# Patient Record
Sex: MALE | Race: WHITE | HISPANIC OR LATINO | Employment: OTHER | URBAN - METROPOLITAN AREA
[De-identification: names, ages, dates, MRNs, and addresses within clinical notes are randomized per-mention and may not be internally consistent; named-entity substitution may affect disease eponyms.]

---

## 2017-01-12 ENCOUNTER — HOSPITAL ENCOUNTER (OUTPATIENT)
Dept: RADIOLOGY | Facility: HOSPITAL | Age: 82
Discharge: HOME | End: 2017-01-12
Attending: OTOLARYNGOLOGY
Payer: MEDICARE

## 2017-01-12 DIAGNOSIS — R05.9 COUGH: ICD-10-CM

## 2017-01-12 PROCEDURE — 71020 XR CHEST PA AND LATERAL: CPT | Mod: 26,,, | Performed by: RADIOLOGY

## 2017-01-12 PROCEDURE — 71020 XR CHEST PA AND LATERAL: CPT | Mod: TC

## 2017-02-21 ENCOUNTER — CLINICAL SUPPORT (OUTPATIENT)
Dept: SPEECH THERAPY | Facility: HOSPITAL | Age: 82
End: 2017-02-21
Attending: OTOLARYNGOLOGY
Payer: MEDICARE

## 2017-02-21 ENCOUNTER — HOSPITAL ENCOUNTER (OUTPATIENT)
Dept: RADIOLOGY | Facility: HOSPITAL | Age: 82
Discharge: HOME | End: 2017-02-21
Attending: OTOLARYNGOLOGY
Payer: MEDICARE

## 2017-02-21 DIAGNOSIS — R13.10 DYSPHAGIA: ICD-10-CM

## 2017-02-21 PROCEDURE — G8997 SWALLOW GOAL STATUS: HCPCS | Mod: CH

## 2017-02-21 PROCEDURE — 74230 X-RAY XM SWLNG FUNCJ C+: CPT | Mod: TC

## 2017-02-21 PROCEDURE — G8996 SWALLOW CURRENT STATUS: HCPCS | Mod: CH

## 2017-02-21 PROCEDURE — 92611 MOTION FLUOROSCOPY/SWALLOW: CPT

## 2017-02-21 PROCEDURE — 74230 X-RAY XM SWLNG FUNCJ C+: CPT | Mod: 26,,, | Performed by: RADIOLOGY

## 2017-02-21 PROCEDURE — G8998 SWALLOW D/C STATUS: HCPCS | Mod: CH

## 2017-02-21 NOTE — PROGRESS NOTES
TIME RECORD    Date: 02/21/2017    Start Time:  10:15  Stop Time:  10:40    PROCEDURES:    TIMED  Procedure Time Min.    Start:  Stop:     Start:  Stop:     Start:  Stop:     Start:  Stop:          UNTIMED  Procedure Time Min.   MBSS Start: 10:15  Stop: 10:40 25    Start:  Stop:      Total Timed Minutes:  25  Total Timed Units:  0  Total Untimed Units:  1  Charges Billed/# of units:  1      REHAB SERVICE VIDEO SWALLOW STUDY    HICN: MRN [id number], [id number]  Onset Date:  2/21/17  SOC Date:  2/21/17  Certification Period:  2/21/17 to 3/21/17   Primary Diagnosis:  Dysphagia, globus sensation when eating solids  Treatment Diagnosis:  n/a  Secondary Diagnosis:  n/a  Pertinent Medical History:    Past Medical History   Diagnosis Date    Arthritis     Diabetes mellitus     DJD (degenerative joint disease)     Hypertension     Prostate enlargement     Thyroid disease      Prior Therapy Dates/Results (same condition):  None prior to swallow study in radiology.  Prior Level of Function:  Independent  Functional Deficits Leading to Referral:  No reports of dysphagia prior to this MBS. Pt referred by ENT, Dr. Miguel, pt reports his primary MD is Dr. Spence  Social Cultural:  Pt lives at home, independent with ADLs, and is a  at his [facility] [employer]. Pt accompanied to MBS with son. Pt is Occitan speaking and can speak broken English. SLP was able to speak to pt in native language.   Nutrition:  Pt reported reg diet/thin liquids prior to MBS.  Signs of Abuse:  No  Medication:  n/a  Alertness/Attention:  Pt was awake and alert. Pt able to orient and state basic autobiographical information to SLP.  Oral Motor:  WFL, pt participated in oral [facility] exam prior to MBS. Pt demonstrated adequate strength and mobility for all oral structures. Pt's voice was observed to be hoarse prior to po trials, as well as after. Pt reported this was a new symptom and believes it is 2/2 to recent cold and coughing.     Patient Position:   Sitting, up right 90 degree posture in fluoro chair in lateral position.    View:  Lateral     Presentations:  Thin cup sips 5cc 1x, 10cc 3x, and 20 cc 1x, and by straw sips 10cc 2x. Pudding self fed 2x. Dental soft (diced peaches) self fed 2x. Solid (cracker) self fed 1x.  Oral Phase:  WFL. Pt demonstrated good bolus control, good mastication, and timely A/P transfer. SLP observed mild premature spillage during po trials, however it is deemed  functional for pt's recommended oral diet at this time.    Pharyngeal Phase:  Pt presents with functional pharyngeal phase of the swallow c/b slight delay of pharyngeal trigger with premature spillage on liquids. When pt consumed cyclical sips of thin from the straw, he had very trace residue in vallecula and was able to clear. However, no observable aspiration noted under fluoro across consistencies and/or pharyngeal residue post swallow. Pt demonstrated adequate epiglottic inversion, good laryngeal elevation/excursion, adequate vocal cord closure and adequate cricopharyngeal opening and relaxation to allow bolus to pass into UES.   On thin liquids, pt demonstrated one episode of mild penetration with straw sips. Pt was able to eject transient penetration from laryngeal vestibule. No material entered passed vocal cords.    Based on this MBSS, pt is deemed functional for eating and drinking and may continue with a regular diet/thin liquids at this time.    Strategies/Compensatory Techniques Utilized:  No straws to ensure safest swallow with liquids. SLP instructed pt to perform double swallows after thin liquids to eliminate vallecula residuals from consecutive straw drinking.    Impressions:  Mr. Martinez presents with a functional oral phase of the swallow. Pt also presents with functional pharyngeal phase of the swallow. Pt demonstrated adequate epiglottic inversion, good laryngeal elevation/excursion, adequate vocal cord closure and adequate cricopharyngeal opening and  relaxation to allow bolus to pass into UES.   On thin liquids, pt demonstrated one episode of mild penetration with straw sips. Pt was able to eject transient penetration from laryngeal vestibule. No material entered passed vocal cords.    Based on this MBSS, pt is deemed functional for eating and drinking and may continue with a regular diet/thin liquids at this time.  SLP recommends regular diet/thin liquids, with double swallows and no straw use to ensure safest swallow for eating and drinking. Although pt observed with mild penetration on thin liquids with use of a straw during MBS and a mild delayed swallow, pt's swallow is functional for oral diet recommended at this time. SLP to be reconsulted with any changes in status.     Recommendations/Precautions:   1. Pt may continue with regular diet/thin liquids, NO straws, double swallow, alternate bites and sips, 1 bite/sip at a time, sit upright at 90 degree angle for all meals.  2. Pt to f/u with Dr. Miguel regarding MBSS results   3. SLP educated pt on above results and son, who acknowledged all information presented to them.    Plan:   1. SLP will send report to Dr. Mcdonough as pt is scheduled for appointment for 2/22/17.  2. SLP provided pt with her contact information incase any questions should arise.    Therapist's Name: JOHN PAUL Horton SLP- Clinician    Therapist's Signature: __________JOHN PAUL HortonSLP- Clinician   Date: 02/21/2017    RADHA Montejo, [facility]-SLP  Speech Pathologist 2/22/2017     I certify that I was present in the room directing the student in service delivery and guiding them using my skilled judgment. As the co-signing therapist I have reviewed the students documentation and am responsible for the treatment, assessment, and plan.         I CERTIFY THE NEED FOR THESE SERVICES FURNISHED UNDER THIS PLAN OF TREATMENT AND WHILE UNDER MY CARE    Physician's comments:  ________________________________________________________________________________________________________________________________________________      Physician's Name: ___________________________________

## 2017-04-11 PROBLEM — I49.1 PREMATURE ATRIAL CONTRACTIONS: Status: ACTIVE | Noted: 2017-04-11

## 2017-04-11 PROBLEM — I49.3 PVCS (PREMATURE VENTRICULAR CONTRACTIONS): Status: ACTIVE | Noted: 2017-04-11

## 2017-10-22 RX ORDER — MONTELUKAST SODIUM 10 MG/1
TABLET ORAL
Qty: 90 TABLET | Refills: 0 | Status: SHIPPED | OUTPATIENT
Start: 2017-10-22 | End: 2017-10-23 | Stop reason: SDUPTHER

## 2017-10-23 RX ORDER — MONTELUKAST SODIUM 10 MG/1
10 TABLET ORAL DAILY
Qty: 90 TABLET | Refills: 0 | Status: SHIPPED | OUTPATIENT
Start: 2017-10-23 | End: 2018-06-28 | Stop reason: SDUPTHER

## 2018-01-02 ENCOUNTER — OFFICE VISIT (OUTPATIENT)
Dept: CARDIOLOGY | Facility: CLINIC | Age: 83
End: 2018-01-02
Payer: MEDICARE

## 2018-01-02 VITALS
HEART RATE: 65 BPM | BODY MASS INDEX: 25.5 KG/M2 | SYSTOLIC BLOOD PRESSURE: 152 MMHG | HEIGHT: 66 IN | DIASTOLIC BLOOD PRESSURE: 72 MMHG | WEIGHT: 158.69 LBS

## 2018-01-02 DIAGNOSIS — R60.0 LOCALIZED EDEMA: ICD-10-CM

## 2018-01-02 DIAGNOSIS — I49.1 PREMATURE ATRIAL CONTRACTIONS: ICD-10-CM

## 2018-01-02 DIAGNOSIS — I49.3 PVCS (PREMATURE VENTRICULAR CONTRACTIONS): ICD-10-CM

## 2018-01-02 DIAGNOSIS — R00.1 SINUS BRADYCARDIA: ICD-10-CM

## 2018-01-02 DIAGNOSIS — R00.2 PALPITATIONS: ICD-10-CM

## 2018-01-02 DIAGNOSIS — E03.9 ACQUIRED HYPOTHYROIDISM: ICD-10-CM

## 2018-01-02 DIAGNOSIS — I10 ESSENTIAL HYPERTENSION: Primary | ICD-10-CM

## 2018-01-02 DIAGNOSIS — E11.9 TYPE 2 DIABETES MELLITUS WITHOUT COMPLICATION, WITHOUT LONG-TERM CURRENT USE OF INSULIN: ICD-10-CM

## 2018-01-02 DIAGNOSIS — I06.1 RHEUMATIC AORTIC VALVE INSUFFICIENCY: ICD-10-CM

## 2018-01-02 DIAGNOSIS — I11.9 HYPERTENSIVE HEART DISEASE WITHOUT HEART FAILURE: ICD-10-CM

## 2018-01-02 PROCEDURE — 99999 PR PBB SHADOW E&M-EST. PATIENT-LVL III: CPT | Mod: PBBFAC,,, | Performed by: INTERNAL MEDICINE

## 2018-01-02 PROCEDURE — 99213 OFFICE O/P EST LOW 20 MIN: CPT | Mod: S$GLB,,, | Performed by: INTERNAL MEDICINE

## 2018-01-02 PROCEDURE — 93000 ELECTROCARDIOGRAM COMPLETE: CPT | Mod: S$GLB,,, | Performed by: INTERNAL MEDICINE

## 2018-01-02 RX ORDER — GLUCOSAMINE/CHONDRO SU A 500-400 MG
1 TABLET ORAL 3 TIMES DAILY
COMMUNITY
End: 2020-03-20

## 2018-01-02 RX ORDER — DICLOFENAC SODIUM 10 MG/G
2 GEL TOPICAL DAILY
COMMUNITY
End: 2020-03-20 | Stop reason: SDUPTHER

## 2018-01-02 RX ORDER — IRBESARTAN 300 MG/1
300 TABLET ORAL NIGHTLY
Qty: 90 TABLET | Refills: 3 | Status: SHIPPED | OUTPATIENT
Start: 2018-01-02 | End: 2019-03-22 | Stop reason: SDUPTHER

## 2018-01-02 RX ORDER — HYDROCHLOROTHIAZIDE 12.5 MG/1
12.5 CAPSULE ORAL DAILY
Qty: 30 CAPSULE | Refills: 11 | Status: SHIPPED | OUTPATIENT
Start: 2018-01-02 | End: 2018-02-05

## 2018-01-02 NOTE — PROGRESS NOTES
Subjective:   Patient ID:  Vince Martinez is a 87 y.o. male     Chief Complaint   Patient presents with    Follow-up     HTN       HPI: Blood pressure 140 to 176 mm Hg systolic at home since doubling the dose of irbesartan.   Pt averages taking one furosemide dose over a month.     Review of Systems   Cardiovascular: Positive for leg swelling (rare, intermittent.) and palpitations (Aware of fast and irregular heart beat when lying on his left side.). Negative for chest pain, claudication, dyspnea on exertion, irregular heartbeat, near-syncope, orthopnea and syncope.       Objective:   Physical Exam   Constitutional: He is oriented to person, place, and time. He appears well-developed and well-nourished. No distress.   HENT:   Head: Normocephalic.   Eyes: No scleral icterus.   Neck: No JVD present.   Cardiovascular: Normal rate and regular rhythm.  Exam reveals no gallop and no friction rub.    Murmur (II/ VI systolic murmur, III/VI diastolic decrescendo murmur) heard.  Pulmonary/Chest: Effort normal and breath sounds normal. No stridor.   Musculoskeletal: He exhibits no edema.   Neurological: He is alert and oriented to person, place, and time.   Skin: Skin is warm and dry. He is not diaphoretic.   Psychiatric: He has a normal mood and affect. His behavior is normal. Judgment and thought content normal.   Vitals reviewed.    ECG: NSR, WNL    Note echo 9/17 showed normal LVEF and mild to moderate AR  Assessment:     1. Essential hypertension    2. Sinus bradycardia    3. Rheumatic aortic valve insufficiency    4. Hypertensive heart disease without heart failure    5. PVCs (premature ventricular contractions)    6. Premature atrial contractions    7. Palpitations        Plan:   Same meds plus add HCTZ 12.5 mg daily  RTC one month with lab

## 2018-02-01 ENCOUNTER — LAB VISIT (OUTPATIENT)
Dept: LAB | Facility: HOSPITAL | Age: 83
End: 2018-02-01
Attending: INTERNAL MEDICINE
Payer: MEDICARE

## 2018-02-01 DIAGNOSIS — I10 ESSENTIAL HYPERTENSION: ICD-10-CM

## 2018-02-01 LAB
ANION GAP SERPL CALC-SCNC: 8 MMOL/L
BUN SERPL-MCNC: 28 MG/DL
CALCIUM SERPL-MCNC: 9.2 MG/DL
CHLORIDE SERPL-SCNC: 105 MMOL/L
CO2 SERPL-SCNC: 26 MMOL/L
CREAT SERPL-MCNC: 1.2 MG/DL
EST. GFR  (AFRICAN AMERICAN): >60 ML/MIN/1.73 M^2
EST. GFR  (NON AFRICAN AMERICAN): 54 ML/MIN/1.73 M^2
GLUCOSE SERPL-MCNC: 162 MG/DL
POTASSIUM SERPL-SCNC: 4.1 MMOL/L
SODIUM SERPL-SCNC: 139 MMOL/L

## 2018-02-01 PROCEDURE — 80048 BASIC METABOLIC PNL TOTAL CA: CPT

## 2018-02-01 PROCEDURE — 36415 COLL VENOUS BLD VENIPUNCTURE: CPT

## 2018-02-05 ENCOUNTER — OFFICE VISIT (OUTPATIENT)
Dept: CARDIOLOGY | Facility: CLINIC | Age: 83
End: 2018-02-05
Payer: MEDICARE

## 2018-02-05 VITALS
WEIGHT: 156.88 LBS | BODY MASS INDEX: 25.21 KG/M2 | DIASTOLIC BLOOD PRESSURE: 60 MMHG | HEIGHT: 66 IN | SYSTOLIC BLOOD PRESSURE: 130 MMHG | HEART RATE: 64 BPM

## 2018-02-05 DIAGNOSIS — R60.0 LOCALIZED EDEMA: ICD-10-CM

## 2018-02-05 DIAGNOSIS — E03.9 ACQUIRED HYPOTHYROIDISM: ICD-10-CM

## 2018-02-05 DIAGNOSIS — I10 ESSENTIAL HYPERTENSION: Primary | ICD-10-CM

## 2018-02-05 DIAGNOSIS — R00.2 PALPITATIONS: ICD-10-CM

## 2018-02-05 DIAGNOSIS — I49.1 PREMATURE ATRIAL CONTRACTIONS: ICD-10-CM

## 2018-02-05 DIAGNOSIS — I49.3 PVCS (PREMATURE VENTRICULAR CONTRACTIONS): ICD-10-CM

## 2018-02-05 DIAGNOSIS — E11.9 TYPE 2 DIABETES MELLITUS WITHOUT COMPLICATION, WITHOUT LONG-TERM CURRENT USE OF INSULIN: ICD-10-CM

## 2018-02-05 DIAGNOSIS — I11.9 HYPERTENSIVE HEART DISEASE WITHOUT HEART FAILURE: ICD-10-CM

## 2018-02-05 DIAGNOSIS — I06.1 RHEUMATIC AORTIC VALVE INSUFFICIENCY: ICD-10-CM

## 2018-02-05 PROCEDURE — 99213 OFFICE O/P EST LOW 20 MIN: CPT | Mod: S$GLB,,, | Performed by: INTERNAL MEDICINE

## 2018-02-05 PROCEDURE — 3008F BODY MASS INDEX DOCD: CPT | Mod: S$GLB,,, | Performed by: INTERNAL MEDICINE

## 2018-02-05 PROCEDURE — 1159F MED LIST DOCD IN RCRD: CPT | Mod: S$GLB,,, | Performed by: INTERNAL MEDICINE

## 2018-02-05 PROCEDURE — 99999 PR PBB SHADOW E&M-EST. PATIENT-LVL II: CPT | Mod: PBBFAC,,, | Performed by: INTERNAL MEDICINE

## 2018-02-05 RX ORDER — HYDROCHLOROTHIAZIDE 12.5 MG/1
12.5 CAPSULE ORAL DAILY
Qty: 90 CAPSULE | Refills: 3 | Status: SHIPPED | OUTPATIENT
Start: 2018-02-05 | End: 2018-08-07

## 2018-02-05 NOTE — PROGRESS NOTES
Subjective:      Patient ID: Vince Martinez is a 87 y.o. male.    Chief Complaint: Follow-up (blood pressure check)    HPI:Feels fine.  Pt still feels heart race when lying on left side.     Review of Systems   Cardiovascular: Positive for palpitations. Negative for chest pain, claudication, dyspnea on exertion, irregular heartbeat, leg swelling, near-syncope, orthopnea and syncope.      Blood pressure has been better at home since adding HCTZ    Sugar OK at home  Past Medical History:   Diagnosis Date    Arthritis     Diabetes mellitus     DJD (degenerative joint disease)     Hypertension     Prostate enlargement     Thyroid disease         Past Surgical History:   Procedure Laterality Date    PROSTATE SURGERY         No family history on file.    Social History     Social History    Marital status:      Spouse name: N/A    Number of children: N/A    Years of education: N/A     Social History Main Topics    Smoking status: Never Smoker    Smokeless tobacco: Never Used    Alcohol use No    Drug use: No    Sexual activity: Not on file     Other Topics Concern    Not on file     Social History Narrative    No narrative on file       Current Outpatient Prescriptions on File Prior to Visit   Medication Sig Dispense Refill    azelastine (ASTELIN) 137 mcg (0.1 %) nasal spray       celecoxib (CELEBREX) 200 MG capsule       cetirizine (ZYRTEC) 10 MG tablet Take 10 mg by mouth once daily.      diclofenac sodium 1 % Gel Apply 2 g topically once daily.      finasteride (PROSCAR) 5 mg tablet Take 5 mg by mouth once daily.       furosemide (LASIX) 20 MG tablet Only uses prn edema      glimepiride (AMARYL) 2 MG tablet Take 2 mg by mouth 3 (three) times daily.   1    glucosamine-chondroitin 500-400 mg tablet Take 1 tablet by mouth 3 (three) times daily.      irbesartan (AVAPRO) 300 MG tablet Take 1 tablet (300 mg total) by mouth every evening. 90 tablet 3    levothyroxine (SYNTHROID) 75 MCG tablet  "      loperamide (IMODIUM A-D) 2 mg Tab Take 2 mg by mouth 4 (four) times daily as needed.      mometasone 0.1% (ELOCON) 0.1 % cream       montelukast (SINGULAIR) 10 mg tablet Take 1 tablet (10 mg total) by mouth once daily. 90 tablet 0    naproxen (NAPROSYN) 375 MG tablet       omeprazole (PRILOSEC) 20 MG capsule Take 20 mg by mouth once daily.      simvastatin (ZOCOR) 20 MG tablet       tamsulosin (FLOMAX) 0.4 mg Cp24 0.4 mg once daily.       tizanidine 4 mg Cap       tramadol (ULTRAM) 50 mg tablet Take 50 mg by mouth every 4 (four) hours as needed.   2    [DISCONTINUED] hydroCHLOROthiazide (MICROZIDE) 12.5 mg capsule Take 1 capsule (12.5 mg total) by mouth once daily. 30 capsule 11     No current facility-administered medications on file prior to visit.        Review of patient's allergies indicates:   Allergen Reactions    Bactrim [sulfamethoxazole-trimethoprim] Rash    Ciprofloxacin Rash     Objective:     Vitals:    02/05/18 1506   BP: 130/60   BP Location: Left arm   Patient Position: Sitting   BP Method: Medium (Manual)   Pulse: 64   Weight: 71.2 kg (156 lb 14.4 oz)   Height: 5' 6" (1.676 m)        Physical Exam   Constitutional: He is oriented to person, place, and time. He appears well-developed and well-nourished. No distress.   Eyes: No scleral icterus.   Neck: No JVD present. Carotid bruit is not present.   Cardiovascular: Regular rhythm.  Exam reveals no gallop and no friction rub.    Murmur (Ii/VI systolic, I/VI diastolic decrescendo ) heard.  Pulmonary/Chest: Effort normal and breath sounds normal. No respiratory distress.   Musculoskeletal: He exhibits edema (trivial).   Neurological: He is alert and oriented to person, place, and time.   Skin: Skin is warm and dry. He is not diaphoretic.   Psychiatric: He has a normal mood and affect. His behavior is normal. Judgment and thought content normal.   Vitals reviewed.     BMP WNL except glu 162, BUN 28    Assessment:     1. Essential " hypertension    2. Hypertensive heart disease without heart failure    3. Rheumatic aortic valve insufficiency    4. Palpitations    5. PVCs (premature ventricular contractions)    6. Premature atrial contractions    7. Type 2 diabetes mellitus without complication, without long-term current use of insulin    8. Acquired hypothyroidism    9. Localized edema      Plan:   Vince was seen today for follow-up.    Diagnoses and all orders for this visit:    Essential hypertension    Hypertensive heart disease without heart failure    Rheumatic aortic valve insufficiency    Palpitations    PVCs (premature ventricular contractions)    Premature atrial contractions    Type 2 diabetes mellitus without complication, without long-term current use of insulin    Acquired hypothyroidism    Localized edema    Other orders  -     hydroCHLOROthiazide (MICROZIDE) 12.5 mg capsule; Take 1 capsule (12.5 mg total) by mouth once daily.     Same meds  RTC 6 months  F/u with Dr Spence    Follow-up in about 6 months (around 8/5/2018).

## 2018-06-28 RX ORDER — MONTELUKAST SODIUM 10 MG/1
TABLET ORAL
Qty: 30 TABLET | Refills: 0 | Status: SHIPPED | OUTPATIENT
Start: 2018-06-28 | End: 2019-01-14 | Stop reason: SDUPTHER

## 2018-07-10 ENCOUNTER — HOSPITAL ENCOUNTER (OUTPATIENT)
Dept: RADIOLOGY | Facility: HOSPITAL | Age: 83
Discharge: HOME | End: 2018-07-10
Attending: INTERNAL MEDICINE
Payer: MEDICARE

## 2018-07-10 DIAGNOSIS — R91.1 LUNG NODULE: ICD-10-CM

## 2018-07-10 DIAGNOSIS — R91.1 LUNG NODULE: Primary | ICD-10-CM

## 2018-07-10 PROCEDURE — 71046 X-RAY EXAM CHEST 2 VIEWS: CPT | Mod: TC,FY

## 2018-07-10 PROCEDURE — 71046 X-RAY EXAM CHEST 2 VIEWS: CPT | Mod: 26,,, | Performed by: RADIOLOGY

## 2018-07-31 RX ORDER — MONTELUKAST SODIUM 10 MG/1
TABLET ORAL
Qty: 30 TABLET | Refills: 0 | OUTPATIENT
Start: 2018-07-31

## 2018-08-06 ENCOUNTER — OFFICE VISIT (OUTPATIENT)
Dept: OTOLARYNGOLOGY | Facility: CLINIC | Age: 83
End: 2018-08-06
Payer: MEDICARE

## 2018-08-06 VITALS
BODY MASS INDEX: 24.85 KG/M2 | WEIGHT: 154.63 LBS | TEMPERATURE: 98 F | SYSTOLIC BLOOD PRESSURE: 87 MMHG | HEART RATE: 74 BPM | HEIGHT: 66 IN | DIASTOLIC BLOOD PRESSURE: 47 MMHG

## 2018-08-06 DIAGNOSIS — J34.89 RHINORRHEA: ICD-10-CM

## 2018-08-06 DIAGNOSIS — R05.9 COUGH: ICD-10-CM

## 2018-08-06 DIAGNOSIS — R49.0 DYSPHONIA: ICD-10-CM

## 2018-08-06 PROCEDURE — 96372 THER/PROPH/DIAG INJ SC/IM: CPT | Mod: 59,S$GLB,, | Performed by: OTOLARYNGOLOGY

## 2018-08-06 PROCEDURE — 99214 OFFICE O/P EST MOD 30 MIN: CPT | Mod: 25,S$GLB,, | Performed by: OTOLARYNGOLOGY

## 2018-08-06 PROCEDURE — 31575 DIAGNOSTIC LARYNGOSCOPY: CPT | Mod: S$GLB,,, | Performed by: OTOLARYNGOLOGY

## 2018-08-06 RX ORDER — IPRATROPIUM BROMIDE 42 UG/1
2 SPRAY, METERED NASAL 4 TIMES DAILY
Qty: 30 ML | Refills: 2 | Status: SHIPPED | OUTPATIENT
Start: 2018-08-06 | End: 2018-08-06 | Stop reason: SDUPTHER

## 2018-08-06 RX ORDER — BETAMETHASONE SODIUM PHOSPHATE AND BETAMETHASONE ACETATE 3; 3 MG/ML; MG/ML
12 INJECTION, SUSPENSION INTRA-ARTICULAR; INTRALESIONAL; INTRAMUSCULAR; SOFT TISSUE
Status: COMPLETED | OUTPATIENT
Start: 2018-08-06 | End: 2018-08-06

## 2018-08-06 RX ORDER — FLUTICASONE PROPIONATE 50 MCG
2 SPRAY, SUSPENSION (ML) NASAL DAILY
Qty: 16 G | Refills: 2 | Status: SHIPPED | OUTPATIENT
Start: 2018-08-06 | End: 2019-04-15 | Stop reason: SDUPTHER

## 2018-08-06 RX ORDER — NYSTATIN 100000 [USP'U]/ML
SUSPENSION ORAL
COMMUNITY
Start: 2018-05-01 | End: 2019-02-07

## 2018-08-06 RX ORDER — IPRATROPIUM BROMIDE 42 UG/1
2 SPRAY, METERED NASAL 4 TIMES DAILY
Qty: 30 ML | Refills: 2 | Status: SHIPPED | OUTPATIENT
Start: 2018-08-06 | End: 2019-01-14 | Stop reason: SDUPTHER

## 2018-08-06 RX ORDER — KETOCONAZOLE 20 MG/ML
SHAMPOO, SUSPENSION TOPICAL
COMMUNITY
Start: 2018-06-08 | End: 2021-04-15

## 2018-08-06 RX ORDER — MOMETASONE FUROATE 1 MG/ML
SOLUTION TOPICAL
COMMUNITY
Start: 2018-06-08 | End: 2018-08-06 | Stop reason: SDUPTHER

## 2018-08-06 RX ORDER — AMLODIPINE BESYLATE 5 MG/1
TABLET ORAL
COMMUNITY
Start: 2018-07-20 | End: 2018-08-07

## 2018-08-06 RX ORDER — METFORMIN HYDROCHLORIDE 500 MG/1
TABLET, EXTENDED RELEASE ORAL
COMMUNITY
Start: 2018-07-20 | End: 2020-07-01 | Stop reason: SDUPTHER

## 2018-08-06 RX ORDER — NEOMYCIN SULFATE, POLYMYXIN B SULFATE, AND DEXAMETHASONE 3.5; 10000; 1 MG/G; [USP'U]/G; MG/G
OINTMENT OPHTHALMIC
COMMUNITY
Start: 2018-06-19 | End: 2020-03-20

## 2018-08-06 RX ORDER — TRIAMCINOLONE ACETONIDE 1 MG/G
CREAM TOPICAL
COMMUNITY
Start: 2018-06-04 | End: 2018-12-20

## 2018-08-06 RX ADMIN — BETAMETHASONE SODIUM PHOSPHATE AND BETAMETHASONE ACETATE 12 MG: 3; 3 INJECTION, SUSPENSION INTRA-ARTICULAR; INTRALESIONAL; INTRAMUSCULAR; SOFT TISSUE at 01:08

## 2018-08-06 NOTE — PROCEDURES
Laryngoscopy  Date/Time: 8/6/2018 1:40 PM  Performed by: AURORA COLLINS  Authorized by: AURORA COLLINS     Consent Done?:  Yes (Verbal)  Anesthesia:     Local anesthetic:  Lidocaine 2% and Javi-Synephrine 1/2%    Patient tolerance:  Patient tolerated the procedure well with no immediate complications    Decongestion performed?: Yes    Laryngoscopy:     Areas examined:  Nasal cavities, nasopharynx, oropharynx, hypopharynx, larynx and vocal cords  Nose External:      No external nasal deformity  Nose Intranasal:      Mucosa no polyps     Mucosa ulcers not present     No mucosa lesions present     Septum gross deformity     Enlarged turbinates  Nasopharynx:      No mucosa lesions     Adenoids not present     Posterior choanae patent  Larynx/hypopharynx:      No epiglottis lesions     No epiglottis edema     No AE folds lesions     No vocal cord polyps     Equal and normal bilateral     No hypopharynx lesions     No piriform sinus pooling     No piriform sinus lesions     Office flexible endoscopy reveals mid septal spur on the left, boggy inferior turbinates, increased mucoid nasal secretions, some bowing of the vocal cords, slightly pink left vocal cord with no lesions seen including the nasopharynx, oropharynx, hypopharynx, larynx.

## 2018-08-06 NOTE — PATIENT INSTRUCTIONS
Start ipratropium nasal spray and use 2 sprays in each nostril 3 - 4 times a day as needed.  Okay to continue OTC Mucinex as needed.  Monitor and follow up blood pressure with PCP.   Keep follow up with Pulmonologist and other specialists.    Follow up here in 2 weeks.

## 2018-08-06 NOTE — PROGRESS NOTES
Subjective:       Patient ID: Vince Martinez is a 87 y.o. male.    Chief Complaint: Cold and Voice problem and Cough    He is an existing patient of mine last seen in March of 2017.  He is here today complaining of cough and runny nose and possible cold for about the past 2 weeks.  Secretions have been clear with no fever or chills or malaise.  There has been no wheezing or chest congestion.  He has been taking Mucinex over the counter with some relief.  He also has had a mild rash recently with itching for which he is taking Benadryl with limited response.  He also complains of gradually worsening voice over the past 6 months or more.  He denies vocal abuse or GERD breakthrough.  He denies throat soreness or swallowing problems at this time.  There is no tobacco use.  He sees pulmonology regularly for stable chest nodule.  His blood pressure is low today and states he saw his PCP within the past 2 weeks.              Review of Systems   Ears: Positive for hearing loss and family history of hearing loss.  Negative for ear pressure, ringing in ear, ear infections, dizziness, head trauma and taken gentramycin/streptomycin.    Nose:  Positive for postnasal drip. Negative for nosebleeds, nasal or sinus surgery and snoring.    Mouth/Throat: Positive for hoarse voice. Negative for pain swallowing, impaired swallowing, throat mass, neck mass, oral ulcers and neck lumps.   Constitutional: Negative for fever, chills and night sweats.    Eyes:  Negative for history of glaucoma.   Cardiovascular:  Positive for history of high blood pressure. Negative for palpitations.   Respiratory:  Positive for recent cough. Negative for asthma, emphysema, history of tuberculosis and shortness of breath.    Gastrointestinal:  Negative for history of stomach ulcers or pain, blood in stool and change in stool.   Other:  Positive for prostate disease, arthritis and rash. Negative for slurred, swollen glands and persistent infections.            Objective:        Vitals:    08/06/18 1148   BP: (!) 87/47   Pulse: 74   Temp: 98.1 °F (36.7 °C)     Body mass index is 24.95 kg/m².  Physical Exam   Constitutional: He is oriented to person, place, and time. He appears well-developed and well-nourished. No distress.   HENT:   Head: Normocephalic and atraumatic.   Right Ear: Tympanic membrane, external ear and ear canal normal.   Left Ear: Tympanic membrane, external ear and ear canal normal.   Mouth/Throat: Uvula is midline, oropharynx is clear and moist and mucous membranes are normal. No oral lesions. No trismus in the jaw. No uvula swelling. No oropharyngeal exudate, posterior oropharyngeal edema or posterior oropharyngeal erythema.   Boggy inferior turbinates with mucoid secretions bilaterally.   Neck: Phonation normal. Neck supple. No tracheal deviation present. No thyromegaly present.   His voice is within normal limits for age and unchanged as compared to prior visits.   Pulmonary/Chest: Effort normal. No stridor. No respiratory distress.   Musculoskeletal: He exhibits no edema.   Lymphadenopathy:     He has no cervical adenopathy.   Neurological: He is alert and oriented to person, place, and time. Coordination normal.   Skin: Skin is warm and dry. Rash noted. He is not diaphoretic.   Psychiatric: He has a normal mood and affect. His behavior is normal. His speech is not slurred.       Tests / Results:        Assessment:       1. Rhinorrhea    2. Cough    3. Dysphonia        Plan:        Reviewed above and office endoscopy and he would like to proceed.  See procedure note.    Start ipratropium nasal spray and use 2 sprays in each nostril 3 - 4 times a day as needed.  Okay to continue OTC Mucinex as needed.  Steroid injection reviewed and he would like to receive this and ordered generic Celestone 2 cc IM today.  Monitor and follow up blood pressure with PCP.   Keep follow up with Pulmonologist and other specialists.    Follow up here in 2 weeks.

## 2018-08-07 ENCOUNTER — OFFICE VISIT (OUTPATIENT)
Dept: CARDIOLOGY | Facility: CLINIC | Age: 83
End: 2018-08-07
Payer: MEDICARE

## 2018-08-07 VITALS
WEIGHT: 153.31 LBS | DIASTOLIC BLOOD PRESSURE: 50 MMHG | BODY MASS INDEX: 24.64 KG/M2 | HEART RATE: 68 BPM | HEIGHT: 66 IN | SYSTOLIC BLOOD PRESSURE: 110 MMHG

## 2018-08-07 DIAGNOSIS — I49.3 PVCS (PREMATURE VENTRICULAR CONTRACTIONS): ICD-10-CM

## 2018-08-07 DIAGNOSIS — I10 ESSENTIAL HYPERTENSION: Primary | ICD-10-CM

## 2018-08-07 DIAGNOSIS — E03.9 ACQUIRED HYPOTHYROIDISM: ICD-10-CM

## 2018-08-07 DIAGNOSIS — I11.9 HYPERTENSIVE HEART DISEASE WITHOUT HEART FAILURE: ICD-10-CM

## 2018-08-07 DIAGNOSIS — R60.0 LOCALIZED EDEMA: ICD-10-CM

## 2018-08-07 DIAGNOSIS — E11.9 TYPE 2 DIABETES MELLITUS WITHOUT COMPLICATION, WITHOUT LONG-TERM CURRENT USE OF INSULIN: ICD-10-CM

## 2018-08-07 DIAGNOSIS — I49.1 PREMATURE ATRIAL CONTRACTIONS: ICD-10-CM

## 2018-08-07 DIAGNOSIS — I06.1 RHEUMATIC AORTIC VALVE INSUFFICIENCY: ICD-10-CM

## 2018-08-07 DIAGNOSIS — R00.2 PALPITATIONS: ICD-10-CM

## 2018-08-07 PROCEDURE — 93000 ELECTROCARDIOGRAM COMPLETE: CPT | Mod: S$GLB,,, | Performed by: INTERNAL MEDICINE

## 2018-08-07 PROCEDURE — 99999 PR PBB SHADOW E&M-EST. PATIENT-LVL III: CPT | Mod: PBBFAC,,, | Performed by: INTERNAL MEDICINE

## 2018-08-07 PROCEDURE — 99214 OFFICE O/P EST MOD 30 MIN: CPT | Mod: S$GLB,,, | Performed by: INTERNAL MEDICINE

## 2018-08-07 NOTE — PROGRESS NOTES
"  Subjective:      Patient ID: Vince Martinez is a 87 y.o. male.    Chief Complaint: Hypotension (BP yesterday at Dr Machado office was 87/47)    HPI:  Pt c/o "shaking " of hands and weakness.  Blood pressure at Dr Miguel's office was 87/47.  No fall but "I almost fell."  Balance is off. Pt has trouble walking straight.     Review of Systems   Cardiovascular: Positive for chest pain (Minor left chest pain lasting a few hours last week. ), irregular heartbeat, leg swelling (chronic, mild), near-syncope and palpitations (qhs when lying on left side pt is aware of his heart beating). Negative for claudication, dyspnea on exertion, orthopnea and syncope.      Pt wears stockings to help edema     Past Medical History:   Diagnosis Date    Arthritis     Diabetes mellitus     DJD (degenerative joint disease)     Hypertension     Prostate enlargement     Thyroid disease         Past Surgical History:   Procedure Laterality Date    PROSTATE SURGERY         No family history on file.    Social History     Social History    Marital status:      Spouse name: N/A    Number of children: N/A    Years of education: N/A     Social History Main Topics    Smoking status: Never Smoker    Smokeless tobacco: Never Used    Alcohol use No    Drug use: No    Sexual activity: Not Asked     Other Topics Concern    None     Social History Narrative    None       Current Outpatient Prescriptions on File Prior to Visit   Medication Sig Dispense Refill    azelastine (ASTELIN) 137 mcg (0.1 %) nasal spray       celecoxib (CELEBREX) 200 MG capsule       cetirizine (ZYRTEC) 10 MG tablet Take 10 mg by mouth once daily.      diclofenac sodium 1 % Gel Apply 2 g topically once daily.      finasteride (PROSCAR) 5 mg tablet Take 5 mg by mouth once daily.       fluticasone (FLONASE) 50 mcg/actuation nasal spray 2 sprays (100 mcg total) by Each Nare route once daily. 16 g 2    furosemide (LASIX) 20 MG tablet Only uses prn " edema      glimepiride (AMARYL) 2 MG tablet Take 2 mg by mouth 3 (three) times daily.   1    glucosamine-chondroitin 500-400 mg tablet Take 1 tablet by mouth 3 (three) times daily.      ipratropium (ATROVENT) 42 mcg (0.06 %) nasal spray 2 sprays by Nasal route 4 (four) times daily. 30 mL 2    irbesartan (AVAPRO) 300 MG tablet Take 1 tablet (300 mg total) by mouth every evening. 90 tablet 3    ketoconazole (NIZORAL) 2 % shampoo       levothyroxine (SYNTHROID) 75 MCG tablet       loperamide (IMODIUM A-D) 2 mg Tab Take 2 mg by mouth 4 (four) times daily as needed.      metFORMIN (GLUCOPHAGE-XR) 500 MG 24 hr tablet       mometasone 0.1% (ELOCON) 0.1 % cream       montelukast (SINGULAIR) 10 mg tablet TAKE ONE TABLET BY MOUTH ONCE DAILY 30 tablet 0    naproxen (NAPROSYN) 375 MG tablet       neomycin-polymyxin-dexamethasone (DEXACINE) 3.5 mg/g-10,000 unit/g-0.1 % Oint       nystatin (MYCOSTATIN) 100,000 unit/mL suspension       omeprazole (PRILOSEC) 20 MG capsule Take 20 mg by mouth once daily.      simvastatin (ZOCOR) 20 MG tablet       tamsulosin (FLOMAX) 0.4 mg Cp24 0.4 mg once daily.       tizanidine 4 mg Cap       tramadol (ULTRAM) 50 mg tablet Take 50 mg by mouth every 4 (four) hours as needed.   2    triamcinolone acetonide 0.1% (KENALOG) 0.1 % cream       [DISCONTINUED] amLODIPine (NORVASC) 5 MG tablet       [DISCONTINUED] hydroCHLOROthiazide (MICROZIDE) 12.5 mg capsule Take 1 capsule (12.5 mg total) by mouth once daily. 90 capsule 3     No current facility-administered medications on file prior to visit.        Review of patient's allergies indicates:   Allergen Reactions    Bactrim [sulfamethoxazole-trimethoprim] Rash    Ciprofloxacin Rash     Objective:     Vitals:    08/07/18 1437 08/07/18 1439   BP: (!) 118/58 (!) 110/50   BP Location: Left arm Right arm   Patient Position: Sitting Sitting   BP Method: Large (Manual) Large (Manual)   Pulse: 68    Weight: 69.5 kg (153 lb 4.8 oz)   "  Height: 5' 6" (1.676 m)         Physical Exam   Constitutional: He is oriented to person, place, and time. He appears well-developed and well-nourished. No distress.   Eyes: No scleral icterus.   Neck: No JVD present. Carotid bruit is not present.   Cardiovascular: Regular rhythm.  Exam reveals no gallop and no friction rub.    Murmur (III/VI systolic) heard.  Pulmonary/Chest: Effort normal and breath sounds normal. No respiratory distress.   Musculoskeletal: He exhibits edema (trace to one plus edema bilaterally).   Neurological: He is alert and oriented to person, place, and time.   Skin: Skin is warm and dry. He is not diaphoretic.   Psychiatric: He has a normal mood and affect. His behavior is normal. Judgment and thought content normal.   Vitals reviewed.     ECG: NSR LAHB, unchanged compared with last year's ECG  Assessment:     1. Essential hypertension    2. Hypertensive heart disease without heart failure    3. Rheumatic aortic valve insufficiency    4. Palpitations    5. PVCs (premature ventricular contractions)    6. Premature atrial contractions    7. Type 2 diabetes mellitus without complication, without long-term current use of insulin    8. Acquired hypothyroidism    9. Localized edema      Plan:   Vince was seen today for hypotension.    Diagnoses and all orders for this visit:    Essential hypertension  -     IN OFFICE EKG 12-LEAD (to [facility])  -     CBC auto differential; Future  -     Comprehensive metabolic panel; Future  -     TSH; Future    Hypertensive heart disease without heart failure    Rheumatic aortic valve insufficiency    Palpitations    PVCs (premature ventricular contractions)    Premature atrial contractions    Type 2 diabetes mellitus without complication, without long-term current use of insulin    Acquired hypothyroidism    Localized edema       Due to low blood pressure and weakness consistent with symptomatic orthostatic hypotension will discontinue the amlodipine and HCTZ  RTC " one week.   Check lab--first we will check recent lab done for dr Spence  Follow-up in about 7 days (around 8/14/2018).

## 2018-08-14 ENCOUNTER — OFFICE VISIT (OUTPATIENT)
Dept: CARDIOLOGY | Facility: CLINIC | Age: 83
End: 2018-08-14
Payer: MEDICARE

## 2018-08-14 VITALS
SYSTOLIC BLOOD PRESSURE: 138 MMHG | HEIGHT: 66 IN | BODY MASS INDEX: 24.57 KG/M2 | HEART RATE: 76 BPM | DIASTOLIC BLOOD PRESSURE: 60 MMHG | WEIGHT: 152.88 LBS

## 2018-08-14 DIAGNOSIS — I06.1 RHEUMATIC AORTIC VALVE INSUFFICIENCY: ICD-10-CM

## 2018-08-14 DIAGNOSIS — I49.3 PVCS (PREMATURE VENTRICULAR CONTRACTIONS): ICD-10-CM

## 2018-08-14 DIAGNOSIS — R00.2 PALPITATIONS: ICD-10-CM

## 2018-08-14 DIAGNOSIS — I11.9 HYPERTENSIVE HEART DISEASE WITHOUT HEART FAILURE: ICD-10-CM

## 2018-08-14 DIAGNOSIS — E03.9 ACQUIRED HYPOTHYROIDISM: ICD-10-CM

## 2018-08-14 DIAGNOSIS — E11.9 TYPE 2 DIABETES MELLITUS WITHOUT COMPLICATION, WITHOUT LONG-TERM CURRENT USE OF INSULIN: ICD-10-CM

## 2018-08-14 DIAGNOSIS — I10 ESSENTIAL HYPERTENSION: Primary | ICD-10-CM

## 2018-08-14 DIAGNOSIS — R60.0 LOCALIZED EDEMA: ICD-10-CM

## 2018-08-14 DIAGNOSIS — I49.1 PREMATURE ATRIAL CONTRACTIONS: ICD-10-CM

## 2018-08-14 PROCEDURE — 99999 PR PBB SHADOW E&M-EST. PATIENT-LVL II: CPT | Mod: PBBFAC,,, | Performed by: INTERNAL MEDICINE

## 2018-08-14 PROCEDURE — 99214 OFFICE O/P EST MOD 30 MIN: CPT | Mod: S$GLB,,, | Performed by: INTERNAL MEDICINE

## 2018-08-14 NOTE — PROGRESS NOTES
Subjective:      Patient ID: Vince Martinez is a 87 y.o. male.    Chief Complaint: Follow-up    HPI:  Pt saw Dr Michelle Sunshine for stable pulmonary nodule  Pt still feels off balance.  Pt is taking cetirizine for rash which is pruritic.      Review of Systems   Cardiovascular: Positive for leg swelling. Negative for chest pain, claudication, dyspnea on exertion, irregular heartbeat, near-syncope, orthopnea, palpitations and syncope.        Past Medical History:   Diagnosis Date    Arthritis     Diabetes mellitus     DJD (degenerative joint disease)     Hypertension     Prostate enlargement     Thyroid disease         Past Surgical History:   Procedure Laterality Date    PROSTATE SURGERY         No family history on file.    Social History     Socioeconomic History    Marital status:      Spouse name: None    Number of children: None    Years of education: None    Highest education level: None   Social Needs    Financial resource strain: None    Food insecurity - worry: None    Food insecurity - inability: None    Transportation needs - medical: None    Transportation needs - non-medical: None   Occupational History    None   Tobacco Use    Smoking status: Never Smoker    Smokeless tobacco: Never Used   Substance and Sexual Activity    Alcohol use: No    Drug use: No    Sexual activity: None   Other Topics Concern    None   Social History Narrative    None       Current Outpatient Medications on File Prior to Visit   Medication Sig Dispense Refill    azelastine (ASTELIN) 137 mcg (0.1 %) nasal spray       celecoxib (CELEBREX) 200 MG capsule       cetirizine (ZYRTEC) 10 MG tablet Take 10 mg by mouth once daily.      diclofenac sodium 1 % Gel Apply 2 g topically once daily.      finasteride (PROSCAR) 5 mg tablet Take 5 mg by mouth once daily.       fluticasone (FLONASE) 50 mcg/actuation nasal spray 2 sprays (100 mcg total) by Each Nare route once daily. 16 g 2    furosemide (LASIX) 20  "MG tablet Only uses prn edema      glimepiride (AMARYL) 2 MG tablet Take 2 mg by mouth 3 (three) times daily.   1    glucosamine-chondroitin 500-400 mg tablet Take 1 tablet by mouth 3 (three) times daily.      ipratropium (ATROVENT) 42 mcg (0.06 %) nasal spray 2 sprays by Nasal route 4 (four) times daily. 30 mL 2    irbesartan (AVAPRO) 300 MG tablet Take 1 tablet (300 mg total) by mouth every evening. 90 tablet 3    ketoconazole (NIZORAL) 2 % shampoo       levothyroxine (SYNTHROID) 75 MCG tablet       loperamide (IMODIUM A-D) 2 mg Tab Take 2 mg by mouth 4 (four) times daily as needed.      metFORMIN (GLUCOPHAGE-XR) 500 MG 24 hr tablet       mometasone 0.1% (ELOCON) 0.1 % cream       montelukast (SINGULAIR) 10 mg tablet TAKE ONE TABLET BY MOUTH ONCE DAILY 30 tablet 0    naproxen (NAPROSYN) 375 MG tablet       neomycin-polymyxin-dexamethasone (DEXACINE) 3.5 mg/g-10,000 unit/g-0.1 % Oint       nystatin (MYCOSTATIN) 100,000 unit/mL suspension       omeprazole (PRILOSEC) 20 MG capsule Take 20 mg by mouth once daily.      simvastatin (ZOCOR) 20 MG tablet       tamsulosin (FLOMAX) 0.4 mg Cp24 0.4 mg once daily.       tizanidine 4 mg Cap       tramadol (ULTRAM) 50 mg tablet Take 50 mg by mouth every 4 (four) hours as needed.   2    triamcinolone acetonide 0.1% (KENALOG) 0.1 % cream        No current facility-administered medications on file prior to visit.        Review of patient's allergies indicates:   Allergen Reactions    Bactrim [sulfamethoxazole-trimethoprim] Rash    Ciprofloxacin Rash     Objective:     Vitals:    08/14/18 1540   BP: 138/60   BP Location: Left arm   Patient Position: Sitting   Pulse: 76   Weight: 69.4 kg (152 lb 14.4 oz)   Height: 5' 6" (1.676 m)        Physical Exam   Constitutional: He is oriented to person, place, and time. He appears well-developed and well-nourished. No distress.   Eyes: No scleral icterus.   Neck: No JVD present. Carotid bruit is not present. "   Cardiovascular: Regular rhythm. Exam reveals no gallop and no friction rub.   Murmur (III/VI systolic murmur, I/VI distolic decrescendo murmur) heard.  Pulmonary/Chest: Effort normal and breath sounds normal. No respiratory distress.   Musculoskeletal: He exhibits edema (trace pitting edema bilaterally).   Neurological: He is alert and oriented to person, place, and time.   Skin: Skin is warm and dry. He is not diaphoretic.   Psychiatric: He has a normal mood and affect. His behavior is normal. Judgment and thought content normal.   Vitals reviewed.   Pt has a vey fine rash localized under his breasts and in the skin folds of his axillae      Recent lab from Dr Spence reviewed CMP OK, Hgb 11.4  HbA1C 8.6  Assessment:     1. Essential hypertension    2. Hypertensive heart disease without heart failure    3. Rheumatic aortic valve insufficiency    4. Palpitations    5. PVCs (premature ventricular contractions)    6. Premature atrial contractions    7. Type 2 diabetes mellitus without complication, without long-term current use of insulin    8. Acquired hypothyroidism    9. Localized edema      Plan:   Vince was seen today for follow-up.    Diagnoses and all orders for this visit:    Essential hypertension    Hypertensive heart disease without heart failure    Rheumatic aortic valve insufficiency    Palpitations    PVCs (premature ventricular contractions)    Premature atrial contractions    Type 2 diabetes mellitus without complication, without long-term current use of insulin    Acquired hypothyroidism    Localized edema     Hypotension has resolved off amlodipine and HCTZ      Try Lotrimin for skin rash since it may be fungal or yeast in intertriginous areas  Try getting off the naproxen and Celebrex and substituting Tylenol to see if rash improves  See Dr Spence or dermatologist.  Same meds otherwise.  Follow-up in about 3 months (around 11/14/2018).

## 2018-10-10 ENCOUNTER — TELEPHONE (OUTPATIENT)
Dept: OTOLARYNGOLOGY | Facility: CLINIC | Age: 83
End: 2018-10-10

## 2018-10-10 NOTE — TELEPHONE ENCOUNTER
Call returned to Dr. Camargo and reviewed / discussed old record including numerous prior discussions re ERNST and pt's informed refusal of treatment.  He will encourage pt to comply as well and follow up.

## 2018-10-10 NOTE — TELEPHONE ENCOUNTER
Dr. Camargo called on behalf of a mutual patient Mr Vince Martinez. Pt has sleep apnea but not using CPAP machine. He has Fibromyalgia, is currently taking 21 medications. He recommending use of CPAP machine to help with his pain instead of prescribing pain meds. Please call Dr Camargo at your earliest convenience.       ----- Message from Cindi May sent at 10/10/2018  3:27 PM CDT -----  Contact: Dr. Camargo   Name of Who is Calling: Rommel Moses       What is the request in detail:Rommel Moses requesting a call back from staff in regards to the patient. Please contact to further discuss and advise      Can the clinic reply by [facility]: No       What Number to Call Back if not in [facility]: [phone]

## 2018-11-09 ENCOUNTER — TELEPHONE (OUTPATIENT)
Dept: CARDIOLOGY | Facility: CLINIC | Age: 83
End: 2018-11-09

## 2018-11-15 ENCOUNTER — OFFICE VISIT (OUTPATIENT)
Dept: CARDIOLOGY | Facility: CLINIC | Age: 83
End: 2018-11-15
Payer: MEDICARE

## 2018-11-15 VITALS
HEART RATE: 60 BPM | BODY MASS INDEX: 26.68 KG/M2 | SYSTOLIC BLOOD PRESSURE: 159 MMHG | DIASTOLIC BLOOD PRESSURE: 55 MMHG | HEIGHT: 66 IN | WEIGHT: 166 LBS

## 2018-11-15 DIAGNOSIS — I10 ESSENTIAL HYPERTENSION: ICD-10-CM

## 2018-11-15 DIAGNOSIS — I49.3 PVCS (PREMATURE VENTRICULAR CONTRACTIONS): ICD-10-CM

## 2018-11-15 DIAGNOSIS — I06.1 RHEUMATIC AORTIC VALVE INSUFFICIENCY: ICD-10-CM

## 2018-11-15 DIAGNOSIS — I50.41 ACUTE COMBINED SYSTOLIC AND DIASTOLIC CHF, NYHA CLASS 3: Primary | ICD-10-CM

## 2018-11-15 DIAGNOSIS — R60.0 LOCALIZED EDEMA: ICD-10-CM

## 2018-11-15 DIAGNOSIS — I49.1 PREMATURE ATRIAL CONTRACTIONS: ICD-10-CM

## 2018-11-15 PROCEDURE — 99999 PR PBB SHADOW E&M-EST. PATIENT-LVL III: CPT | Mod: PBBFAC,,, | Performed by: INTERNAL MEDICINE

## 2018-11-15 PROCEDURE — 93000 ELECTROCARDIOGRAM COMPLETE: CPT | Mod: S$GLB,,, | Performed by: INTERNAL MEDICINE

## 2018-11-15 PROCEDURE — 99214 OFFICE O/P EST MOD 30 MIN: CPT | Mod: 25,S$GLB,, | Performed by: INTERNAL MEDICINE

## 2018-11-15 PROCEDURE — 1101F PT FALLS ASSESS-DOCD LE1/YR: CPT | Mod: CPTII,S$GLB,, | Performed by: INTERNAL MEDICINE

## 2018-11-15 RX ORDER — LEVOTHYROXINE SODIUM 100 UG/1
TABLET ORAL
COMMUNITY
Start: 2018-11-05 | End: 2020-06-12 | Stop reason: SDUPTHER

## 2018-11-15 RX ORDER — FUROSEMIDE 40 MG/1
40 TABLET ORAL DAILY
Qty: 30 TABLET | Refills: 11 | Status: SHIPPED | OUTPATIENT
Start: 2018-11-15 | End: 2019-09-12 | Stop reason: SDUPTHER

## 2018-11-15 RX ORDER — FUROSEMIDE 40 MG/1
40 TABLET ORAL 2 TIMES DAILY
Qty: 60 TABLET | Refills: 11 | Status: SHIPPED | OUTPATIENT
Start: 2018-11-15 | End: 2018-11-15

## 2018-11-15 NOTE — PROGRESS NOTES
Subjective:      Patient ID: Vince Martinez is a 88 y.o. male.    Chief Complaint: Leg Swelling    HPI:  Pt c/o worse swelling of legs over past couple of months.  Pt ran out of furosemide.  In the past pt took HCTZ and metolazone but had to stop due to kidney issues.  Pt feels a little short of breath when he walks too much.  This is a new problem.  Pt feels short of breath when he lies on his back.  Pt takes Celebrex two capsules daily for arthritis all over.  FBS 64 this AM    Review of Systems   Cardiovascular: Positive for dyspnea on exertion, leg swelling and orthopnea. Negative for chest pain, claudication, irregular heartbeat, near-syncope, palpitations and syncope.      Pt recently saw Dr Joe  Past Medical History:   Diagnosis Date    Arthritis     Diabetes mellitus     DJD (degenerative joint disease)     Hypertension     Prostate enlargement     Thyroid disease         Past Surgical History:   Procedure Laterality Date    PROSTATE SURGERY         No family history on file.    Social History     Socioeconomic History    Marital status:      Spouse name: None    Number of children: None    Years of education: None    Highest education level: None   Social Needs    Financial resource strain: None    Food insecurity - worry: None    Food insecurity - inability: None    Transportation needs - medical: None    Transportation needs - non-medical: None   Occupational History    None   Tobacco Use    Smoking status: Never Smoker    Smokeless tobacco: Never Used   Substance and Sexual Activity    Alcohol use: No    Drug use: No    Sexual activity: None   Other Topics Concern    None   Social History Narrative    None       Current Outpatient Medications on File Prior to Visit   Medication Sig Dispense Refill    azelastine (ASTELIN) 137 mcg (0.1 %) nasal spray       celecoxib (CELEBREX) 200 MG capsule       cetirizine (ZYRTEC) 10 MG tablet Take 10 mg by mouth once daily.       diclofenac sodium 1 % Gel Apply 2 g topically once daily.      finasteride (PROSCAR) 5 mg tablet Take 5 mg by mouth once daily.       fluticasone (FLONASE) 50 mcg/actuation nasal spray 2 sprays (100 mcg total) by Each Nare route once daily. 16 g 2    glimepiride (AMARYL) 2 MG tablet Take 2 mg by mouth 3 (three) times daily.   1    glucosamine-chondroitin 500-400 mg tablet Take 1 tablet by mouth 3 (three) times daily.      ipratropium (ATROVENT) 42 mcg (0.06 %) nasal spray 2 sprays by Nasal route 4 (four) times daily. 30 mL 2    irbesartan (AVAPRO) 300 MG tablet Take 1 tablet (300 mg total) by mouth every evening. 90 tablet 3    ketoconazole (NIZORAL) 2 % shampoo       levothyroxine (SYNTHROID) 100 MCG tablet       loperamide (IMODIUM A-D) 2 mg Tab Take 2 mg by mouth 4 (four) times daily as needed.      metFORMIN (GLUCOPHAGE-XR) 500 MG 24 hr tablet       mometasone 0.1% (ELOCON) 0.1 % cream       montelukast (SINGULAIR) 10 mg tablet TAKE ONE TABLET BY MOUTH ONCE DAILY 30 tablet 0    neomycin-polymyxin-dexamethasone (DEXACINE) 3.5 mg/g-10,000 unit/g-0.1 % Oint       nystatin (MYCOSTATIN) 100,000 unit/mL suspension       omeprazole (PRILOSEC) 20 MG capsule Take 20 mg by mouth once daily.      simvastatin (ZOCOR) 20 MG tablet       tamsulosin (FLOMAX) 0.4 mg Cp24 0.4 mg once daily.       tizanidine 4 mg Cap       tramadol (ULTRAM) 50 mg tablet Take 50 mg by mouth every 4 (four) hours as needed.   2    triamcinolone acetonide 0.1% (KENALOG) 0.1 % cream       [DISCONTINUED] levothyroxine (SYNTHROID) 75 MCG tablet       naproxen (NAPROSYN) 375 MG tablet       [DISCONTINUED] furosemide (LASIX) 20 MG tablet Only uses prn edema       No current facility-administered medications on file prior to visit.        Review of patient's allergies indicates:   Allergen Reactions    Bactrim [sulfamethoxazole-trimethoprim] Rash    Ciprofloxacin Rash     Objective:     Vitals:    11/15/18 1528   BP: (!) 159/55  "  BP Location: Right arm   Patient Position: Sitting   BP Method: Large (Automatic)   Pulse: 60   Weight: 75.3 kg (166 lb)   Height: 5' 6" (1.676 m)        Physical Exam   Constitutional: He is oriented to person, place, and time. He appears well-developed and well-nourished. No distress.   Eyes: No scleral icterus.   Neck: No JVD present. Carotid bruit is not present.   Cardiovascular: Regular rhythm. Exam reveals no gallop and no friction rub.   Murmur (III/VI systolic murmur, II/VI diastolic decrescendo murmur) heard.  Pulmonary/Chest: Effort normal. No respiratory distress. He has rales.   Musculoskeletal: He exhibits edema (one to two plus edema bilaterally).   Neurological: He is alert and oriented to person, place, and time.   Skin: Skin is warm and dry. He is not diaphoretic.   Psychiatric: He has a normal mood and affect. His behavior is normal. Judgment and thought content normal.   Vitals reviewed.     Wt up 13 lbs since August    ECG--sinus bradycardia, left axis deviation    Note echocardiogram last year showed LVH, normal LVEF, and mild to moderate AR  Assessment:     1. Acute combined systolic and diastolic CHF, NYHA class 3    2. Essential hypertension    3. Rheumatic aortic valve insufficiency    4. PVCs (premature ventricular contractions)    5. Premature atrial contractions    6. Localized edema      Plan:   Vince was seen today for leg swelling.    Diagnoses and all orders for this visit:    Acute combined systolic and diastolic CHF, NYHA class 3  -     IN OFFICE EKG 12-LEAD (to [facility])  -     Transthoracic echo (TTE) complete ([facility] Only); Future    Essential hypertension  -     IN OFFICE EKG 12-LEAD (to [facility])    Rheumatic aortic valve insufficiency    PVCs (premature ventricular contractions)    Premature atrial contractions    Localized edema     Discontinue Celebrex and Aleve    Substitute Tylenol for pain    Begin furosemide 40 mg daily until the edema is gone and then prn    RTC one week "     CXR    Echocardiogram with doppler    CBC and CMP and BNP and TSH    Follow-up in about 7 days (around 11/22/2018).

## 2018-11-20 ENCOUNTER — TELEPHONE (OUTPATIENT)
Dept: CARDIOLOGY | Facility: CLINIC | Age: 83
End: 2018-11-20

## 2018-11-20 ENCOUNTER — HOSPITAL ENCOUNTER (OUTPATIENT)
Dept: RADIOLOGY | Facility: HOSPITAL | Age: 83
Discharge: HOME | End: 2018-11-20
Attending: INTERNAL MEDICINE
Payer: MEDICARE

## 2018-11-20 ENCOUNTER — HOSPITAL ENCOUNTER (OUTPATIENT)
Dept: CARDIOLOGY | Facility: HOSPITAL | Age: 83
Discharge: HOME | End: 2018-11-20
Attending: INTERNAL MEDICINE
Payer: MEDICARE

## 2018-11-20 DIAGNOSIS — I50.41 ACUTE COMBINED SYSTOLIC AND DIASTOLIC CHF, NYHA CLASS 3: ICD-10-CM

## 2018-11-20 LAB
AORTIC ROOT ANNULUS: 3.64 CM
AORTIC VALVE CUSP SEPERATION: 2.19 CM
AV MEAN GRADIENT: 5.69 MMHG
AV PEAK GRADIENT: 13.25 MMHG
AV REGURGITATION PRESSURE HALF TIME: 441 MS
AV VALVE AREA: 2.72 CM2
CV ECHO LV RWT: 0.55 CM
DOP CALC AO PEAK VEL: 1.82 M/S
DOP CALC AO VTI: 37.78 CM
DOP CALC LVOT AREA: 3.05 CM2
DOP CALC LVOT DIAMETER: 1.97 CM
DOP CALC LVOT STROKE VOLUME: 102.7 CM3
DOP CALCLVOT PEAK VEL VTI: 33.71 CM
E WAVE DECELERATION TIME: 378.06 MSEC
E/A RATIO: 0.58
ECHO LV POSTERIOR WALL: 1.3 CM (ref 0.6–1.1)
FRACTIONAL SHORTENING: 38 % (ref 28–44)
INTERVENTRICULAR SEPTUM: 1.51 CM (ref 0.6–1.1)
IVRT: 0.11 MSEC
LEFT ATRIUM SIZE: 2.57 CM
LEFT INTERNAL DIMENSION IN SYSTOLE: 2.91 CM (ref 2.1–4)
LEFT VENTRICLE DIASTOLIC VOLUME: 101.7 ML
LEFT VENTRICLE SYSTOLIC VOLUME: 32.41 ML
LEFT VENTRICULAR INTERNAL DIMENSION IN DIASTOLE: 4.69 CM (ref 3.5–6)
LEFT VENTRICULAR MASS: 265.77 G
LV LATERAL E/E' RATIO: 9.83
MV PEAK A VEL: 1.02 M/S
MV PEAK E VEL: 0.59 M/S
PISA TR MAX VEL: 1.73 M/S
PULM VEIN S/D RATIO: 2.29
PV PEAK D VEL: 0.28 M/S
PV PEAK S VEL: 0.64 M/S
PV PEAK VELOCITY: 1.15 CM/S
RA PRESSURE: 3 MMHG
RIGHT VENTRICULAR END-DIASTOLIC DIMENSION: 2.87 CM
TDI LATERAL: 0.06
TR MAX PG: 11.97 MMHG
TV REST PULMONARY ARTERY PRESSURE: 14.97 MMHG

## 2018-11-20 PROCEDURE — 93306 TTE W/DOPPLER COMPLETE: CPT | Mod: 26,,, | Performed by: INTERNAL MEDICINE

## 2018-11-20 PROCEDURE — 71046 X-RAY EXAM CHEST 2 VIEWS: CPT | Mod: TC,FY

## 2018-11-20 PROCEDURE — 93306 TTE W/DOPPLER COMPLETE: CPT

## 2018-11-20 PROCEDURE — 71046 X-RAY EXAM CHEST 2 VIEWS: CPT | Mod: 26,,, | Performed by: RADIOLOGY

## 2018-11-21 NOTE — TELEPHONE ENCOUNTER
CMP OK  BNP WNL   Echocardiogram shows normal LVEF  CXR shows clear lungs  Hgb 12.9  TSH WNL    Edema is more likely to be due to venous insufficiency than CHF    NSAID's have been discontinued    OK to take furosemide prn.    Discussed with pt

## 2018-11-26 ENCOUNTER — OFFICE VISIT (OUTPATIENT)
Dept: OTOLARYNGOLOGY | Facility: CLINIC | Age: 83
End: 2018-11-26
Payer: MEDICARE

## 2018-11-26 VITALS
HEART RATE: 64 BPM | DIASTOLIC BLOOD PRESSURE: 59 MMHG | TEMPERATURE: 97 F | SYSTOLIC BLOOD PRESSURE: 135 MMHG | WEIGHT: 158.88 LBS | BODY MASS INDEX: 25.54 KG/M2 | HEIGHT: 66 IN

## 2018-11-26 DIAGNOSIS — R43.8 REDUCED SENSE OF SMELL: ICD-10-CM

## 2018-11-26 DIAGNOSIS — R49.0 DYSPHONIA: ICD-10-CM

## 2018-11-26 DIAGNOSIS — E11.9 TYPE 2 DIABETES MELLITUS WITHOUT COMPLICATION, WITHOUT LONG-TERM CURRENT USE OF INSULIN: ICD-10-CM

## 2018-11-26 DIAGNOSIS — J34.89 RHINORRHEA: ICD-10-CM

## 2018-11-26 PROCEDURE — 1101F PT FALLS ASSESS-DOCD LE1/YR: CPT | Mod: CPTII,S$GLB,, | Performed by: OTOLARYNGOLOGY

## 2018-11-26 PROCEDURE — 99214 OFFICE O/P EST MOD 30 MIN: CPT | Mod: S$GLB,,, | Performed by: OTOLARYNGOLOGY

## 2018-11-26 RX ORDER — METHYLPREDNISOLONE 4 MG/1
TABLET ORAL
Qty: 1 PACKAGE | Refills: 0 | Status: SHIPPED | OUTPATIENT
Start: 2018-11-26 | End: 2019-02-07

## 2018-11-26 NOTE — PATIENT INSTRUCTIONS
Take steroid dose pack according to the instructions and take with food in the morning.    Continue fluticasone nasal spray and okay to use Atrovent spray also as needed for drippy nose.    Return in 2 weeks.

## 2018-11-27 ENCOUNTER — OFFICE VISIT (OUTPATIENT)
Dept: CARDIOLOGY | Facility: CLINIC | Age: 83
End: 2018-11-27
Payer: MEDICARE

## 2018-11-27 VITALS
DIASTOLIC BLOOD PRESSURE: 50 MMHG | BODY MASS INDEX: 26.3 KG/M2 | HEIGHT: 65 IN | SYSTOLIC BLOOD PRESSURE: 128 MMHG | WEIGHT: 157.88 LBS | HEART RATE: 80 BPM

## 2018-11-27 DIAGNOSIS — R00.2 PALPITATIONS: ICD-10-CM

## 2018-11-27 DIAGNOSIS — I10 ESSENTIAL HYPERTENSION: ICD-10-CM

## 2018-11-27 DIAGNOSIS — I49.1 PREMATURE ATRIAL CONTRACTIONS: ICD-10-CM

## 2018-11-27 DIAGNOSIS — I50.41 ACUTE COMBINED SYSTOLIC AND DIASTOLIC CHF, NYHA CLASS 3: ICD-10-CM

## 2018-11-27 DIAGNOSIS — I50.42 CHRONIC COMBINED SYSTOLIC AND DIASTOLIC CHF (CONGESTIVE HEART FAILURE): Primary | ICD-10-CM

## 2018-11-27 DIAGNOSIS — I49.3 PVCS (PREMATURE VENTRICULAR CONTRACTIONS): ICD-10-CM

## 2018-11-27 DIAGNOSIS — E03.9 ACQUIRED HYPOTHYROIDISM: ICD-10-CM

## 2018-11-27 DIAGNOSIS — R60.0 LOCALIZED EDEMA: ICD-10-CM

## 2018-11-27 DIAGNOSIS — R07.9 CHEST PAIN OF UNCERTAIN ETIOLOGY: ICD-10-CM

## 2018-11-27 DIAGNOSIS — I06.1 RHEUMATIC AORTIC VALVE INSUFFICIENCY: ICD-10-CM

## 2018-11-27 PROCEDURE — 1101F PT FALLS ASSESS-DOCD LE1/YR: CPT | Mod: CPTII,S$GLB,, | Performed by: INTERNAL MEDICINE

## 2018-11-27 PROCEDURE — 93000 ELECTROCARDIOGRAM COMPLETE: CPT | Mod: S$GLB,,, | Performed by: INTERNAL MEDICINE

## 2018-11-27 PROCEDURE — 99999 PR PBB SHADOW E&M-EST. PATIENT-LVL II: CPT | Mod: PBBFAC,,, | Performed by: INTERNAL MEDICINE

## 2018-11-27 PROCEDURE — 99214 OFFICE O/P EST MOD 30 MIN: CPT | Mod: S$GLB,,, | Performed by: INTERNAL MEDICINE

## 2018-11-27 NOTE — PROGRESS NOTES
Subjective:       Patient ID: Vince Martinez is a 88 y.o. male.    Chief Complaint: Other (voice more raspy, reduced sense of smell and taste)    He states he is here today due to reduction in his sense of smell and taste over the past 2-3 months.  He does not recall an antecedent URI.  No environmental changes.  No facial or head trauma.  Has been using fluticasone nasal spray apparently regularly.  Also uses either Atrovent or Astelin spray on and off as well.  Also believes his voice is a bit more raspy.  Last seen 4 months ago with runny nose and cough and allergy symptoms which cleared with steroid shot.  Had office endoscopy then as reviewed with boggy inferior turbinates, septal spur, bowing of vocal cords and no pus or polyps or vocal cord lesions.  Currently seeing Cardiology for workup of palpitations.  Believes his diabetes is under okay control.             Review of Systems   Ears: Positive for hearing loss and family history of hearing loss.  Negative for ear pressure, ringing in ear, ear infections, dizziness, head trauma and taken gentramycin/streptomycin.    Nose:  Positive for postnasal drip. Negative for nosebleeds, nasal or sinus surgery and snoring.    Mouth/Throat: Positive for hoarse voice. Negative for pain swallowing, impaired swallowing, throat mass, neck mass, oral ulcers and neck lumps.   Constitutional: Negative for fever, chills and night sweats.    Eyes:  Negative for history of glaucoma.   Cardiovascular:  Positive for history of high blood pressure.   Respiratory:  Negative for asthma, emphysema, history of tuberculosis, recent cough and shortness of breath.    Gastrointestinal:  Negative for history of stomach ulcers or pain, blood in stool and change in stool.   Other:  Positive for prostate disease and arthritis. Negative for slurred, swollen glands and persistent infections.           Objective:        Vitals:    11/26/18 1606   BP: (!) 135/59   Pulse: 64   Temp: 97.4 °F (36.3  °C)     Body mass index is 25.65 kg/m².  Physical Exam   Constitutional: He is oriented to person, place, and time. He appears well-developed and well-nourished. No distress.   HENT:   Head: Normocephalic and atraumatic.   Right Ear: Tympanic membrane, external ear and ear canal normal.   Left Ear: Tympanic membrane, external ear and ear canal normal.   Nose: No rhinorrhea or nasal deformity. No epistaxis.   Mouth/Throat: Uvula is midline, oropharynx is clear and moist and mucous membranes are normal. No oral lesions. No trismus in the jaw. No uvula swelling. No oropharyngeal exudate, posterior oropharyngeal edema or posterior oropharyngeal erythema.   Mild nasal mucosal congestion which is actually better than his usual baseline today.   Neck: Phonation normal. Neck supple. No tracheal deviation present. No thyromegaly present.   His voice is within normal limits for age and unchanged as compared to prior visits at this time.   Pulmonary/Chest: Effort normal. No stridor. No respiratory distress. He has no wheezes.   Lymphadenopathy:     He has no cervical adenopathy.   Neurological: He is alert and oriented to person, place, and time.   Skin: Skin is warm and dry. He is not diaphoretic.   Psychiatric: He has a normal mood and affect. His behavior is normal. His speech is not slurred.       Tests / Results:        Assessment:       1. Rhinorrhea    2. Dysphonia    3. Reduced sense of smell    4. Type 2 diabetes mellitus without complication, without long-term current use of insulin        Plan:       Reviewed options and pros and cons and would like to proceed with Medrol Dosepak and reviewed instructions to take in a.m. with food.    Continue fluticasone nasal spray and okay to use Atrovent spray also as needed for drippy nose.    Return in 2 weeks.

## 2018-11-27 NOTE — PROGRESS NOTES
Subjective:      Patient ID: Vince Martinez is a 88 y.o. male.    Chief Complaint: No chief complaint on file.    HPI:  Feels well.  Dr Ding prescribed Medrol dose pack for back pain.  Edema resolved with furosemide.  Review of Systems   Cardiovascular: Positive for chest pain (Minor left anterior chest discomfort located with one finger in AAL lasting 5 minutes while sitting today) and leg swelling (resolved completely with furosemide). Negative for claudication, dyspnea on exertion, irregular heartbeat, near-syncope, orthopnea, palpitations and syncope.      Pt is still taking the furosemide daily.    Pt stopped the Celebrex and Aleve.    Past Medical History:   Diagnosis Date    Acute combined systolic and diastolic CHF, NYHA class 3 11/15/2018    Arthritis     Diabetes mellitus     DJD (degenerative joint disease)     Hypertension     Prostate enlargement     Thyroid disease         Past Surgical History:   Procedure Laterality Date    PROSTATE SURGERY         No family history on file.    Social History     Socioeconomic History    Marital status:      Spouse name: Not on file    Number of children: Not on file    Years of education: Not on file    Highest education level: Not on file   Social Needs    Financial resource strain: Not on file    Food insecurity - worry: Not on file    Food insecurity - inability: Not on file    Transportation needs - medical: Not on file    Transportation needs - non-medical: Not on file   Occupational History    Not on file   Tobacco Use    Smoking status: Never Smoker    Smokeless tobacco: Never Used   Substance and Sexual Activity    Alcohol use: No    Drug use: No    Sexual activity: Not on file   Other Topics Concern    Not on file   Social History Narrative    Not on file       Current Outpatient Medications on File Prior to Visit   Medication Sig Dispense Refill    azelastine (ASTELIN) 137 mcg (0.1 %) nasal spray       cetirizine  (ZYRTEC) 10 MG tablet Take 10 mg by mouth once daily.      diclofenac sodium 1 % Gel Apply 2 g topically once daily.      finasteride (PROSCAR) 5 mg tablet Take 5 mg by mouth once daily.       fluticasone (FLONASE) 50 mcg/actuation nasal spray 2 sprays (100 mcg total) by Each Nare route once daily. 16 g 2    furosemide (LASIX) 40 MG tablet Take 1 tablet (40 mg total) by mouth once daily. 30 tablet 11    glimepiride (AMARYL) 2 MG tablet Take 2 mg by mouth 3 (three) times daily.   1    glucosamine-chondroitin 500-400 mg tablet Take 1 tablet by mouth 3 (three) times daily.      ipratropium (ATROVENT) 42 mcg (0.06 %) nasal spray 2 sprays by Nasal route 4 (four) times daily. 30 mL 2    irbesartan (AVAPRO) 300 MG tablet Take 1 tablet (300 mg total) by mouth every evening. 90 tablet 3    ketoconazole (NIZORAL) 2 % shampoo       levothyroxine (SYNTHROID) 100 MCG tablet       loperamide (IMODIUM A-D) 2 mg Tab Take 2 mg by mouth 4 (four) times daily as needed.      metFORMIN (GLUCOPHAGE-XR) 500 MG 24 hr tablet       methylPREDNISolone (MEDROL DOSEPACK) 4 mg tablet Take according to instructions with food. 1 Package 0    mometasone 0.1% (ELOCON) 0.1 % cream       montelukast (SINGULAIR) 10 mg tablet TAKE ONE TABLET BY MOUTH ONCE DAILY 30 tablet 0    neomycin-polymyxin-dexamethasone (DEXACINE) 3.5 mg/g-10,000 unit/g-0.1 % Oint       nystatin (MYCOSTATIN) 100,000 unit/mL suspension       omeprazole (PRILOSEC) 20 MG capsule Take 20 mg by mouth once daily.      simvastatin (ZOCOR) 20 MG tablet       tamsulosin (FLOMAX) 0.4 mg Cp24 0.4 mg once daily.       tizanidine 4 mg Cap       tramadol (ULTRAM) 50 mg tablet Take 50 mg by mouth every 4 (four) hours as needed.   2    triamcinolone acetonide 0.1% (KENALOG) 0.1 % cream        No current facility-administered medications on file prior to visit.        Review of patient's allergies indicates:   Allergen Reactions    Bactrim [sulfamethoxazole-trimethoprim]  "Rash    Ciprofloxacin Rash     Objective:     Vitals:    11/27/18 0843 11/27/18 0904   BP: (!) 154/63 (!) 128/50   BP Location: Right arm Left arm   Patient Position: Sitting Sitting   BP Method: Large (Manual)    Pulse: 80    Weight: 71.6 kg (157 lb 14.4 oz)    Height: 5' 5" (1.651 m)         Physical Exam   Constitutional: He is oriented to person, place, and time. He appears well-developed and well-nourished. No distress.   Eyes: No scleral icterus.   Neck: No JVD present. Carotid bruit is not present.   Cardiovascular: Regular rhythm. Exam reveals no gallop and no friction rub.   Murmur (III/VI systolic murmur and II?VI diastolic decrescendo murmur) heard.  Pulmonary/Chest: Effort normal and breath sounds normal. No respiratory distress.   Musculoskeletal: He exhibits no edema.   Neurological: He is alert and oriented to person, place, and time.   Skin: Skin is warm and dry. He is not diaphoretic.   Psychiatric: He has a normal mood and affect. His behavior is normal. Judgment and thought content normal.   Vitals reviewed.     Note recent echocardiogram showed normal LVEF and normal LVID's and moderate aortic regurgitation.    NOte CBC and CMP last week were OK with normal renal function.  BNP 21    CXR showed normal heart size and clear lungs.    Assessment:     1. Essential hypertension    2. Rheumatic aortic valve insufficiency    3. Localized edema    4. Acquired hypothyroidism    5. Acute combined systolic and diastolic CHF, NYHA class 3    6. Chronic combined systolic and diastolic CHF (congestive heart failure)    7. Premature atrial contractions    8. PVCs (premature ventricular contractions)    9. Palpitations    10. Chest pain of uncertain etiology      Plan:   Diagnoses and all orders for this visit:    Essential hypertension  -     EKG 12-lead    Rheumatic aortic valve insufficiency  -     EKG 12-lead    Localized edema    Acquired hypothyroidism    Acute combined systolic and diastolic CHF, NYHA " class 3    Chronic combined systolic and diastolic CHF (congestive heart failure)    Premature atrial contractions    PVCs (premature ventricular contractions)    Palpitations    Chest pain of uncertain etiology  -     EKG 12-lead     Same meds except change the furosemide to prn edema.  OK to take daily if necessary to control the edema.    Long discussion with pt regarding salt and water restriction and usefulness of daily weights    Due to minor chest discomfort today will repeat the ECG---NSR, LAHB, no change    Will repeat echocardiogram and CXR every 6 to 12 months    Discussed potential need for TAVR in the future with pt.    Follow-up in about 2 months (around 1/27/2019).  With lab

## 2018-12-20 ENCOUNTER — OFFICE VISIT (OUTPATIENT)
Dept: OTOLARYNGOLOGY | Facility: CLINIC | Age: 83
End: 2018-12-20
Payer: MEDICARE

## 2018-12-20 VITALS
TEMPERATURE: 97 F | HEART RATE: 77 BPM | WEIGHT: 158.88 LBS | BODY MASS INDEX: 26.47 KG/M2 | HEIGHT: 65 IN | SYSTOLIC BLOOD PRESSURE: 142 MMHG | DIASTOLIC BLOOD PRESSURE: 64 MMHG

## 2018-12-20 DIAGNOSIS — I10 ESSENTIAL HYPERTENSION: ICD-10-CM

## 2018-12-20 DIAGNOSIS — J34.3 NASAL TURBINATE HYPERTROPHY: ICD-10-CM

## 2018-12-20 DIAGNOSIS — R09.81 NASAL CONGESTION: ICD-10-CM

## 2018-12-20 DIAGNOSIS — E11.9 TYPE 2 DIABETES MELLITUS WITHOUT COMPLICATION, WITHOUT LONG-TERM CURRENT USE OF INSULIN: ICD-10-CM

## 2018-12-20 DIAGNOSIS — R43.8 REDUCED SENSE OF SMELL: ICD-10-CM

## 2018-12-20 PROCEDURE — 99214 PR OFFICE/OUTPT VISIT, EST, LEVL IV, 30-39 MIN: ICD-10-PCS | Mod: S$GLB,,, | Performed by: OTOLARYNGOLOGY

## 2018-12-20 PROCEDURE — 1101F PT FALLS ASSESS-DOCD LE1/YR: CPT | Mod: CPTII,S$GLB,, | Performed by: OTOLARYNGOLOGY

## 2018-12-20 PROCEDURE — 1101F PR PT FALLS ASSESS DOC 0-1 FALLS W/OUT INJ PAST YR: ICD-10-PCS | Mod: CPTII,S$GLB,, | Performed by: OTOLARYNGOLOGY

## 2018-12-20 PROCEDURE — 99214 OFFICE O/P EST MOD 30 MIN: CPT | Mod: S$GLB,,, | Performed by: OTOLARYNGOLOGY

## 2018-12-20 RX ORDER — PREDNISONE 10 MG/1
TABLET ORAL
Qty: 27 TABLET | Refills: 0 | Status: SHIPPED | OUTPATIENT
Start: 2018-12-20 | End: 2019-02-07

## 2018-12-20 RX ORDER — BENZOCAINE .13; .15; .5; 2 G/100G; G/100G; G/100G; G/100G
2 GEL ORAL DAILY
Qty: 8.6 G | Refills: 2 | Status: SHIPPED | OUTPATIENT
Start: 2018-12-20 | End: 2019-04-15 | Stop reason: ALTCHOICE

## 2018-12-20 NOTE — PATIENT INSTRUCTIONS
Generic Rhinocort nasal spray 2 sprays in each nostril daily.  Prednisone taper according to the instructions with food.  Follow up in 2- 3  weeks after above.

## 2019-01-04 NOTE — TELEPHONE ENCOUNTER
----- Message from Ade Martin sent at 1/4/2019 12:30 PM CST -----  Contact: pt  Can the clinic reply in [facility]: no    Please refill the medication(s) listed below. Please call the patient when the prescription(s) is ready for  at this phone number   [phone]       Medication #1 montelukast (SINGULAIR) 10 mg tablet  Medication #2 ipratropium (ATROVENT) 42 mcg (0.06 %) nasal spray  3 fluticasone (FLONASE) 50 mcg/actuation nasal spray     90 day supply    Preferred Pharmacy:[facility] [address] [place] [address]

## 2019-01-10 ENCOUNTER — OFFICE VISIT (OUTPATIENT)
Dept: OTOLARYNGOLOGY | Facility: CLINIC | Age: 84
End: 2019-01-10
Payer: MEDICARE

## 2019-01-10 VITALS
HEART RATE: 76 BPM | SYSTOLIC BLOOD PRESSURE: 119 MMHG | DIASTOLIC BLOOD PRESSURE: 58 MMHG | TEMPERATURE: 97 F | WEIGHT: 156.13 LBS | HEIGHT: 65 IN | BODY MASS INDEX: 26.01 KG/M2

## 2019-01-10 DIAGNOSIS — R49.0 DYSPHONIA: ICD-10-CM

## 2019-01-10 DIAGNOSIS — E11.9 TYPE 2 DIABETES MELLITUS WITHOUT COMPLICATION, WITHOUT LONG-TERM CURRENT USE OF INSULIN: ICD-10-CM

## 2019-01-10 DIAGNOSIS — J34.3 NASAL TURBINATE HYPERTROPHY: ICD-10-CM

## 2019-01-10 DIAGNOSIS — J34.89 RHINORRHEA: ICD-10-CM

## 2019-01-10 DIAGNOSIS — M26.629 TMJ SYNDROME: ICD-10-CM

## 2019-01-10 DIAGNOSIS — R09.81 NASAL CONGESTION: ICD-10-CM

## 2019-01-10 PROCEDURE — 99499 RISK ADDL DX/OHS AUDIT: ICD-10-PCS | Mod: S$GLB,,, | Performed by: OTOLARYNGOLOGY

## 2019-01-10 PROCEDURE — 1101F PT FALLS ASSESS-DOCD LE1/YR: CPT | Mod: CPTII,S$GLB,, | Performed by: OTOLARYNGOLOGY

## 2019-01-10 PROCEDURE — 99499 UNLISTED E&M SERVICE: CPT | Mod: S$GLB,,, | Performed by: OTOLARYNGOLOGY

## 2019-01-10 PROCEDURE — 99214 OFFICE O/P EST MOD 30 MIN: CPT | Mod: S$GLB,,, | Performed by: OTOLARYNGOLOGY

## 2019-01-10 PROCEDURE — 1101F PR PT FALLS ASSESS DOC 0-1 FALLS W/OUT INJ PAST YR: ICD-10-PCS | Mod: CPTII,S$GLB,, | Performed by: OTOLARYNGOLOGY

## 2019-01-10 PROCEDURE — 99214 PR OFFICE/OUTPT VISIT, EST, LEVL IV, 30-39 MIN: ICD-10-PCS | Mod: S$GLB,,, | Performed by: OTOLARYNGOLOGY

## 2019-01-10 RX ORDER — GABAPENTIN 100 MG/1
100 CAPSULE ORAL NIGHTLY
COMMUNITY
End: 2019-08-20

## 2019-01-10 RX ORDER — MELATONIN 10 MG
CAPSULE ORAL
COMMUNITY
End: 2021-04-15 | Stop reason: ALTCHOICE

## 2019-01-10 RX ORDER — ECONAZOLE NITRATE 10 MG/G
CREAM TOPICAL DAILY
COMMUNITY
End: 2019-08-20

## 2019-01-10 RX ORDER — TRIAMCINOLONE ACETONIDE 1 MG/G
CREAM TOPICAL 2 TIMES DAILY
COMMUNITY
End: 2020-07-14 | Stop reason: SDUPTHER

## 2019-01-10 RX ORDER — FLUOCINONIDE TOPICAL SOLUTION USP, 0.05% 0.5 MG/ML
SOLUTION TOPICAL 2 TIMES DAILY
COMMUNITY
End: 2019-08-20

## 2019-01-10 RX ORDER — HYDROCORTISONE 25 MG/G
CREAM TOPICAL 2 TIMES DAILY
COMMUNITY
End: 2019-08-20

## 2019-01-10 RX ORDER — DEXTROMETHORPHAN HYDROBROMIDE, GUAIFENESIN 5; 100 MG/5ML; MG/5ML
650 LIQUID ORAL EVERY 8 HOURS
COMMUNITY
End: 2019-08-20

## 2019-01-10 RX ORDER — CAPSAICIN 0.03 G/100G
CREAM TOPICAL 2 TIMES DAILY
COMMUNITY
End: 2019-08-20

## 2019-01-10 NOTE — PROGRESS NOTES
Subjective:       Patient ID: Vince Martinez is a 88 y.o. male.    Chief Complaint: Multiple complaints (Sneezing/runny nose/reduced taste and smell/ reduced hearing)    At his last visit on 11/26/2018 he presented with the following history:   He states he is here today due to reduction in his sense of smell and taste over the past 2-3 months.  He does not recall an antecedent URI.  No environmental changes.  No facial or head trauma.  Has been using fluticasone nasal spray apparently regularly.  Also uses either Atrovent or Astelin spray on and off as well.  Also believes his voice is a bit more raspy.  Last seen 4 months prior with runny nose and cough and allergy symptoms which cleared with steroid shot.  Had office endoscopy then as reviewed with boggy inferior turbinates, septal spur, bowing of vocal cords and no pus or polyps or vocal cord lesions.  Currently seeing Cardiology for workup of palpitations.  Believes his diabetes is under okay control.     Interval history:  At his last visit he was prescribed Medrol Dosepak and to continue fluticasone nasal spray and add generic Atrovent nasal spray as needed and follow up 2 weeks later.  Now states he was not taking fluticasone but was actually taking azelastine nasal spray and Atrovent nasal sprays.  He did not take the Medrol Dosepak as prescribed.  He now reports he has had symptoms for the past 6 months of reduced sense of taste and smell.  Possible antecedent URI not sure.  No closed head trauma or headaches or other neurologic symptoms.  Still with nasal congestion and rhinorrhea of mucoid secretions as well as somewhat raspy vocal quality and postnasal drip and sneezing.  Again denies any swallowing problems including solids or liquids and no GERD symptoms reported.            Review of Systems   Ears: Positive for hearing loss and family history of hearing loss.  Negative for ear pressure, ringing in ear, ear infections, dizziness, head trauma and  taken gentramycin/streptomycin.    Nose:  Positive for postnasal drip. Negative for nosebleeds, nasal or sinus surgery and snoring.    Mouth/Throat: Negative for pain swallowing, impaired swallowing, throat mass, neck mass, oral ulcers and neck lumps.   Constitutional: Negative for fever, chills and night sweats.    Eyes:  Negative for history of glaucoma.   Cardiovascular:  Positive for history of high blood pressure.   Respiratory:  Negative for asthma, emphysema, history of tuberculosis, recent cough and shortness of breath.    Gastrointestinal:  Negative for history of stomach ulcers or pain, blood in stool and change in stool.   Other:  Positive for prostate disease and arthritis. Negative for slurred, swollen glands and persistent infections.           Objective:        Vitals:    12/20/18 1313   BP: (!) 142/64   Pulse: 77   Temp: 96.7 °F (35.9 °C)     Body mass index is 26.44 kg/m².  Physical Exam   Constitutional: He is oriented to person, place, and time. He appears well-developed and well-nourished. No distress.   HENT:   Head: Normocephalic and atraumatic.   Right Ear: Tympanic membrane, external ear and ear canal normal.   Left Ear: Tympanic membrane, external ear and ear canal normal.   Nose: Mucosal edema present. No nasal deformity. No epistaxis.   Mouth/Throat: Uvula is midline, oropharynx is clear and moist and mucous membranes are normal. No oral lesions. No trismus in the jaw. No uvula swelling. No oropharyngeal exudate, posterior oropharyngeal edema or posterior oropharyngeal erythema.   Diffuse nasal mucosa congestion with boggy inferior turbinates and mucoid secretions anteriorly.   Neck: Phonation normal. Neck supple. No tracheal deviation present.   His voice is within normal limits for age and unchanged as compared to prior visits at this time though occasional throat clearing.   Pulmonary/Chest: Effort normal. No stridor. No respiratory distress. He has no wheezes.   Lymphadenopathy:     He  has no cervical adenopathy.   Neurological: He is alert and oriented to person, place, and time.   Skin: Skin is warm and dry.   Psychiatric: He has a normal mood and affect. His behavior is normal. His speech is not slurred.       Tests / Results:        Assessment:       1. Nasal congestion    2. Reduced sense of smell    3. Nasal turbinate hypertrophy    4. Type 2 diabetes mellitus without complication, without long-term current use of insulin    5. Essential hypertension        Plan:        Reviewed all above and options and pros and cons and risks and answered questions.  Reviewed course of steroids again and pros and cons and risks including increased blood sugar, mood change, GI upset, immune suppression, bone and joint problems.  He states he understands and accepts this and would like this course of therapy at this time.  Prednisone taper prescribed and reviewed instructions and precautions including strict ADA diet and monitoring blood sugars.  Change to Rhinocort nasal spray and use twice daily and add Atrovent nasal spray as needed.  Call if questions or problems and follow-up in 2 weeks.  Again advised patient to bring all medications including nasal sprays with him for clarification.  Discussed imaging with CT after tune-up depending on response.

## 2019-01-10 NOTE — PATIENT INSTRUCTIONS
TMJ measures including soft diet, moist heat, Advil as needed.    Proper care of voice as reviewed.    Increase ipratropium nasal spray to 3 - 4 times a day.    Add OTC Benadryl 12.5 mg to 25 mg every evening.    Follow up in 3 - 4 weeks and possible scope.    Continue daily generic Singulair (montelukast).      Stop fluticasone spray and cetirizine pill.

## 2019-01-14 RX ORDER — FLUTICASONE PROPIONATE 50 MCG
2 SPRAY, SUSPENSION (ML) NASAL DAILY
Qty: 16 G | Refills: 2 | OUTPATIENT
Start: 2019-01-14

## 2019-01-14 RX ORDER — IPRATROPIUM BROMIDE 42 UG/1
2 SPRAY, METERED NASAL 4 TIMES DAILY
Qty: 30 ML | Refills: 2 | OUTPATIENT
Start: 2019-01-14

## 2019-01-14 RX ORDER — IPRATROPIUM BROMIDE 42 UG/1
2 SPRAY, METERED NASAL 4 TIMES DAILY
Qty: 30 ML | Refills: 2 | Status: SHIPPED | OUTPATIENT
Start: 2019-01-14 | End: 2019-03-18

## 2019-01-14 RX ORDER — MONTELUKAST SODIUM 10 MG/1
10 TABLET ORAL DAILY
Qty: 30 TABLET | Refills: 0 | Status: SHIPPED | OUTPATIENT
Start: 2019-01-14 | End: 2020-07-20 | Stop reason: SDUPTHER

## 2019-01-14 RX ORDER — MONTELUKAST SODIUM 10 MG/1
10 TABLET ORAL DAILY
Qty: 30 TABLET | Refills: 0 | OUTPATIENT
Start: 2019-01-14

## 2019-01-21 ENCOUNTER — TELEPHONE (OUTPATIENT)
Dept: OTOLARYNGOLOGY | Facility: CLINIC | Age: 84
End: 2019-01-21

## 2019-01-21 NOTE — TELEPHONE ENCOUNTER
----- Message from Juan Villa sent at 1/21/2019 10:17 AM CST -----  Contact: TSERING OWEN [[id number]]  Name of Who is Calling: TSERING OWEN [[id number]]      What is the request in detail: Patient states [facility], did not receive his prescription for ipratropium (ATROVENT) 42 mcg (0.06 %) nasal spray. Can you please resend it in, patient goes out of town Friday.    Can the clinic reply by [facility]: no      What Number to Call Back [facility]: [phone]

## 2019-02-07 ENCOUNTER — OFFICE VISIT (OUTPATIENT)
Dept: CARDIOLOGY | Facility: CLINIC | Age: 84
End: 2019-02-07
Payer: MEDICARE

## 2019-02-07 VITALS
DIASTOLIC BLOOD PRESSURE: 48 MMHG | SYSTOLIC BLOOD PRESSURE: 108 MMHG | BODY MASS INDEX: 26.3 KG/M2 | HEART RATE: 56 BPM | HEIGHT: 65 IN | OXYGEN SATURATION: 97 % | WEIGHT: 157.88 LBS

## 2019-02-07 DIAGNOSIS — I06.1 RHEUMATIC AORTIC VALVE INSUFFICIENCY: Primary | ICD-10-CM

## 2019-02-07 DIAGNOSIS — I49.3 PVCS (PREMATURE VENTRICULAR CONTRACTIONS): ICD-10-CM

## 2019-02-07 DIAGNOSIS — I10 ESSENTIAL HYPERTENSION: ICD-10-CM

## 2019-02-07 DIAGNOSIS — I49.1 PREMATURE ATRIAL CONTRACTIONS: ICD-10-CM

## 2019-02-07 DIAGNOSIS — I50.42 CHRONIC COMBINED SYSTOLIC AND DIASTOLIC CHF (CONGESTIVE HEART FAILURE): ICD-10-CM

## 2019-02-07 DIAGNOSIS — R00.2 PALPITATIONS: ICD-10-CM

## 2019-02-07 DIAGNOSIS — I11.9 HYPERTENSIVE HEART DISEASE WITHOUT HEART FAILURE: ICD-10-CM

## 2019-02-07 DIAGNOSIS — E03.9 ACQUIRED HYPOTHYROIDISM: ICD-10-CM

## 2019-02-07 PROCEDURE — 1101F PR PT FALLS ASSESS DOC 0-1 FALLS W/OUT INJ PAST YR: ICD-10-PCS | Mod: CPTII,S$GLB,, | Performed by: INTERNAL MEDICINE

## 2019-02-07 PROCEDURE — 99999 PR PBB SHADOW E&M-EST. PATIENT-LVL III: CPT | Mod: PBBFAC,,, | Performed by: INTERNAL MEDICINE

## 2019-02-07 PROCEDURE — 99213 OFFICE O/P EST LOW 20 MIN: CPT | Mod: S$GLB,,, | Performed by: INTERNAL MEDICINE

## 2019-02-07 PROCEDURE — 99499 UNLISTED E&M SERVICE: CPT | Mod: S$GLB,,, | Performed by: INTERNAL MEDICINE

## 2019-02-07 PROCEDURE — 99499 RISK ADDL DX/OHS AUDIT: ICD-10-PCS | Mod: S$GLB,,, | Performed by: INTERNAL MEDICINE

## 2019-02-07 PROCEDURE — 99999 PR PBB SHADOW E&M-EST. PATIENT-LVL III: ICD-10-PCS | Mod: PBBFAC,,, | Performed by: INTERNAL MEDICINE

## 2019-02-07 PROCEDURE — 99213 PR OFFICE/OUTPT VISIT, EST, LEVL III, 20-29 MIN: ICD-10-PCS | Mod: S$GLB,,, | Performed by: INTERNAL MEDICINE

## 2019-02-07 PROCEDURE — 1101F PT FALLS ASSESS-DOCD LE1/YR: CPT | Mod: CPTII,S$GLB,, | Performed by: INTERNAL MEDICINE

## 2019-02-07 NOTE — PROGRESS NOTES
Subjective:      Patient ID: Vince Martinez is a 88 y.o. male.    Chief Complaint: Follow-up    HPI:  Travelled to [place].  Came back with vomiting and diarrhea.  PCP is Dr Joe.  Pt saw GI MD Dr Ryder yesterday who prescribed probiotics.      Review of Systems   Cardiovascular: Positive for chest pain (rare little pinch in left anterior chest that lasts a second) and palpitations (Heart was beating hard for 2 hours this AM). Negative for claudication, dyspnea on exertion, irregular heartbeat, leg swelling, near-syncope, orthopnea and syncope.        Past Medical History:   Diagnosis Date    Acute combined systolic and diastolic CHF, NYHA class 3 11/15/2018    Arthritis     Diabetes mellitus     DJD (degenerative joint disease)     Hypertension     Prostate enlargement     Thyroid disease         Past Surgical History:   Procedure Laterality Date    PROSTATE SURGERY         No family history on file.    Social History     Socioeconomic History    Marital status:      Spouse name: None    Number of children: None    Years of education: None    Highest education level: None   Social Needs    Financial resource strain: None    Food insecurity - worry: None    Food insecurity - inability: None    Transportation needs - medical: None    Transportation needs - non-medical: None   Occupational History    None   Tobacco Use    Smoking status: Never Smoker    Smokeless tobacco: Never Used   Substance and Sexual Activity    Alcohol use: No    Drug use: No    Sexual activity: None   Other Topics Concern    None   Social History Narrative    None       Current Outpatient Medications on File Prior to Visit   Medication Sig Dispense Refill    acetaminophen (TYLENOL ARTHRITIS PAIN) 650 MG TbSR Take 650 mg by mouth every 8 (eight) hours.      azelastine (ASTELIN) 137 mcg (0.1 %) nasal spray       budesonide (RINOCORT AQUA) 32 mcg/actuation nasal spray 2 sprays (64 mcg total) by Nasal route  once daily. 8.6 g 2    capsaicin (ZOSTRIX) 0.025 % cream Apply topically 2 (two) times daily.      diclofenac sodium 1 % Gel Apply 2 g topically once daily.      econazole nitrate 1 % cream Apply topically once daily.      finasteride (PROSCAR) 5 mg tablet Take 5 mg by mouth once daily.       fluocinonide (LIDEX) 0.05 % external solution Apply topically 2 (two) times daily.      fluticasone (FLONASE) 50 mcg/actuation nasal spray 2 sprays (100 mcg total) by Each Nare route once daily. 16 g 2    furosemide (LASIX) 40 MG tablet Take 1 tablet (40 mg total) by mouth once daily. 30 tablet 11    gabapentin (NEURONTIN) 100 MG capsule Take 100 mg by mouth every evening.      gel base no.41, bulk, (HYDROGEL) Gel by Misc.(Non-Drug; Combo Route) route.      glimepiride (AMARYL) 2 MG tablet Take 2 mg by mouth 3 (three) times daily.   1    glucosamine-chondroitin 500-400 mg tablet Take 1 tablet by mouth 3 (three) times daily.      hydrocortisone (PROCTOZONE-HC) 2.5 % rectal cream Place rectally 2 (two) times daily.      ipratropium (ATROVENT) 42 mcg (0.06 %) nasal spray 2 sprays by Nasal route 4 (four) times daily. 30 mL 2    irbesartan (AVAPRO) 300 MG tablet Take 1 tablet (300 mg total) by mouth every evening. 90 tablet 3    ketoconazole (NIZORAL) 2 % shampoo       levothyroxine (SYNTHROID) 100 MCG tablet       loperamide (IMODIUM A-D) 2 mg Tab Take 2 mg by mouth 4 (four) times daily as needed.      melatonin 10 mg Cap Take by mouth.      metFORMIN (GLUCOPHAGE-XR) 500 MG 24 hr tablet       mometasone 0.1% (ELOCON) 0.1 % cream       montelukast (SINGULAIR) 10 mg tablet Take 1 tablet (10 mg total) by mouth once daily. 30 tablet 0    multivitamin capsule Take 1 capsule by mouth once daily.      mv-mn/iron/folic acid/herb 190 (VITAMIN D3 COMPLETE ORAL) Take by mouth.      neomycin-polymyxin-dexamethasone (DEXACINE) 3.5 mg/g-10,000 unit/g-0.1 % Oint       omeprazole (PRILOSEC) 20 MG capsule Take 20 mg by  "mouth once daily.      simvastatin (ZOCOR) 20 MG tablet       tamsulosin (FLOMAX) 0.4 mg Cp24 0.4 mg once daily.       tizanidine 4 mg Cap       triamcinolone acetonide 0.1% (KENALOG) 0.1 % cream Apply topically 2 (two) times daily.      VITAMIN K2 ORAL Take 100 mcg by mouth.      [DISCONTINUED] cetirizine (ZYRTEC) 10 MG tablet Take 10 mg by mouth once daily.      [DISCONTINUED] methylPREDNISolone (MEDROL DOSEPACK) 4 mg tablet Take according to instructions with food. 1 Package 0    [DISCONTINUED] nystatin (MYCOSTATIN) 100,000 unit/mL suspension       [DISCONTINUED] predniSONE (DELTASONE) 10 MG tablet Take 4 pills daily day # 1 - 3.  Then take 3 pills daily day # 4 - 6.  Then take 2 pills daily day # 7 - 9.    Take with food.  Start in am. 27 tablet 0     No current facility-administered medications on file prior to visit.        Review of patient's allergies indicates:   Allergen Reactions    Bactrim [sulfamethoxazole-trimethoprim] Rash    Ciprofloxacin Rash     Objective:     Vitals:    02/07/19 1456   BP: (!) 108/48   BP Location: Left arm   Patient Position: Sitting   BP Method: Medium (Automatic)   Pulse: (!) 56   SpO2: 97%   Weight: 71.6 kg (157 lb 14.4 oz)   Height: 5' 5" (1.651 m)        Physical Exam   Constitutional: He is oriented to person, place, and time. He appears well-developed and well-nourished. No distress.   Eyes: No scleral icterus.   Neck: No JVD present. Carotid bruit is not present.   Cardiovascular: Regular rhythm. Exam reveals no gallop and no friction rub.   Murmur (I/VI systolic) heard.  Pulmonary/Chest: Effort normal and breath sounds normal. No respiratory distress.   Musculoskeletal: He exhibits no edema.   Neurological: He is alert and oriented to person, place, and time.   Skin: Skin is warm and dry. He is not diaphoretic.   Psychiatric: He has a normal mood and affect. His behavior is normal. Judgment and thought content normal.   Vitals reviewed.     Note BNP and CBC " and CMP 11/18 OK except for mild anemia    Note echo 11/18 OK except for moderate aortic regurgitation.  Assessment:     1. Rheumatic aortic valve insufficiency    2. Essential hypertension    3. 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 Palpitations    5. PVCs (premature ventricular contractions)    6. Premature atrial contractions    7. Chronic combined systolic and diastolic CHF (congestive heart failure)    8. Acquired hypothyroidism      Plan:   Vince was seen today for follow-up.    Diagnoses and all orders for this visit:    Rheumatic aortic valve insufficiency    Essential hypertension    Hypertensive heart disease without heart failure    Palpitations    PVCs (premature ventricular contractions)    Premature atrial contractions    Chronic combined systolic and diastolic CHF (congestive heart failure)    Acquired hypothyroidism     Same meds    Follow-up in about 6 months (around 8/7/2019).     F/u with Dr Joe    Will likely repeat the echocardiogram after the next visit

## 2019-02-13 ENCOUNTER — OFFICE VISIT (OUTPATIENT)
Dept: OTOLARYNGOLOGY | Facility: CLINIC | Age: 84
End: 2019-02-13
Payer: MEDICARE

## 2019-02-13 ENCOUNTER — TELEPHONE (OUTPATIENT)
Dept: CARDIOLOGY | Facility: CLINIC | Age: 84
End: 2019-02-13

## 2019-02-13 VITALS
HEIGHT: 65 IN | BODY MASS INDEX: 26.16 KG/M2 | HEART RATE: 76 BPM | WEIGHT: 157 LBS | TEMPERATURE: 98 F | SYSTOLIC BLOOD PRESSURE: 106 MMHG | DIASTOLIC BLOOD PRESSURE: 59 MMHG

## 2019-02-13 DIAGNOSIS — R09.89 CHOKING EPISODE: ICD-10-CM

## 2019-02-13 DIAGNOSIS — J34.89 RHINORRHEA: ICD-10-CM

## 2019-02-13 DIAGNOSIS — R09.81 NASAL CONGESTION: ICD-10-CM

## 2019-02-13 DIAGNOSIS — R49.9 CHANGE IN VOICE: ICD-10-CM

## 2019-02-13 DIAGNOSIS — R05.9 COUGH: ICD-10-CM

## 2019-02-13 DIAGNOSIS — R13.10 DYSPHAGIA, UNSPECIFIED TYPE: ICD-10-CM

## 2019-02-13 DIAGNOSIS — J34.3 NASAL TURBINATE HYPERTROPHY: ICD-10-CM

## 2019-02-13 PROCEDURE — 31575 LARYNGOSCOPY: ICD-10-PCS | Mod: S$GLB,,, | Performed by: OTOLARYNGOLOGY

## 2019-02-13 PROCEDURE — 1101F PR PT FALLS ASSESS DOC 0-1 FALLS W/OUT INJ PAST YR: ICD-10-PCS | Mod: CPTII,S$GLB,, | Performed by: OTOLARYNGOLOGY

## 2019-02-13 PROCEDURE — 99214 OFFICE O/P EST MOD 30 MIN: CPT | Mod: 25,S$GLB,, | Performed by: OTOLARYNGOLOGY

## 2019-02-13 PROCEDURE — 1101F PT FALLS ASSESS-DOCD LE1/YR: CPT | Mod: CPTII,S$GLB,, | Performed by: OTOLARYNGOLOGY

## 2019-02-13 PROCEDURE — 31575 DIAGNOSTIC LARYNGOSCOPY: CPT | Mod: S$GLB,,, | Performed by: OTOLARYNGOLOGY

## 2019-02-13 PROCEDURE — 99214 PR OFFICE/OUTPT VISIT, EST, LEVL IV, 30-39 MIN: ICD-10-PCS | Mod: 25,S$GLB,, | Performed by: OTOLARYNGOLOGY

## 2019-02-13 NOTE — PATIENT INSTRUCTIONS
Continue meds and follow up as per GI and PCP.  Set up swallowing study as ordered.  Follow up results and recheck in 2 weeks.

## 2019-02-13 NOTE — PROGRESS NOTES
Subjective:       Patient ID: Vince Martinez is a 88 y.o. male.    Chief Complaint: Follow-up    He is here for scheduled follow-up.  After his last visit he increased the ipratropium nasal spray to 3 times a day and has been taking the Benadryl 12.5 mg at bedtime.  He states he had been getting better until he became ill while traveling in [place] with his [employer] group.  He states he and several members in his group became ill with sore throat and cough and then diarrhea.  He has seen GI who prescribed something for him and reports getting better.  He states the sore throat cleared and the diarrhea is improving but continues with cough for the past few weeks.  He states the cough has been nonproductive and denies fever or purulent secretions or chest pain or shortness of breath.  Also reports a choking episode while traveling which concerned him.  No other neurologic symptoms.  Continue with some difficulty getting clear history.          Review of Systems   Ears: Positive for family history of hearing loss.  Negative for ear pressure, ringing in ear, ear infections, dizziness, head trauma and taken gentramycin/streptomycin.    Nose:  Positive for postnasal drip. Negative for nosebleeds, nasal or sinus surgery and snoring.    Mouth/Throat: Negative for pain swallowing, throat mass, neck mass, oral ulcers and neck lumps.   Constitutional: Negative for fever, chills and night sweats.    Eyes:  Negative for history of glaucoma.   Cardiovascular:  Positive for history of high blood pressure.   Respiratory:  Positive for recent cough. Negative for asthma, emphysema, history of tuberculosis and shortness of breath.    Gastrointestinal:  Negative for history of stomach ulcers or pain, blood in stool and change in stool.   Other:  Positive for prostate disease and arthritis. Negative for slurred, swollen glands and persistent infections.           Objective:        Vitals:    02/13/19 1417   BP: (!) 106/59   Pulse: 76    Temp: 98.4 °F (36.9 °C)     Body mass index is 26.13 kg/m².  Physical Exam   Constitutional: He is oriented to person, place, and time. He appears well-developed and well-nourished. No distress.   HENT:   Head: Normocephalic and atraumatic.   Right Ear: Tympanic membrane, external ear and ear canal normal.   Left Ear: Tympanic membrane, external ear and ear canal normal.   Nose: Mucosal edema present. No nasal deformity. No epistaxis.   Mouth/Throat: Uvula is midline, oropharynx is clear and moist and mucous membranes are normal. No oral lesions. No trismus in the jaw. No uvula swelling. No oropharyngeal exudate, posterior oropharyngeal edema or posterior oropharyngeal erythema.   Diffuse nasal mucosa congestion with boggy inferior turbinates and mucoid secretions anteriorly.   Neck: Phonation normal. Neck supple. No tracheal deviation present.   His voice is within normal limits for age and unchanged as compared to prior visits.   Pulmonary/Chest: Effort normal. No stridor. No respiratory distress. He has no wheezes.   Lymphadenopathy:     He has no cervical adenopathy.   Neurological: He is alert and oriented to person, place, and time.   Skin: Skin is warm and dry.   Psychiatric: He has a normal mood and affect. His behavior is normal. His speech is not slurred.       Tests / Results:          Assessment:       1. Cough    2. Choking episode    3. Dysphagia, unspecified type     4. Change in voice    5. Rhinorrhea    6. Nasal congestion    7. Nasal turbinate hypertrophy        Plan:        Reviewed above and wants vocal cords/throat checked with scope.  See procedure note.    Set up modified barium swallow.  Swallow precautions as reviewed.  Continue meds and follow up as per GI and PCP, etc.  Continue ipratropium nasal spray as often as 4 times daily and Benadryl 12.5 mg every evening.  Follow-up results and recheck in 2 weeks unless problems prior.

## 2019-02-13 NOTE — PROCEDURES
Laryngoscopy  Date/Time: 2/13/2019 2:54 PM  Performed by: Sarah Miguel MD  Authorized by: Sarah Miguel MD     Consent Done?:  Yes (Verbal)  Anesthesia:     Local anesthetic:  Lidocaine 2% and Javi-Synephrine 1/2%    Patient tolerance:  Patient tolerated the procedure well with no immediate complications    Decongestion performed?: Yes    Laryngoscopy:     Areas examined:  Nasal cavities, nasopharynx, oropharynx, hypopharynx, larynx and vocal cords  Nose External:      No external nasal deformity  Nose Intranasal:      Mucosa no polyps     Mucosa ulcers not present     No mucosa lesions present     Enlarged turbinates  Nasopharynx:      No mucosa lesions     Adenoids not present     Posterior choanae patent  Larynx/hypopharynx:      No epiglottis lesions     No epiglottis edema     No AE folds lesions     No vocal cord polyps     Equal and normal bilateral     No hypopharynx lesions     No piriform sinus pooling     No piriform sinus lesions       Office flexible endoscopy reveals mid septal spur on the left, boggy edematous inferior turbinates, increased mucoid nasal secretions, some bowing and thinning of the vocal cords, mild secretions right lateral BOT, no lesions seen including the nasopharynx, oropharynx, hypopharynx, larynx.

## 2019-02-13 NOTE — TELEPHONE ENCOUNTER
----- Message from Sharon Cuevas sent at 2/13/2019  9:26 AM CST -----  Contact: Self/ [phone]  Nepali Only  Patient asked if he should continue taking furosemide (LASIX) 40 MG tablet.    Please call.

## 2019-02-15 ENCOUNTER — HOSPITAL ENCOUNTER (OUTPATIENT)
Dept: RADIOLOGY | Facility: OTHER | Age: 84
Discharge: HOME | End: 2019-02-15
Attending: OTOLARYNGOLOGY
Payer: MEDICARE

## 2019-02-15 DIAGNOSIS — R13.10 DYSPHAGIA, UNSPECIFIED TYPE: ICD-10-CM

## 2019-02-15 DIAGNOSIS — R05.9 COUGH: ICD-10-CM

## 2019-02-15 DIAGNOSIS — R09.89 CHOKING EPISODE: ICD-10-CM

## 2019-02-15 PROCEDURE — 92611 MOTION FLUOROSCOPY/SWALLOW: CPT

## 2019-02-15 PROCEDURE — 74230 X-RAY XM SWLNG FUNCJ C+: CPT | Mod: TC

## 2019-02-15 PROCEDURE — 74230 FL MODIFIED BARIUM SWALLOW SPEECH STUDY: ICD-10-PCS | Mod: 26,,, | Performed by: RADIOLOGY

## 2019-02-15 PROCEDURE — 74230 X-RAY XM SWLNG FUNCJ C+: CPT | Mod: 26,,, | Performed by: RADIOLOGY

## 2019-02-15 NOTE — PROCEDURES
Modified Barium Swallow    Patient Name:  Vince Martinez   MRN:  [id number]      Recommendations:     Recommendations:                General Recommendations:     1. Outpatient speech pathology referral for voice evaluation.    Diet recommendations:   ,   Continue regular consistency diet with thin liquids    Aspiration Precautions:   1. Small bites  2. Small sips of liquids    General Precautions: Standard,      Referral     Reason for Referral  Patient was referred for a Modified Barium Swallow Study to assess the oral, pharyngeal and upper esophageal phases of his swallow. Pt placed standing at a 90 degree angle and a lateral view of the head and neck was taken.    Diagnosis: <principal problem not specified>     History:     Pt is an 88 year old male referred for swallow evaluation due to choking episode, dysphonia.    Pt reports changes in his voice, loss of taste and smell over the last few months. Vocal quality noted to be raspy. He reports no known issues with swallowing, denies coughing or choking with oral intake.    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 Premature spillage noted to the pyriform sinuses on larger volume sips from a straw. Penetration of the airway during the swallow, shallow to the level of the laryngeal vestibule, 1/6 swallows. No aspiration  and instance of aspiration was during rapid successive sips of thins from a straw and is judged to be WFL.  No residuals after the swallow on small single sips. Onset of mild residuals after the swallow on the tongue base, in the valleculae and pyriform sinuses on large volume sips. Residuals cleared with secondary swallows.    PUREES: Minimal delay in the trigger of the swallow reflex, 1 sec longer than normal with premature spillage to the valleculae. No airway penetration or aspiration on 6 swallows.  Mild residuals after the swallow on the tongue base, in the valleculae and pyriform sinuses that cleared with secondary swallows.    SOLIDS: Minimal delay in the trigger of the swallow reflex, 1 sec longer than normal with premature spillage to the valleculae while chewing. No airway penetration or aspiration on 3 swallows.  Minimal residuals after the swallow on the tongue base, in the valleculae and pyriform sinuses that cleared with secondary swallows    Cervical Esophageal Phase  · Adequate degree and duration of cricopharyngeal opening/relaxation during the swallow.      Assessment:     Impressions  ·  oral and pharyngeal swallow are WNL for age  · Instances of minimal to mild delay in the trigger of the swallow reflex that is judged to be WFL for age  · No significant instances of airway threat or aspiration before, during, or after the swallow during this evaluation    Prognosis: Good    Education: recorded results and recommendations discussed and reviewed with the patient at the end of the study. Oral and pharyngeal swallow that is WFL discussed.      Plan:   · Patient to be seen:      · Plan of Care expires:     · Plan of Care reviewed with:      patient       Time Tracking:   SLP Treatment Date:   02/15/19  Speech Start Time:  1302  Speech Stop Time:  1355     Speech Total Time (min):  53 min    Yuni Briseno CCC-SLP  02/15/2019

## 2019-02-24 NOTE — PROGRESS NOTES
Subjective:       Patient ID: Vince Martinez is a 88 y.o. male.    Chief Complaint: Follow-up (very little better)    He returns for follow-up.  Continues with some difficulty getting clear consistent history as well as what/how meds he is taking.  Continues to come to visit alone though previously advised to bring a friend or a family member, but apparently has no one to accompany him.  Finally brought all of his medications including nasal sprays with him today.  Reviewing all of this it appears he has been using the ipratropium nasal spray twice daily and fluticasone nasal spray twice daily, but later states he has been out of 1 of these apparently the fluticasone for the past few weeks.  He did take the steroids after his last visit and believes it helped for a little while and no adverse effects.  He also continues on montelukast and Zyrtec daily.  He did not get the QNASL nasal spray.  Continues to complain of nasal symptoms including drippy nose and some congestion.  Also complains of a gradual change in his voice and unable to sing in Oriental orthodox like he used to now at age 88.  Wonders if PND contributing to voice complaints.  Denies other throat complaints including no difficulty with solids or liquids or cough with meals.  Denies GERD or vocal abuse.  Also wants to discuss intermittent ear pain with jaw movement.  No recent dental work or dental complaints.          Review of Systems   Ears: Positive for family history of hearing loss.  Negative for ear pressure, ringing in ear, ear infections, dizziness, head trauma and taken gentramycin/streptomycin.    Nose:  Negative for nosebleeds, nasal or sinus surgery and snoring.    Mouth/Throat: Negative for pain swallowing, impaired swallowing, throat mass, neck mass, oral ulcers and neck lumps.   Constitutional: Negative for fever, chills and night sweats.    Eyes:  Negative for history of glaucoma.   Cardiovascular:  Positive for history of high blood pressure.    Respiratory:  Negative for asthma, emphysema, history of tuberculosis, recent cough and shortness of breath.    Gastrointestinal:  Negative for history of stomach ulcers or pain, acid reflux, blood in stool and change in stool.   Other:  Positive for prostate disease and arthritis. Negative for slurred, swollen glands and persistent infections.           Objective:        Vitals:    01/10/19 1458   BP: (!) 119/58   Pulse: 76   Temp: 97.2 °F (36.2 °C)     Body mass index is 25.98 kg/m².  Physical Exam   Constitutional: He is oriented to person, place, and time. He appears well-developed and well-nourished. No distress.   HENT:   Head: Normocephalic and atraumatic.   Right Ear: Tympanic membrane, external ear and ear canal normal.   Left Ear: Tympanic membrane, external ear and ear canal normal.   Nose: Mucosal edema present. No nasal deformity. No epistaxis.   Mouth/Throat: Uvula is midline, oropharynx is clear and moist and mucous membranes are normal. No oral lesions. No trismus in the jaw. No uvula swelling. No oropharyngeal exudate, posterior oropharyngeal edema or posterior oropharyngeal erythema.   Diffuse nasal mucosa congestion with boggy inferior turbinates and mucoid secretions anteriorly.   Neck: Phonation normal. Neck supple. No tracheal deviation present.   His voice is within normal limits for age and unchanged as compared to prior visits.   Pulmonary/Chest: Effort normal. No respiratory distress.   Lymphadenopathy:     He has no cervical adenopathy.   Neurological: He is alert and oriented to person, place, and time.   Skin: Skin is warm and dry.   Psychiatric: He has a normal mood and affect. His behavior is normal. His speech is not slurred.       Tests / Results:        Assessment:       1. Rhinorrhea    2. Nasal congestion    3. Nasal turbinate hypertrophy    4. Dysphonia    5. TMJ syndrome    6. Type 2 diabetes mellitus without complication, without long-term current use of insulin        Plan:        TMJ measures including soft diet, moist heat, Advil as needed.    Factors contributing to voice change reviewed and options and start with vocal hygiene as reviewed.    Increase ipratropium nasal spray to 3 - 4 times a day.    Add OTC Benadryl 12.5 mg to 25 mg every evening.    Follow up in 3 - 4 weeks and possible scope.     Continue daily generic Singulair (montelukast).      Stop fluticasone spray and cetirizine pill.

## 2019-03-18 ENCOUNTER — OFFICE VISIT (OUTPATIENT)
Dept: OTOLARYNGOLOGY | Facility: CLINIC | Age: 84
End: 2019-03-18
Payer: MEDICARE

## 2019-03-18 VITALS
HEIGHT: 65 IN | WEIGHT: 158.81 LBS | DIASTOLIC BLOOD PRESSURE: 65 MMHG | TEMPERATURE: 98 F | HEART RATE: 62 BPM | SYSTOLIC BLOOD PRESSURE: 121 MMHG | BODY MASS INDEX: 26.46 KG/M2

## 2019-03-18 DIAGNOSIS — R09.82 POSTNASAL DRIP: ICD-10-CM

## 2019-03-18 DIAGNOSIS — J34.3 NASAL TURBINATE HYPERTROPHY: ICD-10-CM

## 2019-03-18 DIAGNOSIS — R49.9 CHANGE IN VOICE: ICD-10-CM

## 2019-03-18 DIAGNOSIS — J34.89 RHINORRHEA: ICD-10-CM

## 2019-03-18 DIAGNOSIS — R09.89 CHOKING EPISODE: ICD-10-CM

## 2019-03-18 PROCEDURE — 1101F PR PT FALLS ASSESS DOC 0-1 FALLS W/OUT INJ PAST YR: ICD-10-PCS | Mod: CPTII,S$GLB,, | Performed by: OTOLARYNGOLOGY

## 2019-03-18 PROCEDURE — 99214 OFFICE O/P EST MOD 30 MIN: CPT | Mod: S$GLB,,, | Performed by: OTOLARYNGOLOGY

## 2019-03-18 PROCEDURE — 1101F PT FALLS ASSESS-DOCD LE1/YR: CPT | Mod: CPTII,S$GLB,, | Performed by: OTOLARYNGOLOGY

## 2019-03-18 PROCEDURE — 99214 PR OFFICE/OUTPT VISIT, EST, LEVL IV, 30-39 MIN: ICD-10-PCS | Mod: S$GLB,,, | Performed by: OTOLARYNGOLOGY

## 2019-03-18 RX ORDER — IPRATROPIUM BROMIDE 42 UG/1
SPRAY, METERED NASAL
Qty: 30 ML | Refills: 3 | Status: SHIPPED | OUTPATIENT
Start: 2019-03-18 | End: 2019-04-15

## 2019-03-18 NOTE — PROGRESS NOTES
Subjective:       Patient ID: Vince Martinez is a 88 y.o. male.    Chief Complaint: Follow-up    He returns for results and follow-up.  Since his last visit his traveler's diarrhea and cough and chest congestion have cleared.  There have been no further choking episodes or any swallow complaints.  He did follow through with the modified barium swallow and is here to discuss this.  Continues with his chronic nasal complaints of rhinorrhea and drip.  He has been on numerous medications to manage this in various combinations over several years with variable but generally incomplete response and has declined surgery.  Presently taking Atrovent nasal spray with some response but states effect lasts only 8 hr and taking twice daily.  Again advised he can take this as often as 3 to 4 times a day, every 6-8 hours.  He has additionally been taking a low dose of Benadryl every evening which he finds beneficial as well.          Review of Systems   Ears: Positive for family history of hearing loss.  Negative for ear pressure, ringing in ear, ear infections, dizziness, head trauma and taken gentramycin/streptomycin.    Nose:  Positive for postnasal drip. Negative for nosebleeds, nasal or sinus surgery and snoring.    Mouth/Throat: Negative for pain swallowing, throat mass, neck mass, oral ulcers and neck lumps.   Constitutional: Negative for fever, chills and night sweats.    Eyes:  Negative for history of glaucoma.   Cardiovascular:  Positive for history of high blood pressure.   Respiratory:  Negative for asthma, emphysema, history of tuberculosis and shortness of breath.    Gastrointestinal:  Negative for history of stomach ulcers or pain, blood in stool and change in stool.   Other:  Positive for prostate disease and arthritis. Negative for slurred, swollen glands and persistent infections.           Objective:        Vitals:    03/18/19 1527   BP: 121/65   Pulse: 62   Temp: 97.5 °F (36.4 °C)     Body mass index is 26.43  kg/m².  Physical Exam   Constitutional: He is oriented to person, place, and time. He appears well-developed and well-nourished. No distress.   HENT:   Head: Normocephalic and atraumatic.   Right Ear: Tympanic membrane, external ear and ear canal normal.   Left Ear: Tympanic membrane, external ear and ear canal normal.   Nose: Mucosal edema present. No nasal deformity. No epistaxis.   Mouth/Throat: Uvula is midline, oropharynx is clear and moist and mucous membranes are normal. No oral lesions. No trismus in the jaw. No uvula swelling. No oropharyngeal exudate, posterior oropharyngeal edema or posterior oropharyngeal erythema.   Diffuse nasal mucosa congestion with boggy inferior turbinates and mucoid secretions anteriorly.   Neck: Phonation normal. Neck supple.   His voice is within normal limits for age and unchanged as compared to prior visits.   Pulmonary/Chest: Effort normal and breath sounds normal.   Lymphadenopathy:     He has no cervical adenopathy.   Neurological: He is alert and oriented to person, place, and time.   Skin: Skin is warm and dry.   Psychiatric: He has a normal mood and affect. His behavior is normal. His speech is not slurred.       Tests / Results:    MBS results reviewed with patient which reports instances of minimal to mild delay in trigger of swallow reflex and deemed within functional limits for age and recommendation of continued regular diet with aspiration precautions of small bites and small sips and standard precautions.        Assessment:       1. Rhinorrhea    2. Postnasal drip    3. Nasal turbinate hypertrophy    4. Change in voice    5. Choking episode        Plan:       Reviewed above and considerations and recommendations and answered questions.  Increase ipratropium nasal spray to 2 sprays in each nostril every 6 hours as needed.  Continue OTC Benadryl 12.5 mg every evening.    See Dr. Kauffman for further recommendations re runny, drippy nose.  See SLP for recommendations  re change in voice, aging voice.  Standard swallow/aspiration precautions reviewed.    Follow up depending on above / as needed.

## 2019-03-18 NOTE — PATIENT INSTRUCTIONS
Increase ipratropium nasal spray to 2 sprays in each nostril every 6 hours as needed.  Continue OTC Benadryl 12.5 mg every evening.    See Dr. Kauffman for further recommendations re runny, drippy nose.  See SLP for recommendations re change in voice, aging voice.    Follow up depending on above / as needed.

## 2019-03-22 RX ORDER — IRBESARTAN 300 MG/1
TABLET ORAL
Qty: 90 TABLET | Refills: 3 | Status: SHIPPED | OUTPATIENT
Start: 2019-03-22 | End: 2019-03-25 | Stop reason: SDUPTHER

## 2019-03-25 RX ORDER — IRBESARTAN 300 MG/1
TABLET ORAL
Qty: 180 TABLET | Refills: 3 | Status: SHIPPED | OUTPATIENT
Start: 2019-03-25 | End: 2020-09-25 | Stop reason: SDUPTHER

## 2019-04-15 ENCOUNTER — TELEPHONE (OUTPATIENT)
Dept: OTOLARYNGOLOGY | Facility: CLINIC | Age: 84
End: 2019-04-15

## 2019-04-15 RX ORDER — IPRATROPIUM BROMIDE 42 UG/1
SPRAY, METERED NASAL
Qty: 90 ML | Refills: 3 | Status: SHIPPED | OUTPATIENT
Start: 2019-04-15 | End: 2019-05-08 | Stop reason: SDUPTHER

## 2019-04-15 RX ORDER — FLUTICASONE PROPIONATE 50 MCG
2 SPRAY, SUSPENSION (ML) NASAL DAILY
Qty: 16 G | Refills: 2 | Status: SHIPPED | OUTPATIENT
Start: 2019-04-15 | End: 2019-08-20 | Stop reason: SDUPTHER

## 2019-04-15 NOTE — TELEPHONE ENCOUNTER
Let him know I refilled his fluticasone.  However at his last visit he was taking ipratropium with good response but only lasted 8 hours so increased the dose.

## 2019-04-15 NOTE — TELEPHONE ENCOUNTER
----- Message from Michelle Becerril LPN sent at 4/12/2019  3:14 PM CDT -----  Contact: Pt   Refill request. Pt states wrong medication.  ----- Message -----  From: Kayla Conte  Sent: 4/12/2019   3:08 PM  To: [facility] Staff    Can the clinic reply in [facility]: No     Please refill the medication(s) listed below. Please call the patient when the prescription(s) is ready for  at the phone number [phone]    Medication #1: fluticasone (FLONASE) 50 mcg/actuation nasal spray // Pt states he is completely out and was given the wrong nasal spray. Please call to further discuss and advise.     Medication #2:    Preferred Pharmacy: [facility] on file

## 2019-04-17 NOTE — TELEPHONE ENCOUNTER
Spoke with patient gave message Dr. Miguel sent said he would call pharmacy and if not there will leave me a message

## 2019-05-07 ENCOUNTER — OFFICE VISIT (OUTPATIENT)
Dept: OTOLARYNGOLOGY | Facility: CLINIC | Age: 84
End: 2019-05-07
Payer: MEDICARE

## 2019-05-07 VITALS
SYSTOLIC BLOOD PRESSURE: 143 MMHG | HEART RATE: 63 BPM | DIASTOLIC BLOOD PRESSURE: 52 MMHG | BODY MASS INDEX: 26.04 KG/M2 | HEIGHT: 65 IN | WEIGHT: 156.31 LBS

## 2019-05-07 DIAGNOSIS — J30.0 VASOMOTOR RHINITIS: Primary | ICD-10-CM

## 2019-05-07 DIAGNOSIS — J34.3 NASAL TURBINATE HYPERTROPHY: ICD-10-CM

## 2019-05-07 DIAGNOSIS — R09.82 POSTNASAL DRIP: ICD-10-CM

## 2019-05-07 DIAGNOSIS — E11.9 TYPE 2 DIABETES MELLITUS WITHOUT COMPLICATION, WITHOUT LONG-TERM CURRENT USE OF INSULIN: ICD-10-CM

## 2019-05-07 DIAGNOSIS — J34.2 DEVIATED NASAL SEPTUM: ICD-10-CM

## 2019-05-07 DIAGNOSIS — I11.9 HYPERTENSIVE HEART DISEASE WITHOUT HEART FAILURE: ICD-10-CM

## 2019-05-07 PROCEDURE — 1101F PT FALLS ASSESS-DOCD LE1/YR: CPT | Mod: CPTII,S$GLB,, | Performed by: OTOLARYNGOLOGY

## 2019-05-07 PROCEDURE — 99214 PR OFFICE/OUTPT VISIT, EST, LEVL IV, 30-39 MIN: ICD-10-PCS | Mod: 25,S$GLB,, | Performed by: OTOLARYNGOLOGY

## 2019-05-07 PROCEDURE — 31231 NASAL ENDOSCOPY DX: CPT | Mod: S$GLB,,, | Performed by: OTOLARYNGOLOGY

## 2019-05-07 PROCEDURE — 99999 PR PBB SHADOW E&M-EST. PATIENT-LVL III: CPT | Mod: PBBFAC,,, | Performed by: OTOLARYNGOLOGY

## 2019-05-07 PROCEDURE — 1101F PR PT FALLS ASSESS DOC 0-1 FALLS W/OUT INJ PAST YR: ICD-10-PCS | Mod: CPTII,S$GLB,, | Performed by: OTOLARYNGOLOGY

## 2019-05-07 PROCEDURE — 99999 PR PBB SHADOW E&M-EST. PATIENT-LVL III: ICD-10-PCS | Mod: PBBFAC,,, | Performed by: OTOLARYNGOLOGY

## 2019-05-07 PROCEDURE — 99214 OFFICE O/P EST MOD 30 MIN: CPT | Mod: 25,S$GLB,, | Performed by: OTOLARYNGOLOGY

## 2019-05-07 PROCEDURE — 31231 NASAL/SINUS ENDOSCOPY: ICD-10-PCS | Mod: S$GLB,,, | Performed by: OTOLARYNGOLOGY

## 2019-05-07 NOTE — LETTER
May 7, 2019    Sarah Miguel MD  [address]  [address]  [place] [address]           OTOLARYNGOLOGY AND COMMUNICATION SCIENCES    Clyde Maher MD, FACS          SURGICAL AND MEDICAL DISEASES OF HEAD AND NECK  MD Clyde Ruth MD, FACS  Bryn Timmons III, MD    OTOLOGY, NEUROTOLOGY and SKULL-BASE SURGERY  Dixon Kee MD, FACS  Norbert Montoya MD, Director    PEDIATRIC OTOLARYNGOLOGY & OTOLOGY  ASHLEY Saucedo MD, FAAP  Radha Bonds MD, FACS    FACIAL PLASTIC and RECONSTRUCTIVE SURGERY  NIMCO Silver III, MD, FACS    RHINOLOGY and SINUS SURGERY  Carl Kauffman MD, MPH, FACS  Director   NIMCO Silver III, MD, FACS    LARYNGOLOGY  Nura Hernandez MD    SPEECH-LANGUAGE PATHOLOGY  Em Frederikc, PhD, [facility]-SLP  Teodora Benites, MS, CCC-SLP  Yancy Browning, MS, CCC-SLP  Charla Estrada MA, [facility]-SLP    AUDIOLOGY SECTION  Hortensia Villa, MS, CCC-A  RADHA Huerta, CCC-A  Michelle Garcia, Jermaine, CCC-A  Jermaine Quintero, CCC-A  Rommel Mata Jr., RADHA, CCA-A  RADHA Fernandez, CCC-A  Jermaine Burdick, CCC-A    [facility]  Head and Neck Surgical Oncology  LYNDON Godfrey, FNP-C  Pedatric Otolaryngology  LYNDON Fisher, PNP-C       Re:  Vince Martinez  :  10/19/1930    Dear Dr. Miguel,    Thank you for referring your patient, Vince Martinez, to us for evaluation and treatment.  I have enclosed a copy of my clinic note for your review and records.  If you have any questions please do not hesitate to contact our office.     Thank you for allowing me to participate in the care of your patient.    Sincerely,        Carl Kauffman MD, MPH, FACS    Director, Rhinology and Sinus Surgery  Department of Otorhinolaryngology  [facility]    Encl:  Progress note       [facility] [address] [place] [address]  phone [phone]  fax [phone]   [email or web address]

## 2019-05-07 NOTE — PROCEDURES
Nasal/sinus endoscopy  Date/Time: 5/7/2019 2:33 PM  Performed by: Carl Kauffman MD  Authorized by: Carl Kauffman MD     Consent Done?:  Yes (Verbal)  Anesthesia:     Local anesthetic:  Lidocaine 4% and Javi-Synephrine 1/2%    Patient tolerance:  Patient tolerated the procedure well with no immediate complications  Nose:     Procedure Performed:  Nasal Endoscopy  External:      No external nasal deformity  Intranasal:      Mucosa no polyps     Mucosa ulcers not present     No mucosa lesions present     Enlarged turbinates     Septum gross deformity  Nasopharynx:      No mucosa lesions     Adenoids not present     Posterior choanae patent     Eustachian tube patent     Moderate leftward septal deviation with obstruction.  Moderate posterior nonpurulent nasal mucus drainage.

## 2019-05-07 NOTE — PROGRESS NOTES
Subjective:      Vince Martinez is a 88 y.o. male who was referred to me by Dr. Sarah Conway* in consultation for rhinorrhea.    He relates a long history for several years of perennial, daily, moderately severe rhinorrhea, which is bilateral and sometimes copious.  This is stimulated by eating and also at nighttime and in the mornings.  Ipratropium spray has helped transiently, though he has had to use an increasing dose.  He notes also some nocturnal nasal congestion, as well as throat clearing and voice interruption due to the mucus that interferes with his work as a .  He denies facial pressure, hyposmia, pruritic symptoms or notable sinus infections.    Current sinonasal medications include ipratropium 2 sprays q6 hours.  The last course of antibiotics was a long time ago.  He does not regularly use nasal decongestant sprays.    He does not recall previously having allergy testing.    He denies a history of asthma.    He denies a history of reflux symptoms.  He has not previously had an EGD.    He denies have a diagnosis of obstructive sleep apnea.     He has previously had sinonasal surgery consisting of nasoantral windows as a young man.    He does not recall a prior history of nasal trauma other than the sinonasal surgery.    QOL assessment deferred.    Global QOL = 95%    Days missed = Less than 5      Past Medical History  He has a past medical history of Acute combined systolic and diastolic CHF, NYHA class 3, Arthritis, Diabetes mellitus, DJD (degenerative joint disease), Hypertension, Prostate enlargement, and Thyroid disease.    Past Surgical History  He has a past surgical history that includes Prostate surgery.    Family History  His family history is not on file.    Social History  He reports that he has never smoked. He has never used smokeless tobacco. He reports that he does not drink alcohol or use drugs.    Allergies  He is allergic to bactrim [sulfamethoxazole-trimethoprim] and  ciprofloxacin.    Medications   He has a current medication list which includes the following prescription(s): acetaminophen, capsaicin, diclofenac sodium, econazole nitrate, finasteride, fluocinonide, fluticasone propionate, furosemide, gabapentin, gel base no.41 (bulk), glimepiride, glucosamine-chondroitin, hydrocortisone, ipratropium, irbesartan, ketoconazole, levothyroxine, loperamide, melatonin, metformin, mometasone 0.1%, montelukast, multivitamin, mv-mn/iron/folic acid/herb 190, neomycin-polymyxin-dexamethasone, omeprazole, simvastatin, tamsulosin, tizanidine, triamcinolone acetonide 0.1%, and vitamin k2.    Review of Systems  Review of Systems   Constitutional: Negative for fatigue, fever and unexpected weight change.   HENT: Positive for hearing loss, postnasal drip, rhinorrhea and tinnitus. Negative for congestion, dental problem, ear discharge, ear pain, facial swelling, hoarse voice, nosebleeds, sinus pressure, sore throat, trouble swallowing and voice change.    Eyes: Positive for visual disturbance. Negative for photophobia, discharge and itching.   Respiratory: Negative for apnea, cough, shortness of breath and wheezing.    Cardiovascular: Positive for palpitations. Negative for chest pain.   Gastrointestinal: Negative for abdominal pain, nausea and vomiting.   Endocrine: Negative for cold intolerance and heat intolerance.   Genitourinary: Positive for difficulty urinating.   Musculoskeletal: Positive for neck pain. Negative for arthralgias, back pain and myalgias.   Skin: Positive for rash.   Allergic/Immunologic: Negative for environmental allergies and food allergies.   Neurological: Negative for dizziness, seizures, syncope, weakness and headaches.   Hematological: Negative for adenopathy. Does not bruise/bleed easily.   Psychiatric/Behavioral: Positive for decreased concentration and sleep disturbance. Negative for dysphoric mood. The patient is not nervous/anxious.           Objective:     BP  "(!) 143/52   Pulse 63   Ht 5' 5" (1.651 m)   Wt 70.9 kg (156 lb 4.9 oz)   BMI 26.01 kg/m²        Constitutional:   He appears well-developed. He is cooperative. Normal speech.  No hoarse voice.      Head:  Normocephalic. Salivary glands normal.  Facial strength is normal.      Ears:    Right Ear: No drainage or tenderness. Tympanic membrane is not perforated. Tympanic membrane mobility is normal. No middle ear effusion. No decreased hearing is noted.   Left Ear: No drainage or tenderness. Tympanic membrane is not perforated. Tympanic membrane mobility is normal.  No middle ear effusion. No decreased hearing is noted.     Nose:  Septal deviation present. No mucosal edema, rhinorrhea or polyps. No epistaxis. Turbinates normal, no turbinate masses and no turbinate hypertrophy.  Right sinus exhibits no maxillary sinus tenderness and no frontal sinus tenderness. Left sinus exhibits no maxillary sinus tenderness and no frontal sinus tenderness.     Mouth/Throat  Oropharynx clear and moist without lesions or asymmetry and normal uvula midline. He does not have dentures. Normal dentition. No oral lesions or mucous membrane lesions. No oropharyngeal exudate or posterior oropharyngeal erythema. Mirror exam not performed due to patient tolerance.  Mirror exam not performed due to patient tolerance.      Neck:  Neck normal without thyromegaly masses, asymmetry, normal tracheal structure, crepitus, and tenderness, thyroid normal, trachea normal and no adenopathy. Normal range of motion present.     He has no cervical adenopathy.     Cardiovascular:   Regular rhythm.      Pulmonary/Chest:   Effort normal.     Psychiatric:   He has a normal mood and affect. His speech is normal and behavior is normal.     Neurological:   No cranial nerve deficit.     Skin:   No rash noted.       Procedure    Nasal endoscopy performed.  See procedure note.     Left nasal valve     Left MT     Left posterior drainage     Right nasal valve     " Right MT      Right posterior drainage        Data Reviewed    WBC (K/uL)   Date Value   11/20/2018 5.15     Eosinophil% (%)   Date Value   11/20/2018 4.9     Eos # (K/uL)   Date Value   11/20/2018 0.3     Platelets (K/uL)   Date Value   11/20/2018 170     Glucose (mg/dL)   Date Value   11/20/2018 113 (H)     No results found for: IGE    No sinus imaging available.       Assessment:     1. Vasomotor rhinitis    2. Nasal turbinate hypertrophy    3. Deviated nasal septum    4. Postnasal drip    5. Hypertensive heart disease without heart failure    6. Type 2 diabetes mellitus without complication, without long-term current use of insulin         Plan:     I had a long discussion with the patient regarding his condition and the further workup and management options.  He would benefit from cryoablation of the posterior nasal nerves for treatment of his intractable rhinorrhea.  We will need to order the Clarifix device and notify him when available.  He has a deviated septum that may prohibit access on the left side, but he is willing to attempt this.  Otherwise, surgical correction of the septal deviation would be considered.    Follow up for the procedure.

## 2019-05-07 NOTE — Clinical Note
May 7, 2019      Sarah Miguel MD  [address]  [address]  [facility] [address]           Ern[facility] adilson - Otorhinolaryngology  [address]  [facility] [address]  Phone: [phone]  Fax: [phone]          Patient: Vince Martinez   MR Number: [id number]   YOB: 1930   Date of Visit: 5/7/2019       Dear Dr. Sarah Miguel:    Thank you for referring Vince Martinez to me for evaluation. Attached you will find relevant portions of my assessment and plan of care.    If you have questions, please do not hesitate to call me. I look forward to following Vince Martinez along with you.    Sincerely,    Carl Kauffman MD    Enclosure  CC:  No Recipients    If you would like to receive this communication electronically, please contact [email or web address] or [phone] to request more information on [employer] access.    For providers and/or their staff who would like to refer a patient to [facility], please contact us through our one-stop-shop provider referral line, [facility], at [phone].    If you feel you have received this communication in error or would no longer like to receive these types of communications, please e-mail [email or web address]

## 2019-05-08 ENCOUNTER — TELEPHONE (OUTPATIENT)
Dept: OTOLARYNGOLOGY | Facility: CLINIC | Age: 84
End: 2019-05-08

## 2019-05-08 NOTE — TELEPHONE ENCOUNTER
----- Message from Kayla Conte sent at 5/8/2019 12:45 PM CDT -----  Can the clinic reply in [facility]: No     Please refill the medication(s) listed below. Please call the patient when the prescription(s) is ready for  at the phone number [phone]    Medication #1: Nose Spray // Pt states he want to know if he can get two because he runs out. Please call to further discuss and advise.    Medication #2:    Preferred Pharmacy: [facility] on file

## 2019-05-09 RX ORDER — IPRATROPIUM BROMIDE 42 UG/1
SPRAY, METERED NASAL
Qty: 90 ML | Refills: 3 | Status: SHIPPED | OUTPATIENT
Start: 2019-05-09 | End: 2020-05-19 | Stop reason: SDUPTHER

## 2019-08-20 ENCOUNTER — OFFICE VISIT (OUTPATIENT)
Dept: CARDIOLOGY | Facility: CLINIC | Age: 84
End: 2019-08-20
Payer: MEDICARE

## 2019-08-20 VITALS
HEART RATE: 63 BPM | SYSTOLIC BLOOD PRESSURE: 114 MMHG | DIASTOLIC BLOOD PRESSURE: 56 MMHG | HEIGHT: 65 IN | WEIGHT: 156 LBS | BODY MASS INDEX: 25.99 KG/M2

## 2019-08-20 DIAGNOSIS — I35.1 AORTIC REGURGITATION: ICD-10-CM

## 2019-08-20 DIAGNOSIS — I11.9 HYPERTENSIVE HEART DISEASE WITHOUT HEART FAILURE: ICD-10-CM

## 2019-08-20 DIAGNOSIS — E03.9 ACQUIRED HYPOTHYROIDISM: ICD-10-CM

## 2019-08-20 DIAGNOSIS — I06.1 RHEUMATIC AORTIC VALVE INSUFFICIENCY: ICD-10-CM

## 2019-08-20 DIAGNOSIS — R60.0 LOCALIZED EDEMA: ICD-10-CM

## 2019-08-20 DIAGNOSIS — E11.9 TYPE 2 DIABETES MELLITUS WITHOUT COMPLICATION, WITHOUT LONG-TERM CURRENT USE OF INSULIN: ICD-10-CM

## 2019-08-20 DIAGNOSIS — I49.1 PREMATURE ATRIAL CONTRACTIONS: ICD-10-CM

## 2019-08-20 DIAGNOSIS — I49.3 PVCS (PREMATURE VENTRICULAR CONTRACTIONS): ICD-10-CM

## 2019-08-20 DIAGNOSIS — I50.42 CHRONIC COMBINED SYSTOLIC AND DIASTOLIC CHF (CONGESTIVE HEART FAILURE): Primary | ICD-10-CM

## 2019-08-20 DIAGNOSIS — I10 ESSENTIAL HYPERTENSION: ICD-10-CM

## 2019-08-20 DIAGNOSIS — R00.2 PALPITATIONS: ICD-10-CM

## 2019-08-20 PROCEDURE — 99999 PR PBB SHADOW E&M-EST. PATIENT-LVL II: CPT | Mod: PBBFAC,,, | Performed by: INTERNAL MEDICINE

## 2019-08-20 PROCEDURE — 1101F PT FALLS ASSESS-DOCD LE1/YR: CPT | Mod: CPTII,S$GLB,, | Performed by: INTERNAL MEDICINE

## 2019-08-20 PROCEDURE — 93000 EKG 12-LEAD: ICD-10-PCS | Mod: S$GLB,,, | Performed by: INTERNAL MEDICINE

## 2019-08-20 PROCEDURE — 99999 PR PBB SHADOW E&M-EST. PATIENT-LVL II: ICD-10-PCS | Mod: PBBFAC,,, | Performed by: INTERNAL MEDICINE

## 2019-08-20 PROCEDURE — 99214 OFFICE O/P EST MOD 30 MIN: CPT | Mod: S$GLB,,, | Performed by: INTERNAL MEDICINE

## 2019-08-20 PROCEDURE — 1101F PR PT FALLS ASSESS DOC 0-1 FALLS W/OUT INJ PAST YR: ICD-10-PCS | Mod: CPTII,S$GLB,, | Performed by: INTERNAL MEDICINE

## 2019-08-20 PROCEDURE — 93000 ELECTROCARDIOGRAM COMPLETE: CPT | Mod: S$GLB,,, | Performed by: INTERNAL MEDICINE

## 2019-08-20 PROCEDURE — 99499 RISK ADDL DX/OHS AUDIT: ICD-10-PCS | Mod: S$GLB,,, | Performed by: INTERNAL MEDICINE

## 2019-08-20 PROCEDURE — 99499 UNLISTED E&M SERVICE: CPT | Mod: S$GLB,,, | Performed by: INTERNAL MEDICINE

## 2019-08-20 PROCEDURE — 99214 PR OFFICE/OUTPT VISIT, EST, LEVL IV, 30-39 MIN: ICD-10-PCS | Mod: S$GLB,,, | Performed by: INTERNAL MEDICINE

## 2019-08-20 RX ORDER — NAPROXEN 375 MG/1
375 TABLET ORAL
COMMUNITY
Start: 2018-09-17 | End: 2019-08-20

## 2019-08-20 RX ORDER — AZELASTINE 1 MG/ML
SPRAY, METERED NASAL
Refills: 5 | COMMUNITY
Start: 2019-07-25 | End: 2020-03-20

## 2019-08-20 RX ORDER — CETIRIZINE HYDROCHLORIDE 10 MG/1
10 TABLET ORAL DAILY
COMMUNITY
End: 2019-08-20

## 2019-08-20 RX ORDER — CELECOXIB 200 MG/1
200 CAPSULE ORAL 2 TIMES DAILY
COMMUNITY
End: 2019-08-20

## 2019-08-20 RX ORDER — FLUTICASONE PROPIONATE 50 MCG
2 SPRAY, SUSPENSION (ML) NASAL DAILY
Qty: 16 G | Refills: 11 | Status: SHIPPED | OUTPATIENT
Start: 2019-08-20 | End: 2020-07-20

## 2019-08-20 NOTE — PROGRESS NOTES
Subjective:      Patient ID: Vince Martinez is a 88 y.o. male.    Chief Complaint: Follow-up    HPI:  Pt states he is only taking tylenol for arthritis so I have taken the naproxen and Celebrex off his list of meds.    Pt is aware of his heart beat only when he lies on his left side and places his head on the pillow.    Pt is mainly limited by arthritis in his hips.    New PCP is Dr Joe.  Pt had labs done last month.      Review of Systems   Cardiovascular: Negative for chest pain, claudication, dyspnea on exertion, irregular heartbeat, leg swelling, near-syncope, orthopnea, palpitations and syncope.      FBS typically 147 before breakfast    Past Medical History:   Diagnosis Date    Acute combined systolic and diastolic CHF, NYHA class 3 11/15/2018    Arthritis     Diabetes mellitus     DJD (degenerative joint disease)     Hypertension     Prostate enlargement     Thyroid disease         Past Surgical History:   Procedure Laterality Date    PROSTATE SURGERY         No family history on file.    Social History     Socioeconomic History    Marital status:      Spouse name: Not on file    Number of children: Not on file    Years of education: Not on file    Highest education level: Not on file   Occupational History    Not on file   Social Needs    Financial resource strain: Not on file    Food insecurity:     Worry: Not on file     Inability: Not on file    Transportation needs:     Medical: Not on file     Non-medical: Not on file   Tobacco Use    Smoking status: Never Smoker    Smokeless tobacco: Never Used   Substance and Sexual Activity    Alcohol use: No    Drug use: No    Sexual activity: Not on file   Lifestyle    Physical activity:     Days per week: Not on file     Minutes per session: Not on file    Stress: Not on file   Relationships    Social connections:     Talks on phone: Not on file     Gets together: Not on file     Attends Religion service: Not on file      Active member of club or organization: Not on file     Attends meetings of clubs or organizations: Not on file     Relationship status: Not on file   Other Topics Concern    Not on file   Social History Narrative    Not on file       Current Outpatient Medications on File Prior to Visit   Medication Sig Dispense Refill    azelastine (ASTELIN) 137 mcg (0.1 %) nasal spray USE 1 SPRAY(S) IN EACH NOSTRIL THREE TIMES DAILY AS NEEDED FOR 30 DAYS  5    diclofenac sodium 1 % Gel Apply 2 g topically once daily.      finasteride (PROSCAR) 5 mg tablet Take 5 mg by mouth once daily.       furosemide (LASIX) 40 MG tablet Take 1 tablet (40 mg total) by mouth once daily. 30 tablet 11    glimepiride (AMARYL) 2 MG tablet Take 2 mg by mouth 3 (three) times daily.   1    glucosamine-chondroitin 500-400 mg tablet Take 1 tablet by mouth 3 (three) times daily.      ipratropium (ATROVENT) 42 mcg (0.06 %) nasal spray 2 sprays in each nostril 4 times a day. 90 mL 3    irbesartan (AVAPRO) 300 MG tablet Take two tablet daily. 180 tablet 3    ketoconazole (NIZORAL) 2 % shampoo       levothyroxine (SYNTHROID) 100 MCG tablet       loperamide (IMODIUM A-D) 2 mg Tab Take 2 mg by mouth 4 (four) times daily as needed.      metFORMIN (GLUCOPHAGE-XR) 500 MG 24 hr tablet       mometasone 0.1% (ELOCON) 0.1 % cream       montelukast (SINGULAIR) 10 mg tablet Take 1 tablet (10 mg total) by mouth once daily. 30 tablet 0    neomycin-polymyxin-dexamethasone (DEXACINE) 3.5 mg/g-10,000 unit/g-0.1 % Oint       omeprazole (PRILOSEC) 20 MG capsule Take 20 mg by mouth once daily.      simvastatin (ZOCOR) 20 MG tablet       tamsulosin (FLOMAX) 0.4 mg Cp24 0.4 mg once daily.       tizanidine 4 mg Cap       triamcinolone acetonide 0.1% (KENALOG) 0.1 % cream Apply topically 2 (two) times daily.      [DISCONTINUED] fluticasone (FLONASE) 50 mcg/actuation nasal spray 2 sprays (100 mcg total) by Each Nare route once daily. 16 g 2    melatonin 10 mg  "Cap Take by mouth.      [DISCONTINUED] acetaminophen (TYLENOL ARTHRITIS PAIN) 650 MG TbSR Take 650 mg by mouth every 8 (eight) hours.      [DISCONTINUED] capsaicin (ZOSTRIX) 0.025 % cream Apply topically 2 (two) times daily.      [DISCONTINUED] celecoxib (CELEBREX) 200 MG capsule Take 200 mg by mouth 2 (two) times daily.      [DISCONTINUED] cetirizine (ZYRTEC) 10 MG tablet Take 10 mg by mouth once daily.      [DISCONTINUED] econazole nitrate 1 % cream Apply topically once daily.      [DISCONTINUED] fluocinonide (LIDEX) 0.05 % external solution Apply topically 2 (two) times daily.      [DISCONTINUED] gabapentin (NEURONTIN) 100 MG capsule Take 100 mg by mouth every evening.      [DISCONTINUED] gel base no.41, bulk, (HYDROGEL) Gel by Misc.(Non-Drug; Combo Route) route.      [DISCONTINUED] hydrocortisone (PROCTOZONE-HC) 2.5 % rectal cream Place rectally 2 (two) times daily.      [DISCONTINUED] multivitamin capsule Take 1 capsule by mouth once daily.      [DISCONTINUED] mv-mn/iron/folic acid/herb 190 (VITAMIN D3 COMPLETE ORAL) Take by mouth.      [DISCONTINUED] naproxen (EC-NAPROSYN) 375 MG TbEC EC tablet Take 375 mg by mouth.      [DISCONTINUED] VITAMIN K2 ORAL Take 100 mcg by mouth.       No current facility-administered medications on file prior to visit.        Review of patient's allergies indicates:   Allergen Reactions    Bactrim [sulfamethoxazole-trimethoprim] Rash    Ciprofloxacin Rash     Objective:     Vitals:    08/20/19 1622   BP: (!) 114/56   BP Location: Left arm   Patient Position: Sitting   BP Method: Large (Automatic)   Pulse: 63   Weight: 70.8 kg (155 lb 15.6 oz)   Height: 5' 5" (1.651 m)        Physical Exam   Constitutional: He is oriented to person, place, and time. He appears well-developed and well-nourished. No distress.   Eyes: No scleral icterus.   Neck: No JVD present. Carotid bruit is not present.   Cardiovascular: Regular rhythm and normal heart sounds. Exam reveals no gallop " and no friction rub.   No murmur heard.  Pulmonary/Chest: Effort normal and breath sounds normal. No respiratory distress.   Musculoskeletal: He exhibits no edema.   Neurological: He is alert and oriented to person, place, and time.   Skin: Skin is warm and dry. He is not diaphoretic.   Psychiatric: He has a normal mood and affect. His behavior is normal. Judgment and thought content normal.   Vitals reviewed.     ECG: sinus bradycardia, left axis deviation    Wt down 2 lbs    Assessment:     1. Chronic combined systolic and diastolic CHF (congestive heart failure)    2. Essential hypertension    3. 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 Palpitations    5. Premature atrial contractions    6. PVCs (premature ventricular contractions)    7. Rheumatic aortic valve insufficiency    8. Acquired hypothyroidism    9. Type 2 diabetes mellitus without complication, without long-term current use of insulin    10. Localized edema    11. Aortic regurgitation      Plan:   Vince was seen today for follow-up.    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     Need copies of labs done by Dr Tatyana Meng meds    Tylenol alone for arthritis pain.. Try to avoid tizanidine    Pt encouraged to use fluticasone nasal spray in  place of Zyrtec or Benadryl due to sedation  Follow up in about 6 months (around 2/20/2020).

## 2019-08-29 ENCOUNTER — HOSPITAL ENCOUNTER (OUTPATIENT)
Dept: RADIOLOGY | Facility: HOSPITAL | Age: 84
Discharge: HOME | End: 2019-08-29
Attending: ORTHOPAEDIC SURGERY
Payer: MEDICARE

## 2019-08-29 ENCOUNTER — OFFICE VISIT (OUTPATIENT)
Dept: ORTHOPEDICS | Facility: CLINIC | Age: 84
End: 2019-08-29
Payer: MEDICARE

## 2019-08-29 VITALS — HEIGHT: 65 IN | WEIGHT: 155 LBS | BODY MASS INDEX: 25.83 KG/M2

## 2019-08-29 DIAGNOSIS — M25.559 ARTHRALGIA OF HIP, UNSPECIFIED LATERALITY: ICD-10-CM

## 2019-08-29 DIAGNOSIS — M17.0 PRIMARY OSTEOARTHRITIS OF BOTH KNEES: ICD-10-CM

## 2019-08-29 DIAGNOSIS — M16.12 PRIMARY OSTEOARTHRITIS OF LEFT HIP: ICD-10-CM

## 2019-08-29 DIAGNOSIS — M25.559 ARTHRALGIA OF HIP, UNSPECIFIED LATERALITY: Primary | ICD-10-CM

## 2019-08-29 DIAGNOSIS — M16.11 PRIMARY OSTEOARTHRITIS OF RIGHT HIP: ICD-10-CM

## 2019-08-29 PROCEDURE — 73502 X-RAY EXAM HIP UNI 2-3 VIEWS: CPT | Mod: 26,RT,, | Performed by: RADIOLOGY

## 2019-08-29 PROCEDURE — 99202 PR OFFICE/OUTPT VISIT, NEW, LEVL II, 15-29 MIN: ICD-10-PCS | Mod: S$GLB,,, | Performed by: ORTHOPAEDIC SURGERY

## 2019-08-29 PROCEDURE — 73502 X-RAY EXAM HIP UNI 2-3 VIEWS: CPT | Mod: TC,PN,RT

## 2019-08-29 PROCEDURE — 99999 PR PBB SHADOW E&M-EST. PATIENT-LVL III: CPT | Mod: PBBFAC,,, | Performed by: ORTHOPAEDIC SURGERY

## 2019-08-29 PROCEDURE — 73560 XR KNEE 1 OR 2 VIEW BILATERAL: ICD-10-PCS | Mod: 26,50,, | Performed by: RADIOLOGY

## 2019-08-29 PROCEDURE — 73502 X-RAY EXAM HIP UNI 2-3 VIEWS: CPT | Mod: TC,PN,LT

## 2019-08-29 PROCEDURE — 1101F PR PT FALLS ASSESS DOC 0-1 FALLS W/OUT INJ PAST YR: ICD-10-PCS | Mod: CPTII,S$GLB,, | Performed by: ORTHOPAEDIC SURGERY

## 2019-08-29 PROCEDURE — 73502 XR HIP 2 VIEW LEFT: ICD-10-PCS | Mod: 26,59,LT, | Performed by: RADIOLOGY

## 2019-08-29 PROCEDURE — 99202 OFFICE O/P NEW SF 15 MIN: CPT | Mod: S$GLB,,, | Performed by: ORTHOPAEDIC SURGERY

## 2019-08-29 PROCEDURE — 99999 PR PBB SHADOW E&M-EST. PATIENT-LVL III: ICD-10-PCS | Mod: PBBFAC,,, | Performed by: ORTHOPAEDIC SURGERY

## 2019-08-29 PROCEDURE — 73560 X-RAY EXAM OF KNEE 1 OR 2: CPT | Mod: 50,TC,PN

## 2019-08-29 PROCEDURE — 73560 X-RAY EXAM OF KNEE 1 OR 2: CPT | Mod: 26,50,, | Performed by: RADIOLOGY

## 2019-08-29 PROCEDURE — 73502 X-RAY EXAM HIP UNI 2-3 VIEWS: CPT | Mod: 26,59,LT, | Performed by: RADIOLOGY

## 2019-08-29 PROCEDURE — 1101F PT FALLS ASSESS-DOCD LE1/YR: CPT | Mod: CPTII,S$GLB,, | Performed by: ORTHOPAEDIC SURGERY

## 2019-08-29 NOTE — PROGRESS NOTES
Subjective:      Patient ID: Vince Martinez is a 88 y.o. male.    Chief Complaint:  Pain in both hips  HPI      Vince Martinez is seen for evaluation and treatment of hip pain.  They have experienced problems with their bilateral hip over the past 6 months Pain is located in the groin, laterally and  referred to the knee. They have been treated with NA.   Symptoms have recently stayed the same. Ambulation reportedly has not been impaired. Self care ADLs are not painful.  He denies any recent treatment  Review of Systems   Constitution: Negative for fever and weight loss.   HENT: Negative for congestion.    Eyes: Negative for visual disturbance.   Cardiovascular: Negative for chest pain.   Respiratory: Negative for shortness of breath.    Hematologic/Lymphatic: Negative for bleeding problem. Does not bruise/bleed easily.   Skin: Negative for poor wound healing.   Musculoskeletal: Positive for joint pain.   Gastrointestinal: Negative for abdominal pain.   Genitourinary: Negative for dysuria.   Neurological: Negative for seizures.   Psychiatric/Behavioral: Negative for altered mental status.   Allergic/Immunologic: Negative for persistent infections.         Objective:            Ortho/SPM Exam    Right hip    The patient is not in acute distress.   Body habitus is:normal.   Sclerae normal  The patient walks without a limp.   Respiratory distress:  none  The skin over the hip is:intact.   There is:no local tenderness.   Range of motion- Flexion 95, External rotation 30, internal rotation 25.  Resisted SLR negative.  Pain with rotation negative  Sciatic tension findings negative.  Shortening/lengthening compared to the contralateral side exam deferred.  Pulses DP present, PT present.  Motor normal 5/5 strength in all tested muscle groups.   Sensory normal.    The left hip is identical     Radiographs were obtained of both hips and both knees.  There is mild joint space narrowing in both hips with pincer impingement,  slightly worse on the right.  Both knees have mild joint space narrowing        Assessment:       Encounter Diagnoses   Name Primary?    Arthralgia of hip, unspecified laterality Yes    Primary osteoarthritis of both knees     Primary osteoarthritis of right hip     Primary osteoarthritis of left hip     most of the patient's knee symptoms probably result from referred hip pain      Plan:       Vince was seen today for pain and pain.    Diagnoses and all orders for this visit:    Arthralgia of hip, unspecified laterality  -     X-Ray Hip 2 or 3 views Left; Future  -     Cancel: X-ray Knee Ortho Bilateral with Flexion; Future  -     X-Ray Hip 2 or 3 views Right; Future    Primary osteoarthritis of both knees    Primary osteoarthritis of right hip    Primary osteoarthritis of left hip      I explained my diagnostic impression and the reasoning behind it in detail, using layman's terms.  Models and/or pictures were used to help in the explanation.    Although I had initially considered refilling previous Flexeril at the patient's request, upon further review of the patient's medication list I do not recommended at this time because the risk of interactions.    Use of Tylenol products recommended and explained    Physical therapy    Depending on progress therapeutic injections could be considered in the future    I explained the potential role of surgery in the treatment of this condition to the patient.  They understand that if nonsurgical measures do not adequately control symptoms, surgery will be considered in the future.

## 2019-09-12 ENCOUNTER — TELEPHONE (OUTPATIENT)
Dept: ORTHOPEDICS | Facility: CLINIC | Age: 84
End: 2019-09-12

## 2019-09-12 RX ORDER — FUROSEMIDE 40 MG/1
40 TABLET ORAL DAILY
Qty: 90 TABLET | Refills: 3 | Status: SHIPPED | OUTPATIENT
Start: 2019-09-12 | End: 2020-09-11

## 2019-09-12 NOTE — TELEPHONE ENCOUNTER
Spoke to patient's wife. Refill sent to [facility]    ----- Message from Su Osman sent at 9/12/2019  2:06 PM CDT -----  Contact: self, [phone]  Patient states [facility] told him to call you for his Furosemide prescription refill. Please call.

## 2019-09-12 NOTE — TELEPHONE ENCOUNTER
----- Message from Su Osman sent at 9/12/2019  2:06 PM CDT -----  Contact: self, [phone]  Thai speaking only patient states he was seen 10 days ago and medication for arthritis was supposed to be sent to his pharmacy but they haven't received anything. Please call.

## 2019-09-23 ENCOUNTER — CLINICAL SUPPORT (OUTPATIENT)
Dept: REHABILITATION | Facility: HOSPITAL | Age: 84
End: 2019-09-23
Attending: ORTHOPAEDIC SURGERY
Payer: MEDICARE

## 2019-09-23 DIAGNOSIS — R26.89 IMPAIRED GAIT AND MOBILITY: ICD-10-CM

## 2019-09-23 DIAGNOSIS — R29.898 WEAKNESS OF BOTH HIPS: ICD-10-CM

## 2019-09-23 PROCEDURE — 97162 PT EVAL MOD COMPLEX 30 MIN: CPT | Mod: PN

## 2019-09-23 PROCEDURE — G8979 MOBILITY GOAL STATUS: HCPCS | Mod: CJ,PN

## 2019-09-23 PROCEDURE — G8978 MOBILITY CURRENT STATUS: HCPCS | Mod: CK,PN

## 2019-09-23 PROCEDURE — 97110 THERAPEUTIC EXERCISES: CPT | Mod: PN

## 2019-09-23 NOTE — PLAN OF CARE
[facility]  Physical Therapy Initial Evaluation    Name: Vince Martinez  Clinic Number: [id number]    Therapy Diagnosis:   Encounter Diagnoses   Name Primary?    Weakness of both hips     Impaired gait and mobility      Physician: Randy Riggs MD    Physician Orders: PT Eval and Treat  Medical Diagnosis from Referral:   M17.0 (ICD-10-CM) - Primary osteoarthritis of both knees   M16.11 (ICD-10-CM) - Primary osteoarthritis of right hip   M16.12 (ICD-10-CM) - Primary osteoarthritis of left hip     Evaluation Date: 9/23/2019  Authorization Period Expiration: 8/28/2019  Plan of Care Expiration: 9/23/2019 to 11/4/2019  Visit # / Visits authorized: 1/ 1    Time In: 1:15 PM  Time Out: 2:00 PM  Total Billable Time: 45 minutes (Moderate Complexity Evaluation, 1 TE)    Precautions: Standard    Subjective     Date of onset: Vince states the pain has been present for a a long time but has progressively gotten worse over the last 3 months    History of current condition - Vince reports: that his hips hurt more than his knees, right>left. The pain is worse at night and often wakes him up out of his sleep. He's unable to navigate stairs at all due to his hip pain. Minimal pain with standing up and sitting down. States he also has low back pain that accompanies his hip pain. His hip pain is exacerbated when he lays on his back, so he must lie on his sides and flip back and forth all night.      Medical History:   Past Medical History:   Diagnosis Date    Acute combined systolic and diastolic CHF, NYHA class 3 11/15/2018    Arthritis     Diabetes mellitus     DJD (degenerative joint disease)     Hypertension     Prostate enlargement     Thyroid disease        Surgical History:   Vince Martinez  has a past surgical history that includes Prostate surgery.    Medications:   Vince has a current medication list which includes the following prescription(s): azelastine, diclofenac sodium, finasteride,  "fluticasone propionate, furosemide, glimepiride, glucosamine-chondroitin, ipratropium, irbesartan, ketoconazole, levothyroxine, loperamide, melatonin, metformin, mometasone 0.1%, montelukast, neomycin-polymyxin-dexamethasone, omeprazole, simvastatin, tamsulosin, tizanidine, and triamcinolone acetonide 0.1%.    Allergies:   Review of patient's allergies indicates:   Allergen Reactions    Bactrim [sulfamethoxazole-trimethoprim] Rash    Ciprofloxacin Rash        Imaging, X-Ray Knees Bilaterally (8/29/2019): Mild tricompartmental degenerative joint space narrowing, slightly more pronounced at the medial femorotibial compartment. Patellar osteophyte formation.  No acute fracture or dislocation. No large suprapatellar effusion.  4 mm radiopaque density projecting over the medial soft tissues at the level of the left mid tibial shaft, possible foreign body of unknown chronicity.  Clinical correlation.    Prior Therapy: Yes for shoulder  Social History:  lives with their family; 1 story home with wood spencer and tile in bathroom and kitchen  Occupation: Retired  Prior Level of Function: Independent   Current Level of Function: Independent with pain    Pain:  Current 3/10, worst 9/10, best 3/10   Location: Bilateral hips and low back   Description: As if someone is punching him in the hips  Aggravating Factors: Bending, Walking, Extension and Getting out of bed/chair  Easing Factors: Tylenol for arthritis     Pts goals: "To get cured."    Objective     Posture: Forward head, slouched posture  Palpation: Tender to palpation at lateral and anterior portions of right hip with moderate palpation  Sensation: Intact    Range of Motion/Strength:     Hip Right Left Pain/Dysfunction with Movement   AROM/PROM      flexion WFL WFL    extension WFL WFL    abduction WFL WFL    adduction WFL WFL    Internal rotation  WFL* WFL Pain in lateral R hip   External rotation WFL* WFL Pain in lateral R hip     Knee Right Left Pain/Dysfunction " "with Movement   AROM/PROM      flexion WFL WFL    extension WFL WFL        L/E MMT Right Left Pain/Dysfunction with Movement   Hip Flexion 4-/5 4-/5    Hip Extension NT NT    Hip Abduction (Sitting) 4+/5 4+/5    Hip Adduction (Sitting) 4/5 4/5    Hip IR 4/5 4/5    Hip ER 4+/5 4+/5    Knee Flexion 5/5 5/5    Knee Extension 5/5 5/5    Ankle DF 4+/5 5/5    Ankle PF 5/5 5/5    Ankle Inversion 5/ 5/5    Ankle Eversion 5/5 5/5        Flexibility: Decreased hamstring length bilaterally    Special Tests:   SHANE: (+) Bilaterally  FADDIR: (+) on right, (-) on left  Scouring: (+) on right, (-) on left    Gait Analysis:Without AD   Assistance: Independent  Deviations: Decreased hip swing bilaterally, decreased magui    TU seconds     30 second sit-to-stand test (without U/E support): 7    Balance:   Right SLS: 5 seconds  Left SLS: 6 seconds        CMS Impairment/Limitation/Restriction for FOTO Knee Survey    Therapist reviewed FOTO scores for Vince Martinez on 2019.   FOTO documents entered into [employer] - see Media section.    Limitation Score: 44%  Category: Mobility    Current : CK = at least 40% but < 60% impaired, limited or restricted  Goal: CJ = at least 20% but < 40% impaired, limited or restricted         TREATMENT     Treatment Time In: 1:42 PM  Treatment Time Out: 1:53 PM  Total Treatment time separate from Evaluation: 11 minutes    Vince participated in THERAPEUTIC EXERCISES to develop strength, endurance and flexibility for 11 minutes including:    HS Stretch: 30"x1, B  LTR's: 1x5, 5" holds  Hip ADD ball squeezes: 1x10, 5" holds  Supine Hip ABD: 1x10, Yellow TB  SLR: 1x10, B    Home Exercises and Patient Education Provided    Education provided:   - Importance and role of physical therapy  - Proper body mechanics when performing HEP      Written Home Exercises Provided: yes.  Exercises were reviewed and Vince was able to demonstrate them prior to the end of the session.  Vince demonstrated good  " understanding of the education provided.     See EMR under Patient Instructions for exercises provided 9/23/2019.    Assessment     Vince is a 88 y.o. male referred to outpatient Physical Therapy with a medical diagnosis of Primary OA in both knees and pain in both hips. Pt presents to PT with pain in low back, both hips (right>left), and both knees, decreased ROM in bilateral hips, decreased strength and flexibility, poor posture, impaired balance and gait, and functional deficits with sleeping, bending down, picking up objects, walking and sitting..      Pt prognosis is Good.   Pt will benefit from skilled outpatient Physical Therapy to address the deficits stated above and in the chart below, provide pt/family education, and to maximize pt's level of independence.     Plan of care discussed with patient: Yes  Pt's spiritual, cultural and educational needs considered and pt agreeable to plan of care and goals as stated below:     Anticipated Barriers for therapy: Age, co-morbidities      Medical Necessity is demonstrated by the following  History  Co-morbidities and personal factors that may impact the plan of care Co-morbidities:   HTN, Heart disease, Rheumatic aortic valve insufficiency, palpitations, PVC's, Premature atrial contractions, CHF, DM Type II, Acquired hypothyroidism    Personal Factors:   age     high   Examination  Body Structures and Functions, activity limitations and participation restrictions that may impact the plan of care Body Regions:   Bilateral hips, right>left  Bilateral knees  Low back    Body Systems:    ROM  strength  balance  gait  transitions    Participation Restrictions:   See co-morbidities    Activity limitations:   Learning and applying knowledge  no deficits    General Tasks and Commands  undertaking multiple tasks    Communication  communicating with/receiving spoken language    Mobility  walking    Self care  looking after one's health    Domestic Life  doing house work  (cleaning house, washing dishes, laundry)  assisting others    Interactions/Relationships  no deficits    Life Areas  no deficits    Community and Social Life  community life  recreation and leisure         moderate   Clinical Presentation evolving clinical presentation with changing clinical characteristics moderate   Decision Making/ Complexity Score: moderate       Goals:    Short Term Goals (3 Weeks):  1. Pt will be compliant with HEP to supplement PT in restoring pain free function in bilateral hips and knees.  2. Pt will improve impaired LE MMTs to >/= 4/5 to improve dynamic hip support for functional tasks.    Long Term Goals (6 Weeks):  1. Pt will improve FOTO score to </= 39% limited to decrease perceived limitation with mobility.   2. Pt will improve impaired LE MMTs to >/= 4+/5 to improve dynamic hip support for functional tasks.  3. Pt will perform TUG in < 10 seconds without AD in order to demo improved gait speed  4. Pt will perform at least 12 sit to stands without UE support on 30 second sit to stand test in order to demo improved ability to perform transfers  5. Pt to perform stair navigation without assistance with reciprocal/step to pattern to promote safe functional mobility at home.  6. Pt will report pain less than or equal to 2/10 when lying on back, to improve sleeping.         Plan     Plan of care Certification: 9/23/2019 to 11/4/2019.    Outpatient Physical Therapy 2 times weekly for 6 weeks to include the following interventions: Gait Training, Manual Therapy, Moist Heat/ Ice, Neuromuscular Re-ed, Patient Education, Therapeutic Activites and Therapeutic Exercise.     Radha Martinez, PT, DPT

## 2019-09-23 NOTE — PATIENT INSTRUCTIONS
Lumbar Rotation    Feet on floor, slowly rock knees from side to side in small, pain-free range of motion. Allow lower back to rotate slightly, but keep shoulders flat on ground. Hold 5 seconds.  Repeat 10 times per set. Do 2 sets per session. Do 2 sessions per day.  Copyright © I. All rights reserved.      Supine Hamstring Stretch    Straight right leg with belt around heel of foot. Raise leg until a stretch is felt in the back of the thigh. Keep knee straight. Hold 30 seconds.   Repeat 3 times each side per set. Do 1 sets per session. Do 2 sessions per day.       Glute Squeeze, supine    Lie face up and squeeze your rear end. Do not tighten your abdominals or hold your breath. Hold 5 seconds. Relax.  Repeat 10 times per set. Do 2 sets per session. Do 2 sessions per day.      Strengthening: Hip Adduction - Isometric    Sit back or lie down with ball or folded pillow between knees, and squeeze knees together. Hold 5 seconds.  Repeat 10 times per set. Do 2 sets per session. Do 2 sessions per day.      Strengthening: Hip Abductor - Resisted    Lie flat with band looped around both legs above knees, and push thighs apart, ensuring right and left move together.  Repeat 10 times per set. Do 2 sets per session. Do 2 sessions per day.    Straight Leg Raise     With right leg straight, other leg bent, raise straight leg until knees are even. Slowly lower. Roll on your side and repeat lifting top leg up, on other side, lifting bottom leg up, and on stomach kicking back (squeezing your rear end before you kick back).  Repeat 10 times each leg per set. Do 2 sets per session. Do 2 sessions per day.       Copyright © [employer]. All rights reserved.

## 2019-09-26 ENCOUNTER — OFFICE VISIT (OUTPATIENT)
Dept: ORTHOPEDICS | Facility: CLINIC | Age: 84
End: 2019-09-26
Payer: MEDICARE

## 2019-09-26 VITALS — HEIGHT: 65 IN | WEIGHT: 155 LBS | BODY MASS INDEX: 25.83 KG/M2

## 2019-09-26 DIAGNOSIS — M70.62 TROCHANTERIC BURSITIS OF BOTH HIPS: Primary | ICD-10-CM

## 2019-09-26 DIAGNOSIS — M70.61 TROCHANTERIC BURSITIS OF BOTH HIPS: Primary | ICD-10-CM

## 2019-09-26 PROCEDURE — 20610 DRAIN/INJ JOINT/BURSA W/O US: CPT | Mod: 50,S$GLB,, | Performed by: ORTHOPAEDIC SURGERY

## 2019-09-26 PROCEDURE — 99213 PR OFFICE/OUTPT VISIT, EST, LEVL III, 20-29 MIN: ICD-10-PCS | Mod: 25,S$GLB,, | Performed by: ORTHOPAEDIC SURGERY

## 2019-09-26 PROCEDURE — 20610 PR DRAIN/INJECT LARGE JOINT/BURSA: ICD-10-PCS | Mod: 50,S$GLB,, | Performed by: ORTHOPAEDIC SURGERY

## 2019-09-26 PROCEDURE — 99999 PR PBB SHADOW E&M-EST. PATIENT-LVL III: CPT | Mod: PBBFAC,,, | Performed by: ORTHOPAEDIC SURGERY

## 2019-09-26 PROCEDURE — 99999 PR PBB SHADOW E&M-EST. PATIENT-LVL III: ICD-10-PCS | Mod: PBBFAC,,, | Performed by: ORTHOPAEDIC SURGERY

## 2019-09-26 PROCEDURE — 99213 OFFICE O/P EST LOW 20 MIN: CPT | Mod: 25,S$GLB,, | Performed by: ORTHOPAEDIC SURGERY

## 2019-09-26 PROCEDURE — 1101F PT FALLS ASSESS-DOCD LE1/YR: CPT | Mod: CPTII,S$GLB,, | Performed by: ORTHOPAEDIC SURGERY

## 2019-09-26 PROCEDURE — 1101F PR PT FALLS ASSESS DOC 0-1 FALLS W/OUT INJ PAST YR: ICD-10-PCS | Mod: CPTII,S$GLB,, | Performed by: ORTHOPAEDIC SURGERY

## 2019-09-26 RX ORDER — HYDROCORTISONE 25 MG/G
CREAM TOPICAL
Refills: 0 | COMMUNITY
Start: 2019-08-24 | End: 2022-02-02

## 2019-09-26 RX ORDER — TRIAMCINOLONE ACETONIDE 40 MG/ML
40 INJECTION, SUSPENSION INTRA-ARTICULAR; INTRAMUSCULAR
Status: COMPLETED | OUTPATIENT
Start: 2019-09-26 | End: 2019-09-26

## 2019-09-26 RX ORDER — BETAMETHASONE DIPROPIONATE 0.5 MG/G
LOTION TOPICAL
COMMUNITY
Start: 2019-09-24 | End: 2020-03-20

## 2019-09-26 RX ADMIN — TRIAMCINOLONE ACETONIDE 40 MG: 40 INJECTION, SUSPENSION INTRA-ARTICULAR; INTRAMUSCULAR at 03:09

## 2019-09-26 NOTE — PROGRESS NOTES
Subjective:      Patient ID: Vince Martinez is a 88 y.o. male.    Chief Complaint: Knee Pain; Hip Pain; and Follow-up      HPI: Vince Martinez  Is here in follow-up of bilateral hip pain R>>L.  Patient was last seen and treated approximately 1 month ago  For bilateral hip and knee pain.  He was treated with physical therapy.  Unfortunately, he has not been able to set up physical therapy.   Today, he complains of lateral hip pain that is worse with prolonged walking and going up stairs.  He reports tenderness to palpation  especially with sleeping on either side.   He denies any groin pain. He denies any radicular pain.      patient reports knees are not bothering him very much right now would like to focus on his hip pain.    Past Medical History:   Diagnosis Date    Acute combined systolic and diastolic CHF, NYHA class 3 11/15/2018    Arthritis     Diabetes mellitus     DJD (degenerative joint disease)     Hypertension     Prostate enlargement     Thyroid disease        Current Outpatient Medications:     finasteride (PROSCAR) 5 mg tablet, Take 5 mg by mouth once daily. , Disp: , Rfl:     furosemide (LASIX) 40 MG tablet, Take 1 tablet (40 mg total) by mouth once daily., Disp: 90 tablet, Rfl: 3    glimepiride (AMARYL) 2 MG tablet, Take 2 mg by mouth 3 (three) times daily. , Disp: , Rfl: 1    glucosamine-chondroitin 500-400 mg tablet, Take 1 tablet by mouth 3 (three) times daily., Disp: , Rfl:     irbesartan (AVAPRO) 300 MG tablet, Take two tablet daily., Disp: 180 tablet, Rfl: 3    ketoconazole (NIZORAL) 2 % shampoo, , Disp: , Rfl:     levothyroxine (SYNTHROID) 100 MCG tablet, , Disp: , Rfl:     metFORMIN (GLUCOPHAGE-XR) 500 MG 24 hr tablet, , Disp: , Rfl:     omeprazole (PRILOSEC) 20 MG capsule, Take 20 mg by mouth once daily., Disp: , Rfl:     simvastatin (ZOCOR) 20 MG tablet, , Disp: , Rfl:     tamsulosin (FLOMAX) 0.4 mg Cp24, 0.4 mg once daily. , Disp: , Rfl:     tizanidine 4 mg Cap, , Disp:  ", Rfl:     azelastine (ASTELIN) 137 mcg (0.1 %) nasal spray, USE 1 SPRAY(S) IN EACH NOSTRIL THREE TIMES DAILY AS NEEDED FOR 30 DAYS, Disp: , Rfl: 5    betamethasone dipropionate (DIPROLENE) 0.05 % lotion, , Disp: , Rfl:     diclofenac sodium 1 % Gel, Apply 2 g topically once daily., Disp: , Rfl:     fluticasone propionate (FLONASE) 50 mcg/actuation nasal spray, 2 sprays (100 mcg total) by Each Nostril route once daily. (Patient not taking: Reported on 9/26/2019), Disp: 16 g, Rfl: 11    hydrocortisone 2.5 % cream, APPLY CREAM TO AFFECTED AREA AND BODY ONCE DAILY AS NEEDED., Disp: , Rfl: 0    ipratropium (ATROVENT) 42 mcg (0.06 %) nasal spray, 2 sprays in each nostril 4 times a day. (Patient not taking: Reported on 9/26/2019), Disp: 90 mL, Rfl: 3    loperamide (IMODIUM A-D) 2 mg Tab, Take 2 mg by mouth 4 (four) times daily as needed., Disp: , Rfl:     melatonin 10 mg Cap, Take by mouth., Disp: , Rfl:     mometasone 0.1% (ELOCON) 0.1 % cream, , Disp: , Rfl:     montelukast (SINGULAIR) 10 mg tablet, Take 1 tablet (10 mg total) by mouth once daily. (Patient not taking: Reported on 9/26/2019), Disp: 30 tablet, Rfl: 0    neomycin-polymyxin-dexamethasone (DEXACINE) 3.5 mg/g-10,000 unit/g-0.1 % Oint, , Disp: , Rfl:     triamcinolone acetonide 0.1% (KENALOG) 0.1 % cream, Apply topically 2 (two) times daily., Disp: , Rfl:   No current facility-administered medications for this visit.   Review of patient's allergies indicates:   Allergen Reactions    Bactrim [sulfamethoxazole-trimethoprim] Rash    Ciprofloxacin Rash    Latex Rash       Ht 5' 5" (1.651 m)   Wt 70.3 kg (155 lb)   BMI 25.79 kg/m²     Review of Systems   Constitution: Negative for chills and fever.   Cardiovascular: Negative for chest pain and palpitations.   Respiratory: Negative for shortness of breath and wheezing.    Skin: Negative for poor wound healing and rash.   Musculoskeletal: Positive for joint pain, myalgias and stiffness. "   Gastrointestinal: Negative for nausea and vomiting.   Genitourinary: Negative for dysuria and hematuria.   Neurological: Negative for seizures and tremors.   Psychiatric/Behavioral: Negative for altered mental status.   Allergic/Immunologic: Negative for environmental allergies and persistent infections.         Objective:    Ortho Exam    bilateral HIP EXAM  The patient is not in acute distress.   Body habitus is::normal.   The patient walks without a limp.   The skin over the hip is::intact.   There is::tenderness laterally.  Range of motion- Flexion  100, External rotation  full, internal rotation  full.  Resisted SLR negative  Pain with rotation negative  Sciatic tension findings negative.  Shortening/lengthening compared to the contralateral side exam deferred.  Pulses DP present, PT present.  Motor normal resting muscle tone.   Sensory normal.  GEN: Well developed, well nourished  elderly male. AAOX3. No acute distress.   Normocephalic, atraumatic.   ALEX  Breathing unlabored.  Mood and affect  normal.           Assessment:     Imaging:  No new imaging.  Previous hip radiographs were reviewed which was significant for pincer  Impingement.  Joint spaces are fairly well maintained --  Mild narrowing        1. Trochanteric bursitis of both hips          Plan:       Orders Placed This Encounter    triamcinolone acetonide injection 40 mg    triamcinolone acetonide injection 40 mg       patient denies any groin pain and he can easily locate area of maximal tenderness at the trochanters of the bilateral hips.    I explained the nature the problem in regards to her trochanteric bursitis. I recommended and performed bilateral corticosteroid injection without complication.  Patient tolerated well.   patient cannot take NSAIDs -  I recommend Tylenol   start physical therapy on October 1st as planned.    Follow up in about 8 weeks (around 11/21/2019).

## 2019-09-26 NOTE — PROCEDURES
Procedures   PROCEDURE:  I have explained the risks, benefits, and alternatives of the procedure in detail.  The patient voices understanding and all questions have been answered.  The patient agrees to proceed as planned. So after I performed a sterile prep of the skin in the normal fashion the bilateral  Trochanteric bursa is injected using a 21 gauge needle with a combination of 1cc 1% plain xylocaine and 40mg triamcinolone.  The patient is cautioned that immediate relief of pain is secondary to the local anesthetic and will be temporary. After the anesthetic wears off there may be a increase in pain that may last for a few hours or a few days and they should use ice to help alleviate this this pain. Patient tolerated the procedure well.

## 2019-10-03 ENCOUNTER — CLINICAL SUPPORT (OUTPATIENT)
Dept: REHABILITATION | Facility: HOSPITAL | Age: 84
End: 2019-10-03
Attending: ORTHOPAEDIC SURGERY
Payer: MEDICARE

## 2019-10-03 DIAGNOSIS — R29.898 WEAKNESS OF BOTH HIPS: ICD-10-CM

## 2019-10-03 DIAGNOSIS — R26.89 IMPAIRED GAIT AND MOBILITY: ICD-10-CM

## 2019-10-03 PROCEDURE — 97110 THERAPEUTIC EXERCISES: CPT | Mod: PN

## 2019-10-03 NOTE — PROGRESS NOTES
"  Physical Therapy Daily Treatment Note     Name: Vince Martinez  Clinic Number: [id number]    Therapy Diagnosis:        Encounter Diagnoses   Name Primary?    Weakness of both hips      Impaired gait and mobility        Physician: Randy Riggs MD     Physician Orders: PT Eval and Treat  Medical Diagnosis from Referral:   M17.0 (ICD-10-CM) - Primary osteoarthritis of both knees   M16.11 (ICD-10-CM) - Primary osteoarthritis of right hip   M16.12 (ICD-10-CM) - Primary osteoarthritis of left hip      Evaluation Date: 9/23/2019  Authorization Period Expiration: 8/28/2019  Plan of Care Expiration: 9/23/2019 to 11/4/2019  Visit # / Visits authorized: 2/ 12         Time In: 1410  Time Out: 1500  Total Billable Time:50  Minutes (TE-3)    Precautions: Standard    Subjective     Pt reports: He received injections to his hips. Stated he has a little less pain.  He was compliant with home exercise program.  Response to previous treatment: tolerated well  Functional change: ongoing    Pain: 3/10  Location: bilateral hips      Objective   Arrived 10 minutes late for appointment  Vince received therapeutic exercises to develop strength, endurance, ROM, flexibility, posture and core stabilization for 50 minutes including:    Supine:   TA                                                       10x5" hold  HS Stretch:                                         30"x1, B  Piriformis stretch                              3x30" B  LTR's:                                                  2 minutes w/red Swiss ball  Bridges                                               2x10 B  Hip ADD ball squeezes:                      1x10, 5" holds  Supine Hip ABD:                                 1x10, Yellow TB  SLR:                                                    2x10, B   Reverse crunches                            2 minutes w/red Swiss ball                                         Vince received the following manual therapy techniques: Myofacial " "release and Soft tissue Mobilization were applied to the:  for ZERO minutes, including:  Not performed this visit        Home Exercises Provided and Patient Education Provided     Education provided:   - HEP    Written Home Exercises Provided: yes.  Exercises were reviewed and Vince was able to demonstrate them prior to the end of the session.  Vince demonstrated good  understanding of the education provided.     See EMR under Patient Instructions for exercises provided today.    Assessment   Patient received injections 9/26. Stated pain  In "marginally improved. Demonstrated poor HEP technique (unrecognizable) . Reviewed HEP and provided additional printed copy. Stated his pain is slightly reduced following today's interventions.Tolerated well.    Vince is progressing well towards his goals.   Pt prognosis is Good.     Pt will continue to benefit from skilled outpatient physical therapy to address the deficits listed in the problem list box on initial evaluation, provide pt/family education and to maximize pt's level of independence in the home and community environment.     Pt's spiritual, cultural and educational needs considered and pt agreeable to plan of care and goals.     Anticipated barriers to physical therapy:Age, co-morbidities    Goals:   Short Term Goals (3 Weeks):  1. Pt will be compliant with HEP to supplement PT in restoring pain free function in bilateral hips and knees.  2. Pt will improve impaired LE MMTs to >/= 4/5 to improve dynamic hip support for functional tasks.     Long Term Goals (6 Weeks):  1. Pt will improve FOTO score to </= 39% limited to decrease perceived limitation with mobility.   2. Pt will improve impaired LE MMTs to >/= 4+/5 to improve dynamic hip support for functional tasks.  3. Pt will perform TUG in < 10 seconds without AD in order to demo improved gait speed  4. Pt will perform at least 12 sit to stands without UE support on 30 second sit to stand test in order to demo " improved ability to perform transfers  5. Pt to perform stair navigation without assistance with reciprocal/step to pattern to promote safe functional mobility at home.  6. Pt will report pain less than or equal to 2/10 when lying on back, to improve sleeping.           Plan     Continue PT POC    Donavon Bello, PTA

## 2019-10-08 ENCOUNTER — CLINICAL SUPPORT (OUTPATIENT)
Dept: REHABILITATION | Facility: HOSPITAL | Age: 84
End: 2019-10-08
Attending: ORTHOPAEDIC SURGERY
Payer: MEDICARE

## 2019-10-08 DIAGNOSIS — R29.898 WEAKNESS OF BOTH HIPS: ICD-10-CM

## 2019-10-08 DIAGNOSIS — R26.89 IMPAIRED GAIT AND MOBILITY: ICD-10-CM

## 2019-10-08 PROCEDURE — 97110 THERAPEUTIC EXERCISES: CPT | Mod: PN

## 2019-10-08 NOTE — PROGRESS NOTES
"  Physical Therapy Daily Treatment Note     Name: Vince Martinez  Clinic Number: [id number]    Therapy Diagnosis:        Encounter Diagnoses   Name Primary?    Weakness of both hips      Impaired gait and mobility        Physician: Randy Riggs MD     Physician Orders: PT Eval and Treat  Medical Diagnosis from Referral:   M17.0 (ICD-10-CM) - Primary osteoarthritis of both knees   M16.11 (ICD-10-CM) - Primary osteoarthritis of right hip   M16.12 (ICD-10-CM) - Primary osteoarthritis of left hip      Evaluation Date: 9/23/2019  Authorization Period Expiration: 8/28/2019  Plan of Care Expiration: 9/23/2019 to 11/4/2019  Visit # / Visits authorized: 3/ 12         Time In: 1300  Time Out: 1355  Total Billable Time:55  Minutes (TE-3)    Precautions: Standard    Subjective     Pt reports: he is about the same, hips still hurt some. And he does his HEP "It hurts but I feel better after,"  He was compliant with home exercise program.  Response to previous treatment: tolerated well  Functional change: ongoing    Pain: 3/10  Location: bilateral hips      Objective     Vince received therapeutic exercises to develop strength, endurance, ROM, flexibility, posture and core stabilization for 55 minutes including:      Sci Fit: B U&LE reciprocation for CV endurance and tissue extensibility 10 minutes level 1     Supine:   TA                                                       10x5" hold  HS Stretch:                                         30"x1, B  Piriformis stretch                              3x30" B  LTR's:                                                  2 minutes w/red Swiss ball  Bridges                                               2x10 B  Hip ADD ball squeezes:                      1x10, 5" holds  Supine Hip ABD:                                 1x10, Red TB  SLR:                                                    2x10, B   Reverse crunches                            2 minutes w/red Swiss ball      Seated:   LAQ        "                                               2x10 B  Hip flex                                                2x10 B                                         Vince received the following manual therapy techniques: Myofacial release and Soft tissue Mobilization were applied to the:  for ZERO minutes, including:  Not performed this visit        Home Exercises Provided and Patient Education Provided     Education provided:   - HEP    Written Home Exercises Provided: yes.  Exercises were reviewed and Vince was able to demonstrate them prior to the end of the session.  Vince demonstrated good  understanding of the education provided.     See EMR under Patient Instructions for exercises provided today.    Assessment   Patient  Demonstrated poor HEP technique (unrecognizable) .  Stated his pain is slightly elevated following today's interventions.Tolerated well.    Vince is progressing well towards his goals.   Pt prognosis is Good.     Pt will continue to benefit from skilled outpatient physical therapy to address the deficits listed in the problem list box on initial evaluation, provide pt/family education and to maximize pt's level of independence in the home and community environment.     Pt's spiritual, cultural and educational needs considered and pt agreeable to plan of care and goals.     Anticipated barriers to physical therapy:Age, co-morbidities    Goals:   Short Term Goals (3 Weeks):  1. Pt will be compliant with HEP to supplement PT in restoring pain free function in bilateral hips and knees.  2. Pt will improve impaired LE MMTs to >/= 4/5 to improve dynamic hip support for functional tasks.     Long Term Goals (6 Weeks):  1. Pt will improve FOTO score to </= 39% limited to decrease perceived limitation with mobility.   2. Pt will improve impaired LE MMTs to >/= 4+/5 to improve dynamic hip support for functional tasks.  3. Pt will perform TUG in < 10 seconds without AD in order to demo improved gait speed  4. Pt  will perform at least 12 sit to stands without UE support on 30 second sit to stand test in order to demo improved ability to perform transfers  5. Pt to perform stair navigation without assistance with reciprocal/step to pattern to promote safe functional mobility at home.  6. Pt will report pain less than or equal to 2/10 when lying on back, to improve sleeping.           Plan     Continue PT POC    Donavon Bello, PTA

## 2019-10-10 ENCOUNTER — CLINICAL SUPPORT (OUTPATIENT)
Dept: REHABILITATION | Facility: HOSPITAL | Age: 84
End: 2019-10-10
Attending: ORTHOPAEDIC SURGERY
Payer: MEDICARE

## 2019-10-10 DIAGNOSIS — R26.89 IMPAIRED GAIT AND MOBILITY: ICD-10-CM

## 2019-10-10 DIAGNOSIS — R29.898 WEAKNESS OF BOTH HIPS: ICD-10-CM

## 2019-10-10 PROCEDURE — 97110 THERAPEUTIC EXERCISES: CPT | Mod: PN

## 2019-10-10 NOTE — PROGRESS NOTES
"  Physical Therapy Daily Treatment Note     Name: Vince Martinez  Clinic Number: [id number]    Therapy Diagnosis:        Encounter Diagnoses   Name Primary?    Weakness of both hips      Impaired gait and mobility        Physician: Randy Riggs MD     Physician Orders: PT Eval and Treat  Medical Diagnosis from Referral:   M17.0 (ICD-10-CM) - Primary osteoarthritis of both knees   M16.11 (ICD-10-CM) - Primary osteoarthritis of right hip   M16.12 (ICD-10-CM) - Primary osteoarthritis of left hip      Evaluation Date: 9/23/2019  Authorization Period Expiration: 11/23/2019  Plan of Care Expiration: 9/23/2019 to 11/4/2019  Visit # / Visits authorized: 4/ 12  FOTO:  4/5   NEXT  PTA visit: 3/6  GCode: 4/10    Visit amount:    121.28  Total amount:  606.40     Time In: 1300  Time Out: 1355  Total Billable Time:55  Minutes (TE-4)    Precautions: Standard    Subjective     Pt reports: he is about the same, hips still hurt some, but gets relief with HEP.  He was compliant with home exercise program.  Response to previous treatment: tolerated well  Functional change: ongoing    Pain: 3/10  Location: bilateral hips      Objective     Vince received therapeutic exercises to develop strength, endurance, ROM, flexibility, posture and core stabilization for 55 minutes including:    Sci Fit: B U&LE reciprocation for CV endurance and tissue extensibility 10 minutes level 1     Supine:   TA                                                       10x5" hold  HS Stretch:                                         30"x3, B  Piriformis stretch                                3x30" B   LTR's:                                                  2 minutes w/red Swiss ball  Bridges                                               2x10 B  Hip ADD ball squeezes:                      1x15, 5" holds  Supine Hip ABD:                                 1x15, 5" hold Red TB  SLR:                                                    3x10, B   Reverse crunches       " "                     2 minutes w/red Swiss ball      Seated:  Transverse abdominus:                   5"x15   LAQ                                                      2x10 B Seated in chair   Hip flex                                                  2x10 B Seated in chair                                      Home Exercises Provided and Patient Education Provided     Education provided:   - HEP    Written Home Exercises Provided: yes.  Exercises were reviewed and Vince was able to demonstrate them prior to the end of the session.  Vince demonstrated good  understanding of the education provided.     See EMR under Patient Instructions for exercises provided today.    Assessment   Vince required verbal instruction and tactile facilitation to perform therex with correct technique.  Completed all as noted with reports of "OK - about the same I'm tired"    Vince is progressing well towards his goals.   Pt prognosis is Good.     Pt will continue to benefit from skilled outpatient physical therapy to address the deficits listed in the problem list box on initial evaluation, provide pt/family education and to maximize pt's level of independence in the home and community environment.     Pt's spiritual, cultural and educational needs considered and pt agreeable to plan of care and goals.     Anticipated barriers to physical therapy:Age, co-morbidities    Goals:   Short Term Goals (3 Weeks):  1. Pt will be compliant with HEP to supplement PT in restoring pain free function in bilateral hips and knees.   (PROGRESSING, NOT MET)  2. Pt will improve impaired LE MMTs to >/= 4/5 to improve dynamic hip support for functional tasks.   (PROGRESSING, NOT MET)    Long Term Goals (6 Weeks):  1. Pt will improve FOTO score to </= 39% limited to decrease perceived limitation with mobility.  (PROGRESSING, NOT MET)  2. Pt will improve impaired LE MMTs to >/= 4+/5 to improve dynamic hip support for functional tasks.   (PROGRESSING, NOT MET)  3. Pt " will perform TUG in < 10 seconds without AD in order to demo improved gait speed   (PROGRESSING, NOT MET)  4. Pt will perform at least 12 sit to stands without UE support on 30 second sit to stand test in order to demo improved ability to perform transfers.   (PROGRESSING, NOT MET)  5. Pt to perform stair navigation without assistance with reciprocal/step to pattern to promote safe functional mobility at home.   (PROGRESSING, NOT MET)  6. Pt will report pain less than or equal to 2/10 when lying on back, to improve sleeping.   (PROGRESSING, NOT MET)            Plan     Continue PT POC,progress as able towards established goals.  Expand core stabilization.    Ro Butler, PTA

## 2019-10-10 NOTE — PROGRESS NOTES
"  Physical Therapy Daily Treatment Note     Name: Vince Martinez  Clinic Number: [id number]    Therapy Diagnosis: No diagnosis found.  Physician: Randy Riggs MD    Visit Date: 10/10/2019    Physician Orders: PT Eval and Treat  Medical Diagnosis from Referral:   M17.0 (ICD-10-CM) - Primary osteoarthritis of both knees   M16.11 (ICD-10-CM) - Primary osteoarthritis of right hip   M16.12 (ICD-10-CM) - Primary osteoarthritis of left hip      Evaluation Date: 9/23/2019  Authorization Period Expiration: 8/28/2019  Plan of Care Expiration: 9/23/2019 to 11/4/2019  Visit # / Visits authorized: 4/ 12  PTA visit: 3/6  FOTO: 4/5  GCode: 4/10       Time In: ***  Time Out: ***  Total Billable Time: ***  Minutes (TE-3)     Precautions: Standard    Subjective     Pt reports: ***.  He {Actions; was/was not:94456} compliant with home exercise program.  Response to previous treatment: ***  Functional change: ***    Pain: {0-10:14086::"0"}/10  Location: {RIGHT/LEFT/BILATERAL:42495} {LOCATION ON BODY:02542}     Objective     Vnice received therapeutic exercises to develop {AMB PT PROGRESS OBJECTIVE:74942} for *** minutes including:  ***  Sci Fit: B U&LE reciprocation for CV endurance and tissue extensibility 10 minutes level 1      Supine:   TA                                                       10x5" hold  HS Stretch:                                         30"x1, B  Piriformis stretch                              3x30" B  LTR's:                                                  2 minutes w/red Swiss ball  Bridges                                               2x10 B  Hip ADD ball squeezes:                      1x10, 5" holds  Supine Hip ABD:                                 1x10, Red TB  SLR:                                                    2x10, B   Reverse crunches                            2 minutes w/red Swiss ball       Seated:   LAQ                                                      2x10 B  Hip flex                         " "                       2x10 B     Home Exercises Provided and Patient Education Provided     Education provided:   - ***    Written Home Exercises Provided: {Blank single:77406::"yes","Patient instructed to cont prior HEP"}.  Exercises were reviewed and Vince was able to demonstrate them prior to the end of the session.  Vince demonstrated {Desc; good/fair/poor:91264} understanding of the education provided.     See EMR under {Blank single:64433::"Media","Patient Instructions"} for exercises provided {Blank single:66999::"10/10/2019","prior visit"}.      Assessment     ***  Vince {IS/IS NOT:53743} progressing well towards his goals.   Pt prognosis is {REHAB PROGNOSIS OHS:63481}.     Pt will continue to benefit from skilled outpatient physical therapy to address the deficits listed in the problem list box on initial evaluation, provide pt/family education and to maximize pt's level of independence in the home and community environment.     Pt's spiritual, cultural and educational needs considered and pt agreeable to plan of care and goals.    Anticipated barriers to physical therapy: Age, comorbidities    Goals:   Short Term Goals (3 Weeks):  1. Pt will be compliant with HEP to supplement PT in restoring pain free function in bilateral hips and knees.  2. Pt will improve impaired LE MMTs to >/= 4/5 to improve dynamic hip support for functional tasks.     Long Term Goals (6 Weeks):  1. Pt will improve FOTO score to </= 39% limited to decrease perceived limitation with mobility.   2. Pt will improve impaired LE MMTs to >/= 4+/5 to improve dynamic hip support for functional tasks.  3. Pt will perform TUG in < 10 seconds without AD in order to demo improved gait speed  4. Pt will perform at least 12 sit to stands without UE support on 30 second sit to stand test in order to demo improved ability to perform transfers  5. Pt to perform stair navigation without assistance with reciprocal/step to pattern to promote safe " functional mobility at home.  6. Pt will report pain less than or equal to 2/10 when lying on back, to improve sleeping.        Plan     ***    Katie Quintero, PTA

## 2019-10-15 ENCOUNTER — CLINICAL SUPPORT (OUTPATIENT)
Dept: REHABILITATION | Facility: HOSPITAL | Age: 84
End: 2019-10-15
Attending: ORTHOPAEDIC SURGERY
Payer: MEDICARE

## 2019-10-15 DIAGNOSIS — R29.898 WEAKNESS OF BOTH HIPS: ICD-10-CM

## 2019-10-15 DIAGNOSIS — R26.89 IMPAIRED GAIT AND MOBILITY: ICD-10-CM

## 2019-10-15 PROCEDURE — 97110 THERAPEUTIC EXERCISES: CPT | Mod: PN

## 2019-10-15 NOTE — PROGRESS NOTES
"  Physical Therapy Daily Treatment Note     Name: Vince Martinez  Clinic Number: [id number]    Therapy Diagnosis:        Encounter Diagnoses   Name Primary?    Weakness of both hips      Impaired gait and mobility        Physician: Randy Riggs MD     Physician Orders: PT Eval and Treat  Medical Diagnosis from Referral:   M17.0 (ICD-10-CM) - Primary osteoarthritis of both knees   M16.11 (ICD-10-CM) - Primary osteoarthritis of right hip   M16.12 (ICD-10-CM) - Primary osteoarthritis of left hip      Evaluation Date: 9/23/2019  Authorization Period Expiration: 11/23/2019  Plan of Care Expiration: 9/23/2019 to 11/4/2019  Visit # / Visits authorized: 4/ 12  FOTO:  5/5   NEXT  PTA visit: 4/6  GCode: 5/10    Visit amount:    60.64  Total amount:  667.04     Time In: 1400  Time Out: 1455  Total Billable Time: 30   Minutes (TE-2)    Precautions: Standard    Subjective     Pt reports: he is about the same, hips still hurt some, but gets relief with HEP.  He was compliant with home exercise program.  Response to previous treatment: tolerated well  Functional change: ongoing    Pain: 3/10  Location: bilateral hips      Objective     Vince received therapeutic exercises to develop strength, endurance, ROM, flexibility, posture and core stabilization for 45 minutes including:    Sci Fit: B U&LE reciprocation for CV endurance and tissue extensibility 10 minutes level 5.0    Supine:   TA                                                       10x5" hold   Brace march                                      1 minute x 2 trials  HS Stretch:                                          3x30", B  Piriformis stretch                                 3x30" B   LTR's:                                                  2 minutes w/red Swiss ball  Bridges                                               3x10 B  Hip ADD ball squeezes:                      1x15, 5" holds  Supine Hip ABD:                                 1x15, 5" hold Red TB  SLR:          " "                                          3x10, B   Reverse crunches                              2 minutes w/red Swiss ball      Seated:  Transverse abdominus:                   5"x15   LAQ                                                      2x10 B Seated in chair   Hip flex                                                  2x10 B Seated in chair    Standing:                                       Home Exercises Provided and Patient Education Provided     Education provided:   - HEP    Written Home Exercises Provided: yes.  Exercises were reviewed and Vince was able to demonstrate them prior to the end of the session.  Vince demonstrated good  understanding of the education provided.     See EMR under Patient Instructions for exercises provided today.    Assessment   Vince continues to require vc for effective stretch and exercise technique. Progressions in bold    Vince is progressing well towards his goals.   Pt prognosis is Good.     Pt will continue to benefit from skilled outpatient physical therapy to address the deficits listed in the problem list box on initial evaluation, provide pt/family education and to maximize pt's level of independence in the home and community environment.     Pt's spiritual, cultural and educational needs considered and pt agreeable to plan of care and goals.     Anticipated barriers to physical therapy:Age, co-morbidities    Goals:   Short Term Goals (3 Weeks):  1. Pt will be compliant with HEP to supplement PT in restoring pain free function in bilateral hips and knees.   (PROGRESSING, NOT MET)  2. Pt will improve impaired LE MMTs to >/= 4/5 to improve dynamic hip support for functional tasks.   (PROGRESSING, NOT MET)    Long Term Goals (6 Weeks):  1. Pt will improve FOTO score to </= 39% limited to decrease perceived limitation with mobility.  (PROGRESSING, NOT MET)  2. Pt will improve impaired LE MMTs to >/= 4+/5 to improve dynamic hip support for functional tasks.   (PROGRESSING, " NOT MET)  3. Pt will perform TUG in < 10 seconds without AD in order to demo improved gait speed   (PROGRESSING, NOT MET)  4. Pt will perform at least 12 sit to stands without UE support on 30 second sit to stand test in order to demo improved ability to perform transfers.   (PROGRESSING, NOT MET)  5. Pt to perform stair navigation without assistance with reciprocal/step to pattern to promote safe functional mobility at home.   (PROGRESSING, NOT MET)  6. Pt will report pain less than or equal to 2/10 when lying on back, to improve sleeping.   (PROGRESSING, NOT MET)            Plan     Continue PT POC,progress as able towards established goals.  Expand core stabilization.    Donavon Bello, PTA

## 2019-10-16 NOTE — PROGRESS NOTES
"  Physical Therapy Daily Treatment Note     Name: Vince Martinez  Clinic Number: [id number]    Therapy Diagnosis:        Encounter Diagnoses   Name Primary?    Weakness of both hips      Impaired gait and mobility        Physician: Randy Riggs MD     Physician Orders: PT Eval and Treat  Medical Diagnosis from Referral:   M17.0 (ICD-10-CM) - Primary osteoarthritis of both knees   M16.11 (ICD-10-CM) - Primary osteoarthritis of right hip   M16.12 (ICD-10-CM) - Primary osteoarthritis of left hip      Evaluation Date: 9/23/2019  Authorization Period Expiration: 11/23/2019  Plan of Care Expiration: 9/23/2019 to 11/4/2019  Visit # / Visits authorized: 6/12  FOTO:  6/12    PTA visit: 4/6  GCode: 6/10    Visit amount:    60.64  Total amount:  727.68     Time In: 2:05 PM  Time Out: 3:00 PM  Total Billable Time: 30 Minutes (TE-2)    Precautions: Standard    Subjective     Pt reports: he's feeling "okay" today.   He was compliant with home exercise program.  Response to previous treatment: No adverse reactions.   Functional change: None stated today.     Pain: 3/10  Location: bilateral hips      Objective     Vince received therapeutic exercises to develop strength, endurance, ROM, flexibility, posture and core stabilization for 30 minutes 1:1 with PT and 30 minutes supervised including:    Sci Fit: B U&LE reciprocation for CV endurance and tissue extensibility 10 minutes level 5.0 - not today, unavailable    Supine:   TA                                                       10x5" hold   Brace march                                      1 minute x 2 trials  HS Stretch:                                          3x30", B  Piriformis stretch                                 3x30" B   LTR's:                                                  2 minutes w/red Swiss ball  Bridges                                                3x10 B  Hip ADD ball squeezes:                      1x15, 5" holds  Supine Hip ABD:                            " "     1x15, 5" hold Red TB  SLR:                                                    3x10, B   Reverse crunches                              2 minutes w/red Swiss ball      Seated:  Transverse abdominus:                    5"x15   LAQ                                                      2x10 B Seated in chair   Hip flex                                                  2x10 B Seated in chair    Standing:   Mini Squats:     2x10  Steamboats:    2x10, B  Step ups:     2x10, 6" step                                      Home Exercises Provided and Patient Education Provided     Education provided:   - Importance of performing daily HEP to maximize therapy benefits.     Written Home Exercises Provided: yes.  Exercises were reviewed and Vince was able to demonstrate them prior to the end of the session.  Vince demonstrated good  understanding of the education provided.     See EMR under Patient Instructions for exercises provided today.    Assessment   Vince presented to therapy with minimal low back pain. Continues to require min to mod verbal cueing for proper body mechanics when performing supine exercises. Tolerated the addition of standing hip exercises in // bars with no increases in pain or discomfort noted. Will continue to progress resistance and repetitions.     Vince is progressing well towards his goals.   Pt prognosis is Good.     Pt will continue to benefit from skilled outpatient physical therapy to address the deficits listed in the problem list box on initial evaluation, provide pt/family education and to maximize pt's level of independence in the home and community environment.     Pt's spiritual, cultural and educational needs considered and pt agreeable to plan of care and goals.     Anticipated barriers to physical therapy:Age, co-morbidities    Goals:   Short Term Goals (3 Weeks):  1. Pt will be compliant with HEP to supplement PT in restoring pain free function in bilateral hips and knees.   (PROGRESSING, " NOT MET)  2. Pt will improve impaired LE MMTs to >/= 4/5 to improve dynamic hip support for functional tasks.   (PROGRESSING, NOT MET)    Long Term Goals (6 Weeks):  1. Pt will improve FOTO score to </= 39% limited to decrease perceived limitation with mobility.  (PROGRESSING, NOT MET)  2. Pt will improve impaired LE MMTs to >/= 4+/5 to improve dynamic hip support for functional tasks.   (PROGRESSING, NOT MET)  3. Pt will perform TUG in < 10 seconds without AD in order to demo improved gait speed   (PROGRESSING, NOT MET)  4. Pt will perform at least 12 sit to stands without UE support on 30 second sit to stand test in order to demo improved ability to perform transfers.   (PROGRESSING, NOT MET)  5. Pt to perform stair navigation without assistance with reciprocal/step to pattern to promote safe functional mobility at home.   (PROGRESSING, NOT MET)  6. Pt will report pain less than or equal to 2/10 when lying on back, to improve sleeping.   (PROGRESSING, NOT MET)      Plan     Continue PT POC, progress as able towards established goals.   Expand core stabilization.    Radha Martinez, PT

## 2019-10-17 ENCOUNTER — CLINICAL SUPPORT (OUTPATIENT)
Dept: REHABILITATION | Facility: HOSPITAL | Age: 84
End: 2019-10-17
Attending: ORTHOPAEDIC SURGERY
Payer: MEDICARE

## 2019-10-17 DIAGNOSIS — R29.898 WEAKNESS OF BOTH HIPS: ICD-10-CM

## 2019-10-17 DIAGNOSIS — R26.89 IMPAIRED GAIT AND MOBILITY: ICD-10-CM

## 2019-10-17 PROCEDURE — 97110 THERAPEUTIC EXERCISES: CPT | Mod: PN

## 2019-10-22 ENCOUNTER — CLINICAL SUPPORT (OUTPATIENT)
Dept: REHABILITATION | Facility: HOSPITAL | Age: 84
End: 2019-10-22
Attending: ORTHOPAEDIC SURGERY
Payer: MEDICARE

## 2019-10-22 DIAGNOSIS — R29.898 WEAKNESS OF BOTH HIPS: ICD-10-CM

## 2019-10-22 DIAGNOSIS — R26.89 IMPAIRED GAIT AND MOBILITY: ICD-10-CM

## 2019-10-22 PROCEDURE — 97110 THERAPEUTIC EXERCISES: CPT | Mod: PN

## 2019-10-22 NOTE — PROGRESS NOTES
"  Physical Therapy Daily Treatment Note     Name: Vince Martinez  Clinic Number: [id number]    Therapy Diagnosis:        Encounter Diagnoses   Name Primary?    Weakness of both hips      Impaired gait and mobility        Physician: Randy Riggs MD     Physician Orders: PT Eval and Treat  Medical Diagnosis from Referral:   M17.0 (ICD-10-CM) - Primary osteoarthritis of both knees   M16.11 (ICD-10-CM) - Primary osteoarthritis of right hip   M16.12 (ICD-10-CM) - Primary osteoarthritis of left hip      Evaluation Date: 9/23/2019  Authorization Period Expiration: 11/23/2019  Plan of Care Expiration: 9/23/2019 to 11/4/2019  Visit # / Visits authorized: 7/12  FOTO:  7/12    PTA visit: 5/6  GCode: 7/10    Visit amount:    60.64   Total amount:   788.32     Time In: 2:00PM  Time Out: 2:55 PM  Total Billable Time: 30 Minutes (TE-2)    Precautions: Standard    Subjective     Pt reports: he feels "about the same."  He was compliant with home exercise program.  Response to previous treatment: No adverse reactions.   Functional change: Ongoing     Pain: 3/10  Location: bilateral hips      Objective     Vince received therapeutic exercises to develop strength, endurance, ROM, flexibility, posture and core stabilization for 55 minutes  including:    Sci Fit: B U&LE reciprocation for CV endurance and tissue extensibility 10 minutes level 5.0 - not today, unavailable    Supine:   TA                                                       10x5" hold   Brace march                                      1 minute x 2 trials  HS Stretch:                                          3x30", B  Piriformis stretch                                 3x30" B   LTR's:                                                  2 minutes w/red Swiss ball  Bridges                                                3x10 B  Hip ADD ball squeezes:                      1x15, 5" holds  Supine Hip ABD:                                 1x15, 5" hold Red TB  SLR:                 " "                                   3x10, B   Reverse crunches                              2 minutes w/red Swiss ball      Seated:  Transverse abdominus:                    5"x15   LAQ                                                      2x10 B Seated in chair   Hip flex                                                  2x10 B Seated in chair    Standing:   Mini Squats:     2x10  Steamboats:    2x10, B YTB  Step ups:     2x10, 6" step                                      Home Exercises Provided and Patient Education Provided     Education provided:   - Importance of performing daily HEP to maximize therapy benefits.     Written Home Exercises Provided: yes.  Exercises were reviewed and Vince was able to demonstrate them prior to the end of the session.  Vince demonstrated good  understanding of the education provided.     See EMR under Patient Instructions for exercises provided today.    Assessment   Vince presented to therapy with minimal low back pain. Continues to require cueing for effective technique. Progressed with standing hip exercises in // bars adding YTB  with no increases in pain or discomfort noted. Will continue to progress resistance and repetitions.     iVnce is progressing well towards his goals.   Pt prognosis is Good.     Pt will continue to benefit from skilled outpatient physical therapy to address the deficits listed in the problem list box on initial evaluation, provide pt/family education and to maximize pt's level of independence in the home and community environment.     Pt's spiritual, cultural and educational needs considered and pt agreeable to plan of care and goals.     Anticipated barriers to physical therapy:Age, co-morbidities    Goals:   Short Term Goals (3 Weeks):  1. Pt will be compliant with HEP to supplement PT in restoring pain free function in bilateral hips and knees.   (PROGRESSING, NOT MET)  2. Pt will improve impaired LE MMTs to >/= 4/5 to improve dynamic hip support for " functional tasks.   (PROGRESSING, NOT MET)    Long Term Goals (6 Weeks):  1. Pt will improve FOTO score to </= 39% limited to decrease perceived limitation with mobility.  (PROGRESSING, NOT MET)  2. Pt will improve impaired LE MMTs to >/= 4+/5 to improve dynamic hip support for functional tasks.   (PROGRESSING, NOT MET)  3. Pt will perform TUG in < 10 seconds without AD in order to demo improved gait speed   (PROGRESSING, NOT MET)  4. Pt will perform at least 12 sit to stands without UE support on 30 second sit to stand test in order to demo improved ability to perform transfers.   (PROGRESSING, NOT MET)  5. Pt to perform stair navigation without assistance with reciprocal/step to pattern to promote safe functional mobility at home.   (PROGRESSING, NOT MET)  6. Pt will report pain less than or equal to 2/10 when lying on back, to improve sleeping.   (PROGRESSING, NOT MET)      Plan     Continue PT POC, progress as able towards established goals.   Expand core stabilization.    Donavon Bello, PTA

## 2019-10-24 ENCOUNTER — CLINICAL SUPPORT (OUTPATIENT)
Dept: REHABILITATION | Facility: HOSPITAL | Age: 84
End: 2019-10-24
Attending: ORTHOPAEDIC SURGERY
Payer: MEDICARE

## 2019-10-24 DIAGNOSIS — R29.898 WEAKNESS OF BOTH HIPS: ICD-10-CM

## 2019-10-24 DIAGNOSIS — R26.89 IMPAIRED GAIT AND MOBILITY: ICD-10-CM

## 2019-10-24 PROCEDURE — 97110 THERAPEUTIC EXERCISES: CPT | Mod: PN

## 2019-10-24 NOTE — PROGRESS NOTES
"  Physical Therapy Daily Treatment Note     Name: Vince Martinez  Clinic Number: [id number]    Therapy Diagnosis:        Encounter Diagnoses   Name Primary?    Weakness of both hips      Impaired gait and mobility        Physician: Randy Riggs MD     Physician Orders: PT Eval and Treat  Medical Diagnosis from Referral:   M17.0 (ICD-10-CM) - Primary osteoarthritis of both knees   M16.11 (ICD-10-CM) - Primary osteoarthritis of right hip   M16.12 (ICD-10-CM) - Primary osteoarthritis of left hip      Evaluation Date: 9/23/2019  Authorization Period Expiration: 11/23/2019  Plan of Care Expiration: 9/23/2019 to 11/4/2019  Visit # / Visits authorized: 8/12  FOTO:  8/12    PTA visit: 5/6  GCode: 8/10    Visit amount:    60.64   Total amount:   848.96     Time In: 2:10 PM  Time Out: 3:00 PM  Total Billable Time: 30 minutes (TE-2)    Precautions: Standard    Subjective     Pt reports: He still feel pain in his bilateral hips and low back  He was compliant with home exercise program.  Response to previous treatment: No adverse reactions.   Functional change: Ongoing     Pain: 3/10  Location: bilateral hips      Objective     Vince received therapeutic exercises to develop strength, endurance, ROM, flexibility, posture and core stabilization for 30 minutes 1:1 with PT and 30 minutes supervised including:    Supine:   TA                                                       10x5" hold   Brace march                                      1 minute x 2 trials  HS Stretch:                                          3x30", B  Piriformis stretch                                 3x30" B   LTR's:                                                  2 minutes w/red Swiss ball  Reverse crunches                              2 minutes w/red Swiss ball    Bridges                                                3x10 B, Red TB across hips  Hip ADD ball squeezes:                      2x15, 5" holds  SLR's     3x10  Sidelying Hip " "ABD   2x10  Sidelying Clams:                             2x10, Red TB    Seated:  Transverse abdominus:                    5"x15 - OOT  LAQ                                                      2x10 B Seated in chair - OOT  Hip flex                                                  2x10 B Seated in chair - OOT    Standing:   Mini Squats:     2x10  Steamboats:    2x10, B RTB  Step ups:     2x10, 6" step    Cybex Leg Press:    2x10, 5.5 plates                                      Home Exercises Provided and Patient Education Provided     Education provided:   - Importance of performing daily HEP to maximize therapy benefits.     Written Home Exercises Provided: yes.  Exercises were reviewed and Vince was able to demonstrate them prior to the end of the session.  Vince demonstrated good  understanding of the education provided.     See EMR under Patient Instructions for exercises provided today.    Assessment   Vince responded well to increases in resistance and repetitions. Patient will be placed on hold due to up and coming bypass surgery next Monday. Patient instructed to obtain clearance from cardiologist before returning to therapy.     Vince is progressing well towards his goals.   Pt prognosis is Good.     Pt will continue to benefit from skilled outpatient physical therapy to address the deficits listed in the problem list box on initial evaluation, provide pt/family education and to maximize pt's level of independence in the home and community environment.     Pt's spiritual, cultural and educational needs considered and pt agreeable to plan of care and goals.     Anticipated barriers to physical therapy:Age, co-morbidities    Goals:   Short Term Goals (3 Weeks):  1. Pt will be compliant with HEP to supplement PT in restoring pain free function in bilateral hips and knees.   (PROGRESSING, NOT MET)  2. Pt will improve impaired LE MMTs to >/= 4/5 to improve dynamic hip support for functional tasks.   (PROGRESSING, NOT " MET)    Long Term Goals (6 Weeks):  1. Pt will improve FOTO score to </= 39% limited to decrease perceived limitation with mobility.  (PROGRESSING, NOT MET)  2. Pt will improve impaired LE MMTs to >/= 4+/5 to improve dynamic hip support for functional tasks.   (PROGRESSING, NOT MET)  3. Pt will perform TUG in < 10 seconds without AD in order to demo improved gait speed   (PROGRESSING, NOT MET)  4. Pt will perform at least 12 sit to stands without UE support on 30 second sit to stand test in order to demo improved ability to perform transfers.   (PROGRESSING, NOT MET)  5. Pt to perform stair navigation without assistance with reciprocal/step to pattern to promote safe functional mobility at home.   (PROGRESSING, NOT MET)  6. Pt will report pain less than or equal to 2/10 when lying on back, to improve sleeping.   (PROGRESSING, NOT MET)      Plan     Hold PT until patient is cleared by cardiologist s/p bypass surgery.     Radha Martinez, PT, DPT

## 2019-11-01 ENCOUNTER — DOCUMENTATION ONLY (OUTPATIENT)
Dept: REHABILITATION | Facility: HOSPITAL | Age: 84
End: 2019-11-01

## 2019-11-01 DIAGNOSIS — R29.898 WEAKNESS OF BOTH HIPS: ICD-10-CM

## 2019-11-01 DIAGNOSIS — R26.89 IMPAIRED GAIT AND MOBILITY: ICD-10-CM

## 2019-11-01 NOTE — PROGRESS NOTES
Face to Face PTA Conference performed with the following regarding patient's current status, overall progress, and plan of care:  Ro Butler PTA, Katie Quintero PTA, Donavon Bello PTA and Reinier Lee PTA  Radha Martinez, PT     Donavon Bello, PTA     Katie Quintero, PTA    Reinier Lee, PTA     Ro Butler, PTA

## 2019-12-10 ENCOUNTER — OFFICE VISIT (OUTPATIENT)
Dept: ORTHOPEDICS | Facility: CLINIC | Age: 84
End: 2019-12-10
Payer: MEDICARE

## 2019-12-10 VITALS
BODY MASS INDEX: 25.83 KG/M2 | DIASTOLIC BLOOD PRESSURE: 59 MMHG | HEIGHT: 65 IN | HEART RATE: 71 BPM | SYSTOLIC BLOOD PRESSURE: 139 MMHG | WEIGHT: 155 LBS

## 2019-12-10 DIAGNOSIS — M76.30 IT BAND SYNDROME, UNSPECIFIED LATERALITY: ICD-10-CM

## 2019-12-10 DIAGNOSIS — M70.61 TROCHANTERIC BURSITIS OF BOTH HIPS: Primary | ICD-10-CM

## 2019-12-10 DIAGNOSIS — M70.62 TROCHANTERIC BURSITIS OF BOTH HIPS: Primary | ICD-10-CM

## 2019-12-10 PROCEDURE — 99999 PR PBB SHADOW E&M-EST. PATIENT-LVL III: ICD-10-PCS | Mod: PBBFAC,,, | Performed by: ORTHOPAEDIC SURGERY

## 2019-12-10 PROCEDURE — 99213 OFFICE O/P EST LOW 20 MIN: CPT | Mod: S$GLB,,, | Performed by: ORTHOPAEDIC SURGERY

## 2019-12-10 PROCEDURE — 1101F PR PT FALLS ASSESS DOC 0-1 FALLS W/OUT INJ PAST YR: ICD-10-PCS | Mod: CPTII,S$GLB,, | Performed by: ORTHOPAEDIC SURGERY

## 2019-12-10 PROCEDURE — 99213 PR OFFICE/OUTPT VISIT, EST, LEVL III, 20-29 MIN: ICD-10-PCS | Mod: S$GLB,,, | Performed by: ORTHOPAEDIC SURGERY

## 2019-12-10 PROCEDURE — 1159F PR MEDICATION LIST DOCUMENTED IN MEDICAL RECORD: ICD-10-PCS | Mod: S$GLB,,, | Performed by: ORTHOPAEDIC SURGERY

## 2019-12-10 PROCEDURE — 99999 PR PBB SHADOW E&M-EST. PATIENT-LVL III: CPT | Mod: PBBFAC,,, | Performed by: ORTHOPAEDIC SURGERY

## 2019-12-10 PROCEDURE — 1125F AMNT PAIN NOTED PAIN PRSNT: CPT | Mod: S$GLB,,, | Performed by: ORTHOPAEDIC SURGERY

## 2019-12-10 PROCEDURE — 1101F PT FALLS ASSESS-DOCD LE1/YR: CPT | Mod: CPTII,S$GLB,, | Performed by: ORTHOPAEDIC SURGERY

## 2019-12-10 PROCEDURE — 1125F PR PAIN SEVERITY QUANTIFIED, PAIN PRESENT: ICD-10-PCS | Mod: S$GLB,,, | Performed by: ORTHOPAEDIC SURGERY

## 2019-12-10 PROCEDURE — 1159F MED LIST DOCD IN RCRD: CPT | Mod: S$GLB,,, | Performed by: ORTHOPAEDIC SURGERY

## 2019-12-11 NOTE — PROGRESS NOTES
Subjective:      Patient ID: Vince Martinez is a 89 y.o. male.    Chief Complaint: Pain of the Right Hip; Pain of the Left Hip; Pain of the Left Knee; and Pain of the Right Knee      HPI: Vince Martinez  Is here in follow-up of bilateral hip pain R>>L.  Patient was last seen and treated approximately 3 months ago with physical therapy and bilateral corticosteroid injections of the trochanteric bursa. He reports injections were mildly helpful for a few weeks but symptoms have returned and he feels about the same at this point. He continues to have point tenderness to palpation at the lateral hip. Symptoms are worse with sleeping on either side. He does have mild groin pain with prolonged walking. Pts son reports he is very sedentary during the day which seems to make pain worse. Upon further questioning he does report lower back pain for which he wears a brace everyday which is very helpful.       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 , Disp: , Rfl:     fluticasone propionate (FLONASE) 50 mcg/actuation nasal spray, 2 sprays (100 mcg total) by Each Nostril route once daily., Disp: 16 g, Rfl: 11    furosemide (LASIX) 40 MG tablet, Take 1 tablet (40 mg total) by mouth once daily., Disp: 90 tablet, Rfl: 3    glimepiride (AMARYL) 2 MG tablet, Take 2 mg by mouth 3 (three) times daily. , Disp: , Rfl: 1     "glucosamine-chondroitin 500-400 mg tablet, Take 1 tablet by mouth 3 (three) times daily., Disp: , Rfl:     hydrocortisone 2.5 % cream, APPLY CREAM TO AFFECTED AREA AND BODY ONCE DAILY AS NEEDED., Disp: , Rfl: 0    ipratropium (ATROVENT) 42 mcg (0.06 %) nasal spray, 2 sprays in each nostril 4 times a day., Disp: 90 mL, Rfl: 3    irbesartan (AVAPRO) 300 MG tablet, Take two tablet daily., Disp: 180 tablet, Rfl: 3    ketoconazole (NIZORAL) 2 % shampoo, , Disp: , Rfl:     levothyroxine (SYNTHROID) 100 MCG tablet, , Disp: , Rfl:     loperamide (IMODIUM A-D) 2 mg Tab, Take 2 mg by mouth 4 (four) times daily as needed., Disp: , Rfl:     melatonin 10 mg Cap, Take by mouth., Disp: , Rfl:     metFORMIN (GLUCOPHAGE-XR) 500 MG 24 hr tablet, , Disp: , Rfl:     mometasone 0.1% (ELOCON) 0.1 % cream, , Disp: , Rfl:     montelukast (SINGULAIR) 10 mg tablet, Take 1 tablet (10 mg total) by mouth once daily., Disp: 30 tablet, Rfl: 0    neomycin-polymyxin-dexamethasone (DEXACINE) 3.5 mg/g-10,000 unit/g-0.1 % Oint, , Disp: , Rfl:     omeprazole (PRILOSEC) 20 MG capsule, Take 20 mg by mouth once daily., Disp: , Rfl:     simvastatin (ZOCOR) 20 MG tablet, , Disp: , Rfl:     tamsulosin (FLOMAX) 0.4 mg Cp24, 0.4 mg once daily. , Disp: , Rfl:     tizanidine 4 mg Cap, , Disp: , Rfl:     triamcinolone acetonide 0.1% (KENALOG) 0.1 % cream, Apply topically 2 (two) times daily., Disp: , Rfl:   Review of patient's allergies indicates:   Allergen Reactions    Bactrim [sulfamethoxazole-trimethoprim] Rash    Ciprofloxacin Rash    Latex Rash       BP (!) 139/59   Pulse 71   Ht 5' 5" (1.651 m)   Wt 70.3 kg (155 lb)   BMI 25.79 kg/m²     Review of Systems   Constitution: Negative for chills and fever.   Cardiovascular: Negative for chest pain and palpitations.   Respiratory: Negative for shortness of breath and wheezing.    Skin: Negative for poor wound healing and rash.   Musculoskeletal: Positive for joint pain, myalgias and " stiffness.   Gastrointestinal: Negative for nausea and vomiting.   Genitourinary: Negative for dysuria and hematuria.   Neurological: Negative for seizures and tremors.   Psychiatric/Behavioral: Negative for altered mental status.   Allergic/Immunologic: Negative for environmental allergies and persistent infections.         Objective:    Ortho Exam    bilateral HIP EXAM  The patient is not in acute distress.   Body habitus is::normal.   The patient walks without a limp.   The skin over the hip is::intact.   There is::tenderness laterally.  Range of motion- Flexion  100, External rotation  full, internal rotation  full.  Resisted SLR negative  Pain with rotation negative. No hip irritability.  Mild TTP lower back-- brace in place.  Sciatic tension findings negative.  Shortening/lengthening compared to the contralateral side exam deferred.  Pulses DP present, PT present.  Motor normal resting muscle tone.   Sensory normal.  GEN: Well developed, well nourished  elderly male. AAOX3. No acute distress.   Normocephalic, atraumatic.   ALEX  Breathing unlabored.  Mood and affect  normal.           Assessment:     Imaging:  No new imaging.  Previous hip radiographs were reviewed which was significant for pincer  Impingement.  Joint spaces are fairly well maintained --  Mild narrowing        1. Trochanteric bursitis of both hips    2. It band syndrome, unspecified laterality          Plan:       Orders Placed This Encounter    Ambulatory referral to Physical Therapy      Again, he can easily locate area of maximal tenderness at the trochanters of the bilateral hips.   I explained the nature the problem in regards to her trochanteric bursitis vs muscle tightness/ IT band. Injections were not as helpful as I would expect -- recommend continuing PT with focus on muscles not OA.   Start Topical cream with NSAIDs, muscle relaxer, and lidocaine.   Continue Tylenol  If sx do not improve - would recommend evaluation of the lumbar  spine.    Follow up in about 4 weeks (around 1/7/2020).

## 2020-01-09 ENCOUNTER — OFFICE VISIT (OUTPATIENT)
Dept: ORTHOPEDICS | Facility: CLINIC | Age: 85
End: 2020-01-09
Payer: MEDICARE

## 2020-01-09 VITALS — BODY MASS INDEX: 25.83 KG/M2 | WEIGHT: 155 LBS | HEIGHT: 65 IN

## 2020-01-09 DIAGNOSIS — M51.36 LUMBAR DEGENERATIVE DISC DISEASE: Primary | ICD-10-CM

## 2020-01-09 DIAGNOSIS — M48.061 SPINAL STENOSIS OF LUMBAR REGION WITHOUT NEUROGENIC CLAUDICATION: ICD-10-CM

## 2020-01-09 PROCEDURE — 1159F MED LIST DOCD IN RCRD: CPT | Mod: S$GLB,,, | Performed by: ORTHOPAEDIC SURGERY

## 2020-01-09 PROCEDURE — 1101F PR PT FALLS ASSESS DOC 0-1 FALLS W/OUT INJ PAST YR: ICD-10-PCS | Mod: CPTII,S$GLB,, | Performed by: ORTHOPAEDIC SURGERY

## 2020-01-09 PROCEDURE — 1125F AMNT PAIN NOTED PAIN PRSNT: CPT | Mod: S$GLB,,, | Performed by: ORTHOPAEDIC SURGERY

## 2020-01-09 PROCEDURE — 99213 OFFICE O/P EST LOW 20 MIN: CPT | Mod: S$GLB,,, | Performed by: ORTHOPAEDIC SURGERY

## 2020-01-09 PROCEDURE — 99999 PR PBB SHADOW E&M-EST. PATIENT-LVL III: CPT | Mod: PBBFAC,,, | Performed by: ORTHOPAEDIC SURGERY

## 2020-01-09 PROCEDURE — 99999 PR PBB SHADOW E&M-EST. PATIENT-LVL III: ICD-10-PCS | Mod: PBBFAC,,, | Performed by: ORTHOPAEDIC SURGERY

## 2020-01-09 PROCEDURE — 1101F PT FALLS ASSESS-DOCD LE1/YR: CPT | Mod: CPTII,S$GLB,, | Performed by: ORTHOPAEDIC SURGERY

## 2020-01-09 PROCEDURE — 1125F PR PAIN SEVERITY QUANTIFIED, PAIN PRESENT: ICD-10-PCS | Mod: S$GLB,,, | Performed by: ORTHOPAEDIC SURGERY

## 2020-01-09 PROCEDURE — 1159F PR MEDICATION LIST DOCUMENTED IN MEDICAL RECORD: ICD-10-PCS | Mod: S$GLB,,, | Performed by: ORTHOPAEDIC SURGERY

## 2020-01-09 PROCEDURE — 99213 PR OFFICE/OUTPT VISIT, EST, LEVL III, 20-29 MIN: ICD-10-PCS | Mod: S$GLB,,, | Performed by: ORTHOPAEDIC SURGERY

## 2020-01-09 NOTE — PROGRESS NOTES
Subjective:      Patient ID: Vince Martinez is a 89 y.o. male.    Chief Complaint: Follow-up of the Left Hip; Follow-up of the Right Hip; Follow-up of the Left Knee; and Follow-up of the Right Knee    HPI  The patient complains of ongoing bilateral hip pain.  The pain is lateral.  It bothers him mostly with sitting and prolonged standing.  The patient does not feel that walking aggravates his symptoms. He does not feel that past intervention has been particularly beneficial.  An over-the-counter lumbar brace has been helpful in the patient requests a prescription for a more supportive device            Review of Systems   Constitution: Negative for fever and weight loss.   HENT: Negative for congestion.    Eyes: Negative for visual disturbance.   Cardiovascular: Negative for chest pain.   Respiratory: Negative for shortness of breath.    Hematologic/Lymphatic: Negative for bleeding problem. Does not bruise/bleed easily.   Skin: Negative for poor wound healing.   Musculoskeletal: Positive for joint pain.   Gastrointestinal: Negative for abdominal pain.   Genitourinary: Negative for dysuria.   Neurological: Negative for seizures.   Psychiatric/Behavioral: Negative for altered mental status.   Allergic/Immunologic: Negative for persistent infections.         Objective:      Ortho/SPM Exam      Left hip    The patient is not in acute distress.   Body habitus is:normal.   Sclerae normal  The patient walks without a limp.   Respiratory distress:  none  The skin over the hip is:intact.   There is no local tenderness.   Range of motion-hip range of motion is limited by hamstring tightness.  Lumbar range of motion reduced in all planes   Resisted SLR negative.  Pain with rotation negative  Sciatic tension findings negative.  Shortening/lengthening compared to the contralateral side absent.  Pulses DP present, PT present.  Motor normal 5/5 strength in all tested muscle groups.   Sensory normal.      Right hip is  identical          Assessment:       Encounter Diagnoses   Name Primary?    Lumbar degenerative disc disease Yes    Spinal stenosis of lumbar region without neurogenic claudication         Considering the patient's overall presentation lumbar degenerative disc disease with stenosis is the most likely cause of his symptoms.    I cannot rule out that mild osteoarthritis of both hips and chronic bursitis may play a role the symptoms, but they do not seem to predominate.          Plan:       Vince was seen today for follow-up, follow-up, follow-up and follow-up.    Diagnoses and all orders for this visit:    Lumbar degenerative disc disease  -     Ambulatory Referral to Pain Clinic  -     Cancel: MRI Lumbar Spine Without Contrast; Future  -     Back/Cervical Brace For Home Use  -     CT Lumbar Spine Without Contrast; Future    Spinal stenosis of lumbar region without neurogenic claudication  -     Ambulatory Referral to Pain Clinic  -     Cancel: MRI Lumbar Spine Without Contrast; Future  -     Back/Cervical Brace For Home Use  -     CT Lumbar Spine Without Contrast; Future          I explained my diagnostic impression and the reasoning behind it in detail, using layman's terms.  Models and/or pictures were used to help in the explanation.    Brace prescribed the patient request    MRI    Pain clinic evaluation for potential injection-process explained and patient agrees

## 2020-01-16 ENCOUNTER — DOCUMENTATION ONLY (OUTPATIENT)
Dept: REHABILITATION | Facility: HOSPITAL | Age: 85
End: 2020-01-16

## 2020-01-16 ENCOUNTER — HOSPITAL ENCOUNTER (OUTPATIENT)
Dept: RADIOLOGY | Facility: HOSPITAL | Age: 85
Discharge: HOME | End: 2020-01-16
Attending: ORTHOPAEDIC SURGERY
Payer: MEDICARE

## 2020-01-16 ENCOUNTER — CLINICAL SUPPORT (OUTPATIENT)
Dept: REHABILITATION | Facility: HOSPITAL | Age: 85
End: 2020-01-16
Payer: MEDICARE

## 2020-01-16 DIAGNOSIS — M54.50 CHRONIC BILATERAL LOW BACK PAIN WITHOUT SCIATICA: ICD-10-CM

## 2020-01-16 DIAGNOSIS — M53.86 DECREASED ROM OF LUMBAR SPINE: ICD-10-CM

## 2020-01-16 DIAGNOSIS — M51.36 LUMBAR DEGENERATIVE DISC DISEASE: ICD-10-CM

## 2020-01-16 DIAGNOSIS — M48.061 SPINAL STENOSIS OF LUMBAR REGION WITHOUT NEUROGENIC CLAUDICATION: ICD-10-CM

## 2020-01-16 DIAGNOSIS — R29.898 WEAKNESS OF BOTH LEGS: ICD-10-CM

## 2020-01-16 DIAGNOSIS — G89.29 CHRONIC BILATERAL LOW BACK PAIN WITHOUT SCIATICA: ICD-10-CM

## 2020-01-16 PROBLEM — R26.89 IMPAIRED GAIT AND MOBILITY: Status: RESOLVED | Noted: 2019-09-23 | Resolved: 2020-01-16

## 2020-01-16 PROCEDURE — 72131 CT LUMBAR SPINE W/O DYE: CPT | Mod: TC

## 2020-01-16 PROCEDURE — 72131 CT LUMBAR SPINE WITHOUT CONTRAST: ICD-10-PCS | Mod: 26,,, | Performed by: RADIOLOGY

## 2020-01-16 PROCEDURE — 97162 PT EVAL MOD COMPLEX 30 MIN: CPT | Mod: PN

## 2020-01-16 PROCEDURE — 72131 CT LUMBAR SPINE W/O DYE: CPT | Mod: 26,,, | Performed by: RADIOLOGY

## 2020-01-16 NOTE — PROGRESS NOTES
Outpatient Therapy Discharge Summary     Name: Vince Martinez  Clinic Number: [id number]    Therapy Diagnosis: No diagnosis found.  Physician: No ref. provider found    Physician Orders: PT Eval and Treat  Medical Diagnosis from Referral:   M17.0 (ICD-10-CM) - Primary osteoarthritis of both knees   M16.11 (ICD-10-CM) - Primary osteoarthritis of right hip   M16.12 (ICD-10-CM) - Primary osteoarthritis of left hip      Evaluation Date: 9/23/2019    Date of Last visit: 10/24/2019  Total Visits Received: 8  Cancelled Visits: 0  No Show Visits: 1    Assessment    Goals:   Short Term Goals (3 Weeks):  1. Pt will be compliant with HEP to supplement PT in restoring pain free function in bilateral hips and knees. NOT MET  2. Pt will improve impaired LE MMTs to >/= 4/5 to improve dynamic hip support for functional tasks. NOT MET     Long Term Goals (6 Weeks):  1. Pt will improve FOTO score to </= 39% limited to decrease perceived limitation with mobility. NOT MET  2. Pt will improve impaired LE MMTs to >/= 4+/5 to improve dynamic hip support for functional tasks. NOT MET  3. Pt will perform TUG in < 10 seconds without AD in order to demo improved gait speed  NOT MET  4. Pt will perform at least 12 sit to stands without UE support on 30 second sit to stand test in order to demo improved ability to perform transfers. NOT MET  5. Pt to perform stair navigation without assistance with reciprocal/step to pattern to promote safe functional mobility at home.  NOT MET  6. Pt will report pain less than or equal to 2/10 when lying on back, to improve sleeping. NOT MET    Discharge reason: Other:  Patient had surgery    Plan   This patient is discharged from Physical Therapy

## 2020-01-17 NOTE — PLAN OF CARE
[facility]  Physical Therapy Initial Evaluation    Name: Vince Martinez  Clinic Number: [id number]    Therapy Diagnosis:   Encounter Diagnoses   Name Primary?    Weakness of both legs     Decreased ROM of lumbar spine     Chronic bilateral low back pain without sciatica      Physician: No ref. provider found    Physician Orders: PT Eval and Treat   Medical Diagnosis from Referral:   M70.61,M70.62 (ICD-10-CM) - Trochanteric bursitis of both hips   M76.30 (ICD-10-CM) - It band syndrome, unspecified laterality     Evaluation Date: 1/16/2020  Authorization Period Expiration: 3/1/2020  Plan of Care Expiration: 1/16/2020 to 3/6/2020  Visit # / Visits authorized: 1/12    Time In: 4:15 PM  Time Out: 5:00 PM  Total Billable Time: 45 minutes (Moderate Complexity Evaluation)    Precautions: Hx of recent Bypass surgery    Subjective     Date of onset: Multiple years    History of current condition - Vince reports: he was previously attending therapy at [facility] for bilateral hip and knee pain. Reports that he had bypass surgery 2 months ago. Since the surgery he's been going to the gym to lift weights and walk on the treadmill, however his hips have been causing him increased pain and limiting what he can perform at the gym. He states that his bilateral hips, bilateral knees, and low back all hurt, but his hips bother him the most. He states he had an MRI performed today, but hasn't received the results yet. He's in therapy now to relieve some of his hip pain. He states that stairs are difficult, as he is unable to go up more than 2 stairs at a time. He is unable to walk farther than a few blocks and it also limits his sleeping.      Medical History:   Past Medical History:   Diagnosis Date    Acute combined systolic and diastolic CHF, NYHA class 3 11/15/2018    Arthritis     Diabetes mellitus     DJD (degenerative joint disease)     Hypertension     Prostate enlargement     Thyroid  disease        Surgical History:   Vince Martinez  has a past surgical history that includes Prostate surgery.    Medications:   Vince has a current medication list which includes the following prescription(s): azelastine, betamethasone dipropionate, diclofenac sodium, finasteride, fluticasone propionate, furosemide, glimepiride, glucosamine-chondroitin, hydrocortisone, ipratropium, irbesartan, ketoconazole, levothyroxine, loperamide, melatonin, metformin, mometasone 0.1%, montelukast, neomycin-polymyxin-dexamethasone, omeprazole, simvastatin, tamsulosin, tizanidine, and triamcinolone acetonide 0.1%.    Allergies:   Review of patient's allergies indicates:   Allergen Reactions    Bactrim [sulfamethoxazole-trimethoprim] Rash    Ciprofloxacin Rash    Latex Rash        Imaging:  X-Ray Left Hip (8/29/2019): FINDINGS: The left hip joint space is preserved.  Normal left femoral head contour.  No fracture is seen.  No osseous lesion seen.  The remainder of the visualized osseous structures and soft tissues appear normal.    X-Ray Right Hip (8/29/2019): FINDINGS: The right hip joint space is relatively well preserved.  Minimal osteophyte at the lateral acetabular region.  Normal femoral head contour.  No fracture seen, no osseous lesions.  The remainder of the visualized osseous structures and soft tissues appear within normal limits.    X-Ray Knee Bilateral (8/29/2019): FINDINGS: Mild tricompartmental degenerative joint space narrowing, slightly more pronounced at the medial femorotibial compartment.  Patellar osteophyte formation.  No acute fracture or dislocation.  No large suprapatellar effusion.  4 mm radiopaque density projecting over the medial soft tissues at the level of the left mid tibial shaft, possible foreign body of unknown chronicity.  Clinical correlation.    Prior Therapy: Yes, previously at [facility] for hip and knee pain  Social History: lives with their family; 1 story home with wood spencer  "and tile in bathroom and kitchen  Occupation: Retired  Prior Level of Function: Independent  Current Level of Function: Independent with pain and increased time to perform activities    Pain:  Current 2/10, worst 9/10, best 2/10   Location: bilateral hips (lateral and posterior)   Description: Sharp  Aggravating Factors: Walking and Stair navigation  Easing Factors: Tylenol PM    Pts goals: "To get well soon."    Objective     Posture: Increased thoracic kyphosis, elevated right shoulder as compared to left (possibly due to hypertrophy of right UT), increase space between trunk and RUE as compared to left, right foot in more external rotation than left  Palpation: tender to moderate palpation along bilateral paraspinals from ~T12 down to gluteal region, bilateral greater trochanters and along bilateral IT bands  Sensation: Intact    Range of Motion/Strength:     Thoracolumbar AROM Pain/Dysfunction with Movement   Flexion 55    Extension 10    Right side bending 5* Pain in right hip   Left side bending 10* Pain in left hip   Right rotation 32    Left rotation 25    *denotes pain with movement    Hip Right Left Pain/Dysfunction with Movement   AROM/PROM      flexion WFL WFL    extension WFL WFL    abduction WFL WFL    adduction WFL WFL    Internal rotation  Severely limited Severely limited  Pain in bilateral hips with PROM testing   External rotation  Moderately limited Moderately limited Pain in bilateral hips and low back with PROM testing     Knee Right Left Pain/Dysfunction with Movement   AROM/PROM      flexion WFL WFL    extension WFL WFL      Ankle Right Left Pain/Dysfunction with Movement   AROM/PROM      dorsiflexion WFL WFL    plantarflexion WFL WFL    inversion WFL WFL    eversion WFL WFL        L/E MMT Right Left Pain/Dysfunction with Movement   Hip Flexion 4/5 4-/5    Hip Extension 3+/5 3+/5    Hip Abduction 5/5 4+/5    Hip Adduction NT NT    Hip IR 5/5 5/5    Hip ER 5/5 5/5    Knee Flexion 5/5 5/5  " "  Knee Extension 5/5 5/5    Ankle DF 5/5 5/5    Ankle PF 5/5 5/5    Ankle Inversion 5/5 5/5    Ankle Eversion 5/5 5/5      Joint Mobility:     - Hip:  Increased pain across low back and in bilateral hips with PROM testing of ER and IR    Flexibility:   Hamstring 90/90 Right: Moderately limited  Hamstring 90/90 Left: Moderately limited    Special Tests: Scouring: (+) on right, (-) on left    Gait Analysis:Without AD   Assistance Independent   Deviations: Decreased right arm swing, decreased heel strike bilaterally, slowed magui    TUG:  10.42 seconds     2 min walk test: ~420 feet    30 second sit-to-stand test (without U/E support): 9    Balance:   Right SLS: 6 seconds  Left SLS:  5 seconds      CMS Impairment/Limitation/Restriction for FOTO Hip Survey    Therapist reviewed FOTO scores for Vince Martinez on 1/16/2020.   FOTO documents entered into [employer] - see Media section.    Limitation Score: TBD%         TREATMENT     No treatment performed today, evaluation only. Begin with previous exercises from last round of therapy as able. Adjust repetitions and resistance as necessary, in a pain free range. Exercises listed below:    Supine:   TA                                                       10x5" hold   Brace march                                      1 minute x 2 trials  HS Stretch:                                         3x30", B  Piriformis stretch                                3x30" B   LTR's:                                                 2 minutes w/red Swiss ball  Reverse crunches                              2 minutes w/red Swiss ball    Bridges                                               3x10 B, Red TB across hips  Hip ADD ball squeezes:                     2x15, 5" holds  SLR's                                                  3x10  Sidelying Hip ABD                             2x10  Sidelying Clams:                               2x10, Red TB     Seated:  Transverse abdominus:                      " "5"x15  LAQ                                                     2x10 B Seated in chair   Hip flex                                                 2x10 B Seated in chair     Standing:   Mini Squats:                                          2x10  Steamboats:                                        2x10, B RTB  Step ups:                                              2x10, 6" step     Cybex Leg Press:                               2x10, 5.5 plates        Home Exercises and Patient Education Provided    Education provided:   - Importance and role of physical therapy  - Reissued previous HEP, instructed to begin performing in a pain free range.    Written Home Exercises Provided: yes.  Exercises were reviewed and Vince was able to demonstrate them prior to the end of the session.  Vince demonstrated good  understanding of the education provided.     See EMR under Patient Instructions for exercises provided 1/16/2020.    Assessment     Vince is a 89 y.o. male referred to outpatient Physical Therapy with a medical diagnosis of trochanteric bursitis of both hips and IT band syndrome. Pt presents to PT with pain, decreased thoracolumbar and bilateral hip ROM, decreased strength and flexibility in bilateral lower extremities, poor posture, impaired balance and gait, and functional deficits with performing household duties, performing workouts at the gym to improve overall health and wellness, prolonged walking, and stair navigation. Patient previously participated in physical therapy with an overall decrease in pain, however ceased therapy due to scheduled bypass surgery. Patient would benefit from re-starting therapy to address impairments listed above.      Pt prognosis is Fair.   Pt will benefit from skilled outpatient Physical Therapy to address the deficits stated above and in the chart below, provide pt/family education, and to maximize pt's level of independence.     Plan of care discussed with patient: Yes  Pt's spiritual, " cultural and educational needs considered and pt agreeable to plan of care and goals as stated below:     Anticipated Barriers for therapy: Co-morbidities      Medical Necessity is demonstrated by the following  History  Co-morbidities and personal factors that may impact the plan of care Co-morbidities:   HTN, Hx of hypertensive heart disease without heart failure, Hx of palpitations, Hx of PVC's, Hx of premature atrial contractions, CHF, DM II, Acquired hypothyroidism     Personal Factors:   age     high   Examination  Body Structures and Functions, activity limitations and participation restrictions that may impact the plan of care Body Regions:   back  lower extremities    Body Systems:    ROM  strength  balance  gait  transfers    Participation Restrictions:   See co-morbidities     Activity limitations:   Learning and applying knowledge  no deficits    General Tasks and Commands  no deficits    Communication  communicating with/receiving spoken language    Mobility  lifting and carrying objects  walking    Self care  no deficits    Domestic Life  shopping  cooking  doing house work (cleaning house, washing dishes, laundry)  assisting others    Interactions/Relationships  no deficits    Life Areas  no deficits    Community and Social Life  community life  recreation and leisure         moderate   Clinical Presentation evolving clinical presentation with changing clinical characteristics moderate   Decision Making/ Complexity Score: moderate       Goals:    Short Term Goals (3 Weeks):  1. Pt will be compliant with HEP to supplement PT in restoring pain free function.  2. Pt will improve impaired LE MMTs to >/= 4/5 to improve dynamic hip support for functional tasks.  3. Pt will report pain at worse in hips to less than or equal to 6/10 in order to improve ability to perform functional tasks.   4. Pt will participate in stair navigation with decreased pain, navigating greater than or equal to 4 stairs with UE  support, to improve functional mobility.     Long Term Goals (6 Weeks):  1. Pt will improve FOTO score to </= TBD% limited to decrease perceived limitation with mobility.   2. Pt will improve impaired LE MMTs to >/= 4+/5 to improve dynamic hip support for functional tasks.  3. Pt will perform at least 12 sit to stands without UE support on 30 second sit to stand test in order to demo improved ability to perform transfers.  4. Pt to perform stair navigation with UE assistance and reciprocal pattern to promote safe functional mobility at home.  5. Pt will report no pain during lumbar ROM to promote functional mobility      Plan     Plan of care Certification: 1/16/2020 to 3/6/2020.    Outpatient Physical Therapy 2 times weekly for 6 weeks to include the following interventions: Cervical/Lumbar Traction, Electrical Stimulation ,FDN, Fluidotherapy, Gait Training, Manual Therapy, Moist Heat/ Ice, Neuromuscular Re-ed, Patient Education, Therapeutic Activites, Therapeutic Exercise, Ultrasound and ASTYM, IASTM, and Kinesiotape.    Radha Martinez, PT, DPT

## 2020-01-21 ENCOUNTER — DOCUMENTATION ONLY (OUTPATIENT)
Dept: REHABILITATION | Facility: HOSPITAL | Age: 85
End: 2020-01-21

## 2020-01-21 DIAGNOSIS — G89.29 CHRONIC BILATERAL LOW BACK PAIN WITHOUT SCIATICA: ICD-10-CM

## 2020-01-21 DIAGNOSIS — R29.898 WEAKNESS OF BOTH LEGS: ICD-10-CM

## 2020-01-21 DIAGNOSIS — M53.86 DECREASED ROM OF LUMBAR SPINE: ICD-10-CM

## 2020-01-21 DIAGNOSIS — M54.50 CHRONIC BILATERAL LOW BACK PAIN WITHOUT SCIATICA: ICD-10-CM

## 2020-01-21 NOTE — PROGRESS NOTES
Face to Face PTA Conference performed with the following regarding patient's current status, overall progress, and plan of care:    Ro Butler PTA and Donavon Bello PTA    Radha Martinez, PT     Ro Butler, PTA

## 2020-01-22 ENCOUNTER — CLINICAL SUPPORT (OUTPATIENT)
Dept: REHABILITATION | Facility: HOSPITAL | Age: 85
End: 2020-01-22
Payer: MEDICARE

## 2020-01-22 DIAGNOSIS — M53.86 DECREASED ROM OF LUMBAR SPINE: ICD-10-CM

## 2020-01-22 DIAGNOSIS — M54.50 CHRONIC BILATERAL LOW BACK PAIN WITHOUT SCIATICA: ICD-10-CM

## 2020-01-22 DIAGNOSIS — G89.29 CHRONIC BILATERAL LOW BACK PAIN WITHOUT SCIATICA: ICD-10-CM

## 2020-01-22 DIAGNOSIS — R29.898 WEAKNESS OF BOTH LEGS: ICD-10-CM

## 2020-01-22 PROCEDURE — 97110 THERAPEUTIC EXERCISES: CPT | Mod: PN,CQ

## 2020-01-22 NOTE — PROGRESS NOTES
"  Physical Therapy Daily Treatment Note     Name: Vince Mratinez  Clinic Number: [id number]    Therapy Diagnosis:   Encounter Diagnoses   Name Primary?    Weakness of both legs     Decreased ROM of lumbar spine     Chronic bilateral low back pain without sciatica      Physician: Florecita Spencer PA-C  Visit Date: 1/22/2020    Physician Orders: PT Eval and Treat   Medical Diagnosis from Referral:   M70.61,M70.62 (ICD-10-CM) - Trochanteric bursitis of both hips   M76.30 (ICD-10-CM) - It band syndrome, unspecified laterality      Evaluation Date: 1/16/2020  Authorization Period Expiration: 3/1/2020  Plan of Care Expiration: 1/16/2020 to 3/6/2020  Visit # / Visits authorized: 2/12    FOTO: 2/5      Visit:  90.96  Total: 173.84    Time In: 1505  Time Out: 1550  Total Billable Time: 45 minutes    Precautions: History of recent Bypass surgery    Subjective     Pt reports: "just a little pain I'm okay".  He was partially compliant with home exercise program.  Response to previous treatment: some soreness  Functional change: None    Pain: 2/10  Location: bilateral hips      Objective     Vince received therapeutic exercises to develop strength, endurance, ROM, flexibility, posture and core stabilization for 45 minutes including:  Supine:   TA                                                       10x5" hold   Brace march                                      30 seconds x 2 trials  HS Stretch:                                         3x30", B  Piriformis stretch                                3x30" B   LTR's:                                                 1 minute w/red Swiss ball  Reverse crunches                              1 minute w/red Swiss ball    Bridges                                               3x10 B, Red TB across hips  Hip ADD ball squeezes:                     2x10, 5" holds  SLR's                                                  2x10 B  Sidelying Hip ABD                               2x10 Not Performed " "today  Sidelying Clams:                                 2x10, Red TB     Seated:  Transverse abdominus:                      5"x10  LAQ                                                     2x10 B Seated in chair   Hip flex                                                 2x10 B Seated in chair     Standing:   Mini Squats:                                            2x10  Steamboats:                                         NEXT  Step ups:                                              NEXT     Cybex Leg Press:                                 NEXT       Home Exercises Provided and Patient Education Provided     Education provided:   - Importance of daily HEP    Written Home Exercises Provided: Patient instructed to cont prior HEP.  Exercises were reviewed and Vince was able to demonstrate them prior to the end of the session.  Vince demonstrated fair  understanding of the education provided.     See EMR under Patient Instructions for exercises provided 1/16/2020.      Assessment     Patient required multiple verbal and tactile cues as well as demonstration for correct technique as well as increased time to perform.  Difficulty with isolation and tone of abdominals in supine and seated.  Improved slightly in sitting and with verbal and tactile cues. Limited therex due to patient reports of fatigue.  States increased pain after treatment rates at "5/10"  "I worked hard"  Vince is progressing well towards his goals.   Pt prognosis is Fair.     Pt will continue to benefit from skilled outpatient physical therapy to address the deficits listed in the problem list box on initial evaluation, provide pt/family education and to maximize pt's level of independence in the home and community environment.     Pt's spiritual, cultural and educational needs considered and pt agreeable to plan of care and goals.    Anticipated barriers to physical therapy: Co-morbidities       Goals:     Short Term Goals (3 Weeks):  1. Pt will be compliant " with HEP to supplement PT in restoring pain free function.  2. Pt will improve impaired LE MMTs to >/= 4/5 to improve dynamic hip support for functional tasks.  3. Pt will report pain at worse in hips to less than or equal to 6/10 in order to improve ability to perform functional tasks.   4. Pt will participate in stair navigation with decreased pain, navigating greater than or equal to 4 stairs with UE support, to improve functional mobility.      Long Term Goals (6 Weeks):  1. Pt will improve FOTO score to </= TBD% limited to decrease perceived limitation with mobility.   2. Pt will improve impaired LE MMTs to >/= 4+/5 to improve dynamic hip support for functional tasks.  3. Pt will perform at least 12 sit to stands without UE support on 30 second sit to stand test in order to demo improved ability to perform transfers.  4. Pt to perform stair navigation with UE assistance and reciprocal pattern to promote safe functional mobility at home.  5. Pt will report no pain during lumbar ROM to promote functional mobility         Plan     Continue PT per POC, progress as tolerated.  Progress with abdominal bracing and core stabilization.    Ro Butler, PTA

## 2020-01-24 ENCOUNTER — CLINICAL SUPPORT (OUTPATIENT)
Dept: REHABILITATION | Facility: HOSPITAL | Age: 85
End: 2020-01-24
Payer: MEDICARE

## 2020-01-24 DIAGNOSIS — G89.29 CHRONIC BILATERAL LOW BACK PAIN WITHOUT SCIATICA: ICD-10-CM

## 2020-01-24 DIAGNOSIS — M54.50 CHRONIC BILATERAL LOW BACK PAIN WITHOUT SCIATICA: ICD-10-CM

## 2020-01-24 DIAGNOSIS — R29.898 WEAKNESS OF BOTH LEGS: ICD-10-CM

## 2020-01-24 DIAGNOSIS — M53.86 DECREASED ROM OF LUMBAR SPINE: ICD-10-CM

## 2020-01-24 PROCEDURE — 97110 THERAPEUTIC EXERCISES: CPT | Mod: PN

## 2020-01-27 NOTE — PROGRESS NOTES
"  Physical Therapy Daily Treatment Note     Name: Vince Martinez  Clinic Number: [id number]    Therapy Diagnosis:   Encounter Diagnoses   Name Primary?    Weakness of both legs     Decreased ROM of lumbar spine     Chronic bilateral low back pain without sciatica      Physician: Florecita Spencer PA-C  Visit Date: 1/24/2020    Physician Orders: PT Eval and Treat   Medical Diagnosis from Referral:   M70.61,M70.62 (ICD-10-CM) - Trochanteric bursitis of both hips   M76.30 (ICD-10-CM) - It band syndrome, unspecified laterality      Evaluation Date: 1/16/2020  Authorization Period Expiration: 3/1/2020  Plan of Care Expiration: 1/16/2020 to 3/6/2020  Visit # / Visits authorized: 3/12  FOTO: 3/5  Visit:  30.32  Total: 204.16    Time In: 1500  Time Out: 1550  Total Billable Time: 15 minutes (1 TE)  Precautions: History of recent Bypass surgery    Subjective     Pt reports: no new complaints; mild B hip pain but 'doing good'.   He was partially compliant with home exercise program.  Response to previous treatment: some soreness  Functional change: None    Pain: 2/10  Location: bilateral hips      Objective     Vince received therapeutic exercises to develop strength, endurance, ROM, flexibility, posture and core stabilization for 15 minutes with PT including assessment and 30 minutes under supervision:  Supine:   TA                                                       10x5" hold   Brace march                                      30 seconds x 2 trials  HS Stretch:                                         3x30", B  Piriformis stretch                                3x30" B   LTR's:                                                 1 minute w/red Swiss ball  Reverse crunches                              1 minute w/red Swiss ball    Bridges                                               3x10 B, Red TB across hips  Hip ADD ball squeezes:                     2x10, 5" holds  SLR's                                                  2x10 " "B  Sidelying Hip ABD                               2x10   Sidelying Clams:                                 2x10, Red TB     Seated:  Transverse abdominus:                      5"x10 (not performed today)  LAQ                                                     2x10 B Seated in chair (not performed today)  Hip flex                                                 2x10 B Seated in chair (not performed today)     Standing:   Mini Squats:                                            2x10  Steamboats:                                         2x10 YTB  Step ups:                                              2x12 L1     Cybex Leg Press:                                 4 plates; 3x10 DL       Home Exercises Provided and Patient Education Provided     Education provided:   - Importance of daily HEP    Written Home Exercises Provided: Patient instructed to cont prior HEP.  Exercises were reviewed and Vince was able to demonstrate them prior to the end of the session.  Vince demonstrated fair  understanding of the education provided.     See EMR under Patient Instructions for exercises provided 1/16/2020.      Assessment   A:  No complaints voiced throughout session.      Vince is progressing well towards his goals.   Pt prognosis is Fair.     Pt will continue to benefit from skilled outpatient physical therapy to address the deficits listed in the problem list box on initial evaluation, provide pt/family education and to maximize pt's level of independence in the home and community environment.     Pt's spiritual, cultural and educational needs considered and pt agreeable to plan of care and goals.    Anticipated barriers to physical therapy: Co-morbidities       Goals:     Short Term Goals (3 Weeks):  1. Pt will be compliant with HEP to supplement PT in restoring pain free function.  2. Pt will improve impaired LE MMTs to >/= 4/5 to improve dynamic hip support for functional tasks.  3. Pt will report pain at worse in hips to less " than or equal to 6/10 in order to improve ability to perform functional tasks.   4. Pt will participate in stair navigation with decreased pain, navigating greater than or equal to 4 stairs with UE support, to improve functional mobility.      Long Term Goals (6 Weeks):  1. Pt will improve FOTO score to </= TBD% limited to decrease perceived limitation with mobility.   2. Pt will improve impaired LE MMTs to >/= 4+/5 to improve dynamic hip support for functional tasks.  3. Pt will perform at least 12 sit to stands without UE support on 30 second sit to stand test in order to demo improved ability to perform transfers.  4. Pt to perform stair navigation with UE assistance and reciprocal pattern to promote safe functional mobility at home.  5. Pt will report no pain during lumbar ROM to promote functional mobility         Plan     Continue PT per POC, progress as tolerated.  Progress with abdominal bracing and core stabilization.    Xiang Conroy, PT

## 2020-01-30 ENCOUNTER — CLINICAL SUPPORT (OUTPATIENT)
Dept: REHABILITATION | Facility: HOSPITAL | Age: 85
End: 2020-01-30
Payer: MEDICARE

## 2020-01-30 DIAGNOSIS — M53.86 DECREASED ROM OF LUMBAR SPINE: ICD-10-CM

## 2020-01-30 DIAGNOSIS — R29.898 WEAKNESS OF BOTH LEGS: ICD-10-CM

## 2020-01-30 DIAGNOSIS — G89.29 CHRONIC BILATERAL LOW BACK PAIN WITHOUT SCIATICA: ICD-10-CM

## 2020-01-30 DIAGNOSIS — M54.50 CHRONIC BILATERAL LOW BACK PAIN WITHOUT SCIATICA: ICD-10-CM

## 2020-01-30 PROCEDURE — 97110 THERAPEUTIC EXERCISES: CPT | Mod: PN,CQ

## 2020-01-30 NOTE — PROGRESS NOTES
"  Physical Therapy Daily Treatment Note     Name: Vince Matrinez  Clinic Number: [id number]    Therapy Diagnosis:   Encounter Diagnoses   Name Primary?    Weakness of both legs     Decreased ROM of lumbar spine     Chronic bilateral low back pain without sciatica      Physician: Florecita Spencer PA-C  Visit Date: 1/30/2020    Physician Orders: PT Eval and Treat   Medical Diagnosis from Referral:   M70.61,M70.62 (ICD-10-CM) - Trochanteric bursitis of both hips   M76.30 (ICD-10-CM) - It band syndrome, unspecified laterality      Evaluation Date: 1/16/2020  Authorization Period Expiration: 3/1/2020  Plan of Care Expiration: 1/16/2020 to 3/6/2020  Visit # / Visits authorized: 3/12  FOTO: 3/5  Visit:  121.28  Total: 325.44    Time In: 1500  Time Out: 1600  Total Billable Time: 60 minutes (4 TE)  Precautions: History of recent Bypass surgery    Subjective     Pt reports: no new complaints. Pain occurs mostly at night when trying to sleep   He was partially compliant with home exercise program.  Response to previous treatment: some soreness  Functional change: Ongoing    Pain: 2/10  Location: bilateral hips      Objective     Vince received therapeutic exercises to develop strength, endurance, ROM, flexibility, posture and core stabilization for 60  minutes   Supine:   TA                                                       10x5" hold   Brace march                                      30 seconds x 2 trials  HS Stretch:                                         3x30", B (poor technique needs manual  Piriformis stretch                                3x30" B   LTR's:                                                 2 minutes w/red Swiss ball  Reverse crunches                              2 minutes w/red Swiss ball    Bridges                                               3x10 B, Red TB across hips  Hip ADD ball squeezes:                     2x10, 5" holds w/small yellow " "ball  SLR's                                                  2x10 B  Sidelying Hip ABD                               2x10   Sidelying Clams:                                 2x10,  Green TB     Seated:  Transverse abdominus:                      5"x10 (not performed today)  LAQ                                                     2x10 B Seated in chair (not performed today)  Hip flex                                                 2x10 B Seated in chair (not performed today)     Standing:   Mini Squats:                                          2x10  Steamboats:                                         2x10 YTB  Step ups:                                              2x12 L2      Cybex Leg Press:                                 6.0 plates; 3x10 DL       Home Exercises Provided and Patient Education Provided     Education provided:   - Importance of daily HEP. Recommend patinet stretch before bed to alleviate LE spasms at night    Written Home Exercises Provided: Patient instructed to cont prior HEP.  Exercises were reviewed and Vince was able to demonstrate them prior to the end of the session.  Vince demonstrated fair  understanding of the education provided.     See EMR under Patient Instructions for exercises provided 1/16/2020.      Assessment   A:  No complaints voiced throughout session.  Needs a lot of cueing and occasional manual assistance for effective stretch and exercise technique. Increased reps and resistance per chart.    Vince is progressing well towards his goals.   Pt prognosis is Fair.     Pt will continue to benefit from skilled outpatient physical therapy to address the deficits listed in the problem list box on initial evaluation, provide pt/family education and to maximize pt's level of independence in the home and community environment.     Pt's spiritual, cultural and educational needs considered and pt agreeable to plan of care and goals.    Anticipated barriers to physical therapy: " Co-morbidities       Goals:     Short Term Goals (3 Weeks):  1. Pt will be compliant with HEP to supplement PT in restoring pain free function.  2. Pt will improve impaired LE MMTs to >/= 4/5 to improve dynamic hip support for functional tasks.  3. Pt will report pain at worse in hips to less than or equal to 6/10 in order to improve ability to perform functional tasks.   4. Pt will participate in stair navigation with decreased pain, navigating greater than or equal to 4 stairs with UE support, to improve functional mobility.      Long Term Goals (6 Weeks):  1. Pt will improve FOTO score to </= TBD% limited to decrease perceived limitation with mobility.   2. Pt will improve impaired LE MMTs to >/= 4+/5 to improve dynamic hip support for functional tasks.  3. Pt will perform at least 12 sit to stands without UE support on 30 second sit to stand test in order to demo improved ability to perform transfers.  4. Pt to perform stair navigation with UE assistance and reciprocal pattern to promote safe functional mobility at home.  5. Pt will report no pain during lumbar ROM to promote functional mobility         Plan     Continue PT per POC, progress as tolerated.  Progress with abdominal bracing and core stabilization.    Donavon Bello, PTA

## 2020-02-03 ENCOUNTER — TELEPHONE (OUTPATIENT)
Dept: PAIN MEDICINE | Facility: CLINIC | Age: 85
End: 2020-02-03

## 2020-02-03 ENCOUNTER — OFFICE VISIT (OUTPATIENT)
Dept: PAIN MEDICINE | Facility: CLINIC | Age: 85
End: 2020-02-03
Payer: MEDICARE

## 2020-02-03 VITALS
SYSTOLIC BLOOD PRESSURE: 166 MMHG | HEART RATE: 62 BPM | DIASTOLIC BLOOD PRESSURE: 60 MMHG | WEIGHT: 154.13 LBS | BODY MASS INDEX: 25.64 KG/M2

## 2020-02-03 DIAGNOSIS — M43.10 RETROLISTHESIS OF VERTEBRAE: ICD-10-CM

## 2020-02-03 DIAGNOSIS — M70.61 GREATER TROCHANTERIC BURSITIS OF BOTH HIPS: Primary | ICD-10-CM

## 2020-02-03 DIAGNOSIS — M70.62 GREATER TROCHANTERIC BURSITIS OF BOTH HIPS: Primary | ICD-10-CM

## 2020-02-03 DIAGNOSIS — M54.50 CHRONIC BILATERAL LOW BACK PAIN WITHOUT SCIATICA: ICD-10-CM

## 2020-02-03 DIAGNOSIS — G89.29 CHRONIC BILATERAL LOW BACK PAIN WITHOUT SCIATICA: ICD-10-CM

## 2020-02-03 DIAGNOSIS — M47.816 LUMBAR SPONDYLOSIS: ICD-10-CM

## 2020-02-03 PROCEDURE — 1125F AMNT PAIN NOTED PAIN PRSNT: CPT | Mod: S$GLB,,, | Performed by: PAIN MEDICINE

## 2020-02-03 PROCEDURE — 99999 PR PBB SHADOW E&M-EST. PATIENT-LVL III: CPT | Mod: PBBFAC,,, | Performed by: PAIN MEDICINE

## 2020-02-03 PROCEDURE — 99204 PR OFFICE/OUTPT VISIT, NEW, LEVL IV, 45-59 MIN: ICD-10-PCS | Mod: S$GLB,,, | Performed by: PAIN MEDICINE

## 2020-02-03 PROCEDURE — 1101F PR PT FALLS ASSESS DOC 0-1 FALLS W/OUT INJ PAST YR: ICD-10-PCS | Mod: CPTII,S$GLB,, | Performed by: PAIN MEDICINE

## 2020-02-03 PROCEDURE — 1159F MED LIST DOCD IN RCRD: CPT | Mod: S$GLB,,, | Performed by: PAIN MEDICINE

## 2020-02-03 PROCEDURE — 1125F PR PAIN SEVERITY QUANTIFIED, PAIN PRESENT: ICD-10-PCS | Mod: S$GLB,,, | Performed by: PAIN MEDICINE

## 2020-02-03 PROCEDURE — 99999 PR PBB SHADOW E&M-EST. PATIENT-LVL III: ICD-10-PCS | Mod: PBBFAC,,, | Performed by: PAIN MEDICINE

## 2020-02-03 PROCEDURE — 99204 OFFICE O/P NEW MOD 45 MIN: CPT | Mod: S$GLB,,, | Performed by: PAIN MEDICINE

## 2020-02-03 PROCEDURE — 1101F PT FALLS ASSESS-DOCD LE1/YR: CPT | Mod: CPTII,S$GLB,, | Performed by: PAIN MEDICINE

## 2020-02-03 PROCEDURE — 1159F PR MEDICATION LIST DOCUMENTED IN MEDICAL RECORD: ICD-10-PCS | Mod: S$GLB,,, | Performed by: PAIN MEDICINE

## 2020-02-03 NOTE — TELEPHONE ENCOUNTER
Pt scheduled for 2/13/20 at 0945 for Bilateral GTB. Pt aware to check in at registration desk on the first floor of the hospital for 0845 am. Pt denied taking blood thinners and is not diabetic.

## 2020-02-03 NOTE — LETTER
February 3, 2020      Randy Riggs MD  [address]  [address]  [facility] [address]           [facility]  [address]  [facility] [address]  Phone: [phone]          Patient: Vince Martinez   MR Number: [id number]   YOB: 1930   Date of Visit: 2/3/2020       Dear Dr. Randy Riggs:    Thank you for referring Vince Martinez to me for evaluation. Attached you will find relevant portions of my assessment and plan of care.    If you have questions, please do not hesitate to call me. I look forward to following Vince Martinez along with you.    Sincerely,    Angelica Norris Jr., MD    Enclosure  CC:  No Recipients    If you would like to receive this communication electronically, please contact [email or web address] or [phone] to request more information on [facility] access.    For providers and/or their staff who would like to refer a patient to [facility], please contact us through our one-stop-shop provider referral line, [facility], at [phone].    If you feel you have received this communication in error or would no longer like to receive these types of communications, please e-mail [email or web address]

## 2020-02-03 NOTE — PROGRESS NOTES
[facility] New Patient Evaluation    Referred by: Randy Riggs MD  Reason for referral:  Lumbar degenerative disc disease and      Spinal stenosis of lumbar region without neurogenic claudication    CC:   Chief Complaint   Patient presents with    Low-back Pain    Shoulder Pain     bilateral     Last 3 PDI Scores 2/3/2020   Pain Disability Index (PDI) 14       HPI:   Vince Martinez is a 89 y.o. male who presents today with multiple complaints.  His chief complaint is bilateral hip pain, which was diagnosed as greater trochanteric bursitis, bilaterally, by my colleagues in Orthopedic surgery.  He received a bilateral GTB injection which provided him relief for approximately 2 weeks.  He has been in physical therapy, but denies performing home exercises because of increasing pain.  Hip pain radiates down the lateral thigh stopping at the lateral aspect of the knee, bilaterally.  His secondary complaint is chronic low back pain, which is nonradiating.  Pain is felt more with certain positions of the low back, specifically, extension.  Pain is partially alleviated by lumbar flexion.  He has a similar complaint of chronic neck pain exacerbated by extension, and improved by flexion.    Location: Lumbar   Onset: six months ago  Current Pain Score: 2/10  Daily Pain of Range: 2-9/10  Quality: Burning and Deep  Radiation: Down both legs to knees  Worsened by: exercise, lying down and walking for more than 10 minutes  Improved by: physical therapy    Previous Therapies:  PT/OT: yes. Last visit on 1/31/20. Patient states he goes 2x a week.   HEP: Yes   Interventions:   Surgery:Yes 30 years ago. Patient not sure what surgery was done on his back for herniated discs.   Medications:   - NSAIDS:   - MSK Relaxants:   - TCAs:   - SNRIs:   - Topicals:   - Anticonvulsants:  - Opioids:     Current Pain Medications:  1. Tylenol PM      Review of Systems:  Review of Systems   Constitutional: Negative for chills and fever.    HENT: Negative for nosebleeds.    Eyes: Negative for blurred vision.   Respiratory: Positive for shortness of breath. Negative for hemoptysis.    Cardiovascular: Negative for chest pain, palpitations, orthopnea and claudication.   Gastrointestinal: Negative for abdominal pain, diarrhea, heartburn and vomiting.   Genitourinary: Negative for hematuria.   Musculoskeletal: Positive for back pain, joint pain, myalgias and neck pain.   Skin: Negative for rash.   Neurological: Positive for dizziness. Negative for tingling, seizures and loss of consciousness.   Endo/Heme/Allergies: Does not bruise/bleed easily.   Psychiatric/Behavioral: Negative for depression, hallucinations and memory loss. The patient has insomnia. The patient is not nervous/anxious.        History:    Current Outpatient Medications:     azelastine (ASTELIN) 137 mcg (0.1 %) nasal spray, USE 1 SPRAY(S) IN EACH NOSTRIL THREE TIMES DAILY AS NEEDED FOR 30 DAYS, Disp: , Rfl: 5    betamethasone dipropionate (DIPROLENE) 0.05 % lotion, , Disp: , Rfl:     finasteride (PROSCAR) 5 mg tablet, Take 5 mg by mouth once daily. , Disp: , Rfl:     furosemide (LASIX) 40 MG tablet, Take 1 tablet (40 mg total) by mouth once daily., Disp: 90 tablet, Rfl: 3    glimepiride (AMARYL) 2 MG tablet, Take 2 mg by mouth 3 (three) times daily. , Disp: , Rfl: 1    glucosamine-chondroitin 500-400 mg tablet, Take 1 tablet by mouth 3 (three) times daily., Disp: , Rfl:     ipratropium (ATROVENT) 42 mcg (0.06 %) nasal spray, 2 sprays in each nostril 4 times a day., Disp: 90 mL, Rfl: 3    irbesartan (AVAPRO) 300 MG tablet, Take two tablet daily., Disp: 180 tablet, Rfl: 3    ketoconazole (NIZORAL) 2 % shampoo, , Disp: , Rfl:     levothyroxine (SYNTHROID) 100 MCG tablet, , Disp: , Rfl:     loperamide (IMODIUM A-D) 2 mg Tab, Take 2 mg by mouth 4 (four) times daily as needed., Disp: , Rfl:     melatonin 10 mg Cap, Take by mouth., Disp: , Rfl:     metFORMIN (GLUCOPHAGE-XR) 500 MG 24  hr tablet, , Disp: , Rfl:     mometasone 0.1% (ELOCON) 0.1 % cream, , Disp: , Rfl:     montelukast (SINGULAIR) 10 mg tablet, Take 1 tablet (10 mg total) by mouth once daily., Disp: 30 tablet, Rfl: 0    neomycin-polymyxin-dexamethasone (DEXACINE) 3.5 mg/g-10,000 unit/g-0.1 % Oint, , Disp: , Rfl:     omeprazole (PRILOSEC) 20 MG capsule, Take 20 mg by mouth once daily., Disp: , Rfl:     simvastatin (ZOCOR) 20 MG tablet, , Disp: , Rfl:     triamcinolone acetonide 0.1% (KENALOG) 0.1 % cream, Apply topically 2 (two) times daily., Disp: , Rfl:     diclofenac sodium 1 % Gel, Apply 2 g topically once daily., Disp: , Rfl:     fluticasone propionate (FLONASE) 50 mcg/actuation nasal spray, 2 sprays (100 mcg total) by Each Nostril route once daily. (Patient not taking: Reported on 2/3/2020), Disp: 16 g, Rfl: 11    hydrocortisone 2.5 % cream, APPLY CREAM TO AFFECTED AREA AND BODY ONCE DAILY AS NEEDED., Disp: , Rfl: 0    tamsulosin (FLOMAX) 0.4 mg Cp24, 0.4 mg once daily. 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    Social History     Socioeconomic History    Marital status:      Spouse name: Not on file    Number of children: Not on file    Years of education: Not on file    Highest education level: Not on file   Occupational History    Not on file   Social Needs    Financial resource strain: Not on file    Food insecurity:     Worry: Not on file     Inability: Not on file    Transportation needs:     Medical: Not on file     Non-medical: Not on file   Tobacco Use    Smoking status: Never Smoker    Smokeless tobacco: Never Used    Substance and Sexual Activity    Alcohol use: No    Drug use: No    Sexual activity: Not on file   Lifestyle    Physical activity:     Days per week: Not on file     Minutes per session: Not on file    Stress: Not on file   Relationships    Social connections:     Talks on phone: Not on file     Gets together: Not on file     Attends Mosque service: Not on file     Active member of club or organization: Not on file     Attends meetings of clubs or organizations: Not on file     Relationship status: Not on file   Other Topics Concern    Not on file   Social History Narrative    Not on file       Review of patient's allergies indicates:   Allergen Reactions    Bactrim [sulfamethoxazole-trimethoprim] Rash    Ciprofloxacin Rash    Latex Rash       Physical Exam:  Vitals:    02/03/20 1325   BP: (!) 166/60   Pulse: 62   Weight: 69.9 kg (154 lb 1.6 oz)   PainSc:   2     General    Nursing note and vitals reviewed.  Constitutional: He is oriented to person, place, and time. He appears well-developed and well-nourished. No distress.   HENT:   Head: Normocephalic and atraumatic.   Nose: Nose normal.   Eyes: Conjunctivae and EOM are normal. Pupils are equal, round, and reactive to light. Right eye exhibits no discharge. Left eye exhibits no discharge. No scleral icterus.   Neck: No JVD present.   Cardiovascular: Intact distal pulses.    Pulmonary/Chest: Effort normal. No respiratory distress.   Abdominal: He exhibits no distension.   Neurological: He is alert and oriented to person, place, and time. Coordination normal.   Psychiatric: He has a normal mood and affect. His behavior is normal. Judgment and thought content normal.     General Musculoskeletal Exam   Gait: normal         Right Hip Exam     Tenderness   The patient tender to palpation of the trochanteric bursa.  Left Hip Exam     Tenderness   The patient tender to palpation of the trochanteric bursa.      Back (L-Spine & T-Spine) / Neck (C-Spine) Exam      Tenderness Posterior midline palpation reveals tenderness of the Upper C-Spine and Lower C-Spine. Right paramedian tenderness of the Lower L-Spine. Left paramedian tenderness of the Lower L-Spine.     Back (L-Spine & T-Spine) Range of Motion   Extension: abnormal Back extension: facet loading is positive and exacerabtes/reproduces the patient's typical low back pain    Flexion: abnormal Back flexion: limited ROM but partial relief of low back pain noted.     Neck (C-Spine) Range of Motion   Flexion:     Limited  Extension: Limited  Right Lateral Bend: abnormal  Left Lateral Bend: abnormal  Right Rotation: abnormal  Left Rotation: abnormal    Spinal Sensation   Right Side Sensation  C-Spine Level: normal   L-Spine Level: normal  Left Side Sensation  C-Spine Level: normal  L-Spine Level: normal    Neck (C-Spine) Tests   Spurling's Test   Left:  Negative  Right: negative    Other He has no scoliosis .      Muscle Strength   Right Upper Extremity   Biceps: 5/5/5   Deltoid:  5/5  Triceps:  5/5  Wrist extension: 5/5/5   Wrist flexion: 5/5/5   Finger Flexors:  5/5  Finger Extensors:  5/5  Left Upper Extremity  Biceps: 5/5/5   Deltoid:  5/5  Triceps:  5/5  Wrist extension: 5/5/5   Wrist flexion: 5/5/5   Finger Flexors:  5/5  Finger Extensors:  5/5  Right Lower Extremity   Hip Flexion: 5/5   Hip Extensors: 5/5  Quadriceps:  5/5   Hamstrin/5   Gastrocsoleus:  5/5/5  Left Lower Extremity   Hip Flexion: 5/5   Hip Extensors: 5/5  Quadriceps:  5/5   Hamstrin/5   Gastrocsoleus:  5/5/5    Reflexes     Left Side  Biceps:  2+  Quadriceps:  2+  Achilles:  2+    Right Side   Biceps:  2+  Quadriceps:  2+  Achilles:  2+      Imaging:  CT LUMBAR SPINE WITHOUT CONTRAST    CLINICAL HISTORY:  Spinal stenosis, lumbar;  Spinal stenosis, lumbar region without neurogenic claudication    TECHNIQUE:  Low-dose axial, sagittal and coronal reformations are obtained through the lumbar spine.  Contrast was not  administered.    COMPARISON:  None available    FINDINGS:  Lumbar vertebral body heights are maintained.  There is mild grade 1 retrolisthesis of L3 on L4.  Multilevel discogenic endplate change with disc height loss, most pronounced at L4-L5.  Scattered lumbar intervertebral disc gas phenomenon.  Limited evaluation of the central canal with suspected mild central canal stenosis at L2-L3 and L4-L5 and mild moderate stenosis at L3-L4.  Partially fused SI joints. There is multilevel facet arthropathy with variable degree of neural foraminal narrowing ranging from mild to severe, most pronounced on the right at L5-S1.    Limited evaluation of intra-abdominal structures demonstrates abdominal aortic atherosclerosis and left lower pole renal cyst.       Labs:  BMP  Lab Results   Component Value Date     11/20/2018    K 4.4 11/20/2018     11/20/2018    CO2 29 11/20/2018    BUN 23 11/20/2018    CREATININE 1.3 11/20/2018    CALCIUM 9.5 11/20/2018    ANIONGAP 8 11/20/2018    ESTGFRAFRICA 56 (A) 11/20/2018    EGFRNONAA 49 (A) 11/20/2018     Lab Results   Component Value Date    ALT 25 11/20/2018    AST 32 11/20/2018    ALKPHOS 75 11/20/2018    BILITOT 0.5 11/20/2018       Assessment:  Problem List Items Addressed This Visit     Chronic bilateral low back pain without sciatica    Greater trochanteric bursitis of both hips - Primary    Lumbar spondylosis    Retrolisthesis of vertebrae          2/3/2020 - Vince Martinez is a 89 y.o. male with chronic bilateral hip pain secondary to greater trochanteric bursitis and chronic axial spine pain related to facet arthropathy.  There is likely a contribution from retrolisthesis of L3 on L4 with regards to the low back pain. Patient is most concerned about the bilateral hip pain. I educated him on the appropriateness of repeating the bilateral GTB injection, which should not be the primary focus of his treatment.  The bilateral GTB injection can reduce the symptoms of  bursitis temporarily; however, long-term treatment of this condition requires physical therapy and home exercise.  Patient has been compliant with physical therapy but noncompliant with recommendations for home exercise and I tried to encourage him to perform his exercises daily.  He appears to understand.    : Reviewed and consistent with medication use as prescribed.    Treatment Plan:   Procedures:  Schedule for bilateral GTB injection  PT/OT/HEP:  Continue physical therapy as scheduled and improve compliance with home exercise program  Medications: No changes recommended at this time.  Labs: reviewed and medications are appropriately dosed for current hepatorenal function.  Imaging: No additional recommended at this time.    Follow Up: RTC 3-4 weeks    Angelica Norris Jr, MD  Interventional Pain Medicine / Anesthesiology    Disclaimer: This note was partly generated using dictation software which may occasionally result in transcription errors.

## 2020-02-03 NOTE — H&P (VIEW-ONLY)
[facility] New Patient Evaluation    Referred by: Randy Riggs MD  Reason for referral:  Lumbar degenerative disc disease and      Spinal stenosis of lumbar region without neurogenic claudication    CC:   Chief Complaint   Patient presents with    Low-back Pain    Shoulder Pain     bilateral     Last 3 PDI Scores 2/3/2020   Pain Disability Index (PDI) 14       HPI:   Vince Martinez is a 89 y.o. male who presents today with multiple complaints.  His chief complaint is bilateral hip pain, which was diagnosed as greater trochanteric bursitis, bilaterally, by my colleagues in Orthopedic surgery.  He received a bilateral GTB injection which provided him relief for approximately 2 weeks.  He has been in physical therapy, but denies performing home exercises because of increasing pain.  Hip pain radiates down the lateral thigh stopping at the lateral aspect of the knee, bilaterally.  His secondary complaint is chronic low back pain, which is nonradiating.  Pain is felt more with certain positions of the low back, specifically, extension.  Pain is partially alleviated by lumbar flexion.  He has a similar complaint of chronic neck pain exacerbated by extension, and improved by flexion.    Location: Lumbar   Onset: six months ago  Current Pain Score: 2/10  Daily Pain of Range: 2-9/10  Quality: Burning and Deep  Radiation: Down both legs to knees  Worsened by: exercise, lying down and walking for more than 10 minutes  Improved by: physical therapy    Previous Therapies:  PT/OT: yes. Last visit on 1/31/20. Patient states he goes 2x a week.   HEP: Yes   Interventions:   Surgery:Yes 30 years ago. Patient not sure what surgery was done on his back for herniated discs.   Medications:   - NSAIDS:   - MSK Relaxants:   - TCAs:   - SNRIs:   - Topicals:   - Anticonvulsants:  - Opioids:     Current Pain Medications:  1. Tylenol PM      Review of Systems:  Review of Systems   Constitutional: Negative for chills and fever.    HENT: Negative for nosebleeds.    Eyes: Negative for blurred vision.   Respiratory: Positive for shortness of breath. Negative for hemoptysis.    Cardiovascular: Negative for chest pain, palpitations, orthopnea and claudication.   Gastrointestinal: Negative for abdominal pain, diarrhea, heartburn and vomiting.   Genitourinary: Negative for hematuria.   Musculoskeletal: Positive for back pain, joint pain, myalgias and neck pain.   Skin: Negative for rash.   Neurological: Positive for dizziness. Negative for tingling, seizures and loss of consciousness.   Endo/Heme/Allergies: Does not bruise/bleed easily.   Psychiatric/Behavioral: Negative for depression, hallucinations and memory loss. The patient has insomnia. The patient is not nervous/anxious.        History:    Current Outpatient Medications:     azelastine (ASTELIN) 137 mcg (0.1 %) nasal spray, USE 1 SPRAY(S) IN EACH NOSTRIL THREE TIMES DAILY AS NEEDED FOR 30 DAYS, Disp: , Rfl: 5    betamethasone dipropionate (DIPROLENE) 0.05 % lotion, , Disp: , Rfl:     finasteride (PROSCAR) 5 mg tablet, Take 5 mg by mouth once daily. , Disp: , Rfl:     furosemide (LASIX) 40 MG tablet, Take 1 tablet (40 mg total) by mouth once daily., Disp: 90 tablet, Rfl: 3    glimepiride (AMARYL) 2 MG tablet, Take 2 mg by mouth 3 (three) times daily. , Disp: , Rfl: 1    glucosamine-chondroitin 500-400 mg tablet, Take 1 tablet by mouth 3 (three) times daily., Disp: , Rfl:     ipratropium (ATROVENT) 42 mcg (0.06 %) nasal spray, 2 sprays in each nostril 4 times a day., Disp: 90 mL, Rfl: 3    irbesartan (AVAPRO) 300 MG tablet, Take two tablet daily., Disp: 180 tablet, Rfl: 3    ketoconazole (NIZORAL) 2 % shampoo, , Disp: , Rfl:     levothyroxine (SYNTHROID) 100 MCG tablet, , Disp: , Rfl:     loperamide (IMODIUM A-D) 2 mg Tab, Take 2 mg by mouth 4 (four) times daily as needed., Disp: , Rfl:     melatonin 10 mg Cap, Take by mouth., Disp: , Rfl:     metFORMIN (GLUCOPHAGE-XR) 500 MG 24  hr tablet, , Disp: , Rfl:     mometasone 0.1% (ELOCON) 0.1 % cream, , Disp: , Rfl:     montelukast (SINGULAIR) 10 mg tablet, Take 1 tablet (10 mg total) by mouth once daily., Disp: 30 tablet, Rfl: 0    neomycin-polymyxin-dexamethasone (DEXACINE) 3.5 mg/g-10,000 unit/g-0.1 % Oint, , Disp: , Rfl:     omeprazole (PRILOSEC) 20 MG capsule, Take 20 mg by mouth once daily., Disp: , Rfl:     simvastatin (ZOCOR) 20 MG tablet, , Disp: , Rfl:     triamcinolone acetonide 0.1% (KENALOG) 0.1 % cream, Apply topically 2 (two) times daily., Disp: , Rfl:     diclofenac sodium 1 % Gel, Apply 2 g topically once daily., Disp: , Rfl:     fluticasone propionate (FLONASE) 50 mcg/actuation nasal spray, 2 sprays (100 mcg total) by Each Nostril route once daily. (Patient not taking: Reported on 2/3/2020), Disp: 16 g, Rfl: 11    hydrocortisone 2.5 % cream, APPLY CREAM TO AFFECTED AREA AND BODY ONCE DAILY AS NEEDED., Disp: , Rfl: 0    tamsulosin (FLOMAX) 0.4 mg Cp24, 0.4 mg once daily. 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    Social History     Socioeconomic History    Marital status:      Spouse name: Not on file    Number of children: Not on file    Years of education: Not on file    Highest education level: Not on file   Occupational History    Not on file   Social Needs    Financial resource strain: Not on file    Food insecurity:     Worry: Not on file     Inability: Not on file    Transportation needs:     Medical: Not on file     Non-medical: Not on file   Tobacco Use    Smoking status: Never Smoker    Smokeless tobacco: Never Used    Substance and Sexual Activity    Alcohol use: No    Drug use: No    Sexual activity: Not on file   Lifestyle    Physical activity:     Days per week: Not on file     Minutes per session: Not on file    Stress: Not on file   Relationships    Social connections:     Talks on phone: Not on file     Gets together: Not on file     Attends Hindu service: Not on file     Active member of club or organization: Not on file     Attends meetings of clubs or organizations: Not on file     Relationship status: Not on file   Other Topics Concern    Not on file   Social History Narrative    Not on file       Review of patient's allergies indicates:   Allergen Reactions    Bactrim [sulfamethoxazole-trimethoprim] Rash    Ciprofloxacin Rash    Latex Rash       Physical Exam:  Vitals:    02/03/20 1325   BP: (!) 166/60   Pulse: 62   Weight: 69.9 kg (154 lb 1.6 oz)   PainSc:   2     General    Nursing note and vitals reviewed.  Constitutional: He is oriented to person, place, and time. He appears well-developed and well-nourished. No distress.   HENT:   Head: Normocephalic and atraumatic.   Nose: Nose normal.   Eyes: Conjunctivae and EOM are normal. Pupils are equal, round, and reactive to light. Right eye exhibits no discharge. Left eye exhibits no discharge. No scleral icterus.   Neck: No JVD present.   Cardiovascular: Intact distal pulses.    Pulmonary/Chest: Effort normal. No respiratory distress.   Abdominal: He exhibits no distension.   Neurological: He is alert and oriented to person, place, and time. Coordination normal.   Psychiatric: He has a normal mood and affect. His behavior is normal. Judgment and thought content normal.     General Musculoskeletal Exam   Gait: normal         Right Hip Exam     Tenderness   The patient tender to palpation of the trochanteric bursa.  Left Hip Exam     Tenderness   The patient tender to palpation of the trochanteric bursa.      Back (L-Spine & T-Spine) / Neck (C-Spine) Exam      Tenderness Posterior midline palpation reveals tenderness of the Upper C-Spine and Lower C-Spine. Right paramedian tenderness of the Lower L-Spine. Left paramedian tenderness of the Lower L-Spine.     Back (L-Spine & T-Spine) Range of Motion   Extension: abnormal Back extension: facet loading is positive and exacerabtes/reproduces the patient's typical low back pain    Flexion: abnormal Back flexion: limited ROM but partial relief of low back pain noted.     Neck (C-Spine) Range of Motion   Flexion:     Limited  Extension: Limited  Right Lateral Bend: abnormal  Left Lateral Bend: abnormal  Right Rotation: abnormal  Left Rotation: abnormal    Spinal Sensation   Right Side Sensation  C-Spine Level: normal   L-Spine Level: normal  Left Side Sensation  C-Spine Level: normal  L-Spine Level: normal    Neck (C-Spine) Tests   Spurling's Test   Left:  Negative  Right: negative    Other He has no scoliosis .      Muscle Strength   Right Upper Extremity   Biceps: 5/5/5   Deltoid:  5/5  Triceps:  5/5  Wrist extension: 5/5/5   Wrist flexion: 5/5/5   Finger Flexors:  5/5  Finger Extensors:  5/5  Left Upper Extremity  Biceps: 5/5/5   Deltoid:  5/5  Triceps:  5/5  Wrist extension: 5/5/5   Wrist flexion: 5/5/5   Finger Flexors:  5/5  Finger Extensors:  5/5  Right Lower Extremity   Hip Flexion: 5/5   Hip Extensors: 5/5  Quadriceps:  5/5   Hamstrin/5   Gastrocsoleus:  5/5/5  Left Lower Extremity   Hip Flexion: 5/5   Hip Extensors: 5/5  Quadriceps:  5/5   Hamstrin/5   Gastrocsoleus:  5/5/5    Reflexes     Left Side  Biceps:  2+  Quadriceps:  2+  Achilles:  2+    Right Side   Biceps:  2+  Quadriceps:  2+  Achilles:  2+      Imaging:  CT LUMBAR SPINE WITHOUT CONTRAST    CLINICAL HISTORY:  Spinal stenosis, lumbar;  Spinal stenosis, lumbar region without neurogenic claudication    TECHNIQUE:  Low-dose axial, sagittal and coronal reformations are obtained through the lumbar spine.  Contrast was not  administered.    COMPARISON:  None available    FINDINGS:  Lumbar vertebral body heights are maintained.  There is mild grade 1 retrolisthesis of L3 on L4.  Multilevel discogenic endplate change with disc height loss, most pronounced at L4-L5.  Scattered lumbar intervertebral disc gas phenomenon.  Limited evaluation of the central canal with suspected mild central canal stenosis at L2-L3 and L4-L5 and mild moderate stenosis at L3-L4.  Partially fused SI joints. There is multilevel facet arthropathy with variable degree of neural foraminal narrowing ranging from mild to severe, most pronounced on the right at L5-S1.    Limited evaluation of intra-abdominal structures demonstrates abdominal aortic atherosclerosis and left lower pole renal cyst.       Labs:  BMP  Lab Results   Component Value Date     11/20/2018    K 4.4 11/20/2018     11/20/2018    CO2 29 11/20/2018    BUN 23 11/20/2018    CREATININE 1.3 11/20/2018    CALCIUM 9.5 11/20/2018    ANIONGAP 8 11/20/2018    ESTGFRAFRICA 56 (A) 11/20/2018    EGFRNONAA 49 (A) 11/20/2018     Lab Results   Component Value Date    ALT 25 11/20/2018    AST 32 11/20/2018    ALKPHOS 75 11/20/2018    BILITOT 0.5 11/20/2018       Assessment:  Problem List Items Addressed This Visit     Chronic bilateral low back pain without sciatica    Greater trochanteric bursitis of both hips - Primary    Lumbar spondylosis    Retrolisthesis of vertebrae          2/3/2020 - Vince Martinez is a 89 y.o. male with chronic bilateral hip pain secondary to greater trochanteric bursitis and chronic axial spine pain related to facet arthropathy.  There is likely a contribution from retrolisthesis of L3 on L4 with regards to the low back pain. Patient is most concerned about the bilateral hip pain. I educated him on the appropriateness of repeating the bilateral GTB injection, which should not be the primary focus of his treatment.  The bilateral GTB injection can reduce the symptoms of  bursitis temporarily; however, long-term treatment of this condition requires physical therapy and home exercise.  Patient has been compliant with physical therapy but noncompliant with recommendations for home exercise and I tried to encourage him to perform his exercises daily.  He appears to understand.    : Reviewed and consistent with medication use as prescribed.    Treatment Plan:   Procedures:  Schedule for bilateral GTB injection  PT/OT/HEP:  Continue physical therapy as scheduled and improve compliance with home exercise program  Medications: No changes recommended at this time.  Labs: reviewed and medications are appropriately dosed for current hepatorenal function.  Imaging: No additional recommended at this time.    Follow Up: RTC 3-4 weeks    Angelica Norris Jr, MD  Interventional Pain Medicine / Anesthesiology    Disclaimer: This note was partly generated using dictation software which may occasionally result in transcription errors.

## 2020-02-05 NOTE — PROGRESS NOTES
"  Physical Therapy Daily Treatment Note     Name: Vince Martinez  Clinic Number: [id number]    Therapy Diagnosis:   Encounter Diagnoses   Name Primary?    Weakness of both legs     Decreased ROM of lumbar spine     Chronic bilateral low back pain without sciatica      Physician: Florecita Spencer PA-C  Visit Date: 2/6/2020    Physician Orders: PT Eval and Treat   Medical Diagnosis from Referral:   M70.61,M70.62 (ICD-10-CM) - Trochanteric bursitis of both hips   M76.30 (ICD-10-CM) - It band syndrome, unspecified laterality      Evaluation Date: 1/16/2020  Authorization Period Expiration: 3/1/2020  Plan of Care Expiration: 1/16/2020 to 3/6/2020  Visit # / Visits authorized: 4/12  FOTO: 4/5  Visit:  60.64  Total: 386.08    Time In: 4:05 PM  Time Out: 5:05 PM  Total Billable Time: 35 minutes (2 TE)  Precautions: History of recent Bypass surgery    Subjective     Pt reports: that he's a little sore from navigating a lot of steps at the hospital.   He was partially compliant with home exercise program.  Response to previous treatment: no adverse reactions  Functional change: Ongoing    Pain: 4/10  Location: bilateral hips      Objective     Vince received the following manual therapy techniques: STM were applied to the: bilateral hips and IT Bands for 10 minutes, including:  FOAM roller to bilateral hip joints in IT Bands  Manual HS Stretch - not today    Vince received therapeutic exercises to develop strength, endurance, ROM, flexibility, posture and core stabilization for 50 minutes   Supine:   TA                                                       15x5" hold   Brace march                                      1 minute x 2 trials  Piriformis stretch                                3x30" B   LTR's:                                                 2 minutes w/red Swiss ball  Reverse crunches                              2 minutes w/red Swiss ball    Bridges                                               3x10 B, Red TB " "across hips  Hip ADD ball squeezes:                     3x10, 5" holds w/small yellow ball  SLR's                                                  2x10 B  Sidelying Hip ABD                               2x10   Sidelying Clams:                                 2x10,  Green TB     Seated:  Transverse abdominus:                      5"x10  LAQ                                                     2x10 B Seated in chair, 1.5#  Marching                                              2x10 B Seated in chair 1.5#     Standing:   Mini Squats:                                          2x10, 1.5#  Steamboats:                                         2x10 1.5#  Step ups:                                              2x12 L2, 1.5#     Cybex Leg Press:                                 6.0 plates; 3x10 DL - OOT       Home Exercises Provided and Patient Education Provided     Education provided:   - Importance of daily HEP. Recommend patinet stretch before bed to alleviate LE spasms at night    Written Home Exercises Provided: Patient instructed to cont prior HEP.  Exercises were reviewed and Vince was able to demonstrate them prior to the end of the session.  Vince demonstrated fair  understanding of the education provided.     See EMR under Patient Instructions for exercises provided 1/16/2020.      Assessment   Presented to therapy with increased complaints of pain and soreness from performance of stairs and increased walking at hospital. Tolerated increases in resistance by wearing 1.5# ankle weights. Some verbal cueing required for correct body mechanics of supine exercises.     Vince is progressing well towards his goals.   Pt prognosis is Fair.     Pt will continue to benefit from skilled outpatient physical therapy to address the deficits listed in the problem list box on initial evaluation, provide pt/family education and to maximize pt's level of independence in the home and community environment.     Pt's spiritual, cultural and " educational needs considered and pt agreeable to plan of care and goals.    Anticipated barriers to physical therapy: Co-morbidities       Goals:     Short Term Goals (3 Weeks):  1. Pt will be compliant with HEP to supplement PT in restoring pain free function.Progressing, Not Met  2. Pt will improve impaired LE MMTs to >/= 4/5 to improve dynamic hip support for functional tasks.Progressing, Not Met  3. Pt will report pain at worse in hips to less than or equal to 6/10 in order to improve ability to perform functional tasks. Progressing, Not Met  4. Pt will participate in stair navigation with decreased pain, navigating greater than or equal to 4 stairs with UE support, to improve functional mobility. Progressing, Not Met     Long Term Goals (6 Weeks):  1. Pt will improve FOTO score to </= TBD% limited to decrease perceived limitation with mobility. Progressing, Not Met  2. Pt will improve impaired LE MMTs to >/= 4+/5 to improve dynamic hip support for functional tasks.Progressing, Not Met  3. Pt will perform at least 12 sit to stands without UE support on 30 second sit to stand test in order to demo improved ability to perform transfers.Progressing, Not Met  4. Pt to perform stair navigation with UE assistance and reciprocal pattern to promote safe functional mobility at home.Progressing, Not Met  5. Pt will report no pain during lumbar ROM to promote functional mobility. Progressing, Not Met         Plan     Continue PT per POC, progress as tolerated.  Progress with abdominal bracing and core stabilization.    Radha Martinez, PT

## 2020-02-06 ENCOUNTER — CLINICAL SUPPORT (OUTPATIENT)
Dept: REHABILITATION | Facility: HOSPITAL | Age: 85
End: 2020-02-06
Payer: MEDICARE

## 2020-02-06 DIAGNOSIS — M54.50 CHRONIC BILATERAL LOW BACK PAIN WITHOUT SCIATICA: ICD-10-CM

## 2020-02-06 DIAGNOSIS — M53.86 DECREASED ROM OF LUMBAR SPINE: ICD-10-CM

## 2020-02-06 DIAGNOSIS — G89.29 CHRONIC BILATERAL LOW BACK PAIN WITHOUT SCIATICA: ICD-10-CM

## 2020-02-06 DIAGNOSIS — R29.898 WEAKNESS OF BOTH LEGS: ICD-10-CM

## 2020-02-06 PROCEDURE — 97110 THERAPEUTIC EXERCISES: CPT | Mod: PN

## 2020-02-10 ENCOUNTER — CLINICAL SUPPORT (OUTPATIENT)
Dept: REHABILITATION | Facility: HOSPITAL | Age: 85
End: 2020-02-10
Payer: MEDICARE

## 2020-02-10 DIAGNOSIS — G89.29 CHRONIC BILATERAL LOW BACK PAIN WITHOUT SCIATICA: ICD-10-CM

## 2020-02-10 DIAGNOSIS — M53.86 DECREASED ROM OF LUMBAR SPINE: ICD-10-CM

## 2020-02-10 DIAGNOSIS — R29.898 WEAKNESS OF BOTH LEGS: ICD-10-CM

## 2020-02-10 DIAGNOSIS — M54.50 CHRONIC BILATERAL LOW BACK PAIN WITHOUT SCIATICA: ICD-10-CM

## 2020-02-10 PROCEDURE — 97110 THERAPEUTIC EXERCISES: CPT | Mod: PN

## 2020-02-10 PROCEDURE — 97140 MANUAL THERAPY 1/> REGIONS: CPT | Mod: PN

## 2020-02-10 NOTE — PROGRESS NOTES
"  Physical Therapy Daily Treatment Note     Name: Vince Martinez  Clinic Number: [id number]    Therapy Diagnosis:   No diagnosis found.  Physician: Florecita Spencer PA-C  Visit Date: 2/10/2020    Physician Orders: PT Eval and Treat   Medical Diagnosis from Referral:   M70.61,M70.62 (ICD-10-CM) - Trochanteric bursitis of both hips   M76.30 (ICD-10-CM) - It band syndrome, unspecified laterality      Evaluation Date: 1/16/2020  Authorization Period Expiration: 3/1/2020  Plan of Care Expiration: 1/16/2020 to 3/6/2020  Visit # / Visits authorized: 5/12  FOTO: 5/5  Visit:  118.42  Total: 504.50  FOTO Taken at visit 5    Time In: 4:00 PM  Time Out: 4:55 PM  Total Billable Time: 55  minutes (3 TE, 1 MT)   Precautions: History of recent Bypass surgery    Subjective     Pt reports: that he's a little sore bilateral hips laterally. Reports that he has issues with trying to sleep on his back and has to sleep on his side. Reports sleeping on his side is uncomfortable.   He was partially compliant with home exercise program.  Response to previous treatment: no adverse reactions  Functional change: Ongoing    Pain: 3/10  Location: bilateral hips      Objective     Vince received the following manual therapy techniques: STM were applied to the: bilateral hips and IT Bands for 15 minutes, including:  FOAM roller to bilateral hip joints in IT Bands (Not today)   Manual HS Stretch 3x30" (B)  Manual hip flexor stretching in SL 3x30" (B)    Vince received therapeutic exercises to develop strength, endurance, ROM, flexibility, posture and core stabilization for 40 minutes   Supine:   TA                                                       15x5" hold (Not today)  Brace march                                      1 minute x 2 trials  Piriformis stretch                                3x30" B   LTR's:                                                 2 minutes w/red Swiss ball  Reverse crunches                              2 minutes w/red Australian " "ball    Bridges                                               3x10 B, Red TB across hips  Hip ADD ball squeezes:                     3x10, 5" holds w/small yellow ball  SLR's                                                  2x10 B  Sidelying Hip ABD                               2x10   Sidelying Clams:                                 2x10,  Green TB     Seated:  Transverse abdominus:                      5"x10  LAQ                                                     2x10 B Seated in chair, 2#  Marching                                              2x10 B Seated in chair 2#     Standing:   Mini Squats:                                          2x10, 2#  Steamboats:                                         2x10 2#  Step ups:                                              2x12 L2, 2#     Cybex Leg Press:                                 6.0 plates; 3x10 DL - OOT       Home Exercises Provided and Patient Education Provided     Education provided:   - Importance of daily HEP. Recommend patinet stretch before bed to alleviate LE spasms at night    Written Home Exercises Provided: Patient instructed to cont prior HEP.  Exercises were reviewed and Vince was able to demonstrate them prior to the end of the session.  Vince demonstrated fair  understanding of the education provided.     See EMR under Patient Instructions for exercises provided 1/16/2020.      Assessment   Presented to therapy with complaints ongoing pain and soreness from performance of stairs and increased walking at hospital. Tolerated increases in resistance by wearing 2# ankle weights. Some verbal cueing required for correct body mechanics of supine activities, otherwise no new c/o.      Vince is progressing well towards his goals.   Pt prognosis is Fair.     Pt will continue to benefit from skilled outpatient physical therapy to address the deficits listed in the problem list box on initial evaluation, provide pt/family education and to maximize pt's level of " independence in the home and community environment.     Pt's spiritual, cultural and educational needs considered and pt agreeable to plan of care and goals.    Anticipated barriers to physical therapy: Co-morbidities       Goals:     Short Term Goals (3 Weeks):  1. Pt will be compliant with HEP to supplement PT in restoring pain free function.Progressing, Not Met  2. Pt will improve impaired LE MMTs to >/= 4/5 to improve dynamic hip support for functional tasks.Progressing, Not Met  3. Pt will report pain at worse in hips to less than or equal to 6/10 in order to improve ability to perform functional tasks. Progressing, Not Met  4. Pt will participate in stair navigation with decreased pain, navigating greater than or equal to 4 stairs with UE support, to improve functional mobility. Progressing, Not Met     Long Term Goals (6 Weeks):  1. Pt will improve FOTO score to </= TBD% limited to decrease perceived limitation with mobility. Progressing, Not Met  2. Pt will improve impaired LE MMTs to >/= 4+/5 to improve dynamic hip support for functional tasks.Progressing, Not Met  3. Pt will perform at least 12 sit to stands without UE support on 30 second sit to stand test in order to demo improved ability to perform transfers.Progressing, Not Met  4. Pt to perform stair navigation with UE assistance and reciprocal pattern to promote safe functional mobility at home.Progressing, Not Met  5. Pt will report no pain during lumbar ROM to promote functional mobility. Progressing, Not Met         Plan     Continue PT per POC, progress as tolerated.  Progress with abdominal bracing and core stabilization.    Chung Delacruz, PT

## 2020-02-12 ENCOUNTER — CLINICAL SUPPORT (OUTPATIENT)
Dept: REHABILITATION | Facility: HOSPITAL | Age: 85
End: 2020-02-12
Payer: MEDICARE

## 2020-02-12 DIAGNOSIS — M53.86 DECREASED ROM OF LUMBAR SPINE: ICD-10-CM

## 2020-02-12 DIAGNOSIS — R29.898 WEAKNESS OF BOTH LEGS: ICD-10-CM

## 2020-02-12 DIAGNOSIS — G89.29 CHRONIC BILATERAL LOW BACK PAIN WITHOUT SCIATICA: ICD-10-CM

## 2020-02-12 DIAGNOSIS — M54.50 CHRONIC BILATERAL LOW BACK PAIN WITHOUT SCIATICA: ICD-10-CM

## 2020-02-12 PROCEDURE — 97140 MANUAL THERAPY 1/> REGIONS: CPT | Mod: PN,CQ

## 2020-02-12 NOTE — PROGRESS NOTES
"   Physical Therapy Daily Treatment Note     Name: Vince Martinez  Clinic Number: [id number]    Therapy Diagnosis:   Encounter Diagnoses   Name Primary?    Weakness of both legs     Decreased ROM of lumbar spine     Chronic bilateral low back pain without sciatica      Physician: Florecita Spencer PA-C  Visit Date: 2/12/2020    Physician Orders: PT Eval and Treat   Medical Diagnosis from Referral:   M70.61,M70.62 (ICD-10-CM) - Trochanteric bursitis of both hips   M76.30 (ICD-10-CM) - It band syndrome, unspecified laterality      Evaluation Date: 1/16/2020  Authorization Period Expiration: 3/1/2020  Plan of Care Expiration: 1/16/2020 to 3/6/2020  Visit # / Visits authorized: 6/12  FOTO: 6/10  Visit:  118.42  Total: 622.92  FOTO 6/10  PTA Visit:  1/6    Time In: 3:00 PM  Time Out: 4:00 PM  Total Billable Time: 60  minutes (3 TE, 1 MT)   Precautions: History of recent Bypass surgery    Subjective     Pt reports:  Reports that he is "tired"  He was partially compliant with home exercise program.  Response to previous treatment: no adverse reactions  Functional change: Ongoing    Pain: 3/10  Location: bilateral hips      Objective     Vince received the following manual therapy techniques: STM were applied to the: bilateral hips and IT Bands for 15 minutes, including:  FOAM roller to bilateral hip joints in IT Bands    Manual HS Stretch 3x30" (B)  Manual hip flexor stretching in SL 3x30" (B)    Vince received therapeutic exercises to develop strength, endurance, ROM, flexibility, posture and core stabilization for 40 minutes   Supine:   TA                                                       15x5" hold (Not today)  Brace march                                      1 minute x 2 trials  Piriformis stretch                                3x30" B   LTR's:                                                 2 minutes w/red Swiss ball  Reverse crunches                              2 minutes w/red Swiss ball    Bridges " "                                              3x10 B, Red TB across hips  Hip ADD ball squeezes:                     3x10, 5" holds w/small yellow ball  SLR's                                                  2x10 B  Sidelying Hip ABD                               3x10 B  Sidelying Clams:                                 3x10,  Green TB     Seated:  Transverse abdominus:                      5"x10  LAQ                                                     2x15 B Seated in chair, 2#  Marching                                              2x15 B Seated in chair 2#     Standing:   Mini Squats:                                          2x12, 2#  Steamboats:                                         2x10 2#  Step ups:                                              2x12 L2, 2#     Cybex Leg Press:                                 6.0 plates; 3x10 DL        Home Exercises Provided and Patient Education Provided     Education provided:   - Importance of daily HEP. Recommend patinet stretch before bed to alleviate LE spasms at night    Written Home Exercises Provided: Patient instructed to cont prior HEP.  Exercises were reviewed and Vince was able to demonstrate them prior to the end of the session.  Vince demonstrated fair  understanding of the education provided.     See EMR under Patient Instructions for exercises provided 1/16/2020.      Assessment   Continues to require some verbal cueing required for correct technique of supine activities, and for maintaining count. Able to increase activity with added reps as bolded and resume Leg press today.  Reports "just tired" after treatment.     Vince is progressing well towards his goals.   Pt prognosis is Fair.     Pt will continue to benefit from skilled outpatient physical therapy to address the deficits listed in the problem list box on initial evaluation, provide pt/family education and to maximize pt's level of independence in the home and community environment.     Pt's spiritual, " cultural and educational needs considered and pt agreeable to plan of care and goals.    Anticipated barriers to physical therapy: Co-morbidities       Goals:     Short Term Goals (3 Weeks):  1. Pt will be compliant with HEP to supplement PT in restoring pain free function.Progressing, Not Met  2. Pt will improve impaired LE MMTs to >/= 4/5 to improve dynamic hip support for functional tasks.Progressing, Not Met  3. Pt will report pain at worse in hips to less than or equal to 6/10 in order to improve ability to perform functional tasks. Progressing, Not Met  4. Pt will participate in stair navigation with decreased pain, navigating greater than or equal to 4 stairs with UE support, to improve functional mobility. Progressing, Not Met     Long Term Goals (6 Weeks):  1. Pt will improve FOTO score to </= TBD% limited to decrease perceived limitation with mobility. Progressing, Not Met  2. Pt will improve impaired LE MMTs to >/= 4+/5 to improve dynamic hip support for functional tasks.Progressing, Not Met  3. Pt will perform at least 12 sit to stands without UE support on 30 second sit to stand test in order to demo improved ability to perform transfers.Progressing, Not Met  4. Pt to perform stair navigation with UE assistance and reciprocal pattern to promote safe functional mobility at home.Progressing, Not Met  5. Pt will report no pain during lumbar ROM to promote functional mobility. Progressing, Not Met     Plan     Continue PT per POC, progress as tolerated.  Progress with abdominal bracing and core stabilization.    Ro Butler, PTA

## 2020-02-13 ENCOUNTER — HOSPITAL ENCOUNTER (OUTPATIENT)
Facility: HOSPITAL | Age: 85
Discharge: HOME | End: 2020-02-13
Attending: PAIN MEDICINE | Admitting: PAIN MEDICINE
Payer: MEDICARE

## 2020-02-13 VITALS
TEMPERATURE: 98 F | HEART RATE: 61 BPM | RESPIRATION RATE: 16 BRPM | SYSTOLIC BLOOD PRESSURE: 196 MMHG | DIASTOLIC BLOOD PRESSURE: 82 MMHG | HEIGHT: 65 IN | BODY MASS INDEX: 24.66 KG/M2 | WEIGHT: 148 LBS | OXYGEN SATURATION: 99 %

## 2020-02-13 DIAGNOSIS — M70.61 GREATER TROCHANTERIC BURSITIS OF BOTH HIPS: Primary | ICD-10-CM

## 2020-02-13 DIAGNOSIS — M70.62 GREATER TROCHANTERIC BURSITIS OF BOTH HIPS: Primary | ICD-10-CM

## 2020-02-13 DIAGNOSIS — G89.29 CHRONIC PAIN: ICD-10-CM

## 2020-02-13 LAB — POCT GLUCOSE: 77 MG/DL (ref 70–110)

## 2020-02-13 PROCEDURE — 63600175 PHARM REV CODE 636 W HCPCS: Performed by: PAIN MEDICINE

## 2020-02-13 PROCEDURE — 77002 PR FLUOROSCOPIC GUIDANCE NEEDLE PLACEMENT: ICD-10-PCS | Mod: 26,,, | Performed by: PAIN MEDICINE

## 2020-02-13 PROCEDURE — 77002 NEEDLE LOCALIZATION BY XRAY: CPT | Mod: 26,,, | Performed by: PAIN MEDICINE

## 2020-02-13 PROCEDURE — 20610 DRAIN/INJ JOINT/BURSA W/O US: CPT | Mod: 50,,, | Performed by: PAIN MEDICINE

## 2020-02-13 PROCEDURE — 25000003 PHARM REV CODE 250: Performed by: PAIN MEDICINE

## 2020-02-13 PROCEDURE — S0020 INJECTION, BUPIVICAINE HYDRO: HCPCS | Performed by: PAIN MEDICINE

## 2020-02-13 PROCEDURE — 20610 PR DRAIN/INJECT LARGE JOINT/BURSA: ICD-10-PCS | Mod: 50,,, | Performed by: PAIN MEDICINE

## 2020-02-13 PROCEDURE — 20610 DRAIN/INJ JOINT/BURSA W/O US: CPT | Mod: 50 | Performed by: PAIN MEDICINE

## 2020-02-13 PROCEDURE — 25500020 PHARM REV CODE 255: Performed by: PAIN MEDICINE

## 2020-02-13 RX ORDER — SODIUM BICARBONATE 1 MEQ/ML
SYRINGE (ML) INTRAVENOUS
Status: DISCONTINUED | OUTPATIENT
Start: 2020-02-13 | End: 2020-02-13 | Stop reason: HOSPADM

## 2020-02-13 RX ORDER — METHYLPREDNISOLONE ACETATE 40 MG/ML
INJECTION, SUSPENSION INTRA-ARTICULAR; INTRALESIONAL; INTRAMUSCULAR; SOFT TISSUE
Status: DISCONTINUED | OUTPATIENT
Start: 2020-02-13 | End: 2020-02-13 | Stop reason: HOSPADM

## 2020-02-13 RX ORDER — LIDOCAINE HYDROCHLORIDE 10 MG/ML
INJECTION, SOLUTION EPIDURAL; INFILTRATION; INTRACAUDAL; PERINEURAL
Status: DISCONTINUED | OUTPATIENT
Start: 2020-02-13 | End: 2020-02-13 | Stop reason: HOSPADM

## 2020-02-13 RX ORDER — BUPIVACAINE HYDROCHLORIDE 5 MG/ML
INJECTION, SOLUTION EPIDURAL; INTRACAUDAL
Status: DISCONTINUED | OUTPATIENT
Start: 2020-02-13 | End: 2020-02-13 | Stop reason: HOSPADM

## 2020-02-13 NOTE — DISCHARGE SUMMARY
[facility]  Discharge Note  Short Stay     Admit Date: 2/13/2020    Discharge Date: 2/13/2020     Attending Physician: Angelica Norris Jr, MD    Diagnoses:  Active Hospital Problems    Diagnosis  POA    Chronic pain [G89.29]  Yes      Resolved Hospital Problems   No resolved problems to display.     Discharged Condition: Good     Hospital Course: Patient was admitted for an outpatient interventional pain management procedure and tolerated the procedure well with no complications.     Final Diagnoses: Same as principal problem.     Disposition: Home or Self Care     Follow up/Patient Instructions:    Follow-up Information     Angelica Norris Jr, MD. Go in 2 weeks.    Specialty:  Pain Medicine  Why:  Post-procedural Follow Up As Scheduled, Call to make an appointment if you do not have one  Contact information:  [address]  [address]  [facility] [id number] [phone]                   Reconciled Medications:     Medication List      CONTINUE taking these medications    azelastine 137 mcg (0.1 %) nasal spray  Commonly known as:  ASTELIN  USE 1 SPRAY(S) IN EACH NOSTRIL THREE TIMES DAILY AS NEEDED FOR 30 DAYS     betamethasone dipropionate 0.05 % lotion  Commonly known as:  DIPROLENE     diclofenac sodium 1 % Gel  Commonly known as:  VOLTAREN  Apply 2 g topically once daily.     finasteride 5 mg tablet  Commonly known as:  PROSCAR  Take 5 mg by mouth once daily.     fluticasone propionate 50 mcg/actuation nasal spray  Commonly known as:  FLONASE  2 sprays (100 mcg total) by Each Nostril route once daily.     furosemide 40 MG tablet  Commonly known as:  LASIX  Take 1 tablet (40 mg total) by mouth once daily.     glimepiride 2 MG tablet  Commonly known as:  AMARYL  Take 2 mg by mouth 3 (three) times daily.     glucosamine-chondroitin 500-400 mg tablet  Take 1 tablet by mouth 3 (three) times daily.     hydrocortisone 2.5 % cream  APPLY CREAM TO AFFECTED AREA AND BODY ONCE DAILY AS NEEDED.      ipratropium 42 mcg (0.06 %) nasal spray  Commonly known as:  ATROVENT  2 sprays in each nostril 4 times a day.     irbesartan 300 MG tablet  Commonly known as:  AVAPRO  Take two tablet daily.     ketoconazole 2 % shampoo  Commonly known as:  NIZORAL     levothyroxine 100 MCG tablet  Commonly known as:  SYNTHROID     loperamide 2 mg Tab  Commonly known as:  IMODIUM A-D  Take 2 mg by mouth 4 (four) times daily as needed.     melatonin 10 mg Cap  Take by mouth.     metFORMIN 500 MG XR 24hr tablet  Commonly known as:  GLUCOPHAGE-XR     mometasone 0.1% 0.1 % cream  Commonly known as:  ELOCON     montelukast 10 mg tablet  Commonly known as:  SINGULAIR  Take 1 tablet (10 mg total) by mouth once daily.     neomycin-polymyxin-dexamethasone 3.5 mg/g-10,000 unit/g-0.1 % Oint  Commonly known as:  DEXACINE     omeprazole 20 MG capsule  Commonly known as:  PRILOSEC  Take 20 mg by mouth once daily.     simvastatin 20 MG tablet  Commonly known as:  ZOCOR     tamsulosin 0.4 mg Cap  Commonly known as:  FLOMAX  0.4 mg once daily.     tiZANidine 4 mg Cap     triamcinolone acetonide 0.1% 0.1 % cream  Commonly known as:  KENALOG  Apply topically 2 (two) times daily.           Discharge Procedure Orders (must include Diet, Follow-up, Activity)   Call MD for:  temperature >100.4     Call MD for:  severe uncontrolled pain     Call MD for:  redness, tenderness, or signs of infection (pain, swelling, redness, odor or green/yellow discharge around incision site)     Call MD for:  difficulty breathing or increased cough     Call MD for:  severe persistent headache     Call MD for:  worsening rash     Remove dressing in 24 hours       Angelica Norris Jr, MD  Interventional Pain Medicine / Anesthesiology

## 2020-02-13 NOTE — OP NOTE
Greater Trochanter Bursa Injection     Pre-operative diagnosis: Greater Trochanter Bursitis   Post-operative diagnosis: Greater Trochanter Bursitis  Procedure Date: 02/13/2020  Procedure:  (1) Bilateral Greater Trochanter Bursa Injection  (2) Intraoperative Fluoroscopy    Anesthesia: Local    Indication for procedure: Patient has a history of greater trochanter bursitis. The patient is here today for bilateral greater trochanter bursa injection for diagnostic and therapeutic purposes. The patient was deemed an appropriate candidate to undergo the planned procedure. Consent for procedure was obtained.     Findings: Final needle placement consistent with technically sucsessful procedure; demonstration of normal bursagram; without vascular uptake.     Complications: None     Description of procedure in detail: Informed consent obtained after explaining the procedure and potential complications including local discomfort, infection, headache, temporary or permanent weakness and/or numbness of one or both legs, temporary or permanent paraplegia, heart attack and stroke. Patient was brought back to procedure room and placed in a prone position and head resting comfortably in head ring. Prior to the initiation of the procedure, the patient's identity, the site, and the nature of the procedure were verified.     The skin was prepped with chloroprep and sterile drapes were applied. The Left greater trochanter bursa was identified by fluoroscopy in an AP view. Lidocaine 1% 2 mL was used to anesthetize the skin at the skin entry point and subcutaneous tissue with 25g 1 1/2 in needle. A 22G 3.5 inch spinal needle was advanced under intermittent fluoroscopy until os was encountered. There was no paresthesia with needle placement, but there was reproduction of pain. Aspiration was negative for blood. Omnipaque 0.5 mL was injected confirming appropriate position and spread into the bursa without intravascualr uptake. Next, 3 mL  containing 40 mg Depomedrol with 2 mL of 0.25% Bupivacaine was injected without difficulty or resistance. Needle was removed with flush and bandaged placed over site of needle insertion.     The same procedure was repeated on the opposite side.    Estimated blood loss: None     Disposition: The patient tolerated the procedure without complaint and was transported to the recovery room in stable condition.     Follow-up: RTC as scheduled    Angelica Norris Jr, MD  Interventional Pain Medicine / Anesthesiology

## 2020-02-13 NOTE — PLAN OF CARE
Discharge instructions reviewed with patient. Questions answered. Verbalized understanding, no further questions at this time. Procedure site is clean,dry and intact. Band-aid applied. Vital signs are stable. No acute distress noted. Staff at bedside to help pt change. Wheeled to [place] area by staff. Home with family in private vehicle.

## 2020-02-13 NOTE — DISCHARGE INSTRUCTIONS
Home Care Instructions Pain Management:    1.  DIET:    You may resume your normal diet today.    2.  BATHING:    You may shower with luke warm water.    3.  DRESSING:    You may remove your bandage today.    4.  ACTIVITY LEVEL:      You may resume your normal activities 24 hours after your procedure.    5.  MEDICATIONS:    You may resume your normal medications today.    6.  SPECIAL INSTRUCTIONS:    No heat to the injection site for 24 hours including bath or shower, heating pad, moist heat or hot tubs.    Use an ice pack to the injection site for any pain or discomfort.  Apply ice packs for 20 minute intervals as needed.    If you have received any sedatives by mouth today, you can not drive for 12 hours.    If you have received sedation through an IV, you can not drive for 24 hours.    PLEASE CALL YOUR DOCTOR FOR THE FOLLOWIN.  Redness or swelling around the injection site.  2.  Fever of 101 degrees.  3.  Drainage (pus) from the injection site.  4.  For any continuous bleeding (some dried blood over the incision is normal.)    FOR EMERGENCIES:    If any unusual problems or difficulties occur during clinic hours, call [phone] or dial [phone].    Follow up with with your physician in 2-3 weeks.

## 2020-02-17 ENCOUNTER — CLINICAL SUPPORT (OUTPATIENT)
Dept: REHABILITATION | Facility: HOSPITAL | Age: 85
End: 2020-02-17
Attending: ORTHOPAEDIC SURGERY
Payer: MEDICARE

## 2020-02-17 DIAGNOSIS — G89.29 CHRONIC BILATERAL LOW BACK PAIN WITHOUT SCIATICA: ICD-10-CM

## 2020-02-17 DIAGNOSIS — M53.86 DECREASED ROM OF LUMBAR SPINE: ICD-10-CM

## 2020-02-17 DIAGNOSIS — M54.50 CHRONIC BILATERAL LOW BACK PAIN WITHOUT SCIATICA: ICD-10-CM

## 2020-02-17 DIAGNOSIS — R29.898 WEAKNESS OF BOTH LEGS: ICD-10-CM

## 2020-02-17 PROCEDURE — 97140 MANUAL THERAPY 1/> REGIONS: CPT | Mod: PN | Performed by: PHYSICAL THERAPIST

## 2020-02-17 PROCEDURE — 97110 THERAPEUTIC EXERCISES: CPT | Mod: PN | Performed by: PHYSICAL THERAPIST

## 2020-02-17 NOTE — PROGRESS NOTES
"   Physical Therapy Daily Treatment Note     Name: Vince Martinez  Clinic Number: [id number]    Therapy Diagnosis:   Encounter Diagnoses   Name Primary?    Weakness of both legs     Decreased ROM of lumbar spine     Chronic bilateral low back pain without sciatica      Physician: Florecita Spencer PA-C  Visit Date: 2/17/2020    Physician Orders: PT Eval and Treat   Medical Diagnosis from Referral:   M70.61,M70.62 (ICD-10-CM) - Trochanteric bursitis of both hips   M76.30 (ICD-10-CM) - It band syndrome, unspecified laterality      Evaluation Date: 1/16/2020  Authorization Period Expiration: 3/1/2020  Plan of Care Expiration: 1/16/2020 to 3/6/2020  Visit # / Visits authorized: 7/12  FOTO: 7/10      Visit:  57.78    Total: 680.70  PTA Visit:  0/6    Time In: 3:00 PM  Time Out: 357 PM  Total Billable Time: 30 minutes (1 TE, 1 MT)   Precautions: History of recent Bypass surgery    Subjective     Pt reports: pain and stiffness in his hips today.   He was partially compliant with home exercise program.  Response to previous treatment: no adverse reactions  Functional change: Ongoing    Pain: 4/10  Location: bilateral hips      Objective     Vicne received the following manual therapy techniques: STM were applied to the: bilateral hips and IT Bands for 15 minutes, including:  FOAM roller to bilateral hip joints in IT Bands    Manual HS Stretch 3x30" (B)  Manual hip flexor stretching in SL 3x30" (B)    Vince received therapeutic exercises to develop strength, endurance, ROM, flexibility, posture and core stabilization for 42 minutes   Supine:   TA                                                       15x5" hold (Not today)  Brace march                                      1 minute x 2 trials  Piriformis stretch                                3x30" B   LTR's:                                                 2 minutes w/red Swiss ball  Reverse crunches                              2 minutes w/red Swiss ball    Bridges " "                                              3x10 B, Red TB across hips  Hip ADD ball squeezes:                     3x10, 5" holds w/small yellow ball  SLR's                                                  2x10 B  Sidelying Hip ABD                               3x10 B  Sidelying Clams:                                 3x10,  Green TB     Seated:  Transverse abdominus:                      5"x10  LAQ                                                     2x15 B Seated in chair, 2#  Marching                                              2x15 B Seated in chair 2#     Standing:   Mini Squats:                                          2x12, 2#  Steamboats:                                         2x10 2#  Step ups:                                              2x12 L2, 2#     Cybex Leg Press:                                 6.0 plates; 3x10 DL        Home Exercises Provided and Patient Education Provided     Education provided:   - Importance of daily HEP. Recommend patinet stretch before bed to alleviate LE spasms at night    Written Home Exercises Provided: Patient instructed to cont prior HEP.  Exercises were reviewed and Vince was able to demonstrate them prior to the end of the session.  Vince demonstrated fair  understanding of the education provided.     See EMR under Patient Instructions for exercises provided 1/16/2020.      Assessment   Pt presented to session with stiffness and pain in B hips. He was able to complete all therex without complaints of increased pain.     Vince is progressing well towards his goals.   Pt prognosis is Fair.     Pt will continue to benefit from skilled outpatient physical therapy to address the deficits listed in the problem list box on initial evaluation, provide pt/family education and to maximize pt's level of independence in the home and community environment.     Pt's spiritual, cultural and educational needs considered and pt agreeable to plan of care and goals.    Anticipated " barriers to physical therapy: Co-morbidities       Goals:     Short Term Goals (3 Weeks):  1. Pt will be compliant with HEP to supplement PT in restoring pain free function.Progressing, Not Met  2. Pt will improve impaired LE MMTs to >/= 4/5 to improve dynamic hip support for functional tasks.Progressing, Not Met  3. Pt will report pain at worse in hips to less than or equal to 6/10 in order to improve ability to perform functional tasks. Progressing, Not Met  4. Pt will participate in stair navigation with decreased pain, navigating greater than or equal to 4 stairs with UE support, to improve functional mobility. Progressing, Not Met     Long Term Goals (6 Weeks):  1. Pt will improve FOTO score to </= TBD% limited to decrease perceived limitation with mobility. Progressing, Not Met  2. Pt will improve impaired LE MMTs to >/= 4+/5 to improve dynamic hip support for functional tasks.Progressing, Not Met  3. Pt will perform at least 12 sit to stands without UE support on 30 second sit to stand test in order to demo improved ability to perform transfers.Progressing, Not Met  4. Pt to perform stair navigation with UE assistance and reciprocal pattern to promote safe functional mobility at home.Progressing, Not Met  5. Pt will report no pain during lumbar ROM to promote functional mobility. Progressing, Not Met     Plan     Continue PT per POC, progress as tolerated.  Progress with abdominal bracing and core stabilization.    Keith Condon, PT

## 2020-02-21 ENCOUNTER — OFFICE VISIT (OUTPATIENT)
Dept: PAIN MEDICINE | Facility: CLINIC | Age: 85
End: 2020-02-21
Payer: MEDICARE

## 2020-02-21 ENCOUNTER — TELEPHONE (OUTPATIENT)
Dept: PAIN MEDICINE | Facility: CLINIC | Age: 85
End: 2020-02-21

## 2020-02-21 VITALS — WEIGHT: 147.94 LBS | BODY MASS INDEX: 24.62 KG/M2

## 2020-02-21 DIAGNOSIS — M47.816 LUMBAR SPONDYLOSIS: ICD-10-CM

## 2020-02-21 DIAGNOSIS — G89.29 CHRONIC BILATERAL LOW BACK PAIN WITHOUT SCIATICA: ICD-10-CM

## 2020-02-21 DIAGNOSIS — M43.10 RETROLISTHESIS OF VERTEBRAE: ICD-10-CM

## 2020-02-21 DIAGNOSIS — M54.50 CHRONIC BILATERAL LOW BACK PAIN WITHOUT SCIATICA: ICD-10-CM

## 2020-02-21 DIAGNOSIS — M70.62 GREATER TROCHANTERIC BURSITIS OF BOTH HIPS: Primary | ICD-10-CM

## 2020-02-21 DIAGNOSIS — M70.61 GREATER TROCHANTERIC BURSITIS OF BOTH HIPS: Primary | ICD-10-CM

## 2020-02-21 PROCEDURE — 99214 OFFICE O/P EST MOD 30 MIN: CPT | Mod: S$GLB,,, | Performed by: NURSE PRACTITIONER

## 2020-02-21 PROCEDURE — 1125F AMNT PAIN NOTED PAIN PRSNT: CPT | Mod: S$GLB,,, | Performed by: NURSE PRACTITIONER

## 2020-02-21 PROCEDURE — 99214 PR OFFICE/OUTPT VISIT, EST, LEVL IV, 30-39 MIN: ICD-10-PCS | Mod: S$GLB,,, | Performed by: NURSE PRACTITIONER

## 2020-02-21 PROCEDURE — 1159F PR MEDICATION LIST DOCUMENTED IN MEDICAL RECORD: ICD-10-PCS | Mod: S$GLB,,, | Performed by: NURSE PRACTITIONER

## 2020-02-21 PROCEDURE — 1125F PR PAIN SEVERITY QUANTIFIED, PAIN PRESENT: ICD-10-PCS | Mod: S$GLB,,, | Performed by: NURSE PRACTITIONER

## 2020-02-21 PROCEDURE — 99999 PR PBB SHADOW E&M-EST. PATIENT-LVL IV: ICD-10-PCS | Mod: PBBFAC,,, | Performed by: NURSE PRACTITIONER

## 2020-02-21 PROCEDURE — 99999 PR PBB SHADOW E&M-EST. PATIENT-LVL IV: CPT | Mod: PBBFAC,,, | Performed by: NURSE PRACTITIONER

## 2020-02-21 PROCEDURE — 1101F PR PT FALLS ASSESS DOC 0-1 FALLS W/OUT INJ PAST YR: ICD-10-PCS | Mod: CPTII,S$GLB,, | Performed by: NURSE PRACTITIONER

## 2020-02-21 PROCEDURE — 1159F MED LIST DOCD IN RCRD: CPT | Mod: S$GLB,,, | Performed by: NURSE PRACTITIONER

## 2020-02-21 PROCEDURE — 1101F PT FALLS ASSESS-DOCD LE1/YR: CPT | Mod: CPTII,S$GLB,, | Performed by: NURSE PRACTITIONER

## 2020-02-21 NOTE — TELEPHONE ENCOUNTER
----- Message from Beth Livingston sent at 2/21/2020  9:04 AM CST -----  Contact: Self [phone] or call DR. Gomez Office and ask for Emelina at [phone]  Patient is calling to talk to nurse in regards he got injections done 2/13/20 and he is still having severe pain and he would like to be seen today.

## 2020-02-21 NOTE — PROGRESS NOTES
[facility] New Patient Evaluation    Referred by: Randy Riggs MD  Reason for referral:  Lumbar degenerative disc disease and      Spinal stenosis of lumbar region without neurogenic claudication    CC:   Chief Complaint   Patient presents with    Hip Pain     Last 3 PDI Scores 2/21/2020 2/3/2020   Pain Disability Index (PDI) 60 14     02/21/2020 - Mr. Martinez returns to clinic for follow up visit reporting worse hip pain.  Patient is s/p Bilateral Greater Trochanter Bursa Injection on 2/13/20 with 0% relief. Patient presents with son for CV, he is reporting right hip pain relief for 6 days about 50%, he reports his left hip pain continues he did not find that the injection helped his left hip pain he continues to have left hip pain with radiating lateral left leg pain to his knee he reports his pain as throbbing.  He continues in physical therapy, again denies performing home exercises because of increased pain. His son reports to me that he spends 2-3 hours sitting in a chair while at home because he is a .  I recommended consistent movement at least every half hour while at home as well as continuing physical therapy.   Pain intensity is currently 10/10.      HPI:   Vince Martinez is a 89 y.o. male who presents today with multiple complaints.  His chief complaint is bilateral hip pain, which was diagnosed as greater trochanteric bursitis, bilaterally, by my colleagues in Orthopedic surgery.  He received a bilateral GTB injection which provided him relief for approximately 2 weeks.  He has been in physical therapy, but denies performing home exercises because of increasing pain.  Hip pain radiates down the lateral thigh stopping at the lateral aspect of the knee, bilaterally.  His secondary complaint is chronic low back pain, which is nonradiating.  Pain is felt more with certain positions of the low back, specifically, extension.  Pain is partially alleviated by lumbar flexion.  He has a  similar complaint of chronic neck pain exacerbated by extension, and improved by flexion.    Location: Lumbar   Onset: six months ago  Current Pain Score: 2/10  Daily Pain of Range: 2-9/10  Quality: Burning and Deep  Radiation: Down both legs to knees  Worsened by: exercise, lying down and walking for more than 10 minutes  Improved by: physical therapy    Previous Therapies:  PT/OT: yes. Last visit on 1/31/20. Patient states he goes 2x a week.   HEP: Yes   Interventions:   Surgery:Yes 30 years ago. Patient not sure what surgery was done on his back for herniated discs.   Medications:   - NSAIDS:   - MSK Relaxants:   - TCAs:   - SNRIs:   - Topicals:   - Anticonvulsants:  - Opioids:     Current Pain Medications:  1. Tylenol PM      Review of Systems:  Review of Systems   Constitutional: Negative for chills and fever.   HENT: Negative for nosebleeds.    Eyes: Negative for blurred vision.   Respiratory: Positive for shortness of breath. Negative for hemoptysis.    Cardiovascular: Negative for chest pain, palpitations, orthopnea and claudication.   Gastrointestinal: Negative for abdominal pain, diarrhea, heartburn and vomiting.   Genitourinary: Negative for hematuria.   Musculoskeletal: Positive for back pain, joint pain, myalgias and neck pain.   Skin: Negative for rash.   Neurological: Positive for dizziness. Negative for tingling, seizures and loss of consciousness.   Endo/Heme/Allergies: Does not bruise/bleed easily.   Psychiatric/Behavioral: Negative for depression, hallucinations and memory loss. The patient has insomnia. The patient is not nervous/anxious.        History:    Current Outpatient Medications:     azelastine (ASTELIN) 137 mcg (0.1 %) nasal spray, USE 1 SPRAY(S) IN EACH NOSTRIL THREE TIMES DAILY AS NEEDED FOR 30 DAYS, Disp: , Rfl: 5    betamethasone dipropionate (DIPROLENE) 0.05 % lotion, , Disp: , Rfl:     diclofenac sodium 1 % Gel, Apply 2 g topically once daily., Disp: , Rfl:     finasteride  (PROSCAR) 5 mg tablet, Take 5 mg by mouth once daily. , Disp: , Rfl:     fluticasone propionate (FLONASE) 50 mcg/actuation nasal spray, 2 sprays (100 mcg total) by Each Nostril route once daily., Disp: 16 g, Rfl: 11    furosemide (LASIX) 40 MG tablet, Take 1 tablet (40 mg total) by mouth once daily., Disp: 90 tablet, Rfl: 3    glimepiride (AMARYL) 2 MG tablet, Take 2 mg by mouth 3 (three) times daily. , Disp: , Rfl: 1    glucosamine-chondroitin 500-400 mg tablet, Take 1 tablet by mouth 3 (three) times daily., Disp: , Rfl:     hydrocortisone 2.5 % cream, APPLY CREAM TO AFFECTED AREA AND BODY ONCE DAILY AS NEEDED., Disp: , Rfl: 0    ipratropium (ATROVENT) 42 mcg (0.06 %) nasal spray, 2 sprays in each nostril 4 times a day., Disp: 90 mL, Rfl: 3    irbesartan (AVAPRO) 300 MG tablet, Take two tablet daily., Disp: 180 tablet, Rfl: 3    ketoconazole (NIZORAL) 2 % shampoo, , Disp: , Rfl:     levothyroxine (SYNTHROID) 100 MCG tablet, , Disp: , Rfl:     loperamide (IMODIUM A-D) 2 mg Tab, Take 2 mg by mouth 4 (four) times daily as needed., Disp: , Rfl:     melatonin 10 mg Cap, Take by mouth., Disp: , Rfl:     metFORMIN (GLUCOPHAGE-XR) 500 MG 24 hr tablet, , Disp: , Rfl:     mometasone 0.1% (ELOCON) 0.1 % cream, , Disp: , Rfl:     montelukast (SINGULAIR) 10 mg tablet, Take 1 tablet (10 mg total) by mouth once daily., Disp: 30 tablet, Rfl: 0    neomycin-polymyxin-dexamethasone (DEXACINE) 3.5 mg/g-10,000 unit/g-0.1 % Oint, , Disp: , Rfl:     omeprazole (PRILOSEC) 20 MG capsule, Take 20 mg by mouth once daily., Disp: , Rfl:     simvastatin (ZOCOR) 20 MG tablet, , Disp: , Rfl:     tamsulosin (FLOMAX) 0.4 mg Cp24, 0.4 mg once daily. , Disp: , Rfl:     tizanidine 4 mg Cap, , Disp: , Rfl:     triamcinolone acetonide 0.1% (KENALOG) 0.1 % cream, Apply topically 2 (two) times daily., Disp: , Rfl:     Past Medical History:   Diagnosis Date    Acute combined systolic and diastolic CHF, NYHA class 3 11/15/2018     Arthritis     Diabetes mellitus     DJD (degenerative joint disease)     Hypertension     Prostate enlargement     Thyroid disease        Past Surgical History:   Procedure Laterality Date    INJECTION OF JOINT Bilateral 2/13/2020    Procedure: Injection, Joint, Bilateral GTB;  Surgeon: Angelica Norris Jr., MD;  [facility];  Service: Pain Management;  Laterality: Bilateral;    PROSTATE SURGERY         No family history on file.    Social History     Socioeconomic History    Marital status:      Spouse name: Not on file    Number of children: Not on file    Years of education: Not on file    Highest education level: Not on file   Occupational History    Not on file   Social Needs    Financial resource strain: Not on file    Food insecurity:     Worry: Not on file     Inability: Not on file    Transportation needs:     Medical: Not on file     Non-medical: Not on file   Tobacco Use    Smoking status: Never Smoker    Smokeless tobacco: Never Used   Substance and Sexual Activity    Alcohol use: No    Drug use: No    Sexual activity: Not on file   Lifestyle    Physical activity:     Days per week: Not on file     Minutes per session: Not on file    Stress: Not on file   Relationships    Social connections:     Talks on phone: Not on file     Gets together: Not on file     Attends Islam service: Not on file     Active member of club or organization: Not on file     Attends meetings of clubs or organizations: Not on file     Relationship status: Not on file   Other Topics Concern    Not on file   Social History Narrative    Not on file       Review of patient's allergies indicates:   Allergen Reactions    Bactrim [sulfamethoxazole-trimethoprim] Rash    Ciprofloxacin Rash    Latex Rash       Physical Exam:  Vitals:    02/21/20 1008   Weight: 67.1 kg (147 lb 14.9 oz)   PainSc: 10-Worst pain ever     General    Nursing note and vitals reviewed.  Constitutional: He is  oriented to person, place, and time. He appears well-developed and well-nourished. No distress.   HENT:   Head: Normocephalic and atraumatic.   Nose: Nose normal.   Eyes: Conjunctivae and EOM are normal. Pupils are equal, round, and reactive to light. Right eye exhibits no discharge. Left eye exhibits no discharge. No scleral icterus.   Neck: No JVD present.   Cardiovascular: Intact distal pulses.    Pulmonary/Chest: Effort normal. No respiratory distress.   Abdominal: He exhibits no distension.   Neurological: He is alert and oriented to person, place, and time. Coordination normal.   Psychiatric: He has a normal mood and affect. His behavior is normal. Judgment and thought content normal.     General Musculoskeletal Exam   Gait: normal         Right Hip Exam     Tenderness   The patient tender to palpation of the trochanteric bursa.  Left Hip Exam     Tenderness   The patient tender to palpation of the trochanteric bursa.      Back (L-Spine & T-Spine) / Neck (C-Spine) Exam     Tenderness Posterior midline palpation reveals tenderness of the Upper C-Spine and Lower C-Spine. Right paramedian tenderness of the Lower L-Spine. Left paramedian tenderness of the Lower L-Spine.     Back (L-Spine & T-Spine) Range of Motion   Extension: abnormal Back extension: facet loading is positive and exacerabtes/reproduces the patient's typical low back pain    Flexion: abnormal Back flexion: limited ROM but partial relief of low back pain noted.     Neck (C-Spine) Range of Motion   Flexion:     Limited  Extension: Limited  Right Lateral Bend: abnormal  Left Lateral Bend: abnormal  Right Rotation: abnormal  Left Rotation: abnormal    Spinal Sensation   Right Side Sensation  C-Spine Level: normal   L-Spine Level: normal  Left Side Sensation  C-Spine Level: normal  L-Spine Level: normal    Neck (C-Spine) Tests   Spurling's Test   Left:  Negative  Right: negative    Other He has no scoliosis .      Muscle Strength   Right Upper  Extremity   Biceps: 5   Deltoid:  5/5  Triceps:  5/5  Wrist extension:    Wrist flexion:    Finger Flexors:    Finger Extensors:  /5  Left Upper Extremity  Biceps:    Deltoid:  /  Triceps:  5/  Wrist extension:    Wrist flexion:    Finger Flexors:    Finger Extensors:  5  Right Lower Extremity   Hip Flexion:    Hip Extensors:   Quadriceps:     Hamstrin/5   Gastrocsoleus:    Left Lower Extremity   Hip Flexion:    Hip Extensors:   Quadriceps:     Hamstrin/5   Gastrocsoleus:      Reflexes     Left Side  Biceps:  2+  Quadriceps:  2+  Achilles:  2+    Right Side   Biceps:  2+  Quadriceps:  2+  Achilles:  2+      Imaging:  CT LUMBAR SPINE WITHOUT CONTRAST    CLINICAL HISTORY:  Spinal stenosis, lumbar;  Spinal stenosis, lumbar region without neurogenic claudication    TECHNIQUE:  Low-dose axial, sagittal and coronal reformations are obtained through the lumbar spine.  Contrast was not administered.    COMPARISON:  None available    FINDINGS:  Lumbar vertebral body heights are maintained.  There is mild grade 1 retrolisthesis of L3 on L4.  Multilevel discogenic endplate change with disc height loss, most pronounced at L4-L5.  Scattered lumbar intervertebral disc gas phenomenon.  Limited evaluation of the central canal with suspected mild central canal stenosis at L2-L3 and L4-L5 and mild moderate stenosis at L3-L4.  Partially fused SI joints. There is multilevel facet arthropathy with variable degree of neural foraminal narrowing ranging from mild to severe, most pronounced on the right at L5-S1.    Limited evaluation of intra-abdominal structures demonstrates abdominal aortic atherosclerosis and left lower pole renal cyst.       Labs:  BMP  Lab Results   Component Value Date     2018    K 4.4 2018     2018    CO2 29 2018    BUN 23 2018    CREATININE 1.3 2018    CALCIUM 9.5 2018    ANIONGAP 8  11/20/2018    ESTGFRAFRICA 56 (A) 11/20/2018    EGFRNONAA 49 (A) 11/20/2018     Lab Results   Component Value Date    ALT 25 11/20/2018    AST 32 11/20/2018    ALKPHOS 75 11/20/2018    BILITOT 0.5 11/20/2018       Assessment:  Problem List Items Addressed This Visit        Neuro    Lumbar spondylosis    Relevant Orders    CANE FOR HOME USE       Orthopedic    Chronic bilateral low back pain without sciatica    Relevant Orders    CANE FOR HOME USE    Greater trochanteric bursitis of both hips - Primary    Relevant Orders    CANE FOR HOME USE    Retrolisthesis of vertebrae    Relevant Orders    CANE FOR HOME USE          2/3/2020 - Vince Martinez is a 89 y.o. male with chronic bilateral hip pain secondary to greater trochanteric bursitis and chronic axial spine pain related to facet arthropathy.  There is likely a contribution from retrolisthesis of L3 on L4 with regards to the low back pain. Patient is most concerned about the bilateral hip pain. I educated him on the appropriateness of repeating the bilateral GTB injection, which should not be the primary focus of his treatment.  The bilateral GTB injection can reduce the symptoms of bursitis temporarily; however, long-term treatment of this condition requires physical therapy and home exercise.  Patient has been compliant with physical therapy but noncompliant with recommendations for home exercise and I tried to encourage him to perform his exercises daily.  He appears to understand.    02/21/[id number]-jces-old male presents with his son for clinic visit.  Patient status post bilateral GT be injections with reported 0% relief.  he is reporting right hip pain relief for 6 days at about 50%, he reports his left hip pain continues he did not find that the injection helped his left hip pain he continues to have left hip pain with radiating lateral left leg pain to his knee.  This chronic hip pain is secondary to GTB and chronic axial spine pain related to facet  arthropathy.  He reports no low back pain today just continued bilateral hip pain. Discussed that I do not recommend repeat injections today, suggested that he continue with physical therapy and that he establishes a home exercise plan for continued exercises daily.  Discussed that I will also recommend a quad cane to help with rising from a seated position as well as standing for long periods of time considering the patient is the past.  Also suggested that patient does not sit for greater than 30-40 minutes, his son reports to me during our visit that patient has a tendency to sit in his chair at home for 2-3 hours.  Recommended continue physical therapy and follow up in 6 weeks.    : Reviewed and consistent with medication use as prescribed.    Treatment Plan:   Procedures:  None at this time  PT/OT/HEP:  Continue physical therapy as scheduled and improve compliance with home exercise program  Medications:  Recommended continued use of Tylenol 500 to a 1000 Q 8 as needed for pain not to exceed 3000 mg in 24 hours.   Labs: reviewed and medications are appropriately dosed for current hepatorenal function.  Imaging: No additional recommended at this time.  Functional support:  Ordered a quad cane to assist with mobility and rising and sitting.  Follow Up: RTC 6 weeks    BASHIR Garcia  Interventional Pain Management      Disclaimer: This note was partly generated using dictation software which may occasionally result in transcription errors.

## 2020-02-21 NOTE — TELEPHONE ENCOUNTER
Spoke with pt regarding an appt today. Pt is scheduled for 10 am to see Pancho. Pt verbalized understanding and confirmed appt date and time.

## 2020-02-26 NOTE — PROGRESS NOTES
"   Physical Therapy Daily Treatment Note     Name: Vince Martinez  Clinic Number: [id number]    Therapy Diagnosis:   Encounter Diagnoses   Name Primary?    Weakness of both legs     Decreased ROM of lumbar spine     Chronic bilateral low back pain without sciatica      Physician: Florecita Spencer PA-C  Visit Date: 2/27/2020    Physician Orders: PT Eval and Treat   Medical Diagnosis from Referral:   M70.61,M70.62 (ICD-10-CM) - Trochanteric bursitis of both hips   M76.30 (ICD-10-CM) - It band syndrome, unspecified laterality      Evaluation Date: 1/16/2020  Authorization Period Expiration: 3/1/2020  Plan of Care Expiration: 1/16/2020 to 3/6/2020  Visit # / Visits authorized: 8/12  FOTO: 8/10      Visit:  60.64   Total: 741.34    Time In: 3:00 PM  Time Out: 4:00 PM  Total Billable Time: 30 minutes (2 TE)   Precautions: History of recent Bypass surgery    Subjective     Pt reports: that he's not doing too well. States he had injections in both hips about a week ago and is currently having increased pain in his left hip. States he thinks the therapy was helping prior to the injections, however since the injections he's been having pain with movement. Still has been unable to perform HEP regularly due to the pain.  He was partially compliant with home exercise program.  Response to previous treatment: no adverse reactions  Functional change: Ongoing    Pain: 3/10  Location: bilateral hips      Objective     Vince received therapeutic exercises to develop strength, endurance, ROM, flexibility, posture and core stabilization for 60 minutes including:     Supine:   TA                                                       15x5" hold (Not today)  Brace march                                      1 minute x 2 trials  Piriformis stretch                                3x30" B   LTR's:                                                 2 minutes w/red Swiss ball  Reverse crunches                              2 minutes w/red [place] " "ball    Bridges                                               3x10 B, Red TB across hips  Hip ADD ball squeezes:                     3x10, 5" holds w/small yellow ball  SLR's                                                  2x10 B  Sidelying Hip ABD                               3x10 B  Sidelying Clams:                                 3x10,  Green TB     Seated:  Transverse abdominus:                      5"x10  LAQ                                                     2x15 B Seated in chair, 2#  Marching                                              2x15 B Seated in chair 2#     Standing:   Mini Squats:                                          2x12, 2# - OOT  Steamboats:                                         2x10 2# - OOT  Step ups:                                              2x12 L2, 2# - OOT     Cybex Leg Press:                                 6.0 plates; 3x10 DL - OOT       Home Exercises Provided and Patient Education Provided     Education provided:   - Importance of daily HEP. Recommend patinet stretch before bed to alleviate LE spasms at night    Written Home Exercises Provided: Patient instructed to cont prior HEP.  Exercises were reviewed and Vince was able to demonstrate them prior to the end of the session.  Vince demonstrated fair  understanding of the education provided.     See EMR under Patient Instructions for exercises provided 1/16/2020.      Assessment   See updated POC.     Vince is progressing well towards his goals.   Pt prognosis is Fair.     Pt will continue to benefit from skilled outpatient physical therapy to address the deficits listed in the problem list box on initial evaluation, provide pt/family education and to maximize pt's level of independence in the home and community environment.     Pt's spiritual, cultural and educational needs considered and pt agreeable to plan of care and goals.    Anticipated barriers to physical therapy: Co-morbidities       Goals:   See updated POC.  "   Plan     See updated POC.     Radha Martinez, PT

## 2020-02-27 ENCOUNTER — DOCUMENTATION ONLY (OUTPATIENT)
Dept: REHABILITATION | Facility: HOSPITAL | Age: 85
End: 2020-02-27

## 2020-02-27 ENCOUNTER — CLINICAL SUPPORT (OUTPATIENT)
Dept: REHABILITATION | Facility: HOSPITAL | Age: 85
End: 2020-02-27
Payer: MEDICARE

## 2020-02-27 DIAGNOSIS — G89.29 CHRONIC BILATERAL LOW BACK PAIN WITHOUT SCIATICA: ICD-10-CM

## 2020-02-27 DIAGNOSIS — R29.898 WEAKNESS OF BOTH LEGS: ICD-10-CM

## 2020-02-27 DIAGNOSIS — M53.86 DECREASED ROM OF LUMBAR SPINE: ICD-10-CM

## 2020-02-27 DIAGNOSIS — M54.50 CHRONIC BILATERAL LOW BACK PAIN WITHOUT SCIATICA: ICD-10-CM

## 2020-02-27 PROCEDURE — 97110 THERAPEUTIC EXERCISES: CPT | Mod: PN

## 2020-02-27 NOTE — PROGRESS NOTES
PT/PTA met face to face to discuss pt's treatment plan and progress towards established goals. Pt will be seen by a physical therapist minimally every 6th visit or every 30 days.  Ro Butler PTA and BABITA Rothman PTA, PTA Denise Huner PTA

## 2020-03-03 ENCOUNTER — CLINICAL SUPPORT (OUTPATIENT)
Dept: REHABILITATION | Facility: HOSPITAL | Age: 85
End: 2020-03-03
Payer: MEDICARE

## 2020-03-03 DIAGNOSIS — M54.50 CHRONIC BILATERAL LOW BACK PAIN WITHOUT SCIATICA: ICD-10-CM

## 2020-03-03 DIAGNOSIS — R29.898 WEAKNESS OF BOTH LEGS: ICD-10-CM

## 2020-03-03 DIAGNOSIS — M53.86 DECREASED ROM OF LUMBAR SPINE: ICD-10-CM

## 2020-03-03 DIAGNOSIS — G89.29 CHRONIC BILATERAL LOW BACK PAIN WITHOUT SCIATICA: ICD-10-CM

## 2020-03-03 PROCEDURE — 97110 THERAPEUTIC EXERCISES: CPT | Mod: PN,CQ

## 2020-03-03 NOTE — PROGRESS NOTES
Physical Therapy Daily Treatment Note     Name: Vince Martinez  Clinic Number: [id number]    Therapy Diagnosis:   No diagnosis found.  Physician: Florecita Spencer PA-C  Visit Date: 3/3/2020    Physician Orders: PT Eval and Treat   Medical Diagnosis from Referral:   M70.61,M70.62 (ICD-10-CM) - Trochanteric bursitis of both hips   M76.30 (ICD-10-CM) - It band syndrome, unspecified laterality      Evaluation Date: 1/16/2020  Authorization Period Expiration: 3/1/2020  Plan of Care Expiration: 1/16/2020 to 3/6/2020  Visit # / Visits authorized: 9/12  FOTO: 9/10      Visit:  *** 60.64   Total: *** 741.34    Time In: *** PM  Time Out: *** PM  Total Billable Time: *** minutes (*** TE)   Precautions: History of recent Bypass surgery    Subjective     Pt reports: Ask about hip injections ***.  He was partially compliant with home exercise program.  Response to previous treatment: no adverse reactions  Functional change: Ongoing    Pain: 3/10  Location: bilateral hips      Objective     Vince received therapeutic exercises to develop strength, endurance, ROM, flexibility, posture and core stabilization for *** minutes including objective measurements:    Range of Motion/Strength:      Thoracolumbar AROM Pain/Dysfunction with Movement   Flexion 55     Extension 10     Right side bending 8 No pain in right hip   Left side bending 10 Pain in left hip   Right rotation 32     Left rotation 25     *denotes pain with movement     Hip Right Left Pain/Dysfunction with Movement   AROM/PROM         flexion WFL WFL     extension WFL WFL     abduction WFL WFL     adduction WFL WFL     Internal rotation  Severely limited Severely limited  No pain    External rotation  Moderately limited Moderately limited Pain in bilateral hips (L>R) and low back with PROM testing       L/E MMT Right Left Pain/Dysfunction with Movement   Hip Flexion 4/5 4/5     Hip Extension 3+/5 3+/5     Hip Abduction 5/5 4+/5     Hip Adduction NT NT     Hip IR 5/5 5/5    "  Hip ER 5/5 5/5     Knee Flexion 5/5 5/5     Knee Extension 5/5 5/5     Ankle DF 5/5 5/5     Ankle PF 5/5 5/5     Ankle Inversion 5/5 5/5     Ankle Eversion 5/5 5/5          Special Tests: Scouring: (-) on right, (-) on left     30 second sit-to-stand test (without U/E support): 8 with BUE support          CMS Impairment/Limitation/Restriction for FOTO Hip Survey     Therapist reviewed FOTO scores for Vince Martinez on 2/27/2020.   FOTO documents entered into [facility] - see Media section.     Current Limitation Score: 69%  Prediction Limitation Score: 59%            Supine:   TA                                                       15x5" hold (Not today)  Brace march                                      1 minute x 2 trials  Piriformis stretch                                3x30" B   LTR's:                                                 2 minutes w/red Swiss ball  Reverse crunches                              2 minutes w/red Swiss ball    Bridges                                               3x10 B, Red TB across hips  Hip ADD ball squeezes:                     3x10, 5" holds w/small yellow ball  SLR's                                                  2x10 B  Sidelying Hip ABD                               3x10 B  Sidelying Clams:                                 3x10,  Green TB     Seated:  Transverse abdominus:                      5"x10  LAQ                                                     2x15 B Seated in chair, 2#  Marching                                              2x15 B Seated in chair 2#     Standing:   Mini Squats:                                          2x12, 2# - OOT  Steamboats:                                         2x10 2# - OOT  Step ups:                                              2x12 L2, 2# - OOT     Cybex Leg Press:                                 6.0 plates; 3x10 DL - OOT       Home Exercises Provided and Patient Education Provided     Education provided:   - Importance of daily HEP. " Recommend patinet stretch before bed to alleviate LE spasms at night    Written Home Exercises Provided: Patient instructed to cont prior HEP.  Exercises were reviewed and Vince was able to demonstrate them prior to the end of the session.  Vince demonstrated fair  understanding of the education provided.     See EMR under Patient Instructions for exercises provided 1/16/2020.      Assessment   See updated POC.     Vince is progressing well towards his goals.   Pt prognosis is Fair.     Pt will continue to benefit from skilled outpatient physical therapy to address the deficits listed in the problem list box on initial evaluation, provide pt/family education and to maximize pt's level of independence in the home and community environment.     Pt's spiritual, cultural and educational needs considered and pt agreeable to plan of care and goals.    Anticipated barriers to physical therapy: Co-morbidities       Goals:     Short Term Goals (3 Weeks):  1. Pt will be compliant with HEP to supplement PT in restoring pain free function.Progressing, Not Met  2. Pt will improve impaired LE MMTs to >/= 4/5 to improve dynamic hip support for functional tasks.Progressing, Not Met  3. Pt will report pain at worse in hips to less than or equal to 6/10 in order to improve ability to perform functional tasks. Progressing, Not Met  4. Pt will participate in stair navigation with decreased pain, navigating greater than or equal to 4 stairs with UE support, to improve functional mobility. Progressing, Not Met     Long Term Goals (6 Weeks):  1. Pt will improve FOTO score to </= 59% limited to decrease perceived limitation with mobility. Progressing, Not Met; 69% limited 2/27/2020  2. Pt will improve impaired LE MMTs to >/= 4+/5 to improve dynamic hip support for functional tasks.Progressing, Not Met  3. Pt will perform at least 12 sit to stands without UE support on 30 second sit to stand test in order to demo improved ability to perform  transfers.Progressing, Not Met  4. Pt to perform stair navigation with UE assistance and reciprocal pattern to promote safe functional mobility at home.Progressing, Not Met  5. Pt will report no pain during lumbar ROM to promote functional mobility. Progressing, Not Met     Plan     See updated POC.      Radha Martinez, PT

## 2020-03-03 NOTE — PROGRESS NOTES
"   Physical Therapy Daily Treatment Note     Name: Vince Martinez  Clinic Number: [id number]    Therapy Diagnosis:   Encounter Diagnoses   Name Primary?    Weakness of both legs     Decreased ROM of lumbar spine     Chronic bilateral low back pain without sciatica      Physician: Florecita Spencer PA-C  Visit Date: 3/3/2020    Physician Orders: PT Eval and Treat   Medical Diagnosis from Referral:   M70.61,M70.62 (ICD-10-CM) - Trochanteric bursitis of both hips   M76.30 (ICD-10-CM) - It band syndrome, unspecified laterality      Evaluation Date: 1/16/2020  Authorization Period Expiration: 3/1/2020  Plan of Care Expiration: 2/27/2020 to 4/10/2020  Visit # / Visits authorized: 9/12  FOTO: 9/10  NEXT    Visit:   121.28  Total: 862.62    Time In: 5:00 PM  Time Out: 6:00 PM  Total Billable Time: 60 minutes (4 TE)   Precautions: History of recent Bypass surgery    Subjective     Pt reports: that he's doing better today.  Some fatigue but minimal pain. States left leg is definitely weaker than right.  He was partially compliant with home exercise program.  Response to previous treatment: no adverse reactions  Functional change: Ongoing    Pain: 2/10  Location: bilateral hips      Objective     Vince received therapeutic exercises to develop strength, endurance, ROM, flexibility, posture and core stabilization for 60 minutes including:    Supine:   TA                                                       15x5" hold   Brace march                                      1 minute x 2 trials  Piriformis stretch                                3x30" B   LTR's:                                                 2 minutes w/red Swiss ball  Reverse crunches                              2 minutes w/red Swiss ball    Bridges                                               3x10 B, Red TB across hips  Hip ADD ball squeezes:                     2x15, 5" holds w/small yellow ball  SLR's                                                  1# 2x10 " "B  Sidelying Hip ABD                               3x10 B  Sidelying Clams:                                 3x10,  Green TB     Seated:  Transverse abdominus:                      5"x15  LAQ                                                     2x15 B Seated in chair, 2#  Marching                                              2x15 B Seated in chair 2#     Standing:   Mini Squats:                                          2x12  Steamboats:                                         2x12 2#   Step ups:                                              2x12 L2, 2#      Cybex Leg Press:                                 6.0 plates; 3x10 DL        Home Exercises Provided and Patient Education Provided     Education provided:   - Importance of daily HEP. Recommend patinet stretch before bed to alleviate LE spasms at night    Written Home Exercises Provided: Patient instructed to cont prior HEP.  Exercises were reviewed and Vince was able to demonstrate them prior to the end of the session.  Vince demonstrated fair  understanding of the education provided.     See EMR under Patient Instructions for exercises provided 1/16/2020.      Assessment   Patient able to tolerate increased activity today with trial of added weight and reps as noted. Reports "tired" after treatment.  No complaints of pain.    Vince is progressing well towards his goals.   Pt prognosis is Fair.     Pt will continue to benefit from skilled outpatient physical therapy to address the deficits listed in the problem list box on initial evaluation, provide pt/family education and to maximize pt's level of independence in the home and community environment.     Pt's spiritual, cultural and educational needs considered and pt agreeable to plan of care and goals.    Anticipated barriers to physical therapy: Co-morbidities    Previous Short Term Goals (3 Weeks):  1. Pt will be compliant with HEP to supplement PT in restoring pain free function.Progressing, Not Met  2. Pt will " improve impaired LE MMTs to >/= 4/5 to improve dynamic hip support for functional tasks.Progressing, Not Met  3. Pt will report pain at worse in hips to less than or equal to 6/10 in order to improve ability to perform functional tasks. Progressing, Not Met  4. Pt will participate in stair navigation with decreased pain, navigating greater than or equal to 4 stairs with UE support, to improve functional mobility. Progressing, Not Met     New Short Term Goals: Continue per initial POC.     Long Term Goals (6 Weeks):  1. Pt will improve FOTO score to </= 59% limited to decrease perceived limitation with mobility. Progressing, Not Met; 69% limited 2/27/2020  2. Pt will improve impaired LE MMTs to >/= 4+/5 to improve dynamic hip support for functional tasks.Progressing, Not Met  3. Pt will perform at least 12 sit to stands without UE support on 30 second sit to stand test in order to demo improved ability to perform transfers.Progressing, Not Met  4. Pt to perform stair navigation with UE assistance and reciprocal pattern to promote safe functional mobility at home.Progressing, Not Met  5. Pt will report no pain during lumbar ROM to promote functional mobility. Progressing, Not Met     Long Term Goal Status:   continue per initial plan of care.  Reasons for Recertification of Therapy:   Expiration of current POC       Plan     Continue PT per updated POC to progress with LE strengthening.    Ro Butler, PTA

## 2020-03-04 NOTE — PLAN OF CARE
Outpatient Therapy Updated Plan of Care     Visit Date: 2/27/2020    Name: Vince Martinez  Clinic Number: [id number]    Therapy Diagnosis:   Encounter Diagnoses   Name Primary?    Weakness of both legs     Decreased ROM of lumbar spine     Chronic bilateral low back pain without sciatica      Physician: Florecita Spencer PA-C    Physician Orders: PT Eval and Treat   Medical Diagnosis from Referral:   M70.61,M70.62 (ICD-10-CM) - Trochanteric bursitis of both hips   M76.30 (ICD-10-CM) - It band syndrome, unspecified laterality      Evaluation Date: 1/16/2020  Current Certification Period:  1/16/2020 to 3/6/2020  Authorization Period Expiration: 3/1/2020  Plan of Care Expiration: 1/16/2020 to 3/6/2020  Visit # / Visits authorized: 8/12    Precautions: History of recent Bypass surgery  Functional Level Prior to Evaluation:  Independent    Subjective     Update:   Pt reports: that he's not doing too well. States he had injections in both hips about a week ago and is currently having increased pain in his left hip. States he thinks the therapy was helping prior to the injections, however since the injections he's been having pain with movement. Still has been unable to perform HEP regularly due to the pain.  He was partially compliant with home exercise program.  Response to previous treatment: no adverse reactions  Functional change: Ongoing     Pain: 3/10  Location: bilateral hips      Objective     Update:   Range of Motion/Strength:      Thoracolumbar AROM Pain/Dysfunction with Movement   Flexion 55     Extension 10     Right side bending 8 No pain in right hip   Left side bending 10 Pain in left hip   Right rotation 32     Left rotation 25     *denotes pain with movement     Hip Right Left Pain/Dysfunction with Movement   AROM/PROM         flexion WFL WFL     extension WFL WFL     abduction WFL WFL     adduction WFL WFL     Internal rotation  Severely limited Severely limited  No pain    External rotation   Moderately limited Moderately limited Pain in bilateral hips (L>R) and low back with PROM testing       L/E MMT Right Left Pain/Dysfunction with Movement   Hip Flexion 4/5 4/5     Hip Extension 3+/5 3+/5     Hip Abduction 5/5 4+/5     Hip Adduction NT NT     Hip IR 5/5 5/5     Hip ER 5/5 5/5     Knee Flexion 5/5 5/5     Knee Extension 5/5 5/5     Ankle DF 5/5 5/5     Ankle PF 5/5 5/5     Ankle Inversion 5/5 5/5     Ankle Eversion 5/5 5/5           Special Tests: Scouring: (-) on right, (-) on left     30 second sit-to-stand test (without U/E support): 8 with BUE support           CMS Impairment/Limitation/Restriction for FOTO Hip Survey     Therapist reviewed FOTO scores for Vince Martinez on 2/27/2020.   FOTO documents entered into [employer] - see Media section.     Current Limitation Score: 69%  Prediction Limitation Score: 59%           Assessment     Update: Vince presented to therapy with complaints of increased pain in left hip following injections. He states he believes that it's nerve pain. Upon objective measurements patient has only made minimal improvements, but scores could be skewed due to pain from injections. Will reassess in a few more visits. Patient to continue therapy, however if no improvements are made, patient will return to MD.      Previous Short Term Goals (3 Weeks):  1. Pt will be compliant with HEP to supplement PT in restoring pain free function.Progressing, Not Met  2. Pt will improve impaired LE MMTs to >/= 4/5 to improve dynamic hip support for functional tasks.Progressing, Not Met  3. Pt will report pain at worse in hips to less than or equal to 6/10 in order to improve ability to perform functional tasks. Progressing, Not Met  4. Pt will participate in stair navigation with decreased pain, navigating greater than or equal to 4 stairs with UE support, to improve functional mobility. Progressing, Not Met    New Short Term Goals: Continue per initial POC.     Long Term Goals (6 Weeks):  1.  Pt will improve FOTO score to </= 59% limited to decrease perceived limitation with mobility. Progressing, Not Met; 69% limited 2/27/2020  2. Pt will improve impaired LE MMTs to >/= 4+/5 to improve dynamic hip support for functional tasks.Progressing, Not Met  3. Pt will perform at least 12 sit to stands without UE support on 30 second sit to stand test in order to demo improved ability to perform transfers.Progressing, Not Met  4. Pt to perform stair navigation with UE assistance and reciprocal pattern to promote safe functional mobility at home.Progressing, Not Met  5. Pt will report no pain during lumbar ROM to promote functional mobility. Progressing, Not Met    Long Term Goal Status:   continue per initial plan of care.  Reasons for Recertification of Therapy:   Expiration of current POC.     Plan     Updated Certification Period: 2/27/2020 to 4/10/2020  Recommended Treatment Plan: 2 times per week for 4 weeks: Cervical/Lumbar Traction, Electrical Stimulation , FDN prn, Gait Training, Manual Therapy, Moist Heat/ Ice, Neuromuscular Re-ed, Patient Education, Therapeutic Activites, Therapeutic Exercise, Ultrasound and Kinesiotape prn  Other Recommendations: Patient to return to doctor if he shows no improvements over the next few sessions.     Radha Martinez, PT  2/27/2020      I CERTIFY THE NEED FOR THESE SERVICES FURNISHED UNDER THIS PLAN OF TREATMENT AND WHILE UNDER MY CARE    Physician's comments:        Physician's Signature: ___________________________________________________

## 2020-03-09 ENCOUNTER — CLINICAL SUPPORT (OUTPATIENT)
Dept: REHABILITATION | Facility: HOSPITAL | Age: 85
End: 2020-03-09
Payer: MEDICARE

## 2020-03-09 DIAGNOSIS — G89.29 CHRONIC BILATERAL LOW BACK PAIN WITHOUT SCIATICA: ICD-10-CM

## 2020-03-09 DIAGNOSIS — M53.86 DECREASED ROM OF LUMBAR SPINE: ICD-10-CM

## 2020-03-09 DIAGNOSIS — R29.898 WEAKNESS OF BOTH LEGS: ICD-10-CM

## 2020-03-09 DIAGNOSIS — M54.50 CHRONIC BILATERAL LOW BACK PAIN WITHOUT SCIATICA: ICD-10-CM

## 2020-03-09 PROCEDURE — 97110 THERAPEUTIC EXERCISES: CPT | Mod: PN,CQ

## 2020-03-09 NOTE — PROGRESS NOTES
"   Physical Therapy Daily Treatment Note     Name: Vince Martinez  Clinic Number: [id number]    Therapy Diagnosis:   Encounter Diagnoses   Name Primary?    Weakness of both legs     Decreased ROM of lumbar spine     Chronic bilateral low back pain without sciatica      Physician: Florecita Spencer PA-C  Visit Date: 3/9/2020    Physician Orders: PT Eval and Treat   Medical Diagnosis from Referral:   M70.61,M70.62 (ICD-10-CM) - Trochanteric bursitis of both hips   M76.30 (ICD-10-CM) - It band syndrome, unspecified laterality      Evaluation Date: 1/16/2020  Authorization Period Expiration: 3/1/2020  Plan of Care Expiration: 2/27/2020 to 4/10/2020  Visit # / Visits authorized: 10/12  FOTO: 10/10  NEXT    Visit:   121.28  Total: 862.62    Time In: 3:05 PM  Time Out: 4:05 PM  Total Billable Time: 30 minutes (4 TE)   Precautions: History of recent Bypass surgery    Subjective     Pt reports: his pain is minimal.  He was partially compliant with home exercise program.  Response to previous treatment: no adverse reactions  Functional change: Ongoing    Pain: 2/10  Location: bilateral hips      Objective     Vince received therapeutic exercises to develop strength, endurance, ROM, flexibility, posture and core stabilization for 30 minutes including:    Supine:   TA                                                       15x5" hold   Brace march                                      1 minute x 2 trials  Piriformis stretch                                3x30" B   LTR's:                                                 2 minutes w/red Swiss ball  Reverse crunches                              2 minutes w/red Swiss ball    Bridges                                               3x10 B, Red TB across hips  Hip ADD ball squeezes:                     2x15, 5" holds w/small yellow ball  SLR's                                                  1# 2x10 B  Sidelying Hip ABD                               2x10x1# B  Sidelying " "Clams:                                 3x10,  Green TB     Seated:  Transverse abdominus:                      5"x15  LAQ                                                     2x15 B Seated in chair, 2#  Marching                                              2x15 B Seated in chair 2#     Standing:   Mini Squats:                                          2x12  Steamboats:                                         2x15 2#   Step ups:                                              2x12 L2, 2#      Cybex Leg Press:                                 6.0 plates; 3x10 DL - NOT TODAY       Home Exercises Provided and Patient Education Provided     Education provided:   - Importance of daily HEP. Recommend patinet stretch before bed to alleviate LE spasms at night    Written Home Exercises Provided: Patient instructed to cont prior HEP.  Exercises were reviewed and Vince was able to demonstrate them prior to the end of the session.  Vince demonstrated fair  understanding of the education provided.     See EMR under Patient Instructions for exercises provided 1/16/2020.      Assessment   Patient performed above exercise program with verbal cueing for proper technique and had no difficulty with today's progressions.  Reports "tired" after treatment.  No complaints of pain.  Patient did not perform Cybex machine today due to extra time needed for today's progressions.     Vince is progressing well towards his goals.   Pt prognosis is Fair.     Pt will continue to benefit from skilled outpatient physical therapy to address the deficits listed in the problem list box on initial evaluation, provide pt/family education and to maximize pt's level of independence in the home and community environment.     Pt's spiritual, cultural and educational needs considered and pt agreeable to plan of care and goals.    Anticipated barriers to physical therapy: Co-morbidities    Previous Short Term Goals (3 Weeks):  1. Pt will be compliant with HEP to " supplement PT in restoring pain free function.Progressing, Not Met  2. Pt will improve impaired LE MMTs to >/= 4/5 to improve dynamic hip support for functional tasks.Progressing, Not Met  3. Pt will report pain at worse in hips to less than or equal to 6/10 in order to improve ability to perform functional tasks. Progressing, Not Met  4. Pt will participate in stair navigation with decreased pain, navigating greater than or equal to 4 stairs with UE support, to improve functional mobility. Progressing, Not Met     New Short Term Goals: Continue per initial POC.     Long Term Goals (6 Weeks):  1. Pt will improve FOTO score to </= 59% limited to decrease perceived limitation with mobility. Progressing, Not Met; 69% limited 2/27/2020  2. Pt will improve impaired LE MMTs to >/= 4+/5 to improve dynamic hip support for functional tasks.Progressing, Not Met  3. Pt will perform at least 12 sit to stands without UE support on 30 second sit to stand test in order to demo improved ability to perform transfers.Progressing, Not Met  4. Pt to perform stair navigation with UE assistance and reciprocal pattern to promote safe functional mobility at home.Progressing, Not Met  5. Pt will report no pain during lumbar ROM to promote functional mobility. Progressing, Not Met     Long Term Goal Status:   continue per initial plan of care.  Reasons for Recertification of Therapy:   Expiration of current POC       Plan     Continue PT per updated POC to progress with LE strengthening.    Richard Peterson, PTA

## 2020-03-12 ENCOUNTER — CLINICAL SUPPORT (OUTPATIENT)
Dept: REHABILITATION | Facility: HOSPITAL | Age: 85
End: 2020-03-12
Payer: MEDICARE

## 2020-03-12 DIAGNOSIS — M53.86 DECREASED ROM OF LUMBAR SPINE: ICD-10-CM

## 2020-03-12 DIAGNOSIS — M54.50 CHRONIC BILATERAL LOW BACK PAIN WITHOUT SCIATICA: ICD-10-CM

## 2020-03-12 DIAGNOSIS — R29.898 WEAKNESS OF BOTH LEGS: ICD-10-CM

## 2020-03-12 DIAGNOSIS — G89.29 CHRONIC BILATERAL LOW BACK PAIN WITHOUT SCIATICA: ICD-10-CM

## 2020-03-12 PROCEDURE — 97110 THERAPEUTIC EXERCISES: CPT | Mod: PN,CQ

## 2020-03-12 NOTE — PROGRESS NOTES
"   Physical Therapy Daily Treatment Note     Name: Vince Martinez  Clinic Number: [id number]    Therapy Diagnosis:   Encounter Diagnoses   Name Primary?    Weakness of both legs     Decreased ROM of lumbar spine     Chronic bilateral low back pain without sciatica      Physician: Florecita Spencer PA-C  Visit Date: 3/12/2020    Physician Orders: PT Eval and Treat   Medical Diagnosis from Referral:   M70.61,M70.62 (ICD-10-CM) - Trochanteric bursitis of both hips   M76.30 (ICD-10-CM) - It band syndrome, unspecified laterality      Evaluation Date: 1/16/2020  Authorization Period Expiration: 5/01/2020  Plan of Care Expiration: 2/27/2020 to 4/10/2020  Visit # / Visits authorized: 11/20  FOTO: 10/10  NEXT    Visit:   121.28  Total: 983.90    Time In: 3:05 PM  Time Out: 4:05 PM  Total Billable Time: 60 minutes (4 TE)   Precautions: History of recent Bypass surgery    Subjective     Pt reports: his pain is minimal.  He was partially compliant with home exercise program.  Response to previous treatment: no adverse reactions  Functional change: Ongoing    Pain: 2/10  Location: bilateral hips      Objective     Vince received therapeutic exercises to develop strength, endurance, ROM, flexibility, posture and core stabilization for  minutes including:    Supine:   TA                                                       15x5" hold   Brace march                                      1 minute x 2 trials  Piriformis stretch                                3x30" B   LTR's:                                                 2 minutes w/red Swiss ball  Reverse crunches                              2 minutes w/red Swiss ball    Bridges                                               3x10 B, Red TB across hips  Hip ADD ball squeezes:                     2x15, 5" holds w/small yellow ball  SLR's                                                  2# 3x10 B  Sidelying Hip ABD                               2# 2x10 B  Sidelying " "Clams:                                 Blue T Band 2x10 B     Seated:  Transverse abdominus:                      5"x20  LAQ                                                     2x15 B Seated in chair, 2#  Marching                                              2x15 B Seated in chair 2#     Standing:   Mini Squats:                                          2x12  Steamboats:                                         2x15 2#   Step ups:                                              2x15 L2, 2#      Cybex Leg Press:                                 6.0 plates; 2x15 DL       Home Exercises Provided and Patient Education Provided     Education provided:   - Importance of daily HEP. Recommend patinet stretch before bed to alleviate LE spasms at night    Written Home Exercises Provided: Patient instructed to cont prior HEP.  Exercises were reviewed and Vince was able to demonstrate them prior to the end of the session.  Vince demonstrated fair  understanding of the education provided.     See EMR under Patient Instructions for exercises provided 1/16/2020.      Assessment   Patient performed above exercise program with verbal cueing for proper technique and had no difficulty with today's progressions.  Reports continued complaints of "2/10" pain after treatment..     Vince is progressing well towards his goals.   Pt prognosis is Fair.     Pt will continue to benefit from skilled outpatient physical therapy to address the deficits listed in the problem list box on initial evaluation, provide pt/family education and to maximize pt's level of independence in the home and community environment.     Pt's spiritual, cultural and educational needs considered and pt agreeable to plan of care and goals.    Anticipated barriers to physical therapy: Co-morbidities    Previous Short Term Goals (3 Weeks):  1. Pt will be compliant with HEP to supplement PT in restoring pain free function.Progressing, Not Met  2. Pt will improve impaired LE MMTs to " >/= 4/5 to improve dynamic hip support for functional tasks.Progressing, Not Met  3. Pt will report pain at worse in hips to less than or equal to 6/10 in order to improve ability to perform functional tasks. Progressing, Not Met  4. Pt will participate in stair navigation with decreased pain, navigating greater than or equal to 4 stairs with UE support, to improve functional mobility. Progressing, Not Met     New Short Term Goals: Continue per initial POC.     Long Term Goals (6 Weeks):  1. Pt will improve FOTO score to </= 59% limited to decrease perceived limitation with mobility. Progressing, Not Met; 69% limited 2/27/2020  2. Pt will improve impaired LE MMTs to >/= 4+/5 to improve dynamic hip support for functional tasks.Progressing, Not Met  3. Pt will perform at least 12 sit to stands without UE support on 30 second sit to stand test in order to demo improved ability to perform transfers.Progressing, Not Met  4. Pt to perform stair navigation with UE assistance and reciprocal pattern to promote safe functional mobility at home.Progressing, Not Met  5. Pt will report no pain during lumbar ROM to promote functional mobility. Progressing, Not Met     Long Term Goal Status:   continue per initial plan of care.  Reasons for Recertification of Therapy:   Expiration of current POC    Plan     Continue PT per updated POC to progress with LE strengthening.    Ro Butler, PTA

## 2020-03-18 ENCOUNTER — TELEPHONE (OUTPATIENT)
Dept: RHEUMATOLOGY | Facility: CLINIC | Age: 85
End: 2020-03-18

## 2020-03-19 ENCOUNTER — TELEPHONE (OUTPATIENT)
Dept: REHABILITATION | Facility: HOSPITAL | Age: 85
End: 2020-03-19

## 2020-03-19 DIAGNOSIS — M54.50 CHRONIC BILATERAL LOW BACK PAIN WITHOUT SCIATICA: ICD-10-CM

## 2020-03-19 DIAGNOSIS — M53.86 DECREASED ROM OF LUMBAR SPINE: ICD-10-CM

## 2020-03-19 DIAGNOSIS — R29.898 WEAKNESS OF BOTH LEGS: ICD-10-CM

## 2020-03-19 DIAGNOSIS — G89.29 CHRONIC BILATERAL LOW BACK PAIN WITHOUT SCIATICA: ICD-10-CM

## 2020-03-19 NOTE — TELEPHONE ENCOUNTER
Postponed Appointments    Patient: Vince Martinez  Date: 3/19/2020  Diagnosis:   1. Weakness of both legs     2. Decreased ROM of lumbar spine     3. Chronic bilateral low back pain without sciatica       MRN: [id number]    Spoke with patient due to therapy following updates regarding COVID-19 closely and taking every precaution to ensure the safety of our patients, staff and community.  In an abundance of caution and in an effort to help reduce risk and limit community spread, we have decided to temporarily postpone appointments for patients who may be at increased risk to attend in-person therapy or where therapy is not critically needed at this time. Plan of care and home exercise program were reviewed and patient has what they need to continue therapy at home. All patient questions were answered. Patient verbalized understanding to all.      Radha Martinez, PT, DPT  3/19/2020

## 2020-03-20 ENCOUNTER — OFFICE VISIT (OUTPATIENT)
Dept: FAMILY MEDICINE | Facility: CLINIC | Age: 85
End: 2020-03-20
Payer: MEDICARE

## 2020-03-20 VITALS
HEART RATE: 60 BPM | DIASTOLIC BLOOD PRESSURE: 58 MMHG | WEIGHT: 155.19 LBS | TEMPERATURE: 97 F | BODY MASS INDEX: 25.85 KG/M2 | OXYGEN SATURATION: 98 % | SYSTOLIC BLOOD PRESSURE: 138 MMHG | HEIGHT: 65 IN

## 2020-03-20 DIAGNOSIS — I50.42 CHRONIC COMBINED SYSTOLIC AND DIASTOLIC CHF (CONGESTIVE HEART FAILURE): ICD-10-CM

## 2020-03-20 DIAGNOSIS — I10 ESSENTIAL HYPERTENSION: ICD-10-CM

## 2020-03-20 DIAGNOSIS — Z00.00 ANNUAL PHYSICAL EXAM: Primary | ICD-10-CM

## 2020-03-20 DIAGNOSIS — M15.9 PRIMARY OSTEOARTHRITIS INVOLVING MULTIPLE JOINTS: ICD-10-CM

## 2020-03-20 DIAGNOSIS — E11.65 TYPE 2 DIABETES MELLITUS WITH HYPERGLYCEMIA, WITHOUT LONG-TERM CURRENT USE OF INSULIN: ICD-10-CM

## 2020-03-20 PROCEDURE — 99204 PR OFFICE/OUTPT VISIT, NEW, LEVL IV, 45-59 MIN: ICD-10-PCS | Mod: S$GLB,,, | Performed by: FAMILY MEDICINE

## 2020-03-20 PROCEDURE — 99204 OFFICE O/P NEW MOD 45 MIN: CPT | Mod: S$GLB,,, | Performed by: FAMILY MEDICINE

## 2020-03-20 PROCEDURE — 99499 UNLISTED E&M SERVICE: CPT | Mod: S$GLB,,, | Performed by: FAMILY MEDICINE

## 2020-03-20 PROCEDURE — 99999 PR PBB SHADOW E&M-EST. PATIENT-LVL IV: ICD-10-PCS | Mod: PBBFAC,,, | Performed by: FAMILY MEDICINE

## 2020-03-20 PROCEDURE — 99499 RISK ADDL DX/OHS AUDIT: ICD-10-PCS | Mod: S$GLB,,, | Performed by: FAMILY MEDICINE

## 2020-03-20 PROCEDURE — 99999 PR PBB SHADOW E&M-EST. PATIENT-LVL IV: CPT | Mod: PBBFAC,,, | Performed by: FAMILY MEDICINE

## 2020-03-20 RX ORDER — TRAMADOL HYDROCHLORIDE 50 MG/1
50 TABLET ORAL EVERY 12 HOURS PRN
Qty: 40 TABLET | Refills: 0 | Status: SHIPPED | OUTPATIENT
Start: 2020-03-20 | End: 2020-07-14 | Stop reason: SDUPTHER

## 2020-03-20 RX ORDER — DICLOFENAC SODIUM 10 MG/G
2 GEL TOPICAL DAILY PRN
Qty: 100 G | Refills: 1 | Status: SHIPPED | OUTPATIENT
Start: 2020-03-20 | End: 2020-06-09

## 2020-03-20 NOTE — PROGRESS NOTES
Subjective:       Patient ID: Vince Martinez is a 89 y.o. male.    Chief Complaint: Establish Care and Annual Exam    89 years old male came to the clinic for his physical examination.  Blood pressure today stable.  No chest pain, palpitation, orthopnea or PND.  Last A1c was 7.6.  No polyuria, polydipsia polyphagia.  Patient was follow-up via private  endocrinologist .  Patient reports multiple joints pains especially over both hips.  The pain is 6/10 of intensity on and off aggravated with activity and better with rest.  Patient previously diagnosed with congestive heart failure no recent flare-ups.    Review of Systems   Constitutional: Negative.    HENT: Negative.    Eyes: Negative.    Respiratory: Negative.    Cardiovascular: Negative.  Negative for chest pain, palpitations and leg swelling.   Gastrointestinal: Negative.    Endocrine: Negative for polydipsia, polyphagia and polyuria.   Genitourinary: Negative.    Musculoskeletal: Positive for arthralgias.   Skin: Negative.    Neurological: Negative.    Psychiatric/Behavioral: Negative.        Objective:      Physical Exam   Constitutional: He is oriented to person, place, and time. He appears well-developed and well-nourished. No distress.   HENT:   Head: Normocephalic and atraumatic.   Right Ear: External ear normal.   Left Ear: External ear normal.   Nose: Nose normal.   Mouth/Throat: Oropharynx is clear and moist. No oropharyngeal exudate.   Eyes: Pupils are equal, round, and reactive to light. Conjunctivae and EOM are normal. Right eye exhibits no discharge. Left eye exhibits no discharge. No scleral icterus.   Neck: Normal range of motion. Neck supple. No JVD present. No tracheal deviation present. No thyromegaly present.   Cardiovascular: Normal rate, regular rhythm, normal heart sounds and intact distal pulses. Exam reveals no gallop and no friction rub.   No murmur heard.  Pulmonary/Chest: Effort normal and breath sounds normal. No stridor. No  respiratory distress. He has no wheezes. He has no rales. He exhibits no tenderness.   Abdominal: Soft. Bowel sounds are normal. He exhibits no distension and no mass. There is no tenderness. There is no rebound and no guarding.   Musculoskeletal: Normal range of motion. He exhibits no edema.        Right hip: He exhibits tenderness.        Left hip: He exhibits tenderness.   Lymphadenopathy:     He has no cervical adenopathy.   Neurological: He is alert and oriented to person, place, and time. He has normal reflexes. He displays normal reflexes. No cranial nerve deficit. He exhibits normal muscle tone. Coordination normal.   Skin: Skin is warm and dry. No rash noted. He is not diaphoretic. No erythema. No pallor.   Psychiatric: He has a normal mood and affect. His behavior is normal. Judgment and thought content normal.   Nursing note and vitals reviewed.      Assessment:       1. Annual physical exam    2. Essential hypertension    3. Type 2 diabetes mellitus with hyperglycemia, without long-term current use of insulin    4. Primary osteoarthritis involving multiple joints    5. Chronic combined systolic and diastolic CHF (congestive heart failure)        Plan:         Vince was seen today for establish care and annual exam.    Diagnoses and all orders for this visit:    Annual physical exam  -     Comprehensive metabolic panel; Future  -     Lipid panel; Future  -     Hemoglobin A1c; Future  -     TSH; Future    Essential hypertension  -     Comprehensive metabolic panel; Future  -     Lipid panel; Future  -     TSH; Future  -     Urinalysis; Future    Type 2 diabetes mellitus with hyperglycemia, without long-term current use of insulin  -     Comprehensive metabolic panel; Future  -     Lipid panel; Future  -     Hemoglobin A1c; Future    Primary osteoarthritis involving multiple joints  -     diclofenac sodium (VOLTAREN) 1 % Gel; Apply 2 g topically daily as needed.  -     traMADoL (ULTRAM) 50 mg tablet; Take 1  tablet (50 mg total) by mouth every 12 (twelve) hours as needed for Pain.    Chronic combined systolic and diastolic CHF (congestive heart failure)  -     Comprehensive metabolic panel; Future  -     TSH; Future    Continue monitoring blood pressure at home, low sodium diet.

## 2020-03-20 NOTE — PATIENT INSTRUCTIONS
Diabetes: Activity Tips    Being more active can help you manage your diabetes. The tips on this sheet can help you get the most from your exercise. They can also help you stay safe.  Staying Active  Its important for adults to spend less time sitting and being inactive. This is especially true if you have type 2 diabetes. When you are sitting for long periods of time, get up for short sessions of light activity every 30 minutes.  You should aim for at least 150 minutes a week of exercise or physical activity. Dont let more than 2 days go by without being active.  Benefit from briskness  Brisk activity gets your heart beating faster. This can help you increase your fitness, lose extra weight, and manage your blood sugar level. Try brisk walking. Or, if you have foot or leg problems, you can try swimming or bike riding. You can break up your exercise into chunks throughout the day. Work up to at least 30 minutes of steady, brisk exercise on most days.  Warm up and cool down  Warming up and cooling down reduce your risk of injury. They also help limit muscle soreness. Do a mild version of your activity for 5 minutes before and after your routine. You can also learn stretches that will help keep your muscles loose. Your healthcare provider may show you good ways to warm up and stretch.  Do the talk-sing test  The talk-sing test is a simple way to tell how hard youre exercising. If you can talk while exercising, youre in a safe range. If youre out of breath, slow down. If you can carry a tune, its time to  the pace. Walk up a hill. Increase the resistance on your stationary bike. Or swim faster.  What about eating?  You may be told to plan your exercise for 1 to 2 hours after a meal. In most cases, you dont need to eat while being active. If you take insulin or medicine that can cause low blood sugar, test your blood sugar before exercising. And carry a fast-acting sugar that will raise your blood sugar  level quickly. This includes glucose tablets or hard candy. Use it if you feel low blood sugar symptoms.  Safety tips  These tips can help you stay safe as you become fit:  · Exercise with a friend or carry a cell phone if you have one.  · Carry or wear identification, such as a necklace or bracelet, that says you have diabetes.  · Use the proper footwear and safety equipment for your activity.  · Drink water before, during, and after exercise.  · Dress properly for the weather.  · Dont exercise in very hot or very cold weather.  · Dont exercise if you are sick.  · If you are instructed to do so, test your blood sugar before and after you exercise. Have a small carbohydrate snack if your blood sugar is low before you start exercising.   When to stop exercising and call your healthcare provider  Stop exercising and call your healthcare provider right away if you notice any of the following:  · Pain, pressure, tightness, or heaviness in the chest  · Pain or heaviness in the neck, shoulders, back, arms, legs, or feet  · Unusual shortness of breath  · Dizziness or lightheadedness  · Unusually rapid or slow pulse  · Increased joint or muscle pain  · Nausea or vomiting  Date Last Reviewed: 5/1/2016  © 2760-7747 [employer]. [address] [place] [address], [place] [address]. All rights reserved. This information is not intended as a substitute for professional medical care. Always follow your healthcare professional's instructions.

## 2020-04-15 ENCOUNTER — OFFICE VISIT (OUTPATIENT)
Dept: URGENT CARE | Facility: CLINIC | Age: 85
End: 2020-04-15
Payer: MEDICARE

## 2020-04-15 ENCOUNTER — TELEPHONE (OUTPATIENT)
Dept: FAMILY MEDICINE | Facility: CLINIC | Age: 85
End: 2020-04-15

## 2020-04-15 VITALS
BODY MASS INDEX: 24.16 KG/M2 | HEART RATE: 96 BPM | OXYGEN SATURATION: 96 % | HEIGHT: 65 IN | WEIGHT: 145 LBS | TEMPERATURE: 98 F

## 2020-04-15 DIAGNOSIS — Z51.89 VISIT FOR WOUND CHECK: Primary | ICD-10-CM

## 2020-04-15 PROCEDURE — 99212 OFFICE O/P EST SF 10 MIN: CPT | Mod: S$GLB,,, | Performed by: INTERNAL MEDICINE

## 2020-04-15 PROCEDURE — 99212 PR OFFICE/OUTPT VISIT, EST, LEVL II, 10-19 MIN: ICD-10-PCS | Mod: S$GLB,,, | Performed by: INTERNAL MEDICINE

## 2020-04-15 NOTE — TELEPHONE ENCOUNTER
----- Message from Antonio Cuevas sent at 4/15/2020  5:05 PM CDT -----  Contact: son [phone]  Name of Caller   Reason for Visit/Symptoms fell Sunday  Best Contact Number or Confirm if Mychart Preferred [phone]  Preferred Date/Time of Appointment tomorrow   Interested in Virtual Visit (yes/no)  Additional Information

## 2020-04-15 NOTE — TELEPHONE ENCOUNTER
Spoke with son Vince ortiz. Reports father fell on Sunday but did not tell anyone in the family. This evening he noticed his father was limping and asked what was wrong and pt then reported he fell on Sunday. Son reports father has a red spot below knee with fluid. Informed to bring father to the urgent care on [address] for evaluation and poss xray's.  Son reports his parents do not have a phone or computer to access My [facility] or My chart portal.

## 2020-04-15 NOTE — PROGRESS NOTES
"Subjective:       Patient ID: Vince Martinez is a 89 y.o. male.    Vitals:  height is 5' 5" (1.651 m) and weight is 65.8 kg (145 lb). His tympanic temperature is 97.7 °F (36.5 °C). His pulse is 96. His oxygen saturation is 96%.     Chief Complaint: Wound Check   Patient has an approximately 3x6 abrasion on right shin. patient tripped on ramp and fell down and scrapped shin on aluminium, approximately 6 days ago. Patient is here concerned about infection.    Constitution: Negative for chills, fatigue and fever.   HENT: Negative for congestion and sore throat.    Neck: Negative for painful lymph nodes.   Cardiovascular: Negative for chest pain and leg swelling.   Eyes: Negative for double vision and blurred vision.   Respiratory: Negative for cough and shortness of breath.    Gastrointestinal: Negative for nausea, vomiting and diarrhea.   Genitourinary: Negative for dysuria, frequency and urgency.   Musculoskeletal: Negative for joint pain, joint swelling, muscle cramps and muscle ache.   Skin: Positive for abrasion. Negative for color change, pale and rash.   Allergic/Immunologic: Negative for seasonal allergies.   Neurological: Negative for dizziness, history of vertigo, light-headedness, passing out and headaches.   Hematologic/Lymphatic: Negative for swollen lymph nodes, easy bruising/bleeding and history of blood clots. Does not bruise/bleed easily.   Psychiatric/Behavioral: Negative for nervous/anxious, sleep disturbance and depression. The patient is not nervous/anxious.        Objective:      Physical Exam    Wound on right leg clean, dry without evidence of infection. Slightly erythematous borders with no fluctuance or tenderness.   Assessment:       1. Visit for wound check        Plan:         Visit for wound check    Healing well. No evidence of infection. Recommended continued daily dressing changes with neosporin use. Signs and symptoms of an infection reviewed. Patient and son expressed understanding. "

## 2020-04-23 ENCOUNTER — TELEPHONE (OUTPATIENT)
Dept: FAMILY MEDICINE | Facility: CLINIC | Age: 85
End: 2020-04-23

## 2020-04-23 NOTE — TELEPHONE ENCOUNTER
Patient complains of falling and hitting his right leg went to urgent care on 04/15/2020  Patient complains of pain on right leg and has problems walking with that leg he rates his pain at 10 when he walks. He says his legs bleeds on and off. Please advice on what to do.?

## 2020-04-23 NOTE — TELEPHONE ENCOUNTER
Spoke with wife and informed will need to do video visit in order for Dr. Ulloa to see pt leg, if not pt will have to go back to the urgent care clinic. Wife reports pt is doing much better now and not necessary to have appt. Instructed wife to call office if any changes in pt condition. Wife voices understanding.

## 2020-04-23 NOTE — TELEPHONE ENCOUNTER
Lets see if we can do  a virtual visit so I am able to see the wound and the area affected.    If not possible patient needs to go to the urgent care again for direct visualization to consider additional testing.

## 2020-04-23 NOTE — TELEPHONE ENCOUNTER
----- Message from Coreen SHORTJohn Paul Dugan sent at 4/23/2020 11:28 AM CDT -----  Contact: [phone] self - [place]   Name of Caller: Vince  Reason for Visit/Symptoms: Infected cut  Best Contact Number or Confirm if Mychart Preferred: [phone]  Preferred Date/Time of Appointment: soon as possible   Interested in Virtual Visit: No  Additional Information: Pt states that he fell yesterday and cut his leg.

## 2020-04-28 ENCOUNTER — OFFICE VISIT (OUTPATIENT)
Dept: PAIN MEDICINE | Facility: CLINIC | Age: 85
End: 2020-04-28
Payer: MEDICARE

## 2020-04-28 DIAGNOSIS — M54.16 LUMBAR RADICULOPATHY: ICD-10-CM

## 2020-04-28 DIAGNOSIS — M48.061 NEUROFORAMINAL STENOSIS OF LUMBAR SPINE: ICD-10-CM

## 2020-04-28 DIAGNOSIS — M51.36 DDD (DEGENERATIVE DISC DISEASE), LUMBAR: ICD-10-CM

## 2020-04-28 DIAGNOSIS — M48.062 SPINAL STENOSIS OF LUMBAR REGION WITH NEUROGENIC CLAUDICATION: Primary | ICD-10-CM

## 2020-04-28 DIAGNOSIS — M47.816 LUMBAR SPONDYLOSIS: ICD-10-CM

## 2020-04-28 PROBLEM — M51.369 DDD (DEGENERATIVE DISC DISEASE), LUMBAR: Status: ACTIVE | Noted: 2020-04-28

## 2020-04-28 PROCEDURE — 99999 PR PBB SHADOW E&M-EST. PATIENT-LVL II: ICD-10-PCS | Mod: PBBFAC,,, | Performed by: PAIN MEDICINE

## 2020-04-28 PROCEDURE — 99441 PR PHYSICIAN TELEPHONE EVALUATION 5-10 MIN: CPT | Mod: 95,,, | Performed by: PAIN MEDICINE

## 2020-04-28 PROCEDURE — 99441 PR PHYSICIAN TELEPHONE EVALUATION 5-10 MIN: ICD-10-PCS | Mod: 95,,, | Performed by: PAIN MEDICINE

## 2020-04-28 PROCEDURE — 99999 PR PBB SHADOW E&M-EST. PATIENT-LVL II: CPT | Mod: PBBFAC,,, | Performed by: PAIN MEDICINE

## 2020-04-28 NOTE — PROGRESS NOTES
[facility] Established Patient Evaluation  Telemedicine (Audio Only) Encounter    Telephone Bundle:  This visit was completed during the Coronavirus Crisis to enhance patient safety.  The patient location is: patient's home  The chief complaint leading to consultation is: Back Pain and Leg Pain  Visit type: Virtual visit with synchronous audio only  Total time spent with patient: 8 minutes  Each patient to whom he or she provides medical services by telemedicine is:    (1) informed of the relationship between the physician and patient and the respective role of any other health care provider with respect to management of the patient and  (2) notified that he or she may decline to receive medical services by telephone and may withdraw from such care at any time.    Referred by: Randy Riggs MD  Reason for referral:  Lumbar degenerative disc disease and      Spinal stenosis of lumbar region without neurogenic claudication    CC:   Chief Complaint   Patient presents with    Back Pain    Leg Pain     Last 3 PDI Scores 2/21/2020 2/3/2020   Pain Disability Index (PDI) 60 14     Interval Update:  4/28/20 - Mr. Martinez returns to clinic for follow up visit reporting stable back, thigh and leg pain.  Pain intensity is currently 3/10 ranging up to 9/10 with walking or standing.  Sleeping positions exacerbate the pain too.    2/21/2020 - Mr. Martinez returns to clinic for follow up visit reporting worse hip pain.  Patient is s/p Bilateral Greater Trochanter Bursa Injection on 2/13/20 with 0% relief. Patient presents with son for CV, he is reporting right hip pain relief for 6 days about 50%, he reports his left hip pain continues he did not find that the injection helped his left hip pain he continues to have left hip pain with radiating lateral left leg pain to his knee he reports his pain as throbbing.  He continues in physical therapy, again denies performing home exercises because of increased pain. His son  reports to me that he spends 2-3 hours sitting in a chair while at home because he is a .  I recommended consistent movement at least every half hour while at home as well as continuing physical therapy.   Pain intensity is currently 10/10.      Background / HPI:   Vince Martinez is a 89 y.o. male who presents today with multiple complaints.  His chief complaint is bilateral hip pain, which was diagnosed as greater trochanteric bursitis, bilaterally, by my colleagues in Orthopedic surgery.  He received a bilateral GTB injection which provided him relief for approximately 2 weeks.  He has been in physical therapy, but denies performing home exercises because of increasing pain.  Hip pain radiates down the lateral thigh stopping at the lateral aspect of the knee, bilaterally.  His secondary complaint is chronic low back pain, which is nonradiating.  Pain is felt more with certain positions of the low back, specifically, extension.  Pain is partially alleviated by lumbar flexion.  He has a similar complaint of chronic neck pain exacerbated by extension, and improved by flexion.    Location: Lumbar   Onset: six months ago  Current Pain Score: 2/10  Daily Pain of Range: 2-9/10  Quality: Burning and Deep  Radiation: Down both legs to knees  Worsened by: exercise, lying down and walking for more than 10 minutes  Improved by: physical therapy    Previous Therapies:  PT/OT: yes. Last visit on 1/31/20. Patient states he goes 2x a week.   HEP: Yes   Interventions:   Surgery:Yes 30 years ago. Patient not sure what surgery was done on his back for herniated discs.   Medications:   - NSAIDS:   - MSK Relaxants:   - TCAs:   - SNRIs:   - Topicals:   - Anticonvulsants:  - Opioids:     Current Pain Medications:  1. Tylenol PM      Review of Systems:  Review of Systems   Constitutional: Negative for chills and fever.   HENT: Negative for nosebleeds.    Eyes: Negative for blurred vision.   Respiratory: Positive for shortness of  breath. Negative for hemoptysis.    Cardiovascular: Negative for chest pain, palpitations, orthopnea and claudication.   Gastrointestinal: Negative for abdominal pain, diarrhea, heartburn and vomiting.   Genitourinary: Negative for hematuria.   Musculoskeletal: Positive for back pain, joint pain, myalgias and neck pain.   Skin: Negative for rash.   Neurological: Positive for dizziness. Negative for tingling, seizures and loss of consciousness.   Endo/Heme/Allergies: Does not bruise/bleed easily.   Psychiatric/Behavioral: Negative for depression, hallucinations and memory loss. The patient has insomnia. The patient is not nervous/anxious.        History:    Current Outpatient Medications:     diclofenac sodium (VOLTAREN) 1 % Gel, Apply 2 g topically daily as needed., Disp: 100 g, Rfl: 1    fluticasone propionate (FLONASE) 50 mcg/actuation nasal spray, 2 sprays (100 mcg total) by Each Nostril route once daily., Disp: 16 g, Rfl: 11    furosemide (LASIX) 40 MG tablet, Take 1 tablet (40 mg total) by mouth once daily., Disp: 90 tablet, Rfl: 3    glimepiride (AMARYL) 2 MG tablet, Take 2 mg by mouth 3 (three) times daily. , Disp: , Rfl: 1    hydrocortisone 2.5 % cream, APPLY CREAM TO AFFECTED AREA AND BODY ONCE DAILY AS NEEDED., Disp: , Rfl: 0    ipratropium (ATROVENT) 42 mcg (0.06 %) nasal spray, 2 sprays in each nostril 4 times a day., Disp: 90 mL, Rfl: 3    irbesartan (AVAPRO) 300 MG tablet, Take two tablet daily., Disp: 180 tablet, Rfl: 3    ketoconazole (NIZORAL) 2 % shampoo, , Disp: , Rfl:     levothyroxine (SYNTHROID) 100 MCG tablet, , Disp: , Rfl:     loperamide (IMODIUM A-D) 2 mg Tab, Take 2 mg by mouth 4 (four) times daily as needed., Disp: , Rfl:     melatonin 10 mg Cap, Take by mouth., Disp: , Rfl:     metFORMIN (GLUCOPHAGE-XR) 500 MG 24 hr tablet, , Disp: , Rfl:     montelukast (SINGULAIR) 10 mg tablet, Take 1 tablet (10 mg total) by mouth once daily., Disp: 30 tablet, Rfl: 0    omeprazole  (PRILOSEC) 20 MG capsule, Take 20 mg by mouth once daily., Disp: , Rfl:     simvastatin (ZOCOR) 20 MG tablet, , Disp: , Rfl:     tamsulosin (FLOMAX) 0.4 mg Cp24, 0.4 mg once daily. , Disp: , Rfl:     traMADoL (ULTRAM) 50 mg tablet, Take 1 tablet (50 mg total) by mouth every 12 (twelve) hours as needed for Pain., Disp: 40 tablet, Rfl: 0    triamcinolone acetonide 0.1% (KENALOG) 0.1 % cream, Apply topically 2 (two) times daily., Disp: , Rfl:     Past Medical History:   Diagnosis Date    Acute combined systolic and diastolic CHF, NYHA class 3 11/15/2018    Arthritis     Diabetes mellitus     DJD (degenerative joint disease)     Hypertension     Prostate enlargement     Thyroid disease        Past Surgical History:   Procedure Laterality Date    INJECTION OF JOINT Bilateral 2/13/2020    Procedure: Injection, Joint, Bilateral GTB;  Surgeon: Angelica Norris Jr., MD;  Location: [facility];  Service: Pain Management;  Laterality: Bilateral;    PROSTATE SURGERY         No family history on file.    Social History     Socioeconomic History    Marital status:      Spouse name: Not on file    Number of children: Not on file    Years of education: Not on file    Highest education level: Not on file   Occupational History    Not on file   Social Needs    Financial resource strain: Not on file    Food insecurity:     Worry: Not on file     Inability: Not on file    Transportation needs:     Medical: Not on file     Non-medical: Not on file   Tobacco Use    Smoking status: Never Smoker    Smokeless tobacco: Never Used   Substance and Sexual Activity    Alcohol use: No    Drug use: No    Sexual activity: Not on file   Lifestyle    Physical activity:     Days per week: Not on file     Minutes per session: Not on file    Stress: Not on file   Relationships    Social connections:     Talks on phone: Not on file     Gets together: Not on file     Attends Hinduism service: Not on file     Active  member of club or organization: Not on file     Attends meetings of clubs or organizations: Not on file     Relationship status: Not on file   Other Topics Concern    Not on file   Social History Narrative    Not on file       Review of patient's allergies indicates:   Allergen Reactions    Bactrim [sulfamethoxazole-trimethoprim] Rash    Ciprofloxacin Rash    Latex Rash       Physical Exam:  There were no vitals filed for this visit.  General    Nursing note and vitals reviewed.  Constitutional: He is oriented to person, place, and time. He appears well-developed and well-nourished. No distress.   HENT:   Head: Normocephalic and atraumatic.   Nose: Nose normal.   Eyes: Conjunctivae and EOM are normal. Pupils are equal, round, and reactive to light. Right eye exhibits no discharge. Left eye exhibits no discharge. No scleral icterus.   Neck: No JVD present.   Cardiovascular: Intact distal pulses.    Pulmonary/Chest: Effort normal. No respiratory distress.   Abdominal: He exhibits no distension.   Neurological: He is alert and oriented to person, place, and time. Coordination normal.   Psychiatric: He has a normal mood and affect. His behavior is normal. Judgment and thought content normal.     General Musculoskeletal Exam   Gait: normal         Right Hip Exam     Tenderness   The patient tender to palpation of the trochanteric bursa.  Left Hip Exam     Tenderness   The patient tender to palpation of the trochanteric bursa.      Back (L-Spine & T-Spine) / Neck (C-Spine) Exam     Tenderness Posterior midline palpation reveals tenderness of the Upper C-Spine and Lower C-Spine. Right paramedian tenderness of the Lower L-Spine. Left paramedian tenderness of the Lower L-Spine.     Back (L-Spine & T-Spine) Range of Motion   Extension: abnormal Back extension: facet loading is positive and exacerabtes/reproduces the patient's typical low back pain    Flexion: abnormal Back flexion: limited ROM but partial relief of low  back pain noted.     Neck (C-Spine) Range of Motion   Flexion:     Limited  Extension: Limited  Right Lateral Bend: abnormal  Left Lateral Bend: abnormal  Right Rotation: abnormal  Left Rotation: abnormal    Spinal Sensation   Right Side Sensation  C-Spine Level: normal   L-Spine Level: normal  Left Side Sensation  C-Spine Level: normal  L-Spine Level: normal    Neck (C-Spine) Tests   Spurling's Test   Left:  Negative  Right: negative    Other He has no scoliosis .      Muscle Strength   Right Upper Extremity   Biceps: 5/5/5   Deltoid:  5/5  Triceps:  5/5  Wrist extension: 5/5/5   Wrist flexion: 5/5/5   Finger Flexors:  5/5  Finger Extensors:  5/5  Left Upper Extremity  Biceps: 5/5/5   Deltoid:  5/5  Triceps:  5/5  Wrist extension: 5/5/5   Wrist flexion: 5/5/5   Finger Flexors:  5/5  Finger Extensors:  5/5  Right Lower Extremity   Hip Flexion: 5/5   Hip Extensors: 5/5  Quadriceps:  5/5   Hamstrin/5   Gastrocsoleus:  5/5/5  Left Lower Extremity   Hip Flexion: 5/5   Hip Extensors: 5/5  Quadriceps:  5/5   Hamstrin/5   Gastrocsoleus:  5/5/5    Reflexes     Left Side  Biceps:  2+  Quadriceps:  2+  Achilles:  2+    Right Side   Biceps:  2+  Quadriceps:  2+  Achilles:  2+      Imaging:  CT LUMBAR SPINE WITHOUT CONTRAST    CLINICAL HISTORY:  Spinal stenosis, lumbar;  Spinal stenosis, lumbar region without neurogenic claudication    TECHNIQUE:  Low-dose axial, sagittal and coronal reformations are obtained through the lumbar spine.  Contrast was not administered.    COMPARISON:  None available    FINDINGS:  Lumbar vertebral body heights are maintained.  There is mild grade 1 retrolisthesis of L3 on L4.  Multilevel discogenic endplate change with disc height loss, most pronounced at L4-L5.  Scattered lumbar intervertebral disc gas phenomenon.  Limited evaluation of the central canal with suspected mild central canal stenosis at L2-L3 and L4-L5 and mild moderate stenosis at L3-L4.  Partially fused SI joints. There is  multilevel facet arthropathy with variable degree of neural foraminal narrowing ranging from mild to severe, most pronounced on the right at L5-S1.    Limited evaluation of intra-abdominal structures demonstrates abdominal aortic atherosclerosis and left lower pole renal cyst.       Labs:  BMP  Lab Results   Component Value Date     11/20/2018    K 4.4 11/20/2018     11/20/2018    CO2 29 11/20/2018    BUN 23 11/20/2018    CREATININE 1.3 11/20/2018    CALCIUM 9.5 11/20/2018    ANIONGAP 8 11/20/2018    ESTGFRAFRICA 56 (A) 11/20/2018    EGFRNONAA 49 (A) 11/20/2018     Lab Results   Component Value Date    ALT 25 11/20/2018    AST 32 11/20/2018    ALKPHOS 75 11/20/2018    BILITOT 0.5 11/20/2018       Assessment:  Problem List Items Addressed This Visit     None          2/3/2020 - Vince Martinez is a 89 y.o. male with chronic bilateral hip pain secondary to greater trochanteric bursitis and chronic axial spine pain related to facet arthropathy.  There is likely a contribution from retrolisthesis of L3 on L4 with regards to the low back pain. Patient is most concerned about the bilateral hip pain. I educated him on the appropriateness of repeating the bilateral GTB injection, which should not be the primary focus of his treatment.  The bilateral GTB injection can reduce the symptoms of bursitis temporarily; however, long-term treatment of this condition requires physical therapy and home exercise.  Patient has been compliant with physical therapy but noncompliant with recommendations for home exercise and I tried to encourage him to perform his exercises daily.  He appears to understand.    02/21/[id number]-wgrr-old male presents with his son for clinic visit.  Patient status post bilateral GT be injections with reported 0% relief.  he is reporting right hip pain relief for 6 days at about 50%, he reports his left hip pain continues he did not find that the injection helped his left hip pain he continues to have  left hip pain with radiating lateral left leg pain to his knee.  This chronic hip pain is secondary to GTB and chronic axial spine pain related to facet arthropathy.  He reports no low back pain today just continued bilateral hip pain. Discussed that I do not recommend repeat injections today, suggested that he continue with physical therapy and that he establishes a home exercise plan for continued exercises daily.  Discussed that I will also recommend a quad cane to help with rising from a seated position as well as standing for long periods of time considering the patient is the past.  Also suggested that patient does not sit for greater than 30-40 minutes, his son reports to me during our visit that patient has a tendency to sit in his chair at home for 2-3 hours.  Recommended continue physical therapy and follow up in 6 weeks.    4/28/20 - 89-year-old male with symptoms of neurogenic claudication.  I recommended a lumbar epidural steroid injection at L4-5 which will hopefully address a combination of central canal or neural foraminal stenosis at L3-4 and L5-S1.    : Reviewed and consistent with medication use as prescribed.    Treatment Plan:   Procedures:  s/f LESI @ L4-5  PT/OT/HEP:  Continue physical therapy as scheduled and improve compliance with home exercise program  Medications:  Recommended continued use of Tylenol 500 to a 1000 Q 8 as needed for pain not to exceed 3000 mg in 24 hours.   Labs: reviewed and medications are appropriately dosed for current hepatorenal function.  Imaging: No additional recommended at this time.  Functional support:  Ordered a quad cane to assist with mobility and rising and sitting.  Follow Up: RTC within 10 days of DANIII    Angelica Norris Jr, MD  Interventional Pain Medicine / Anesthesiology        Disclaimer: This note was partly generated using dictation software which may occasionally result in transcription errors.

## 2020-04-30 ENCOUNTER — TELEPHONE (OUTPATIENT)
Dept: FAMILY MEDICINE | Facility: CLINIC | Age: 85
End: 2020-04-30

## 2020-04-30 ENCOUNTER — TELEPHONE (OUTPATIENT)
Dept: PAIN MEDICINE | Facility: CLINIC | Age: 85
End: 2020-04-30

## 2020-04-30 DIAGNOSIS — E11.65 TYPE 2 DIABETES MELLITUS WITH HYPERGLYCEMIA, WITHOUT LONG-TERM CURRENT USE OF INSULIN: Primary | ICD-10-CM

## 2020-04-30 DIAGNOSIS — M54.16 LUMBAR RADICULOPATHY: Primary | ICD-10-CM

## 2020-04-30 DIAGNOSIS — Z01.818 PREOP TESTING: Primary | ICD-10-CM

## 2020-04-30 NOTE — TELEPHONE ENCOUNTER
Spoke with pt and informed will have to refer to Podiatry for eval for diabetic shoes. Patient voices understanding.

## 2020-04-30 NOTE — TELEPHONE ENCOUNTER
Pt scheduled for 5/7/20 at 1130 am for LESI. Pt aware to check in at registration desk on the first floor of the hospital for 1030 am. Pt denied taking blood thinners and is diabetic. Reviewed medication list. Pt scheduled covid testing for 5/5/20.

## 2020-04-30 NOTE — TELEPHONE ENCOUNTER
Podiatric referral was ordered for evaluation for diabetes shoes.    Please contact the patient with appointment.

## 2020-04-30 NOTE — TELEPHONE ENCOUNTER
----- Message from Coreen Dugan sent at 4/30/2020  2:52 PM CDT -----  Contact: [phone] self -German   Pt is requesting a order diabetic shoes to be sent to [facility] Fax: [phone] Please advise

## 2020-05-04 ENCOUNTER — TELEPHONE (OUTPATIENT)
Dept: PAIN MEDICINE | Facility: CLINIC | Age: 85
End: 2020-05-04

## 2020-05-04 NOTE — TELEPHONE ENCOUNTER
----- Message from Atiya Jaramillo MA sent at 5/1/2020  1:59 PM CDT -----  Contact: Self [phone]      ----- Message -----  From: Beth Livingston  Sent: 5/1/2020   1:51 PM CDT  To: Myrna Dominguez [facility] Staff    Patient is calling to reschedule his procedure due to the virus.

## 2020-05-19 RX ORDER — IPRATROPIUM BROMIDE 42 UG/1
SPRAY, METERED NASAL
Qty: 90 ML | Refills: 3 | Status: SHIPPED | OUTPATIENT
Start: 2020-05-19 | End: 2020-12-03 | Stop reason: SDUPTHER

## 2020-05-19 RX ORDER — SIMVASTATIN 20 MG/1
20 TABLET, FILM COATED ORAL NIGHTLY
Qty: 90 TABLET | Refills: 0 | Status: SHIPPED | OUTPATIENT
Start: 2020-05-19 | End: 2021-01-15 | Stop reason: SDUPTHER

## 2020-05-19 RX ORDER — GLIMEPIRIDE 2 MG/1
2 TABLET ORAL 2 TIMES DAILY
Qty: 90 TABLET | Refills: 0 | Status: SHIPPED | OUTPATIENT
Start: 2020-05-19 | End: 2021-03-08 | Stop reason: SDUPTHER

## 2020-05-19 NOTE — TELEPHONE ENCOUNTER
----- Message from Nat Rodríguez sent at 5/19/2020 10:20 AM CDT -----  Contact: Vince  Type:  RX Refill Request    Who Called:  Vince  Refill or New Rx: refill  RX Name and Strength: simvastatin (ZOCOR) 20 MG tablet  How is the patient currently taking it? (ex. 1XDay): 1 x day  Is this a 30 day or 90 day RX: 90 day  Preferred Pharmacy with phone number: [address] [phone] (Phone) [phone] (Fax)  Local or Mail Order: local  Ordering Provider: Dr.Escipion Joe  Would the patient rather a call back or a response via [facility]?  Call back  Best Call Back Number: [phone]  Additional Information:  none

## 2020-05-27 ENCOUNTER — TELEPHONE (OUTPATIENT)
Dept: RHEUMATOLOGY | Facility: CLINIC | Age: 85
End: 2020-05-27

## 2020-05-27 ENCOUNTER — LAB VISIT (OUTPATIENT)
Dept: LAB | Facility: HOSPITAL | Age: 85
End: 2020-05-27
Attending: INTERNAL MEDICINE
Payer: MEDICARE

## 2020-05-27 ENCOUNTER — OFFICE VISIT (OUTPATIENT)
Dept: RHEUMATOLOGY | Facility: CLINIC | Age: 85
End: 2020-05-27
Payer: MEDICARE

## 2020-05-27 VITALS
SYSTOLIC BLOOD PRESSURE: 142 MMHG | HEART RATE: 78 BPM | WEIGHT: 157.44 LBS | BODY MASS INDEX: 26.23 KG/M2 | HEIGHT: 65 IN | TEMPERATURE: 98 F | DIASTOLIC BLOOD PRESSURE: 61 MMHG

## 2020-05-27 DIAGNOSIS — M25.551 BILATERAL HIP PAIN: ICD-10-CM

## 2020-05-27 DIAGNOSIS — M25.511 CHRONIC PAIN OF BOTH SHOULDERS: ICD-10-CM

## 2020-05-27 DIAGNOSIS — M25.512 CHRONIC PAIN OF BOTH SHOULDERS: ICD-10-CM

## 2020-05-27 DIAGNOSIS — G89.29 CHRONIC PAIN OF BOTH SHOULDERS: ICD-10-CM

## 2020-05-27 DIAGNOSIS — M25.551 BILATERAL HIP PAIN: Primary | ICD-10-CM

## 2020-05-27 DIAGNOSIS — M25.552 BILATERAL HIP PAIN: Primary | ICD-10-CM

## 2020-05-27 DIAGNOSIS — M25.552 BILATERAL HIP PAIN: ICD-10-CM

## 2020-05-27 LAB
ALBUMIN SERPL BCP-MCNC: 4 G/DL (ref 3.5–5.2)
ALP SERPL-CCNC: 86 U/L (ref 55–135)
ALT SERPL W/O P-5'-P-CCNC: 28 U/L (ref 10–44)
ANION GAP SERPL CALC-SCNC: 10 MMOL/L (ref 8–16)
AST SERPL-CCNC: 34 U/L (ref 10–40)
BASOPHILS # BLD AUTO: 0.06 K/UL (ref 0–0.2)
BASOPHILS NFR BLD: 1.2 % (ref 0–1.9)
BILIRUB SERPL-MCNC: 0.3 MG/DL (ref 0.1–1)
BUN SERPL-MCNC: 29 MG/DL (ref 8–23)
CALCIUM SERPL-MCNC: 9.4 MG/DL (ref 8.7–10.5)
CCP AB SER IA-ACNC: <0.5 U/ML
CHLORIDE SERPL-SCNC: 105 MMOL/L (ref 95–110)
CO2 SERPL-SCNC: 24 MMOL/L (ref 23–29)
CREAT SERPL-MCNC: 1.5 MG/DL (ref 0.5–1.4)
CRP SERPL-MCNC: 0.5 MG/L (ref 0–8.2)
DIFFERENTIAL METHOD: ABNORMAL
EOSINOPHIL # BLD AUTO: 0.2 K/UL (ref 0–0.5)
EOSINOPHIL NFR BLD: 4 % (ref 0–8)
ERYTHROCYTE [DISTWIDTH] IN BLOOD BY AUTOMATED COUNT: 13.1 % (ref 11.5–14.5)
ERYTHROCYTE [SEDIMENTATION RATE] IN BLOOD BY WESTERGREN METHOD: 6 MM/HR (ref 0–23)
EST. GFR  (AFRICAN AMERICAN): 47 ML/MIN/1.73 M^2
EST. GFR  (NON AFRICAN AMERICAN): 40.7 ML/MIN/1.73 M^2
GLUCOSE SERPL-MCNC: 234 MG/DL (ref 70–110)
HCT VFR BLD AUTO: 37.1 % (ref 40–54)
HGB BLD-MCNC: 11.6 G/DL (ref 14–18)
IMM GRANULOCYTES # BLD AUTO: 0.01 K/UL (ref 0–0.04)
IMM GRANULOCYTES NFR BLD AUTO: 0.2 % (ref 0–0.5)
LYMPHOCYTES # BLD AUTO: 1.9 K/UL (ref 1–4.8)
LYMPHOCYTES NFR BLD: 36.7 % (ref 18–48)
MCH RBC QN AUTO: 31.6 PG (ref 27–31)
MCHC RBC AUTO-ENTMCNC: 31.3 G/DL (ref 32–36)
MCV RBC AUTO: 101 FL (ref 82–98)
MONOCYTES # BLD AUTO: 0.4 K/UL (ref 0.3–1)
MONOCYTES NFR BLD: 8.3 % (ref 4–15)
NEUTROPHILS # BLD AUTO: 2.5 K/UL (ref 1.8–7.7)
NEUTROPHILS NFR BLD: 49.6 % (ref 38–73)
NRBC BLD-RTO: 0 /100 WBC
PLATELET # BLD AUTO: 147 K/UL (ref 150–350)
PMV BLD AUTO: 10.7 FL (ref 9.2–12.9)
POTASSIUM SERPL-SCNC: 4.3 MMOL/L (ref 3.5–5.1)
PROT SERPL-MCNC: 7.3 G/DL (ref 6–8.4)
RBC # BLD AUTO: 3.67 M/UL (ref 4.6–6.2)
SODIUM SERPL-SCNC: 139 MMOL/L (ref 136–145)
WBC # BLD AUTO: 5.04 K/UL (ref 3.9–12.7)

## 2020-05-27 PROCEDURE — 1159F PR MEDICATION LIST DOCUMENTED IN MEDICAL RECORD: ICD-10-PCS | Mod: S$GLB,,, | Performed by: INTERNAL MEDICINE

## 2020-05-27 PROCEDURE — 1125F PR PAIN SEVERITY QUANTIFIED, PAIN PRESENT: ICD-10-PCS | Mod: S$GLB,,, | Performed by: INTERNAL MEDICINE

## 2020-05-27 PROCEDURE — 85652 RBC SED RATE AUTOMATED: CPT

## 2020-05-27 PROCEDURE — 85025 COMPLETE CBC W/AUTO DIFF WBC: CPT

## 2020-05-27 PROCEDURE — 86431 RHEUMATOID FACTOR QUANT: CPT

## 2020-05-27 PROCEDURE — 1159F MED LIST DOCD IN RCRD: CPT | Mod: S$GLB,,, | Performed by: INTERNAL MEDICINE

## 2020-05-27 PROCEDURE — 86038 ANTINUCLEAR ANTIBODIES: CPT

## 2020-05-27 PROCEDURE — 86704 HEP B CORE ANTIBODY TOTAL: CPT

## 2020-05-27 PROCEDURE — 1125F AMNT PAIN NOTED PAIN PRSNT: CPT | Mod: S$GLB,,, | Performed by: INTERNAL MEDICINE

## 2020-05-27 PROCEDURE — 99205 OFFICE O/P NEW HI 60 MIN: CPT | Mod: S$GLB,,, | Performed by: INTERNAL MEDICINE

## 2020-05-27 PROCEDURE — 86706 HEP B SURFACE ANTIBODY: CPT

## 2020-05-27 PROCEDURE — 80053 COMPREHEN METABOLIC PANEL: CPT

## 2020-05-27 PROCEDURE — 86140 C-REACTIVE PROTEIN: CPT

## 2020-05-27 PROCEDURE — 99999 PR PBB SHADOW E&M-EST. PATIENT-LVL V: CPT | Mod: PBBFAC,,, | Performed by: INTERNAL MEDICINE

## 2020-05-27 PROCEDURE — 1101F PT FALLS ASSESS-DOCD LE1/YR: CPT | Mod: CPTII,S$GLB,, | Performed by: INTERNAL MEDICINE

## 2020-05-27 PROCEDURE — 1101F PR PT FALLS ASSESS DOC 0-1 FALLS W/OUT INJ PAST YR: ICD-10-PCS | Mod: CPTII,S$GLB,, | Performed by: INTERNAL MEDICINE

## 2020-05-27 PROCEDURE — 86705 HEP B CORE ANTIBODY IGM: CPT

## 2020-05-27 PROCEDURE — 86200 CCP ANTIBODY: CPT

## 2020-05-27 PROCEDURE — 99999 PR PBB SHADOW E&M-EST. PATIENT-LVL V: ICD-10-PCS | Mod: PBBFAC,,, | Performed by: INTERNAL MEDICINE

## 2020-05-27 PROCEDURE — 99205 PR OFFICE/OUTPT VISIT, NEW, LEVL V, 60-74 MIN: ICD-10-PCS | Mod: S$GLB,,, | Performed by: INTERNAL MEDICINE

## 2020-05-27 PROCEDURE — 86803 HEPATITIS C AB TEST: CPT

## 2020-05-27 NOTE — LETTER
May 27, 2020      Shlomo Luong MD  [address] [place] [address]           [facility]  [address] [place] [address]  Phone: [phone]  Fax: [phone]          Patient: Vince Martinez   MR Number: [id number]   YOB: 1930   Date of Visit: 5/27/2020       Dear Dr. Shlomo Luong:    Thank you for referring Vince Martinez to me for evaluation. Attached you will find relevant portions of my assessment and plan of care.    If you have questions, please do not hesitate to call me. I look forward to following Vince Martinez along with you.    Sincerely,    Mai Dumont MD    Enclosure  CC:  No Recipients    If you would like to receive this communication electronically, please contact [email or web address] or [phone] to request more information on [facility] access.    For providers and/or their staff who would like to refer a patient to [facility], please contact us through our one-stop-shop provider referral line, [facility] [place], at [phone].    If you feel you have received this communication in error or would no longer like to receive these types of communications, please e-mail [email or web address]

## 2020-05-27 NOTE — PROGRESS NOTES
Subjective:       Patient ID: Vince Martinez is a 89 y.o. male.    Chief Complaint: Disease Management    HPI  89 year old  F with PMH of DJD,  Type II DM, HTN, hypothyroidism, lumbar surgery. He is having pain in shoulders, knees, hips, and lower back. He has been having in shoulders and hips for a year.  Pain level can be as high as 10/10 aching. He uses voltaren gel without improvement.He has pain with raising his arms.  He has pain when he walks.  He denies any swelling. He reports he had mild headache in frontal area.  Denies jaw claudication or jaw claudication.   .   Past Medical History:   Diagnosis Date    Acute combined systolic and diastolic CHF, NYHA class 3 11/15/2018    Arthritis     Diabetes mellitus     DJD (degenerative joint disease)     Hypertension     Prostate enlargement     Thyroid disease            Review of Systems   Constitutional: Negative for activity change, appetite change, chills, diaphoresis, fatigue and fever.   HENT: Negative for ear discharge, ear pain, hearing loss, mouth sores, nosebleeds and sinus pressure.    Eyes: Negative.  Negative for photophobia, pain, discharge, redness and itching.   Respiratory: Negative for cough, chest tightness and shortness of breath.    Cardiovascular: Negative for chest pain, palpitations and leg swelling.   Gastrointestinal: Negative for abdominal distention, blood in stool and nausea.   Endocrine: Negative for cold intolerance, heat intolerance, polydipsia and polyphagia.   Genitourinary: Negative for flank pain, genital sores and hematuria.   Musculoskeletal: Negative for arthralgias, back pain, gait problem, joint swelling, myalgias, neck pain and neck stiffness.   Skin: Negative for color change, pallor and rash.   Neurological: Negative for dizziness, weakness, light-headedness and headaches.   Hematological: Negative for adenopathy. Does not bruise/bleed easily.   Psychiatric/Behavioral: Negative for decreased concentration and  "hallucinations. The patient is not nervous/anxious.          Objective:   BP (!) 142/61   Pulse 78   Temp 97.6 °F (36.4 °C)   Ht 5' 5" (1.651 m)   Wt 71.4 kg (157 lb 6.5 oz)   BMI 26.19 kg/m²      Physical Exam   Constitutional: He is well-developed, well-nourished, and in no distress. No distress.   HENT:   Head: Normocephalic and atraumatic.   Right Ear: External ear normal.   Eyes: Conjunctivae and EOM are normal. Pupils are equal, round, and reactive to light. Right eye exhibits no discharge. Left eye exhibits no discharge. No scleral icterus.   Neck: Normal range of motion. Neck supple. No JVD present. No tracheal deviation present. No thyromegaly present.   Cardiovascular: Normal rate, regular rhythm, normal heart sounds and intact distal pulses.    Pulmonary/Chest: Effort normal and breath sounds normal. No stridor. No respiratory distress. He has no wheezes. He has no rales. He exhibits no tenderness.   Abdominal: Soft. Bowel sounds are normal. He exhibits no distension and no mass. There is no tenderness. There is no rebound and no guarding.   Lymphadenopathy:     He has no cervical adenopathy.   Neurological: He is alert.   Skin: Skin is warm and dry. He is not diaphoretic.     Musculoskeletal:   Shoulders: decreased abduction to 170 degrees  No pain with external or internal rotation of the hips          Labs: reviewed    Lumbar spine (2019): I personally reviewed;Spondylosis with multilevel lower lumbar central canal stenosis which appears most pronounced at L3-L4.    Multilevel variable degree of neural foraminal narrowing ranging from mild to severe, most pronounced on the right at L5-S1.      No data to display     Assessment:     89 year old  F with PMH of DJD,  Type II DM, HTN, hypothyroidism, lumbar surgery here for evaluation of arthralgias.  He has known spinal stenosis and has upcoming steroid injection into lower back.  I told him I will work him up for PMR and inflammatory arthritis but I " suspect he likely just has djd.    No diagnosis found.        Plan:       Problem List Items Addressed This Visit     None        Labs  xrays  Start tylenol arthritis every 8 hours as needed  *   45 minutes of face to face contact spent with patient

## 2020-05-28 ENCOUNTER — TELEPHONE (OUTPATIENT)
Dept: RHEUMATOLOGY | Facility: CLINIC | Age: 85
End: 2020-05-28

## 2020-05-28 ENCOUNTER — TELEPHONE (OUTPATIENT)
Dept: FAMILY MEDICINE | Facility: CLINIC | Age: 85
End: 2020-05-28

## 2020-05-28 ENCOUNTER — HOSPITAL ENCOUNTER (OUTPATIENT)
Dept: RADIOLOGY | Facility: HOSPITAL | Age: 85
Discharge: HOME | End: 2020-05-28
Attending: INTERNAL MEDICINE
Payer: MEDICARE

## 2020-05-28 DIAGNOSIS — M25.551 BILATERAL HIP PAIN: ICD-10-CM

## 2020-05-28 DIAGNOSIS — M25.511 CHRONIC PAIN OF BOTH SHOULDERS: ICD-10-CM

## 2020-05-28 DIAGNOSIS — M25.512 CHRONIC PAIN OF BOTH SHOULDERS: ICD-10-CM

## 2020-05-28 DIAGNOSIS — G89.29 CHRONIC PAIN OF BOTH SHOULDERS: ICD-10-CM

## 2020-05-28 DIAGNOSIS — M25.552 BILATERAL HIP PAIN: ICD-10-CM

## 2020-05-28 LAB
ANA SER QL IF: NORMAL
HBV CORE AB SERPL QL IA: NEGATIVE
HBV CORE IGM SERPL QL IA: NEGATIVE
HBV SURFACE AB SER-ACNC: NEGATIVE M[IU]/ML
HCV AB SERPL QL IA: NEGATIVE
RHEUMATOID FACT SERPL-ACNC: <10 IU/ML (ref 0–15)

## 2020-05-28 PROCEDURE — 73521 XR HIPS BILATERAL 2 VIEW INCL AP PELVIS: ICD-10-PCS | Mod: 26,,, | Performed by: RADIOLOGY

## 2020-05-28 PROCEDURE — 73030 X-RAY EXAM OF SHOULDER: CPT | Mod: 26,50,, | Performed by: RADIOLOGY

## 2020-05-28 PROCEDURE — 73030 X-RAY EXAM OF SHOULDER: CPT | Mod: TC,50,FY,PO

## 2020-05-28 PROCEDURE — 73521 X-RAY EXAM HIPS BI 2 VIEWS: CPT | Mod: 26,,, | Performed by: RADIOLOGY

## 2020-05-28 PROCEDURE — 73030 XR SHOULDER COMPLETE 2 OR MORE VIEWS BILATERAL: ICD-10-PCS | Mod: 26,50,, | Performed by: RADIOLOGY

## 2020-05-28 PROCEDURE — 73521 X-RAY EXAM HIPS BI 2 VIEWS: CPT | Mod: TC,FY,PO

## 2020-05-28 NOTE — TELEPHONE ENCOUNTER
----- Message from Coreen DEJAJohn Paul Dugan sent at 5/28/2020  4:44 PM CDT -----  Contact: [phone] se;f - Ukrainian   Pt is requesting to speak with you re: labs results and medications. Please advise

## 2020-05-28 NOTE — PROGRESS NOTES
Called patient regarding results.  Told him he has elevated glucose and worsening renal function so he needs to contact pcp for follow up.  Secondly, I told him labs do not show PMR or RA.  His pain is largely due to DJD.  If no improvement with injections, would recommend he sees PMR.

## 2020-05-28 NOTE — TELEPHONE ENCOUNTER
Spoke with patient and he reports he seen Dr. Dumont yesterday and was informed that his kidney function has declined and he needs to call his PCP's office to f/u to see if any med changes need to be made. Informed patient will send message to the Dr and call back with recommendations. Patient voices understanding.

## 2020-05-29 NOTE — TELEPHONE ENCOUNTER
Please schedule follow-up visit with me or my nurse practitioner to adjust the medicines to renal dose.

## 2020-05-29 NOTE — DISCHARGE INSTRUCTIONS
Home Care Instructions Pain Management:    1.  DIET:    You may resume your normal diet today.    2.  BATHING:    You may shower with luke warm water.    3.  DRESSING:    You may remove your bandage today.    4.  ACTIVITY LEVEL:      You may resume your normal activities 24 hours after your procedure.    5.  MEDICATIONS:    You may resume your normal medications today.    6.  SPECIAL INSTRUCTIONS:    No heat to the injection site for 24 hours including bath or shower, heating pad, moist heat or hot tubs.    Use an ice pack to the injection site for any pain or discomfort.  Apply ice packs for 20 minute intervals as needed.    If you have received any sedatives by mouth today, you can not drive for 12 hours.    If you have received sedation through an IV, you can not drive for 24 hours.    PLEASE CALL YOUR DOCTOR FOR THE FOLLOWIN.  Redness or swelling around the injection site.  2.  Fever of 101 degrees.  3.  Drainage (pus) from the injection site.  4.  For any continuous bleeding (some dried blood over the incision is normal.)    FOR EMERGENCIES:    If any unusual problems or difficulties occur during clinic hours, call [phone] or dial [phone].    Follow up with with your physician in 2-3 weeks.

## 2020-06-01 ENCOUNTER — TELEPHONE (OUTPATIENT)
Dept: FAMILY MEDICINE | Facility: CLINIC | Age: 85
End: 2020-06-01

## 2020-06-01 NOTE — TELEPHONE ENCOUNTER
Spoke to pt, he reported that he had some blood work with Dr. Dumont, he was informed kidney function has decreased, and was advised to ask which medication may be causing this. Please advise.

## 2020-06-01 NOTE — TELEPHONE ENCOUNTER
----- Message from Nancy Merino sent at 6/1/2020 11:42 AM CDT -----  Contact: [phone]/Self   Patient is requesting to speak with nurse regarding medication and lab work results. Please advise.

## 2020-06-02 ENCOUNTER — TELEPHONE (OUTPATIENT)
Dept: RHEUMATOLOGY | Facility: CLINIC | Age: 85
End: 2020-06-02

## 2020-06-02 ENCOUNTER — LAB VISIT (OUTPATIENT)
Dept: FAMILY MEDICINE | Facility: CLINIC | Age: 85
End: 2020-06-02
Payer: MEDICARE

## 2020-06-02 DIAGNOSIS — Z01.818 PREOP TESTING: ICD-10-CM

## 2020-06-02 PROCEDURE — U0003 INFECTIOUS AGENT DETECTION BY NUCLEIC ACID (DNA OR RNA); SEVERE ACUTE RESPIRATORY SYNDROME CORONAVIRUS 2 (SARS-COV-2) (CORONAVIRUS DISEASE [COVID-19]), AMPLIFIED PROBE TECHNIQUE, MAKING USE OF HIGH THROUGHPUT TECHNOLOGIES AS DESCRIBED BY CMS-2020-01-R: HCPCS

## 2020-06-02 NOTE — TELEPHONE ENCOUNTER
Attempted to reach pt to schedule  Follow up appt. Pt is not available as he is has an appointment at [facility] for a procedure. Left message with Josie(wife) to call the office back to schedule a follow up appt.

## 2020-06-02 NOTE — TELEPHONE ENCOUNTER
Spoke with patient about + quant gold result.  He denies history of tuberculosis or exposure.  Denies any symptoms of TB.  I told him I can refer him to ID to see if they suggest treatment.  He verbalized understanding and said he would like to follow up with his pcp.

## 2020-06-03 LAB — SARS-COV-2 RNA RESP QL NAA+PROBE: NOT DETECTED

## 2020-06-03 NOTE — PLAN OF CARE
Spoke with patient regarding arrival time and NPO status for scheduled procedure with Dr. Norris, pt voiced understanding.

## 2020-06-04 ENCOUNTER — TELEPHONE (OUTPATIENT)
Dept: FAMILY MEDICINE | Facility: CLINIC | Age: 85
End: 2020-06-04

## 2020-06-04 ENCOUNTER — HOSPITAL ENCOUNTER (OUTPATIENT)
Facility: HOSPITAL | Age: 85
Discharge: HOME | End: 2020-06-04
Attending: PAIN MEDICINE | Admitting: PAIN MEDICINE
Payer: MEDICARE

## 2020-06-04 VITALS
WEIGHT: 160 LBS | DIASTOLIC BLOOD PRESSURE: 74 MMHG | HEART RATE: 60 BPM | SYSTOLIC BLOOD PRESSURE: 188 MMHG | TEMPERATURE: 98 F | OXYGEN SATURATION: 99 % | BODY MASS INDEX: 26.66 KG/M2 | RESPIRATION RATE: 16 BRPM | HEIGHT: 65 IN

## 2020-06-04 DIAGNOSIS — G89.29 CHRONIC PAIN: ICD-10-CM

## 2020-06-04 DIAGNOSIS — M54.16 LUMBAR RADICULOPATHY: Primary | ICD-10-CM

## 2020-06-04 PROCEDURE — 64483 PR EPIDURAL INJ, ANES/STEROID, TRANSFORAMINAL, LUMB/SACR, SNGL LEVL: ICD-10-PCS | Mod: ,,, | Performed by: PAIN MEDICINE

## 2020-06-04 PROCEDURE — 25500020 PHARM REV CODE 255: Performed by: PAIN MEDICINE

## 2020-06-04 PROCEDURE — 64483 NJX AA&/STRD TFRM EPI L/S 1: CPT | Mod: ,,, | Performed by: PAIN MEDICINE

## 2020-06-04 PROCEDURE — 63600175 PHARM REV CODE 636 W HCPCS: Performed by: PAIN MEDICINE

## 2020-06-04 PROCEDURE — 25000003 PHARM REV CODE 250: Performed by: PAIN MEDICINE

## 2020-06-04 PROCEDURE — 62323 NJX INTERLAMINAR LMBR/SAC: CPT | Performed by: PAIN MEDICINE

## 2020-06-04 RX ORDER — LIDOCAINE HYDROCHLORIDE 10 MG/ML
INJECTION INFILTRATION; PERINEURAL
Status: DISCONTINUED | OUTPATIENT
Start: 2020-06-04 | End: 2020-06-04 | Stop reason: HOSPADM

## 2020-06-04 RX ORDER — DEXAMETHASONE SODIUM PHOSPHATE 10 MG/ML
INJECTION INTRAMUSCULAR; INTRAVENOUS
Status: DISCONTINUED | OUTPATIENT
Start: 2020-06-04 | End: 2020-06-04 | Stop reason: HOSPADM

## 2020-06-04 RX ORDER — SODIUM BICARBONATE 1 MEQ/ML
SYRINGE (ML) INTRAVENOUS
Status: DISCONTINUED | OUTPATIENT
Start: 2020-06-04 | End: 2020-06-04 | Stop reason: HOSPADM

## 2020-06-04 RX ORDER — BUPIVACAINE HYDROCHLORIDE 2.5 MG/ML
INJECTION, SOLUTION EPIDURAL; INFILTRATION; INTRACAUDAL
Status: DISCONTINUED | OUTPATIENT
Start: 2020-06-04 | End: 2020-06-04 | Stop reason: HOSPADM

## 2020-06-04 NOTE — TELEPHONE ENCOUNTER
----- Message from Nat Rodríguez sent at 6/4/2020 11:13 AM CDT -----  Contact: Vince  Name of Caller: Vince   Reason for Visit/Symptoms: tuberculosis and medication affecting kidneys  Best Contact Number or Confirm if Mychart Preferred: [phone]  Preferred Date/Time of Appointment: asap please  Interested in Virtual Visit (yes/no): no  Additional Information: needs a lung exam per

## 2020-06-04 NOTE — TELEPHONE ENCOUNTER
Spoke with patient's wife and instructed appt scheduled for Mon with NP that works with Dr. Ulloa. Wife voices understanding.

## 2020-06-04 NOTE — H&P
[facility]  History & Physical - Short Stay  Pain Management           SUBJECTIVE:     Procedure: Procedure(s) (LRB):  Injection-steroid-epidural-lumbar--L4-5 (N/A)    Chief Complaint/Reason for Admission:  Lumbar radiculopathy [M54.16]    PTA Medications   Medication Sig    diclofenac sodium (VOLTAREN) 1 % Gel Apply 2 g topically daily as needed.    fluticasone propionate (FLONASE) 50 mcg/actuation nasal spray 2 sprays (100 mcg total) by Each Nostril route once daily.    furosemide (LASIX) 40 MG tablet Take 1 tablet (40 mg total) by mouth once daily.    glimepiride (AMARYL) 2 MG tablet Take 1 tablet (2 mg total) by mouth 2 (two) times daily.    hydrocortisone 2.5 % cream APPLY CREAM TO AFFECTED AREA AND BODY ONCE DAILY AS NEEDED.    ipratropium (ATROVENT) 42 mcg (0.06 %) nasal spray 2 sprays in each nostril 4 times a day.    irbesartan (AVAPRO) 300 MG tablet Take two tablet daily.    ketoconazole (NIZORAL) 2 % shampoo     levothyroxine (SYNTHROID) 100 MCG tablet     loperamide (IMODIUM A-D) 2 mg Tab Take 2 mg by mouth 4 (four) times daily as needed.    melatonin 10 mg Cap Take by mouth.    metFORMIN (GLUCOPHAGE-XR) 500 MG 24 hr tablet     montelukast (SINGULAIR) 10 mg tablet Take 1 tablet (10 mg total) by mouth once daily.    omeprazole (PRILOSEC) 20 MG capsule Take 20 mg by mouth once daily.    simvastatin (ZOCOR) 20 MG tablet Take 1 tablet (20 mg total) by mouth every evening.    tamsulosin (FLOMAX) 0.4 mg Cp24 0.4 mg once daily.     traMADoL (ULTRAM) 50 mg tablet Take 1 tablet (50 mg total) by mouth every 12 (twelve) hours as needed for Pain.    triamcinolone acetonide 0.1% (KENALOG) 0.1 % cream Apply topically 2 (two) times daily.       Review of patient's allergies indicates:   Allergen Reactions    Bactrim [sulfamethoxazole-trimethoprim] Rash    Ciprofloxacin Rash    Latex Rash       Past Medical History:   Diagnosis Date    Acute combined systolic and diastolic CHF,  NYHA class 3 11/15/2018    Arthritis     Diabetes mellitus     DJD (degenerative joint disease)     Hypertension     Prostate enlargement     Thyroid disease      Past Surgical History:   Procedure Laterality Date    INJECTION OF JOINT Bilateral 2/13/2020    Procedure: Injection, Joint, Bilateral GTB;  Surgeon: Angelica Norris Jr., MD;  Location: [facility];  Service: Pain Management;  Laterality: Bilateral;    PROSTATE SURGERY       History reviewed. No pertinent family history.  Social History     Tobacco Use    Smoking status: Never Smoker    Smokeless tobacco: Never Used   Substance Use Topics    Alcohol use: No    Drug use: No        Review of Systems:  Review of Systems   Constitutional: Negative for chills and fever.   HENT: Negative for nosebleeds.    Eyes: Negative for blurred vision.   Respiratory: Negative for hemoptysis.    Cardiovascular: Negative for chest pain and palpitations.   Gastrointestinal: Negative for heartburn and vomiting.   Genitourinary: Negative for hematuria.   Musculoskeletal: Positive for back pain. Negative for myalgias.   Skin: Negative for rash.   Neurological: Positive for tingling. Negative for seizures and loss of consciousness.   Endo/Heme/Allergies: Does not bruise/bleed easily.   Psychiatric/Behavioral: Negative for hallucinations.         OBJECTIVE:     Vital Signs (Most Recent):  Temp: 98.5 °F (36.9 °C) (06/04/20 1417)  Pulse: 60 (06/04/20 1417)  Resp: 17 (06/04/20 1417)  BP: (!) 187/77 (06/04/20 1417)  SpO2: 100 % (06/04/20 1417)    Physical Exam:  General appearance - alert, well appearing, and in no distress  Mental status - AOx3  Eyes - pupils equal and reactive, extraocular eye movements intact  Heart - normal rate, regular rhythm, normal S1, S2, no murmurs, rubs, clicks or gallops  Chest - clear to auscultation, no wheezes, rales or rhonchi, symmetric air entry  Abdomen - soft, nontender, nondistended, no masses or organomegaly  Neurological - alert,  oriented, normal speech, no focal findings or movement disorder noted  Extremities - peripheral pulses normal, no pedal edema, no clubbing or cyanosis      ASSESSMENT/PLAN:     Active Hospital Problems    Diagnosis  POA    Chronic pain [G89.29]  Yes      Resolved Hospital Problems   No resolved problems to display.        The risks and benefits of this intervention, and alternative therapies were discussed with the patient.  The discussion of risks included infection, bleeding, need for additional procedures or surgery, nerve damage, paralysis, adverse medication reaction(s), stroke, and/or death.  Questions regarding the procedure, risks, expected outcome, and possible side effects were solicited and answered to the patient's satisfaction.  Vince wishes to proceed with the injection.  Verbal and written consent were verified.      Proceed with intervention as scheduled.      Angelica Norris Jr, MD  Interventional Pain Medicine / Anesthesiology

## 2020-06-04 NOTE — DISCHARGE SUMMARY
[facility]  Discharge Note  Short Stay     Admit Date: 6/4/2020    Discharge Date: 6/4/2020     Attending Physician: Angelica Norris Jr, MD    Diagnoses:  Active Hospital Problems    Diagnosis  POA    Chronic pain [G89.29]  Yes      Resolved Hospital Problems   No resolved problems to display.     Discharged Condition: Good     Hospital Course: Patient was admitted for an outpatient interventional pain management procedure and tolerated the procedure well with no complications.     Final Diagnoses: Same as principal problem.     Disposition: Home or Self Care     Follow up/Patient Instructions:    Follow-up Information     Angelica Norris Jr, MD. Go in 2 weeks.    Specialty:  Pain Medicine  Why:  Post-procedural Follow Up As Scheduled  Contact information:  [address]  [address]  [place] [address] [phone]                   Reconciled Medications:     Medication List      CONTINUE taking these medications    diclofenac sodium 1 % Gel  Commonly known as:  VOLTAREN  Apply 2 g topically daily as needed.     fluticasone propionate 50 mcg/actuation nasal spray  Commonly known as:  FLONASE  2 sprays (100 mcg total) by Each Nostril route once daily.     furosemide 40 MG tablet  Commonly known as:  LASIX  Take 1 tablet (40 mg total) by mouth once daily.     glimepiride 2 MG tablet  Commonly known as:  AMARYL  Take 1 tablet (2 mg total) by mouth 2 (two) times daily.     hydrocortisone 2.5 % cream  APPLY CREAM TO AFFECTED AREA AND BODY ONCE DAILY AS NEEDED.     ipratropium 42 mcg (0.06 %) nasal spray  Commonly known as:  ATROVENT  2 sprays in each nostril 4 times a day.     irbesartan 300 MG tablet  Commonly known as:  AVAPRO  Take two tablet daily.     ketoconazole 2 % shampoo  Commonly known as:  NIZORAL     levothyroxine 100 MCG tablet  Commonly known as:  SYNTHROID     loperamide 2 mg Tab  Commonly known as:  IMODIUM A-D  Take 2 mg by mouth 4 (four) times daily as needed.     melatonin 10 mg  Cap  Take by mouth.     metFORMIN 500 MG XR 24hr tablet  Commonly known as:  GLUCOPHAGE-XR     montelukast 10 mg tablet  Commonly known as:  SINGULAIR  Take 1 tablet (10 mg total) by mouth once daily.     omeprazole 20 MG capsule  Commonly known as:  PRILOSEC  Take 20 mg by mouth once daily.     simvastatin 20 MG tablet  Commonly known as:  ZOCOR  Take 1 tablet (20 mg total) by mouth every evening.     tamsulosin 0.4 mg Cap  Commonly known as:  FLOMAX  0.4 mg once daily.     traMADoL 50 mg tablet  Commonly known as:  ULTRAM  Take 1 tablet (50 mg total) by mouth every 12 (twelve) hours as needed for Pain.     triamcinolone acetonide 0.1% 0.1 % cream  Commonly known as:  KENALOG  Apply topically 2 (two) times daily.           Discharge Procedure Orders (must include Diet, Follow-up, Activity)   Call MD for:  temperature >100.4     Call MD for:  severe uncontrolled pain     Call MD for:  redness, tenderness, or signs of infection (pain, swelling, redness, odor or green/yellow discharge around incision site)     Call MD for:  difficulty breathing or increased cough     Call MD for:  severe persistent headache     Call MD for:  worsening rash     Remove dressing in 24 hours       Angelica Norris Jr, MD  Interventional Pain Medicine / Anesthesiology

## 2020-06-04 NOTE — PLAN OF CARE
Discharge instructions with pain diary given and explained, pt voiced understating.  Denies pain or discomfort at this time.  Transported to car via w/c with son, safety maintained.

## 2020-06-04 NOTE — TELEPHONE ENCOUNTER
----- Message from Nat Rodríguez sent at 6/4/2020 11:13 AM CDT -----  Contact: Vince  Name of Caller: Vince   Reason for Visit/Symptoms: tuberculosis and medication affecting kidneys  Best Contact Number or Confirm if Mychart Preferred: [phone]  Preferred Date/Time of Appointment: asap please  Interested in Virtual Visit (yes/no): no  Additional Information: needs a lung exam per

## 2020-06-04 NOTE — OP NOTE
"Procedure Note    Pre-operative Diagnosis: Lumbar Radiculopathy  Post-operative Diagnosis: Lumbar Radiculopathy  Procedure Date: 06/04/2020  Procedure:  (1) Lumbar Epidural Steroid Injection at L4-5    (2) Intraoperative fluoroscopy          Anesthesia: Local    Indications: To alleviate pain and suffering, and reduce functional impairment.    Procedure in Detail:   The patients history and physical exam were reviewed. The risks, benefits and alternatives to the procedure were discussed, and all questions were answered to the patients satisfaction. The patient agreed to proceed, and written informed consent was verified.    The patient was brought into the procedure room and placed in the prone position on the fluoroscopy table. The area of the lumbar spine was prepped with Chloraprep and draped in a sterile manner. The L4-5 interspace was identified and marked under AP fluoroscopy. The skin and subcutaneous tissues overlying the targeted interspace were anesthetized with 3-5 mL of 1% lidocaine using a 25G, 1.5" needle. A 17G, 3.5" Tuohy epidural needle was directed toward the interspace under fluoroscopic guidance until the ligamentum flavum was engaged. From this point, a loss of resistance technique with a glass syringe and saline was used to identify entrance of the needle into the epidural space. Once loss of resistance was observed, up to 1 mL of contrast solution was injected. An appropriate epidurogram was noted.    A 5 mL mixture consisting of PF saline (2 mL), MPF Bupivacaine 0.25% (2 mL), and Dexamethasone 10 mg (1 mL) was injected slowly and without resistance.  The needle was removed and a bandage applied to puncture site.    Blood Loss: nil    Disposition: The patient tolerated the procedure well, and there were no apparent complications. Vital signs remained stable throughout the procedure. The patient was taken to the recovery area where written discharge instructions for the procedure were given. "     Follow-up: RTC as scheduled      Angelica Norris Jr, MD  Interventional Pain Medicine / Anesthesiology

## 2020-06-05 ENCOUNTER — OFFICE VISIT (OUTPATIENT)
Dept: ORTHOPEDICS | Facility: CLINIC | Age: 85
End: 2020-06-05
Payer: MEDICARE

## 2020-06-05 VITALS — BODY MASS INDEX: 26.66 KG/M2 | WEIGHT: 160 LBS | HEIGHT: 65 IN

## 2020-06-05 DIAGNOSIS — M51.36 LUMBAR DEGENERATIVE DISC DISEASE: Primary | ICD-10-CM

## 2020-06-05 PROCEDURE — 99213 OFFICE O/P EST LOW 20 MIN: CPT | Mod: S$GLB,,, | Performed by: ORTHOPAEDIC SURGERY

## 2020-06-05 PROCEDURE — 1125F AMNT PAIN NOTED PAIN PRSNT: CPT | Mod: S$GLB,,, | Performed by: ORTHOPAEDIC SURGERY

## 2020-06-05 PROCEDURE — 99999 PR PBB SHADOW E&M-EST. PATIENT-LVL III: ICD-10-PCS | Mod: PBBFAC,,, | Performed by: ORTHOPAEDIC SURGERY

## 2020-06-05 PROCEDURE — 1125F PR PAIN SEVERITY QUANTIFIED, PAIN PRESENT: ICD-10-PCS | Mod: S$GLB,,, | Performed by: ORTHOPAEDIC SURGERY

## 2020-06-05 PROCEDURE — 99213 PR OFFICE/OUTPT VISIT, EST, LEVL III, 20-29 MIN: ICD-10-PCS | Mod: S$GLB,,, | Performed by: ORTHOPAEDIC SURGERY

## 2020-06-05 PROCEDURE — 1101F PT FALLS ASSESS-DOCD LE1/YR: CPT | Mod: CPTII,S$GLB,, | Performed by: ORTHOPAEDIC SURGERY

## 2020-06-05 PROCEDURE — 1101F PR PT FALLS ASSESS DOC 0-1 FALLS W/OUT INJ PAST YR: ICD-10-PCS | Mod: CPTII,S$GLB,, | Performed by: ORTHOPAEDIC SURGERY

## 2020-06-05 PROCEDURE — 99999 PR PBB SHADOW E&M-EST. PATIENT-LVL III: CPT | Mod: PBBFAC,,, | Performed by: ORTHOPAEDIC SURGERY

## 2020-06-05 PROCEDURE — 1159F PR MEDICATION LIST DOCUMENTED IN MEDICAL RECORD: ICD-10-PCS | Mod: S$GLB,,, | Performed by: ORTHOPAEDIC SURGERY

## 2020-06-05 PROCEDURE — 1159F MED LIST DOCD IN RCRD: CPT | Mod: S$GLB,,, | Performed by: ORTHOPAEDIC SURGERY

## 2020-06-05 NOTE — PROGRESS NOTES
Subjective:      Patient ID: Vince Martinez is a 89 y.o. male.    Chief Complaint: Follow-up of the Right Hip and Follow-up of the Left Hip    HPI    The patient complains of pain in his neck, his lower back, both shoulders, both hips and both knees.  He received an epidural injection yesterday and notes no change.  He denies focal neurologic deficits          Review of Systems   Constitution: Negative for fever and weight loss.   HENT: Negative for congestion.    Eyes: Negative for visual disturbance.   Cardiovascular: Negative for chest pain.   Respiratory: Negative for shortness of breath.    Hematologic/Lymphatic: Negative for bleeding problem. Does not bruise/bleed easily.   Skin: Negative for poor wound healing.   Musculoskeletal: Positive for arthritis, back pain, joint pain and neck pain.   Gastrointestinal: Negative for abdominal pain.   Genitourinary: Negative for dysuria.   Neurological: Negative for seizures.   Psychiatric/Behavioral: Negative for altered mental status.   Allergic/Immunologic: Negative for persistent infections.         Objective:      Ortho/SPM Exam      Right hip    The patient is not in acute distress.   Body habitus is:normal.   Sclerae normal  The patient walks without a limp.   Respiratory distress:  none  The skin over the hip is:intact.   There is no local tenderness.   Range of motion- Flexion full, External rotation full, internal rotation full.  Resisted SLR negative.  Pain with rotation negative  Sciatic tension findings negative.  Shortening/lengthening compared to the contralateral side absent.  Pulses DP present, PT present.  Motor normal 5/5 strength in all tested muscle groups.   Sensory normal.      Left is identical    I reviewed the relevant imaging for the patient's condition:  Recent films of both hips show minimal degenerative change.  CT scan of the lumbar spine shows postsurgical changes and multilevel, severe degenerative change.              Assessment:        Encounter Diagnosis   Name Primary?    Lumbar degenerative disc disease Yes        The patient is objective symptoms are overall mainly due to degenerative disc disease.  Considering his age, his essentially normal gait and posture, overall spry appearance and lack of severe physical findings, I recommend nonsurgical care.            Plan:       Vince was seen today for follow-up and follow-up.    Diagnoses and all orders for this visit:    Lumbar degenerative disc disease        I explained my diagnostic impression and the reasoning behind it in detail, using layman's terms.  Models and/or pictures were used to help in the explanation.    Going forward I recommend occasional spinal injection, rheumatology follow-up in palliative treatment by primary care.

## 2020-06-08 ENCOUNTER — OFFICE VISIT (OUTPATIENT)
Dept: FAMILY MEDICINE | Facility: CLINIC | Age: 85
End: 2020-06-08
Payer: MEDICARE

## 2020-06-08 ENCOUNTER — HOSPITAL ENCOUNTER (OUTPATIENT)
Dept: RADIOLOGY | Facility: HOSPITAL | Age: 85
Discharge: HOME | End: 2020-06-08
Attending: NURSE PRACTITIONER
Payer: MEDICARE

## 2020-06-08 VITALS
TEMPERATURE: 98 F | HEIGHT: 65 IN | BODY MASS INDEX: 26.19 KG/M2 | OXYGEN SATURATION: 98 % | WEIGHT: 157.19 LBS | SYSTOLIC BLOOD PRESSURE: 140 MMHG | HEART RATE: 60 BPM | DIASTOLIC BLOOD PRESSURE: 54 MMHG

## 2020-06-08 DIAGNOSIS — I50.42 CHRONIC COMBINED SYSTOLIC AND DIASTOLIC CHF (CONGESTIVE HEART FAILURE): ICD-10-CM

## 2020-06-08 DIAGNOSIS — R76.12 POSITIVE QUANTIFERON-TB GOLD TEST: ICD-10-CM

## 2020-06-08 DIAGNOSIS — E11.65 TYPE 2 DIABETES MELLITUS WITH HYPERGLYCEMIA, WITHOUT LONG-TERM CURRENT USE OF INSULIN: Primary | ICD-10-CM

## 2020-06-08 DIAGNOSIS — I10 ESSENTIAL HYPERTENSION: ICD-10-CM

## 2020-06-08 DIAGNOSIS — N18.30 CKD (CHRONIC KIDNEY DISEASE) STAGE 3, GFR 30-59 ML/MIN: ICD-10-CM

## 2020-06-08 DIAGNOSIS — R05.8 COUGH PRESENT FOR GREATER THAN 3 WEEKS: ICD-10-CM

## 2020-06-08 PROCEDURE — 1101F PR PT FALLS ASSESS DOC 0-1 FALLS W/OUT INJ PAST YR: ICD-10-PCS | Mod: CPTII,S$GLB,, | Performed by: NURSE PRACTITIONER

## 2020-06-08 PROCEDURE — 1159F MED LIST DOCD IN RCRD: CPT | Mod: S$GLB,,, | Performed by: NURSE PRACTITIONER

## 2020-06-08 PROCEDURE — 71046 X-RAY EXAM CHEST 2 VIEWS: CPT | Mod: 26,,, | Performed by: RADIOLOGY

## 2020-06-08 PROCEDURE — 1125F AMNT PAIN NOTED PAIN PRSNT: CPT | Mod: S$GLB,,, | Performed by: NURSE PRACTITIONER

## 2020-06-08 PROCEDURE — 71046 X-RAY EXAM CHEST 2 VIEWS: CPT | Mod: TC,FY,PO

## 2020-06-08 PROCEDURE — 71046 XR CHEST PA AND LATERAL: ICD-10-PCS | Mod: 26,,, | Performed by: RADIOLOGY

## 2020-06-08 PROCEDURE — 3052F PR MOST RECENT HEMOGLOBIN A1C LEVEL 8.0 - < 9.0%: ICD-10-PCS | Mod: CPTII,S$GLB,, | Performed by: NURSE PRACTITIONER

## 2020-06-08 PROCEDURE — 99214 OFFICE O/P EST MOD 30 MIN: CPT | Mod: S$GLB,,, | Performed by: NURSE PRACTITIONER

## 2020-06-08 PROCEDURE — 99999 PR PBB SHADOW E&M-EST. PATIENT-LVL V: ICD-10-PCS | Mod: PBBFAC,,, | Performed by: NURSE PRACTITIONER

## 2020-06-08 PROCEDURE — 1125F PR PAIN SEVERITY QUANTIFIED, PAIN PRESENT: ICD-10-PCS | Mod: S$GLB,,, | Performed by: NURSE PRACTITIONER

## 2020-06-08 PROCEDURE — 1159F PR MEDICATION LIST DOCUMENTED IN MEDICAL RECORD: ICD-10-PCS | Mod: S$GLB,,, | Performed by: NURSE PRACTITIONER

## 2020-06-08 PROCEDURE — 99999 PR PBB SHADOW E&M-EST. PATIENT-LVL V: CPT | Mod: PBBFAC,,, | Performed by: NURSE PRACTITIONER

## 2020-06-08 PROCEDURE — 3052F HG A1C>EQUAL 8.0%<EQUAL 9.0%: CPT | Mod: CPTII,S$GLB,, | Performed by: NURSE PRACTITIONER

## 2020-06-08 PROCEDURE — 99214 PR OFFICE/OUTPT VISIT, EST, LEVL IV, 30-39 MIN: ICD-10-PCS | Mod: S$GLB,,, | Performed by: NURSE PRACTITIONER

## 2020-06-08 PROCEDURE — 99499 RISK ADDL DX/OHS AUDIT: ICD-10-PCS | Mod: S$GLB,,, | Performed by: NURSE PRACTITIONER

## 2020-06-08 PROCEDURE — 99499 UNLISTED E&M SERVICE: CPT | Mod: S$GLB,,, | Performed by: NURSE PRACTITIONER

## 2020-06-08 PROCEDURE — 1101F PT FALLS ASSESS-DOCD LE1/YR: CPT | Mod: CPTII,S$GLB,, | Performed by: NURSE PRACTITIONER

## 2020-06-08 NOTE — PROGRESS NOTES
Subjective:       Patient ID: Vince Martinez is a 89 y.o. male.    Chief Complaint: Follow-up (adjustments on meds)    This is an 88 yo  male patient of Dr. Ulloa who is new to me. He presents today with several concerns, but mainly to address recent positive Quanitferon Gold lab result. PMH includes HTN, CHF, DM2, hypothyroidism, DDD, and rheumatic aortic valve insufficiency, among others. VSS today. Denies fever, chest pain, SOB, and dyspnea. Reports mild, intermittent palpitations but has not occurred recently. Sees Dr. Jamal Gomez with Cardiology, last visit was 1 month ago. Patient reports chronic, non-productive cough. Denies blood- or pink-tinged sputum. Reports occasional night sweats. Patient reports receiving BCG injection in [facility] about 50 years ago.    Review of Systems   Constitutional: Negative for appetite change, chills, fatigue and fever.   HENT: Positive for postnasal drip and rhinorrhea. Negative for trouble swallowing.    Eyes: Negative for visual disturbance.   Respiratory: Positive for cough. Negative for chest tightness, shortness of breath and wheezing.    Cardiovascular: Negative for chest pain, palpitations and leg swelling.   Gastrointestinal: Negative for abdominal pain, blood in stool, constipation, diarrhea, nausea and vomiting.   Genitourinary: Negative for difficulty urinating and hematuria.   Musculoskeletal: Positive for arthralgias. Negative for neck stiffness.   Neurological: Positive for weakness (BLE). Negative for dizziness and light-headedness.   Psychiatric/Behavioral: Negative for sleep disturbance.       Objective:      Physical Exam   Constitutional: He is oriented to person, place, and time. Vital signs are normal. He appears well-developed. He is cooperative.   HENT:   Head: Normocephalic and atraumatic.   Right Ear: Hearing, tympanic membrane, external ear and ear canal normal.   Left Ear: Hearing, tympanic membrane, external ear and ear canal normal.   Nose:  Nose normal.   Mouth/Throat: Uvula is midline, oropharynx is clear and moist and mucous membranes are normal.   Eyes: Pupils are equal, round, and reactive to light. Conjunctivae, EOM and lids are normal.   Neck: Trachea normal and normal range of motion. Carotid bruit is not present. No tracheal deviation present.   Cardiovascular: Normal rate, regular rhythm, S1 normal, S2 normal, normal heart sounds and normal pulses.   Pacemaker in situ   Pulmonary/Chest: Effort normal and breath sounds normal. No respiratory distress.   Abdominal: Soft. Normal appearance and bowel sounds are normal. He exhibits no distension. There is no tenderness.   Musculoskeletal: Normal range of motion.   Neurological: He is alert and oriented to person, place, and time. He has normal strength and normal reflexes. Coordination normal.   Skin: Skin is warm, dry and intact. Capillary refill takes less than 2 seconds.   Psychiatric: He has a normal mood and affect. His speech is normal and behavior is normal. Thought content normal.   Vitals reviewed.      Assessment:       1. Type 2 diabetes mellitus with hyperglycemia, without long-term current use of insulin    2. Positive QuantiFERON-TB Gold test    3. Cough present for greater than 3 weeks    4. Essential hypertension    5. Chronic combined systolic and diastolic CHF (congestive heart failure)    6. CKD (chronic kidney disease) stage 3, GFR 30-59 ml/min        Plan:       Vince was seen today for follow-up.    Diagnoses and all orders for this visit:    Type 2 diabetes mellitus with hyperglycemia, without long-term current use of insulin  -     Hemoglobin A1C; Future  -     Renal function panel; Future    Positive QuantiFERON-TB Gold test  -     Ambulatory referral/consult to Infectious Disease; Future  -     X-Ray Chest PA And Lateral; Future    Cough present for greater than 3 weeks  -     X-Ray Chest PA And Lateral; Future    Essential hypertension  -     TSH; Future  -      Microalbumin/creatinine urine ratio; Future  -     furosemide (LASIX) 20 MG tablet; Take 1 tablet (20 mg total) by mouth once daily.  -     Renal function panel; Future    Chronic combined systolic and diastolic CHF (congestive heart failure)  -     furosemide (LASIX) 20 MG tablet; Take 1 tablet (20 mg total) by mouth once daily.    CKD (chronic kidney disease) stage 3, GFR 30-59 ml/min  -     Renal function panel; Future    - Patient states he takes current dose of Lasix for a few days of the week  when he notices peripheral edema. Advised patient to begin taking furosemide 20mg daily instead of the 40mg. Instructed to weigh himself daily and check a BP daily and maintain a log. If weight increases from one day to next, begin taking 40mg once again and call to notify us. Will do renal function panel next Tuesday  - CXR ordered today was normal; referred to ID for further evaluation of +Quantiferon Gold  - Labs ordered today  - Discussed proper diet at length, importance of adequate hydration and taking medications on time every day  - RTC for a follow-up with PCP next month, or sooner if needed

## 2020-06-09 ENCOUNTER — TELEPHONE (OUTPATIENT)
Dept: FAMILY MEDICINE | Facility: CLINIC | Age: 85
End: 2020-06-09

## 2020-06-09 PROBLEM — N18.30 CKD (CHRONIC KIDNEY DISEASE) STAGE 3, GFR 30-59 ML/MIN: Status: ACTIVE | Noted: 2020-06-09

## 2020-06-09 RX ORDER — FUROSEMIDE 20 MG/1
20 TABLET ORAL DAILY
Qty: 30 TABLET | Refills: 0 | Status: SHIPPED | OUTPATIENT
Start: 2020-06-09 | End: 2020-07-09

## 2020-06-09 NOTE — PROGRESS NOTES
Patient, Vince Martinez (MRN #[id number]), presented with a recent Platelet count less than 150 K/uL consistent with the definition of thrombocytopenia (ICD10 - D69.6).    Platelets   Date Value Ref Range Status   05/27/2020 147 (L) 150 - 350 K/uL Final     The patient's thrombocytopenia was monitored, evaluated, addressed and/or treated. This addendum to the medical record is made on 06/09/2020.

## 2020-06-09 NOTE — TELEPHONE ENCOUNTER
----- Message from Beth Livingston sent at 6/9/2020  9:26 AM CDT -----  Contact: Self [phone]  Patient Returning Your Phone Call

## 2020-06-10 ENCOUNTER — OFFICE VISIT (OUTPATIENT)
Dept: INFECTIOUS DISEASES | Facility: CLINIC | Age: 85
End: 2020-06-10
Payer: MEDICARE

## 2020-06-10 VITALS
HEART RATE: 59 BPM | WEIGHT: 157 LBS | SYSTOLIC BLOOD PRESSURE: 196 MMHG | BODY MASS INDEX: 26.16 KG/M2 | HEIGHT: 65 IN | DIASTOLIC BLOOD PRESSURE: 65 MMHG

## 2020-06-10 DIAGNOSIS — R76.12 POSITIVE QUANTIFERON-TB GOLD TEST: ICD-10-CM

## 2020-06-10 DIAGNOSIS — Z11.1 TUBERCULOSIS SCREENING: ICD-10-CM

## 2020-06-10 PROCEDURE — 1125F PR PAIN SEVERITY QUANTIFIED, PAIN PRESENT: ICD-10-PCS | Mod: S$GLB,,, | Performed by: INTERNAL MEDICINE

## 2020-06-10 PROCEDURE — 1101F PR PT FALLS ASSESS DOC 0-1 FALLS W/OUT INJ PAST YR: ICD-10-PCS | Mod: CPTII,S$GLB,, | Performed by: INTERNAL MEDICINE

## 2020-06-10 PROCEDURE — 99999 PR PBB SHADOW E&M-EST. PATIENT-LVL V: ICD-10-PCS | Mod: PBBFAC,,,

## 2020-06-10 PROCEDURE — 99202 PR OFFICE/OUTPT VISIT, NEW, LEVL II, 15-29 MIN: ICD-10-PCS | Mod: S$GLB,,, | Performed by: INTERNAL MEDICINE

## 2020-06-10 PROCEDURE — 1159F PR MEDICATION LIST DOCUMENTED IN MEDICAL RECORD: ICD-10-PCS | Mod: S$GLB,,, | Performed by: INTERNAL MEDICINE

## 2020-06-10 PROCEDURE — 1125F AMNT PAIN NOTED PAIN PRSNT: CPT | Mod: S$GLB,,, | Performed by: INTERNAL MEDICINE

## 2020-06-10 PROCEDURE — 1159F MED LIST DOCD IN RCRD: CPT | Mod: S$GLB,,, | Performed by: INTERNAL MEDICINE

## 2020-06-10 PROCEDURE — 99999 PR PBB SHADOW E&M-EST. PATIENT-LVL V: CPT | Mod: PBBFAC,,,

## 2020-06-10 PROCEDURE — 99202 OFFICE O/P NEW SF 15 MIN: CPT | Mod: S$GLB,,, | Performed by: INTERNAL MEDICINE

## 2020-06-10 PROCEDURE — 1101F PT FALLS ASSESS-DOCD LE1/YR: CPT | Mod: CPTII,S$GLB,, | Performed by: INTERNAL MEDICINE

## 2020-06-10 NOTE — PROGRESS NOTES
89-year-old man with hypertension, COPD, Left hip back pain with two injections and chronic cough greater than three weeks presents with positive qtb gold.    Patient was seen in pulmonary clinic five years ago for a pulmonary nodule. Patient did not follow up with [facility].    Patient has had some cough with sputum for the last several months. No fevers,chills,sweats, change in appetite, chest pain, nausea, vomiting, diarrhea, constipation, abdominal pain, burning on urination.    Patient is in need of a [facility] but has been in the [place] for the last 50 years. Has visited within the last five years. No TV exposure that he recalls. Patient has received BCG as a 15-year-old.    Vital signs as noted. Blood pressure 195/65. Pulse 59.    Chest x-ray 6/8/20 with pacemaker but no acute cardiopulmonary disease.    Impression: positive Qtb Consistent with exposure to tuberculosis. Chest x-ray does not show active tuberculosis. However, because of previous history of lung nodule, Will obtain a chest CT for a better look.    Plan: chest CT to look for nodule and or other signs for tuberculosis or other processes. If patient does not have evidence of active tuberculosis, will likely not treat for leading tuberculosis given unclear timing of TB exposure.  Return to clinic after CT scan    Mansoor David   [facility]

## 2020-06-12 DIAGNOSIS — M15.9 PRIMARY OSTEOARTHRITIS INVOLVING MULTIPLE JOINTS: ICD-10-CM

## 2020-06-12 RX ORDER — LEVOTHYROXINE SODIUM 100 UG/1
100 TABLET ORAL
Qty: 90 TABLET | Refills: 0 | Status: SHIPPED | OUTPATIENT
Start: 2020-06-12 | End: 2020-09-02

## 2020-06-12 RX ORDER — DICLOFENAC SODIUM 10 MG/G
GEL TOPICAL DAILY
Qty: 100 G | Refills: 0 | Status: SHIPPED | OUTPATIENT
Start: 2020-06-12 | End: 2020-07-14 | Stop reason: SDUPTHER

## 2020-06-12 NOTE — TELEPHONE ENCOUNTER
Pharmacy wanted to know how many grams patient uses on the diclofenac . I clarified it was 2 grams once a day. Pharmacist voices understanding.

## 2020-06-12 NOTE — TELEPHONE ENCOUNTER
----- Message from Carolee Mckinney MA sent at 6/12/2020  1:28 PM CDT -----  Contact: pharmacy      ----- Message -----  From: Ema Lee  Sent: 6/12/2020  11:47 AM CDT  To: Vignesh Carpio Staff    Type:  Pharmacy Calling to Clarify an RX    Name of Caller: Aliyah  Pharmacy Name: [facility]  Prescription Name: diclofenac sodium (VOLTAREN) 1 % Gel  What do they need to clarify?: how much to apply topically  Best Call Back Number: [phone]  Additional Information: n/a

## 2020-06-16 ENCOUNTER — LAB VISIT (OUTPATIENT)
Dept: LAB | Facility: HOSPITAL | Age: 85
End: 2020-06-16
Attending: NURSE PRACTITIONER
Payer: MEDICARE

## 2020-06-16 DIAGNOSIS — E11.65 TYPE 2 DIABETES MELLITUS WITH HYPERGLYCEMIA, WITHOUT LONG-TERM CURRENT USE OF INSULIN: ICD-10-CM

## 2020-06-16 DIAGNOSIS — N18.30 CKD (CHRONIC KIDNEY DISEASE) STAGE 3, GFR 30-59 ML/MIN: ICD-10-CM

## 2020-06-16 DIAGNOSIS — I10 ESSENTIAL HYPERTENSION: ICD-10-CM

## 2020-06-16 LAB
ALBUMIN SERPL BCP-MCNC: 4 G/DL (ref 3.5–5.2)
ANION GAP SERPL CALC-SCNC: 9 MMOL/L (ref 8–16)
BUN SERPL-MCNC: 18 MG/DL (ref 8–23)
CALCIUM SERPL-MCNC: 9.3 MG/DL (ref 8.7–10.5)
CHLORIDE SERPL-SCNC: 106 MMOL/L (ref 95–110)
CO2 SERPL-SCNC: 25 MMOL/L (ref 23–29)
CREAT SERPL-MCNC: 1.1 MG/DL (ref 0.5–1.4)
EST. GFR  (AFRICAN AMERICAN): >60 ML/MIN/1.73 M^2
EST. GFR  (NON AFRICAN AMERICAN): 59.2 ML/MIN/1.73 M^2
GLUCOSE SERPL-MCNC: 85 MG/DL (ref 70–110)
PHOSPHATE SERPL-MCNC: 3 MG/DL (ref 2.7–4.5)
POTASSIUM SERPL-SCNC: 4.4 MMOL/L (ref 3.5–5.1)
SODIUM SERPL-SCNC: 140 MMOL/L (ref 136–145)

## 2020-06-16 PROCEDURE — 36415 COLL VENOUS BLD VENIPUNCTURE: CPT | Mod: PO

## 2020-06-16 PROCEDURE — 80069 RENAL FUNCTION PANEL: CPT

## 2020-06-17 ENCOUNTER — NURSE TRIAGE (OUTPATIENT)
Dept: ADMINISTRATIVE | Facility: CLINIC | Age: 85
End: 2020-06-17

## 2020-06-17 NOTE — TELEPHONE ENCOUNTER
Pt contacted through Post Procedural Symptom Tracking. Denies any cough, fever, or difficulty breathing since procedure.     Reason for Disposition   Health Information question, no triage required and triager able to answer question    Additional Information   Negative: [1] Caller is not with the adult (patient) AND [2] reporting urgent symptoms   Negative: Lab result questions   Negative: Medication questions   Negative: Caller can't be reached by phone   Negative: Caller has already spoken to PCP or another triager   Negative: RN needs further essential information from caller in order to complete triage   Negative: Requesting regular office appointment   Negative: [1] Caller requesting NON-URGENT health information AND [2] PCP's office is the best resource    Protocols used: INFORMATION ONLY CALL-A-

## 2020-06-18 ENCOUNTER — PATIENT OUTREACH (OUTPATIENT)
Dept: ADMINISTRATIVE | Facility: OTHER | Age: 85
End: 2020-06-18

## 2020-06-18 DIAGNOSIS — E11.65 TYPE 2 DIABETES MELLITUS WITH HYPERGLYCEMIA, WITHOUT LONG-TERM CURRENT USE OF INSULIN: Primary | ICD-10-CM

## 2020-06-18 NOTE — PROGRESS NOTES
Patient's chart was reviewed.   Requested updates within Care Everywhere.  Immunizations reconciled.  Order placed for diabetic eye screening photo.    Health Maintenance was updated.

## 2020-06-19 ENCOUNTER — HOSPITAL ENCOUNTER (OUTPATIENT)
Dept: RADIOLOGY | Facility: HOSPITAL | Age: 85
Discharge: HOME | End: 2020-06-19
Attending: INTERNAL MEDICINE
Payer: MEDICARE

## 2020-06-19 DIAGNOSIS — Z11.1 TUBERCULOSIS SCREENING: ICD-10-CM

## 2020-06-19 PROCEDURE — 71250 CT THORAX DX C-: CPT | Mod: TC

## 2020-06-19 PROCEDURE — 71250 CT CHEST WITHOUT CONTRAST: ICD-10-PCS | Mod: 26,,, | Performed by: RADIOLOGY

## 2020-06-19 PROCEDURE — 71250 CT THORAX DX C-: CPT | Mod: 26,,, | Performed by: RADIOLOGY

## 2020-06-22 ENCOUNTER — OFFICE VISIT (OUTPATIENT)
Dept: PODIATRY | Facility: CLINIC | Age: 85
End: 2020-06-22
Payer: MEDICARE

## 2020-06-22 VITALS
HEART RATE: 67 BPM | SYSTOLIC BLOOD PRESSURE: 167 MMHG | BODY MASS INDEX: 26.13 KG/M2 | DIASTOLIC BLOOD PRESSURE: 61 MMHG | HEIGHT: 65 IN

## 2020-06-22 DIAGNOSIS — M20.41 HAMMER TOES OF BOTH FEET: ICD-10-CM

## 2020-06-22 DIAGNOSIS — M79.672 FOOT PAIN, BILATERAL: ICD-10-CM

## 2020-06-22 DIAGNOSIS — E11.65 TYPE 2 DIABETES MELLITUS WITH HYPERGLYCEMIA, WITHOUT LONG-TERM CURRENT USE OF INSULIN: ICD-10-CM

## 2020-06-22 DIAGNOSIS — E11.42 TYPE 2 DIABETES MELLITUS WITH POLYNEUROPATHY: Primary | ICD-10-CM

## 2020-06-22 DIAGNOSIS — M79.671 FOOT PAIN, BILATERAL: ICD-10-CM

## 2020-06-22 DIAGNOSIS — M20.42 HAMMER TOES OF BOTH FEET: ICD-10-CM

## 2020-06-22 PROCEDURE — 1125F PR PAIN SEVERITY QUANTIFIED, PAIN PRESENT: ICD-10-PCS | Mod: S$GLB,,, | Performed by: PODIATRIST

## 2020-06-22 PROCEDURE — 99999 PR PBB SHADOW E&M-EST. PATIENT-LVL V: CPT | Mod: PBBFAC,,, | Performed by: PODIATRIST

## 2020-06-22 PROCEDURE — 1101F PT FALLS ASSESS-DOCD LE1/YR: CPT | Mod: CPTII,S$GLB,, | Performed by: PODIATRIST

## 2020-06-22 PROCEDURE — 3052F PR MOST RECENT HEMOGLOBIN A1C LEVEL 8.0 - < 9.0%: ICD-10-PCS | Mod: CPTII,S$GLB,, | Performed by: PODIATRIST

## 2020-06-22 PROCEDURE — 99203 PR OFFICE/OUTPT VISIT, NEW, LEVL III, 30-44 MIN: ICD-10-PCS | Mod: S$GLB,,, | Performed by: PODIATRIST

## 2020-06-22 PROCEDURE — 1125F AMNT PAIN NOTED PAIN PRSNT: CPT | Mod: S$GLB,,, | Performed by: PODIATRIST

## 2020-06-22 PROCEDURE — 99203 OFFICE O/P NEW LOW 30 MIN: CPT | Mod: S$GLB,,, | Performed by: PODIATRIST

## 2020-06-22 PROCEDURE — 1159F MED LIST DOCD IN RCRD: CPT | Mod: S$GLB,,, | Performed by: PODIATRIST

## 2020-06-22 PROCEDURE — 1101F PR PT FALLS ASSESS DOC 0-1 FALLS W/OUT INJ PAST YR: ICD-10-PCS | Mod: CPTII,S$GLB,, | Performed by: PODIATRIST

## 2020-06-22 PROCEDURE — 3052F HG A1C>EQUAL 8.0%<EQUAL 9.0%: CPT | Mod: CPTII,S$GLB,, | Performed by: PODIATRIST

## 2020-06-22 PROCEDURE — 99999 PR PBB SHADOW E&M-EST. PATIENT-LVL V: ICD-10-PCS | Mod: PBBFAC,,, | Performed by: PODIATRIST

## 2020-06-22 PROCEDURE — 1159F PR MEDICATION LIST DOCUMENTED IN MEDICAL RECORD: ICD-10-PCS | Mod: S$GLB,,, | Performed by: PODIATRIST

## 2020-06-22 NOTE — PATIENT INSTRUCTIONS
Prescribed compound analgesic cream from [facility] to be applied as needed.    Recommend complex B vitamin to be taken daily. It is available over the counter.

## 2020-06-22 NOTE — LETTER
June 22, 2020      Shlomo Luong MD  [address] [facility]  [place] [address]           [place] [facility]  [address], [address]  [place] [address]  Phone: [phone]  Fax: [phone]          Patient: Vince Martinez   MR Number: [id number]   YOB: 1930   Date of Visit: 6/22/2020       Dear Dr. Shlomo Luong:    Thank you for referring Vince Martinez to me for evaluation. Attached you will find relevant portions of my assessment and plan of care.    If you have questions, please do not hesitate to call me. I look forward to following Vince Martinez along with you.    Sincerely,    Oneil Hazel, DPEDILBERTO    Enclosure  CC:  No Recipients    If you would like to receive this communication electronically, please contact [email or web address] or [phone] to request more information on [employer] access.    For providers and/or their staff who would like to refer a patient to [facility], please contact us through our one-stop-shop provider referral line, [facility], at [phone].    If you feel you have received this communication in error or would no longer like to receive these types of communications, please e-mail [email or web address]

## 2020-06-22 NOTE — PROGRESS NOTES
Subjective:      Patient ID: Vince Martinez is a 89 y.o. male.    Chief Complaint: Diabetes Mellitus (Dr. Luong 3/20/20) and Diabetic Foot Exam (brenda pain )    Vince is a 89 y.o. male who presents to the clinic upon referral from Dr. Luong  for evaluation and treatment of diabetic feet. 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  Patient relates history of seeing  routine diabetic foot care.  He says that he just received a prescription for diabetic shoes 2 months ago.  Relates that he gets a burning pain at the tips of both great toes and underneath the arch the left foot that is present even at rest.  Denies any trauma.    PCP: Shlomo Luong MD    Date Last Seen by PCP:     Current shoe gear: Rx diabetic extra depth shoes and custom accommodative insoles    Hemoglobin A1C   Date Value Ref Range Status   06/08/2020 8.2 (H) 4.0 - 5.6 % Final     Comment:     ADA Screening Guidelines:  5.7-6.4%  Consistent with prediabetes  >or=6.5%  Consistent with diabetes  High levels of fetal hemoglobin interfere with the HbA1C  assay. Heterozygous hemoglobin variants (HbS, HgC, etc)do  not significantly interfere with this assay.   However, presence of multiple variants may affect accuracy.             Review of Systems   Constitution: Negative for chills, fever and malaise/fatigue.   HENT: Negative for congestion and hearing loss.    Cardiovascular: Negative for chest pain, claudication and leg swelling.   Respiratory: Negative for cough and shortness of breath.    Skin: Positive for dry skin and nail changes. Negative for color change.   Musculoskeletal: Negative for back pain, joint pain, muscle cramps and muscle weakness.   Gastrointestinal: Negative for nausea and vomiting.   Neurological: Positive for numbness  and paresthesias. Negative for weakness.   Psychiatric/Behavioral: Negative for altered mental status.           Objective:      Physical Exam  Constitutional:       Appearance: Normal appearance.   Cardiovascular:      Pulses:           Dorsalis pedis pulses are 2+ on the right side and 2+ on the left side.        Posterior tibial pulses are 1+ on the right side and 1+ on the left side.      Comments: Mild nonpitting edema to lower extremity bilateral with multiple telangiectasias.  Musculoskeletal:      Comments: Manual muscle strength testing 4/5 bilateral lower extremity.    Mild semi rigid cavus foot structure bilateral foot.    Flexible reducible hammertoe contractures toes 2-4 bilateral foot.    No pain with ROM or MMT bilateral lower extremity.     Feet:      Right foot:      Protective Sensation: 10 sites tested. 4 sites sensed.      Skin integrity: Dry skin present. No skin breakdown.      Toenail Condition: Fungal disease present.     Left foot:      Protective Sensation: 10 sites tested. 3 sites sensed.      Skin integrity: No skin breakdown.      Toenail Condition: Fungal disease present.  Skin:     General: Skin is warm.      Capillary Refill: Capillary refill takes less than 2 seconds.      Findings: No ecchymosis or erythema.      Nails: There is no clubbing.        Comments: Mild skin atrophy to lower extremity bilateral.    No open lesions or macerations of foot bilateral.   Neurological:      Mental Status: He is alert.               Assessment:       Encounter Diagnoses   Name Primary?    Type 2 diabetes mellitus with hyperglycemia, without long-term current use of insulin     Type 2 diabetes mellitus with polyneuropathy Yes    Hammer toes of both feet     Foot pain, bilateral          Plan:       Vince was seen today for diabetes mellitus and diabetic foot exam.    Diagnoses and all orders for this visit:    Type 2 diabetes mellitus with polyneuropathy  -     DIABETIC SHOES FOR HOME  USE    Type 2 diabetes mellitus with hyperglycemia, without long-term current use of insulin  -     Ambulatory referral/consult to Podiatry  -     DIABETIC SHOES FOR HOME USE    Hammer toes of both feet  -     DIABETIC SHOES FOR HOME USE    Foot pain, bilateral  -     DIABETIC SHOES FOR HOME USE      I counseled the patient on his conditions, their implications and medical management.    Shoe inspection. Diabetic Foot Education. Patient reminded of the importance of good nutrition and blood sugar control to help prevent podiatric complications of diabetes. Patient instructed on proper foot hygeine. We discussed wearing proper shoe gear, daily foot inspections, never walking without protective shoe gear, never putting sharp instruments to feet, routine podiatric nail visits every 2-3 months.      Since I am unable to verify when the patient last received diabetic foot care I would recommend he follows up within 2 months.    Disregard the order for diabetic shoes above since he just received shoes within 2 months.    Prescribed compound pain cream to be applied to 4 times daily as needed.    Recommend patient take complex B vitamin daily to help with possible thiamine deficiency secondary to chronic metformin use.    Patient is considered intermediate risk for diabetic foot complication.

## 2020-06-24 ENCOUNTER — TELEPHONE (OUTPATIENT)
Dept: FAMILY MEDICINE | Facility: CLINIC | Age: 85
End: 2020-06-24

## 2020-06-24 NOTE — TELEPHONE ENCOUNTER
----- Message from Beth Livingston sent at 6/24/2020 10:18 AM CDT -----  Regarding: results  Contact: [phone]  TEST RESULTS:   Patient would like to get test results.  Name of test (lab, mammo, etc.): CT-Scan - X-ray   Date of test: 6-19-20 6-8-20

## 2020-06-26 ENCOUNTER — TELEPHONE (OUTPATIENT)
Dept: FAMILY MEDICINE | Facility: CLINIC | Age: 85
End: 2020-06-26

## 2020-06-26 NOTE — TELEPHONE ENCOUNTER
----- Message from Nat Rodríguez sent at 6/26/2020 12:58 PM CDT -----  Vince would like a call back in regards to labs for TB. He knows he did the CT scan for TB, but wonders if that will be enough. Please call [phone] to discuss.

## 2020-06-26 NOTE — TELEPHONE ENCOUNTER
Spoke with patient to discuss recent CT scan results. Patient would like a follow up appointment with Dr. David. Message sent to Dr. David's staff.    Mai Tilley NP

## 2020-07-01 RX ORDER — METFORMIN HYDROCHLORIDE 500 MG/1
500 TABLET, EXTENDED RELEASE ORAL DAILY
Qty: 90 TABLET | Refills: 3 | Status: SHIPPED | OUTPATIENT
Start: 2020-07-01 | End: 2021-07-09 | Stop reason: SDUPTHER

## 2020-07-09 ENCOUNTER — TELEPHONE (OUTPATIENT)
Dept: FAMILY MEDICINE | Facility: CLINIC | Age: 85
End: 2020-07-09

## 2020-07-09 NOTE — TELEPHONE ENCOUNTER
----- Message from Beth Livingston sent at 7/9/2020  9:54 AM CDT -----  Regarding: call back  Contact: [phone]  Patient is calling to see if a copy of his recent Lab work can be send to Dr. Gomez please fax to [phone].

## 2020-07-13 ENCOUNTER — OFFICE VISIT (OUTPATIENT)
Dept: OTOLARYNGOLOGY | Facility: CLINIC | Age: 85
End: 2020-07-13
Payer: MEDICARE

## 2020-07-13 VITALS
HEART RATE: 66 BPM | SYSTOLIC BLOOD PRESSURE: 181 MMHG | HEIGHT: 65 IN | WEIGHT: 155.69 LBS | DIASTOLIC BLOOD PRESSURE: 58 MMHG | BODY MASS INDEX: 25.94 KG/M2 | TEMPERATURE: 97 F

## 2020-07-13 DIAGNOSIS — Z01.812 PRE-PROCEDURE LAB EXAM: ICD-10-CM

## 2020-07-13 DIAGNOSIS — Z86.79 HISTORY OF CONGESTIVE HEART FAILURE: ICD-10-CM

## 2020-07-13 DIAGNOSIS — Z87.19 HISTORY OF GASTROESOPHAGEAL REFLUX (GERD): ICD-10-CM

## 2020-07-13 DIAGNOSIS — R49.0 DYSPHONIA: Primary | ICD-10-CM

## 2020-07-13 DIAGNOSIS — R05.9 COUGH: ICD-10-CM

## 2020-07-13 DIAGNOSIS — R09.89 THROAT CLEARING: ICD-10-CM

## 2020-07-13 DIAGNOSIS — J30.0 VASOMOTOR RHINITIS: ICD-10-CM

## 2020-07-13 PROCEDURE — 99214 PR OFFICE/OUTPT VISIT, EST, LEVL IV, 30-39 MIN: ICD-10-PCS | Mod: S$GLB,,, | Performed by: OTOLARYNGOLOGY

## 2020-07-13 PROCEDURE — 99214 OFFICE O/P EST MOD 30 MIN: CPT | Mod: S$GLB,,, | Performed by: OTOLARYNGOLOGY

## 2020-07-13 PROCEDURE — 1101F PT FALLS ASSESS-DOCD LE1/YR: CPT | Mod: CPTII,S$GLB,, | Performed by: OTOLARYNGOLOGY

## 2020-07-13 PROCEDURE — 1159F MED LIST DOCD IN RCRD: CPT | Mod: S$GLB,,, | Performed by: OTOLARYNGOLOGY

## 2020-07-13 PROCEDURE — 1159F PR MEDICATION LIST DOCUMENTED IN MEDICAL RECORD: ICD-10-PCS | Mod: S$GLB,,, | Performed by: OTOLARYNGOLOGY

## 2020-07-13 PROCEDURE — 1101F PR PT FALLS ASSESS DOC 0-1 FALLS W/OUT INJ PAST YR: ICD-10-PCS | Mod: CPTII,S$GLB,, | Performed by: OTOLARYNGOLOGY

## 2020-07-13 NOTE — PATIENT INSTRUCTIONS
Increase omeprazole to 20 mg twice a day.  Reflux precautions.  Vocal hygiene.  Use ipratropium nasal spray 2 sprays in each nostril every 6 hours.    Follow up in 3 weeks with possible scope.  Needs COVID-19 NP swab 72 hours prior to office scope.

## 2020-07-14 DIAGNOSIS — M15.9 PRIMARY OSTEOARTHRITIS INVOLVING MULTIPLE JOINTS: ICD-10-CM

## 2020-07-14 NOTE — TELEPHONE ENCOUNTER
----- Message from Lydia Noguera sent at 7/14/2020 12:32 PM CDT -----  Contact: [phone]/ SELF  Patient would like a refill for tamsulosin (FLOMAX) 0.4 mg Cp24, tizanidine 4 mg Cap , betamethasone dipropionate (DIPROLENE) 0.05 % lotion, traMADoL (ULTRAM) 50 mg tablet sent to [facility] - [place], [place] - [address] [facility] and would like to speak with you about his next MRI. Please advise

## 2020-07-15 RX ORDER — DICLOFENAC SODIUM 10 MG/G
GEL TOPICAL DAILY
Qty: 100 G | Refills: 0 | Status: SHIPPED | OUTPATIENT
Start: 2020-07-15 | End: 2020-07-15

## 2020-07-15 RX ORDER — DICLOFENAC SODIUM 10 MG/G
2 GEL TOPICAL DAILY PRN
Qty: 100 G | Refills: 1 | Status: SHIPPED | OUTPATIENT
Start: 2020-07-15 | End: 2020-11-30 | Stop reason: SDUPTHER

## 2020-07-15 RX ORDER — TRIAMCINOLONE ACETONIDE 1 MG/G
CREAM TOPICAL 2 TIMES DAILY
Qty: 15 G | Refills: 0 | Status: SHIPPED | OUTPATIENT
Start: 2020-07-15 | End: 2021-04-15 | Stop reason: SDUPTHER

## 2020-07-15 RX ORDER — DICLOFENAC SODIUM 10 MG/G
GEL TOPICAL DAILY
Qty: 100 G | Refills: 0 | Status: CANCELLED | OUTPATIENT
Start: 2020-07-15

## 2020-07-15 RX ORDER — TAMSULOSIN HYDROCHLORIDE 0.4 MG/1
0.4 CAPSULE ORAL DAILY
Qty: 90 CAPSULE | Refills: 3 | Status: SHIPPED | OUTPATIENT
Start: 2020-07-15 | End: 2021-04-22 | Stop reason: SDUPTHER

## 2020-07-15 RX ORDER — TRAMADOL HYDROCHLORIDE 50 MG/1
50 TABLET ORAL EVERY 12 HOURS PRN
Qty: 40 TABLET | Refills: 0 | Status: SHIPPED | OUTPATIENT
Start: 2020-07-15 | End: 2021-07-28 | Stop reason: SDUPTHER

## 2020-07-16 NOTE — PROGRESS NOTES
Subjective:       Patient ID: Vince Martinez is a 89 y.o. male.    Chief Complaint: Other (Voice problems getting worse)    Here today complaining of worsening voice problems.  Has complained of vocal quality for many years but getting worse and now difficult to communicate due to very frequent voice breaks as well as current mask requirements.  Coughs and clears his throat frequently which is only occasionally productive.  States feels as if phlegm in his chest that won't come out, but denies chest congestion, wheezing, SOB.  Denies any swallowing problems or increased symptoms with meals.  Had swallow study in 2/2017 and 2/2019 within normal limits for age and weight stable.  Recent chest x-ray and CT chest with lungs clear and no active disease.  Admits some GERD symptoms on once daily acid reducer.  Denies diet change, acidic foods, mint lozenges, vitamin C tablets, carbonated beverages or other exacerbating factors.  History of vasomotor rhinitis with some relief on ipratropium nasal spray and was to have procedure per Dr. Kauffman but follow-up problems.  Also previously discussed/referred SLP in private practice but he deferred, primarily focused on postnasal drip.                Review of Systems   Ears: Positive for family history of hearing loss.  Negative for ear pain, ear pressure, ear discharge and ear infections.    Nose:  Positive for postnasal drip. Negative for nosebleeds, nasal or sinus surgery and snoring.    Mouth/Throat: Negative for pain swallowing, impaired swallowing, throat mass, neck mass and oral ulcers.   Constitutional: Negative for recent unexplained weight loss, fever and chills.    Eyes:  Negative for history of glaucoma.   Cardiovascular:  Positive for history of high blood pressure.   Respiratory:  Negative for asthma, emphysema, history of tuberculosis and shortness of breath.    Gastrointestinal:  Positive for acid reflux. Negative for history of stomach ulcers or pain.   Other:   Positive for prostate disease and arthritis. Negative for slurred, swollen glands and persistent infections.           Objective:        Vitals:    07/13/20 1409   BP: (!) 181/58   Pulse: 66   Temp: 97.2 °F (36.2 °C)     Body mass index is 25.91 kg/m².  Physical Exam  Constitutional:       General: He is not in acute distress.     Appearance: He is well-developed.   HENT:      Head: Normocephalic and atraumatic.      Right Ear: Tympanic membrane, ear canal and external ear normal.      Left Ear: Tympanic membrane, ear canal and external ear normal.      Nose: Mucosal edema present. No nasal deformity.      Comments: Boggy inferior turbinates with mucoid secretions on anterior rhinoscopy.     Mouth/Throat:      Mouth: No oral lesions.      Pharynx: No oropharyngeal exudate, posterior oropharyngeal erythema or uvula swelling.   Neck:      Musculoskeletal: Neck supple.      Comments: Frequent voice breaks and occ throat clearing.     Pulmonary:      Effort: Pulmonary effort is normal. No respiratory distress.   Lymphadenopathy:      Cervical: No cervical adenopathy.   Skin:     General: Skin is warm and dry.   Neurological:      Mental Status: He is alert and oriented to person, place, and time.   Psychiatric:         Speech: Speech is not slurred.         Behavior: Behavior normal.         Tests / Results:    Recent chest x-ray and CT chest reports reviewed.        Assessment:       1. Dysphonia    2. Throat clearing    3. Cough    4. History of gastroesophageal reflux (GERD)    5. Vasomotor rhinitis    6. History of congestive heart failure    7. Pre-procedure lab exam        Plan:       Reviewed all above and considerations and recommendations and answered questions.  Long history of multifactorial voice problems gradually worsening.  Reviewed/understands multiple contributing factors including aging, GERD, postnasal drip, any underlying cardiopulmonary disease.  Need to recheck vocal cords etc with scope at next  visit after preprocedure COVID swab per protocol.  In the interim will increase acid reducer to twice daily, reflux precautions again reviewed, be sure to take ipratropium nasal spray every 6 hr/4 times a day, vocal hygiene.

## 2020-07-20 ENCOUNTER — OFFICE VISIT (OUTPATIENT)
Dept: FAMILY MEDICINE | Facility: CLINIC | Age: 85
End: 2020-07-20
Payer: MEDICARE

## 2020-07-20 VITALS
DIASTOLIC BLOOD PRESSURE: 60 MMHG | HEART RATE: 63 BPM | SYSTOLIC BLOOD PRESSURE: 120 MMHG | TEMPERATURE: 97 F | WEIGHT: 158.31 LBS | BODY MASS INDEX: 26.34 KG/M2 | OXYGEN SATURATION: 97 %

## 2020-07-20 DIAGNOSIS — A15.9 TB (TUBERCULOSIS): ICD-10-CM

## 2020-07-20 DIAGNOSIS — B35.6 TINEA CRURIS: ICD-10-CM

## 2020-07-20 DIAGNOSIS — Z00.00 ROUTINE GENERAL MEDICAL EXAMINATION AT A HEALTH CARE FACILITY: ICD-10-CM

## 2020-07-20 DIAGNOSIS — J30.1 SEASONAL ALLERGIC RHINITIS DUE TO POLLEN: ICD-10-CM

## 2020-07-20 DIAGNOSIS — N18.30 CKD (CHRONIC KIDNEY DISEASE) STAGE 3, GFR 30-59 ML/MIN: Primary | ICD-10-CM

## 2020-07-20 DIAGNOSIS — E03.9 HYPOTHYROIDISM, UNSPECIFIED TYPE: ICD-10-CM

## 2020-07-20 DIAGNOSIS — M62.838 MUSCLE SPASM: ICD-10-CM

## 2020-07-20 PROCEDURE — 1159F PR MEDICATION LIST DOCUMENTED IN MEDICAL RECORD: ICD-10-PCS | Mod: S$GLB,,, | Performed by: FAMILY MEDICINE

## 2020-07-20 PROCEDURE — 99214 PR OFFICE/OUTPT VISIT, EST, LEVL IV, 30-39 MIN: ICD-10-PCS | Mod: S$GLB,,, | Performed by: FAMILY MEDICINE

## 2020-07-20 PROCEDURE — 1159F MED LIST DOCD IN RCRD: CPT | Mod: S$GLB,,, | Performed by: FAMILY MEDICINE

## 2020-07-20 PROCEDURE — 1125F PR PAIN SEVERITY QUANTIFIED, PAIN PRESENT: ICD-10-PCS | Mod: S$GLB,,, | Performed by: FAMILY MEDICINE

## 2020-07-20 PROCEDURE — 99214 OFFICE O/P EST MOD 30 MIN: CPT | Mod: S$GLB,,, | Performed by: FAMILY MEDICINE

## 2020-07-20 PROCEDURE — 1101F PT FALLS ASSESS-DOCD LE1/YR: CPT | Mod: CPTII,S$GLB,, | Performed by: FAMILY MEDICINE

## 2020-07-20 PROCEDURE — 1125F AMNT PAIN NOTED PAIN PRSNT: CPT | Mod: S$GLB,,, | Performed by: FAMILY MEDICINE

## 2020-07-20 PROCEDURE — 99999 PR PBB SHADOW E&M-EST. PATIENT-LVL V: ICD-10-PCS | Mod: PBBFAC,,, | Performed by: FAMILY MEDICINE

## 2020-07-20 PROCEDURE — 1101F PR PT FALLS ASSESS DOC 0-1 FALLS W/OUT INJ PAST YR: ICD-10-PCS | Mod: CPTII,S$GLB,, | Performed by: FAMILY MEDICINE

## 2020-07-20 PROCEDURE — 99999 PR PBB SHADOW E&M-EST. PATIENT-LVL V: CPT | Mod: PBBFAC,,, | Performed by: FAMILY MEDICINE

## 2020-07-20 RX ORDER — FLUTICASONE PROPIONATE 50 UG/1
1 POWDER, METERED RESPIRATORY (INHALATION)
COMMUNITY
Start: 2019-10-28 | End: 2020-09-21 | Stop reason: SDUPTHER

## 2020-07-20 RX ORDER — CLOTRIMAZOLE AND BETAMETHASONE DIPROPIONATE 10; .64 MG/G; MG/G
CREAM TOPICAL 2 TIMES DAILY
Qty: 45 G | Refills: 0 | Status: SHIPPED | OUTPATIENT
Start: 2020-07-20 | End: 2021-02-05

## 2020-07-20 RX ORDER — FUROSEMIDE 20 MG/1
TABLET ORAL
COMMUNITY
Start: 2020-06-09 | End: 2020-11-30 | Stop reason: SDUPTHER

## 2020-07-20 RX ORDER — MONTELUKAST SODIUM 10 MG/1
10 TABLET ORAL DAILY
Qty: 90 TABLET | Refills: 0 | Status: SHIPPED | OUTPATIENT
Start: 2020-07-20 | End: 2020-10-19

## 2020-07-20 RX ORDER — TIZANIDINE 2 MG/1
2 TABLET ORAL EVERY 8 HOURS PRN
Qty: 90 TABLET | Refills: 0 | Status: SHIPPED | OUTPATIENT
Start: 2020-07-20 | End: 2020-10-26

## 2020-07-20 RX ORDER — HYDROCODONE POLISTIREX AND CHLORPHENIRAMINE POLISTIREX 10; 8 MG/5ML; MG/5ML
SUSPENSION, EXTENDED RELEASE ORAL
COMMUNITY
Start: 2020-07-15 | End: 2021-01-11 | Stop reason: CLARIF

## 2020-07-20 RX ORDER — FINASTERIDE 5 MG/1
TABLET, FILM COATED ORAL
COMMUNITY
Start: 2020-06-05 | End: 2021-01-06 | Stop reason: SDUPTHER

## 2020-07-20 NOTE — PROGRESS NOTES
Subjective:       Patient ID: Vince Martinez is a 89 y.o. male.    Chief Complaint: Follow-up (4 months)    89 years old male came to the clinic for follow-up.  Patient with decreased kidney function but stable in comparison previous reports.  Patient with possible the TB screen.  Patient with no recent follow-up infectious diseases.  Patient wants to know if he was previously exposed to COVID 19.  Patient requests refill of his medicine for seasonal allergies.  No recent thyroid check.  Patient with chronic back pain for the last year.  The pain is 6/10 of intensity on and off aggravated with activity and better with rest.  Patient was using Tylenol with no significant improvement.  Patient with rash over the inguinal area.    Review of Systems   Constitutional: Negative.  Negative for chills, fever and unexpected weight change.   HENT: Positive for voice change.    Eyes: Negative.    Respiratory: Positive for cough.    Cardiovascular: Negative.    Gastrointestinal: Negative.    Genitourinary: Negative.    Musculoskeletal: Positive for arthralgias and back pain.   Integumentary:  Positive for rash.   Neurological: Negative.    Psychiatric/Behavioral: Negative.          Objective:      Physical Exam  Vitals signs and nursing note reviewed.   Constitutional:       General: He is not in acute distress.     Appearance: He is well-developed. He is not diaphoretic.   HENT:      Head: Normocephalic and atraumatic.      Right Ear: External ear normal.      Left Ear: External ear normal.      Nose: Nose normal.      Mouth/Throat:      Pharynx: No oropharyngeal exudate.   Eyes:      General: No scleral icterus.        Right eye: No discharge.         Left eye: No discharge.      Conjunctiva/sclera: Conjunctivae normal.      Pupils: Pupils are equal, round, and reactive to light.   Neck:      Musculoskeletal: Normal range of motion and neck supple.      Thyroid: No thyromegaly.      Vascular: No JVD.      Trachea: No tracheal  deviation.   Cardiovascular:      Rate and Rhythm: Normal rate and regular rhythm.      Heart sounds: Normal heart sounds. No murmur. No friction rub. No gallop.    Pulmonary:      Effort: Pulmonary effort is normal. No respiratory distress.      Breath sounds: Normal breath sounds. No stridor. No wheezing or rales.   Chest:      Chest wall: No tenderness.   Abdominal:      General: Bowel sounds are normal. There is no distension.      Palpations: Abdomen is soft. There is no mass.      Tenderness: There is no abdominal tenderness. There is no guarding or rebound.   Musculoskeletal:      Lumbar back: He exhibits decreased range of motion, tenderness, pain and spasm.   Lymphadenopathy:      Cervical: No cervical adenopathy.   Skin:     General: Skin is warm and dry.      Coloration: Skin is not pale.      Findings: No erythema or rash.   Neurological:      Mental Status: He is alert and oriented to person, place, and time.      Cranial Nerves: No cranial nerve deficit.      Motor: No abnormal muscle tone.      Coordination: Coordination normal.      Gait: Gait abnormal.      Deep Tendon Reflexes: Reflexes are normal and symmetric. Reflexes normal.   Psychiatric:         Behavior: Behavior normal.         Thought Content: Thought content normal.         Judgment: Judgment normal.         Assessment:       1. CKD (chronic kidney disease) stage 3, GFR 30-59 ml/min    2. TB (tuberculosis)    3. Routine general medical examination at a health care facility    4. Tinea cruris    5. Hypothyroidism, unspecified type    6. Muscle spasm    7. Seasonal allergic rhinitis due to pollen        Plan:         Vince was seen today for follow-up.    Diagnoses and all orders for this visit:    CKD (chronic kidney disease) stage 3, GFR 30-59 ml/min  -     CBC auto differential; Future  -     Comprehensive metabolic panel; Future  -     Urinalysis; Future    TB (tuberculosis)  -     Ambulatory referral/consult to Infectious Disease;  Future    Routine general medical examination at a health care facility  -     COVID-19 (SARS CoV-2) IgG Antibody; Future    Tinea cruris  -     clotrimazole-betamethasone 1-0.05% (LOTRISONE) cream; Apply topically 2 (two) times daily.    Hypothyroidism, unspecified type  -     TSH; Future    Muscle spasm  -     tiZANidine (ZANAFLEX) 2 MG tablet; Take 1 tablet (2 mg total) by mouth every 8 (eight) hours as needed.    Seasonal allergic rhinitis due to pollen  -     montelukast (SINGULAIR) 10 mg tablet; Take 1 tablet (10 mg total) by mouth once daily.

## 2020-07-21 ENCOUNTER — PATIENT OUTREACH (OUTPATIENT)
Dept: ADMINISTRATIVE | Facility: OTHER | Age: 85
End: 2020-07-21

## 2020-07-21 ENCOUNTER — LAB VISIT (OUTPATIENT)
Dept: LAB | Facility: HOSPITAL | Age: 85
End: 2020-07-21
Attending: FAMILY MEDICINE
Payer: MEDICARE

## 2020-07-21 DIAGNOSIS — Z00.00 ROUTINE GENERAL MEDICAL EXAMINATION AT A HEALTH CARE FACILITY: ICD-10-CM

## 2020-07-21 DIAGNOSIS — N18.30 CKD (CHRONIC KIDNEY DISEASE) STAGE 3, GFR 30-59 ML/MIN: ICD-10-CM

## 2020-07-21 DIAGNOSIS — E03.9 HYPOTHYROIDISM, UNSPECIFIED TYPE: ICD-10-CM

## 2020-07-21 LAB
ALBUMIN SERPL BCP-MCNC: 3.9 G/DL (ref 3.5–5.2)
ALP SERPL-CCNC: 71 U/L (ref 55–135)
ALT SERPL W/O P-5'-P-CCNC: 34 U/L (ref 10–44)
ANION GAP SERPL CALC-SCNC: 9 MMOL/L (ref 8–16)
AST SERPL-CCNC: 39 U/L (ref 10–40)
BASOPHILS # BLD AUTO: 0.06 K/UL (ref 0–0.2)
BASOPHILS NFR BLD: 0.7 % (ref 0–1.9)
BILIRUB SERPL-MCNC: 0.4 MG/DL (ref 0.1–1)
BUN SERPL-MCNC: 19 MG/DL (ref 8–23)
CALCIUM SERPL-MCNC: 9.5 MG/DL (ref 8.7–10.5)
CHLORIDE SERPL-SCNC: 105 MMOL/L (ref 95–110)
CO2 SERPL-SCNC: 25 MMOL/L (ref 23–29)
CREAT SERPL-MCNC: 1.1 MG/DL (ref 0.5–1.4)
DIFFERENTIAL METHOD: ABNORMAL
EOSINOPHIL # BLD AUTO: 0.3 K/UL (ref 0–0.5)
EOSINOPHIL NFR BLD: 3.1 % (ref 0–8)
ERYTHROCYTE [DISTWIDTH] IN BLOOD BY AUTOMATED COUNT: 13.4 % (ref 11.5–14.5)
EST. GFR  (AFRICAN AMERICAN): >60 ML/MIN/1.73 M^2
EST. GFR  (NON AFRICAN AMERICAN): 59.2 ML/MIN/1.73 M^2
GLUCOSE SERPL-MCNC: 137 MG/DL (ref 70–110)
HCT VFR BLD AUTO: 38.3 % (ref 40–54)
HGB BLD-MCNC: 11.7 G/DL (ref 14–18)
IMM GRANULOCYTES # BLD AUTO: 0.02 K/UL (ref 0–0.04)
IMM GRANULOCYTES NFR BLD AUTO: 0.2 % (ref 0–0.5)
LYMPHOCYTES # BLD AUTO: 2.5 K/UL (ref 1–4.8)
LYMPHOCYTES NFR BLD: 31.6 % (ref 18–48)
MCH RBC QN AUTO: 31.9 PG (ref 27–31)
MCHC RBC AUTO-ENTMCNC: 30.5 G/DL (ref 32–36)
MCV RBC AUTO: 104 FL (ref 82–98)
MONOCYTES # BLD AUTO: 0.7 K/UL (ref 0.3–1)
MONOCYTES NFR BLD: 8.7 % (ref 4–15)
NEUTROPHILS # BLD AUTO: 4.5 K/UL (ref 1.8–7.7)
NEUTROPHILS NFR BLD: 55.7 % (ref 38–73)
NRBC BLD-RTO: 0 /100 WBC
PLATELET # BLD AUTO: 178 K/UL (ref 150–350)
PMV BLD AUTO: 11 FL (ref 9.2–12.9)
POTASSIUM SERPL-SCNC: 4.4 MMOL/L (ref 3.5–5.1)
PROT SERPL-MCNC: 7.5 G/DL (ref 6–8.4)
RBC # BLD AUTO: 3.67 M/UL (ref 4.6–6.2)
SODIUM SERPL-SCNC: 139 MMOL/L (ref 136–145)
TSH SERPL DL<=0.005 MIU/L-ACNC: 3.13 UIU/ML (ref 0.4–4)
WBC # BLD AUTO: 8.05 K/UL (ref 3.9–12.7)

## 2020-07-21 PROCEDURE — 86769 SARS-COV-2 COVID-19 ANTIBODY: CPT

## 2020-07-21 PROCEDURE — 36415 COLL VENOUS BLD VENIPUNCTURE: CPT | Mod: PO

## 2020-07-21 PROCEDURE — 84443 ASSAY THYROID STIM HORMONE: CPT

## 2020-07-21 PROCEDURE — 80053 COMPREHEN METABOLIC PANEL: CPT

## 2020-07-21 PROCEDURE — 85025 COMPLETE CBC W/AUTO DIFF WBC: CPT

## 2020-07-21 NOTE — PROGRESS NOTES
Requested updates within Care Everywhere.  Patient's chart was reviewed for overdue DILLON topics.  Immunizations reconciled.    Orders placed:  Tasked appts:  Labs Linked:

## 2020-07-22 ENCOUNTER — TELEPHONE (OUTPATIENT)
Dept: FAMILY MEDICINE | Facility: CLINIC | Age: 85
End: 2020-07-22

## 2020-07-22 DIAGNOSIS — E11.65 TYPE 2 DIABETES MELLITUS WITH HYPERGLYCEMIA, WITHOUT LONG-TERM CURRENT USE OF INSULIN: Primary | ICD-10-CM

## 2020-07-22 LAB — SARS-COV-2 IGG SERPLBLD QL IA.RAPID: NEGATIVE

## 2020-07-22 NOTE — TELEPHONE ENCOUNTER
Spoke to patient and inform that his blood sugar is elevated and that needed to recheck his blood sugar in 8 weeks. Patient voices understanding.

## 2020-07-22 NOTE — TELEPHONE ENCOUNTER
Patient with elevated blood sugar.    CMP and A1c was ordered in 8 weeks.    Please notify the patient with appointment.

## 2020-07-24 ENCOUNTER — OFFICE VISIT (OUTPATIENT)
Dept: ORTHOPEDICS | Facility: CLINIC | Age: 85
End: 2020-07-24
Payer: MEDICARE

## 2020-07-24 VITALS — WEIGHT: 158 LBS | BODY MASS INDEX: 26.33 KG/M2 | HEIGHT: 65 IN

## 2020-07-24 DIAGNOSIS — M48.061 SPINAL STENOSIS OF LUMBAR REGION WITHOUT NEUROGENIC CLAUDICATION: Primary | ICD-10-CM

## 2020-07-24 PROCEDURE — 99999 PR PBB SHADOW E&M-EST. PATIENT-LVL IV: CPT | Mod: PBBFAC,,, | Performed by: ORTHOPAEDIC SURGERY

## 2020-07-24 PROCEDURE — 99999 PR PBB SHADOW E&M-EST. PATIENT-LVL IV: ICD-10-PCS | Mod: PBBFAC,,, | Performed by: ORTHOPAEDIC SURGERY

## 2020-07-24 PROCEDURE — 1125F PR PAIN SEVERITY QUANTIFIED, PAIN PRESENT: ICD-10-PCS | Mod: S$GLB,,, | Performed by: ORTHOPAEDIC SURGERY

## 2020-07-24 PROCEDURE — 99213 OFFICE O/P EST LOW 20 MIN: CPT | Mod: S$GLB,,, | Performed by: ORTHOPAEDIC SURGERY

## 2020-07-24 PROCEDURE — 1101F PR PT FALLS ASSESS DOC 0-1 FALLS W/OUT INJ PAST YR: ICD-10-PCS | Mod: CPTII,S$GLB,, | Performed by: ORTHOPAEDIC SURGERY

## 2020-07-24 PROCEDURE — 1159F PR MEDICATION LIST DOCUMENTED IN MEDICAL RECORD: ICD-10-PCS | Mod: S$GLB,,, | Performed by: ORTHOPAEDIC SURGERY

## 2020-07-24 PROCEDURE — 1125F AMNT PAIN NOTED PAIN PRSNT: CPT | Mod: S$GLB,,, | Performed by: ORTHOPAEDIC SURGERY

## 2020-07-24 PROCEDURE — 1159F MED LIST DOCD IN RCRD: CPT | Mod: S$GLB,,, | Performed by: ORTHOPAEDIC SURGERY

## 2020-07-24 PROCEDURE — 99213 PR OFFICE/OUTPT VISIT, EST, LEVL III, 20-29 MIN: ICD-10-PCS | Mod: S$GLB,,, | Performed by: ORTHOPAEDIC SURGERY

## 2020-07-24 PROCEDURE — 1101F PT FALLS ASSESS-DOCD LE1/YR: CPT | Mod: CPTII,S$GLB,, | Performed by: ORTHOPAEDIC SURGERY

## 2020-07-24 NOTE — PROGRESS NOTES
Subjective:      Patient ID: Vince Martinez is a 89 y.o. male.    Chief Complaint: Pain of the Right Hip and Pain of the Left Hip    HPI    Patient reports he has lower back pain radiating to both buttocks and lateral hips.  Symptoms are worse with prolonged walking and at night when he rests.  He denies focal neurologic symptoms.  The patient reports that lumbar injection has not been helpful.          Review of Systems   Constitution: Negative for fever and weight loss.   HENT: Negative for congestion.    Eyes: Negative for visual disturbance.   Cardiovascular: Negative for chest pain.   Respiratory: Negative for shortness of breath.    Hematologic/Lymphatic: Negative for bleeding problem. Does not bruise/bleed easily.   Skin: Negative for poor wound healing.   Musculoskeletal: Positive for back pain and joint pain.   Gastrointestinal: Negative for abdominal pain.   Genitourinary: Negative for dysuria.   Neurological: Negative for seizures.   Psychiatric/Behavioral: Negative for altered mental status.   Allergic/Immunologic: Negative for persistent infections.         Objective:      Ortho/SPM Exam      The patient is not in acute distress.   Body habitus is:normal.   Sclerae normal  The patient walks without a limp.   Respiratory distress:  none  Lumbar incision well healed  There is no local tenderness.   Lumbar range of motion mildly reduced  No hip irritability  Negative tension findings bilaterally  Pulses DP present, PT present both lower extremities  Motor normal 5/5 strength in all tested muscle groups.  Both lower extremities  Sensory normal both lower extremities    Lumbar CT previously showed severe spinal stenosis          Assessment:       No diagnosis found.   The patient has spinal stenosis with claudication.  Injection was not helpful.  He reports that he cannot tolerate his symptoms, despite his benign examination at this time          Plan:       There are no diagnoses linked to this  encounter.       used (Genoveva)      I explained that the patient's lumbar spine is causing most of his symptoms.  I recommend the patient strongly consider seeking palliative care through his primary physician.  Patient indicated that his symptoms were intolerable and he required intervention.  Considering this, he has reached a therapeutic and point in so far as my credentials allow.  Explained this.    Neurosurgical consultation sent

## 2020-07-28 ENCOUNTER — OFFICE VISIT (OUTPATIENT)
Dept: INFECTIOUS DISEASES | Facility: CLINIC | Age: 85
End: 2020-07-28
Payer: MEDICARE

## 2020-07-28 VITALS
HEIGHT: 65 IN | HEART RATE: 63 BPM | BODY MASS INDEX: 25.71 KG/M2 | WEIGHT: 154.31 LBS | TEMPERATURE: 99 F | DIASTOLIC BLOOD PRESSURE: 51 MMHG | SYSTOLIC BLOOD PRESSURE: 166 MMHG

## 2020-07-28 DIAGNOSIS — Z22.7 LATENT TUBERCULOSIS: Primary | ICD-10-CM

## 2020-07-28 DIAGNOSIS — A15.9 TB (TUBERCULOSIS): ICD-10-CM

## 2020-07-28 PROCEDURE — 1159F MED LIST DOCD IN RCRD: CPT | Mod: S$GLB,,, | Performed by: INTERNAL MEDICINE

## 2020-07-28 PROCEDURE — 99203 PR OFFICE/OUTPT VISIT, NEW, LEVL III, 30-44 MIN: ICD-10-PCS | Mod: S$GLB,,, | Performed by: INTERNAL MEDICINE

## 2020-07-28 PROCEDURE — 99203 OFFICE O/P NEW LOW 30 MIN: CPT | Mod: S$GLB,,, | Performed by: INTERNAL MEDICINE

## 2020-07-28 PROCEDURE — 1125F AMNT PAIN NOTED PAIN PRSNT: CPT | Mod: S$GLB,,, | Performed by: INTERNAL MEDICINE

## 2020-07-28 PROCEDURE — 1125F PR PAIN SEVERITY QUANTIFIED, PAIN PRESENT: ICD-10-PCS | Mod: S$GLB,,, | Performed by: INTERNAL MEDICINE

## 2020-07-28 PROCEDURE — 1159F PR MEDICATION LIST DOCUMENTED IN MEDICAL RECORD: ICD-10-PCS | Mod: S$GLB,,, | Performed by: INTERNAL MEDICINE

## 2020-07-28 PROCEDURE — 99999 PR PBB SHADOW E&M-EST. PATIENT-LVL V: CPT | Mod: PBBFAC,,, | Performed by: INTERNAL MEDICINE

## 2020-07-28 PROCEDURE — 1101F PT FALLS ASSESS-DOCD LE1/YR: CPT | Mod: CPTII,S$GLB,, | Performed by: INTERNAL MEDICINE

## 2020-07-28 PROCEDURE — 1101F PR PT FALLS ASSESS DOC 0-1 FALLS W/OUT INJ PAST YR: ICD-10-PCS | Mod: CPTII,S$GLB,, | Performed by: INTERNAL MEDICINE

## 2020-07-28 PROCEDURE — 99999 PR PBB SHADOW E&M-EST. PATIENT-LVL V: ICD-10-PCS | Mod: PBBFAC,,, | Performed by: INTERNAL MEDICINE

## 2020-07-28 NOTE — PROGRESS NOTES
Subjective:      Patient ID: Vince Martinez is a 89 y.o. male.    Chief Complaint:TB    History of Present Illness    88 y/o male with a history of DM, HTN, hypertension and chronic kidney disease stage 3 who is referred to my clinic after testing positive for tuberculosis exposure.  [place] and has lived in the [place].  He works as a preacher.  He denies fevers.  He denies cough.  He denies night sweats.  He feels well.    Review of Systems   Constitution: Positive for decreased appetite. Negative for chills, fever, malaise/fatigue, night sweats, weight gain and weight loss.   HENT: Positive for hearing loss. Negative for congestion, ear pain, hoarse voice, sore throat and tinnitus.    Eyes: Positive for blurred vision. Negative for redness and visual disturbance.   Cardiovascular: Positive for leg swelling. Negative for chest pain and palpitations.   Respiratory: Negative for cough, hemoptysis, shortness of breath and sputum production.    Hematologic/Lymphatic: Bruises/bleeds easily.   Skin: Negative for dry skin, itching, rash and suspicious lesions.   Musculoskeletal: Positive for back pain and neck pain. Negative for joint pain and myalgias.   Gastrointestinal: Positive for constipation and heartburn. Negative for abdominal pain, diarrhea, nausea and vomiting.   Genitourinary: Positive for dysuria, flank pain and frequency. Negative for hematuria, hesitancy and urgency.   Neurological: Positive for weakness. Negative for dizziness, headaches, numbness and paresthesias.   Psychiatric/Behavioral: Positive for memory loss. Negative for depression. The patient has insomnia. The patient is not nervous/anxious.    All other systems reviewed and are negative.    Objective:   Physical Exam  Vitals signs and nursing note reviewed.   Constitutional:       General: He is not in acute distress.     Appearance: He is well-developed. He is not diaphoretic.   HENT:      Head:  Normocephalic and atraumatic.      Right Ear: External ear normal.      Left Ear: External ear normal.      Nose: Nose normal.      Mouth/Throat:      Pharynx: No oropharyngeal exudate.   Eyes:      General: No scleral icterus.        Right eye: No discharge.         Left eye: No discharge.      Conjunctiva/sclera: Conjunctivae normal.      Pupils: Pupils are equal, round, and reactive to light.   Neck:      Musculoskeletal: Normal range of motion and neck supple.      Thyroid: No thyromegaly.      Vascular: No JVD.      Trachea: No tracheal deviation.   Cardiovascular:      Rate and Rhythm: Normal rate and regular rhythm.      Heart sounds: No murmur. No friction rub. No gallop.    Pulmonary:      Effort: Pulmonary effort is normal. No respiratory distress.      Breath sounds: Normal breath sounds. No stridor. No wheezing or rales.   Chest:      Chest wall: No tenderness.   Abdominal:      General: Bowel sounds are normal. There is no distension.      Palpations: Abdomen is soft. There is no mass.      Tenderness: There is no abdominal tenderness. There is no guarding or rebound.   Musculoskeletal: Normal range of motion.         General: No tenderness.   Lymphadenopathy:      Cervical: No cervical adenopathy.   Skin:     General: Skin is warm.      Coloration: Skin is not pale.      Findings: No erythema or rash.   Neurological:      Mental Status: He is alert and oriented to person, place, and time.      Cranial Nerves: No cranial nerve deficit.      Motor: No abnormal muscle tone.      Coordination: Coordination normal.      Deep Tendon Reflexes: Reflexes are normal and symmetric. Reflexes normal.   Psychiatric:         Behavior: Behavior normal.         Thought Content: Thought content normal.         Judgment: Judgment normal.       Assessment:       1. Latent tuberculosis    2. TB (tuberculosis)        89 y/o male with latent tuberculosis.  CT chest was negative for pulmonary TB and he is asymptomatic.  At 89  years of age, treatment of latent TB may lead to side effects including liver toxicity.  I recommend holding off on treatment.  He should have a chest x-ray once a year as part of his annual physical exam.  Plan:       Latent tuberculosis   Follow up as needed   Chest x-ray once a year.

## 2020-07-28 NOTE — LETTER
August 2, 2020      Shlomo Luong MD  [address] [place] [address]           [facility] - Infectious Diseases  [address]  [place] [address]  Phone: [phone]  Fax: [phone]          Patient: Vince Martinez   MR Number: [id number]   YOB: 1930   Date of Visit: 7/28/2020       Dear Dr. Shlomo Luong:    Thank you for referring Vince Martinez to me for evaluation. Attached you will find relevant portions of my assessment and plan of care.    If you have questions, please do not hesitate to call me. I look forward to following Vince Martinez along with you.    Sincerely,    Benito Guerra MD    Enclosure  CC:  No Recipients    If you would like to receive this communication electronically, please contact [email or web address] or [phone] to request more information on [employer] Link access.    For providers and/or their staff who would like to refer a patient to [facility], please contact us through our one-stop-shop provider referral line, [facility], at [phone].    If you feel you have received this communication in error or would no longer like to receive these types of communications, please e-mail [email or web address]

## 2020-08-03 ENCOUNTER — TELEPHONE (OUTPATIENT)
Dept: FAMILY MEDICINE | Facility: CLINIC | Age: 85
End: 2020-08-03

## 2020-08-03 NOTE — TELEPHONE ENCOUNTER
----- Message from Gita Carvajal sent at 8/3/2020  9:33 AM CDT -----  Contact: Sandy @ [facility] -[phone]  Type:  Pharmacy Calling to Clarify an RX    Name of Caller: Sandy  Pharmacy Name: [facility]  Prescription Name:traMADoL (ULTRAM) 50 mg tablet  What do they need to clarify?: they pt history on the pt's P and P   Best Call Back Number:[phone]

## 2020-08-03 NOTE — TELEPHONE ENCOUNTER
----- Message from Ema Lee sent at 8/3/2020  1:05 PM CDT -----  Type:  Patient Returning Call    Who Called: Patient  Who Left Message for Patient: Unknown  Does the patient know what this is regarding?: Unknown  Would the patient rather a call back or a response via [facility]? Call back  Best Call Back Number: [phone]  Additional Information: n/a

## 2020-08-04 ENCOUNTER — TELEPHONE (OUTPATIENT)
Dept: INTERNAL MEDICINE | Facility: CLINIC | Age: 85
End: 2020-08-04

## 2020-08-04 ENCOUNTER — TELEPHONE (OUTPATIENT)
Dept: FAMILY MEDICINE | Facility: CLINIC | Age: 85
End: 2020-08-04

## 2020-08-04 NOTE — TELEPHONE ENCOUNTER
Spoke with  Gaby peterson from [facility] to inform us patient had received traMADOL 50 mg. . I informed her I will document ,

## 2020-08-04 NOTE — TELEPHONE ENCOUNTER
----- Message from Nancy Merino sent at 8/4/2020  8:04 AM CDT -----  Contact: Gaby bennett/ [facility] [phone]  Type:  Pharmacy Calling to Clarify an RX    Name of Caller:Gaby   Pharmacy Name:[facility]    Prescription Name:traMADoL (ULTRAM) 50 mg tablet  What do they need to clarify?:Pt currently taking hydrocodone-chlorpheniramine (TUSSIONEX) 10-8 mg/5 mL suspension  Best Call Back Number:[phone]  Additional Information:

## 2020-08-04 NOTE — TELEPHONE ENCOUNTER
----- Message from Ema Lee sent at 8/4/2020 11:52 AM CDT -----  Type:  RX Refill Request    Who Called: PT  Refill or New Rx: Refill  RX Name and Strength: Gabapentin 100 MG, 1  Is this a 30 day or 90 day RX: 90 Day  Preferred Pharmacy with phone number: [facility]  Would the patient rather a call back or a response via [facility]? Call back  Best Call Back Number: [phone]  Additional Information: Haitian preferred

## 2020-08-13 ENCOUNTER — TELEPHONE (OUTPATIENT)
Dept: ORTHOPEDICS | Facility: CLINIC | Age: 85
End: 2020-08-13

## 2020-08-13 NOTE — TELEPHONE ENCOUNTER
----- Message from Nat Rodríguez sent at 8/13/2020  1:36 PM CDT -----  Name of Caller: Vince   Reason for Visit/Symptoms: Spinal stenosis of lumbar region without neurogenic claudication   Best Contact Number or Confirm if Mychart Preferred: [phone]  Preferred Date/Time of Appointment: asap please   Interested in Virtual Visit (yes/no) no  Additional Information: none

## 2020-08-17 ENCOUNTER — TELEPHONE (OUTPATIENT)
Dept: NEUROSURGERY | Facility: CLINIC | Age: 85
End: 2020-08-17

## 2020-08-17 NOTE — TELEPHONE ENCOUNTER
----- Message from Lydia Noguera sent at 8/17/2020 11:01 AM CDT -----  Contact: SELF [phone]  Type:  Sooner Apoointment Request    Caller is requesting a sooner appointment.  Caller declined first available appointment listed below.  Caller will not accept being placed on the waitlist and is requesting a message be sent to doctor.  Name of Caller:PT  When is the first available appointment?11/4/20  Symptoms:pain    Additional Information: Speaks Italian

## 2020-08-17 NOTE — TELEPHONE ENCOUNTER
Spoke to the patient appointment scheduled instructed on needing MRI prior tot he appointment. Will get the MRI done

## 2020-08-21 ENCOUNTER — TELEPHONE (OUTPATIENT)
Dept: NEUROSURGERY | Facility: CLINIC | Age: 85
End: 2020-08-21

## 2020-08-21 NOTE — TELEPHONE ENCOUNTER
----- Message from Maciel Clayton sent at 8/19/2020 12:59 PM CDT -----  Regarding: MRI Inquiry  Contact: Self/ [phone]  Patient requesting to speak with you regarding the MRI he has to get done before his upcoming appointment. He wants to know when the appointment is going to be scheduled and where. Patient speaks Turkish. Please call and advise.

## 2020-08-21 NOTE — TELEPHONE ENCOUNTER
Spoke to the patient using the interpretor  Instructed he needs to call PCP to have MRI scheduled.

## 2020-08-25 ENCOUNTER — TELEPHONE (OUTPATIENT)
Dept: NEUROSURGERY | Facility: CLINIC | Age: 85
End: 2020-08-25

## 2020-08-25 ENCOUNTER — TELEPHONE (OUTPATIENT)
Dept: INTERNAL MEDICINE | Facility: CLINIC | Age: 85
End: 2020-08-25

## 2020-08-25 DIAGNOSIS — M54.9 DORSALGIA, UNSPECIFIED: ICD-10-CM

## 2020-08-25 NOTE — TELEPHONE ENCOUNTER
----- Message from Beth Livingston sent at 8/25/2020 11:54 AM CDT -----  Regarding: orders  Contact: [phone]  Patient is calling to get orders for an MRI of back and hip area, patient needs to have an MRI done before he see's dr. Sparks.

## 2020-08-26 NOTE — TELEPHONE ENCOUNTER
MRI was ordered.    Patient was previously referred to the neuro surgeon by the orthopedist because of spinal stenosis.

## 2020-08-28 DIAGNOSIS — M54.9 DORSALGIA, UNSPECIFIED: ICD-10-CM

## 2020-08-31 ENCOUNTER — TELEPHONE (OUTPATIENT)
Dept: FAMILY MEDICINE | Facility: CLINIC | Age: 85
End: 2020-08-31

## 2020-08-31 NOTE — TELEPHONE ENCOUNTER
----- Message from Gita Carvajal sent at 8/31/2020  3:11 PM CDT -----  Contact: [phone]  Type:  Needs Medical Advice    Who Called: PT  Reason for Call: regarding pt would like to speak to the nurse regarding having orders for a MRI of the Neck and Shoulder.pt has orders for the Spine and would like to schedule the appts all together  Would the patient rather a call back or a response via [facility]?  Call back  Best Call Back Number: [phone]

## 2020-08-31 NOTE — TELEPHONE ENCOUNTER
----- Message from Gita Carvajal sent at 8/31/2020  3:11 PM CDT -----  Contact: [phone]  Type:  Needs Medical Advice    Who Called: PT  Reason for Call: regarding pt would like to speak to the nurse regarding having orders for a MRI of the Neck and Shoulder.pt has orders for the Spine and would like to schedule the appts all together  Would the patient rather a call back or a response via [facility]?  Call back  Best Call Back Number: [phone]

## 2020-09-08 ENCOUNTER — TELEPHONE (OUTPATIENT)
Dept: OTOLARYNGOLOGY | Facility: CLINIC | Age: 85
End: 2020-09-08

## 2020-09-08 ENCOUNTER — LAB VISIT (OUTPATIENT)
Dept: FAMILY MEDICINE | Facility: CLINIC | Age: 85
End: 2020-09-08
Payer: MEDICARE

## 2020-09-08 ENCOUNTER — TELEPHONE (OUTPATIENT)
Dept: CARDIOLOGY | Facility: CLINIC | Age: 85
End: 2020-09-08

## 2020-09-08 DIAGNOSIS — Z01.812 PRE-PROCEDURE LAB EXAM: ICD-10-CM

## 2020-09-08 PROCEDURE — U0003 INFECTIOUS AGENT DETECTION BY NUCLEIC ACID (DNA OR RNA); SEVERE ACUTE RESPIRATORY SYNDROME CORONAVIRUS 2 (SARS-COV-2) (CORONAVIRUS DISEASE [COVID-19]), AMPLIFIED PROBE TECHNIQUE, MAKING USE OF HIGH THROUGHPUT TECHNOLOGIES AS DESCRIBED BY CMS-2020-01-R: HCPCS

## 2020-09-08 RX ORDER — OMEPRAZOLE 20 MG/1
20 CAPSULE, DELAYED RELEASE ORAL
Qty: 90 CAPSULE | Refills: 3 | Status: SHIPPED | OUTPATIENT
Start: 2020-09-08 | End: 2020-09-21 | Stop reason: SDUPTHER

## 2020-09-08 NOTE — TELEPHONE ENCOUNTER
----- Message from Nancy Merino sent at 9/8/2020 11:05 AM CDT -----  Contact: [phone]/Self  Patient is requesting a refill for fluticasone (FLONASE) 50 mcg/actuation nasal spray. [facility] [address]. Please advise.

## 2020-09-08 NOTE — TELEPHONE ENCOUNTER
Returned pt call. Scheduled pt an appointment for Thursday 9/10/20.        ----- Message from Toña Rothman sent at 9/8/2020 11:25 AM CDT -----  Regarding: Call Back  Name of Who is Calling : TSERING OWEN [[id number]]    Patient is requesting a call from staff in regards to being seen soon for voice hoarseness scheduled for covid test today    .....Please contact to further discuss and advise.    Can the clinic reply by [facility] : No    What Number to Call Back :  [phone]

## 2020-09-08 NOTE — TELEPHONE ENCOUNTER
----- Message from Beth Livingston sent at 9/8/2020  9:51 AM CDT -----  Regarding: call back  Contact: [phone]  Patient is calling to get refills on his medication sent to [facility] [place], [address] [phone] (Phone) [phone] (Fax)    1. omeprazole (PRILOSEC) 20 MG capsule 90 day supplies and takes it twice a day.

## 2020-09-09 ENCOUNTER — PATIENT OUTREACH (OUTPATIENT)
Dept: ADMINISTRATIVE | Facility: OTHER | Age: 85
End: 2020-09-09

## 2020-09-09 LAB — SARS-COV-2 RNA RESP QL NAA+PROBE: NOT DETECTED

## 2020-09-10 ENCOUNTER — OFFICE VISIT (OUTPATIENT)
Dept: OTOLARYNGOLOGY | Facility: CLINIC | Age: 85
End: 2020-09-10
Payer: MEDICARE

## 2020-09-10 VITALS
HEIGHT: 65 IN | HEART RATE: 73 BPM | SYSTOLIC BLOOD PRESSURE: 154 MMHG | DIASTOLIC BLOOD PRESSURE: 59 MMHG | TEMPERATURE: 98 F | BODY MASS INDEX: 25.18 KG/M2 | WEIGHT: 151.13 LBS

## 2020-09-10 DIAGNOSIS — R49.0 DYSPHONIA: Primary | ICD-10-CM

## 2020-09-10 DIAGNOSIS — J30.0 VASOMOTOR RHINITIS: ICD-10-CM

## 2020-09-10 DIAGNOSIS — J34.3 NASAL TURBINATE HYPERTROPHY: ICD-10-CM

## 2020-09-10 DIAGNOSIS — R43.8 REDUCED SENSE OF SMELL: ICD-10-CM

## 2020-09-10 DIAGNOSIS — Z87.19 HISTORY OF GASTROESOPHAGEAL REFLUX (GERD): ICD-10-CM

## 2020-09-10 DIAGNOSIS — R09.89 THROAT CLEARING: ICD-10-CM

## 2020-09-10 PROCEDURE — 99214 PR OFFICE/OUTPT VISIT, EST, LEVL IV, 30-39 MIN: ICD-10-PCS | Mod: 25,S$GLB,, | Performed by: OTOLARYNGOLOGY

## 2020-09-10 PROCEDURE — 31575 LARYNGOSCOPY: ICD-10-PCS | Mod: S$GLB,,, | Performed by: OTOLARYNGOLOGY

## 2020-09-10 PROCEDURE — 1101F PT FALLS ASSESS-DOCD LE1/YR: CPT | Mod: CPTII,S$GLB,, | Performed by: OTOLARYNGOLOGY

## 2020-09-10 PROCEDURE — 31575 DIAGNOSTIC LARYNGOSCOPY: CPT | Mod: S$GLB,,, | Performed by: OTOLARYNGOLOGY

## 2020-09-10 PROCEDURE — 1159F PR MEDICATION LIST DOCUMENTED IN MEDICAL RECORD: ICD-10-PCS | Mod: S$GLB,,, | Performed by: OTOLARYNGOLOGY

## 2020-09-10 PROCEDURE — 99214 OFFICE O/P EST MOD 30 MIN: CPT | Mod: 25,S$GLB,, | Performed by: OTOLARYNGOLOGY

## 2020-09-10 PROCEDURE — 1159F MED LIST DOCD IN RCRD: CPT | Mod: S$GLB,,, | Performed by: OTOLARYNGOLOGY

## 2020-09-10 PROCEDURE — 1101F PR PT FALLS ASSESS DOC 0-1 FALLS W/OUT INJ PAST YR: ICD-10-PCS | Mod: CPTII,S$GLB,, | Performed by: OTOLARYNGOLOGY

## 2020-09-10 NOTE — PATIENT INSTRUCTIONS
See laryngology, Dr. Hernandez for voice change / vocal chord nodule.  Increase omeprazole to 20 mg twice a day.  Reflux precautions.  Vocal hygiene.  Use ipratropium nasal spray 2 sprays in each nostril every 6 hours.

## 2020-09-10 NOTE — PROGRESS NOTES
Subjective:       Patient ID: Vince Martinez is a 89 y.o. male.    Chief Complaint: Follow-up (Voice problems)    Last seen 07/13/2020 with history as follows:   Here today complaining of worsening voice problems.  Has complained of vocal quality for many years but getting worse and now difficult to communicate due to very frequent voice breaks as well as current mask requirements.  Coughs and clears his throat frequently which is only occasionally productive.  States feels as if phlegm in his chest that won't come out, but denies chest congestion, wheezing, SOB.  Denies any swallowing problems or increased symptoms with meals.  Had swallow study in 2/2017 and 2/2019 within normal limits for age and weight stable.  Recent chest x-ray and CT chest with lungs clear and no active disease.  Seeing Infectious Disease for possible latent TB.  History of stable CHF.  Admits some GERD symptoms on once daily acid reducer.  Denies diet change, acidic foods, mint lozenges, vitamin C tablets, carbonated beverages or other exacerbating factors.  History of vasomotor rhinitis with some relief on ipratropium nasal spray and was to have procedure per Dr. Kauffman but follow-up problems.  Also previously discussed/referred SLP/laryngology in private practice but he deferred, primarily focused on postnasal drip.    Interval history:   Returns for follow-up.  After the last visit was to increase omeprazole to twice daily and increase the ipratropium nasal spray to every 6 hours and return for recheck and possible scope.  Did not make any of these medication changes however.  Voice complaints remain the same.  Has had long history of poor vocal quality but now states worse/change over the past 2 months.  No antecedent URI or LRI symptoms.  No new complaints.  Had COVID swab on 09/08/2020 and negative.  No tobacco ever.                   Review of Systems     Constitutional: Negative for chills and fever.      HENT: Positive for postnasal  drip, runny nose, stuffy nose and voice change.  Negative for ear discharge and ear pain.      Respiratory:  Negative for shortness of breath.      Gastrointestinal:  Positive for acid reflux.     Hematologic: Negative for swollen glands.                Objective:        Vitals:    09/10/20 1132   BP: (!) 154/59   Pulse: 73   Temp: 98.1 °F (36.7 °C)     Body mass index is 25.14 kg/m².  Physical Exam  Constitutional:       General: He is not in acute distress.     Appearance: He is well-developed.   HENT:      Head: Normocephalic and atraumatic.      Right Ear: Tympanic membrane, ear canal and external ear normal.      Left Ear: Tympanic membrane, ear canal and external ear normal.      Nose: Mucosal edema present. No nasal deformity.      Comments: Boggy inferior turbinates with mucoid secretions on anterior rhinoscopy.     Mouth/Throat:      Mouth: No oral lesions.      Pharynx: No oropharyngeal exudate, posterior oropharyngeal erythema or uvula swelling.   Neck:      Comments: Frequent voice breaks and occ throat clearing.     Pulmonary:      Effort: Pulmonary effort is normal. No respiratory distress.   Lymphadenopathy:      Cervical: No cervical adenopathy.   Skin:     General: Skin is warm and dry.   Neurological:      Mental Status: He is alert and oriented to person, place, and time.   Psychiatric:         Speech: Speech is not slurred.         Behavior: Behavior normal.         Tests / Results:    COVID swab negative on 09/08/2020.        Assessment:       1. Dysphonia    2. Throat clearing    3. Vasomotor rhinitis    4. History of gastroesophageal reflux (GERD)    5. Reduced sense of smell    6. Nasal turbinate hypertrophy        Plan:        Reviewed above and office endoscopy and he would like to proceed.  See procedure note.    See laryngology, Dr. Hernandez.  Increase omeprazole to 20 mg twice a day.  Reflux precautions.  Vocal hygiene.  Use ipratropium nasal spray 2 sprays in each nostril every 6 hours.

## 2020-09-10 NOTE — PROCEDURES
Laryngoscopy    Date/Time: 9/10/2020 11:20 AM  Performed by: Sarah Miguel MD  Authorized by: Sarah Miguel MD     Consent Done?:  Yes (Verbal)  Anesthesia:     Local anesthetic:  Lidocaine 2% and Javi-Synephrine 1/2%    Patient tolerance:  Patient tolerated the procedure well with no immediate complications    Decongestion performed?: Yes    Laryngoscopy:     Areas examined:  Nasal cavities, nasopharynx, oropharynx, hypopharynx, vocal cords and larynx  Nose External:      No external nasal deformity  Nose Intranasal:      Mucosa no polyps     Mucosa ulcers not present     No mucosa lesions present  Nasopharynx:      No mucosa lesions     Adenoids not present     Posterior choanae patent  Larynx/hypopharynx:      No epiglottis lesions     No epiglottis edema     Not equal and normal bilateral     No hypopharynx lesions     Office endoscopy reveals diffuse nasal mucosal edema, reduced after topical Javi-Synephrine.  Septal spur left mid septum.  Mucoid nasal secretions bilaterally.  Nasopharynx, oropharynx, hypopharynx within normal limits with somewhat increased secretions hypopharynx.  Vocal cords move well bilaterally but thin atrophic, right more than left with small nodule right vocal cord at anterior to mid 1/3 junction.  Mild interarytenoid erythema.

## 2020-09-16 ENCOUNTER — LAB VISIT (OUTPATIENT)
Dept: LAB | Facility: HOSPITAL | Age: 85
End: 2020-09-16
Attending: FAMILY MEDICINE
Payer: MEDICARE

## 2020-09-16 ENCOUNTER — DOCUMENTATION ONLY (OUTPATIENT)
Dept: REHABILITATION | Facility: HOSPITAL | Age: 85
End: 2020-09-16

## 2020-09-16 DIAGNOSIS — G89.29 CHRONIC BILATERAL LOW BACK PAIN WITHOUT SCIATICA: ICD-10-CM

## 2020-09-16 DIAGNOSIS — R29.898 WEAKNESS OF BOTH LEGS: Primary | ICD-10-CM

## 2020-09-16 DIAGNOSIS — M54.50 CHRONIC BILATERAL LOW BACK PAIN WITHOUT SCIATICA: ICD-10-CM

## 2020-09-16 DIAGNOSIS — E11.65 TYPE 2 DIABETES MELLITUS WITH HYPERGLYCEMIA, WITHOUT LONG-TERM CURRENT USE OF INSULIN: ICD-10-CM

## 2020-09-16 DIAGNOSIS — M53.86 DECREASED ROM OF LUMBAR SPINE: ICD-10-CM

## 2020-09-16 LAB
ALBUMIN SERPL BCP-MCNC: 4.1 G/DL (ref 3.5–5.2)
ALP SERPL-CCNC: 79 U/L (ref 55–135)
ALT SERPL W/O P-5'-P-CCNC: 33 U/L (ref 10–44)
ANION GAP SERPL CALC-SCNC: 9 MMOL/L (ref 8–16)
AST SERPL-CCNC: 36 U/L (ref 10–40)
BILIRUB SERPL-MCNC: 0.4 MG/DL (ref 0.1–1)
BUN SERPL-MCNC: 35 MG/DL (ref 8–23)
CALCIUM SERPL-MCNC: 9 MG/DL (ref 8.7–10.5)
CHLORIDE SERPL-SCNC: 100 MMOL/L (ref 95–110)
CO2 SERPL-SCNC: 29 MMOL/L (ref 23–29)
CREAT SERPL-MCNC: 1.6 MG/DL (ref 0.5–1.4)
EST. GFR  (AFRICAN AMERICAN): 43.5 ML/MIN/1.73 M^2
EST. GFR  (NON AFRICAN AMERICAN): 37.6 ML/MIN/1.73 M^2
ESTIMATED AVG GLUCOSE: 186 MG/DL (ref 68–131)
GLUCOSE SERPL-MCNC: 92 MG/DL (ref 70–110)
HBA1C MFR BLD HPLC: 8.1 % (ref 4–5.6)
POTASSIUM SERPL-SCNC: 4.4 MMOL/L (ref 3.5–5.1)
PROT SERPL-MCNC: 7.4 G/DL (ref 6–8.4)
SODIUM SERPL-SCNC: 138 MMOL/L (ref 136–145)

## 2020-09-16 PROCEDURE — 36415 COLL VENOUS BLD VENIPUNCTURE: CPT | Mod: PO

## 2020-09-16 PROCEDURE — 83036 HEMOGLOBIN GLYCOSYLATED A1C: CPT

## 2020-09-16 PROCEDURE — 80053 COMPREHEN METABOLIC PANEL: CPT

## 2020-09-16 NOTE — PROGRESS NOTES
Pt was evaluated on 1/16/2020 and was seen 11 times for PT. Pt has not attended PT since 3/12/2020. Pt was given HEP. Current status is unknown. Pt to be d/c'd at this time.

## 2020-09-17 ENCOUNTER — TELEPHONE (OUTPATIENT)
Dept: FAMILY MEDICINE | Facility: CLINIC | Age: 85
End: 2020-09-17

## 2020-09-17 NOTE — TELEPHONE ENCOUNTER
Elevated blood sugar for the last 3 months.    Follow-up visit with me or my nurse practitioner for diabetes follow-up.

## 2020-09-21 RX ORDER — OMEPRAZOLE 20 MG/1
20 CAPSULE, DELAYED RELEASE ORAL
Qty: 90 CAPSULE | Refills: 3 | Status: SHIPPED | OUTPATIENT
Start: 2020-09-21 | End: 2020-11-09 | Stop reason: SDUPTHER

## 2020-09-21 RX ORDER — FLUTICASONE PROPIONATE 50 UG/1
1 POWDER, METERED RESPIRATORY (INHALATION) 2 TIMES DAILY
Qty: 60 EACH | Refills: 1 | Status: SHIPPED | OUTPATIENT
Start: 2020-09-21 | End: 2020-12-03 | Stop reason: SDUPTHER

## 2020-09-25 ENCOUNTER — OFFICE VISIT (OUTPATIENT)
Dept: FAMILY MEDICINE | Facility: CLINIC | Age: 85
End: 2020-09-25
Payer: MEDICARE

## 2020-09-25 ENCOUNTER — HOSPITAL ENCOUNTER (OUTPATIENT)
Dept: RADIOLOGY | Facility: HOSPITAL | Age: 85
Discharge: HOME | End: 2020-09-25
Attending: PODIATRIST
Payer: MEDICARE

## 2020-09-25 ENCOUNTER — OFFICE VISIT (OUTPATIENT)
Dept: PODIATRY | Facility: CLINIC | Age: 85
End: 2020-09-25
Payer: MEDICARE

## 2020-09-25 VITALS
TEMPERATURE: 98 F | WEIGHT: 154.31 LBS | DIASTOLIC BLOOD PRESSURE: 58 MMHG | HEIGHT: 65 IN | HEART RATE: 62 BPM | BODY MASS INDEX: 25.71 KG/M2 | OXYGEN SATURATION: 97 % | SYSTOLIC BLOOD PRESSURE: 124 MMHG

## 2020-09-25 VITALS
HEIGHT: 65 IN | DIASTOLIC BLOOD PRESSURE: 61 MMHG | HEART RATE: 59 BPM | WEIGHT: 154.31 LBS | SYSTOLIC BLOOD PRESSURE: 146 MMHG | BODY MASS INDEX: 25.71 KG/M2

## 2020-09-25 DIAGNOSIS — M79.672 FOOT PAIN, BILATERAL: ICD-10-CM

## 2020-09-25 DIAGNOSIS — I10 ESSENTIAL HYPERTENSION: ICD-10-CM

## 2020-09-25 DIAGNOSIS — I73.9 PVD (PERIPHERAL VASCULAR DISEASE): ICD-10-CM

## 2020-09-25 DIAGNOSIS — E66.3 OVERWEIGHT (BMI 25.0-29.9): ICD-10-CM

## 2020-09-25 DIAGNOSIS — E11.65 TYPE 2 DIABETES MELLITUS WITH HYPERGLYCEMIA, WITHOUT LONG-TERM CURRENT USE OF INSULIN: Primary | ICD-10-CM

## 2020-09-25 DIAGNOSIS — L60.0 ONYCHOCRYPTOSIS: ICD-10-CM

## 2020-09-25 DIAGNOSIS — M51.36 DDD (DEGENERATIVE DISC DISEASE), LUMBAR: ICD-10-CM

## 2020-09-25 DIAGNOSIS — M79.671 FOOT PAIN, BILATERAL: ICD-10-CM

## 2020-09-25 DIAGNOSIS — N18.30 CKD (CHRONIC KIDNEY DISEASE) STAGE 3, GFR 30-59 ML/MIN: ICD-10-CM

## 2020-09-25 DIAGNOSIS — L60.9 DISEASE OF NAIL: ICD-10-CM

## 2020-09-25 DIAGNOSIS — E11.42 TYPE 2 DIABETES MELLITUS WITH POLYNEUROPATHY: ICD-10-CM

## 2020-09-25 PROBLEM — E11.49 TYPE 2 OR UNSPECIFIED TYPE DIABETES MELLITUS WITH NEUROLOGICAL MANIFESTATIONS: Status: ACTIVE | Noted: 2020-09-25

## 2020-09-25 PROCEDURE — 99999 PR PBB SHADOW E&M-EST. PATIENT-LVL V: CPT | Mod: PBBFAC,,, | Performed by: NURSE PRACTITIONER

## 2020-09-25 PROCEDURE — 1159F PR MEDICATION LIST DOCUMENTED IN MEDICAL RECORD: ICD-10-PCS | Mod: S$GLB,,, | Performed by: PODIATRIST

## 2020-09-25 PROCEDURE — 99499 RISK ADDL DX/OHS AUDIT: ICD-10-PCS | Mod: S$GLB,,, | Performed by: PODIATRIST

## 2020-09-25 PROCEDURE — 1159F PR MEDICATION LIST DOCUMENTED IN MEDICAL RECORD: ICD-10-PCS | Mod: S$GLB,,, | Performed by: NURSE PRACTITIONER

## 2020-09-25 PROCEDURE — 1159F MED LIST DOCD IN RCRD: CPT | Mod: S$GLB,,, | Performed by: PODIATRIST

## 2020-09-25 PROCEDURE — 1125F AMNT PAIN NOTED PAIN PRSNT: CPT | Mod: S$GLB,,, | Performed by: PODIATRIST

## 2020-09-25 PROCEDURE — 73630 XR FOOT COMPLETE 3 VIEW BILATERAL: ICD-10-PCS | Mod: 26,50,, | Performed by: RADIOLOGY

## 2020-09-25 PROCEDURE — 3052F HG A1C>EQUAL 8.0%<EQUAL 9.0%: CPT | Mod: CPTII,S$GLB,, | Performed by: PODIATRIST

## 2020-09-25 PROCEDURE — 1101F PT FALLS ASSESS-DOCD LE1/YR: CPT | Mod: CPTII,S$GLB,, | Performed by: NURSE PRACTITIONER

## 2020-09-25 PROCEDURE — 1125F AMNT PAIN NOTED PAIN PRSNT: CPT | Mod: S$GLB,,, | Performed by: NURSE PRACTITIONER

## 2020-09-25 PROCEDURE — 3052F PR MOST RECENT HEMOGLOBIN A1C LEVEL 8.0 - < 9.0%: ICD-10-PCS | Mod: CPTII,S$GLB,, | Performed by: NURSE PRACTITIONER

## 2020-09-25 PROCEDURE — 99214 PR OFFICE/OUTPT VISIT, EST, LEVL IV, 30-39 MIN: ICD-10-PCS | Mod: S$GLB,,, | Performed by: NURSE PRACTITIONER

## 2020-09-25 PROCEDURE — 99999 PR PBB SHADOW E&M-EST. PATIENT-LVL V: ICD-10-PCS | Mod: PBBFAC,,, | Performed by: NURSE PRACTITIONER

## 2020-09-25 PROCEDURE — 3052F HG A1C>EQUAL 8.0%<EQUAL 9.0%: CPT | Mod: CPTII,S$GLB,, | Performed by: NURSE PRACTITIONER

## 2020-09-25 PROCEDURE — 1101F PR PT FALLS ASSESS DOC 0-1 FALLS W/OUT INJ PAST YR: ICD-10-PCS | Mod: CPTII,S$GLB,, | Performed by: NURSE PRACTITIONER

## 2020-09-25 PROCEDURE — 11721 ROUTINE FOOT CARE: ICD-10-PCS | Mod: Q9,S$GLB,, | Performed by: PODIATRIST

## 2020-09-25 PROCEDURE — 3052F PR MOST RECENT HEMOGLOBIN A1C LEVEL 8.0 - < 9.0%: ICD-10-PCS | Mod: CPTII,S$GLB,, | Performed by: PODIATRIST

## 2020-09-25 PROCEDURE — 99999 PR PBB SHADOW E&M-EST. PATIENT-LVL V: CPT | Mod: PBBFAC,,, | Performed by: PODIATRIST

## 2020-09-25 PROCEDURE — 99213 PR OFFICE/OUTPT VISIT, EST, LEVL III, 20-29 MIN: ICD-10-PCS | Mod: 25,S$GLB,, | Performed by: PODIATRIST

## 2020-09-25 PROCEDURE — 99214 OFFICE O/P EST MOD 30 MIN: CPT | Mod: S$GLB,,, | Performed by: NURSE PRACTITIONER

## 2020-09-25 PROCEDURE — 1101F PR PT FALLS ASSESS DOC 0-1 FALLS W/OUT INJ PAST YR: ICD-10-PCS | Mod: CPTII,S$GLB,, | Performed by: PODIATRIST

## 2020-09-25 PROCEDURE — 1101F PT FALLS ASSESS-DOCD LE1/YR: CPT | Mod: CPTII,S$GLB,, | Performed by: PODIATRIST

## 2020-09-25 PROCEDURE — 1125F PR PAIN SEVERITY QUANTIFIED, PAIN PRESENT: ICD-10-PCS | Mod: S$GLB,,, | Performed by: PODIATRIST

## 2020-09-25 PROCEDURE — 1159F MED LIST DOCD IN RCRD: CPT | Mod: S$GLB,,, | Performed by: NURSE PRACTITIONER

## 2020-09-25 PROCEDURE — 99499 UNLISTED E&M SERVICE: CPT | Mod: S$GLB,,, | Performed by: PODIATRIST

## 2020-09-25 PROCEDURE — 73630 X-RAY EXAM OF FOOT: CPT | Mod: TC,50,PN

## 2020-09-25 PROCEDURE — 1125F PR PAIN SEVERITY QUANTIFIED, PAIN PRESENT: ICD-10-PCS | Mod: S$GLB,,, | Performed by: NURSE PRACTITIONER

## 2020-09-25 PROCEDURE — 11721 DEBRIDE NAIL 6 OR MORE: CPT | Mod: Q9,S$GLB,, | Performed by: PODIATRIST

## 2020-09-25 PROCEDURE — 99999 PR PBB SHADOW E&M-EST. PATIENT-LVL V: ICD-10-PCS | Mod: PBBFAC,,, | Performed by: PODIATRIST

## 2020-09-25 PROCEDURE — 73630 X-RAY EXAM OF FOOT: CPT | Mod: 26,50,, | Performed by: RADIOLOGY

## 2020-09-25 PROCEDURE — 99213 OFFICE O/P EST LOW 20 MIN: CPT | Mod: 25,S$GLB,, | Performed by: PODIATRIST

## 2020-09-25 RX ORDER — IRBESARTAN 300 MG/1
TABLET ORAL
Qty: 180 TABLET | Refills: 3 | Status: SHIPPED | OUTPATIENT
Start: 2020-09-25 | End: 2020-11-30 | Stop reason: SDUPTHER

## 2020-09-25 RX ORDER — INFLUENZA A VIRUS A/MICHIGAN/45/2015 X-275 (H1N1) ANTIGEN (FORMALDEHYDE INACTIVATED), INFLUENZA A VIRUS A/SINGAPORE/INFIMH-16-0019/2016 IVR-186 (H3N2) ANTIGEN (FORMALDEHYDE INACTIVATED), INFLUENZA B VIRUS B/PHUKET/3073/2013 ANTIGEN (FORMALDEHYDE INACTIVATED), AND INFLUENZA B VIRUS B/MARYLAND/15/2016 BX-69A ANTIGEN (FORMALDEHYDE INACTIVATED) 60; 60; 60; 60 UG/.7ML; UG/.7ML; UG/.7ML; UG/.7ML
INJECTION, SUSPENSION INTRAMUSCULAR
COMMUNITY
Start: 2020-09-23 | End: 2020-09-25 | Stop reason: ALTCHOICE

## 2020-09-25 NOTE — PROCEDURES
Routine Foot Care    Date/Time: 9/25/2020 2:45 PM  Performed by: Oneil Hazel DPM  Authorized by: Oneil Hazel DPM     Consent Done?:  Yes (Verbal)  Hyperkeratotic Skin Lesions?: No      Nail Care Type:  Debride  Location(s): All  (Left 1st Toe, Left 3rd Toe, Left 2nd Toe, Left 4th Toe, Left 5th Toe, Right 1st Toe, Right 2nd Toe, Right 3rd Toe, Right 4th Toe and Right 5th Toe)  Patient tolerance:  Patient tolerated the procedure well with no immediate complications     Used sterile nail nipper.

## 2020-09-25 NOTE — PATIENT INSTRUCTIONS
You may use Voltaren gel on your feet up to 4 times a day as needed.    Recommend complex B vitamin to be taken daily. It is available over the counter.    We discussed removing ingrown nail borders left great toe if arterial ultrasound is normal.

## 2020-09-25 NOTE — PROGRESS NOTES
Subjective:       Patient ID: Vince Martinez is a 89 y.o. male.    Chief Complaint: Follow-up and Diabetes    This is an 88 yo  male patient of Dr. Ulloa who is known to me. He presents today for a DM follow-up. PMH includes    Patient Active Problem List:     Cough     Essential hypertension     Hypertensive heart disease without heart failure     Type 2 diabetes mellitus with hyperglycemia, without long-term current use of insulin     Acquired hypothyroidism     Localized edema     Rheumatic aortic valve insufficiency     Palpitations     PVCs (premature ventricular contractions)     Premature atrial contractions     Chronic combined systolic and diastolic CHF (congestive heart failure)     Chest pain of uncertain etiology     Greater trochanteric bursitis of both hips     Lumbar spondylosis     Retrolisthesis of vertebrae     Chronic pain     Neuroforaminal stenosis of lumbar spine     Lumbar radiculopathy     DDD (degenerative disc disease), lumbar     Spinal stenosis of lumbar region with neurogenic claudication     CKD (chronic kidney disease) stage 3, GFR 30-59 ml/min    VSS today. Denies fever, chills, chest pain, SOB, dyspnea, palpitations, polydipsia, polyuria, polyphagia or N/V/D. Reports some chronic generalized weakness, visual changes, and neuropathy. Due to see Podiatry today. Also due for eye exam. Patient admits that he does not follow a proper diabetic diet. Does not limit starches and has ice cream occasionally. Eats a variety of fruits. Drinks some water. Has no current exercise routine. No exercise routine as of now. Last A1C at 8.1 which is a slight improvement from previous result. Currently taking metformin 500mg once a day and glimepiride 2mg BID. See more below.    Diabetes  He presents for his follow-up diabetic visit. He has type 2 diabetes mellitus. Pertinent negatives for hypoglycemia include no dizziness, headaches, mood changes, nervousness/anxiousness or speech difficulty.  Associated symptoms include visual change and weakness. Pertinent negatives for diabetes include no blurred vision, no chest pain, no fatigue, no polydipsia, no polyphagia and no polyuria. Pertinent negatives for hypoglycemia complications include no blackouts. Risk factors for coronary artery disease include diabetes mellitus, male sex and hypertension. Current diabetic treatment includes oral agent (dual therapy). He is compliant with treatment most of the time. He is following a diabetic diet. When asked about meal planning, he reported none. He never participates in exercise. An ACE inhibitor/angiotensin II receptor blocker is being taken. He sees a podiatrist.Eye exam is not current.     Review of Systems   Constitutional: Negative for appetite change, chills, fatigue and fever.   HENT: Negative for sore throat and trouble swallowing.    Eyes: Positive for visual disturbance. Negative for blurred vision.   Respiratory: Negative for cough, chest tightness and shortness of breath.    Cardiovascular: Negative for chest pain, palpitations and leg swelling.   Gastrointestinal: Negative for abdominal pain, constipation, diarrhea, nausea and vomiting.   Endocrine: Negative for polydipsia, polyphagia and polyuria.   Genitourinary: Negative for difficulty urinating.   Musculoskeletal: Positive for arthralgias, back pain and gait problem.   Neurological: Positive for weakness. Negative for dizziness, speech difficulty, headaches, disturbances in coordination and coordination difficulties.   Psychiatric/Behavioral: The patient is not nervous/anxious.          Objective:      Physical Exam  Constitutional:       General: He is not in acute distress.     Appearance: Normal appearance. He is well-developed, well-groomed and overweight.   HENT:      Head: Normocephalic.      Right Ear: External ear normal.      Left Ear: External ear normal.   Eyes:      General:         Right eye: No discharge.         Left eye: No  discharge.   Neck:      Vascular: No carotid bruit.   Cardiovascular:      Rate and Rhythm: Normal rate.      Pulses:           Carotid pulses are 2+ on the right side and 2+ on the left side.       Radial pulses are 2+ on the right side and 2+ on the left side.      Heart sounds: Normal heart sounds. No murmur.      Comments: Pacemaker in situ  Pulmonary:      Effort: Pulmonary effort is normal. No respiratory distress.      Breath sounds: No wheezing or rhonchi.   Abdominal:      General: Bowel sounds are normal. There is no distension.      Palpations: Abdomen is soft.      Tenderness: There is no abdominal tenderness.   Musculoskeletal:      Lumbar back: He exhibits pain.      Right lower leg: No edema.      Left lower leg: No edema.   Skin:     General: Skin is warm and dry.      Capillary Refill: Capillary refill takes less than 2 seconds.   Neurological:      Mental Status: He is alert and oriented to person, place, and time.      Coordination: Coordination abnormal.      Gait: Gait abnormal.   Psychiatric:         Attention and Perception: Attention normal.         Mood and Affect: Mood and affect normal.         Speech: Speech normal.         Behavior: Behavior normal. Behavior is cooperative.         Thought Content: Thought content normal.         Assessment:       1. Type 2 diabetes mellitus with hyperglycemia, without long-term current use of insulin    2. CKD (chronic kidney disease) stage 3, GFR 30-59 ml/min    3. Essential hypertension    4. DDD (degenerative disc disease), lumbar    5. Overweight (BMI 25.0-29.9)        Plan:       Vince was seen today for follow-up and diabetes.    Diagnoses and all orders for this visit:    Type 2 diabetes mellitus with hyperglycemia, without long-term current use of insulin  -     CBC auto differential; Future  -     Comprehensive metabolic panel; Future  -     Lipid Panel; Future  -     TSH; Future  -     Urinalysis; Future  -     Hemoglobin A1C; Future  -      Ambulatory referral/consult to Optometry; Future    CKD (chronic kidney disease) stage 3, GFR 30-59 ml/min  -     CBC auto differential; Future  -     Comprehensive metabolic panel; Future  -     Lipid Panel; Future  -     TSH; Future  -     Urinalysis; Future    Essential hypertension  -     CBC auto differential; Future  -     Comprehensive metabolic panel; Future  -     Lipid Panel; Future  -     TSH; Future  -     Urinalysis; Future  -     irbesartan (AVAPRO) 300 MG tablet; Take two tablet daily.    DDD (degenerative disc disease), lumbar    Overweight (BMI 25.0-29.9)  -     Lipid Panel; Future        - Encouraged patient to continue to eat a low salt/low fat ADA diet and discussed importance of engaging in physical activity at least 5x/week for a minimum of 30 min/day  - Refills sent to preferred pharmacy  - Labs ordered to be done prior to next appointment  - Follow up with Podiatry & Optometry  - RTC in 3 months for a DM follow-up, or sooner if needed

## 2020-09-25 NOTE — PROGRESS NOTES
Subjective:      Patient ID: Vince Martinez is a 89 y.o. male.    Chief Complaint: Diabetes Mellitus (7/20/2020 office visit with PCP-FAZAL Ulloa ) and Nail Care    Vince is a 89 y.o. male who presents to the clinic upon referral from Dr. Marina geiger. provider found  for evaluation and treatment of diabetic feet. 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  Patient relates history of seeing  routine diabetic foot care.  He says that he just received a prescription for diabetic shoes 2 months ago.  Relates that he gets a burning pain at the tips of both great toes and underneath the arch the left foot that is present even at rest.  Denies any trauma.    09/25/2020:  He returns for routine foot care however does complain of persistent pain to both feet for more than 6 months duration.  He is wearing extra-depth diabetic shoes.  Relates allow the pain is at the tips of his toes specially the left great toe.  Does get some pain up underneath the balls of both feet and bottom of the heels.  He did not receive previous compound pain cream from professional [facility] secondary to lack of insurance coverage.  Does have prior prescription for Voltaren gel however uses it mostly around his hips and other joints that her him.  Chart check also reveals that he has a previous diagnosis of latent tuberculosis followed by infectious disease.  Also has a history spinal stenosis of the lumbar region without neurogenic claudication.  All these are contributing factors possibly to chronic pain.    PCP: Shlomo Luong MD    Date Last Seen by PCP:  07/20/2020    Current shoe gear: Rx diabetic extra depth shoes and custom accommodative insoles    Hemoglobin A1C   Date Value Ref Range Status   09/16/2020 8.1 (H) 4.0 - 5.6 % Final     Comment:      ADA Screening Guidelines:  5.7-6.4%  Consistent with prediabetes  >or=6.5%  Consistent with diabetes  High levels of fetal hemoglobin interfere with the HbA1C  assay. Heterozygous hemoglobin variants (HbS, HgC, etc)do  not significantly interfere with this assay.   However, presence of multiple variants may affect accuracy.     06/08/2020 8.2 (H) 4.0 - 5.6 % Final     Comment:     ADA Screening Guidelines:  5.7-6.4%  Consistent with prediabetes  >or=6.5%  Consistent with diabetes  High levels of fetal hemoglobin interfere with the HbA1C  assay. Heterozygous hemoglobin variants (HbS, HgC, etc)do  not significantly interfere with this assay.   However, presence of multiple variants may affect accuracy.             Review of Systems   Constitution: Negative for chills, fever and malaise/fatigue.   HENT: Negative for congestion and hearing loss.    Cardiovascular: Negative for chest pain, claudication and leg swelling.   Respiratory: Negative for cough and shortness of breath.    Skin: Positive for dry skin and nail changes. Negative for color change.   Musculoskeletal: Positive for back pain and joint pain. Negative for muscle cramps and muscle weakness.   Gastrointestinal: Negative for nausea and vomiting.   Neurological: Positive for numbness and paresthesias. Negative for weakness.   Psychiatric/Behavioral: Negative for altered mental status.           Objective:      Physical Exam  Constitutional:       Appearance: Normal appearance.   Cardiovascular:      Pulses:           Dorsalis pedis pulses are 1+ on the right side and 1+ on the left side.        Posterior tibial pulses are 1+ on the right side and 1+ on the left side.      Comments: Mild nonpitting edema to lower extremity bilateral with multiple telangiectasias.  Musculoskeletal:      Comments: Manual muscle strength testing 4/5 bilateral lower extremity.    Mild semi rigid cavus foot structure bilateral foot.    Flexible reducible hammertoe contractures  toes 2-4 bilateral foot.    No pain with ROM or MMT bilateral lower extremity.    Localized pain on palpation to distal medial lateral nail borders of the left hallux without localized signs infection.  There is pain on palpation to the plantar 1 MTP overlying the sesamoids left foot.  Slight pain with 1 MTP range of motion left foot.  Mild diffuse pain on palpation along the dorsal aspect of the left forefoot overlying metatarsal shafts 2-5.  Mild pain on palpation to the plantar heel bilateral.  Mild pain on palpation to dorsal left midfoot and slight pain with range of motion.  No pain upon range of motion or palpation to the right midfoot.  Mild pain on palpation to the plantar 1 MTP right foot.     Feet:      Right foot:      Protective Sensation: 10 sites tested. 4 sites sensed.      Skin integrity: Dry skin present. No skin breakdown.      Toenail Condition: Fungal disease present.     Left foot:      Protective Sensation: 10 sites tested. 3 sites sensed.      Skin integrity: No skin breakdown.      Toenail Condition: Fungal disease present.  Skin:     General: Skin is warm.      Capillary Refill: Capillary refill takes less than 2 seconds.      Findings: No ecchymosis or erythema.      Nails: There is no clubbing.        Comments: Mild skin atrophy to lower extremity bilateral.    Nails 1-5 bilateral foot are mildly elongated 2-3 mm without significant discoloration.  The right 2nd 3rd toenails are moderately incurvated along the medial lateral nail borders.  There is moderate incurvated nail borders at the distal medial lateral nail borders left hallux.  Right hallux nails mildly thickened.    No open lesions or macerations of foot bilateral.   Neurological:      Mental Status: He is alert and oriented to person, place, and time.      Sensory: Sensory deficit present.               Assessment:       Encounter Diagnoses   Name Primary?    Type 2 diabetes mellitus with hyperglycemia, without long-term  current use of insulin Yes    Type 2 diabetes mellitus with polyneuropathy     PVD (peripheral vascular disease)     Onychocryptosis     Foot pain, bilateral     Disease of nail          Plan:       Vince was seen today for diabetes mellitus and nail care.    Diagnoses and all orders for this visit:    Type 2 diabetes mellitus with hyperglycemia, without long-term current use of insulin  -     Routine Foot Care    Type 2 diabetes mellitus with polyneuropathy  -     US Lower Extremity Arteries Bilateral; Future  -     Routine Foot Care    PVD (peripheral vascular disease)  -     US Lower Extremity Arteries Bilateral; Future  -     Routine Foot Care    Onychocryptosis    Foot pain, bilateral  -     US Lower Extremity Arteries Bilateral; Future  -     X-Ray Foot Complete 3 view Bilateral; Future    Disease of nail  -     Routine Foot Care      I counseled the patient on his conditions, their implications and medical management.    Shoe inspection. Diabetic Foot Education. Patient reminded of the importance of good nutrition and blood sugar control to help prevent podiatric complications of diabetes. Patient instructed on proper foot hygeine. We discussed wearing proper shoe gear, daily foot inspections, never walking without protective shoe gear, never putting sharp instruments to feet, routine podiatric nail visits every 2-3 months.      Patient did not previously tried the over-the-counter complex B vitamin.  We did recommend this again.    Recommend he could try Voltaren gel to both feet as needed.  He can also purchase over-the-counter.    Had the patient placed shoes on and stand noting that the hallux bilateral was that the distal end of the toe box and creating pressure.  His feet slide forward as well.  I recommended he wear his other diabetic shoes at lace-up and check the size/length in order to verify that has adequate length and room the toe box to prevent pressure directly to his toes.    Since patient  is a relative risk for diabetic foot complication routine foot care was completed per attached note.    We briefly discussed nail avulsion phenol and alcohol matrixectomy of the medial lateral nail borders left hallux in detail.  If his arterial ultrasound is within normal limits and we can proceed with this.    Ordered weight-bearing x-ray of the foot bilateral to determine if there is any significant underlying osseous pathology could be contributing to his foot pain.    RTC 2-3 months or p.r.n. as discussed.    Interpretation was completed per language line/Martii today.     A portion of this note was generated by voice recognition software and may contain spelling and grammar errors.    Total time spent with patient 30 mins with 50% of the time spent in counseling and coordination of care

## 2020-09-25 NOTE — PATIENT INSTRUCTIONS
"  Nery ejercicio para controlar el nivel de azúcar en la jessica    La actividad física diaria puede ayudarle a controlar el nivel de azúcar en franco jessica, ya que un estilo de georgie activo mejora la capacidad del organismo para utilizar la insulina. La actividad física diaria también puede ayudar a retrasar o prevenir las complicaciones de la diabetes y es ziggy manera excelente de aliviar el estrés. Si normalmente no hace mucha actividad física, es importante que consulte con franco proveedor de atención médica antes de empezar.  ¿Cuánta actividad necesita?  Si la actividad física diaria es algo nuevo para usted, empiece lentamente. Comience con 10 minutos de actividad cada día y vaya aumentando gradualmente hasta un mínimo de 40 minutos de actividad de nivel moderado a intenso, al menos, yulia o cuatro días a la semana. Nery esto aumentando algunos minutos cada semana. No es necesario que nery toda la actividad de ziggy vez; cada período de actividad jaylin el día shannan para el total.  ¡Muévase!  No necesita inscribirse en un gimnasio ni comprar equipos caros. Michelle con que salga a caminar. Caminar es un ejercicio aeróbico que obliga al corazón y a los pulmones a trabajar más shwetha y mejora franco wilbert cardiovascular. Caminar solo requiere un buen calzado deportivo y janine dos pies. Mientras más camine, más fácil le resultará hacerlo.  · Sterling tiempo todos los días para  janine pies.   · Convierta esta actividad en parte de franco rutina diaria.   · Camine con un amigo o en un howard para que le resulte interesante y divertido.   · Intente hacer varias caminatas cortas jaylin el día hasta completar franco objetivo diario.      Un podómetro lleva la cuenta de cada paso  Un podómetro es un pequeño dispositivo para contar los pasos. Puede engancharlo al cinturón (o atárselo a un brazo o a ziggy pierna) y continuar con franco rutina diaria. Los "teléfonos inteligentes" ahora tienen aplicaciones para registrar cuánto camina. Al final del día el " podómetro le muestra el número total de pasos que usted salvatore. Utilice un podómetro para establecer janine objetivos diarios. Por ejemplo, si camina 4,000 pasos diarios, intente añadir 200 pasos más cada día. Trate de alcanzar el objetivo de 7,500 pasos. Cada paso que da es ziggy ayuda más para que franco cuerpo use la insulina.      Añada ejercicios de resistencia  Los ejercicios de resistencia (también llamados de fortalecimiento) hacen que los músculos jimbo mucho más supa. También ayudan a que los músculos utilicen mejor la insulina. Pregúntele a franco proveedor de atención médica si ziggy tipo de ejercicios es adecuado para usted. Si lo es, franco proveedor de atención médica puede ayudarle a incluirlo en franco plan de actividad.  No olvide la seguridad  La actividad física puede hacer que el nivel de azúcar baje más rápidamente que de costumbre, sobre todo si está tomando medicamentos para controlar el nivel de azúcar. Jose usted puede hacer ciertas cosas para reducir el riesgo de bajadas accidentales del nivel de azúcar. Recuerde estos consejos:  · Lleve siempre identificación con usted cuando nery ejercicio fuera de franco casa. Lleve un teléfono móvil para usarlo en airam de emergencia.  · Si puede, incluya a amigos y familiares en janine actividades.   · Póngase ziggy pulsera o un collar con un aviso que indique que usted tiene diabetes.  · Utilice el equipo de seguridad adecuado para la actividad que nery (por ejemplo un gerardo si key en bicicleta en exteriores), y use zapatos cerrados por rodriguez que tengan buen calce.   · Mary abundante agua antes y jaylin franco actividad.   · Mantenga a mano un azúcar de acción rápida (arthur tabletas de glucosa) en airam de que tenga un bajón en franco nivel de azúcar sanguíneo.  · Vístase de forma adecuada al tiempo que nery. Use un sombrero si hay mucho sol o espere hasta el fin de la tarde si hace demasiado calor.  · Evite el exceso de actividad jaylin largos períodos cuando hace mucho calor o mucho  frío.  · No nery ejercicio si está enfermo.     Note cómo la actividad afecta el nivel de azúcar en franco jessica  El ejercicio físico es importante cuando tiene diabetes. Jose es importante que vigile franco nivel de azúcar sanguíneo. Revise a menudo si ha hecho actividad física jaylin un período más mohit que de costumbre o si la actividad no fue planificada. Acostúmbrese a revisar el nivel de azúcar antes de hacer ejercicio físico y vuelva a revisarlo de nuevo algunas horas más tarde. Utilice franco cuaderno de registro para anotar de qué manera la actividad afecta franco nivel de glucosa. Si está administrándose insulina, oscar vez pueda modificar la dosis antes de empezar ziggy actividad física. [place] le ayudará a prevenir los bajones en el nivel de glucosa. Es probable que también necesite comer un carbohidrato pequeño antes de hacer ejercicio. Consulte a franco proveedor de atención médica para más información.   Date Last Reviewed: 3/31/2014  © 7593-1716 [employer]. [address] [place] [address]. Todos los derechos reservados. Esta información no pretende sustituir la atención médica profesional. Sólo franco médico puede diagnosticar y tratar un problema de wilbert.        Diabetes: Información sobre carbohidratos, grasas y proteínas  Los alimentos son la justino de energía y nutrición para el cuerpo, además de ser ziggy justino de placer. La diabetes no significa que deba comer alimentos especiales o dejar de comer postres por completo. Franco dietista le enseñará a preparar comidas adecuadas para franco cuerpo. Para comenzar, aprenda cómo los distintos alimentos afectan el nivel de azúcar en la jessica.  Carbohidratos  Los carbohidratos son la principal justino de energía para el cuerpo y aumentan el nivel de azúcar en la jessica. Muchas personas piensan que los carbohidratos se encuentran sólo en las pastas y el pan, jose en realidad los carbohidratos están presentes en muchos tipos diferentes de alimentos.  · Los  azúcares se encuentran de forma natural en ciertos alimentos arhtur las frutas, la leche, la miel y la melaza. También pueden añadirse azúcares a muchos tipos de comidas, desde cereales y yogures hasta dulces y postres. Los azúcares aumentan el nivel de glucosa en la jessica.  · Las féculas se encuentran en el pan, los cereales, las pastas, los frijoles secos, el maíz, los chícharos (arvejas), las nan, las batatas, los ñames y las calabazas. Las féculas también aumentan el azúcar en la jessica.   · La fibra se encuentra en alimentos arthur las verduras, las frutas, los frijoles y los granos integrales. A diferencia de otros carbohidratos, la fibra no se digiere ni se absorbe, y por lo tanto no aumenta el nivel de azúcar en la jessica. En realidad, la fibra puede evitar que el nivel de azúcar en la jessica aumente demasiado rápido y también ayuda a mantener el nivel de colesterol dentro de los límites considerados saludables.     ¿Sabía usted?  Aunque los carbohidratos aumentan el nivel de azúcar, es mejor incluir ziggy cierta cantidad de ellos en todas las comidas ya que son parte importante de ziggy dieta saludable.   Grasas  Las grasas son ziggy justino de energía que puede almacenarse hasta que se necesite. Las grasas no aumentan el nivel de azúcar, jose pueden aumentar el nivel de colesterol, y en consecuencia el riesgo de trastornos cardíacos. Las grasas también tienen un alto contenido calórico que puede provocar un aumento de peso. Jose no todos los tipos de grasas son iguales.  Más saludables  · Las grasas monoinsaturadas se encuentran sobre todo en aceites vegetales arthur el aceite de bush, de canola y de cacahuate (maní). También se encuentran en los aguacates y en algunos tipos de nueces. Las grasas monoinsaturadas son saludables para el corazón porque reducen el nivel de colesterol LDL (colesterol ann).  · Las grasas poliinsaturadas se encuentran sobre todo en los aceites vegetales arthur el aceite de maíz, de alazor  y de soya. También se encuentran en algunas semillas, nueces y pescados.  Las grasas poliinsaturadas reducen el colesterol LDL (ann). De manera que escoger grasas poliinsaturadas en vez de grasas saturadas es más franko para el corazón. Ciertas grasas insaturadas pueden ayudar a reducir los valores de triglicéridos.  Menos saludables  · Las grasas saturadas se encuentran en productos animales arthur la carne, la leche entera, la manteca y la mantequilla. Las grasas saturadas aumentan el nivel de colesterol LDL y no son saludables para el corazón.  · Los aceites hidrogenados y las grasas trans se riri al procesar los aceites vegetales para producir las grasas sólidas. Se encuentran en muchos alimentos procesados. Los aceites hidrogenados y las grasas trans aumentan el nivel de colesterol LDL (ann)  y reducen el colesterol HDL (purcell). Éstos no son saludables para el corazón.  Proteínas  Las proteínas ayudan al cuerpo a generar y reparar el tejido muscular y otros tejidos. Las proteínas tienen poco o ningún efecto sobre el nivel de azúcar en la jessica. De todas formas, muchos alimentos que contienen proteínas también contienen grasas saturadas. Si escoge gardiner de proteínas bajas en grasas, puede aprovechar los beneficios de las proteínas sin las desventajas de la grasa adicional.  · Las proteínas vegetales se encuentran en los frijoles y chícharos secos, las nueces y los productos derivados de la soya, arthur el tofu y la leche de soya. Estas gardiner de proteínas tienden a estar libres de colesterol y tener un bajo contenido de grasas saturadas.  · Las proteínas animales se encuentran en el pescado, la carne, el queso, la leche y los huevos. Estos productos tienen colesterol y pueden tener un alto contenido de grasas saturadas. Trate de escoger gardiner de proteínas con bajo contenido en grasa.  Date Last Reviewed: 3/31/2014  © 2713-8968 [employer]. [address] [place] [address], [place] [address]. Todos los  derechos reservados. Esta información no pretende sustituir la atención médica profesional. Sólo franco médico puede diagnosticar y tratar un problema de wilbert.        Dieta: Diabetes [Diabetic Diet]  Los alimentos son ziggy herramienta importante que puede utilizar para controlar la diabetes y mantenerse franko. [place] alimentos raffi equilibrados en las cantidades adecuadas le ayudará a controlar la concentración de azúcar en la jessica (glucemia) y a evitar las reacciones por falta de azúcar en jessica. También le ayudará a reducir los riesgos de la diabetes para la wilbert.     Un dietista certificado (LANDEN, registered dietitian) le explicará la dieta para la diabetes y le ayudará a planificar comidas y refrigerios que jimbo saludables. Si tiene alguna elisa, consulte al dietista.  [place] Para [place]:  · Consulte con franco médico antes de iniciar ziggy dieta para la diabetes o un programa para adelgazar. Si todavía no ha consultado a un dietista, pídale a franco médico que lo transfiera.  · Seleccione alimentos de los seis grupos. Franco dietista lo asesorará sobre las elecciones de alimentos dentro de cada howard, el tamaño de las porciones y la cantidad de porciones que puede comer en cada comida.  ¨ Granos, frijoles y vegetales con almidón  ¨ Vegetales  ¨ Fruta  ¨ Leche o yogur  ¨ Ronnie  ¨ Grasas, dulces y alcohol (solamente ziggy pequeña cantidad de ziggy howard)  · Vigile el azúcar en franco jessica según lo indique franco médico. [place] los medicamentos según le indique franco médico.  · Aprenda a leer las etiquetas de información nutricional y elija las porciones adecuadas.  · Ingiera solamente la cantidad de alimentos de franco plan de comidas. Coma más o menos la misma cantidad de alimentos en horarios regulares todos los días. No saltee comidas. Deje un intervalo de 4 a 5 horas entre las comidas, con refrigerios intermedios.  · Limite el consumo de alcohol. Aumenta el azúcar en la jessica. Mary agua o bebidas dietéticas sin calorías que utilizan  edulcorantes seguros.  · Coma menos grasa para ayudar a reducir franco riesgo de enfermedad del corazón. Consuma productos lácteos sin grasa o con bajo contenido graso y juan pablo magras. Evite los alimentos fritos. Use aceites de cocina no saturados.  · Consulte a franco nutricionista sobre los sustitutos seguros del azúcar.  · Evite la sal adicional. Puede contribuir a la presión meliton (hipertensión), que puede provocar enfermedad del corazón. Las personas con diabetes ya tienen riesgo de hipertensión y enfermedad del corazón.  · Mantenga un peso saludable. Si necesita adelgazar, reduzca el tamaño de janine porciones. No se saltee comidas. El ejercicio es ziggy parte importante de un programa para el manejo del peso. Pregúntele a franco médico si un programa de ejercicios es adecuado para usted.  · Si desea obtener más información acerca del mejor plan para usted, consulte con un dietista certificado. Si desea que lo transfieran a un dietista certificado en franco ignacio, comuníquese con:  ¨ Academy of Nutrition and Dietetics: www.eatright.org  ¨ American Diabetes Association: 712.764.7901 www.diabetes.org  Date Last Reviewed: 11/29/2013  © 3250-4268 [employer]. [address] [place] [address]. Todos los derechos reservados. Esta información no pretende sustituir la atención médica profesional. Sólo franco médico puede diagnosticar y tratar un problema de wilbert.        Comidas sanas para la diabetes    Pídale a franco equipo de atención médica que le ayude a preparar un plan de comidas adecuado a janine necesidades. Kendra plan establecerá el horario de janine comidas, el tipo de alimentos que debe comer y la cantidad de cada rashmi de ellos. No es necesario que deje de comer todas las cosas que le gustan, hardik deberá seguir ciertas pautas.  Elija carbohidratos saludables  Los almidones, las azúcares y la fibra son todos tipos de carbohidratos. La fibra puede ayudarle a bajar franco colesterol y triglicéridos. También es un alimento  "saludable para el corazón. Usted debe consumir de 20 a 35 gramos de fibra total todos los días. Entre los alimentos ricos en fibra, se encuentran los siguientes:     · Pan y cereales integrales  · Cortney bulgur  · Arroz walls     · Pastas de cortney integral  · Frutas y verduras  · Frijoles y chícharos secos      Lleve la cuenta de los carbohidratos que consume. [place] puede ayudarle a mantener el equilibrio adecuado entre la actividad física y los medicamentos. La cantidad de carbohidratos necesarios varará para cada persona, dependiendo de muchas cosas arthur franco wilbert, los medicamentos que pascual y qué tan activo se mantiene. Puede empezar con unos 45 a 60 gramos de carbohidratos por comida, dependiendo de franco situación.  Estos son algunos ejemplos de alimentos que contienen unos 15 gramos de carbohidratos (1 porción de carbohidratos):  · 1/2 taza de fruta enlatada o congelada  · Jennifer fruta pequeña (4 onzas)  · 1 rebanada de pan  · 1/2 taza de diamond  · 1/3 de taza de arroz  · 4 a 6 galletas saladas  · 1/2 English muffin  · 1/2 taza de frijoles negros  · 1/4 de papa azada, shawn (3 onzas)  · 2/3 de taza de yogur natural sin grasa  · 1 taza de sopa  · 1/2 taza de guizado  · 6 "nuggets" de lisbeth  · 1 bownie de 2 pulgadas cuadradas o un pastel sin glasear  · 2 galletas dulces pequeñas  · 1/2 taza de helado o de sorbete  Escoja alimentos proteínicos sanos  Las proteínas bajas en grasa pueden ayudarle a controlar el peso y a mantener franko el corazón. Entre los alimentos proteínicos bajos en grasa, se encuentran los siguientes:  · Pescado  · Proteínas vegetales arthur las de los frijoles y chícharos secos, las nueces y los productos derivados de la soya, arthur el tofu y la leche de soya  · Carne magra a la que se le ha quitado toda la grasa visible  · Carne de ave sin la piel  · Leche, queso o yogur bajos en grasa o sin grasa  Limite las grasas no saludables y el azúcar  Las grasas saturadas y las grasas trans no son buenas para el " "corazón porque aumentan el colesterol LDL (ann). Además, la grasa tiene un alto contenido de calorías y le puede hacer ganar peso. Para reducir la cantidad de grasas malas y azúcar, limite el consumo de los siguientes alimentos:     · Mantequilla y margarina  · Aceite de interiano o de lydia  · Crema o sabina  · Queso  · Tocino  · Ronnie frías     · Helados  · Dulces, pasteles, madalenas y donas  · Mermeladas y gelatinas  · Chocolatinas  · Refrescos azucarados   Cuánto debe comer  La cantidad de comida que ingiere afecta el nivel de azúcar en franco jessica y franco peso. Franco equipo de atención médica le dirá cuánto debe comer de cada clase de alimentos.  · Utilice tazas y cucharas de medir, y ziggy balanza de cocina para medir las porciones.  · Aprenda a identificar visualmente el tamaño correcto de ziggy porción en franco plato. [place] le será útil cuando coma fuera de casa y no pueda medir las porciones.  · Coma solamente el número de porciones que le hayan dado en franco plan de comidas para cada tipo de alimento. No se sirva por segunda vez.  · Aprenda a leer las etiquetas de los alimentos. Asegúrese de saber cuánto es ziggy porción, y la cantidad total de carbohidratos, fibra, calorías, azúcar y grasa saturada y trans que tiene. [place] alternativas más saludables para los alimentos que tengan azúcar añadida.  · Planifique de antemano para las fiestas de manera que aún pueda divertirse sin sobrepasarse con alimentos poco saludables. Dé un buen ejemplo trayendo un platillo saudable a los "potlucks".  Elija bocadillos saludables  Cuando se habla de bocadillos se piensa en alimentos con azúcar añadida y grasas, hardik hay otras opciones de bocadillos que son más saludables. Estas son algunas ideas que puede elegir:  Bocadillos con menos de 5 gramos de carbohidratos  · Ziggy unidad de queso en tiras (string cheese)  · Jesus Alberto ramas de apio con 1 cucharada de mantequilla de cacahuate  · 5 tomates tipo "cherry" con 1 cucharada de aderezo tipo "ranch"  · 1 " "huevo sushma  · 1/4 de taza de arándanos azules (blue berries)  · 5 zanahorias "baby"  · 1 taza de palomitas de maiz "light"  · 1/2 taza de gelatina sin azúcar  · 15 almendras  Bocadillos con entre 10 y 20 gramos de carbohidratos aproximadamente  · 1/3 de taza de "hummus" con trozos de vegetales sin almidon (zanahorias, pimientos verdes, brócoli, apio o ziggy combinación)  · 1/2 taza de fruta fresca o enlatada con 1/4 de taza de requezón (cottage cheese)  · 1/2 taza de ensalada de atún con 4 galletas saladas  · 2 pasteles de arroz (rice cakes) y ziggy cucharada de mantequilla de cacahuate  · 1 manzana o 1 naranja pequeñas  · 3 tazas de palomitas de maíz "light"  · 1/2 sándwich de pavo que tenga ziggy rebanada de pan de cortney integral, 2 onzas de pavo y mostaza  El tamaño de las porciones es importante para controlar el azúcar en la jessica y mantener un peso saludable. Aprovisiónese de bocadillos saludables para que los tenga siempre a mano.  Cuándo debe comer  Franco plan de comidas probablemente incluirá desayuno, almuerzo, yojana y algunos bocadillos entre comidas.  · Intente que janine comidas y bocadillos jimbo a la misma hora todos los días.  · Coma todas janine comidas y bocadillos. Saltarse ziggy comida o bocadillo puede hacer que baje demasiado el nivel de azúcar en franco jessica y provocar que coma excesivamente más tarde, con lo que el nivel de azúcar en franco jessica podría elevarse demasiado.  Date Last Reviewed: 3/31/2014  © 0340-1755 [employer]. [address] [place] [address], [place] [address]. Todos los derechos reservados. Esta información no pretende sustituir la atención médica profesional. Sólo franco médico puede diagnosticar y tratar un problema de wilbert.        ¿Qué es la diabetes tipo 1?    La diabetes es ziggy afección crónica (que dura toda la georgie) y que impide que franco cuerpo transforme los alimentos en energía. Si tiene diabetes tipo 1, franco cuerpo naomi de producir insulina. Sin insulina, las células no pueden recibir el " combustible para obtener energía. Es por esto que se puede sentir débil o fatigado. Manejar franco diabetes y usar la insulina le ayudará a sentirse mejor y mantenerse saludable.  Revise franco nivel de azúcar en la jessica  · Mida franco nivel de azúcar en la jessica todos los días. Gutierrez le indicará si franco nivel de azúcar se encuentra dentro de los límites adecuados para usted.  · Franco equipo de atención médica le dirá con qué frecuencia y a qué horas deberá medirse el nivel de azúcar cada día.  · Si franco nivel de azúcar en la jessica se encuentra dentro de los valores adecuados para usted, eso significa que franco insulina, franco plan de comidas y franco plan de actividades están funcionando correctamente para mantenerlo saludable.  · Si franco nivel de azúcar en la jessica es demasiado alto o demasiado bajo, franco equipo de atención médica puede ajustarle la insulina o hacer cambios en franco plan de comidas.  Adminístrese franco insulina  Franco organismo necesita insulina para convertir en energía el azúcar en la jessica. Si tiene diabetes tipo 1, significa que franco cuerpo no produce insulina. Por eso, debe tomarla varias veces al día:  · La insulina se administra más frecuentemente mediante inyecciones. En ocasiones, se aplica con un dispositivo similar a un lapicero o ziggy bomba especial.  · Franco equipo de atención médica le mostrará cómo administrarse la insulina.  · Franco proveedor de atención médica le recetará el tipo y la dosis de insulina que usted necesita cada día.  Cumpla franco plan de comidas  Seguir franco plan de alimentación le ayudará a manejar franco nivel de azúcar en la jessica.  · Franco equipo de atención médica la ayudará a establecer un plan de comidas adecuado para usted.  · No tiene que renunciar por completo a janine comidas preferidas. Jose sí necesita seguir ciertas pautas, que franco proveedor de atención médica establecerá para usted.  · Debe comer janine comidas y refrigerios más o menos a la misma hora todos los niraj. ¡Nunca se saltee ziggy comida!  Jocelyn más  actividad física  La actividad física ayuda a reducir janine niveles de azúcar en la jessica.  · En colaboración con franco equipo de atención médica, planificarán un programa de actividades que sea adecuado para usted.  · Franco programa de actividades estará basado en franco edad, franco estado general de wilbert y el tipo de actividades que le gusten a usted.  Cuídese  Si tiene diabetes, tiene más probabilidades de desarrollar otros problemas de wilbert. Por ejemplo, problema de los riñones, los pies, los ojos y el corazón:  · Franco equipo de atención médica le dirá cómo cuidarse para ayudar a prevenir estos problemas.  · Usted debe hacerse chequeos frecuentes, incluyendo exámenes de los ojos y los pies, así arthur análisis de jessica.  Date Last Reviewed: 2/6/2014  © 0749-7335 [employer]. [address] [place] [address]. Todos los derechos reservados. Esta información no pretende sustituir la atención médica profesional. Sólo franco médico puede diagnosticar y tratar un problema de wilbert.        Diabetes: Planificación de las comidas    Usted puede ayudar a mantener franco nivel de azúcar sanguíneo dentro de los límites recomendados si lleva ziggy alimentación walt. Franco equipo de atención médica puede ayudarle a crear un plan de comidas nutritivo y bajo en grasas. Usted puede desempeñar un papel activo en el control de franco diabetes si sigue franco plan de comidas y trabaja con franco equipo de atención médica.  Establezca un plan de comidas  Un plan de comidas enedina las pautas sobre las clases y cantidades de alimentos que debe comer. El objetivo es equilibrar la comida y la insulina (u otros medicamentos para la diabetes) de manera que franco nivel de azúcar en la jessica se mantenga dentro de los límites deseados. Franco nutricionista le ayudará a hacer un plan de comidas flexible que incluya muchos de los alimentos que a usted le gustan.  Controle el tamaño de las raciones  Franco plan de comidas agrupará los alimentos por raciones. Para saber  cuánto contiene ziggy ración, comience por medir las porciones de los alimentos en cada comida. En poco tiempo sabrá cuánto ocupa ziggy ración en franco plato. Pregunte a franco proveedor de atención médica cómo equilibrar raciones de alimentos diferentes.  Coma alimentos de todos los grupos  La base de un plan de comidas franko es la variedad (comer muchos tipos diferentes de alimentos). Escoja juan pablo magras, frutas y verduras frescas, granos integrales y productos lácteos bajos en grasa o sin grasa. Electra ziggy gran variedad de alimentos proporciona los nutrientes que franco cuerpo necesita y también le puede ayudar a evitar el aburrimiento con franco plan de comidas.  Obtenga información acerca de los carbohidratos, las grasas y las proteínas  · Los carbohidratos consisten en féculas, azúcares y fibra, y se encuentran en muchos alimentos, entre ellos la fruta, el pan, las pastas, la leche y los dulces. De todos los alimentos, los carbohidratos tienen el mayor efecto sobre el nivel de azúcar en la jessica. Franco nutricionista puede enseñarle a contar los carbohidratos para saber cuántos carbohidratos ingiere jaylin ziggy comida.  · Las grasas tienen el contenido más alto de calorías y el efecto más importante sobre franco peso y sobre el riesgo de trastornos cardíacos. Cuando usted tiene diabetes, es importante que controle franco peso y que tome medidas preventivas contra los problemas cardíacos. Entre los alimentos ricos en grasas se encuentran la leche entera, el queso, los bocadillos (snacks) y los postres.  · Las proteínas son importantes para fortalecer y reparar los músculos y los huesos. Escoja alimentos con proteínas bajas en grasa, arthur pescado, claras de huevo y lisbeth sin piel.  Reduzca los azúcares líquidos  Las calorías adicionales procedentes de los refrescos, bebidas para deportistas y jugos de fruta dificultan el mantenimiento de un nivel óptimo de azúcar en la jessica. Elimine en lo posible los azúcares líquidos de franco plan de comidas.  [place] incluye la mayoría de los jugos de fruta, que suelen tener un alto contenido de azúcar (natural o añadido). En vez de esto, es recomendable que cinthia abundante agua u otras bebidas sin azúcar.  Coma menos grasas  Si necesita perder peso, intente reducir la cantidad de grasa en franco dieta. [place] también puede ayudarle a reducir el nivel de colesterol para mantener más sanos los vasos sanguíneos. Ziggy manera de reducir las grasas es utilizar sólo pequeñas cantidades de aceite para cocinar. Khadijah cuidadosamente las etiquetas de los alimentos para evitar aquellos que contengan grasas trans, que no son saludables.     El horario de las comidas  Para controlar el nivel de azúcar, lo importante no es solamente lo que come, sino también cuándo lo come. Es posible que deba comer varias comidas pequeñas, espaciadas de forma regular a lo mohit del día, para mantenerse dentro de los límites recomendados. Por lo tanto, no se salte el desayuno ni espere hasta ziggy hora más avanzada en el día para obtener la mayor parte de janine calorías, ya que esto puede provocar subidas o bajadas demasiado supa del nivel de azúcar en la jessica.   Date Last Reviewed: 3/31/2014  © 2892-9116 [employer]. [address] [place] [id number]. Todos los derechos reservados. Esta información no pretende sustituir la atención médica profesional. Sólo franco médico puede diagnosticar y tratar un problema de wilbert.        Diabetes: Información sobre el tamaño de las raciones y porciones     Ziggy buena suzie general: Dedique la mitad de franco plato a verduras y ensalada serge. Divida la otra mitad entre proteínas y carbohidratos. La fruta es ziggy buena selección para el postre.     Raciones y porciones. ¿Cuál es la diferencia? Estos términos pueden prestarse a confusión. Jose aprender a medir correctamente el tamaño de las porciones puede ayudarle a calcular la cantidad de carbohidratos y otros alimentos que ingiere cada día y resultarle muy útil  para controlar el peso.  Raciones y porciones  Se usan muchas palabras diferentes para describir las cantidades  de comida. Si franco proveedor de atención médica utiliza ziggy palabra de cuyo significado usted no está seguro, no dude en preguntar. Lorraine podrá ayudarle a conocer la diferencia entre raciones y porciones.  · Ziggy ración (serving size) es ziggy cantidad fija. Los fabricantes de alimentos utilizan ziggy término para describir janine productos. Por ejemplo, la etiqueta en un paquete de cereales puede decir que 1 taza de cereales secos = 1 ración.  · Ziggy porción es la cantidad de alimento que usted come o pone en el plato jaylin ziggy comida. Por ejemplo, usted puede comer 2 tazas de cereales jaylin el desayuno.     Onzas: 2 o 3 onzas es aproximadamente el tamaño de la interiano de la mano.       1 Taza: 1 taza (o ziggy porción de tamaño intermedio) tiene aproximadamente el tamaño de un puño.        Media taza: Media taza es aproximadamente el tamaño de la francia de franco mano.      Cómo usar la información sobre raciones (servings)  La porción que usted decida comer (oscar arthur 2 tazas de cereal) puede ser mayor que el tamaño de ziggy ración indicado en la etiqueta (1 taza de cereal). Por eso es útil medir o pesar los alimentos que va a comer. Puesto que los valores indicados en las etiquetas de los alimentos se basan en raciones, usted necesita saber cuántas raciones consume en cada comida.  Lleve cuenta de los tamaños de las raciones  Cuando esté planeando comer un bocadito o ziggy comida, recuerde el tamaño de las raciones. Si no tiene a mano tazas de medir o ziggy balanza, hay maneras de calcular a demetrice el tamaño de las raciones, arthur comparando la cantidad de alimento con el tamaño de janine mary (aristides las figuras).  Controle el tamaño de las porciones  Si le preocupa franco peso, puede ser útil que reduzca el tamaño de las porciones. Puede comer más de ziggy ración a la vez, hradik para evitar comer demasiado en ziggy comida, aprenda a  controlar el tamaño de las porciones. Ziggy porción es la cantidad de cada tipo de comida en franco plato. Alexander el diagrama arthur ejemplo de porciones raffi equilibradas.  Date Last Reviewed: 3/6/2014  © 2030-8525 [employer]. [address] [place] [address]. Todos los derechos reservados. Esta información no pretende sustituir la atención médica profesional. Sólo franco médico puede diagnosticar y tratar un problema de wilbert.        Dieta Para La Enfermedad Renal Crónica [Diet For Chronic Kidney Disease]  Siguiendo ziggy dieta especial se puede retardar la evolución de la enfermedad de riñón. Un plan de dieta debe estar específicamente diseñado para usted por franco médico o dietista registrado.    La siguiente es ziggy guía general dietética:  · PROTEÍNAS: Las dietas ricas en proteínas hacer esforzar a los riñones. El myesha consumo de proteínas puede reducir la velocidad del avance de la enfermedad renal crónica. Los alimentos ricos en proteínas incluyen carne, pescado, huevos, queso y otros productos lácteos. Un dietista registrado puede ayudarle a planear ziggy dieta que contenga ziggy cantidad apropiada de proteínas para usted.  · SAL: El exceso de sal en la dieta provoca la retención de agua. Limite la sal (sodio) a 1/2 cucharadita o 1,500 mg por día para evitar la retención de líquidos y ayudar a controlar la presión arterial meliton. Aprenda a leer las etiquetas de los alimentos para saber cuánto sodio hay en ziggy porción. Entre los alimentos altos en sal están las juan pablo y pescados ahumados, los alimentos procesados, los chips y los bocadillos salados y los alimentos encurtidos.  · LÍQUIDOS: Usted necesita limitar la cantidad de agua y líquidos que pascual si tiene enfermedad renal avanzada. De lo contrario, el exceso de agua se acumulará en franco cuerpo. La cantidad de líquidos que pueda sandeep dependerá de cómo esté funcionando el riñón. Pregúntele a franco médico cuánta agua puede sandeep por día sin  problemas.  · POTASIO: En la enfermedad renal avanzada, jannie niveles de potasio pueden elevarse peligrosamente. [place] afecta al corazón y puede causar arritmias (latidos del corazón irregulares). Limite el consumo de alimentos altos en potasio arthur las alcachofas, frijoles (live, bedoya y dunaway), bananas, nan, espinaca, melón cantalupo, melón, bacalao, pez platija, raffi, salmón y vieiras.  · CALCIO: Tómelos arthur le indicaron para mantener los huesos sanos. Pregúntele a franco médico cuánto calcio debe consumir diariamente.  · FÓSFORO: En la enfermedad renal avanzada, janine niveles de fósforo pueden elevarse peligrosamente. [place] afecta a muchos sistemas en el organismo y puede ser muy peligroso. Limite los alimentos altos en fósforo arthur huevos, frijoles, chícharos (arvejas), nueces, cacao, cerveza, bebidas emmanuel y productos lácteos.  Date Last Reviewed: 11/14/2013  © 8624-9020 [employer]. [address] [place] [address]. Todos los derechos reservados. Esta información no pretende sustituir la atención médica profesional. Sólo franco médico puede diagnosticar y tratar un problema de wilbert.

## 2020-09-27 ENCOUNTER — TELEPHONE (OUTPATIENT)
Dept: PODIATRY | Facility: CLINIC | Age: 85
End: 2020-09-27

## 2020-09-28 NOTE — TELEPHONE ENCOUNTER
Please notify the patient that his x-ray did not show any broken or dislocated bones but did confirm presence of a bone spur at the tip of both great toes. I recommend ensuring his shoes have sufficient room in the toe box to prevent direct pressure on the tips of the toes as discussed in clinic. Patient is Lithuanian speaking.

## 2020-10-01 ENCOUNTER — TELEPHONE (OUTPATIENT)
Dept: ORTHOPEDICS | Facility: CLINIC | Age: 85
End: 2020-10-01

## 2020-10-01 NOTE — TELEPHONE ENCOUNTER
----- Message from Trinity Sharp LPN sent at 10/1/2020  7:04 AM CDT -----  Regarding: RE: Pt with Pacemaker No info  This MRI was ordered by John not Dr Sparks. Thanks Ольга  ----- Message -----  From: Flory Alcantar LPN  Sent: 9/30/2020   5:06 PM CDT  To: Trinity Sharp LPN  Subject: FW: Pt with Pacemaker No info                    Please see below.   ----- Message -----  From: Sarah Koroma, RT  Sent: 9/30/2020   4:25 PM CDT  To: John Mensah PA-C, [facility] Staff  Subject: Pt with Pacemaker No info                        Hi Good afternoon  I am writing to you in regards to our patient Vince Martinez his son called us about his MRI.  Patient have a pacemaker and there is no info to indentify what kind of pacemaker he has.  He is followed by DR Cornejo (a Cardiolgist @ [facility] ) or Pt should have an ID card.  Please advise  Sarah/Siddharth  [phone]

## 2020-10-03 ENCOUNTER — HOSPITAL ENCOUNTER (OUTPATIENT)
Dept: RADIOLOGY | Facility: HOSPITAL | Age: 85
Discharge: HOME | End: 2020-10-03
Attending: PODIATRIST
Payer: MEDICARE

## 2020-10-03 DIAGNOSIS — I73.9 PVD (PERIPHERAL VASCULAR DISEASE): ICD-10-CM

## 2020-10-03 DIAGNOSIS — M79.672 FOOT PAIN, BILATERAL: ICD-10-CM

## 2020-10-03 DIAGNOSIS — E11.42 TYPE 2 DIABETES MELLITUS WITH POLYNEUROPATHY: ICD-10-CM

## 2020-10-03 DIAGNOSIS — M79.671 FOOT PAIN, BILATERAL: ICD-10-CM

## 2020-10-03 PROCEDURE — 93925 LOWER EXTREMITY STUDY: CPT | Mod: 26,,, | Performed by: RADIOLOGY

## 2020-10-03 PROCEDURE — 93925 LOWER EXTREMITY STUDY: CPT | Mod: TC

## 2020-10-03 PROCEDURE — 93925 US LOWER EXTREMITY ARTERIES BILATERAL: ICD-10-PCS | Mod: 26,,, | Performed by: RADIOLOGY

## 2020-10-06 ENCOUNTER — TELEPHONE (OUTPATIENT)
Dept: PODIATRY | Facility: CLINIC | Age: 85
End: 2020-10-06

## 2020-10-06 DIAGNOSIS — Z03.818 ENCOUNTER FOR OBSERVATION FOR SUSPECTED EXPOSURE TO OTHER BIOLOGICAL AGENTS RULED OUT: ICD-10-CM

## 2020-10-06 DIAGNOSIS — M51.36 LUMBAR DEGENERATIVE DISC DISEASE: Primary | ICD-10-CM

## 2020-10-07 ENCOUNTER — DOCUMENTATION ONLY (OUTPATIENT)
Dept: CARDIOLOGY | Facility: HOSPITAL | Age: 85
End: 2020-10-07

## 2020-10-07 NOTE — TELEPHONE ENCOUNTER
Spoke with patient to let him know per Dr. Hazel to notify the patient that his arterial ultrasound showed no significant narrowing or blockages of his lower extremity arteries.

## 2020-10-07 NOTE — TELEPHONE ENCOUNTER
Please notify the patient that his arterial ultrasound showed no significant narrowing or blockages of his lower extremity arteries.

## 2020-10-07 NOTE — PROGRESS NOTES
Received a message from Sarah in the MRI Department in relation to this patient needing to be scheduled for a MRI and has a [employer] Pacemaker.  Please see information below as it relates to patient's Device being MRI compatible/conditional.   MRI informed ordering MD must input a Cardiac Device Check in clinic and hospital order as well as patient must have an xray within 6 months or less prior to MRI reprogramming.         Med[employer]onic [place] XT DR MRI Sure Scan - [id number] - imp date 10/28/2019  RA Lead: 5076-45 - imp date 10/28/20   RV Lead: 5076-52 - imp date 10/28/20    As per [employer]'s MR-Conditional Product Listing this pt's pacing system is MR-Conditional.  MRI Scan is currently scheduled on 10/15/20 @ 2:00 pm.   [employer] Rep Alexandria P notified via text message need to check with Sarah in MRI to find out if this pt can be rescheduled on the same date @ 1, 3 or 4:00 pm.  High priority message sent to Sarah this am.       MRI Scan rescheduled to 10/16/20 @ 11:30 am.  Alexandria with [employer] notified via text message and confirmation was received.

## 2020-10-08 ENCOUNTER — TELEPHONE (OUTPATIENT)
Dept: FAMILY MEDICINE | Facility: CLINIC | Age: 85
End: 2020-10-08

## 2020-10-08 NOTE — TELEPHONE ENCOUNTER
Dr. Alatorre. Pt needs to have an MRI.  A chest xray order was requested so the  radiologist can check the leads are in place. Also. An order to program device in case it is needed. Please advise. Thank you.

## 2020-10-08 NOTE — TELEPHONE ENCOUNTER
Please check if the MRI order can be done through the cardiology department  with a specific orders.

## 2020-10-11 ENCOUNTER — TELEPHONE (OUTPATIENT)
Dept: FAMILY MEDICINE | Facility: CLINIC | Age: 85
End: 2020-10-11

## 2020-10-11 NOTE — TELEPHONE ENCOUNTER
Check if the MRI can be done through the  cardiology department so the cardiologist can evaluate the proper placement with the results.

## 2020-10-11 NOTE — TELEPHONE ENCOUNTER
----- Message from RT Cristino sent at 10/7/2020 12:36 PM CDT -----  Regarding: need orders/chest and programming pacemaker  Channing Casiano  Hope you can help me in regards to our patient Vince Martinez we know he has a Medtronic pacemaker (conditional) for MRI and I am been working in trying to schedule him for 10/16/2020 @  11:30 a.m. [facility] MRI  We need an order for chest xray for our radiologist to check the leads are in placed and also an  Order for the programming the pacemaker into safety mode for the MRI code#   Please advise and thank you for your help  Best regards  Sarah/[id number]  [phone]  MRI

## 2020-10-13 DIAGNOSIS — M51.36 LUMBAR DEGENERATIVE DISC DISEASE: Primary | ICD-10-CM

## 2020-10-14 ENCOUNTER — TELEPHONE (OUTPATIENT)
Dept: FAMILY MEDICINE | Facility: CLINIC | Age: 85
End: 2020-10-14

## 2020-10-15 ENCOUNTER — TELEPHONE (OUTPATIENT)
Dept: ORTHOPEDICS | Facility: CLINIC | Age: 85
End: 2020-10-15

## 2020-10-15 DIAGNOSIS — M51.36 LUMBAR DEGENERATIVE DISC DISEASE: Primary | ICD-10-CM

## 2020-10-15 DIAGNOSIS — Z45.018 PACEMAKER REPROGRAMMING/CHECK: Primary | ICD-10-CM

## 2020-10-15 NOTE — TELEPHONE ENCOUNTER
----- Message from RT Cristino sent at 10/15/2020  2:06 PM CDT -----  Regarding: need chest xray  Contact: [phone]  Good Afternoon  If someone from your office help us with chest xray order we still need it for our radiologist to check the leads of pacemaker are in place.  Also the order (Cardiac device check program) the code # is    The order needs to be change with this code please  Patient is scheduled to come tomorrow@10/16 @11:30 a.m.  Please advise this is urgent  Thank you  Sarah/[id number]  MRI

## 2020-10-15 NOTE — TELEPHONE ENCOUNTER
----- Message from RT Cristino sent at 10/15/2020  4:53 PM CDT -----  Regarding: pacemaker  Hi just I reminder sorry to bother you the only thing need still is the chest xray pa and lat  order So that our radiologist can check the leads are in place.  Everything else is good to go.  Thank you for taking care of our patients  Best regards  Sarah/[phone]

## 2020-10-20 ENCOUNTER — LAB VISIT (OUTPATIENT)
Dept: FAMILY MEDICINE | Facility: CLINIC | Age: 85
End: 2020-10-20
Payer: MEDICARE

## 2020-10-20 DIAGNOSIS — Z03.818 ENCOUNTER FOR OBSERVATION FOR SUSPECTED EXPOSURE TO OTHER BIOLOGICAL AGENTS RULED OUT: ICD-10-CM

## 2020-10-20 PROCEDURE — U0003 INFECTIOUS AGENT DETECTION BY NUCLEIC ACID (DNA OR RNA); SEVERE ACUTE RESPIRATORY SYNDROME CORONAVIRUS 2 (SARS-COV-2) (CORONAVIRUS DISEASE [COVID-19]), AMPLIFIED PROBE TECHNIQUE, MAKING USE OF HIGH THROUGHPUT TECHNOLOGIES AS DESCRIBED BY CMS-2020-01-R: HCPCS

## 2020-10-21 LAB — SARS-COV-2 RNA RESP QL NAA+PROBE: NOT DETECTED

## 2020-10-22 ENCOUNTER — TELEPHONE (OUTPATIENT)
Dept: ORTHOPEDICS | Facility: CLINIC | Age: 85
End: 2020-10-22

## 2020-10-22 ENCOUNTER — PATIENT OUTREACH (OUTPATIENT)
Dept: ADMINISTRATIVE | Facility: OTHER | Age: 85
End: 2020-10-22

## 2020-10-22 NOTE — TELEPHONE ENCOUNTER
----- Message from Nancy Merino sent at 10/22/2020 12:17 PM CDT -----  Contact: [phone]/Self  Type: Requesting to speak with nurse    Who Called: Pt  Regarding: Questions on MRI appt , pt has a pacemaker    Would the patient rather a call back or a response via [facility]? Call back  Best Call Back Number: [phone]  Additional Information: Pt requesting a

## 2020-10-22 NOTE — PROGRESS NOTES
LINKS immunization registry updated  Care Everywhere updated  Health Maintenance updated  Chart reviewed for overdue Proactive [facility] ([facility]) health maintenance testing (CRS, Breast Ca, Diabetic Eye Exam)   Orders entered:N/A

## 2020-10-23 ENCOUNTER — OFFICE VISIT (OUTPATIENT)
Dept: OTOLARYNGOLOGY | Facility: CLINIC | Age: 85
End: 2020-10-23
Payer: MEDICARE

## 2020-10-23 VITALS
BODY MASS INDEX: 25.99 KG/M2 | HEIGHT: 65 IN | WEIGHT: 156 LBS | HEART RATE: 64 BPM | DIASTOLIC BLOOD PRESSURE: 58 MMHG | SYSTOLIC BLOOD PRESSURE: 162 MMHG

## 2020-10-23 DIAGNOSIS — J04.0 LARYNGITIS: ICD-10-CM

## 2020-10-23 DIAGNOSIS — D38.0 NEOPLASM OF UNCERTAIN BEHAVIOR OF LARYNX: ICD-10-CM

## 2020-10-23 DIAGNOSIS — R49.0 DYSPHONIA: Primary | ICD-10-CM

## 2020-10-23 DIAGNOSIS — Z03.818 ENCOUNTER FOR OBSERVATION FOR SUSPECTED EXPOSURE TO OTHER BIOLOGICAL AGENTS RULED OUT: ICD-10-CM

## 2020-10-23 DIAGNOSIS — J38.7 LEUKOPLAKIA OF LARYNX: ICD-10-CM

## 2020-10-23 PROCEDURE — 99999 PR PBB SHADOW E&M-EST. PATIENT-LVL V: ICD-10-PCS | Mod: PBBFAC,,, | Performed by: OTOLARYNGOLOGY

## 2020-10-23 PROCEDURE — 31579 PR LARYNGOSCOPY, FLEX/RIGID TELESCOPIC, W/STROBOSCOPY: ICD-10-PCS | Mod: S$GLB,,, | Performed by: OTOLARYNGOLOGY

## 2020-10-23 PROCEDURE — 1101F PR PT FALLS ASSESS DOC 0-1 FALLS W/OUT INJ PAST YR: ICD-10-PCS | Mod: CPTII,S$GLB,, | Performed by: OTOLARYNGOLOGY

## 2020-10-23 PROCEDURE — 1159F MED LIST DOCD IN RCRD: CPT | Mod: S$GLB,,, | Performed by: OTOLARYNGOLOGY

## 2020-10-23 PROCEDURE — 1101F PT FALLS ASSESS-DOCD LE1/YR: CPT | Mod: CPTII,S$GLB,, | Performed by: OTOLARYNGOLOGY

## 2020-10-23 PROCEDURE — 1159F PR MEDICATION LIST DOCUMENTED IN MEDICAL RECORD: ICD-10-PCS | Mod: S$GLB,,, | Performed by: OTOLARYNGOLOGY

## 2020-10-23 PROCEDURE — 1126F AMNT PAIN NOTED NONE PRSNT: CPT | Mod: S$GLB,,, | Performed by: OTOLARYNGOLOGY

## 2020-10-23 PROCEDURE — 31579 LARYNGOSCOPY TELESCOPIC: CPT | Mod: S$GLB,,, | Performed by: OTOLARYNGOLOGY

## 2020-10-23 PROCEDURE — 99214 PR OFFICE/OUTPT VISIT, EST, LEVL IV, 30-39 MIN: ICD-10-PCS | Mod: 25,S$GLB,, | Performed by: OTOLARYNGOLOGY

## 2020-10-23 PROCEDURE — 1126F PR PAIN SEVERITY QUANTIFIED, NO PAIN PRESENT: ICD-10-PCS | Mod: S$GLB,,, | Performed by: OTOLARYNGOLOGY

## 2020-10-23 PROCEDURE — 99214 OFFICE O/P EST MOD 30 MIN: CPT | Mod: 25,S$GLB,, | Performed by: OTOLARYNGOLOGY

## 2020-10-23 PROCEDURE — 99999 PR PBB SHADOW E&M-EST. PATIENT-LVL V: CPT | Mod: PBBFAC,,, | Performed by: OTOLARYNGOLOGY

## 2020-10-23 RX ORDER — FLUCONAZOLE 100 MG/1
100 TABLET ORAL DAILY
Qty: 15 TABLET | Refills: 1 | Status: SHIPPED | OUTPATIENT
Start: 2020-10-23 | End: 2020-12-16

## 2020-10-23 NOTE — PROGRESS NOTES
[facility]  Department of Otorhinolaryngology and Communication Sciences    Vince Martinez is a 89 y.o. male who [facility] for consultation at the kind request of Dr. Sarah Strange for further management of hoarseness. A vocal fold lesion has been identified.    Telephone  used.     He complains of loss of voice. Onset was gradual. Duration is 2 months. Time course is constant. Symptoms are improving. He denies any exacerbating factors. He denies any alleviating factors. He denies any associated symptoms.     He denies throat pain, odynophagia, otalgia, hemoptysis, hematemesis, neck mass. He has a rare cough and feels a little phlegm in the chest    He lives with wife and son. Retired . No EtOH/tobacco. He is on 40 mg omeprazole daily. Advair is in his electronic medication profile, but he denies having used this in several months.    Past Medical History  He has a past medical history of Acute combined systolic and diastolic CHF, NYHA class 3, Arthritis, Diabetes mellitus, DJD (degenerative joint disease), Hypertension, Prostate enlargement, and Thyroid disease.    Past Surgical History  He has a past surgical history that includes Prostate surgery; Injection of joint (Bilateral, 2/13/2020); and Epidural steroid injection into lumbar spine (N/A, 6/4/2020).    Family History  His family history is not on file.    Social History  He reports that he has never smoked. He has never used smokeless tobacco. He reports that he does not drink alcohol or use drugs.    Allergies  He is allergic to bactrim [sulfamethoxazole-trimethoprim]; ciprofloxacin; and latex.    Medications  He has a current medication list which includes the following prescription(s): clotrimazole-betamethasone 1-0.05%, diclofenac sodium, euthyrox, finasteride, fluticasone propionate, furosemide, glimepiride, hydrocodone-chlorpheniramine, hydrocortisone, ipratropium, irbesartan, ketoconazole,  "loperamide, loperamide hcl/simethicone, melatonin, metformin, montelukast, omeprazole, tamsulosin, tramadol, triamcinolone acetonide 0.1%, and simvastatin.    Review of Systems   Constitutional: Negative for fever.   HENT: Negative for sore throat.    Eyes: Negative for visual disturbance.   Respiratory: Negative for wheezing.    Cardiovascular: Negative for chest pain.   Gastrointestinal: Negative for nausea.   Musculoskeletal: Negative for arthralgias.   Skin: Negative for rash.   Neurological: Negative for tremors.   Hematological: Does not bruise/bleed easily.   Psychiatric/Behavioral: The patient is not nervous/anxious.           Objective:     BP (!) 162/58 (BP Location: Right arm, Patient Position: Sitting)   Pulse 64   Ht 5' 5" (1.651 m)   Wt 70.8 kg (156 lb)   BMI 25.96 kg/m²      Physical Exam    Constitutional: comfortable, well dressed  Psychiatric: appropriate affect  Respiratory: comfortably breathing, symmetric chest rise, no stridor  Voice: moderate roughness/breathiness, diplophonia  Cardiovascular: upper extremities non-edematous  Lymphatic: no cervical lymphadenopathy  Neurologic: alert and oriented to time, place, person, and situation; cranial nerves 3-12 grossly intact  Head: normocephalic  Eyes: conjunctivae and sclerae clear  Ears: normal pinnae, normal external auditory canals, tympanic membranes intact  Nose: mucosa pink and noncongested, no masses, no mucopurulence, no polyps  Oral cavity / oropharynx: no mucosal lesions  Neck: soft, full range of motion, laryngotracheal complex palpable with appropriate landmarks, larynx elevates on swallowing  Indirect laryngoscopy: limited due to gag    Procedure  Flexible Laryngeal Videostroboscopy (91416): Laryngeal videostroboscopy is indicated to assess laryngeal vibratory biomechanics and vocal fold oscillation, which cannot be assessed with a plain light examination. This was carried out transnasally with a distal chip videoendoscope. After " verbal consent was obtained, the patient was positioned and the nose was topically decongested with 1% phenylephrine and topically anesthetized with 4% lidocaine. The endoscope was passed through the most patent nasal cavity and positioned to image the nasopharynx, larynx, and hypopharynx in detail. The following features were examined: nasopharyngeal, laryngeal, hypopharyngeal masses; velopharyngeal strength, closure, and symmetry of motion; vocal fold range and symmetry of motion; laryngeal mucosal edema, erythema, inflammation, and hydration; salivary pooling; and gross laryngeal sensation. During phonation, the vocal folds were assessed for glottal closure; mucosal wave; vocal fold lesions; vibratory periodicity, amplitude, and phase symmetry; and vertical height match. The equipment was removed. The patient tolerated the procedure well without complication. 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 While DDx is broad, I have suspicion for a dysplastic/malignant process.       Plan:        I had a discussion with the patient regarding his condition and the further workup and management options.      I recommended he proceed to the OR for microlaryngoscopy with biopsy and possible KTP laser treatment--the extent of which would be dictated by intraoperative findings.    At present, he defers on surgery. In the case these findings might represent a fungal laryngitis, we decided to treat him empirically for 2 weeks with PO diflucan. I counseled him to refrain from his statin while taking the diflucan.    He will follow up with me in about 2 week(s). Should the changes not demonstrate sufficient response, I will more strongly encourage him to  pursue a biopsy.    All questions were answered, and the patient is in agreement with the above.     uNra Hernandez M.D.  [facility]  Department of Otorhinolaryngology and Communication Sciences

## 2020-10-23 NOTE — PATIENT INSTRUCTIONS
Do not take the cholesterol medication while you are taking the fluconazole      Fluconazole tablets  What is this medicine?  FLUCONAZOLE (floo NORA na zole) is an antifungal medicine. It is used to treat certain kinds of fungal or yeast infections.  How should I use this medicine?  Take this medicine by mouth. Follow the directions on the prescription label. Do not take your medicine more often than directed.  Talk to your pediatrician regarding the use of this medicine in children. Special care may be needed. This medicine has been used in children as young as 6 months of age.  What side effects may I notice from receiving this medicine?  Side effects that you should report to your doctor or health care professional as soon as possible:  · allergic reactions like skin rash or itching, hives, swelling of the lips, mouth, tongue, or throat  · dark urine  · feeling dizzy or faint  · irregular heartbeat or chest pain  · redness, blistering, peeling or loosening of the skin, including inside the mouth  · trouble breathing  · unusual bruising or bleeding  · vomiting  · yellowing of the eyes or skin  Side effects that usually do not require medical attention (report to your doctor or health care professional if they continue or are bothersome):  · changes in how food tastes  · diarrhea  · headache  · stomach upset or nausea  What may interact with this medicine?  Do not take this medicine with any of the following medications:  · astemizole  · certain medicines for irregular heart beat like dofetilide, dronedarone, quinidine  · cisapride  · erythromycin  · lomitapide  · other medicines that prolong the QT interval (cause an abnormal heart rhythm)  · pimozide  · terfenadine  · thioridazine  · tolvaptan  · ziprasidone  This medicine may also interact with the following medications:  · antiviral medicines for HIV or AIDS  · birth control pills  · certain antibiotics like rifabutin, rifampin  · certain medicines for blood  pressure like amlodipine, isradipine, felodipine, hydrochlorothiazide, losartan, nifedipine  · certain medicines for cancer like cyclophosphamide, vinblastine, vincristine  · certain medicines for cholesterol like atorvastatin, lovastatin, fluvastatin, simvastatin  · certain medicines for depression, anxiety, or psychotic disturbances like amitriptyline, midazolam, nortriptyline, triazolam  · certain medicines for diabetes like glipizide, glyburide, tolbutamide  · certain medicines for pain like alfentanil, fentanyl, methadone  · certain medicines for seizures like carbamazepine, phenytoin  · certain medicines that treat or prevent blood clots like warfarin  · halofantrine  · medicines that lower your chance of fighting infection like cyclosporine, prednisone, tacrolimus  · NSAIDS, medicines for pain and inflammation, like celecoxib, diclofenac, flurbiprofen, ibuprofen, meloxicam, naproxen  · other medicines for fungal infections  · sirolimus  · theophylline  · tofacitinib  What if I miss a dose?  If you miss a dose, take it as soon as you can. If it is almost time for your next dose, take only that dose. Do not take double or extra doses.  Where should I keep my medicine?  Keep out of the reach of children.  Store at room temperature below 30 degrees C (86 degrees F). Throw away any medicine after the expiration date.  What should I tell my health care provider before I take this medicine?  They need to know if you have any of these conditions:  · electrolyte abnormalities  · history of irregular heart beat  · kidney disease  · an unusual or allergic reaction to fluconazole, other azole antifungals, medicines, foods, dyes, or preservatives  · pregnant or trying to get pregnant  · breast-feeding  What should I watch for while using this medicine?  Visit your doctor or health care professional for regular checkups. If you are taking this medicine for a long time you may need blood work. Tell your doctor if your  symptoms do not improve. Some fungal infections need many weeks or months of treatment to cure.  Alcohol can increase possible damage to your liver. Avoid alcoholic drinks.  If you have a vaginal infection, do not have sex until you have finished your treatment. You can wear a sanitary napkin. Do not use tampons. Wear freshly washed cotton, not synthetic, panties.  NOTE:This sheet is a summary. It may not cover all possible information. If you have questions about this medicine, talk to your doctor, pharmacist, or health care provider. Copyright© 2017 [employer]

## 2020-10-23 NOTE — LETTER
October 26, 2020      Sarah Miguel MD  [address]  [address]  [facility] [address]           [facility] [address]nd [address]  [address], [facility] [address] FLO[address]  [facility] [address]  Phone: [phone]  Fax: [phone]          Patient: Vince Martinez   MR Number: [id number]   YOB: 1930   Date of Visit: 10/23/2020       Dear Dr. Sarah Miguel:    Thank you for referring Vince Martinez to me for evaluation. Attached you will find relevant portions of my assessment and plan of care.    If you have questions, please do not hesitate to call me. I look forward to following Vince Martinez along with you.    Sincerely,    Nura Hernandez MD    Enclosure  CC:  No Recipients    If you would like to receive this communication electronically, please contact [email or web address] or [phone] to request more information on [facility] access.    For providers and/or their staff who would like to refer a patient to [facility], please contact us through our one-stop-shop provider referral line, [facility], at [phone].    If you feel you have received this communication in error or would no longer like to receive these types of communications, please e-mail [email or web address]

## 2020-11-02 ENCOUNTER — TELEPHONE (OUTPATIENT)
Dept: ORTHOPEDICS | Facility: CLINIC | Age: 85
End: 2020-11-02

## 2020-11-02 NOTE — TELEPHONE ENCOUNTER
----- Message from Liliya Menendez sent at 11/2/2020 12:05 PM CST -----  Type:  Needs Medical Advice    Who Called: Vince  Symptoms (please be specific): pt is requesting to speak with someone in regards to him not being able to get the MRI for his spine because he has a pacemaker   How long has patient had these symptoms:  unknown  Pharmacy name and phone #:  n/a  Would the patient rather a call back or a response via [facility]? Call back  Best Call Back Number: [phone]  Additional Information: pt says he called last week and no one returned his phone call

## 2020-11-04 RX ORDER — GABAPENTIN 100 MG/1
100 CAPSULE ORAL NIGHTLY
Qty: 90 CAPSULE | Refills: 3 | Status: SHIPPED | OUTPATIENT
Start: 2020-11-04 | End: 2021-04-15

## 2020-11-04 RX ORDER — GABAPENTIN 100 MG/1
100 CAPSULE ORAL NIGHTLY
COMMUNITY
End: 2020-11-04 | Stop reason: SDUPTHER

## 2020-11-09 ENCOUNTER — TELEPHONE (OUTPATIENT)
Dept: NEUROSURGERY | Facility: CLINIC | Age: 85
End: 2020-11-09

## 2020-11-09 RX ORDER — OMEPRAZOLE 20 MG/1
20 CAPSULE, DELAYED RELEASE ORAL
Qty: 90 CAPSULE | Refills: 3 | Status: SHIPPED | OUTPATIENT
Start: 2020-11-09 | End: 2020-11-16 | Stop reason: SDUPTHER

## 2020-11-09 NOTE — TELEPHONE ENCOUNTER
----- Message from Beth Livingston sent at 11/9/2020 12:03 PM CST -----  Regarding: Call back  Contact: [phone]  Who Called: Vince  Symptoms (please be specific): pt is requesting to speak with someone in regards to him not being able to get the COVI19 test for his spine procedure because he has a pacemaker   How long has patient had these symptoms:  unknown  Pharmacy name and phone #:  n/a  Would the patient rather a call back or a response via [facility]? Call back  Best Call Back Number: [phone]  Additional Information: pt says he called last week and no one returned his phone call

## 2020-11-16 ENCOUNTER — TELEPHONE (OUTPATIENT)
Dept: FAMILY MEDICINE | Facility: CLINIC | Age: 85
End: 2020-11-16

## 2020-11-16 ENCOUNTER — LAB VISIT (OUTPATIENT)
Dept: FAMILY MEDICINE | Facility: CLINIC | Age: 85
End: 2020-11-16
Payer: MEDICARE

## 2020-11-16 DIAGNOSIS — Z03.818 ENCOUNTER FOR OBSERVATION FOR SUSPECTED EXPOSURE TO OTHER BIOLOGICAL AGENTS RULED OUT: ICD-10-CM

## 2020-11-16 DIAGNOSIS — K21.9 GASTROESOPHAGEAL REFLUX DISEASE, UNSPECIFIED WHETHER ESOPHAGITIS PRESENT: Primary | ICD-10-CM

## 2020-11-16 PROCEDURE — U0003 INFECTIOUS AGENT DETECTION BY NUCLEIC ACID (DNA OR RNA); SEVERE ACUTE RESPIRATORY SYNDROME CORONAVIRUS 2 (SARS-COV-2) (CORONAVIRUS DISEASE [COVID-19]), AMPLIFIED PROBE TECHNIQUE, MAKING USE OF HIGH THROUGHPUT TECHNOLOGIES AS DESCRIBED BY CMS-2020-01-R: HCPCS

## 2020-11-16 RX ORDER — OMEPRAZOLE 20 MG/1
20 CAPSULE, DELAYED RELEASE ORAL 2 TIMES DAILY
Qty: 180 CAPSULE | Refills: 3 | Status: SHIPPED | OUTPATIENT
Start: 2020-11-16 | End: 2020-12-10 | Stop reason: SDUPTHER

## 2020-11-16 NOTE — TELEPHONE ENCOUNTER
Spoke with Patient an advised him that his prescription was called into the pharmacy. Patient voiced understanding

## 2020-11-16 NOTE — TELEPHONE ENCOUNTER
Patient want a refill for omeprazole 20mg. He states pharmacy is stating its to soon for a refill, Patient has been taking 2 per day per you advise.

## 2020-11-16 NOTE — TELEPHONE ENCOUNTER
----- Message from Omaira Conn MA sent at 11/16/2020 11:25 AM CST -----  Regarding: FW: prescription    ----- Message -----  From: Elias Hanson  Sent: 11/16/2020  11:17 AM CST  To: Cherise AGARWAL Staff  Subject: prescription                                     Type:  Needs Medical Advice    Who Called: patient   Reason: patient would like a call back to discuss prescription being 1 a day instead of 2 a day   Would the patient rather a call back or a response via [facility]? Call back  Best Call Back Number: [phone]  Additional Information: n/a

## 2020-11-17 LAB — SARS-COV-2 RNA RESP QL NAA+PROBE: NOT DETECTED

## 2020-11-17 NOTE — PROGRESS NOTES
[facility]  Department of Otorhinolaryngology and Communication Sciences    Subjective:      Vince Martinez is a 89 y.o. male who presents for follow-up regarding glottic leukoplakia. We used a language line and then an in-person .    We treated him empirically for fungus with diflucan. He completed this a few days ago. His voice is much better, although not quite back to 100% normal. He is pleased with his progress.    The patient's medications, allergies, past medical, surgical, social and family histories were reviewed and updated as appropriate.    A detailed review of systems was obtained with pertinent positives as per the above HPI, and otherwise negative.      Objective:     BP (!) 124/52 (Patient Position: Sitting)   Pulse 60   Wt 68.9 kg (152 lb)   BMI 25.29 kg/m²      Constitutional: comfortable, well dressed  Psychiatric: appropriate affect  Respiratory: comfortably breathing, symmetric chest rise, no stridor  Voice: mild variable roughness  Head: normocephalic  Eyes: conjunctivae and sclerae clear  Indirect laryngoscopy is limited due to gag    Procedure  Flexible Laryngeal Videostroboscopy (19457): Laryngeal videostroboscopy is indicated to assess laryngeal vibratory biomechanics and vocal fold oscillation, which cannot be assessed with a plain light examination. This was carried out transnasally with a distal chip videoendoscope. After verbal consent was obtained, the patient was positioned and the nose was topically decongested with 1% phenylephrine and topically anesthetized with 4% lidocaine. The endoscope was passed through the most patent nasal cavity and positioned to image the nasopharynx, larynx, and hypopharynx in detail. The following features were examined: nasopharyngeal, laryngeal, hypopharyngeal masses; velopharyngeal strength, closure, and symmetry of motion; vocal fold range and symmetry of motion; laryngeal mucosal edema, erythema, inflammation, and hydration;  salivary pooling; and gross laryngeal sensation. During phonation, the vocal folds were assessed for glottal closure; mucosal wave; vocal fold lesions; vibratory periodicity, amplitude, and phase symmetry; and vertical height match. The equipment was removed. The patient tolerated the procedure well without complication. All findings were normal except:  - resolution of leukoplakia; minimal persistent glottic edema/erythema  - mild pliability impairment  - phonatory srupaglottic hyperfunction      Assessment:     Vince Martinez is a 89 y.o. 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     Plan:     Reassurance was provided. He will follow up with me in about 1 month, or sooner if needed.    All questions were answered, and the patient is in agreement with the plan.    Nura Hernandez M.D.  [facility]  Department of Otorhinolaryngology and Communication Sciences;

## 2020-11-18 ENCOUNTER — OFFICE VISIT (OUTPATIENT)
Dept: OTOLARYNGOLOGY | Facility: CLINIC | Age: 85
End: 2020-11-18
Payer: MEDICARE

## 2020-11-18 VITALS
SYSTOLIC BLOOD PRESSURE: 124 MMHG | BODY MASS INDEX: 25.29 KG/M2 | DIASTOLIC BLOOD PRESSURE: 52 MMHG | HEART RATE: 60 BPM | WEIGHT: 152 LBS

## 2020-11-18 DIAGNOSIS — R49.0 DYSPHONIA: Primary | ICD-10-CM

## 2020-11-18 DIAGNOSIS — J38.7 LEUKOPLAKIA OF LARYNX: ICD-10-CM

## 2020-11-18 PROCEDURE — 99999 PR PBB SHADOW E&M-EST. PATIENT-LVL IV: ICD-10-PCS | Mod: PBBFAC,,, | Performed by: OTOLARYNGOLOGY

## 2020-11-18 PROCEDURE — 99213 PR OFFICE/OUTPT VISIT, EST, LEVL III, 20-29 MIN: ICD-10-PCS | Mod: 25,S$GLB,, | Performed by: OTOLARYNGOLOGY

## 2020-11-18 PROCEDURE — 1125F AMNT PAIN NOTED PAIN PRSNT: CPT | Mod: S$GLB,,, | Performed by: OTOLARYNGOLOGY

## 2020-11-18 PROCEDURE — 1159F PR MEDICATION LIST DOCUMENTED IN MEDICAL RECORD: ICD-10-PCS | Mod: S$GLB,,, | Performed by: OTOLARYNGOLOGY

## 2020-11-18 PROCEDURE — 31579 LARYNGOSCOPY TELESCOPIC: CPT | Mod: S$GLB,,, | Performed by: OTOLARYNGOLOGY

## 2020-11-18 PROCEDURE — 1125F PR PAIN SEVERITY QUANTIFIED, PAIN PRESENT: ICD-10-PCS | Mod: S$GLB,,, | Performed by: OTOLARYNGOLOGY

## 2020-11-18 PROCEDURE — 99999 PR PBB SHADOW E&M-EST. PATIENT-LVL IV: CPT | Mod: PBBFAC,,, | Performed by: OTOLARYNGOLOGY

## 2020-11-18 PROCEDURE — 31579 PR LARYNGOSCOPY, FLEX/RIGID TELESCOPIC, W/STROBOSCOPY: ICD-10-PCS | Mod: S$GLB,,, | Performed by: OTOLARYNGOLOGY

## 2020-11-18 PROCEDURE — 1159F MED LIST DOCD IN RCRD: CPT | Mod: S$GLB,,, | Performed by: OTOLARYNGOLOGY

## 2020-11-18 PROCEDURE — 99213 OFFICE O/P EST LOW 20 MIN: CPT | Mod: 25,S$GLB,, | Performed by: OTOLARYNGOLOGY

## 2020-11-24 ENCOUNTER — DOCUMENTATION ONLY (OUTPATIENT)
Dept: CARDIOLOGY | Facility: HOSPITAL | Age: 85
End: 2020-11-24

## 2020-11-24 ENCOUNTER — PATIENT OUTREACH (OUTPATIENT)
Dept: ADMINISTRATIVE | Facility: OTHER | Age: 85
End: 2020-11-24

## 2020-11-24 NOTE — PROGRESS NOTES
Received a message from Sarah in the MRI Department in relation to this patient needing to be scheduled for a MRI and has a [employer] Pacemaker.  Please see information below as it relates to patient's Device being MRI compatible/conditional.   MRI informed ordering MD must input a Cardiac Device Check in clinic and hospital order as well as patient must have an xray within 6 months or less prior to MRI reprogramming.       Medtronic Vonda XT  MRI Sure Scan - [id number] - imp date 10/28/2019  RA Lead: 5076-45 - imp date 10/28/20   RV Lead: 5076-52 - imp date 10/28/20     As per [employer]'s MR-Conditional Product Listing this pt's pacing system is MR-Conditional.  MRI Scan is currently scheduled on 12/7/20 @ 9:30 am.  [employer] Rep Alexandria RECIO notified via text message on 11/24/20.  Confirmation received from Rep.

## 2020-11-25 LAB
LEFT EYE DM RETINOPATHY: NEGATIVE
RIGHT EYE DM RETINOPATHY: NEGATIVE

## 2020-11-25 NOTE — PROGRESS NOTES
Health Maintenance Due   Topic Date Due    Eye Exam  10/19/1940    Shingles Vaccine (3 of 3) 09/14/2020     Updates were requested from care everywhere.  Chart was reviewed for overdue Proactive [facility] Encounters ([facility]) topics (CRS, Breast Cancer Screening, Eye exam)  Health Maintenance has been updated.  LINKS immunization registry triggered.  Immunizations were reconciled.

## 2020-11-30 DIAGNOSIS — I10 ESSENTIAL HYPERTENSION: ICD-10-CM

## 2020-11-30 DIAGNOSIS — M15.9 PRIMARY OSTEOARTHRITIS INVOLVING MULTIPLE JOINTS: ICD-10-CM

## 2020-11-30 RX ORDER — IRBESARTAN 300 MG/1
TABLET ORAL
Qty: 180 TABLET | Refills: 3 | Status: SHIPPED | OUTPATIENT
Start: 2020-11-30 | End: 2020-12-10 | Stop reason: SDUPTHER

## 2020-11-30 RX ORDER — FUROSEMIDE 20 MG/1
20 TABLET ORAL DAILY
Qty: 90 TABLET | Refills: 3 | Status: SHIPPED | OUTPATIENT
Start: 2020-11-30 | End: 2021-05-07 | Stop reason: SDUPTHER

## 2020-11-30 RX ORDER — DICLOFENAC SODIUM 10 MG/G
2 GEL TOPICAL DAILY PRN
Qty: 100 G | Refills: 1 | Status: SHIPPED | OUTPATIENT
Start: 2020-11-30 | End: 2021-03-30

## 2020-11-30 NOTE — TELEPHONE ENCOUNTER
----- Message from Beth Livingston sent at 11/30/2020 10:27 AM CST -----  Regarding: Call back  Contact: [phone]  Patient is calling to get refills on his medication sent to [id number] - [place], [place] - [address] [phone] (Phone) [phone] (Fax)    1. diclofenac sodium (VOLTAREN) 1 % Gel 100 g     2. furosemide (LASIX) 20 MG tablet    3. irbesartan (AVAPRO) 300 MG tablet 180 tablet

## 2020-12-03 RX ORDER — FLUTICASONE PROPIONATE 50 UG/1
1 POWDER, METERED RESPIRATORY (INHALATION) 2 TIMES DAILY
Qty: 60 EACH | Refills: 1 | Status: SHIPPED | OUTPATIENT
Start: 2020-12-03 | End: 2020-12-14 | Stop reason: SDUPTHER

## 2020-12-03 RX ORDER — IPRATROPIUM BROMIDE 42 UG/1
SPRAY, METERED NASAL
Qty: 90 ML | Refills: 3 | Status: SHIPPED | OUTPATIENT
Start: 2020-12-03 | End: 2021-05-06 | Stop reason: SDUPTHER

## 2020-12-03 NOTE — TELEPHONE ENCOUNTER
----- Message from Kimi Strong sent at 12/3/2020 10:17 AM CST -----  Regarding: refill  Contact: pt [phone]       Type: RX Refill Request  Who Called:   Refill or New Rx:  RX Name and Strength:  #fluticasone propionate (FLOVENT DISKUS) 50 mcg/actuation DsDv 60 each   #ipratropium (ATROVENT) 42 mcg (0.06 %) nasal spray 90 mL   #triamcinolone acetonide 0.1% (KENALOG) 0.1 % cream 15 g   Preferred Pharmacy with phone number:[id number] - [place], [place]  [address] [place] [address]  Would the patient rather a call back or a response via [facility]?   Best Call Back Number:[phone]  Additional Information:

## 2020-12-07 ENCOUNTER — HOSPITAL ENCOUNTER (OUTPATIENT)
Dept: RADIOLOGY | Facility: HOSPITAL | Age: 85
Discharge: HOME | End: 2020-12-07
Attending: PHYSICIAN ASSISTANT
Payer: MEDICARE

## 2020-12-07 ENCOUNTER — OFFICE VISIT (OUTPATIENT)
Dept: NEUROSURGERY | Facility: CLINIC | Age: 85
End: 2020-12-07
Payer: MEDICARE

## 2020-12-07 ENCOUNTER — DOCUMENTATION ONLY (OUTPATIENT)
Dept: CARDIOLOGY | Facility: HOSPITAL | Age: 85
End: 2020-12-07

## 2020-12-07 DIAGNOSIS — M45.0 ANKYLOSING SPONDYLITIS OF MULTIPLE SITES IN SPINE: ICD-10-CM

## 2020-12-07 DIAGNOSIS — M54.9 DORSALGIA, UNSPECIFIED: ICD-10-CM

## 2020-12-07 DIAGNOSIS — M46.1 BILATERAL SACROILIITIS: Primary | ICD-10-CM

## 2020-12-07 DIAGNOSIS — M47.22 CERVICAL SPONDYLOSIS WITH RADICULOPATHY: ICD-10-CM

## 2020-12-07 DIAGNOSIS — Z45.018 PACEMAKER REPROGRAMMING/CHECK: ICD-10-CM

## 2020-12-07 DIAGNOSIS — M48.061 SPINAL STENOSIS OF LUMBAR REGION WITHOUT NEUROGENIC CLAUDICATION: ICD-10-CM

## 2020-12-07 PROCEDURE — 99204 OFFICE O/P NEW MOD 45 MIN: CPT | Mod: S$GLB,,, | Performed by: NEUROLOGICAL SURGERY

## 2020-12-07 PROCEDURE — 1159F MED LIST DOCD IN RCRD: CPT | Mod: S$GLB,,, | Performed by: NEUROLOGICAL SURGERY

## 2020-12-07 PROCEDURE — 71046 X-RAY EXAM CHEST 2 VIEWS: CPT | Mod: 26,,, | Performed by: RADIOLOGY

## 2020-12-07 PROCEDURE — 3288F PR FALLS RISK ASSESSMENT DOCUMENTED: ICD-10-PCS | Mod: CPTII,S$GLB,, | Performed by: NEUROLOGICAL SURGERY

## 2020-12-07 PROCEDURE — 1159F PR MEDICATION LIST DOCUMENTED IN MEDICAL RECORD: ICD-10-PCS | Mod: S$GLB,,, | Performed by: NEUROLOGICAL SURGERY

## 2020-12-07 PROCEDURE — 71046 X-RAY EXAM CHEST 2 VIEWS: CPT | Mod: TC,FY

## 2020-12-07 PROCEDURE — 72148 MRI LUMBAR SPINE W/O DYE: CPT | Mod: TC

## 2020-12-07 PROCEDURE — 72148 MRI LUMBAR SPINE WITHOUT CONTRAST: ICD-10-PCS | Mod: 26,,, | Performed by: RADIOLOGY

## 2020-12-07 PROCEDURE — 99999 PR PBB SHADOW E&M-EST. PATIENT-LVL IV: CPT | Mod: PBBFAC,,, | Performed by: NEUROLOGICAL SURGERY

## 2020-12-07 PROCEDURE — 1100F PR PT FALLS ASSESS DOC 2+ FALLS/FALL W/INJURY/YR: ICD-10-PCS | Mod: CPTII,S$GLB,, | Performed by: NEUROLOGICAL SURGERY

## 2020-12-07 PROCEDURE — 71046 XR CHEST PA AND LATERAL: ICD-10-PCS | Mod: 26,,, | Performed by: RADIOLOGY

## 2020-12-07 PROCEDURE — 3288F FALL RISK ASSESSMENT DOCD: CPT | Mod: CPTII,S$GLB,, | Performed by: NEUROLOGICAL SURGERY

## 2020-12-07 PROCEDURE — 99999 PR PBB SHADOW E&M-EST. PATIENT-LVL IV: ICD-10-PCS | Mod: PBBFAC,,, | Performed by: NEUROLOGICAL SURGERY

## 2020-12-07 PROCEDURE — 99499 UNLISTED E&M SERVICE: CPT | Mod: S$GLB,,, | Performed by: NEUROLOGICAL SURGERY

## 2020-12-07 PROCEDURE — 99499 RISK ADDL DX/OHS AUDIT: ICD-10-PCS | Mod: S$GLB,,, | Performed by: NEUROLOGICAL SURGERY

## 2020-12-07 PROCEDURE — 1100F PTFALLS ASSESS-DOCD GE2>/YR: CPT | Mod: CPTII,S$GLB,, | Performed by: NEUROLOGICAL SURGERY

## 2020-12-07 PROCEDURE — 72148 MRI LUMBAR SPINE W/O DYE: CPT | Mod: 26,,, | Performed by: RADIOLOGY

## 2020-12-07 PROCEDURE — 99204 PR OFFICE/OUTPT VISIT, NEW, LEVL IV, 45-59 MIN: ICD-10-PCS | Mod: S$GLB,,, | Performed by: NEUROLOGICAL SURGERY

## 2020-12-07 NOTE — PROGRESS NOTES
Pre and Post reprogramming of pt's Medtronic PPM for MRI was done by Yordan CASANOVA with [employer].

## 2020-12-07 NOTE — PROGRESS NOTES
NEUROSURGICAL OUTPATIENT CONSULTATION NOTE    DATE OF SERVICE:  12/07/2020    ATTENDING PHYSICIAN:  Dayton Sparks MD    CONSULT REQUESTED BY:  Dr Riggs    REASON FOR CONSULT:  Low back pain, bilateral hip pain, neck pain, left shoulder pain    SUBJECTIVE:    HISTORY OF PRESENT ILLNESS:  This is a very pleasant 89 y.o. male who reports worsening gait imbalance, he fell a month and a half ago and sustained a neck injury.  Since then he has been complaining of worsening neck pain and left shoulder pain.  He reports dropping things occasionally..  No finger numbness.    He also complains of worsening chronic low back pain.  The pain is felt bilaterally more on the right side in the lumbosacral area.  Pain travels in the groin and bilateral hip area.  Pain has been refractory to conservative management including physical therapy, epidural steroid injection.  No new onset of motor weakness, numbness or sphincter dysfunction symptoms.  Patient reports significant range of motion lost in the cervical thoracic spine.              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    Palpitations 11/18/2016    PVCs (premature ventricular contractions) 04/11/2017    Premature atrial contractions 04/11/2017    Chronic combined systolic and diastolic CHF (congestive heart failure) 11/27/2018    Chest pain of uncertain etiology 11/27/2018    Greater trochanteric bursitis of both hips 02/03/2020    Lumbar spondylosis 02/03/2020    Retrolisthesis of vertebrae 02/03/2020    Chronic pain 02/13/2020    Neuroforaminal stenosis of lumbar spine 04/28/2020    Lumbar  radiculopathy 04/28/2020    DDD (degenerative disc disease), lumbar 04/28/2020    Spinal stenosis of lumbar region with neurogenic claudication 04/28/2020    CKD (chronic kidney disease) stage 3, GFR 30-59 ml/min 06/09/2020    Type 2 or unspecified type diabetes mellitus with neurological manifestations 09/25/2020    Generalized osteoarthritis 12/04/2013    Benign prostatic hyperplasia 12/04/2013    Hypertensive heart disease without congestive heart failure 04/12/2016    Esophageal reflux 12/04/2013     Resolved Ambulatory Problems     Diagnosis Date Noted    Cervicalgia 08/10/2015    Pain in limb 08/10/2015    Muscle weakness 08/10/2015    Poor posture 08/10/2015    Sinus bradycardia 04/12/2016    Acute combined systolic and diastolic CHF, NYHA class 3 11/15/2018    Weakness of both hips 09/23/2019    Impaired gait and mobility 09/23/2019    Weakness of both legs 01/16/2020    Decreased ROM of lumbar spine 01/16/2020    Chronic bilateral low back pain without sciatica 01/16/2020     Past Medical History:   Diagnosis Date    Arthritis     Diabetes mellitus     DJD (degenerative joint disease)     Hypertension     Prostate enlargement     Thyroid disease        PAST SURGICAL HISTORY:  Past Surgical History:   Procedure Laterality Date    EPIDURAL STEROID INJECTION INTO LUMBAR SPINE N/A 6/4/2020    Procedure: Injection-steroid-epidural-lumbar--L4-5;  Surgeon: Angelica Norris Jr., MD;  Location: [facility] [place];  Service: Pain Management;  Laterality: N/A;  sign consent at DOS.    INJECTION OF JOINT Bilateral 2/13/2020    Procedure: Injection, Joint, Bilateral GTB;  Surgeon: Angelica Norris Jr., MD;  Location: [facility] [place];  Service: Pain Management;  Laterality: Bilateral;    PROSTATE SURGERY         SOCIAL HISTORY:   Social History     Socioeconomic History    Marital status:      Spouse name: Not on file    Number of children: Not on file    Years of education: Not on file     Highest education level: Not on file   Occupational History    Not on file   Social Needs    Financial resource strain: Not on file    Food insecurity     Worry: Not on file     Inability: Not on file    Transportation needs     Medical: Not on file     Non-medical: Not on file   Tobacco Use    Smoking status: Never Smoker    Smokeless tobacco: Never Used   Substance and Sexual Activity    Alcohol use: No    Drug use: No    Sexual activity: Not on file   Lifestyle    Physical activity     Days per week: Not on file     Minutes per session: Not on file    Stress: Not on file   Relationships    Social connections     Talks on phone: Not on file     Gets together: Not on file     Attends Yarsanism service: Not on file     Active member of club or organization: Not on file     Attends meetings of clubs or organizations: Not on file     Relationship status: Not on file   Other Topics Concern    Not on file   Social History Narrative    Not on file       FAMILY HISTORY:  No family history on file.    CURRENTS MEDICATIONS:  Current Outpatient Medications on File Prior to Visit   Medication Sig Dispense Refill    clotrimazole-betamethasone 1-0.05% (LOTRISONE) cream Apply topically 2 (two) times daily. 45 g 0    diclofenac sodium (VOLTAREN) 1 % Gel Apply 2 g topically daily as needed. 100 g 1    EUTHYROX 100 mcg tablet TAKE 1 TABLET BY MOUTH BEFORE BREAKFAST 90 tablet 0    finasteride (PROSCAR) 5 mg tablet       fluconazole (DIFLUCAN) 100 MG tablet Take 1 tablet (100 mg total) by mouth once daily. 2 tablets by mouth on day 1, then 1 tablet by mouth on days 2-14. 15 tablet 1    fluticasone propionate (FLOVENT DISKUS) 50 mcg/actuation DsDv Inhale 1 puff into the lungs 2 (two) times daily. 60 each 1    furosemide (LASIX) 20 MG tablet Take 1 tablet (20 mg total) by mouth once daily. 90 tablet 3    gabapentin (NEURONTIN) 100 MG capsule Take 1 capsule (100 mg total) by mouth every evening. 90 capsule 3     glimepiride (AMARYL) 2 MG tablet Take 1 tablet (2 mg total) by mouth 2 (two) times daily. 90 tablet 0    hydrocodone-chlorpheniramine (TUSSIONEX) 10-8 mg/5 mL suspension TK 5ML PO QHS FOR 7 DAYS      hydrocortisone 2.5 % cream APPLY CREAM TO AFFECTED AREA AND BODY ONCE DAILY AS NEEDED.  0    ipratropium (ATROVENT) 42 mcg (0.06 %) nasal spray 2 sprays in each nostril 4 times a day. 90 mL 3    irbesartan (AVAPRO) 300 MG tablet Take two tablet daily. 180 tablet 3    ketoconazole (NIZORAL) 2 % shampoo       loperamide (IMODIUM A-D) 2 mg Tab Take 2 mg by mouth 4 (four) times daily as needed.      loperamide HCl/simethicone (LOPERAMIDE-SIMETHICONE ORAL) Take by mouth.      melatonin 10 mg Cap Take by mouth.      metFORMIN (GLUCOPHAGE-XR) 500 MG XR 24hr tablet Take 1 tablet (500 mg total) by mouth once daily. 90 tablet 3    montelukast (SINGULAIR) 10 mg tablet Take 1 tablet by mouth once daily 90 tablet 0    omeprazole (PRILOSEC) 20 MG capsule Take 1 capsule (20 mg total) by mouth 2 (two) times daily. 180 capsule 3    tamsulosin (FLOMAX) 0.4 mg Cap Take 1 capsule (0.4 mg total) by mouth once daily. 90 capsule 3    tiZANidine (ZANAFLEX) 2 MG tablet TAKE 1 TABLET BY MOUTH EVERY 8 HOURS AS NEEDED 90 tablet 0    traMADoL (ULTRAM) 50 mg tablet Take 1 tablet (50 mg total) by mouth every 12 (twelve) hours as needed for Pain. 40 tablet 0    triamcinolone acetonide 0.1% (KENALOG) 0.1 % cream Apply topically 2 (two) times daily. 15 g 0    simvastatin (ZOCOR) 20 MG tablet Take 1 tablet (20 mg total) by mouth every evening. 90 tablet 0     No current facility-administered medications on file prior to visit.        ALLERGIES:  Review of patient's allergies indicates:   Allergen Reactions    Bactrim [sulfamethoxazole-trimethoprim] Rash    Ciprofloxacin Rash    Latex Rash       REVIEW OF SYSTEMS:  Review of Systems   Constitutional: Negative for diaphoresis, fever and weight loss.   Respiratory: Negative for  shortness of breath.    Cardiovascular: Negative for chest pain.   Gastrointestinal: Negative for blood in stool.   Genitourinary: Negative for hematuria.   Endo/Heme/Allergies: Does not bruise/bleed easily.   All other systems reviewed and are negative.      OBJECTIVE:    PHYSICAL EXAMINATION:   There were no vitals filed for this visit.    Physical Exam:  Vitals reviewed.    Constitutional: He appears well-developed and well-nourished.     Eyes: Pupils are equal, round, and reactive to light. Conjunctivae and EOM are normal.     Cardiovascular: Normal distal pulses (thoracic kyphosis) and no edema.     Abdominal: Soft.     Skin: Skin displays no rash on trunk and no rash on extremities. Skin displays no lesions on trunk and no lesions on extremities.     Psych/Behavior: He is alert. He is oriented to person, place, and time. He has a normal mood and affect.     Musculoskeletal:        Neck: Range of motion is limited.     Neurological:        DTRs: Tricep reflexes are 2+ on the right side and 2+ on the left side. Bicep reflexes are 2+ on the right side and 2+ on the left side. Brachioradialis reflexes are 2+ on the right side and 2+ on the left side. Patellar reflexes are 2+ on the right side and 2+ on the left side. Achilles reflexes are 2+ on the right side and 2+ on the left side.       Back Exam     Tenderness   The patient is experiencing tenderness in the cervical and sacroiliac.    Range of Motion   Extension: abnormal   Flexion: abnormal   Lateral bend right: abnormal   Lateral bend left: abnormal   Rotation right: abnormal   Rotation left: abnormal     Muscle Strength   Right Quadriceps:  5/5   Left Quadriceps:  5/5   Right Hamstrings:  5/5   Left Hamstrings:  5/5     Tests   Straight leg raise right: negative  Straight leg raise left: negative    Other   Toe walk: normal  Heel walk: normal            SI joint:   Palpation at the right and left SI joints is painful bilaterally  SHANE test is positive  bilaterally  Gaenslen test is positive bilaterally  Thigh thrust test is positive bilaterally    Neurologic Exam     Mental Status   Oriented to person, place, and time.   Speech: speech is normal   Level of consciousness: alert    Cranial Nerves   Cranial nerves II through XII intact.     CN III, IV, VI   Pupils are equal, round, and reactive to light.  Extraocular motions are normal.     Motor Exam   Muscle bulk: normal  Overall muscle tone: normal    Strength   Right deltoid: 5/5  Left deltoid: 5/5  Right biceps: 5/5  Left biceps: 5/5  Right triceps: 5/5  Left triceps: 5/5  Right wrist flexion: 5/5  Left wrist flexion: 5/5  Right wrist extension: 5/5  Left wrist extension: 5/5  Right interossei: 5/5  Left interossei: 5/5  Right iliopsoas: 5/5  Left iliopsoas: 5/5  Right quadriceps: 5/5  Left quadriceps: 5/5  Right hamstrin/5  Left hamstrin/5  Right anterior tibial: 5/5  Left anterior tibial: 5/5  Right posterior tibial: 5/5  Left posterior tibial: 5/5  Right peroneal: 5/5  Left peroneal: 5/5  Right gastroc: 5/5  Left gastroc: 5/5    Sensory Exam   Light touch normal.   Pinprick normal.     Gait, Coordination, and Reflexes     Gait  Gait: normal    Coordination   Finger to nose coordination: normal  Tandem walking coordination: normal    Reflexes   Right brachioradialis: 2+  Left brachioradialis: 2+  Right biceps: 2+  Left biceps: 2+  Right triceps: 2+  Left triceps: 2+  Right patellar: 2+  Left patellar: 2+  Right achilles: 2+  Left achilles: 2+  Right plantar: normal  Left plantar: normal  Right Rodriguez: absent  Left Rodriguez: absent  Right ankle clonus: absent  Left ankle clonus: absent        DIAGNOSTIC DATA:  I personally interpreted the following imaging:   CT lumbar spine 2020 shows ankylosing spondylitis fusion of the thoracic and upper lumbar spine.  Anterior fusion of the SI joint bilaterally  Lumbar spine MRI shows diffuse spondylosis with facet arthropathy at L5-S1, L4-5 and  L3-4    ASSESMENT:  This is a 89 y.o. male with     Problem List Items Addressed This Visit     None      Visit Diagnoses     Bilateral sacroiliitis    -  Primary    Relevant Orders    CT Pelvis Without Contrast    Ambulatory referral/consult to Rheumatology    Spinal stenosis of lumbar region without neurogenic claudication        Ankylosing spondylitis of multiple sites in spine        Relevant Orders    CT Pelvis Without Contrast    CT Cervical Spine Without Contrast    Ambulatory referral/consult to Rheumatology    Cervical spondylosis with radiculopathy        Relevant Orders    CT Cervical Spine Without Contrast          PLAN:  Bilateral SI joint block and steroid injection to treat the sacroiliitis  CT of the cervical spine to rule out fracture or significant stenosis in the context of recent fall with ankylosing spondylitis  Medical management per rheumatology  All questions answered        Dayton Sparks MD  Cell:[phone]

## 2020-12-07 NOTE — LETTER
December 7, 2020      Radny Riggs MD  [address]  [address]  [facility] [address]           [place] [facility]  [address]  [facility] [address]  Phone: [phone]          Patient: Vince Martinez   MR Number: [id number]   YOB: 1930   Date of Visit: 12/7/2020       Dear Dr. Randy Riggs:    Thank you for referring Vince Martinez to me for evaluation. Attached you will find relevant portions of my assessment and plan of care.    If you have questions, please do not hesitate to call me. I look forward to following Vince Martinez along with you.    Sincerely,    Dayton Sparks MD    Enclosure  CC:  No Recipients    If you would like to receive this communication electronically, please contact [email or web address] or [phone] to request more information on [employer] [facility] access.    For providers and/or their staff who would like to refer a patient to [facility], please contact us through our one-stop-shop provider referral line, [employer] , at [phone].    If you feel you have received this communication in error or would no longer like to receive these types of communications, please e-mail [email or web address]

## 2020-12-07 NOTE — PROGRESS NOTES
An MRI has been ordered on this patient, an IMPLANTED CARDIAC DEVICE is present.    The patient's medical record has been reviewed for the following:    -the MRI conditional system has been implanted for a minimum of 6 weeks and is considered post the lead maturation period    -there are no known lead extenders, lead adaptors, or abandoned leads in place    -there are no known broken leads or leads with intermittent electrical contact as confirmed by the lead impedance history    -pacing capture thresholds are < 2.0 volts (V) at a pulse width of 0.40 milliseconds (ms)    -PACEMAKERS ONLY:  measured pacing lead impedances are >/= 200 Ohms and </= 1500 Ohms    -there is no noted diaphragmatic stimulation at a pacing output of 5.0V and at a pulse width of 1.0ms in patients whose device will be programmed to an asynchronous pacing mode    Parameters were reprogrammed to MRI safe mode as per the manufacturers scanning guidelines; Pacing mode should be programmed to ODO.

## 2020-12-08 ENCOUNTER — TELEPHONE (OUTPATIENT)
Dept: NEUROSURGERY | Facility: CLINIC | Age: 85
End: 2020-12-08

## 2020-12-08 DIAGNOSIS — M46.1 BILATERAL SACROILIITIS: Primary | ICD-10-CM

## 2020-12-08 DIAGNOSIS — Z03.818 ENCOUNTER FOR OBSERVATION FOR SUSPECTED EXPOSURE TO OTHER BIOLOGICAL AGENTS RULED OUT: ICD-10-CM

## 2020-12-08 DIAGNOSIS — Z41.9 SURGERY, ELECTIVE: ICD-10-CM

## 2020-12-10 ENCOUNTER — OFFICE VISIT (OUTPATIENT)
Dept: FAMILY MEDICINE | Facility: CLINIC | Age: 85
End: 2020-12-10
Payer: MEDICARE

## 2020-12-10 ENCOUNTER — TELEPHONE (OUTPATIENT)
Dept: FAMILY MEDICINE | Facility: CLINIC | Age: 85
End: 2020-12-10

## 2020-12-10 ENCOUNTER — TELEPHONE (OUTPATIENT)
Dept: NEUROSURGERY | Facility: CLINIC | Age: 85
End: 2020-12-10

## 2020-12-10 ENCOUNTER — HOSPITAL ENCOUNTER (OUTPATIENT)
Dept: RADIOLOGY | Facility: HOSPITAL | Age: 85
Discharge: HOME | End: 2020-12-10
Attending: FAMILY MEDICINE
Payer: MEDICARE

## 2020-12-10 VITALS
BODY MASS INDEX: 25.5 KG/M2 | WEIGHT: 153.25 LBS | OXYGEN SATURATION: 99 % | HEART RATE: 63 BPM | SYSTOLIC BLOOD PRESSURE: 116 MMHG | TEMPERATURE: 97 F | DIASTOLIC BLOOD PRESSURE: 54 MMHG

## 2020-12-10 DIAGNOSIS — M54.2 CHRONIC NECK PAIN: ICD-10-CM

## 2020-12-10 DIAGNOSIS — G89.29 CHRONIC NECK PAIN: ICD-10-CM

## 2020-12-10 DIAGNOSIS — K21.9 GASTROESOPHAGEAL REFLUX DISEASE, UNSPECIFIED WHETHER ESOPHAGITIS PRESENT: ICD-10-CM

## 2020-12-10 DIAGNOSIS — E78.5 DYSLIPIDEMIA: ICD-10-CM

## 2020-12-10 DIAGNOSIS — Z13.820 SCREENING FOR OSTEOPOROSIS: ICD-10-CM

## 2020-12-10 DIAGNOSIS — K21.9 GASTROESOPHAGEAL REFLUX DISEASE WITHOUT ESOPHAGITIS: ICD-10-CM

## 2020-12-10 DIAGNOSIS — M67.40 GANGLION CYST: ICD-10-CM

## 2020-12-10 DIAGNOSIS — I10 ESSENTIAL HYPERTENSION: ICD-10-CM

## 2020-12-10 DIAGNOSIS — M81.0 AGE-RELATED OSTEOPOROSIS WITHOUT CURRENT PATHOLOGICAL FRACTURE: ICD-10-CM

## 2020-12-10 DIAGNOSIS — E11.65 TYPE 2 DIABETES MELLITUS WITH HYPERGLYCEMIA, WITHOUT LONG-TERM CURRENT USE OF INSULIN: Primary | ICD-10-CM

## 2020-12-10 PROCEDURE — 1126F AMNT PAIN NOTED NONE PRSNT: CPT | Mod: S$GLB,,, | Performed by: FAMILY MEDICINE

## 2020-12-10 PROCEDURE — 72040 XR CERVICAL SPINE AP LATERAL: ICD-10-PCS | Mod: 26,,, | Performed by: RADIOLOGY

## 2020-12-10 PROCEDURE — 72040 X-RAY EXAM NECK SPINE 2-3 VW: CPT | Mod: TC,FY,PO

## 2020-12-10 PROCEDURE — 72040 X-RAY EXAM NECK SPINE 2-3 VW: CPT | Mod: 26,,, | Performed by: RADIOLOGY

## 2020-12-10 PROCEDURE — 99999 PR PBB SHADOW E&M-EST. PATIENT-LVL V: CPT | Mod: PBBFAC,,, | Performed by: FAMILY MEDICINE

## 2020-12-10 PROCEDURE — 1100F PR PT FALLS ASSESS DOC 2+ FALLS/FALL W/INJURY/YR: ICD-10-PCS | Mod: CPTII,S$GLB,, | Performed by: FAMILY MEDICINE

## 2020-12-10 PROCEDURE — 3052F PR MOST RECENT HEMOGLOBIN A1C LEVEL 8.0 - < 9.0%: ICD-10-PCS | Mod: CPTII,S$GLB,, | Performed by: FAMILY MEDICINE

## 2020-12-10 PROCEDURE — 99214 OFFICE O/P EST MOD 30 MIN: CPT | Mod: S$GLB,,, | Performed by: FAMILY MEDICINE

## 2020-12-10 PROCEDURE — 1159F MED LIST DOCD IN RCRD: CPT | Mod: S$GLB,,, | Performed by: FAMILY MEDICINE

## 2020-12-10 PROCEDURE — 3052F HG A1C>EQUAL 8.0%<EQUAL 9.0%: CPT | Mod: CPTII,S$GLB,, | Performed by: FAMILY MEDICINE

## 2020-12-10 PROCEDURE — 99999 PR PBB SHADOW E&M-EST. PATIENT-LVL V: ICD-10-PCS | Mod: PBBFAC,,, | Performed by: FAMILY MEDICINE

## 2020-12-10 PROCEDURE — 3288F PR FALLS RISK ASSESSMENT DOCUMENTED: ICD-10-PCS | Mod: CPTII,S$GLB,, | Performed by: FAMILY MEDICINE

## 2020-12-10 PROCEDURE — 3288F FALL RISK ASSESSMENT DOCD: CPT | Mod: CPTII,S$GLB,, | Performed by: FAMILY MEDICINE

## 2020-12-10 PROCEDURE — 99214 PR OFFICE/OUTPT VISIT, EST, LEVL IV, 30-39 MIN: ICD-10-PCS | Mod: S$GLB,,, | Performed by: FAMILY MEDICINE

## 2020-12-10 PROCEDURE — 1100F PTFALLS ASSESS-DOCD GE2>/YR: CPT | Mod: CPTII,S$GLB,, | Performed by: FAMILY MEDICINE

## 2020-12-10 PROCEDURE — 1159F PR MEDICATION LIST DOCUMENTED IN MEDICAL RECORD: ICD-10-PCS | Mod: S$GLB,,, | Performed by: FAMILY MEDICINE

## 2020-12-10 PROCEDURE — 1126F PR PAIN SEVERITY QUANTIFIED, NO PAIN PRESENT: ICD-10-PCS | Mod: S$GLB,,, | Performed by: FAMILY MEDICINE

## 2020-12-10 RX ORDER — OMEPRAZOLE 40 MG/1
40 CAPSULE, DELAYED RELEASE ORAL
Qty: 90 CAPSULE | Refills: 3 | Status: SHIPPED | OUTPATIENT
Start: 2020-12-10 | End: 2021-04-15 | Stop reason: SINTOL

## 2020-12-10 RX ORDER — TOBRAMYCIN / DEXAMETHASONE 3; .5 MG/ML; MG/ML
SUSPENSION/ DROPS OPHTHALMIC
COMMUNITY
Start: 2020-11-25 | End: 2021-04-15

## 2020-12-10 RX ORDER — TRIAMCINOLONE ACETONIDE 1 MG/ML
LOTION TOPICAL
COMMUNITY
Start: 2020-12-07 | End: 2021-04-15

## 2020-12-10 RX ORDER — IRBESARTAN 300 MG/1
300 TABLET ORAL DAILY
Qty: 90 TABLET | Refills: 3 | Status: SHIPPED | OUTPATIENT
Start: 2020-12-10 | End: 2021-07-28

## 2020-12-10 NOTE — TELEPHONE ENCOUNTER
----- Message from Liliya Menendez sent at 12/10/2020  9:56 AM CST -----  Type:  Patient Returning Call    Who Called: Vince  Who Left Message for Patient: unknown  Does the patient know what this is regarding?: rheumatologist  Would the patient rather a call back or a response via [facility]? Call back  Best Call Back Number: [phone]  Additional Information: none

## 2020-12-10 NOTE — PROGRESS NOTES
Subjective:       Patient ID: Vince Martinez is a 89 y.o. male.    Chief Complaint: No chief complaint on file.    89 years old male who came to the clinic for diabetes follow-up.  Last A1c was elevated.  No polyuria, polydipsia .or polyphagia.  Patient with left hand ganglion cyst for the last couple of months.  Patient is concerned about possible osteoporosis.  Patient with neck pain for the last 6 months.  The pain is 6/10 of intensity on and off aggravated with activity better with rest.  He is requesting refill of omeprazole for reflux.    Review of Systems   Constitutional: Negative.    HENT: Negative.    Eyes: Negative.    Respiratory: Negative.    Cardiovascular: Negative.  Negative for chest pain, palpitations, leg swelling and claudication.   Gastrointestinal: Negative.    Genitourinary: Negative.    Musculoskeletal: Negative.    Integumentary:  Negative.   Neurological: Negative.    Psychiatric/Behavioral: Negative.          Objective:      Physical Exam  Vitals signs and nursing note reviewed.   Constitutional:       General: He is not in acute distress.     Appearance: He is well-developed. He is not diaphoretic.   HENT:      Head: Normocephalic and atraumatic.      Right Ear: External ear normal.      Left Ear: External ear normal.      Nose: Nose normal.      Mouth/Throat:      Pharynx: No oropharyngeal exudate.   Eyes:      General: No scleral icterus.        Right eye: No discharge.         Left eye: No discharge.      Conjunctiva/sclera: Conjunctivae normal.      Pupils: Pupils are equal, round, and reactive to light.   Neck:      Musculoskeletal: Normal range of motion and neck supple.      Thyroid: No thyromegaly.      Vascular: No JVD.      Trachea: No tracheal deviation.   Cardiovascular:      Rate and Rhythm: Normal rate and regular rhythm.      Heart sounds: Normal heart sounds. No murmur. No friction rub. No gallop.    Pulmonary:      Effort: Pulmonary effort is normal. No respiratory  distress.      Breath sounds: Normal breath sounds. No stridor. No wheezing or rales.   Chest:      Chest wall: No tenderness.   Abdominal:      General: Bowel sounds are normal. There is no distension.      Palpations: Abdomen is soft. There is no mass.      Tenderness: There is no abdominal tenderness. There is no guarding or rebound.   Musculoskeletal: Normal range of motion.         General: No tenderness.   Lymphadenopathy:      Cervical: No cervical adenopathy.   Skin:     General: Skin is warm and dry.      Coloration: Skin is not pale.      Findings: No erythema or rash.   Neurological:      Mental Status: He is alert and oriented to person, place, and time.      Cranial Nerves: No cranial nerve deficit.      Motor: No abnormal muscle tone.      Coordination: Coordination abnormal.      Gait: Gait abnormal.      Deep Tendon Reflexes: Reflexes are normal and symmetric. Reflexes normal.   Psychiatric:         Behavior: Behavior normal.         Thought Content: Thought content normal.         Judgment: Judgment normal.         Assessment:       1. Type 2 diabetes mellitus with hyperglycemia, without long-term current use of insulin    2. Gastroesophageal reflux disease without esophagitis    3. Dyslipidemia    4. Ganglion cyst    5. Gastroesophageal reflux disease, unspecified whether esophagitis present    6. Chronic neck pain    7. Screening for osteoporosis    8. Age-related osteoporosis without current pathological fracture         Plan:         Diagnoses and all orders for this visit:    Type 2 diabetes mellitus with hyperglycemia, without long-term current use of insulin  -     Hemoglobin A1C; Future  -     Comprehensive Metabolic Panel; Future  -     Lipid Panel; Future    Gastroesophageal reflux disease without esophagitis    Dyslipidemia  -     Comprehensive Metabolic Panel; Future  -     Lipid Panel; Future    Ganglion cyst    Gastroesophageal reflux disease, unspecified whether esophagitis present  -      omeprazole (PRILOSEC) 40 MG capsule; Take 1 capsule (40 mg total) by mouth before breakfast.    Chronic neck pain  -     X-Ray Cervical Spine AP And Lateral; Future    Screening for osteoporosis  -     DXA Bone Density Spine And Hip; Future    Age-related osteoporosis without current pathological fracture   -     DXA Bone Density Spine And Hip; Future

## 2020-12-10 NOTE — PATIENT INSTRUCTIONS
Quiste ganglionar: Mano    Un quiste ganglionar es ziggy protuberancia firme y llena de líquido que puede aparecer repentinamente en la parte delantera o trasera de la haleigh o en la base de un dedo. Estos quistes se desarrollan a partir de tejidos normales de la haleigh y los dedos, y pueden ser tan pequeños arthur ziggy arveja y robles grandes arthur ziggy semilla de durazno. Aunque los quistes ganglionares son comunes, no se propagan ni llegan a ser cancerosos. Pueden formarse después de ziggy lesión, hardik muchas veces se desconoce la razón de franco aparición. Estos quistes pueden cambiar de tamaño y desaparecer por sí solos.  Síntomas  Algunas veces, los quistes ganglionares son dolorosos, sobre todo cuando aparecen por primera vez. El uso oxana de la mano o la haleigh puede hacer que el quiste crezca y sea más doloroso. Algunos movimientos de la mano y la haleigh, arthur agarrar objetos, pueden también resultar difíciles.  Cómo se desarrolla un quiste ganglionar  Dipti muñecas y mary están compuestas de muchos huesos pequeños que se unen en las articulaciones. Los tendones fijan los músculos a los huesos en las articulaciones y, a franco vez, permiten que las articulaciones se flexionen y estiren. Tanto los tendones arthur las articulaciones están revestidos de un tejido llamado membrana sinovial. Ziggy tejido produce un fluido espeso que hace que las articulaciones y tendones se muevan constantemente con facilidad. Algunas veces, ziggy tejido brota de la articulación o tendones y forma un quiste. Conforme el quiste se llena de fluido y crece, forma ziggy protuberancia que se puede palpar.  Dónde aparecen los quistes ganglionares  Un quiste ganglionar puede aparecer en cualquier lugar de la mano, cerca de ziggy articulación. Con mayor frecuencia, aparecen en la parte de atrás o adelante de la haleigh, o en la base de un dedo del lado de la interiano de la mano. Franco médico por lo general puede diagnosticar un quiste con tan sólo examinar la  protuberancia. Es posible que extraiga un poco de fluido o pida ziggy radiografía a fin de descartar otros posibles problemas.  Cómo tratar un quiste ganglionar  Es posible que franco médico sólo le vigile el quiste ganglionar. Muchos se encogen y concha de doler sin necesidad de tratamiento; otros simplemente desaparecen. Si el quiste es antiestético o doloroso, o si le dificulta utilizar la mano, el médico puede tratarlo o, de ser necesario, quitarlo mediante ziggy operación.  Tratamiento no quirúrgico  Es posible que franco médico extraiga (aspire) el líquido con ziggy aguja con el fin de reducir el quiste. Si el quiste le produce dolor, es posible que el médico le inyecte también un antiinflamatorio (por ejemplo, cortisona) para aliviar la irritación. Quizá se le envuelva la mano luego para evitar que vuelva a surgir el quiste.  Cirugía  Si el quiste vuelve a aparecer después del tratamiento, el médico puede quitarlo con ziggy cirugía. Ziggy parte del tejido que recubre la articulación o tendón se extrae junto con el quiste. Eso ayuda a prevenir la formación de otro quiste, aunque de todos modos es posible que reaparezca luego de la cirugía. Por lo general, se le aplica anestesia sólo en la mano o brazo y usted puede regresar a casa después de varias horas de cindy concluido la operación. Es posible que se le entablille la mano jaylin varios días.  Date Last Reviewed: 9/10/2015  © 2430-0325 [employer]. [address] [place] [address], [place] [address]. Todos los derechos reservados. Esta información no pretende sustituir la atención médica profesional. Sólo franco médico puede diagnosticar y tratar un problema de wilbert.

## 2020-12-11 ENCOUNTER — HOSPITAL ENCOUNTER (OUTPATIENT)
Dept: RADIOLOGY | Facility: HOSPITAL | Age: 85
Discharge: HOME | End: 2020-12-11
Attending: FAMILY MEDICINE
Payer: MEDICARE

## 2020-12-11 ENCOUNTER — PATIENT OUTREACH (OUTPATIENT)
Dept: ADMINISTRATIVE | Facility: HOSPITAL | Age: 85
End: 2020-12-11

## 2020-12-11 DIAGNOSIS — Z13.820 SCREENING FOR OSTEOPOROSIS: ICD-10-CM

## 2020-12-11 DIAGNOSIS — M81.0 AGE-RELATED OSTEOPOROSIS WITHOUT CURRENT PATHOLOGICAL FRACTURE: ICD-10-CM

## 2020-12-11 PROCEDURE — 77080 DXA BONE DENSITY AXIAL: CPT | Mod: 26,,, | Performed by: RADIOLOGY

## 2020-12-11 PROCEDURE — 77080 DXA BONE DENSITY AXIAL: CPT | Mod: TC

## 2020-12-11 PROCEDURE — 77080 DEXA BONE DENSITY SPINE HIP: ICD-10-PCS | Mod: 26,,, | Performed by: RADIOLOGY

## 2020-12-11 NOTE — TELEPHONE ENCOUNTER
Spoke to Gabi from the pharmacy to give clarification about the avapro 300 mg once a day. Gabi voices understanding.

## 2020-12-11 NOTE — TELEPHONE ENCOUNTER
----- Message from Omaira Conn MA sent at 12/10/2020 10:44 AM CST -----  Regarding: Clarify RX  Contact: Gaby @ [facility] [phone]    ----- Message -----  From: Gita Carvajal  Sent: 12/10/2020  10:25 AM CST  To: Cherise AGARWAL Staff    Type:  Pharmacy Calling to Clarify an RX    Name of Caller: Gaby  Pharmacy Name: [facility]  Prescription Name:irbesartan (AVAPRO) 300 MG tablet  What do they need to clarify?: Regarding Clarification on the Directions  Best Call Back Number: [phone]

## 2020-12-11 NOTE — TELEPHONE ENCOUNTER
Avapro dose is 300 mg daily.    Prescription was sent to the pharmacy.    Please clarify with the pharmacy.

## 2020-12-14 ENCOUNTER — LAB VISIT (OUTPATIENT)
Dept: FAMILY MEDICINE | Facility: CLINIC | Age: 85
End: 2020-12-14
Payer: MEDICARE

## 2020-12-14 DIAGNOSIS — Z03.818 ENCOUNTER FOR OBSERVATION FOR SUSPECTED EXPOSURE TO OTHER BIOLOGICAL AGENTS RULED OUT: ICD-10-CM

## 2020-12-14 PROCEDURE — U0003 INFECTIOUS AGENT DETECTION BY NUCLEIC ACID (DNA OR RNA); SEVERE ACUTE RESPIRATORY SYNDROME CORONAVIRUS 2 (SARS-COV-2) (CORONAVIRUS DISEASE [COVID-19]), AMPLIFIED PROBE TECHNIQUE, MAKING USE OF HIGH THROUGHPUT TECHNOLOGIES AS DESCRIBED BY CMS-2020-01-R: HCPCS

## 2020-12-14 RX ORDER — FLUTICASONE PROPIONATE 50 UG/1
1 POWDER, METERED RESPIRATORY (INHALATION) 2 TIMES DAILY
Qty: 60 EACH | Refills: 1 | Status: SHIPPED | OUTPATIENT
Start: 2020-12-14 | End: 2021-01-06 | Stop reason: SDUPTHER

## 2020-12-15 LAB — SARS-COV-2 RNA RESP QL NAA+PROBE: NOT DETECTED

## 2020-12-15 NOTE — PROGRESS NOTES
[facility]  Department of Otorhinolaryngology and Communication Sciences    Subjective:      Vince Martinez is a 89 y.o. male who presents for follow-up regarding fungal laryngitis that resolved with PO diflucan. We used the language line .    Since his last visit, his voice has worsened a bit. He uses a steroid inhaler.    The patient's medications, allergies, past medical, surgical, social and family histories were reviewed and updated as appropriate.    A detailed review of systems was obtained with pertinent positives as per the above HPI, and otherwise negative.      Objective:     /87 (BP Location: Left arm)   Pulse 93   Wt 65.1 kg (143 lb 8.3 oz)   BMI 23.88 kg/m²      Constitutional: comfortable, well dressed  Psychiatric: appropriate affect  Respiratory: comfortably breathing, symmetric chest rise, no stridor  Voice: mild variable roughness/strain  Head: normocephalic  Eyes: conjunctivae and sclerae clear  Indirect laryngoscopy is limited due to gag    Procedure  Flexible Laryngeal Videostroboscopy (80655): Laryngeal videostroboscopy is indicated to assess laryngeal vibratory biomechanics and vocal fold oscillation, which cannot be assessed with a plain light examination. This was carried out transnasally with a distal chip videoendoscope. After verbal consent was obtained, the patient was positioned and the nose was topically decongested with 1% phenylephrine and topically anesthetized with 4% lidocaine. The endoscope was passed through the most patent nasal cavity and positioned to image the nasopharynx, larynx, and hypopharynx in detail. The following features were examined: nasopharyngeal, laryngeal, hypopharyngeal masses; velopharyngeal strength, closure, and symmetry of motion; vocal fold range and symmetry of motion; laryngeal mucosal edema, erythema, inflammation, and hydration; salivary pooling; and gross laryngeal sensation. During phonation, the vocal folds were  assessed for glottal closure; mucosal wave; vocal fold lesions; vibratory periodicity, amplitude, and phase symmetry; and vertical height match. The equipment was removed. The patient tolerated the procedure well without complication. 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 male with recurrent candida laryngitis, suspected to e related to inhaled steroid usage.     Plan:     I recommended we treat him again with dilfucan, for a longer course of treatment. I recommended he hold his statin while taking the diflucan. I also reached out to his PCP to ascertain whether he can hold his inhaled steroid until his treatment is complete.    He will follow up with me in about 1 month, or sooner if needed.    All questions were answered, and the patient is in agreement with the plan.    Nura Hernandez M.D.  [facility]  Department of Otorhinolaryngology and Communication Sciences;

## 2020-12-16 ENCOUNTER — OFFICE VISIT (OUTPATIENT)
Dept: OTOLARYNGOLOGY | Facility: CLINIC | Age: 85
End: 2020-12-16
Payer: MEDICARE

## 2020-12-16 VITALS
WEIGHT: 143.5 LBS | HEART RATE: 93 BPM | DIASTOLIC BLOOD PRESSURE: 87 MMHG | SYSTOLIC BLOOD PRESSURE: 129 MMHG | BODY MASS INDEX: 23.88 KG/M2

## 2020-12-16 DIAGNOSIS — J04.0 LARYNGITIS: Primary | ICD-10-CM

## 2020-12-16 DIAGNOSIS — R49.0 DYSPHONIA: ICD-10-CM

## 2020-12-16 PROCEDURE — 99213 OFFICE O/P EST LOW 20 MIN: CPT | Mod: 25,S$GLB,, | Performed by: OTOLARYNGOLOGY

## 2020-12-16 PROCEDURE — 1126F PR PAIN SEVERITY QUANTIFIED, NO PAIN PRESENT: ICD-10-PCS | Mod: S$GLB,,, | Performed by: OTOLARYNGOLOGY

## 2020-12-16 PROCEDURE — 1159F PR MEDICATION LIST DOCUMENTED IN MEDICAL RECORD: ICD-10-PCS | Mod: S$GLB,,, | Performed by: OTOLARYNGOLOGY

## 2020-12-16 PROCEDURE — 3288F FALL RISK ASSESSMENT DOCD: CPT | Mod: CPTII,S$GLB,, | Performed by: OTOLARYNGOLOGY

## 2020-12-16 PROCEDURE — 1159F MED LIST DOCD IN RCRD: CPT | Mod: S$GLB,,, | Performed by: OTOLARYNGOLOGY

## 2020-12-16 PROCEDURE — 99999 PR PBB SHADOW E&M-EST. PATIENT-LVL V: ICD-10-PCS | Mod: PBBFAC,,, | Performed by: OTOLARYNGOLOGY

## 2020-12-16 PROCEDURE — 1126F AMNT PAIN NOTED NONE PRSNT: CPT | Mod: S$GLB,,, | Performed by: OTOLARYNGOLOGY

## 2020-12-16 PROCEDURE — 31579 LARYNGOSCOPY TELESCOPIC: CPT | Mod: S$GLB,,, | Performed by: OTOLARYNGOLOGY

## 2020-12-16 PROCEDURE — 99213 PR OFFICE/OUTPT VISIT, EST, LEVL III, 20-29 MIN: ICD-10-PCS | Mod: 25,S$GLB,, | Performed by: OTOLARYNGOLOGY

## 2020-12-16 PROCEDURE — 3288F PR FALLS RISK ASSESSMENT DOCUMENTED: ICD-10-PCS | Mod: CPTII,S$GLB,, | Performed by: OTOLARYNGOLOGY

## 2020-12-16 PROCEDURE — 31579 PR LARYNGOSCOPY, FLEX/RIGID TELESCOPIC, W/STROBOSCOPY: ICD-10-PCS | Mod: S$GLB,,, | Performed by: OTOLARYNGOLOGY

## 2020-12-16 PROCEDURE — 1101F PR PT FALLS ASSESS DOC 0-1 FALLS W/OUT INJ PAST YR: ICD-10-PCS | Mod: CPTII,S$GLB,, | Performed by: OTOLARYNGOLOGY

## 2020-12-16 PROCEDURE — 1101F PT FALLS ASSESS-DOCD LE1/YR: CPT | Mod: CPTII,S$GLB,, | Performed by: OTOLARYNGOLOGY

## 2020-12-16 PROCEDURE — 99999 PR PBB SHADOW E&M-EST. PATIENT-LVL V: CPT | Mod: PBBFAC,,, | Performed by: OTOLARYNGOLOGY

## 2020-12-16 RX ORDER — FLUCONAZOLE 100 MG/1
100 TABLET ORAL DAILY
Qty: 29 TABLET | Refills: 0 | Status: SHIPPED | OUTPATIENT
Start: 2020-12-16 | End: 2021-01-13

## 2020-12-16 RX ORDER — FLUTICASONE PROPIONATE 50 UG/1
POWDER, METERED RESPIRATORY (INHALATION)
COMMUNITY
End: 2021-01-06

## 2020-12-16 NOTE — Clinical Note
This man has what appears to be a recurrent fungal laryngitis. I think this is related to his inhaled steroids. I am prescribing him Diflucan for 2 weeks. Can he hold his inhaled steroids during this time period? Thanks.

## 2020-12-16 NOTE — PATIENT INSTRUCTIONS
Talk to your doctor about holding the FLOVENT INHALER while taking the fungus medicine  Hold the ZOCOR medication while taking the fungus medicine      Fluconazole tablets  What is this medicine?  FLUCONAZOLE (floo NORA na zole) is an antifungal medicine. It is used to treat certain kinds of fungal or yeast infections.  How should I use this medicine?  Take this medicine by mouth. Follow the directions on the prescription label. Do not take your medicine more often than directed.  Talk to your pediatrician regarding the use of this medicine in children. Special care may be needed. This medicine has been used in children as young as 6 months of age.  What side effects may I notice from receiving this medicine?  Side effects that you should report to your doctor or health care professional as soon as possible:  · allergic reactions like skin rash or itching, hives, swelling of the lips, mouth, tongue, or throat  · dark urine  · feeling dizzy or faint  · irregular heartbeat or chest pain  · redness, blistering, peeling or loosening of the skin, including inside the mouth  · trouble breathing  · unusual bruising or bleeding  · vomiting  · yellowing of the eyes or skin  Side effects that usually do not require medical attention (report to your doctor or health care professional if they continue or are bothersome):  · changes in how food tastes  · diarrhea  · headache  · stomach upset or nausea  What may interact with this medicine?  Do not take this medicine with any of the following medications:  · astemizole  · certain medicines for irregular heart beat like dofetilide, dronedarone, quinidine  · cisapride  · erythromycin  · lomitapide  · other medicines that prolong the QT interval (cause an abnormal heart rhythm)  · pimozide  · terfenadine  · thioridazine  · tolvaptan  · ziprasidone  This medicine may also interact with the following medications:  · antiviral medicines for HIV or AIDS  · birth control pills  · certain  antibiotics like rifabutin, rifampin  · certain medicines for blood pressure like amlodipine, isradipine, felodipine, hydrochlorothiazide, losartan, nifedipine  · certain medicines for cancer like cyclophosphamide, vinblastine, vincristine  · certain medicines for cholesterol like atorvastatin, lovastatin, fluvastatin, simvastatin  · certain medicines for depression, anxiety, or psychotic disturbances like amitriptyline, midazolam, nortriptyline, triazolam  · certain medicines for diabetes like glipizide, glyburide, tolbutamide  · certain medicines for pain like alfentanil, fentanyl, methadone  · certain medicines for seizures like carbamazepine, phenytoin  · certain medicines that treat or prevent blood clots like warfarin  · halofantrine  · medicines that lower your chance of fighting infection like cyclosporine, prednisone, tacrolimus  · NSAIDS, medicines for pain and inflammation, like celecoxib, diclofenac, flurbiprofen, ibuprofen, meloxicam, naproxen  · other medicines for fungal infections  · sirolimus  · theophylline  · tofacitinib  What if I miss a dose?  If you miss a dose, take it as soon as you can. If it is almost time for your next dose, take only that dose. Do not take double or extra doses.  Where should I keep my medicine?  Keep out of the reach of children.  Store at room temperature below 30 degrees C (86 degrees F). Throw away any medicine after the expiration date.  What should I tell my health care provider before I take this medicine?  They need to know if you have any of these conditions:  · electrolyte abnormalities  · history of irregular heart beat  · kidney disease  · an unusual or allergic reaction to fluconazole, other azole antifungals, medicines, foods, dyes, or preservatives  · pregnant or trying to get pregnant  · breast-feeding  What should I watch for while using this medicine?  Visit your doctor or health care professional for regular checkups. If you are taking this medicine for  a long time you may need blood work. Tell your doctor if your symptoms do not improve. Some fungal infections need many weeks or months of treatment to cure.  Alcohol can increase possible damage to your liver. Avoid alcoholic drinks.  If you have a vaginal infection, do not have sex until you have finished your treatment. You can wear a sanitary napkin. Do not use tampons. Wear freshly washed cotton, not synthetic, panties.  NOTE:This sheet is a summary. It may not cover all possible information. If you have questions about this medicine, talk to your doctor, pharmacist, or health care provider. Copyright© 2017 [employer]

## 2020-12-22 ENCOUNTER — HOSPITAL ENCOUNTER (OUTPATIENT)
Dept: RADIOLOGY | Facility: HOSPITAL | Age: 85
Discharge: HOME | End: 2020-12-22
Attending: NEUROLOGICAL SURGERY
Payer: MEDICARE

## 2020-12-22 DIAGNOSIS — M45.0 ANKYLOSING SPONDYLITIS OF MULTIPLE SITES IN SPINE: ICD-10-CM

## 2020-12-22 DIAGNOSIS — M46.1 BILATERAL SACROILIITIS: ICD-10-CM

## 2020-12-22 DIAGNOSIS — M47.22 CERVICAL SPONDYLOSIS WITH RADICULOPATHY: ICD-10-CM

## 2020-12-22 PROCEDURE — 72192 CT PELVIS WITHOUT CONTRAST: ICD-10-PCS | Mod: 26,,, | Performed by: RADIOLOGY

## 2020-12-22 PROCEDURE — 72192 CT PELVIS W/O DYE: CPT | Mod: TC

## 2020-12-22 PROCEDURE — 72125 CT NECK SPINE W/O DYE: CPT | Mod: 26,,, | Performed by: SPECIALIST

## 2020-12-22 PROCEDURE — 72192 CT PELVIS W/O DYE: CPT | Mod: 26,,, | Performed by: RADIOLOGY

## 2020-12-22 PROCEDURE — 72125 CT NECK SPINE W/O DYE: CPT | Mod: TC

## 2020-12-22 PROCEDURE — 72125 CT CERVICAL SPINE WITHOUT CONTRAST: ICD-10-PCS | Mod: 26,,, | Performed by: SPECIALIST

## 2020-12-23 ENCOUNTER — TELEPHONE (OUTPATIENT)
Dept: NEUROSURGERY | Facility: CLINIC | Age: 85
End: 2020-12-23

## 2020-12-23 NOTE — TELEPHONE ENCOUNTER
----- Message from Dayton Sparks MD sent at 12/23/2020  7:56 AM CST -----  Please let him know that I have not seen a fracture on his cervical spine CT scan.  I have reviewed this CT of the pelvis that shows degeneration in the SI joints.  We can proceed with the injections.

## 2020-12-29 ENCOUNTER — TELEPHONE (OUTPATIENT)
Dept: FAMILY MEDICINE | Facility: CLINIC | Age: 85
End: 2020-12-29

## 2020-12-29 NOTE — TELEPHONE ENCOUNTER
Spoke to patients wife and she stated he was not in and she will have him return my call. Patient wife voiced understanding

## 2021-01-06 RX ORDER — FINASTERIDE 5 MG/1
5 TABLET, FILM COATED ORAL DAILY
Qty: 90 TABLET | Refills: 3 | Status: SHIPPED | OUTPATIENT
Start: 2021-01-06 | End: 2022-02-02 | Stop reason: SDUPTHER

## 2021-01-06 RX ORDER — FLUTICASONE PROPIONATE 50 UG/1
1 POWDER, METERED RESPIRATORY (INHALATION) 2 TIMES DAILY
Qty: 60 EACH | Refills: 1 | Status: SHIPPED | OUTPATIENT
Start: 2021-01-06 | End: 2021-01-12 | Stop reason: SDUPTHER

## 2021-01-07 DIAGNOSIS — Z03.818 ENCOUNTER FOR OBSERVATION FOR SUSPECTED EXPOSURE TO OTHER BIOLOGICAL AGENTS RULED OUT: ICD-10-CM

## 2021-01-09 ENCOUNTER — LAB VISIT (OUTPATIENT)
Dept: INTERNAL MEDICINE | Facility: CLINIC | Age: 86
End: 2021-01-09
Payer: MEDICARE

## 2021-01-09 DIAGNOSIS — Z41.9 SURGERY, ELECTIVE: ICD-10-CM

## 2021-01-09 PROCEDURE — U0003 INFECTIOUS AGENT DETECTION BY NUCLEIC ACID (DNA OR RNA); SEVERE ACUTE RESPIRATORY SYNDROME CORONAVIRUS 2 (SARS-COV-2) (CORONAVIRUS DISEASE [COVID-19]), AMPLIFIED PROBE TECHNIQUE, MAKING USE OF HIGH THROUGHPUT TECHNOLOGIES AS DESCRIBED BY CMS-2020-01-R: HCPCS

## 2021-01-11 ENCOUNTER — ANESTHESIA EVENT (OUTPATIENT)
Dept: SURGERY | Facility: HOSPITAL | Age: 86
End: 2021-01-11
Payer: MEDICARE

## 2021-01-11 ENCOUNTER — HOSPITAL ENCOUNTER (OUTPATIENT)
Dept: PREADMISSION TESTING | Facility: HOSPITAL | Age: 86
Discharge: HOME | End: 2021-01-11
Attending: NEUROLOGICAL SURGERY
Payer: MEDICARE

## 2021-01-11 ENCOUNTER — CLINICAL SUPPORT (OUTPATIENT)
Dept: LAB | Facility: HOSPITAL | Age: 86
End: 2021-01-11
Attending: NEUROLOGICAL SURGERY
Payer: MEDICARE

## 2021-01-11 ENCOUNTER — TELEPHONE (OUTPATIENT)
Dept: NEUROSURGERY | Facility: CLINIC | Age: 86
End: 2021-01-11

## 2021-01-11 VITALS
HEIGHT: 65 IN | RESPIRATION RATE: 16 BRPM | TEMPERATURE: 98 F | BODY MASS INDEX: 23.82 KG/M2 | OXYGEN SATURATION: 100 % | WEIGHT: 143 LBS | HEART RATE: 60 BPM | DIASTOLIC BLOOD PRESSURE: 72 MMHG | SYSTOLIC BLOOD PRESSURE: 180 MMHG

## 2021-01-11 DIAGNOSIS — Z01.818 PREOP EXAMINATION: Primary | ICD-10-CM

## 2021-01-11 DIAGNOSIS — Z01.818 PREOP EXAMINATION: ICD-10-CM

## 2021-01-11 LAB — SARS-COV-2 RNA RESP QL NAA+PROBE: NOT DETECTED

## 2021-01-11 PROCEDURE — 93010 EKG 12-LEAD: ICD-10-PCS | Mod: ,,, | Performed by: INTERNAL MEDICINE

## 2021-01-11 PROCEDURE — 93005 ELECTROCARDIOGRAM TRACING: CPT

## 2021-01-11 PROCEDURE — 93010 ELECTROCARDIOGRAM REPORT: CPT | Mod: ,,, | Performed by: INTERNAL MEDICINE

## 2021-01-11 RX ORDER — SODIUM CHLORIDE, SODIUM LACTATE, POTASSIUM CHLORIDE, CALCIUM CHLORIDE 600; 310; 30; 20 MG/100ML; MG/100ML; MG/100ML; MG/100ML
INJECTION, SOLUTION INTRAVENOUS CONTINUOUS
Status: CANCELLED | OUTPATIENT
Start: 2021-01-11

## 2021-01-11 RX ORDER — LIDOCAINE HYDROCHLORIDE 10 MG/ML
1 INJECTION, SOLUTION EPIDURAL; INFILTRATION; INTRACAUDAL; PERINEURAL ONCE
Status: CANCELLED | OUTPATIENT
Start: 2021-01-11 | End: 2021-01-11

## 2021-01-12 ENCOUNTER — HOSPITAL ENCOUNTER (OUTPATIENT)
Facility: HOSPITAL | Age: 86
Discharge: HOME | End: 2021-01-12
Attending: NEUROLOGICAL SURGERY | Admitting: NEUROLOGICAL SURGERY
Payer: MEDICARE

## 2021-01-12 ENCOUNTER — ANESTHESIA (OUTPATIENT)
Dept: SURGERY | Facility: HOSPITAL | Age: 86
End: 2021-01-12
Payer: MEDICARE

## 2021-01-12 VITALS
BODY MASS INDEX: 24.99 KG/M2 | RESPIRATION RATE: 18 BRPM | DIASTOLIC BLOOD PRESSURE: 64 MMHG | HEART RATE: 66 BPM | HEIGHT: 65 IN | TEMPERATURE: 98 F | WEIGHT: 150 LBS | SYSTOLIC BLOOD PRESSURE: 159 MMHG | OXYGEN SATURATION: 97 %

## 2021-01-12 DIAGNOSIS — M53.3 SI (SACROILIAC) JOINT DYSFUNCTION: Primary | ICD-10-CM

## 2021-01-12 LAB — POCT GLUCOSE: 113 MG/DL (ref 70–110)

## 2021-01-12 PROCEDURE — 36000704 HC OR TIME LEV I 1ST 15 MIN: Performed by: NEUROLOGICAL SURGERY

## 2021-01-12 PROCEDURE — 25000003 PHARM REV CODE 250: Performed by: NEUROLOGICAL SURGERY

## 2021-01-12 PROCEDURE — 27096 PR INJECTION,SACROILIAC JOINT: ICD-10-PCS | Mod: 50,,, | Performed by: NEUROLOGICAL SURGERY

## 2021-01-12 PROCEDURE — 71000015 HC POSTOP RECOV 1ST HR: Performed by: NEUROLOGICAL SURGERY

## 2021-01-12 PROCEDURE — 25500020 PHARM REV CODE 255: Performed by: NEUROLOGICAL SURGERY

## 2021-01-12 PROCEDURE — 37000009 HC ANESTHESIA EA ADD 15 MINS: Performed by: NEUROLOGICAL SURGERY

## 2021-01-12 PROCEDURE — 36000705 HC OR TIME LEV I EA ADD 15 MIN: Performed by: NEUROLOGICAL SURGERY

## 2021-01-12 PROCEDURE — 71000016 HC POSTOP RECOV ADDL HR: Performed by: NEUROLOGICAL SURGERY

## 2021-01-12 PROCEDURE — 27096 INJECT SACROILIAC JOINT: CPT | Mod: 50,,, | Performed by: NEUROLOGICAL SURGERY

## 2021-01-12 PROCEDURE — 63600175 PHARM REV CODE 636 W HCPCS: Performed by: NURSE ANESTHETIST, CERTIFIED REGISTERED

## 2021-01-12 PROCEDURE — 63600175 PHARM REV CODE 636 W HCPCS: Performed by: NEUROLOGICAL SURGERY

## 2021-01-12 PROCEDURE — 63600175 PHARM REV CODE 636 W HCPCS: Performed by: NURSE PRACTITIONER

## 2021-01-12 PROCEDURE — 25000003 PHARM REV CODE 250: Performed by: NURSE ANESTHETIST, CERTIFIED REGISTERED

## 2021-01-12 PROCEDURE — 37000008 HC ANESTHESIA 1ST 15 MINUTES: Performed by: NEUROLOGICAL SURGERY

## 2021-01-12 RX ORDER — BUPIVACAINE HYDROCHLORIDE 2.5 MG/ML
INJECTION, SOLUTION INFILTRATION; PERINEURAL
Status: DISCONTINUED | OUTPATIENT
Start: 2021-01-12 | End: 2021-01-12 | Stop reason: HOSPADM

## 2021-01-12 RX ORDER — LIDOCAINE HYDROCHLORIDE 10 MG/ML
1 INJECTION, SOLUTION EPIDURAL; INFILTRATION; INTRACAUDAL; PERINEURAL ONCE
Status: DISCONTINUED | OUTPATIENT
Start: 2021-01-12 | End: 2021-01-12 | Stop reason: HOSPADM

## 2021-01-12 RX ORDER — LIDOCAINE HCL/PF 100 MG/5ML
SYRINGE (ML) INTRAVENOUS
Status: DISCONTINUED | OUTPATIENT
Start: 2021-01-12 | End: 2021-01-12

## 2021-01-12 RX ORDER — PROPOFOL 10 MG/ML
INJECTION, EMULSION INTRAVENOUS CONTINUOUS PRN
Status: DISCONTINUED | OUTPATIENT
Start: 2021-01-12 | End: 2021-01-12

## 2021-01-12 RX ORDER — ONDANSETRON 2 MG/ML
4 INJECTION INTRAMUSCULAR; INTRAVENOUS ONCE AS NEEDED
Status: CANCELLED | OUTPATIENT
Start: 2021-01-12 | End: 2032-06-09

## 2021-01-12 RX ORDER — MUPIROCIN 20 MG/G
OINTMENT TOPICAL 2 TIMES DAILY
Status: DISCONTINUED | OUTPATIENT
Start: 2021-01-12 | End: 2021-01-12 | Stop reason: HOSPADM

## 2021-01-12 RX ORDER — FLUTICASONE PROPIONATE 50 UG/1
1 POWDER, METERED RESPIRATORY (INHALATION) 2 TIMES DAILY
Qty: 60 EACH | Refills: 1 | Status: SHIPPED | OUTPATIENT
Start: 2021-01-12 | End: 2021-01-19 | Stop reason: SDUPTHER

## 2021-01-12 RX ORDER — SODIUM CHLORIDE, SODIUM LACTATE, POTASSIUM CHLORIDE, CALCIUM CHLORIDE 600; 310; 30; 20 MG/100ML; MG/100ML; MG/100ML; MG/100ML
INJECTION, SOLUTION INTRAVENOUS CONTINUOUS
Status: DISCONTINUED | OUTPATIENT
Start: 2021-01-12 | End: 2021-01-12 | Stop reason: HOSPADM

## 2021-01-12 RX ORDER — PROPOFOL 10 MG/ML
INJECTION, EMULSION INTRAVENOUS
Status: DISCONTINUED | OUTPATIENT
Start: 2021-01-12 | End: 2021-01-12

## 2021-01-12 RX ORDER — METHYLPREDNISOLONE ACETATE 40 MG/ML
INJECTION, SUSPENSION INTRA-ARTICULAR; INTRALESIONAL; INTRAMUSCULAR; SOFT TISSUE
Status: DISCONTINUED | OUTPATIENT
Start: 2021-01-12 | End: 2021-01-12 | Stop reason: HOSPADM

## 2021-01-12 RX ORDER — HYDROMORPHONE HYDROCHLORIDE 2 MG/ML
0.5 INJECTION, SOLUTION INTRAMUSCULAR; INTRAVENOUS; SUBCUTANEOUS EVERY 5 MIN PRN
Status: CANCELLED | OUTPATIENT
Start: 2021-01-12

## 2021-01-12 RX ADMIN — PROPOFOL 50 MCG/KG/MIN: 10 INJECTION, EMULSION INTRAVENOUS at 07:01

## 2021-01-12 RX ADMIN — PROPOFOL 15 MG: 10 INJECTION, EMULSION INTRAVENOUS at 07:01

## 2021-01-12 RX ADMIN — LIDOCAINE HYDROCHLORIDE 100 MG: 20 INJECTION, SOLUTION INTRAVENOUS at 07:01

## 2021-01-12 RX ADMIN — SODIUM CHLORIDE, SODIUM LACTATE, POTASSIUM CHLORIDE, AND CALCIUM CHLORIDE: .6; .31; .03; .02 INJECTION, SOLUTION INTRAVENOUS at 06:01

## 2021-01-15 RX ORDER — SIMVASTATIN 20 MG/1
20 TABLET, FILM COATED ORAL NIGHTLY
Qty: 90 TABLET | Refills: 0 | Status: SHIPPED | OUTPATIENT
Start: 2021-01-15 | End: 2021-04-15

## 2021-01-18 ENCOUNTER — IMMUNIZATION (OUTPATIENT)
Dept: INTERNAL MEDICINE | Facility: CLINIC | Age: 86
End: 2021-01-18
Payer: MEDICARE

## 2021-01-18 DIAGNOSIS — Z23 NEED FOR VACCINATION: Primary | ICD-10-CM

## 2021-01-18 PROCEDURE — 91300 COVID-19, MRNA, LNP-S, PF, 30 MCG/0.3 ML DOSE VACCINE: CPT | Mod: PBBFAC | Performed by: FAMILY MEDICINE

## 2021-01-20 ENCOUNTER — OFFICE VISIT (OUTPATIENT)
Dept: RHEUMATOLOGY | Facility: CLINIC | Age: 86
End: 2021-01-20
Payer: MEDICARE

## 2021-01-20 ENCOUNTER — LAB VISIT (OUTPATIENT)
Dept: LAB | Facility: HOSPITAL | Age: 86
End: 2021-01-20
Attending: INTERNAL MEDICINE
Payer: MEDICARE

## 2021-01-20 VITALS
TEMPERATURE: 97 F | HEART RATE: 67 BPM | WEIGHT: 152.38 LBS | BODY MASS INDEX: 25.36 KG/M2 | DIASTOLIC BLOOD PRESSURE: 49 MMHG | SYSTOLIC BLOOD PRESSURE: 121 MMHG

## 2021-01-20 DIAGNOSIS — M25.50 POLYARTHRALGIA: ICD-10-CM

## 2021-01-20 DIAGNOSIS — M15.0 PRIMARY GENERALIZED (OSTEO)ARTHRITIS: ICD-10-CM

## 2021-01-20 LAB
CRP SERPL-MCNC: 1.2 MG/L (ref 0–8.2)
ERYTHROCYTE [SEDIMENTATION RATE] IN BLOOD BY WESTERGREN METHOD: 5 MM/HR (ref 0–23)

## 2021-01-20 PROCEDURE — 1125F AMNT PAIN NOTED PAIN PRSNT: CPT | Mod: S$GLB,,, | Performed by: INTERNAL MEDICINE

## 2021-01-20 PROCEDURE — 99214 PR OFFICE/OUTPT VISIT, EST, LEVL IV, 30-39 MIN: ICD-10-PCS | Mod: S$GLB,,, | Performed by: INTERNAL MEDICINE

## 2021-01-20 PROCEDURE — 3288F PR FALLS RISK ASSESSMENT DOCUMENTED: ICD-10-PCS | Mod: CPTII,S$GLB,, | Performed by: INTERNAL MEDICINE

## 2021-01-20 PROCEDURE — 3288F FALL RISK ASSESSMENT DOCD: CPT | Mod: CPTII,S$GLB,, | Performed by: INTERNAL MEDICINE

## 2021-01-20 PROCEDURE — 99214 OFFICE O/P EST MOD 30 MIN: CPT | Mod: S$GLB,,, | Performed by: INTERNAL MEDICINE

## 2021-01-20 PROCEDURE — 85652 RBC SED RATE AUTOMATED: CPT

## 2021-01-20 PROCEDURE — 99999 PR PBB SHADOW E&M-EST. PATIENT-LVL III: CPT | Mod: PBBFAC,,, | Performed by: INTERNAL MEDICINE

## 2021-01-20 PROCEDURE — 36415 COLL VENOUS BLD VENIPUNCTURE: CPT | Mod: PO

## 2021-01-20 PROCEDURE — 1159F MED LIST DOCD IN RCRD: CPT | Mod: S$GLB,,, | Performed by: INTERNAL MEDICINE

## 2021-01-20 PROCEDURE — 1100F PR PT FALLS ASSESS DOC 2+ FALLS/FALL W/INJURY/YR: ICD-10-PCS | Mod: CPTII,S$GLB,, | Performed by: INTERNAL MEDICINE

## 2021-01-20 PROCEDURE — 1159F PR MEDICATION LIST DOCUMENTED IN MEDICAL RECORD: ICD-10-PCS | Mod: S$GLB,,, | Performed by: INTERNAL MEDICINE

## 2021-01-20 PROCEDURE — 1125F PR PAIN SEVERITY QUANTIFIED, PAIN PRESENT: ICD-10-PCS | Mod: S$GLB,,, | Performed by: INTERNAL MEDICINE

## 2021-01-20 PROCEDURE — 99999 PR PBB SHADOW E&M-EST. PATIENT-LVL III: ICD-10-PCS | Mod: PBBFAC,,, | Performed by: INTERNAL MEDICINE

## 2021-01-20 PROCEDURE — 1100F PTFALLS ASSESS-DOCD GE2>/YR: CPT | Mod: CPTII,S$GLB,, | Performed by: INTERNAL MEDICINE

## 2021-01-20 PROCEDURE — 86140 C-REACTIVE PROTEIN: CPT

## 2021-01-20 RX ORDER — AMOXICILLIN 500 MG
1 CAPSULE ORAL DAILY
COMMUNITY
End: 2022-02-02

## 2021-01-20 RX ORDER — CYANOCOBALAMIN (VITAMIN B-12) 250 MCG
1000 TABLET ORAL DAILY
COMMUNITY
End: 2021-04-15

## 2021-01-22 RX ORDER — FLUTICASONE PROPIONATE 50 MCG
SPRAY, SUSPENSION (ML) NASAL
COMMUNITY
Start: 2020-10-26 | End: 2021-01-22 | Stop reason: SDUPTHER

## 2021-01-22 RX ORDER — FLUTICASONE PROPIONATE 50 MCG
SPRAY, SUSPENSION (ML) NASAL
Qty: 16 G | Refills: 0 | Status: SHIPPED | OUTPATIENT
Start: 2021-01-22 | End: 2021-02-03

## 2021-01-22 RX ORDER — FLUTICASONE PROPIONATE 50 MCG
1 SPRAY, SUSPENSION (ML) NASAL DAILY
COMMUNITY
End: 2021-02-03

## 2021-02-03 ENCOUNTER — OFFICE VISIT (OUTPATIENT)
Dept: NEUROSURGERY | Facility: CLINIC | Age: 86
End: 2021-02-03
Payer: MEDICARE

## 2021-02-03 VITALS — HEIGHT: 65 IN | BODY MASS INDEX: 25.36 KG/M2

## 2021-02-03 DIAGNOSIS — M48.061 BILATERAL STENOSIS OF LATERAL RECESS OF LUMBAR SPINE: Primary | ICD-10-CM

## 2021-02-03 DIAGNOSIS — M54.16 LUMBAR RADICULOPATHY, CHRONIC: ICD-10-CM

## 2021-02-03 PROCEDURE — 99213 OFFICE O/P EST LOW 20 MIN: CPT | Mod: S$GLB,,, | Performed by: NEUROLOGICAL SURGERY

## 2021-02-03 PROCEDURE — 3288F FALL RISK ASSESSMENT DOCD: CPT | Mod: CPTII,S$GLB,, | Performed by: NEUROLOGICAL SURGERY

## 2021-02-03 PROCEDURE — 3288F PR FALLS RISK ASSESSMENT DOCUMENTED: ICD-10-PCS | Mod: CPTII,S$GLB,, | Performed by: NEUROLOGICAL SURGERY

## 2021-02-03 PROCEDURE — 1101F PT FALLS ASSESS-DOCD LE1/YR: CPT | Mod: CPTII,S$GLB,, | Performed by: NEUROLOGICAL SURGERY

## 2021-02-03 PROCEDURE — 1159F MED LIST DOCD IN RCRD: CPT | Mod: S$GLB,,, | Performed by: NEUROLOGICAL SURGERY

## 2021-02-03 PROCEDURE — 99999 PR PBB SHADOW E&M-EST. PATIENT-LVL IV: ICD-10-PCS | Mod: PBBFAC,,, | Performed by: NEUROLOGICAL SURGERY

## 2021-02-03 PROCEDURE — 1101F PR PT FALLS ASSESS DOC 0-1 FALLS W/OUT INJ PAST YR: ICD-10-PCS | Mod: CPTII,S$GLB,, | Performed by: NEUROLOGICAL SURGERY

## 2021-02-03 PROCEDURE — 1159F PR MEDICATION LIST DOCUMENTED IN MEDICAL RECORD: ICD-10-PCS | Mod: S$GLB,,, | Performed by: NEUROLOGICAL SURGERY

## 2021-02-03 PROCEDURE — 1125F PR PAIN SEVERITY QUANTIFIED, PAIN PRESENT: ICD-10-PCS | Mod: S$GLB,,, | Performed by: NEUROLOGICAL SURGERY

## 2021-02-03 PROCEDURE — 99213 PR OFFICE/OUTPT VISIT, EST, LEVL III, 20-29 MIN: ICD-10-PCS | Mod: S$GLB,,, | Performed by: NEUROLOGICAL SURGERY

## 2021-02-03 PROCEDURE — 99999 PR PBB SHADOW E&M-EST. PATIENT-LVL IV: CPT | Mod: PBBFAC,,, | Performed by: NEUROLOGICAL SURGERY

## 2021-02-03 PROCEDURE — 1125F AMNT PAIN NOTED PAIN PRSNT: CPT | Mod: S$GLB,,, | Performed by: NEUROLOGICAL SURGERY

## 2021-02-05 ENCOUNTER — TELEPHONE (OUTPATIENT)
Dept: PAIN MEDICINE | Facility: CLINIC | Age: 86
End: 2021-02-05

## 2021-02-05 DIAGNOSIS — B35.6 TINEA CRURIS: ICD-10-CM

## 2021-02-05 DIAGNOSIS — M54.16 LUMBAR RADICULOPATHY: Primary | ICD-10-CM

## 2021-02-05 RX ORDER — CLOTRIMAZOLE AND BETAMETHASONE DIPROPIONATE 10; .64 MG/G; MG/G
CREAM TOPICAL
Qty: 45 G | Refills: 0 | Status: SHIPPED | OUTPATIENT
Start: 2021-02-05 | End: 2022-07-12

## 2021-02-08 ENCOUNTER — IMMUNIZATION (OUTPATIENT)
Dept: INTERNAL MEDICINE | Facility: CLINIC | Age: 86
End: 2021-02-08
Payer: MEDICARE

## 2021-02-08 DIAGNOSIS — Z23 NEED FOR VACCINATION: Primary | ICD-10-CM

## 2021-02-08 PROCEDURE — 91300 COVID-19, MRNA, LNP-S, PF, 30 MCG/0.3 ML DOSE VACCINE: CPT | Mod: PBBFAC | Performed by: FAMILY MEDICINE

## 2021-02-08 PROCEDURE — 0002A COVID-19, MRNA, LNP-S, PF, 30 MCG/0.3 ML DOSE VACCINE: CPT | Mod: PBBFAC | Performed by: FAMILY MEDICINE

## 2021-02-17 ENCOUNTER — TELEPHONE (OUTPATIENT)
Dept: INTERNAL MEDICINE | Facility: CLINIC | Age: 86
End: 2021-02-17

## 2021-02-23 ENCOUNTER — HOSPITAL ENCOUNTER (OUTPATIENT)
Facility: HOSPITAL | Age: 86
Discharge: HOME | End: 2021-02-23
Attending: PAIN MEDICINE | Admitting: PAIN MEDICINE
Payer: MEDICARE

## 2021-02-23 VITALS
DIASTOLIC BLOOD PRESSURE: 72 MMHG | HEART RATE: 60 BPM | RESPIRATION RATE: 18 BRPM | WEIGHT: 145 LBS | OXYGEN SATURATION: 97 % | SYSTOLIC BLOOD PRESSURE: 170 MMHG | TEMPERATURE: 97 F | HEIGHT: 65 IN | BODY MASS INDEX: 24.16 KG/M2

## 2021-02-23 DIAGNOSIS — M54.16 LUMBAR RADICULOPATHY: Primary | ICD-10-CM

## 2021-02-23 DIAGNOSIS — G89.29 CHRONIC PAIN: ICD-10-CM

## 2021-02-23 LAB — POCT GLUCOSE: 122 MG/DL (ref 70–110)

## 2021-02-23 PROCEDURE — 64483 NJX AA&/STRD TFRM EPI L/S 1: CPT | Performed by: PAIN MEDICINE

## 2021-02-23 PROCEDURE — 99152 MOD SED SAME PHYS/QHP 5/>YRS: CPT | Performed by: PAIN MEDICINE

## 2021-02-23 PROCEDURE — 63600175 PHARM REV CODE 636 W HCPCS: Performed by: PAIN MEDICINE

## 2021-02-23 PROCEDURE — 25000003 PHARM REV CODE 250: Performed by: PAIN MEDICINE

## 2021-02-23 PROCEDURE — 25500020 PHARM REV CODE 255: Performed by: PAIN MEDICINE

## 2021-02-23 PROCEDURE — 64483 NJX AA&/STRD TFRM EPI L/S 1: CPT | Mod: LT,,, | Performed by: PAIN MEDICINE

## 2021-02-23 PROCEDURE — 64483 PR EPIDURAL INJ, ANES/STEROID, TRANSFORAMINAL, LUMB/SACR, SNGL LEVL: ICD-10-PCS | Mod: LT,,, | Performed by: PAIN MEDICINE

## 2021-02-23 RX ORDER — INDOMETHACIN 25 MG/1
CAPSULE ORAL
Status: DISCONTINUED | OUTPATIENT
Start: 2021-02-23 | End: 2021-02-23 | Stop reason: HOSPADM

## 2021-02-23 RX ORDER — LIDOCAINE HYDROCHLORIDE 10 MG/ML
1 INJECTION, SOLUTION EPIDURAL; INFILTRATION; INTRACAUDAL; PERINEURAL ONCE
Status: COMPLETED | OUTPATIENT
Start: 2021-02-23 | End: 2021-02-23

## 2021-02-23 RX ORDER — BUPIVACAINE HYDROCHLORIDE 2.5 MG/ML
INJECTION, SOLUTION EPIDURAL; INFILTRATION; INTRACAUDAL
Status: DISCONTINUED | OUTPATIENT
Start: 2021-02-23 | End: 2021-02-23 | Stop reason: HOSPADM

## 2021-02-23 RX ORDER — DEXAMETHASONE SODIUM PHOSPHATE 10 MG/ML
INJECTION INTRAMUSCULAR; INTRAVENOUS
Status: DISCONTINUED | OUTPATIENT
Start: 2021-02-23 | End: 2021-02-23 | Stop reason: HOSPADM

## 2021-02-23 RX ORDER — SODIUM CHLORIDE 9 MG/ML
INJECTION, SOLUTION INTRAVENOUS CONTINUOUS
Status: DISCONTINUED | OUTPATIENT
Start: 2021-02-23 | End: 2022-02-07

## 2021-02-23 RX ORDER — FENTANYL CITRATE 50 UG/ML
INJECTION, SOLUTION INTRAMUSCULAR; INTRAVENOUS
Status: DISCONTINUED | OUTPATIENT
Start: 2021-02-23 | End: 2021-02-23 | Stop reason: HOSPADM

## 2021-03-09 ENCOUNTER — OFFICE VISIT (OUTPATIENT)
Dept: PAIN MEDICINE | Facility: CLINIC | Age: 86
End: 2021-03-09
Payer: MEDICARE

## 2021-03-09 VITALS
WEIGHT: 145.06 LBS | HEART RATE: 60 BPM | SYSTOLIC BLOOD PRESSURE: 129 MMHG | DIASTOLIC BLOOD PRESSURE: 56 MMHG | BODY MASS INDEX: 24.14 KG/M2

## 2021-03-09 DIAGNOSIS — M47.816 LUMBAR SPONDYLOSIS: ICD-10-CM

## 2021-03-09 DIAGNOSIS — M48.062 SPINAL STENOSIS OF LUMBAR REGION WITH NEUROGENIC CLAUDICATION: ICD-10-CM

## 2021-03-09 DIAGNOSIS — M70.61 GREATER TROCHANTERIC BURSITIS OF BOTH HIPS: ICD-10-CM

## 2021-03-09 DIAGNOSIS — M48.061 NEUROFORAMINAL STENOSIS OF LUMBAR SPINE: ICD-10-CM

## 2021-03-09 DIAGNOSIS — M51.36 DDD (DEGENERATIVE DISC DISEASE), LUMBAR: ICD-10-CM

## 2021-03-09 DIAGNOSIS — G89.29 CHRONIC BILATERAL LOW BACK PAIN WITHOUT SCIATICA: ICD-10-CM

## 2021-03-09 DIAGNOSIS — M54.16 LUMBAR RADICULOPATHY: Primary | ICD-10-CM

## 2021-03-09 DIAGNOSIS — M54.50 CHRONIC BILATERAL LOW BACK PAIN WITHOUT SCIATICA: ICD-10-CM

## 2021-03-09 DIAGNOSIS — M70.62 GREATER TROCHANTERIC BURSITIS OF BOTH HIPS: ICD-10-CM

## 2021-03-09 PROCEDURE — 99213 PR OFFICE/OUTPT VISIT, EST, LEVL III, 20-29 MIN: ICD-10-PCS | Mod: S$GLB,,, | Performed by: NURSE PRACTITIONER

## 2021-03-09 PROCEDURE — 99999 PR PBB SHADOW E&M-EST. PATIENT-LVL IV: ICD-10-PCS | Mod: PBBFAC,,, | Performed by: NURSE PRACTITIONER

## 2021-03-09 PROCEDURE — 99213 OFFICE O/P EST LOW 20 MIN: CPT | Mod: S$GLB,,, | Performed by: NURSE PRACTITIONER

## 2021-03-09 PROCEDURE — 1125F PR PAIN SEVERITY QUANTIFIED, PAIN PRESENT: ICD-10-PCS | Mod: S$GLB,,, | Performed by: NURSE PRACTITIONER

## 2021-03-09 PROCEDURE — 1159F PR MEDICATION LIST DOCUMENTED IN MEDICAL RECORD: ICD-10-PCS | Mod: S$GLB,,, | Performed by: NURSE PRACTITIONER

## 2021-03-09 PROCEDURE — 1125F AMNT PAIN NOTED PAIN PRSNT: CPT | Mod: S$GLB,,, | Performed by: NURSE PRACTITIONER

## 2021-03-09 PROCEDURE — 1159F MED LIST DOCD IN RCRD: CPT | Mod: S$GLB,,, | Performed by: NURSE PRACTITIONER

## 2021-03-09 PROCEDURE — 99999 PR PBB SHADOW E&M-EST. PATIENT-LVL IV: CPT | Mod: PBBFAC,,, | Performed by: NURSE PRACTITIONER

## 2021-04-09 ENCOUNTER — TELEPHONE (OUTPATIENT)
Dept: NEUROSURGERY | Facility: CLINIC | Age: 86
End: 2021-04-09

## 2021-04-13 RX ORDER — FLUTICASONE PROPIONATE 50 MCG
2 SPRAY, SUSPENSION (ML) NASAL 2 TIMES DAILY
Qty: 16 G | Refills: 1 | Status: SHIPPED | OUTPATIENT
Start: 2021-04-13 | End: 2021-06-04

## 2021-04-15 ENCOUNTER — OFFICE VISIT (OUTPATIENT)
Dept: FAMILY MEDICINE | Facility: CLINIC | Age: 86
End: 2021-04-15
Payer: MEDICARE

## 2021-04-15 VITALS
OXYGEN SATURATION: 93 % | RESPIRATION RATE: 18 BRPM | BODY MASS INDEX: 25.42 KG/M2 | WEIGHT: 152.56 LBS | HEIGHT: 65 IN | DIASTOLIC BLOOD PRESSURE: 65 MMHG | SYSTOLIC BLOOD PRESSURE: 120 MMHG | HEART RATE: 69 BPM

## 2021-04-15 DIAGNOSIS — R49.0 HOARSENESS: ICD-10-CM

## 2021-04-15 DIAGNOSIS — I10 ESSENTIAL HYPERTENSION: ICD-10-CM

## 2021-04-15 DIAGNOSIS — E66.3 OVERWEIGHT (BMI 25.0-29.9): ICD-10-CM

## 2021-04-15 DIAGNOSIS — G47.00 INSOMNIA, UNSPECIFIED TYPE: Primary | ICD-10-CM

## 2021-04-15 DIAGNOSIS — M81.0 AGE-RELATED OSTEOPOROSIS WITHOUT CURRENT PATHOLOGICAL FRACTURE: ICD-10-CM

## 2021-04-15 DIAGNOSIS — E11.65 TYPE 2 DIABETES MELLITUS WITH HYPERGLYCEMIA, WITHOUT LONG-TERM CURRENT USE OF INSULIN: ICD-10-CM

## 2021-04-15 DIAGNOSIS — J30.1 SEASONAL ALLERGIC RHINITIS DUE TO POLLEN: ICD-10-CM

## 2021-04-15 PROCEDURE — 99999 PR PBB SHADOW E&M-EST. PATIENT-LVL V: CPT | Mod: PBBFAC,,, | Performed by: NURSE PRACTITIONER

## 2021-04-15 PROCEDURE — 99214 OFFICE O/P EST MOD 30 MIN: CPT | Mod: S$GLB,,, | Performed by: NURSE PRACTITIONER

## 2021-04-15 PROCEDURE — 3288F FALL RISK ASSESSMENT DOCD: CPT | Mod: CPTII,S$GLB,, | Performed by: NURSE PRACTITIONER

## 2021-04-15 PROCEDURE — 3052F HG A1C>EQUAL 8.0%<EQUAL 9.0%: CPT | Mod: CPTII,S$GLB,, | Performed by: NURSE PRACTITIONER

## 2021-04-15 PROCEDURE — 3052F PR MOST RECENT HEMOGLOBIN A1C LEVEL 8.0 - < 9.0%: ICD-10-PCS | Mod: CPTII,S$GLB,, | Performed by: NURSE PRACTITIONER

## 2021-04-15 PROCEDURE — 1159F PR MEDICATION LIST DOCUMENTED IN MEDICAL RECORD: ICD-10-PCS | Mod: S$GLB,,, | Performed by: NURSE PRACTITIONER

## 2021-04-15 PROCEDURE — 99999 PR PBB SHADOW E&M-EST. PATIENT-LVL V: ICD-10-PCS | Mod: PBBFAC,,, | Performed by: NURSE PRACTITIONER

## 2021-04-15 PROCEDURE — 3288F PR FALLS RISK ASSESSMENT DOCUMENTED: ICD-10-PCS | Mod: CPTII,S$GLB,, | Performed by: NURSE PRACTITIONER

## 2021-04-15 PROCEDURE — 1100F PR PT FALLS ASSESS DOC 2+ FALLS/FALL W/INJURY/YR: ICD-10-PCS | Mod: CPTII,S$GLB,, | Performed by: NURSE PRACTITIONER

## 2021-04-15 PROCEDURE — 1159F MED LIST DOCD IN RCRD: CPT | Mod: S$GLB,,, | Performed by: NURSE PRACTITIONER

## 2021-04-15 PROCEDURE — 1125F AMNT PAIN NOTED PAIN PRSNT: CPT | Mod: S$GLB,,, | Performed by: NURSE PRACTITIONER

## 2021-04-15 PROCEDURE — 99214 PR OFFICE/OUTPT VISIT, EST, LEVL IV, 30-39 MIN: ICD-10-PCS | Mod: S$GLB,,, | Performed by: NURSE PRACTITIONER

## 2021-04-15 PROCEDURE — 1100F PTFALLS ASSESS-DOCD GE2>/YR: CPT | Mod: CPTII,S$GLB,, | Performed by: NURSE PRACTITIONER

## 2021-04-15 PROCEDURE — 1125F PR PAIN SEVERITY QUANTIFIED, PAIN PRESENT: ICD-10-PCS | Mod: S$GLB,,, | Performed by: NURSE PRACTITIONER

## 2021-04-15 RX ORDER — TRAZODONE HYDROCHLORIDE 50 MG/1
50 TABLET ORAL NIGHTLY
Qty: 30 TABLET | Refills: 11 | Status: SHIPPED | OUTPATIENT
Start: 2021-04-15 | End: 2021-07-28

## 2021-04-16 ENCOUNTER — LAB VISIT (OUTPATIENT)
Dept: LAB | Facility: HOSPITAL | Age: 86
End: 2021-04-16
Attending: NURSE PRACTITIONER
Payer: MEDICARE

## 2021-04-16 DIAGNOSIS — I10 ESSENTIAL HYPERTENSION: ICD-10-CM

## 2021-04-16 DIAGNOSIS — E11.65 TYPE 2 DIABETES MELLITUS WITH HYPERGLYCEMIA, WITHOUT LONG-TERM CURRENT USE OF INSULIN: ICD-10-CM

## 2021-04-16 LAB
ALBUMIN SERPL BCP-MCNC: 3.9 G/DL (ref 3.5–5.2)
ALP SERPL-CCNC: 57 U/L (ref 55–135)
ALT SERPL W/O P-5'-P-CCNC: 27 U/L (ref 10–44)
ANION GAP SERPL CALC-SCNC: 9 MMOL/L (ref 8–16)
AST SERPL-CCNC: 40 U/L (ref 10–40)
BASOPHILS # BLD AUTO: 0.05 K/UL (ref 0–0.2)
BASOPHILS NFR BLD: 1 % (ref 0–1.9)
BILIRUB SERPL-MCNC: 0.4 MG/DL (ref 0.1–1)
BUN SERPL-MCNC: 26 MG/DL (ref 8–23)
CALCIUM SERPL-MCNC: 8.9 MG/DL (ref 8.7–10.5)
CHLORIDE SERPL-SCNC: 106 MMOL/L (ref 95–110)
CHOLEST SERPL-MCNC: 151 MG/DL (ref 120–199)
CHOLEST/HDLC SERPL: 3.4 {RATIO} (ref 2–5)
CO2 SERPL-SCNC: 26 MMOL/L (ref 23–29)
CREAT SERPL-MCNC: 1.5 MG/DL (ref 0.5–1.4)
DIFFERENTIAL METHOD: ABNORMAL
EOSINOPHIL # BLD AUTO: 0.3 K/UL (ref 0–0.5)
EOSINOPHIL NFR BLD: 5.7 % (ref 0–8)
ERYTHROCYTE [DISTWIDTH] IN BLOOD BY AUTOMATED COUNT: 12.9 % (ref 11.5–14.5)
EST. GFR  (AFRICAN AMERICAN): 46.7 ML/MIN/1.73 M^2
EST. GFR  (NON AFRICAN AMERICAN): 40.4 ML/MIN/1.73 M^2
ESTIMATED AVG GLUCOSE: 192 MG/DL (ref 68–131)
GLUCOSE SERPL-MCNC: 91 MG/DL (ref 70–110)
HBA1C MFR BLD: 8.3 % (ref 4–5.6)
HCT VFR BLD AUTO: 33.5 % (ref 40–54)
HDLC SERPL-MCNC: 44 MG/DL (ref 40–75)
HDLC SERPL: 29.1 % (ref 20–50)
HGB BLD-MCNC: 10.7 G/DL (ref 14–18)
IMM GRANULOCYTES # BLD AUTO: 0.01 K/UL (ref 0–0.04)
IMM GRANULOCYTES NFR BLD AUTO: 0.2 % (ref 0–0.5)
LDLC SERPL CALC-MCNC: 75 MG/DL (ref 63–159)
LYMPHOCYTES # BLD AUTO: 2.7 K/UL (ref 1–4.8)
LYMPHOCYTES NFR BLD: 52.1 % (ref 18–48)
MCH RBC QN AUTO: 32.3 PG (ref 27–31)
MCHC RBC AUTO-ENTMCNC: 31.9 G/DL (ref 32–36)
MCV RBC AUTO: 101 FL (ref 82–98)
MONOCYTES # BLD AUTO: 0.5 K/UL (ref 0.3–1)
MONOCYTES NFR BLD: 9.8 % (ref 4–15)
NEUTROPHILS # BLD AUTO: 1.6 K/UL (ref 1.8–7.7)
NEUTROPHILS NFR BLD: 31.2 % (ref 38–73)
NONHDLC SERPL-MCNC: 107 MG/DL
NRBC BLD-RTO: 0 /100 WBC
PLATELET # BLD AUTO: 158 K/UL (ref 150–450)
PMV BLD AUTO: 11 FL (ref 9.2–12.9)
POTASSIUM SERPL-SCNC: 4.2 MMOL/L (ref 3.5–5.1)
PROT SERPL-MCNC: 6.9 G/DL (ref 6–8.4)
RBC # BLD AUTO: 3.31 M/UL (ref 4.6–6.2)
SODIUM SERPL-SCNC: 141 MMOL/L (ref 136–145)
TRIGL SERPL-MCNC: 160 MG/DL (ref 30–150)
TSH SERPL DL<=0.005 MIU/L-ACNC: 1.87 UIU/ML (ref 0.4–4)
WBC # BLD AUTO: 5.12 K/UL (ref 3.9–12.7)

## 2021-04-16 PROCEDURE — 80061 LIPID PANEL: CPT | Performed by: NURSE PRACTITIONER

## 2021-04-16 PROCEDURE — 36415 COLL VENOUS BLD VENIPUNCTURE: CPT | Mod: PO | Performed by: NURSE PRACTITIONER

## 2021-04-16 PROCEDURE — 85025 COMPLETE CBC W/AUTO DIFF WBC: CPT | Performed by: NURSE PRACTITIONER

## 2021-04-16 PROCEDURE — 80053 COMPREHEN METABOLIC PANEL: CPT | Performed by: NURSE PRACTITIONER

## 2021-04-16 PROCEDURE — 83036 HEMOGLOBIN GLYCOSYLATED A1C: CPT | Performed by: NURSE PRACTITIONER

## 2021-04-16 PROCEDURE — 84443 ASSAY THYROID STIM HORMONE: CPT | Performed by: NURSE PRACTITIONER

## 2021-04-19 ENCOUNTER — TELEPHONE (OUTPATIENT)
Dept: FAMILY MEDICINE | Facility: CLINIC | Age: 86
End: 2021-04-19

## 2021-04-21 ENCOUNTER — TELEPHONE (OUTPATIENT)
Dept: NEUROSURGERY | Facility: CLINIC | Age: 86
End: 2021-04-21

## 2021-04-28 ENCOUNTER — OFFICE VISIT (OUTPATIENT)
Dept: NEUROSURGERY | Facility: CLINIC | Age: 86
End: 2021-04-28
Payer: MEDICARE

## 2021-04-28 VITALS — HEART RATE: 72 BPM | DIASTOLIC BLOOD PRESSURE: 74 MMHG | SYSTOLIC BLOOD PRESSURE: 124 MMHG

## 2021-04-28 DIAGNOSIS — M48.061 STENOSIS OF LATERAL RECESS OF LUMBAR SPINE: ICD-10-CM

## 2021-04-28 DIAGNOSIS — Z98.890 STATUS POST LUMBAR LAMINECTOMY: ICD-10-CM

## 2021-04-28 DIAGNOSIS — M54.17 LUMBOSACRAL RADICULOPATHY AT L5: ICD-10-CM

## 2021-04-28 DIAGNOSIS — M48.061 SPINAL STENOSIS OF LUMBAR REGION WITH RADICULOPATHY: Primary | ICD-10-CM

## 2021-04-28 DIAGNOSIS — M54.16 SPINAL STENOSIS OF LUMBAR REGION WITH RADICULOPATHY: Primary | ICD-10-CM

## 2021-04-28 PROCEDURE — 99214 OFFICE O/P EST MOD 30 MIN: CPT | Mod: S$GLB,,, | Performed by: NEUROLOGICAL SURGERY

## 2021-04-28 PROCEDURE — 99214 PR OFFICE/OUTPT VISIT, EST, LEVL IV, 30-39 MIN: ICD-10-PCS | Mod: S$GLB,,, | Performed by: NEUROLOGICAL SURGERY

## 2021-04-28 PROCEDURE — 1159F PR MEDICATION LIST DOCUMENTED IN MEDICAL RECORD: ICD-10-PCS | Mod: S$GLB,,, | Performed by: NEUROLOGICAL SURGERY

## 2021-04-28 PROCEDURE — 99999 PR PBB SHADOW E&M-EST. PATIENT-LVL III: ICD-10-PCS | Mod: PBBFAC,,, | Performed by: NEUROLOGICAL SURGERY

## 2021-04-28 PROCEDURE — 3288F PR FALLS RISK ASSESSMENT DOCUMENTED: ICD-10-PCS | Mod: CPTII,S$GLB,, | Performed by: NEUROLOGICAL SURGERY

## 2021-04-28 PROCEDURE — 1101F PT FALLS ASSESS-DOCD LE1/YR: CPT | Mod: CPTII,S$GLB,, | Performed by: NEUROLOGICAL SURGERY

## 2021-04-28 PROCEDURE — 99499 UNLISTED E&M SERVICE: CPT | Mod: S$GLB,,, | Performed by: NEUROLOGICAL SURGERY

## 2021-04-28 PROCEDURE — 99999 PR PBB SHADOW E&M-EST. PATIENT-LVL III: CPT | Mod: PBBFAC,,, | Performed by: NEUROLOGICAL SURGERY

## 2021-04-28 PROCEDURE — 99499 RISK ADDL DX/OHS AUDIT: ICD-10-PCS | Mod: S$GLB,,, | Performed by: NEUROLOGICAL SURGERY

## 2021-04-28 PROCEDURE — 3288F FALL RISK ASSESSMENT DOCD: CPT | Mod: CPTII,S$GLB,, | Performed by: NEUROLOGICAL SURGERY

## 2021-04-28 PROCEDURE — 1159F MED LIST DOCD IN RCRD: CPT | Mod: S$GLB,,, | Performed by: NEUROLOGICAL SURGERY

## 2021-04-28 PROCEDURE — 1125F PR PAIN SEVERITY QUANTIFIED, PAIN PRESENT: ICD-10-PCS | Mod: S$GLB,,, | Performed by: NEUROLOGICAL SURGERY

## 2021-04-28 PROCEDURE — 1125F AMNT PAIN NOTED PAIN PRSNT: CPT | Mod: S$GLB,,, | Performed by: NEUROLOGICAL SURGERY

## 2021-04-28 PROCEDURE — 1101F PR PT FALLS ASSESS DOC 0-1 FALLS W/OUT INJ PAST YR: ICD-10-PCS | Mod: CPTII,S$GLB,, | Performed by: NEUROLOGICAL SURGERY

## 2021-04-29 ENCOUNTER — TELEPHONE (OUTPATIENT)
Dept: NEUROSURGERY | Facility: CLINIC | Age: 86
End: 2021-04-29

## 2021-04-29 DIAGNOSIS — M54.16 LUMBAR RADICULOPATHY: ICD-10-CM

## 2021-04-29 DIAGNOSIS — M48.061 STENOSIS OF LATERAL RECESS OF LUMBAR SPINE: Primary | ICD-10-CM

## 2021-05-06 ENCOUNTER — TELEPHONE (OUTPATIENT)
Dept: FAMILY MEDICINE | Facility: CLINIC | Age: 86
End: 2021-05-06

## 2021-05-06 ENCOUNTER — OFFICE VISIT (OUTPATIENT)
Dept: OTOLARYNGOLOGY | Facility: CLINIC | Age: 86
End: 2021-05-06
Payer: MEDICARE

## 2021-05-06 VITALS
WEIGHT: 153.56 LBS | BODY MASS INDEX: 25.58 KG/M2 | HEART RATE: 59 BPM | DIASTOLIC BLOOD PRESSURE: 56 MMHG | SYSTOLIC BLOOD PRESSURE: 130 MMHG | HEIGHT: 65 IN

## 2021-05-06 DIAGNOSIS — J37.0 CHRONIC LARYNGITIS: Chronic | ICD-10-CM

## 2021-05-06 DIAGNOSIS — J30.0 VASOMOTOR RHINITIS: ICD-10-CM

## 2021-05-06 DIAGNOSIS — R49.0 HOARSENESS: Primary | ICD-10-CM

## 2021-05-06 DIAGNOSIS — I10 ESSENTIAL HYPERTENSION: Primary | ICD-10-CM

## 2021-05-06 DIAGNOSIS — J38.2 VOCAL CORD NODULE: ICD-10-CM

## 2021-05-06 DIAGNOSIS — K21.9 LARYNGOPHARYNGEAL REFLUX (LPR): Chronic | ICD-10-CM

## 2021-05-06 DIAGNOSIS — E11.65 TYPE 2 DIABETES MELLITUS WITH HYPERGLYCEMIA, WITHOUT LONG-TERM CURRENT USE OF INSULIN: ICD-10-CM

## 2021-05-06 PROCEDURE — 31575 DIAGNOSTIC LARYNGOSCOPY: CPT | Mod: S$GLB,,, | Performed by: OTOLARYNGOLOGY

## 2021-05-06 PROCEDURE — 3288F FALL RISK ASSESSMENT DOCD: CPT | Mod: CPTII,S$GLB,, | Performed by: OTOLARYNGOLOGY

## 2021-05-06 PROCEDURE — 31575 LARYNGOSCOPY: ICD-10-PCS | Mod: S$GLB,,, | Performed by: OTOLARYNGOLOGY

## 2021-05-06 PROCEDURE — 1101F PT FALLS ASSESS-DOCD LE1/YR: CPT | Mod: CPTII,S$GLB,, | Performed by: OTOLARYNGOLOGY

## 2021-05-06 PROCEDURE — 1101F PR PT FALLS ASSESS DOC 0-1 FALLS W/OUT INJ PAST YR: ICD-10-PCS | Mod: CPTII,S$GLB,, | Performed by: OTOLARYNGOLOGY

## 2021-05-06 PROCEDURE — 1159F MED LIST DOCD IN RCRD: CPT | Mod: S$GLB,,, | Performed by: OTOLARYNGOLOGY

## 2021-05-06 PROCEDURE — 99214 OFFICE O/P EST MOD 30 MIN: CPT | Mod: 25,S$GLB,, | Performed by: OTOLARYNGOLOGY

## 2021-05-06 PROCEDURE — 99999 PR PBB SHADOW E&M-EST. PATIENT-LVL V: ICD-10-PCS | Mod: PBBFAC,,, | Performed by: OTOLARYNGOLOGY

## 2021-05-06 PROCEDURE — 1159F PR MEDICATION LIST DOCUMENTED IN MEDICAL RECORD: ICD-10-PCS | Mod: S$GLB,,, | Performed by: OTOLARYNGOLOGY

## 2021-05-06 PROCEDURE — 99214 PR OFFICE/OUTPT VISIT, EST, LEVL IV, 30-39 MIN: ICD-10-PCS | Mod: 25,S$GLB,, | Performed by: OTOLARYNGOLOGY

## 2021-05-06 PROCEDURE — 1126F AMNT PAIN NOTED NONE PRSNT: CPT | Mod: S$GLB,,, | Performed by: OTOLARYNGOLOGY

## 2021-05-06 PROCEDURE — 99999 PR PBB SHADOW E&M-EST. PATIENT-LVL V: CPT | Mod: PBBFAC,,, | Performed by: OTOLARYNGOLOGY

## 2021-05-06 PROCEDURE — 1126F PR PAIN SEVERITY QUANTIFIED, NO PAIN PRESENT: ICD-10-PCS | Mod: S$GLB,,, | Performed by: OTOLARYNGOLOGY

## 2021-05-06 PROCEDURE — 3288F PR FALLS RISK ASSESSMENT DOCUMENTED: ICD-10-PCS | Mod: CPTII,S$GLB,, | Performed by: OTOLARYNGOLOGY

## 2021-05-06 RX ORDER — AZELASTINE 1 MG/ML
1 SPRAY, METERED NASAL 2 TIMES DAILY
Qty: 30 ML | Refills: 11 | Status: SHIPPED | OUTPATIENT
Start: 2021-05-06 | End: 2021-05-07 | Stop reason: CLARIF

## 2021-05-06 RX ORDER — FAMOTIDINE 40 MG/1
40 TABLET, FILM COATED ORAL 2 TIMES DAILY
Qty: 30 TABLET | Refills: 3 | Status: SHIPPED | OUTPATIENT
Start: 2021-05-06 | End: 2021-05-07

## 2021-05-06 RX ORDER — IPRATROPIUM BROMIDE 42 UG/1
SPRAY, METERED NASAL
Qty: 90 ML | Refills: 3 | Status: SHIPPED | OUTPATIENT
Start: 2021-05-06 | End: 2021-07-28

## 2021-05-07 ENCOUNTER — HOSPITAL ENCOUNTER (OUTPATIENT)
Dept: PREADMISSION TESTING | Facility: HOSPITAL | Age: 86
Discharge: HOME | End: 2021-05-07
Attending: NEUROLOGICAL SURGERY
Payer: MEDICARE

## 2021-05-07 ENCOUNTER — HOSPITAL ENCOUNTER (OUTPATIENT)
Dept: RADIOLOGY | Facility: HOSPITAL | Age: 86
Discharge: HOME | End: 2021-05-07
Attending: FAMILY MEDICINE
Payer: MEDICARE

## 2021-05-07 ENCOUNTER — ANESTHESIA EVENT (OUTPATIENT)
Dept: SURGERY | Facility: HOSPITAL | Age: 86
End: 2021-05-07
Payer: MEDICARE

## 2021-05-07 ENCOUNTER — OFFICE VISIT (OUTPATIENT)
Dept: FAMILY MEDICINE | Facility: CLINIC | Age: 86
End: 2021-05-07
Payer: MEDICARE

## 2021-05-07 VITALS
SYSTOLIC BLOOD PRESSURE: 142 MMHG | WEIGHT: 155 LBS | BODY MASS INDEX: 25.83 KG/M2 | OXYGEN SATURATION: 97 % | DIASTOLIC BLOOD PRESSURE: 64 MMHG | HEART RATE: 60 BPM | RESPIRATION RATE: 16 BRPM | HEIGHT: 65 IN

## 2021-05-07 VITALS
SYSTOLIC BLOOD PRESSURE: 118 MMHG | HEART RATE: 64 BPM | HEIGHT: 65 IN | OXYGEN SATURATION: 99 % | DIASTOLIC BLOOD PRESSURE: 64 MMHG | WEIGHT: 155.19 LBS | BODY MASS INDEX: 25.85 KG/M2

## 2021-05-07 DIAGNOSIS — K21.9 GASTROESOPHAGEAL REFLUX DISEASE WITHOUT ESOPHAGITIS: ICD-10-CM

## 2021-05-07 DIAGNOSIS — E11.65 TYPE 2 DIABETES MELLITUS WITH HYPERGLYCEMIA, WITHOUT LONG-TERM CURRENT USE OF INSULIN: ICD-10-CM

## 2021-05-07 DIAGNOSIS — I50.42 CHRONIC COMBINED SYSTOLIC AND DIASTOLIC CHF (CONGESTIVE HEART FAILURE): ICD-10-CM

## 2021-05-07 DIAGNOSIS — Z01.818 PRE-OP EXAM: Primary | ICD-10-CM

## 2021-05-07 DIAGNOSIS — Z01.818 PRE-OP EXAM: ICD-10-CM

## 2021-05-07 DIAGNOSIS — I10 ESSENTIAL HYPERTENSION: ICD-10-CM

## 2021-05-07 PROCEDURE — 1159F PR MEDICATION LIST DOCUMENTED IN MEDICAL RECORD: ICD-10-PCS | Mod: S$GLB,,, | Performed by: FAMILY MEDICINE

## 2021-05-07 PROCEDURE — 93010 EKG 12-LEAD: ICD-10-PCS | Mod: S$GLB,,, | Performed by: INTERNAL MEDICINE

## 2021-05-07 PROCEDURE — 99999 PR PBB SHADOW E&M-EST. PATIENT-LVL IV: CPT | Mod: PBBFAC,,, | Performed by: FAMILY MEDICINE

## 2021-05-07 PROCEDURE — 1101F PT FALLS ASSESS-DOCD LE1/YR: CPT | Mod: CPTII,S$GLB,, | Performed by: FAMILY MEDICINE

## 2021-05-07 PROCEDURE — 93005 ELECTROCARDIOGRAM TRACING: CPT | Mod: S$GLB,,, | Performed by: FAMILY MEDICINE

## 2021-05-07 PROCEDURE — 1125F AMNT PAIN NOTED PAIN PRSNT: CPT | Mod: S$GLB,,, | Performed by: FAMILY MEDICINE

## 2021-05-07 PROCEDURE — 99499 UNLISTED E&M SERVICE: CPT | Mod: S$GLB,,, | Performed by: FAMILY MEDICINE

## 2021-05-07 PROCEDURE — 99215 OFFICE O/P EST HI 40 MIN: CPT | Mod: S$GLB,,, | Performed by: FAMILY MEDICINE

## 2021-05-07 PROCEDURE — 3288F FALL RISK ASSESSMENT DOCD: CPT | Mod: CPTII,S$GLB,, | Performed by: FAMILY MEDICINE

## 2021-05-07 PROCEDURE — 71046 X-RAY EXAM CHEST 2 VIEWS: CPT | Mod: 26,,, | Performed by: RADIOLOGY

## 2021-05-07 PROCEDURE — 1159F MED LIST DOCD IN RCRD: CPT | Mod: S$GLB,,, | Performed by: FAMILY MEDICINE

## 2021-05-07 PROCEDURE — 71046 XR CHEST PA AND LATERAL: ICD-10-PCS | Mod: 26,,, | Performed by: RADIOLOGY

## 2021-05-07 PROCEDURE — 99999 PR PBB SHADOW E&M-EST. PATIENT-LVL IV: ICD-10-PCS | Mod: PBBFAC,,, | Performed by: FAMILY MEDICINE

## 2021-05-07 PROCEDURE — 1125F PR PAIN SEVERITY QUANTIFIED, PAIN PRESENT: ICD-10-PCS | Mod: S$GLB,,, | Performed by: FAMILY MEDICINE

## 2021-05-07 PROCEDURE — 1101F PR PT FALLS ASSESS DOC 0-1 FALLS W/OUT INJ PAST YR: ICD-10-PCS | Mod: CPTII,S$GLB,, | Performed by: FAMILY MEDICINE

## 2021-05-07 PROCEDURE — 3288F PR FALLS RISK ASSESSMENT DOCUMENTED: ICD-10-PCS | Mod: CPTII,S$GLB,, | Performed by: FAMILY MEDICINE

## 2021-05-07 PROCEDURE — 99215 PR OFFICE/OUTPT VISIT, EST, LEVL V, 40-54 MIN: ICD-10-PCS | Mod: S$GLB,,, | Performed by: FAMILY MEDICINE

## 2021-05-07 PROCEDURE — 93010 ELECTROCARDIOGRAM REPORT: CPT | Mod: S$GLB,,, | Performed by: INTERNAL MEDICINE

## 2021-05-07 PROCEDURE — 3052F PR MOST RECENT HEMOGLOBIN A1C LEVEL 8.0 - < 9.0%: ICD-10-PCS | Mod: CPTII,S$GLB,, | Performed by: FAMILY MEDICINE

## 2021-05-07 PROCEDURE — 93005 EKG 12-LEAD: ICD-10-PCS | Mod: S$GLB,,, | Performed by: FAMILY MEDICINE

## 2021-05-07 PROCEDURE — 3052F HG A1C>EQUAL 8.0%<EQUAL 9.0%: CPT | Mod: CPTII,S$GLB,, | Performed by: FAMILY MEDICINE

## 2021-05-07 PROCEDURE — 71046 X-RAY EXAM CHEST 2 VIEWS: CPT | Mod: TC,FY,PO

## 2021-05-07 PROCEDURE — 99499 RISK ADDL DX/OHS AUDIT: ICD-10-PCS | Mod: S$GLB,,, | Performed by: FAMILY MEDICINE

## 2021-05-07 RX ORDER — FUROSEMIDE 20 MG/1
20 TABLET ORAL 2 TIMES DAILY
Qty: 180 TABLET | Refills: 3
Start: 2021-05-07 | End: 2021-05-07 | Stop reason: SDUPTHER

## 2021-05-07 RX ORDER — FUROSEMIDE 20 MG/1
20 TABLET ORAL 2 TIMES DAILY
Qty: 180 TABLET | Refills: 3 | Status: SHIPPED | OUTPATIENT
Start: 2021-05-07 | End: 2021-06-28 | Stop reason: SDUPTHER

## 2021-05-07 RX ORDER — LIDOCAINE HYDROCHLORIDE 10 MG/ML
1 INJECTION, SOLUTION EPIDURAL; INFILTRATION; INTRACAUDAL; PERINEURAL ONCE
Status: CANCELLED | OUTPATIENT
Start: 2021-05-07 | End: 2021-05-07

## 2021-05-07 RX ORDER — OMEPRAZOLE 20 MG/1
20 CAPSULE, DELAYED RELEASE ORAL
Qty: 90 CAPSULE | Refills: 3 | Status: SHIPPED | OUTPATIENT
Start: 2021-05-07 | End: 2021-07-28

## 2021-05-07 RX ORDER — SODIUM CHLORIDE, SODIUM LACTATE, POTASSIUM CHLORIDE, CALCIUM CHLORIDE 600; 310; 30; 20 MG/100ML; MG/100ML; MG/100ML; MG/100ML
INJECTION, SOLUTION INTRAVENOUS CONTINUOUS
Status: CANCELLED | OUTPATIENT
Start: 2021-05-07

## 2021-05-10 ENCOUNTER — TELEPHONE (OUTPATIENT)
Dept: FAMILY MEDICINE | Facility: CLINIC | Age: 86
End: 2021-05-10

## 2021-05-13 ENCOUNTER — TELEPHONE (OUTPATIENT)
Dept: SPEECH THERAPY | Facility: HOSPITAL | Age: 86
End: 2021-05-13

## 2021-05-17 ENCOUNTER — TELEPHONE (OUTPATIENT)
Dept: OTOLARYNGOLOGY | Facility: CLINIC | Age: 86
End: 2021-05-17

## 2021-05-24 ENCOUNTER — LAB VISIT (OUTPATIENT)
Dept: FAMILY MEDICINE | Facility: CLINIC | Age: 86
End: 2021-05-24
Payer: MEDICARE

## 2021-05-24 DIAGNOSIS — Z01.818 PRE-OP TESTING: ICD-10-CM

## 2021-05-24 PROCEDURE — U0005 INFEC AGEN DETEC AMPLI PROBE: HCPCS | Performed by: NEUROLOGICAL SURGERY

## 2021-05-24 PROCEDURE — U0003 INFECTIOUS AGENT DETECTION BY NUCLEIC ACID (DNA OR RNA); SEVERE ACUTE RESPIRATORY SYNDROME CORONAVIRUS 2 (SARS-COV-2) (CORONAVIRUS DISEASE [COVID-19]), AMPLIFIED PROBE TECHNIQUE, MAKING USE OF HIGH THROUGHPUT TECHNOLOGIES AS DESCRIBED BY CMS-2020-01-R: HCPCS | Performed by: NEUROLOGICAL SURGERY

## 2021-05-25 LAB — SARS-COV-2 RNA RESP QL NAA+PROBE: NOT DETECTED

## 2021-05-26 ENCOUNTER — HOSPITAL ENCOUNTER (OUTPATIENT)
Facility: HOSPITAL | Age: 86
Discharge: HOME | End: 2021-05-27
Attending: NEUROLOGICAL SURGERY | Admitting: NEUROLOGICAL SURGERY
Payer: MEDICARE

## 2021-05-26 ENCOUNTER — ANESTHESIA (OUTPATIENT)
Dept: SURGERY | Facility: HOSPITAL | Age: 86
End: 2021-05-26
Payer: MEDICARE

## 2021-05-26 DIAGNOSIS — M54.16 SPINAL STENOSIS OF LUMBAR REGION WITH RADICULOPATHY: Primary | ICD-10-CM

## 2021-05-26 DIAGNOSIS — M48.061 SPINAL STENOSIS OF LUMBAR REGION WITH RADICULOPATHY: Primary | ICD-10-CM

## 2021-05-26 LAB
ABO + RH BLD: NORMAL
BLD GP AB SCN CELLS X3 SERPL QL: NORMAL
POCT GLUCOSE: 123 MG/DL (ref 70–110)
POCT GLUCOSE: 238 MG/DL (ref 70–110)
POCT GLUCOSE: 241 MG/DL (ref 70–110)
POCT GLUCOSE: 255 MG/DL (ref 70–110)
POCT GLUCOSE: 84 MG/DL (ref 70–110)

## 2021-05-26 PROCEDURE — 25000003 PHARM REV CODE 250: Performed by: NEUROLOGICAL SURGERY

## 2021-05-26 PROCEDURE — 86900 BLOOD TYPING SEROLOGIC ABO: CPT | Performed by: NEUROLOGICAL SURGERY

## 2021-05-26 PROCEDURE — 99900035 HC TECH TIME PER 15 MIN (STAT)

## 2021-05-26 PROCEDURE — 71000015 HC POSTOP RECOV 1ST HR: Performed by: NEUROLOGICAL SURGERY

## 2021-05-26 PROCEDURE — 63600175 PHARM REV CODE 636 W HCPCS: Performed by: NURSE PRACTITIONER

## 2021-05-26 PROCEDURE — 71000016 HC POSTOP RECOV ADDL HR: Performed by: NEUROLOGICAL SURGERY

## 2021-05-26 PROCEDURE — 37000008 HC ANESTHESIA 1ST 15 MINUTES: Performed by: NEUROLOGICAL SURGERY

## 2021-05-26 PROCEDURE — 63600175 PHARM REV CODE 636 W HCPCS: Performed by: NURSE ANESTHETIST, CERTIFIED REGISTERED

## 2021-05-26 PROCEDURE — 36415 COLL VENOUS BLD VENIPUNCTURE: CPT | Performed by: NEUROLOGICAL SURGERY

## 2021-05-26 PROCEDURE — 71000039 HC RECOVERY, EACH ADD'L HOUR: Performed by: NEUROLOGICAL SURGERY

## 2021-05-26 PROCEDURE — 27201423 OPTIME MED/SURG SUP & DEVICES STERILE SUPPLY: Performed by: NEUROLOGICAL SURGERY

## 2021-05-26 PROCEDURE — 63047 PR LAMINEC/FACETECT/FORAMIN,LUMBAR 1 SEG: ICD-10-PCS | Mod: ,,, | Performed by: NEUROLOGICAL SURGERY

## 2021-05-26 PROCEDURE — 63600175 PHARM REV CODE 636 W HCPCS: Performed by: NEUROLOGICAL SURGERY

## 2021-05-26 PROCEDURE — 94799 UNLISTED PULMONARY SVC/PX: CPT

## 2021-05-26 PROCEDURE — 63047 LAM FACETEC & FORAMOT LUMBAR: CPT | Mod: ,,, | Performed by: NEUROLOGICAL SURGERY

## 2021-05-26 PROCEDURE — 71000033 HC RECOVERY, INTIAL HOUR: Performed by: NEUROLOGICAL SURGERY

## 2021-05-26 PROCEDURE — 37000009 HC ANESTHESIA EA ADD 15 MINS: Performed by: NEUROLOGICAL SURGERY

## 2021-05-26 PROCEDURE — 94760 N-INVAS EAR/PLS OXIMETRY 1: CPT

## 2021-05-26 PROCEDURE — 36000710: Performed by: NEUROLOGICAL SURGERY

## 2021-05-26 PROCEDURE — 36000711: Performed by: NEUROLOGICAL SURGERY

## 2021-05-26 PROCEDURE — 25000003 PHARM REV CODE 250: Performed by: NURSE ANESTHETIST, CERTIFIED REGISTERED

## 2021-05-26 RX ORDER — SUCCINYLCHOLINE CHLORIDE 20 MG/ML
INJECTION INTRAMUSCULAR; INTRAVENOUS
Status: DISCONTINUED | OUTPATIENT
Start: 2021-05-26 | End: 2021-05-26

## 2021-05-26 RX ORDER — MAG HYDROX/ALUMINUM HYD/SIMETH 200-200-20
30 SUSPENSION, ORAL (FINAL DOSE FORM) ORAL EVERY 4 HOURS PRN
Status: DISCONTINUED | OUTPATIENT
Start: 2021-05-26 | End: 2021-05-27 | Stop reason: HOSPADM

## 2021-05-26 RX ORDER — CEFAZOLIN SODIUM 2 G/50ML
2 SOLUTION INTRAVENOUS ONCE
Status: DISCONTINUED | OUTPATIENT
Start: 2021-05-26 | End: 2021-05-26

## 2021-05-26 RX ORDER — HEPARIN SODIUM 5000 [USP'U]/ML
5000 INJECTION, SOLUTION INTRAVENOUS; SUBCUTANEOUS EVERY 12 HOURS
Status: DISCONTINUED | OUTPATIENT
Start: 2021-05-26 | End: 2021-05-27 | Stop reason: HOSPADM

## 2021-05-26 RX ORDER — ONDANSETRON 8 MG/1
8 TABLET, ORALLY DISINTEGRATING ORAL EVERY 6 HOURS PRN
Status: DISCONTINUED | OUTPATIENT
Start: 2021-05-26 | End: 2021-05-27 | Stop reason: HOSPADM

## 2021-05-26 RX ORDER — LIDOCAINE HYDROCHLORIDE 10 MG/ML
1 INJECTION, SOLUTION EPIDURAL; INFILTRATION; INTRACAUDAL; PERINEURAL ONCE
Status: DISCONTINUED | OUTPATIENT
Start: 2021-05-26 | End: 2021-05-26

## 2021-05-26 RX ORDER — IBUPROFEN 200 MG
24 TABLET ORAL
Status: DISCONTINUED | OUTPATIENT
Start: 2021-05-26 | End: 2021-05-27 | Stop reason: HOSPADM

## 2021-05-26 RX ORDER — ONDANSETRON 2 MG/ML
4 INJECTION INTRAMUSCULAR; INTRAVENOUS DAILY PRN
Status: DISCONTINUED | OUTPATIENT
Start: 2021-05-26 | End: 2021-05-26 | Stop reason: HOSPADM

## 2021-05-26 RX ORDER — TRAZODONE HYDROCHLORIDE 50 MG/1
50 TABLET ORAL NIGHTLY
Status: DISCONTINUED | OUTPATIENT
Start: 2021-05-26 | End: 2021-05-27 | Stop reason: HOSPADM

## 2021-05-26 RX ORDER — PHENYLEPHRINE HYDROCHLORIDE 10 MG/ML
INJECTION INTRAVENOUS
Status: DISCONTINUED | OUTPATIENT
Start: 2021-05-26 | End: 2021-05-26

## 2021-05-26 RX ORDER — METHOCARBAMOL 500 MG/1
500 TABLET, FILM COATED ORAL 3 TIMES DAILY
Status: DISCONTINUED | OUTPATIENT
Start: 2021-05-26 | End: 2021-05-27 | Stop reason: HOSPADM

## 2021-05-26 RX ORDER — AMOXICILLIN 250 MG
2 CAPSULE ORAL NIGHTLY PRN
Status: DISCONTINUED | OUTPATIENT
Start: 2021-05-26 | End: 2021-05-27 | Stop reason: HOSPADM

## 2021-05-26 RX ORDER — OXYCODONE HCL 10 MG/1
10 TABLET, FILM COATED, EXTENDED RELEASE ORAL
Status: COMPLETED | OUTPATIENT
Start: 2021-05-26 | End: 2021-05-26

## 2021-05-26 RX ORDER — TAMSULOSIN HYDROCHLORIDE 0.4 MG/1
0.4 CAPSULE ORAL DAILY
Status: DISCONTINUED | OUTPATIENT
Start: 2021-05-26 | End: 2021-05-27 | Stop reason: HOSPADM

## 2021-05-26 RX ORDER — FUROSEMIDE 20 MG/1
20 TABLET ORAL 2 TIMES DAILY
Status: DISCONTINUED | OUTPATIENT
Start: 2021-05-26 | End: 2021-05-27 | Stop reason: HOSPADM

## 2021-05-26 RX ORDER — LEVOTHYROXINE SODIUM 100 UG/1
100 TABLET ORAL
Status: DISCONTINUED | OUTPATIENT
Start: 2021-05-27 | End: 2021-05-27 | Stop reason: HOSPADM

## 2021-05-26 RX ORDER — IBUPROFEN 200 MG
16 TABLET ORAL
Status: DISCONTINUED | OUTPATIENT
Start: 2021-05-26 | End: 2021-05-27 | Stop reason: HOSPADM

## 2021-05-26 RX ORDER — HYDROMORPHONE HYDROCHLORIDE 2 MG/ML
0.5 INJECTION, SOLUTION INTRAMUSCULAR; INTRAVENOUS; SUBCUTANEOUS EVERY 5 MIN PRN
Status: DISCONTINUED | OUTPATIENT
Start: 2021-05-26 | End: 2021-05-26 | Stop reason: HOSPADM

## 2021-05-26 RX ORDER — LIDOCAINE HYDROCHLORIDE AND EPINEPHRINE 10; 10 MG/ML; UG/ML
INJECTION, SOLUTION INFILTRATION; PERINEURAL
Status: DISCONTINUED | OUTPATIENT
Start: 2021-05-26 | End: 2021-05-26 | Stop reason: HOSPADM

## 2021-05-26 RX ORDER — PROCHLORPERAZINE EDISYLATE 5 MG/ML
5 INJECTION INTRAMUSCULAR; INTRAVENOUS EVERY 6 HOURS PRN
Status: DISCONTINUED | OUTPATIENT
Start: 2021-05-26 | End: 2021-05-27 | Stop reason: HOSPADM

## 2021-05-26 RX ORDER — SODIUM CHLORIDE, SODIUM LACTATE, POTASSIUM CHLORIDE, CALCIUM CHLORIDE 600; 310; 30; 20 MG/100ML; MG/100ML; MG/100ML; MG/100ML
INJECTION, SOLUTION INTRAVENOUS CONTINUOUS
Status: DISCONTINUED | OUTPATIENT
Start: 2021-05-26 | End: 2021-05-26

## 2021-05-26 RX ORDER — ACETAMINOPHEN 325 MG/1
650 TABLET ORAL EVERY 6 HOURS
Status: DISCONTINUED | OUTPATIENT
Start: 2021-05-26 | End: 2021-05-27 | Stop reason: HOSPADM

## 2021-05-26 RX ORDER — SODIUM CHLORIDE, SODIUM LACTATE, POTASSIUM CHLORIDE, CALCIUM CHLORIDE 600; 310; 30; 20 MG/100ML; MG/100ML; MG/100ML; MG/100ML
INJECTION, SOLUTION INTRAVENOUS CONTINUOUS
Status: DISCONTINUED | OUTPATIENT
Start: 2021-05-26 | End: 2021-05-27

## 2021-05-26 RX ORDER — LIDOCAINE HYDROCHLORIDE 20 MG/ML
INJECTION INTRAVENOUS
Status: DISCONTINUED | OUTPATIENT
Start: 2021-05-26 | End: 2021-05-26

## 2021-05-26 RX ORDER — GLUCAGON 1 MG
1 KIT INJECTION
Status: DISCONTINUED | OUTPATIENT
Start: 2021-05-26 | End: 2021-05-27 | Stop reason: HOSPADM

## 2021-05-26 RX ORDER — OXYCODONE HYDROCHLORIDE 5 MG/1
5 TABLET ORAL
Status: DISCONTINUED | OUTPATIENT
Start: 2021-05-26 | End: 2021-05-26 | Stop reason: HOSPADM

## 2021-05-26 RX ORDER — TRAMADOL HYDROCHLORIDE 50 MG/1
50 TABLET ORAL EVERY 6 HOURS
Status: DISCONTINUED | OUTPATIENT
Start: 2021-05-26 | End: 2021-05-27 | Stop reason: HOSPADM

## 2021-05-26 RX ORDER — ACETAMINOPHEN 325 MG/1
650 TABLET ORAL
Status: COMPLETED | OUTPATIENT
Start: 2021-05-26 | End: 2021-05-26

## 2021-05-26 RX ORDER — BISACODYL 10 MG
10 SUPPOSITORY, RECTAL RECTAL DAILY
Status: DISCONTINUED | OUTPATIENT
Start: 2021-05-26 | End: 2021-05-27 | Stop reason: HOSPADM

## 2021-05-26 RX ORDER — PANTOPRAZOLE SODIUM 40 MG/1
40 TABLET, DELAYED RELEASE ORAL DAILY
Status: DISCONTINUED | OUTPATIENT
Start: 2021-05-26 | End: 2021-05-27 | Stop reason: HOSPADM

## 2021-05-26 RX ORDER — FINASTERIDE 5 MG/1
5 TABLET, FILM COATED ORAL DAILY
Status: DISCONTINUED | OUTPATIENT
Start: 2021-05-26 | End: 2021-05-27 | Stop reason: HOSPADM

## 2021-05-26 RX ORDER — MUPIROCIN 20 MG/G
OINTMENT TOPICAL 2 TIMES DAILY
Status: DISCONTINUED | OUTPATIENT
Start: 2021-05-26 | End: 2021-05-27 | Stop reason: HOSPADM

## 2021-05-26 RX ORDER — CEFAZOLIN SODIUM 1 G/3ML
INJECTION, POWDER, FOR SOLUTION INTRAMUSCULAR; INTRAVENOUS
Status: DISCONTINUED | OUTPATIENT
Start: 2021-05-26 | End: 2021-05-26

## 2021-05-26 RX ORDER — FENTANYL CITRATE 50 UG/ML
INJECTION, SOLUTION INTRAMUSCULAR; INTRAVENOUS
Status: DISCONTINUED | OUTPATIENT
Start: 2021-05-26 | End: 2021-05-26

## 2021-05-26 RX ORDER — ROCURONIUM BROMIDE 10 MG/ML
INJECTION, SOLUTION INTRAVENOUS
Status: DISCONTINUED | OUTPATIENT
Start: 2021-05-26 | End: 2021-05-26

## 2021-05-26 RX ORDER — HYDROMORPHONE HYDROCHLORIDE 2 MG/ML
1 INJECTION, SOLUTION INTRAMUSCULAR; INTRAVENOUS; SUBCUTANEOUS
Status: DISCONTINUED | OUTPATIENT
Start: 2021-05-26 | End: 2021-05-27 | Stop reason: HOSPADM

## 2021-05-26 RX ORDER — INSULIN ASPART 100 [IU]/ML
0-5 INJECTION, SOLUTION INTRAVENOUS; SUBCUTANEOUS
Status: DISCONTINUED | OUTPATIENT
Start: 2021-05-26 | End: 2021-05-27 | Stop reason: HOSPADM

## 2021-05-26 RX ORDER — DEXAMETHASONE SODIUM PHOSPHATE 4 MG/ML
INJECTION, SOLUTION INTRA-ARTICULAR; INTRALESIONAL; INTRAMUSCULAR; INTRAVENOUS; SOFT TISSUE
Status: DISCONTINUED | OUTPATIENT
Start: 2021-05-26 | End: 2021-05-26

## 2021-05-26 RX ORDER — ONDANSETRON 2 MG/ML
INJECTION INTRAMUSCULAR; INTRAVENOUS
Status: DISCONTINUED | OUTPATIENT
Start: 2021-05-26 | End: 2021-05-26

## 2021-05-26 RX ORDER — VANCOMYCIN HYDROCHLORIDE 1 G/20ML
INJECTION, POWDER, LYOPHILIZED, FOR SOLUTION INTRAVENOUS
Status: DISCONTINUED | OUTPATIENT
Start: 2021-05-26 | End: 2021-05-26 | Stop reason: HOSPADM

## 2021-05-26 RX ORDER — PREGABALIN 75 MG/1
75 CAPSULE ORAL
Status: COMPLETED | OUTPATIENT
Start: 2021-05-26 | End: 2021-05-26

## 2021-05-26 RX ORDER — PROPOFOL 10 MG/ML
VIAL (ML) INTRAVENOUS
Status: DISCONTINUED | OUTPATIENT
Start: 2021-05-26 | End: 2021-05-26

## 2021-05-26 RX ORDER — CELECOXIB 100 MG/1
200 CAPSULE ORAL
Status: COMPLETED | OUTPATIENT
Start: 2021-05-26 | End: 2021-05-26

## 2021-05-26 RX ADMIN — PROPOFOL 100 MG: 10 INJECTION, EMULSION INTRAVENOUS at 08:05

## 2021-05-26 RX ADMIN — TRAMADOL HYDROCHLORIDE 50 MG: 50 TABLET ORAL at 05:05

## 2021-05-26 RX ADMIN — INSULIN ASPART 3 UNITS: 100 INJECTION, SOLUTION INTRAVENOUS; SUBCUTANEOUS at 05:05

## 2021-05-26 RX ADMIN — TAMSULOSIN HYDROCHLORIDE 0.4 MG: 0.4 CAPSULE ORAL at 04:05

## 2021-05-26 RX ADMIN — FENTANYL CITRATE 50 MCG: 50 INJECTION, SOLUTION INTRAMUSCULAR; INTRAVENOUS at 08:05

## 2021-05-26 RX ADMIN — PHENYLEPHRINE HYDROCHLORIDE 100 MCG: 10 INJECTION INTRAVENOUS at 08:05

## 2021-05-26 RX ADMIN — TRAZODONE HYDROCHLORIDE 50 MG: 50 TABLET ORAL at 09:05

## 2021-05-26 RX ADMIN — PHENYLEPHRINE HYDROCHLORIDE 200 MCG: 10 INJECTION INTRAVENOUS at 09:05

## 2021-05-26 RX ADMIN — LIDOCAINE HYDROCHLORIDE 100 MG: 20 INJECTION, SOLUTION INTRAVENOUS at 08:05

## 2021-05-26 RX ADMIN — PREGABALIN 75 MG: 75 CAPSULE ORAL at 06:05

## 2021-05-26 RX ADMIN — ACETAMINOPHEN 650 MG: 325 TABLET ORAL at 05:05

## 2021-05-26 RX ADMIN — SODIUM CHLORIDE, SODIUM LACTATE, POTASSIUM CHLORIDE, AND CALCIUM CHLORIDE: .6; .31; .03; .02 INJECTION, SOLUTION INTRAVENOUS at 09:05

## 2021-05-26 RX ADMIN — CELECOXIB 200 MG: 100 CAPSULE ORAL at 06:05

## 2021-05-26 RX ADMIN — SODIUM CHLORIDE, SODIUM LACTATE, POTASSIUM CHLORIDE, AND CALCIUM CHLORIDE: .6; .31; .03; .02 INJECTION, SOLUTION INTRAVENOUS at 06:05

## 2021-05-26 RX ADMIN — DEXAMETHASONE SODIUM PHOSPHATE 4 MG: 4 INJECTION, SOLUTION INTRA-ARTICULAR; INTRALESIONAL; INTRAMUSCULAR; INTRAVENOUS; SOFT TISSUE at 08:05

## 2021-05-26 RX ADMIN — SUCCINYLCHOLINE CHLORIDE 120 MG: 20 INJECTION, SOLUTION INTRAMUSCULAR; INTRAVENOUS at 08:05

## 2021-05-26 RX ADMIN — HEPARIN SODIUM 5000 UNITS: 5000 INJECTION INTRAVENOUS; SUBCUTANEOUS at 12:05

## 2021-05-26 RX ADMIN — CEFAZOLIN 2 G: 330 INJECTION, POWDER, FOR SOLUTION INTRAMUSCULAR; INTRAVENOUS at 08:05

## 2021-05-26 RX ADMIN — ROCURONIUM BROMIDE 10 MG: 10 INJECTION, SOLUTION INTRAVENOUS at 08:05

## 2021-05-26 RX ADMIN — METHOCARBAMOL TABLETS 500 MG: 500 TABLET, COATED ORAL at 04:05

## 2021-05-26 RX ADMIN — HEPARIN SODIUM 5000 UNITS: 5000 INJECTION INTRAVENOUS; SUBCUTANEOUS at 09:05

## 2021-05-26 RX ADMIN — PANTOPRAZOLE SODIUM 40 MG: 40 TABLET, DELAYED RELEASE ORAL at 04:05

## 2021-05-26 RX ADMIN — ACETAMINOPHEN 650 MG: 325 TABLET ORAL at 06:05

## 2021-05-26 RX ADMIN — METHOCARBAMOL TABLETS 500 MG: 500 TABLET, COATED ORAL at 09:05

## 2021-05-26 RX ADMIN — FINASTERIDE 5 MG: 5 TABLET, FILM COATED ORAL at 04:05

## 2021-05-26 RX ADMIN — FUROSEMIDE 20 MG: 20 TABLET ORAL at 09:05

## 2021-05-26 RX ADMIN — ACETAMINOPHEN 650 MG: 325 TABLET ORAL at 12:05

## 2021-05-26 RX ADMIN — FENTANYL CITRATE 50 MCG: 50 INJECTION, SOLUTION INTRAMUSCULAR; INTRAVENOUS at 09:05

## 2021-05-26 RX ADMIN — ONDANSETRON 8 MG: 2 INJECTION, SOLUTION INTRAMUSCULAR; INTRAVENOUS at 09:05

## 2021-05-26 RX ADMIN — OXYCODONE HYDROCHLORIDE 10 MG: 10 TABLET, FILM COATED, EXTENDED RELEASE ORAL at 06:05

## 2021-05-26 RX ADMIN — PHENYLEPHRINE HYDROCHLORIDE 100 MCG: 10 INJECTION INTRAVENOUS at 09:05

## 2021-05-26 RX ADMIN — MUPIROCIN: 20 OINTMENT TOPICAL at 09:05

## 2021-05-26 RX ADMIN — ACETAMINOPHEN 650 MG: 325 TABLET ORAL at 11:05

## 2021-05-27 ENCOUNTER — TELEPHONE (OUTPATIENT)
Dept: FAMILY MEDICINE | Facility: CLINIC | Age: 86
End: 2021-05-27

## 2021-05-27 VITALS
HEIGHT: 65 IN | HEART RATE: 59 BPM | SYSTOLIC BLOOD PRESSURE: 128 MMHG | BODY MASS INDEX: 25.75 KG/M2 | RESPIRATION RATE: 17 BRPM | TEMPERATURE: 98 F | DIASTOLIC BLOOD PRESSURE: 67 MMHG | WEIGHT: 154.56 LBS | OXYGEN SATURATION: 96 %

## 2021-05-27 LAB — POCT GLUCOSE: 157 MG/DL (ref 70–110)

## 2021-05-27 PROCEDURE — 99900035 HC TECH TIME PER 15 MIN (STAT)

## 2021-05-27 PROCEDURE — 25000003 PHARM REV CODE 250: Performed by: NEUROLOGICAL SURGERY

## 2021-05-27 PROCEDURE — 97116 GAIT TRAINING THERAPY: CPT | Performed by: PHYSICAL THERAPIST

## 2021-05-27 PROCEDURE — 97162 PT EVAL MOD COMPLEX 30 MIN: CPT | Performed by: PHYSICAL THERAPIST

## 2021-05-27 PROCEDURE — 94761 N-INVAS EAR/PLS OXIMETRY MLT: CPT

## 2021-05-27 PROCEDURE — 97165 OT EVAL LOW COMPLEX 30 MIN: CPT

## 2021-05-27 PROCEDURE — 94799 UNLISTED PULMONARY SVC/PX: CPT

## 2021-05-27 PROCEDURE — 63600175 PHARM REV CODE 636 W HCPCS: Performed by: NEUROLOGICAL SURGERY

## 2021-05-27 RX ORDER — TRAMADOL HYDROCHLORIDE 50 MG/1
50 TABLET ORAL EVERY 8 HOURS PRN
Qty: 42 TABLET | Refills: 0 | Status: SHIPPED | OUTPATIENT
Start: 2021-05-27 | End: 2022-02-02

## 2021-05-27 RX ADMIN — FINASTERIDE 5 MG: 5 TABLET, FILM COATED ORAL at 08:05

## 2021-05-27 RX ADMIN — HEPARIN SODIUM 5000 UNITS: 5000 INJECTION INTRAVENOUS; SUBCUTANEOUS at 08:05

## 2021-05-27 RX ADMIN — FUROSEMIDE 20 MG: 20 TABLET ORAL at 08:05

## 2021-05-27 RX ADMIN — PANTOPRAZOLE SODIUM 40 MG: 40 TABLET, DELAYED RELEASE ORAL at 08:05

## 2021-05-27 RX ADMIN — LEVOTHYROXINE SODIUM 100 MCG: 100 TABLET ORAL at 08:05

## 2021-05-27 RX ADMIN — METHOCARBAMOL TABLETS 500 MG: 500 TABLET, COATED ORAL at 08:05

## 2021-05-27 RX ADMIN — TAMSULOSIN HYDROCHLORIDE 0.4 MG: 0.4 CAPSULE ORAL at 08:05

## 2021-06-04 ENCOUNTER — OFFICE VISIT (OUTPATIENT)
Dept: NEUROSURGERY | Facility: CLINIC | Age: 86
End: 2021-06-04
Payer: MEDICARE

## 2021-06-04 VITALS — SYSTOLIC BLOOD PRESSURE: 153 MMHG | DIASTOLIC BLOOD PRESSURE: 56 MMHG | TEMPERATURE: 98 F | HEART RATE: 60 BPM

## 2021-06-04 DIAGNOSIS — Z98.890 S/P LUMBAR MICRODISCECTOMY: ICD-10-CM

## 2021-06-04 DIAGNOSIS — Z98.890 S/P LUMBAR LAMINECTOMY: Primary | ICD-10-CM

## 2021-06-04 PROCEDURE — 1125F AMNT PAIN NOTED PAIN PRSNT: CPT | Mod: S$GLB,,, | Performed by: PHYSICIAN ASSISTANT

## 2021-06-04 PROCEDURE — 99999 PR PBB SHADOW E&M-EST. PATIENT-LVL V: CPT | Mod: PBBFAC,,, | Performed by: PHYSICIAN ASSISTANT

## 2021-06-04 PROCEDURE — 1101F PT FALLS ASSESS-DOCD LE1/YR: CPT | Mod: CPTII,S$GLB,, | Performed by: PHYSICIAN ASSISTANT

## 2021-06-04 PROCEDURE — 1101F PR PT FALLS ASSESS DOC 0-1 FALLS W/OUT INJ PAST YR: ICD-10-PCS | Mod: CPTII,S$GLB,, | Performed by: PHYSICIAN ASSISTANT

## 2021-06-04 PROCEDURE — 99024 PR POST-OP FOLLOW-UP VISIT: ICD-10-PCS | Mod: S$GLB,,, | Performed by: PHYSICIAN ASSISTANT

## 2021-06-04 PROCEDURE — 1125F PR PAIN SEVERITY QUANTIFIED, PAIN PRESENT: ICD-10-PCS | Mod: S$GLB,,, | Performed by: PHYSICIAN ASSISTANT

## 2021-06-04 PROCEDURE — 3288F FALL RISK ASSESSMENT DOCD: CPT | Mod: CPTII,S$GLB,, | Performed by: PHYSICIAN ASSISTANT

## 2021-06-04 PROCEDURE — 99999 PR PBB SHADOW E&M-EST. PATIENT-LVL V: ICD-10-PCS | Mod: PBBFAC,,, | Performed by: PHYSICIAN ASSISTANT

## 2021-06-04 PROCEDURE — 99024 POSTOP FOLLOW-UP VISIT: CPT | Mod: S$GLB,,, | Performed by: PHYSICIAN ASSISTANT

## 2021-06-04 PROCEDURE — 3288F PR FALLS RISK ASSESSMENT DOCUMENTED: ICD-10-PCS | Mod: CPTII,S$GLB,, | Performed by: PHYSICIAN ASSISTANT

## 2021-06-28 DIAGNOSIS — I50.42 CHRONIC COMBINED SYSTOLIC AND DIASTOLIC CHF (CONGESTIVE HEART FAILURE): ICD-10-CM

## 2021-06-28 RX ORDER — FUROSEMIDE 20 MG/1
20 TABLET ORAL 2 TIMES DAILY
Qty: 180 TABLET | Refills: 3 | Status: SHIPPED | OUTPATIENT
Start: 2021-06-28 | End: 2022-06-28

## 2021-06-30 DIAGNOSIS — R49.0 HOARSENESS: Primary | ICD-10-CM

## 2021-06-30 DIAGNOSIS — R05.9 COUGH: ICD-10-CM

## 2021-07-09 RX ORDER — METFORMIN HYDROCHLORIDE 500 MG/1
500 TABLET, EXTENDED RELEASE ORAL DAILY
Qty: 90 TABLET | Refills: 3 | Status: SHIPPED | OUTPATIENT
Start: 2021-07-09 | End: 2022-07-09

## 2021-07-19 ENCOUNTER — OFFICE VISIT (OUTPATIENT)
Dept: NEUROSURGERY | Facility: CLINIC | Age: 86
End: 2021-07-19
Payer: MEDICARE

## 2021-07-19 VITALS
BODY MASS INDEX: 25.66 KG/M2 | WEIGHT: 154 LBS | DIASTOLIC BLOOD PRESSURE: 90 MMHG | SYSTOLIC BLOOD PRESSURE: 150 MMHG | HEIGHT: 65 IN | HEART RATE: 59 BPM | TEMPERATURE: 98 F

## 2021-07-19 DIAGNOSIS — Z98.890 S/P LUMBAR LAMINECTOMY: Primary | ICD-10-CM

## 2021-07-19 PROCEDURE — 3288F FALL RISK ASSESSMENT DOCD: CPT | Mod: CPTII,S$GLB,, | Performed by: PHYSICIAN ASSISTANT

## 2021-07-19 PROCEDURE — 99024 POSTOP FOLLOW-UP VISIT: CPT | Mod: S$GLB,,, | Performed by: PHYSICIAN ASSISTANT

## 2021-07-19 PROCEDURE — 1101F PR PT FALLS ASSESS DOC 0-1 FALLS W/OUT INJ PAST YR: ICD-10-PCS | Mod: CPTII,S$GLB,, | Performed by: PHYSICIAN ASSISTANT

## 2021-07-19 PROCEDURE — 99999 PR PBB SHADOW E&M-EST. PATIENT-LVL V: ICD-10-PCS | Mod: PBBFAC,,, | Performed by: PHYSICIAN ASSISTANT

## 2021-07-19 PROCEDURE — 99024 PR POST-OP FOLLOW-UP VISIT: ICD-10-PCS | Mod: S$GLB,,, | Performed by: PHYSICIAN ASSISTANT

## 2021-07-19 PROCEDURE — 1101F PT FALLS ASSESS-DOCD LE1/YR: CPT | Mod: CPTII,S$GLB,, | Performed by: PHYSICIAN ASSISTANT

## 2021-07-19 PROCEDURE — 99999 PR PBB SHADOW E&M-EST. PATIENT-LVL V: CPT | Mod: PBBFAC,,, | Performed by: PHYSICIAN ASSISTANT

## 2021-07-19 PROCEDURE — 3288F PR FALLS RISK ASSESSMENT DOCUMENTED: ICD-10-PCS | Mod: CPTII,S$GLB,, | Performed by: PHYSICIAN ASSISTANT

## 2021-07-19 RX ORDER — MELOXICAM 15 MG/1
15 TABLET ORAL DAILY PRN
Qty: 90 TABLET | Refills: 0 | Status: ON HOLD | OUTPATIENT
Start: 2021-07-19 | End: 2022-05-15 | Stop reason: HOSPADM

## 2021-07-28 ENCOUNTER — OFFICE VISIT (OUTPATIENT)
Dept: FAMILY MEDICINE | Facility: CLINIC | Age: 86
End: 2021-07-28
Payer: MEDICARE

## 2021-07-28 VITALS
OXYGEN SATURATION: 96 % | HEART RATE: 68 BPM | DIASTOLIC BLOOD PRESSURE: 50 MMHG | WEIGHT: 154.13 LBS | BODY MASS INDEX: 25.68 KG/M2 | SYSTOLIC BLOOD PRESSURE: 100 MMHG | HEIGHT: 65 IN

## 2021-07-28 DIAGNOSIS — D64.9 ANEMIA, UNSPECIFIED TYPE: ICD-10-CM

## 2021-07-28 DIAGNOSIS — G89.29 CHRONIC NECK PAIN: ICD-10-CM

## 2021-07-28 DIAGNOSIS — I10 ESSENTIAL HYPERTENSION: ICD-10-CM

## 2021-07-28 DIAGNOSIS — N18.30 STAGE 3 CHRONIC KIDNEY DISEASE, UNSPECIFIED WHETHER STAGE 3A OR 3B CKD: ICD-10-CM

## 2021-07-28 DIAGNOSIS — I50.42 CHRONIC COMBINED SYSTOLIC AND DIASTOLIC CHF (CONGESTIVE HEART FAILURE): ICD-10-CM

## 2021-07-28 DIAGNOSIS — E66.3 OVERWEIGHT (BMI 25.0-29.9): ICD-10-CM

## 2021-07-28 DIAGNOSIS — M54.2 CHRONIC NECK PAIN: ICD-10-CM

## 2021-07-28 DIAGNOSIS — E11.65 TYPE 2 DIABETES MELLITUS WITH HYPERGLYCEMIA, WITHOUT LONG-TERM CURRENT USE OF INSULIN: Primary | ICD-10-CM

## 2021-07-28 LAB — GLUCOSE SERPL-MCNC: 95 MG/DL (ref 70–110)

## 2021-07-28 PROCEDURE — 1126F PR PAIN SEVERITY QUANTIFIED, NO PAIN PRESENT: ICD-10-PCS | Mod: CPTII,S$GLB,, | Performed by: NURSE PRACTITIONER

## 2021-07-28 PROCEDURE — 3051F PR MOST RECENT HEMOGLOBIN A1C LEVEL 7.0 - < 8.0%: ICD-10-PCS | Mod: CPTII,S$GLB,, | Performed by: NURSE PRACTITIONER

## 2021-07-28 PROCEDURE — 3051F HG A1C>EQUAL 7.0%<8.0%: CPT | Mod: CPTII,S$GLB,, | Performed by: NURSE PRACTITIONER

## 2021-07-28 PROCEDURE — 99215 OFFICE O/P EST HI 40 MIN: CPT | Mod: S$GLB,,, | Performed by: NURSE PRACTITIONER

## 2021-07-28 PROCEDURE — 82962 POCT GLUCOSE, HAND-HELD DEVICE: ICD-10-PCS | Mod: S$GLB,,, | Performed by: NURSE PRACTITIONER

## 2021-07-28 PROCEDURE — 1160F PR REVIEW ALL MEDS BY PRESCRIBER/CLIN PHARMACIST DOCUMENTED: ICD-10-PCS | Mod: CPTII,S$GLB,, | Performed by: NURSE PRACTITIONER

## 2021-07-28 PROCEDURE — 82962 GLUCOSE BLOOD TEST: CPT | Mod: S$GLB,,, | Performed by: NURSE PRACTITIONER

## 2021-07-28 PROCEDURE — 1159F MED LIST DOCD IN RCRD: CPT | Mod: CPTII,S$GLB,, | Performed by: NURSE PRACTITIONER

## 2021-07-28 PROCEDURE — 99499 RISK ADDL DX/OHS AUDIT: ICD-10-PCS | Mod: S$GLB,,, | Performed by: NURSE PRACTITIONER

## 2021-07-28 PROCEDURE — 1160F RVW MEDS BY RX/DR IN RCRD: CPT | Mod: CPTII,S$GLB,, | Performed by: NURSE PRACTITIONER

## 2021-07-28 PROCEDURE — 99215 PR OFFICE/OUTPT VISIT, EST, LEVL V, 40-54 MIN: ICD-10-PCS | Mod: S$GLB,,, | Performed by: NURSE PRACTITIONER

## 2021-07-28 PROCEDURE — 1101F PT FALLS ASSESS-DOCD LE1/YR: CPT | Mod: CPTII,S$GLB,, | Performed by: NURSE PRACTITIONER

## 2021-07-28 PROCEDURE — 1101F PR PT FALLS ASSESS DOC 0-1 FALLS W/OUT INJ PAST YR: ICD-10-PCS | Mod: CPTII,S$GLB,, | Performed by: NURSE PRACTITIONER

## 2021-07-28 PROCEDURE — 99499 UNLISTED E&M SERVICE: CPT | Mod: S$GLB,,, | Performed by: NURSE PRACTITIONER

## 2021-07-28 PROCEDURE — 3288F PR FALLS RISK ASSESSMENT DOCUMENTED: ICD-10-PCS | Mod: CPTII,S$GLB,, | Performed by: NURSE PRACTITIONER

## 2021-07-28 PROCEDURE — 99999 PR PBB SHADOW E&M-EST. PATIENT-LVL IV: ICD-10-PCS | Mod: PBBFAC,,, | Performed by: NURSE PRACTITIONER

## 2021-07-28 PROCEDURE — 3288F FALL RISK ASSESSMENT DOCD: CPT | Mod: CPTII,S$GLB,, | Performed by: NURSE PRACTITIONER

## 2021-07-28 PROCEDURE — 1126F AMNT PAIN NOTED NONE PRSNT: CPT | Mod: CPTII,S$GLB,, | Performed by: NURSE PRACTITIONER

## 2021-07-28 PROCEDURE — 1159F PR MEDICATION LIST DOCUMENTED IN MEDICAL RECORD: ICD-10-PCS | Mod: CPTII,S$GLB,, | Performed by: NURSE PRACTITIONER

## 2021-07-28 PROCEDURE — 99999 PR PBB SHADOW E&M-EST. PATIENT-LVL IV: CPT | Mod: PBBFAC,,, | Performed by: NURSE PRACTITIONER

## 2021-07-28 RX ORDER — IRBESARTAN 150 MG/1
150 TABLET ORAL DAILY
Qty: 90 TABLET | Refills: 2 | Status: SHIPPED | OUTPATIENT
Start: 2021-07-28 | End: 2022-04-29

## 2021-07-28 RX ORDER — MONTELUKAST SODIUM 10 MG/1
10 TABLET ORAL DAILY
COMMUNITY
Start: 2021-07-19 | End: 2021-07-28

## 2021-07-28 RX ORDER — ACETAMINOPHEN, DIPHENHYDRAMINE HCL, PHENYLEPHRINE HCL 325; 25; 5 MG/1; MG/1; MG/1
1 TABLET ORAL NIGHTLY PRN
COMMUNITY

## 2021-07-28 RX ORDER — GLIMEPIRIDE 2 MG/1
2 TABLET ORAL
Qty: 90 TABLET | Refills: 3
Start: 2021-07-28 | End: 2022-07-28

## 2021-08-02 ENCOUNTER — CLINICAL SUPPORT (OUTPATIENT)
Dept: REHABILITATION | Facility: HOSPITAL | Age: 86
End: 2021-08-02
Payer: MEDICARE

## 2021-08-02 DIAGNOSIS — Z74.09 IMPAIRED MOBILITY: ICD-10-CM

## 2021-08-02 DIAGNOSIS — R53.1 DECREASED STRENGTH: ICD-10-CM

## 2021-08-02 DIAGNOSIS — G89.29 CHRONIC BILATERAL LOW BACK PAIN WITH BILATERAL SCIATICA: Primary | ICD-10-CM

## 2021-08-02 DIAGNOSIS — M54.42 CHRONIC BILATERAL LOW BACK PAIN WITH BILATERAL SCIATICA: Primary | ICD-10-CM

## 2021-08-02 DIAGNOSIS — M54.41 CHRONIC BILATERAL LOW BACK PAIN WITH BILATERAL SCIATICA: Primary | ICD-10-CM

## 2021-08-02 PROCEDURE — 97110 THERAPEUTIC EXERCISES: CPT | Mod: PN

## 2021-08-02 PROCEDURE — 97162 PT EVAL MOD COMPLEX 30 MIN: CPT | Mod: PN

## 2021-08-03 PROBLEM — G89.29 CHRONIC BILATERAL LOW BACK PAIN WITH BILATERAL SCIATICA: Status: ACTIVE | Noted: 2021-08-03

## 2021-08-03 PROBLEM — Z74.09 IMPAIRED MOBILITY: Status: ACTIVE | Noted: 2021-08-03

## 2021-08-03 PROBLEM — R53.1 DECREASED STRENGTH: Status: ACTIVE | Noted: 2021-08-03

## 2021-08-03 PROBLEM — M54.41 CHRONIC BILATERAL LOW BACK PAIN WITH BILATERAL SCIATICA: Status: ACTIVE | Noted: 2021-08-03

## 2021-08-03 PROBLEM — M54.42 CHRONIC BILATERAL LOW BACK PAIN WITH BILATERAL SCIATICA: Status: ACTIVE | Noted: 2021-08-03

## 2021-08-09 ENCOUNTER — TELEPHONE (OUTPATIENT)
Dept: REHABILITATION | Facility: HOSPITAL | Age: 86
End: 2021-08-09

## 2021-08-10 ENCOUNTER — CLINICAL SUPPORT (OUTPATIENT)
Dept: REHABILITATION | Facility: HOSPITAL | Age: 86
End: 2021-08-10
Payer: MEDICARE

## 2021-08-10 DIAGNOSIS — M54.41 CHRONIC BILATERAL LOW BACK PAIN WITH BILATERAL SCIATICA: Primary | ICD-10-CM

## 2021-08-10 DIAGNOSIS — G89.29 CHRONIC BILATERAL LOW BACK PAIN WITH BILATERAL SCIATICA: Primary | ICD-10-CM

## 2021-08-10 DIAGNOSIS — Z74.09 IMPAIRED MOBILITY: ICD-10-CM

## 2021-08-10 DIAGNOSIS — M54.42 CHRONIC BILATERAL LOW BACK PAIN WITH BILATERAL SCIATICA: Primary | ICD-10-CM

## 2021-08-10 DIAGNOSIS — R53.1 DECREASED STRENGTH: ICD-10-CM

## 2021-08-10 PROCEDURE — 97110 THERAPEUTIC EXERCISES: CPT | Mod: PN

## 2021-08-10 PROCEDURE — 97112 NEUROMUSCULAR REEDUCATION: CPT | Mod: PN

## 2021-08-13 ENCOUNTER — CLINICAL SUPPORT (OUTPATIENT)
Dept: REHABILITATION | Facility: HOSPITAL | Age: 86
End: 2021-08-13
Payer: MEDICARE

## 2021-08-13 DIAGNOSIS — M54.41 CHRONIC BILATERAL LOW BACK PAIN WITH BILATERAL SCIATICA: ICD-10-CM

## 2021-08-13 DIAGNOSIS — G89.29 CHRONIC BILATERAL LOW BACK PAIN WITH BILATERAL SCIATICA: ICD-10-CM

## 2021-08-13 DIAGNOSIS — R53.1 DECREASED STRENGTH: ICD-10-CM

## 2021-08-13 DIAGNOSIS — Z74.09 IMPAIRED MOBILITY: ICD-10-CM

## 2021-08-13 DIAGNOSIS — M54.42 CHRONIC BILATERAL LOW BACK PAIN WITH BILATERAL SCIATICA: ICD-10-CM

## 2021-08-13 PROCEDURE — 97112 NEUROMUSCULAR REEDUCATION: CPT | Mod: PN

## 2021-08-13 PROCEDURE — 97110 THERAPEUTIC EXERCISES: CPT | Mod: PN

## 2021-08-16 ENCOUNTER — CLINICAL SUPPORT (OUTPATIENT)
Dept: REHABILITATION | Facility: HOSPITAL | Age: 86
End: 2021-08-16
Payer: MEDICARE

## 2021-08-16 DIAGNOSIS — G89.29 CHRONIC BILATERAL LOW BACK PAIN WITH BILATERAL SCIATICA: ICD-10-CM

## 2021-08-16 DIAGNOSIS — M54.42 CHRONIC BILATERAL LOW BACK PAIN WITH BILATERAL SCIATICA: ICD-10-CM

## 2021-08-16 DIAGNOSIS — R53.1 DECREASED STRENGTH: ICD-10-CM

## 2021-08-16 DIAGNOSIS — Z74.09 IMPAIRED MOBILITY: ICD-10-CM

## 2021-08-16 DIAGNOSIS — M54.41 CHRONIC BILATERAL LOW BACK PAIN WITH BILATERAL SCIATICA: ICD-10-CM

## 2021-08-16 PROCEDURE — 97110 THERAPEUTIC EXERCISES: CPT | Mod: PN

## 2021-08-16 PROCEDURE — 97112 NEUROMUSCULAR REEDUCATION: CPT | Mod: PN

## 2021-08-17 ENCOUNTER — DOCUMENT SCAN (OUTPATIENT)
Dept: HOME HEALTH SERVICES | Facility: HOSPITAL | Age: 86
End: 2021-08-17
Payer: MEDICARE

## 2021-08-18 ENCOUNTER — CLINICAL SUPPORT (OUTPATIENT)
Dept: REHABILITATION | Facility: HOSPITAL | Age: 86
End: 2021-08-18
Payer: MEDICARE

## 2021-08-18 DIAGNOSIS — G89.29 CHRONIC BILATERAL LOW BACK PAIN WITH BILATERAL SCIATICA: ICD-10-CM

## 2021-08-18 DIAGNOSIS — M54.42 CHRONIC BILATERAL LOW BACK PAIN WITH BILATERAL SCIATICA: ICD-10-CM

## 2021-08-18 DIAGNOSIS — R53.1 DECREASED STRENGTH: ICD-10-CM

## 2021-08-18 DIAGNOSIS — M54.41 CHRONIC BILATERAL LOW BACK PAIN WITH BILATERAL SCIATICA: ICD-10-CM

## 2021-08-18 DIAGNOSIS — Z74.09 IMPAIRED MOBILITY: ICD-10-CM

## 2021-08-18 PROCEDURE — 97110 THERAPEUTIC EXERCISES: CPT | Mod: PN

## 2021-08-18 PROCEDURE — 97112 NEUROMUSCULAR REEDUCATION: CPT | Mod: PN

## 2021-08-24 ENCOUNTER — CLINICAL SUPPORT (OUTPATIENT)
Dept: REHABILITATION | Facility: HOSPITAL | Age: 86
End: 2021-08-24
Payer: MEDICARE

## 2021-08-24 DIAGNOSIS — M54.41 CHRONIC BILATERAL LOW BACK PAIN WITH BILATERAL SCIATICA: ICD-10-CM

## 2021-08-24 DIAGNOSIS — Z74.09 IMPAIRED MOBILITY: ICD-10-CM

## 2021-08-24 DIAGNOSIS — M54.42 CHRONIC BILATERAL LOW BACK PAIN WITH BILATERAL SCIATICA: ICD-10-CM

## 2021-08-24 DIAGNOSIS — G89.29 CHRONIC BILATERAL LOW BACK PAIN WITH BILATERAL SCIATICA: ICD-10-CM

## 2021-08-24 DIAGNOSIS — R53.1 DECREASED STRENGTH: ICD-10-CM

## 2021-08-24 PROCEDURE — 97110 THERAPEUTIC EXERCISES: CPT | Mod: PN

## 2021-08-24 PROCEDURE — 97112 NEUROMUSCULAR REEDUCATION: CPT | Mod: PN

## 2021-09-16 ENCOUNTER — TELEPHONE (OUTPATIENT)
Dept: REHABILITATION | Facility: HOSPITAL | Age: 86
End: 2021-09-16

## 2021-09-23 ENCOUNTER — TELEPHONE (OUTPATIENT)
Dept: REHABILITATION | Facility: HOSPITAL | Age: 86
End: 2021-09-23

## 2021-10-01 ENCOUNTER — DOCUMENTATION ONLY (OUTPATIENT)
Dept: REHABILITATION | Facility: HOSPITAL | Age: 86
End: 2021-10-01

## 2021-10-01 PROBLEM — M54.42 CHRONIC BILATERAL LOW BACK PAIN WITH BILATERAL SCIATICA: Status: RESOLVED | Noted: 2021-08-03 | Resolved: 2021-10-01

## 2021-10-01 PROBLEM — G89.29 CHRONIC BILATERAL LOW BACK PAIN WITH BILATERAL SCIATICA: Status: RESOLVED | Noted: 2021-08-03 | Resolved: 2021-10-01

## 2021-10-01 PROBLEM — Z74.09 IMPAIRED MOBILITY: Status: RESOLVED | Noted: 2021-08-03 | Resolved: 2021-10-01

## 2021-10-01 PROBLEM — M54.41 CHRONIC BILATERAL LOW BACK PAIN WITH BILATERAL SCIATICA: Status: RESOLVED | Noted: 2021-08-03 | Resolved: 2021-10-01

## 2021-10-01 PROBLEM — R53.1 DECREASED STRENGTH: Status: RESOLVED | Noted: 2021-08-03 | Resolved: 2021-10-01

## 2021-12-29 ENCOUNTER — LAB VISIT (OUTPATIENT)
Dept: PRIMARY CARE CLINIC | Facility: OTHER | Age: 86
End: 2021-12-29
Payer: MEDICARE

## 2021-12-29 DIAGNOSIS — R05.9 COUGH: ICD-10-CM

## 2021-12-29 DIAGNOSIS — Z11.52 ENCOUNTER FOR SCREENING FOR COVID-19: Primary | ICD-10-CM

## 2021-12-29 PROCEDURE — U0003 INFECTIOUS AGENT DETECTION BY NUCLEIC ACID (DNA OR RNA); SEVERE ACUTE RESPIRATORY SYNDROME CORONAVIRUS 2 (SARS-COV-2) (CORONAVIRUS DISEASE [COVID-19]), AMPLIFIED PROBE TECHNIQUE, MAKING USE OF HIGH THROUGHPUT TECHNOLOGIES AS DESCRIBED BY CMS-2020-01-R: HCPCS | Performed by: FAMILY MEDICINE

## 2021-12-31 ENCOUNTER — TELEPHONE (OUTPATIENT)
Dept: FAMILY MEDICINE | Facility: CLINIC | Age: 86
End: 2021-12-31
Payer: MEDICARE

## 2021-12-31 LAB
SARS-COV-2 RNA RESP QL NAA+PROBE: NOT DETECTED
SARS-COV-2- CYCLE NUMBER: NORMAL

## 2022-01-06 ENCOUNTER — IMMUNIZATION (OUTPATIENT)
Dept: INTERNAL MEDICINE | Facility: CLINIC | Age: 87
End: 2022-01-06
Payer: MEDICARE

## 2022-01-06 DIAGNOSIS — Z23 NEED FOR VACCINATION: Primary | ICD-10-CM

## 2022-01-06 PROCEDURE — 0003A COVID-19, MRNA, LNP-S, PF, 30 MCG/0.3 ML DOSE VACCINE: CPT | Mod: PBBFAC | Performed by: FAMILY MEDICINE

## 2022-01-19 ENCOUNTER — LAB VISIT (OUTPATIENT)
Dept: PRIMARY CARE CLINIC | Facility: CLINIC | Age: 87
End: 2022-01-19
Payer: MEDICARE

## 2022-01-19 DIAGNOSIS — Z20.822 CONTACT WITH AND (SUSPECTED) EXPOSURE TO COVID-19: ICD-10-CM

## 2022-01-19 LAB
CTP QC/QA: YES
SARS-COV-2 AG RESP QL IA.RAPID: POSITIVE

## 2022-01-19 PROCEDURE — 87811 SARS-COV-2 COVID19 W/OPTIC: CPT

## 2022-01-24 ENCOUNTER — LAB VISIT (OUTPATIENT)
Dept: PRIMARY CARE CLINIC | Facility: CLINIC | Age: 87
End: 2022-01-24
Payer: MEDICARE

## 2022-01-24 DIAGNOSIS — Z20.822 CONTACT WITH AND (SUSPECTED) EXPOSURE TO COVID-19: ICD-10-CM

## 2022-01-24 LAB
CTP QC/QA: YES
SARS-COV-2 AG RESP QL IA.RAPID: POSITIVE

## 2022-01-24 PROCEDURE — 87811 SARS-COV-2 COVID19 W/OPTIC: CPT

## 2022-02-02 ENCOUNTER — OFFICE VISIT (OUTPATIENT)
Dept: FAMILY MEDICINE | Facility: CLINIC | Age: 87
End: 2022-02-02
Payer: MEDICARE

## 2022-02-02 VITALS
DIASTOLIC BLOOD PRESSURE: 50 MMHG | HEIGHT: 65 IN | SYSTOLIC BLOOD PRESSURE: 140 MMHG | HEART RATE: 61 BPM | OXYGEN SATURATION: 99 % | WEIGHT: 150.81 LBS | BODY MASS INDEX: 25.13 KG/M2

## 2022-02-02 DIAGNOSIS — N18.30 STAGE 3 CHRONIC KIDNEY DISEASE, UNSPECIFIED WHETHER STAGE 3A OR 3B CKD: ICD-10-CM

## 2022-02-02 DIAGNOSIS — M53.3 SI (SACROILIAC) JOINT DYSFUNCTION: ICD-10-CM

## 2022-02-02 DIAGNOSIS — I11.9 HYPERTENSIVE HEART DISEASE WITHOUT HEART FAILURE: ICD-10-CM

## 2022-02-02 DIAGNOSIS — E03.9 ACQUIRED HYPOTHYROIDISM: ICD-10-CM

## 2022-02-02 DIAGNOSIS — E66.3 OVERWEIGHT (BMI 25.0-29.9): ICD-10-CM

## 2022-02-02 DIAGNOSIS — R26.9 ABNORMALITY OF GAIT AND MOBILITY: ICD-10-CM

## 2022-02-02 DIAGNOSIS — N40.0 BENIGN PROSTATIC HYPERPLASIA, UNSPECIFIED WHETHER LOWER URINARY TRACT SYMPTOMS PRESENT: ICD-10-CM

## 2022-02-02 DIAGNOSIS — I10 ESSENTIAL HYPERTENSION: ICD-10-CM

## 2022-02-02 DIAGNOSIS — M48.062 SPINAL STENOSIS OF LUMBAR REGION WITH NEUROGENIC CLAUDICATION: ICD-10-CM

## 2022-02-02 DIAGNOSIS — I50.42 CHRONIC COMBINED SYSTOLIC AND DIASTOLIC CHF (CONGESTIVE HEART FAILURE): ICD-10-CM

## 2022-02-02 DIAGNOSIS — M15.9 GENERALIZED OSTEOARTHRITIS: ICD-10-CM

## 2022-02-02 DIAGNOSIS — Z00.00 ENCOUNTER FOR PREVENTIVE HEALTH EXAMINATION: Primary | ICD-10-CM

## 2022-02-02 DIAGNOSIS — E11.65 TYPE 2 DIABETES MELLITUS WITH HYPERGLYCEMIA, WITHOUT LONG-TERM CURRENT USE OF INSULIN: ICD-10-CM

## 2022-02-02 DIAGNOSIS — I06.1 RHEUMATIC AORTIC VALVE INSUFFICIENCY: ICD-10-CM

## 2022-02-02 DIAGNOSIS — G89.4 CHRONIC PAIN SYNDROME: ICD-10-CM

## 2022-02-02 DIAGNOSIS — Z74.09 OTHER REDUCED MOBILITY: ICD-10-CM

## 2022-02-02 DIAGNOSIS — I70.0 ABDOMINAL AORTIC ATHEROSCLEROSIS: ICD-10-CM

## 2022-02-02 DIAGNOSIS — J84.10 CALCIFIED GRANULOMA OF LUNG: ICD-10-CM

## 2022-02-02 PROBLEM — J98.4 CALCIFIED GRANULOMA OF LUNG: Status: ACTIVE | Noted: 2022-02-02

## 2022-02-02 PROBLEM — R07.9 CHEST PAIN OF UNCERTAIN ETIOLOGY: Status: RESOLVED | Noted: 2018-11-27 | Resolved: 2022-02-02

## 2022-02-02 PROBLEM — E11.49 TYPE 2 OR UNSPECIFIED TYPE DIABETES MELLITUS WITH NEUROLOGICAL MANIFESTATIONS: Status: RESOLVED | Noted: 2020-09-25 | Resolved: 2022-02-02

## 2022-02-02 PROCEDURE — G0439 PR MEDICARE ANNUAL WELLNESS SUBSEQUENT VISIT: ICD-10-PCS | Mod: S$GLB,,, | Performed by: NURSE PRACTITIONER

## 2022-02-02 PROCEDURE — G0439 PPPS, SUBSEQ VISIT: HCPCS | Mod: S$GLB,,, | Performed by: NURSE PRACTITIONER

## 2022-02-02 PROCEDURE — 3051F HG A1C>EQUAL 7.0%<8.0%: CPT | Mod: CPTII,S$GLB,, | Performed by: NURSE PRACTITIONER

## 2022-02-02 PROCEDURE — 1126F AMNT PAIN NOTED NONE PRSNT: CPT | Mod: CPTII,S$GLB,, | Performed by: NURSE PRACTITIONER

## 2022-02-02 PROCEDURE — 1126F PR PAIN SEVERITY QUANTIFIED, NO PAIN PRESENT: ICD-10-PCS | Mod: CPTII,S$GLB,, | Performed by: NURSE PRACTITIONER

## 2022-02-02 PROCEDURE — 99499 UNLISTED E&M SERVICE: CPT | Mod: S$GLB,,, | Performed by: NURSE PRACTITIONER

## 2022-02-02 PROCEDURE — 1100F PR PT FALLS ASSESS DOC 2+ FALLS/FALL W/INJURY/YR: ICD-10-PCS | Mod: CPTII,S$GLB,, | Performed by: NURSE PRACTITIONER

## 2022-02-02 PROCEDURE — 3288F PR FALLS RISK ASSESSMENT DOCUMENTED: ICD-10-PCS | Mod: CPTII,S$GLB,, | Performed by: NURSE PRACTITIONER

## 2022-02-02 PROCEDURE — 99999 PR PBB SHADOW E&M-EST. PATIENT-LVL III: ICD-10-PCS | Mod: PBBFAC,,, | Performed by: NURSE PRACTITIONER

## 2022-02-02 PROCEDURE — 1170F FXNL STATUS ASSESSED: CPT | Mod: CPTII,S$GLB,, | Performed by: NURSE PRACTITIONER

## 2022-02-02 PROCEDURE — 3288F FALL RISK ASSESSMENT DOCD: CPT | Mod: CPTII,S$GLB,, | Performed by: NURSE PRACTITIONER

## 2022-02-02 PROCEDURE — 99999 PR PBB SHADOW E&M-EST. PATIENT-LVL III: CPT | Mod: PBBFAC,,, | Performed by: NURSE PRACTITIONER

## 2022-02-02 PROCEDURE — 1170F PR FUNCTIONAL STATUS ASSESSED: ICD-10-PCS | Mod: CPTII,S$GLB,, | Performed by: NURSE PRACTITIONER

## 2022-02-02 PROCEDURE — 1100F PTFALLS ASSESS-DOCD GE2>/YR: CPT | Mod: CPTII,S$GLB,, | Performed by: NURSE PRACTITIONER

## 2022-02-02 PROCEDURE — 3051F PR MOST RECENT HEMOGLOBIN A1C LEVEL 7.0 - < 8.0%: ICD-10-PCS | Mod: CPTII,S$GLB,, | Performed by: NURSE PRACTITIONER

## 2022-02-02 PROCEDURE — 99499 RISK ADDL DX/OHS AUDIT: ICD-10-PCS | Mod: S$GLB,,, | Performed by: NURSE PRACTITIONER

## 2022-02-02 RX ORDER — BETAMETHASONE DIPROPIONATE 0.5 MG/G
1 LOTION TOPICAL NIGHTLY PRN
COMMUNITY
Start: 2021-12-29

## 2022-02-02 RX ORDER — TAMSULOSIN HYDROCHLORIDE 0.4 MG/1
0.4 CAPSULE ORAL DAILY
Qty: 90 CAPSULE | Refills: 3 | Status: SHIPPED | OUTPATIENT
Start: 2022-02-02 | End: 2023-02-11

## 2022-02-02 RX ORDER — FINASTERIDE 5 MG/1
5 TABLET, FILM COATED ORAL DAILY
Qty: 90 TABLET | Refills: 3 | Status: SHIPPED | OUTPATIENT
Start: 2022-02-02 | End: 2023-01-30
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
## 2022-02-02 NOTE — PATIENT INSTRUCTIONS
Counseling and Referral of Other Preventative  (Italic type indicates deductible and co-insurance are waived)    Patient Name: Vince Martinez  Today's Date: 2/2/2022    Health Maintenance       Date Due Completion Date    Diabetes Urine Screening 06/08/2021 6/8/2020    Hemoglobin A1c 11/07/2021 5/7/2021    Foot Exam 03/10/2022 3/10/2021 (Done)    Override on 3/10/2021: Done    Eye Exam 02/09/2022 (Originally 11/25/2021) 11/25/2020    Shingles Vaccine (3 of 3) 03/04/2022 (Originally 9/14/2020) 7/20/2020    Lipid Panel 05/07/2022 5/7/2021    TETANUS VACCINE 07/20/2030 7/20/2020        Orders Placed This Encounter   Procedures    Comprehensive Metabolic Panel    CBC Auto Differential    Lipid Panel    TSH    Urinalysis    Hemoglobin A1C    Microalbumin/Creatinine Ratio, Urine     The following information is provided to all patients.  This information is to help you find resources for any of the problems found today that may be affecting your health:                Living healthy guide: www.[facility].[place].gov      Understanding Diabetes: www.diabetes.org      Eating healthy: www.cdc.gov/healthyweight      CDC home safety checklist: www.cdc.gov/steadi/patient.html      Agency on Aging: www.goea.[place].gov      Alcoholics anonymous (AA): www.aa.org      Physical Activity: www.ergino.nih.gov/xi5wvll      Tobacco use: www.quitwithusla.org

## 2022-02-05 ENCOUNTER — LAB VISIT (OUTPATIENT)
Dept: LAB | Facility: HOSPITAL | Age: 87
End: 2022-02-05
Attending: NURSE PRACTITIONER
Payer: MEDICARE

## 2022-02-05 DIAGNOSIS — I10 ESSENTIAL HYPERTENSION: ICD-10-CM

## 2022-02-05 DIAGNOSIS — E11.65 TYPE 2 DIABETES MELLITUS WITH HYPERGLYCEMIA, WITHOUT LONG-TERM CURRENT USE OF INSULIN: ICD-10-CM

## 2022-02-05 LAB
ALBUMIN SERPL BCP-MCNC: 3.7 G/DL (ref 3.5–5.2)
ALP SERPL-CCNC: 56 U/L (ref 55–135)
ALT SERPL W/O P-5'-P-CCNC: 27 U/L (ref 10–44)
ANION GAP SERPL CALC-SCNC: 10 MMOL/L (ref 8–16)
AST SERPL-CCNC: 30 U/L (ref 10–40)
BASOPHILS # BLD AUTO: 0.04 K/UL (ref 0–0.2)
BASOPHILS NFR BLD: 1 % (ref 0–1.9)
BILIRUB SERPL-MCNC: 0.4 MG/DL (ref 0.1–1)
BUN SERPL-MCNC: 35 MG/DL (ref 10–30)
CALCIUM SERPL-MCNC: 9.3 MG/DL (ref 8.7–10.5)
CHLORIDE SERPL-SCNC: 106 MMOL/L (ref 95–110)
CHOLEST SERPL-MCNC: 196 MG/DL (ref 120–199)
CHOLEST/HDLC SERPL: 4.3 {RATIO} (ref 2–5)
CO2 SERPL-SCNC: 25 MMOL/L (ref 23–29)
CREAT SERPL-MCNC: 1.6 MG/DL (ref 0.5–1.4)
DIFFERENTIAL METHOD: ABNORMAL
EOSINOPHIL # BLD AUTO: 0.3 K/UL (ref 0–0.5)
EOSINOPHIL NFR BLD: 8 % (ref 0–8)
ERYTHROCYTE [DISTWIDTH] IN BLOOD BY AUTOMATED COUNT: 13 % (ref 11.5–14.5)
EST. GFR  (AFRICAN AMERICAN): 42.9 ML/MIN/1.73 M^2
EST. GFR  (NON AFRICAN AMERICAN): 37.1 ML/MIN/1.73 M^2
ESTIMATED AVG GLUCOSE: 177 MG/DL (ref 68–131)
GLUCOSE SERPL-MCNC: 149 MG/DL (ref 70–110)
HBA1C MFR BLD: 7.8 % (ref 4–5.6)
HCT VFR BLD AUTO: 33.3 % (ref 40–54)
HDLC SERPL-MCNC: 46 MG/DL (ref 40–75)
HDLC SERPL: 23.5 % (ref 20–50)
HGB BLD-MCNC: 10.9 G/DL (ref 14–18)
IMM GRANULOCYTES # BLD AUTO: 0.01 K/UL (ref 0–0.04)
IMM GRANULOCYTES NFR BLD AUTO: 0.2 % (ref 0–0.5)
LDLC SERPL CALC-MCNC: 123 MG/DL (ref 63–159)
LYMPHOCYTES # BLD AUTO: 1.8 K/UL (ref 1–4.8)
LYMPHOCYTES NFR BLD: 45 % (ref 18–48)
MCH RBC QN AUTO: 32.5 PG (ref 27–31)
MCHC RBC AUTO-ENTMCNC: 32.7 G/DL (ref 32–36)
MCV RBC AUTO: 99 FL (ref 82–98)
MONOCYTES # BLD AUTO: 0.4 K/UL (ref 0.3–1)
MONOCYTES NFR BLD: 10.4 % (ref 4–15)
NEUTROPHILS # BLD AUTO: 1.4 K/UL (ref 1.8–7.7)
NEUTROPHILS NFR BLD: 35.4 % (ref 38–73)
NONHDLC SERPL-MCNC: 150 MG/DL
NRBC BLD-RTO: 0 /100 WBC
PLATELET # BLD AUTO: 176 K/UL (ref 150–450)
PMV BLD AUTO: 10.9 FL (ref 9.2–12.9)
POTASSIUM SERPL-SCNC: 4.9 MMOL/L (ref 3.5–5.1)
PROT SERPL-MCNC: 7 G/DL (ref 6–8.4)
RBC # BLD AUTO: 3.35 M/UL (ref 4.6–6.2)
SODIUM SERPL-SCNC: 141 MMOL/L (ref 136–145)
TRIGL SERPL-MCNC: 135 MG/DL (ref 30–150)
TSH SERPL DL<=0.005 MIU/L-ACNC: 1.09 UIU/ML (ref 0.4–4)
WBC # BLD AUTO: 4.02 K/UL (ref 3.9–12.7)

## 2022-02-05 PROCEDURE — 85025 COMPLETE CBC W/AUTO DIFF WBC: CPT | Performed by: NURSE PRACTITIONER

## 2022-02-05 PROCEDURE — 36415 COLL VENOUS BLD VENIPUNCTURE: CPT | Mod: PO | Performed by: NURSE PRACTITIONER

## 2022-02-05 PROCEDURE — 80061 LIPID PANEL: CPT | Performed by: NURSE PRACTITIONER

## 2022-02-05 PROCEDURE — 80053 COMPREHEN METABOLIC PANEL: CPT | Performed by: NURSE PRACTITIONER

## 2022-02-05 PROCEDURE — 83036 HEMOGLOBIN GLYCOSYLATED A1C: CPT | Performed by: NURSE PRACTITIONER

## 2022-02-05 PROCEDURE — 84443 ASSAY THYROID STIM HORMONE: CPT | Performed by: NURSE PRACTITIONER

## 2022-02-07 ENCOUNTER — OFFICE VISIT (OUTPATIENT)
Dept: FAMILY MEDICINE | Facility: CLINIC | Age: 87
End: 2022-02-07
Payer: MEDICARE

## 2022-02-07 VITALS
SYSTOLIC BLOOD PRESSURE: 130 MMHG | WEIGHT: 152.56 LBS | OXYGEN SATURATION: 98 % | DIASTOLIC BLOOD PRESSURE: 60 MMHG | BODY MASS INDEX: 25.42 KG/M2 | HEART RATE: 71 BPM | HEIGHT: 65 IN

## 2022-02-07 DIAGNOSIS — E11.65 TYPE 2 DIABETES MELLITUS WITH HYPERGLYCEMIA, WITHOUT LONG-TERM CURRENT USE OF INSULIN: ICD-10-CM

## 2022-02-07 DIAGNOSIS — D69.6 THROMBOCYTOPENIA, UNSPECIFIED: Primary | ICD-10-CM

## 2022-02-07 DIAGNOSIS — I10 ESSENTIAL HYPERTENSION: ICD-10-CM

## 2022-02-07 DIAGNOSIS — I49.5 SICK SINUS SYNDROME: ICD-10-CM

## 2022-02-07 DIAGNOSIS — M46.1 SACROILIITIS, NOT ELSEWHERE CLASSIFIED: ICD-10-CM

## 2022-02-07 PROCEDURE — 3051F PR MOST RECENT HEMOGLOBIN A1C LEVEL 7.0 - < 8.0%: ICD-10-PCS | Mod: CPTII,S$GLB,, | Performed by: FAMILY MEDICINE

## 2022-02-07 PROCEDURE — 99499 UNLISTED E&M SERVICE: CPT | Mod: S$GLB,,, | Performed by: FAMILY MEDICINE

## 2022-02-07 PROCEDURE — 99214 OFFICE O/P EST MOD 30 MIN: CPT | Mod: S$GLB,,, | Performed by: FAMILY MEDICINE

## 2022-02-07 PROCEDURE — 3288F PR FALLS RISK ASSESSMENT DOCUMENTED: ICD-10-PCS | Mod: CPTII,S$GLB,, | Performed by: FAMILY MEDICINE

## 2022-02-07 PROCEDURE — 99499 RISK ADDL DX/OHS AUDIT: ICD-10-PCS | Mod: S$GLB,,, | Performed by: FAMILY MEDICINE

## 2022-02-07 PROCEDURE — 3288F FALL RISK ASSESSMENT DOCD: CPT | Mod: CPTII,S$GLB,, | Performed by: FAMILY MEDICINE

## 2022-02-07 PROCEDURE — 1125F AMNT PAIN NOTED PAIN PRSNT: CPT | Mod: CPTII,S$GLB,, | Performed by: FAMILY MEDICINE

## 2022-02-07 PROCEDURE — 1159F PR MEDICATION LIST DOCUMENTED IN MEDICAL RECORD: ICD-10-PCS | Mod: CPTII,S$GLB,, | Performed by: FAMILY MEDICINE

## 2022-02-07 PROCEDURE — 99999 PR PBB SHADOW E&M-EST. PATIENT-LVL IV: CPT | Mod: PBBFAC,,, | Performed by: FAMILY MEDICINE

## 2022-02-07 PROCEDURE — 1159F MED LIST DOCD IN RCRD: CPT | Mod: CPTII,S$GLB,, | Performed by: FAMILY MEDICINE

## 2022-02-07 PROCEDURE — 99999 PR PBB SHADOW E&M-EST. PATIENT-LVL IV: ICD-10-PCS | Mod: PBBFAC,,, | Performed by: FAMILY MEDICINE

## 2022-02-07 PROCEDURE — 1101F PR PT FALLS ASSESS DOC 0-1 FALLS W/OUT INJ PAST YR: ICD-10-PCS | Mod: CPTII,S$GLB,, | Performed by: FAMILY MEDICINE

## 2022-02-07 PROCEDURE — 1101F PT FALLS ASSESS-DOCD LE1/YR: CPT | Mod: CPTII,S$GLB,, | Performed by: FAMILY MEDICINE

## 2022-02-07 PROCEDURE — 99214 PR OFFICE/OUTPT VISIT, EST, LEVL IV, 30-39 MIN: ICD-10-PCS | Mod: S$GLB,,, | Performed by: FAMILY MEDICINE

## 2022-02-07 PROCEDURE — 3051F HG A1C>EQUAL 7.0%<8.0%: CPT | Mod: CPTII,S$GLB,, | Performed by: FAMILY MEDICINE

## 2022-02-07 PROCEDURE — 1125F PR PAIN SEVERITY QUANTIFIED, PAIN PRESENT: ICD-10-PCS | Mod: CPTII,S$GLB,, | Performed by: FAMILY MEDICINE

## 2022-02-07 NOTE — PROGRESS NOTES
Subjective:       Patient ID: Vince Martinez is a 89 y.o. male.    Chief Complaint: Follow-up and Diabetes    89 years old male came clinic for blood pressure check.  Blood pressure today stable.  No chest pain, palpitation, orthopnea or PND.  Last A1c was stable.  No Polyuria, polydipsia polyphagia.  Patient currently with pacemaker.  No flare ups of back pain.  Thrombocytopenia was resolved.    Review of Systems   Constitutional: Negative.    HENT: Negative.    Eyes: Negative.    Respiratory: Negative.    Cardiovascular: Negative.  Negative for chest pain, palpitations, leg swelling and claudication.   Gastrointestinal: Negative.    Endocrine: Negative for polydipsia, polyphagia and polyuria.   Genitourinary: Negative.    Musculoskeletal: Negative.    Integumentary:  Negative.   Neurological: Negative.    Psychiatric/Behavioral: Negative.          Objective:      Physical Exam  Vitals and nursing note reviewed.   Constitutional:       General: He is not in acute distress.     Appearance: He is well-developed and well-nourished. He is not diaphoretic.   HENT:      Head: Normocephalic and atraumatic.      Right Ear: External ear normal.      Left Ear: External ear normal.      Nose: Nose normal.      Mouth/Throat:      Mouth: Oropharynx is clear and moist.      Pharynx: No oropharyngeal exudate.   Eyes:      General: No scleral icterus.        Right eye: No discharge.         Left eye: No discharge.      Extraocular Movements: EOM normal.      Conjunctiva/sclera: Conjunctivae normal.      Pupils: Pupils are equal, round, and reactive to light.   Neck:      Thyroid: No thyromegaly.      Vascular: No JVD.      Trachea: No tracheal deviation.   Cardiovascular:      Rate and Rhythm: Normal rate and regular rhythm.      Pulses: Intact distal pulses.      Heart sounds: Normal heart sounds. No murmur heard.  No friction rub. No gallop.    Pulmonary:      Effort: Pulmonary effort is normal. No respiratory distress.       Breath sounds: Normal breath sounds. No stridor. No wheezing or rales.   Chest:      Chest wall: No tenderness.   Abdominal:      General: Bowel sounds are normal. There is no distension.      Palpations: Abdomen is soft. There is no mass.      Tenderness: There is no abdominal tenderness. There is no guarding or rebound.   Musculoskeletal:         General: No tenderness or edema. Normal range of motion.      Cervical back: Normal range of motion and neck supple.   Lymphadenopathy:      Cervical: No cervical adenopathy.   Skin:     General: Skin is warm and dry.      Coloration: Skin is not pale.      Findings: No erythema or rash.   Neurological:      Mental Status: He is alert and oriented to person, place, and time.      Cranial Nerves: No cranial nerve deficit.      Motor: No abnormal muscle tone.      Coordination: Coordination abnormal.      Gait: Gait abnormal.      Deep Tendon Reflexes: Reflexes are normal and symmetric. Reflexes normal.   Psychiatric:         Mood and Affect: Mood and affect normal.         Behavior: Behavior normal.         Thought Content: Thought content normal.         Judgment: Judgment normal.         Assessment:       Problem List Items Addressed This Visit     Essential hypertension    Type 2 diabetes mellitus with hyperglycemia, without long-term current use of insulin    Relevant Orders    Microalbumin/Creatinine Ratio, Urine    Sacroiliitis, not elsewhere classified    Thrombocytopenia, unspecified - Primary    Sick sinus syndrome          Plan:         Vince was seen today for follow-up and diabetes.    Diagnoses and all orders for this visit:    Thrombocytopenia, unspecified    Sick sinus syndrome    Sacroiliitis, not elsewhere classified    Type 2 diabetes mellitus with hyperglycemia, without long-term current use of insulin  -     Microalbumin/Creatinine Ratio, Urine; Future    Essential hypertension        Continue monitoring blood pressure at home, low sodium diet.  Continue  monitoring blood sugar at home,ADA diet.

## 2022-02-23 DIAGNOSIS — K21.9 GASTROESOPHAGEAL REFLUX DISEASE, UNSPECIFIED WHETHER ESOPHAGITIS PRESENT: ICD-10-CM

## 2022-02-23 NOTE — TELEPHONE ENCOUNTER
No new care gaps identified.  Powered by [employer] by [employer]. Reference number: [id number].   2/23/2022 9:19:52 AM CST

## 2022-02-24 ENCOUNTER — PATIENT OUTREACH (OUTPATIENT)
Dept: ADMINISTRATIVE | Facility: OTHER | Age: 87
End: 2022-02-24
Payer: MEDICARE

## 2022-02-24 DIAGNOSIS — E11.65 TYPE 2 DIABETES MELLITUS WITH HYPERGLYCEMIA, WITHOUT LONG-TERM CURRENT USE OF INSULIN: Primary | ICD-10-CM

## 2022-02-24 NOTE — PROGRESS NOTES
Health Maintenance Due   Topic Date Due    Eye Exam  11/25/2021    Foot Exam  03/10/2022     Updates were requested from care everywhere.  Chart was reviewed for overdue Proactive [facility]ters ([facility]) topics (CRS, Breast Cancer Screening, Eye exam)  Health Maintenance has been updated.  LINKS immunization registry triggered.  Immunizations were reconciled.  Order placed for diabetic eye screening photo.

## 2022-02-25 RX ORDER — OMEPRAZOLE 40 MG/1
CAPSULE, DELAYED RELEASE ORAL
Qty: 90 CAPSULE | Refills: 0 | OUTPATIENT
Start: 2022-02-25

## 2022-02-25 NOTE — TELEPHONE ENCOUNTER
Quick DC. Inappropriate Request    Refill Authorization Note   Vince Martinez  is requesting a refill authorization.  Brief Assessment and Rationale for Refill:  Quick Discontinue  Medication Therapy Plan:       Medication Reconciliation Completed:  No      Comments:   Pended Medication(s)       Requested Prescriptions     Pending Prescriptions Disp Refills    omeprazole (PRILOSEC) 40 MG capsule [Pharmacy Med Name: Omeprazole 40 MG Oral Capsule Delayed Release] 90 capsule 0     Sig: TAKE 1 CAPSULE BY MOUTH BEFORE BREAKFAST        Duplicate Pended Encounter(s)/ Last Prescribed Details: (includes pharmacy & prescriber details)   The original prescription was discontinued on 4/15/2021 by Mai Tilley NP for the following reason: Side effects           Note composed:8:21 AM 02/25/2022

## 2022-02-28 ENCOUNTER — OFFICE VISIT (OUTPATIENT)
Dept: NEUROSURGERY | Facility: CLINIC | Age: 87
End: 2022-02-28
Payer: MEDICARE

## 2022-02-28 VITALS — WEIGHT: 152.56 LBS | HEIGHT: 65 IN | BODY MASS INDEX: 25.42 KG/M2

## 2022-02-28 DIAGNOSIS — M70.61 TROCHANTERIC BURSITIS OF BOTH HIPS: Primary | ICD-10-CM

## 2022-02-28 DIAGNOSIS — M70.62 TROCHANTERIC BURSITIS OF BOTH HIPS: Primary | ICD-10-CM

## 2022-02-28 DIAGNOSIS — Z98.890 STATUS POST LAMINECTOMY: ICD-10-CM

## 2022-02-28 DIAGNOSIS — M47.817 FACET ARTHROPATHY, LUMBOSACRAL: ICD-10-CM

## 2022-02-28 PROCEDURE — 99213 OFFICE O/P EST LOW 20 MIN: CPT | Mod: S$GLB,,, | Performed by: NEUROLOGICAL SURGERY

## 2022-02-28 PROCEDURE — 99999 PR PBB SHADOW E&M-EST. PATIENT-LVL IV: ICD-10-PCS | Mod: PBBFAC,,, | Performed by: NEUROLOGICAL SURGERY

## 2022-02-28 PROCEDURE — 1157F ADVNC CARE PLAN IN RCRD: CPT | Mod: CPTII,S$GLB,, | Performed by: NEUROLOGICAL SURGERY

## 2022-02-28 PROCEDURE — 1159F PR MEDICATION LIST DOCUMENTED IN MEDICAL RECORD: ICD-10-PCS | Mod: CPTII,S$GLB,, | Performed by: NEUROLOGICAL SURGERY

## 2022-02-28 PROCEDURE — 1157F PR ADVANCE CARE PLAN OR EQUIV PRESENT IN MEDICAL RECORD: ICD-10-PCS | Mod: CPTII,S$GLB,, | Performed by: NEUROLOGICAL SURGERY

## 2022-02-28 PROCEDURE — 1125F PR PAIN SEVERITY QUANTIFIED, PAIN PRESENT: ICD-10-PCS | Mod: CPTII,S$GLB,, | Performed by: NEUROLOGICAL SURGERY

## 2022-02-28 PROCEDURE — 3288F PR FALLS RISK ASSESSMENT DOCUMENTED: ICD-10-PCS | Mod: CPTII,S$GLB,, | Performed by: NEUROLOGICAL SURGERY

## 2022-02-28 PROCEDURE — 1159F MED LIST DOCD IN RCRD: CPT | Mod: CPTII,S$GLB,, | Performed by: NEUROLOGICAL SURGERY

## 2022-02-28 PROCEDURE — 99213 PR OFFICE/OUTPT VISIT, EST, LEVL III, 20-29 MIN: ICD-10-PCS | Mod: S$GLB,,, | Performed by: NEUROLOGICAL SURGERY

## 2022-02-28 PROCEDURE — 99999 PR PBB SHADOW E&M-EST. PATIENT-LVL IV: CPT | Mod: PBBFAC,,, | Performed by: NEUROLOGICAL SURGERY

## 2022-02-28 PROCEDURE — 1101F PR PT FALLS ASSESS DOC 0-1 FALLS W/OUT INJ PAST YR: ICD-10-PCS | Mod: CPTII,S$GLB,, | Performed by: NEUROLOGICAL SURGERY

## 2022-02-28 PROCEDURE — 1101F PT FALLS ASSESS-DOCD LE1/YR: CPT | Mod: CPTII,S$GLB,, | Performed by: NEUROLOGICAL SURGERY

## 2022-02-28 PROCEDURE — 3288F FALL RISK ASSESSMENT DOCD: CPT | Mod: CPTII,S$GLB,, | Performed by: NEUROLOGICAL SURGERY

## 2022-02-28 PROCEDURE — 1125F AMNT PAIN NOTED PAIN PRSNT: CPT | Mod: CPTII,S$GLB,, | Performed by: NEUROLOGICAL SURGERY

## 2022-02-28 NOTE — PROGRESS NOTES
NEUROSURGICAL PROGRESS NOTE    DATE OF SERVICE:  02/28/2022    ATTENDING PHYSICIAN:  Dayton Sparks MD    SUBJECTIVE:    INTERIM HISTORY:    This is a very pleasant 89 y.o. male, who is status post left L4-5 laminectomy, medial facetectomy, microdiskectomy on 05/26/2021.  He reports that his left buttock pain did not improve following the laminectomy.  He is now complaining of bilateral hip pain, mainly when he lays on his side at night.  He tried a new mattress but he has been having the same pain issue.  He also has back pain when he bent forward to lift about 20 lb.  He has some pain on the left side and radiates below the knee.  On the right side the pain radiates behind the thigh.  Wearing a back brace helps with the back pain.  No new onset of motor weakness or numbness.  No sphincter dysfunction symptoms.              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    Palpitations 11/18/2016    PVCs (premature ventricular contractions) 04/11/2017    Premature atrial contractions 04/11/2017    Chronic combined systolic and diastolic CHF (congestive heart failure) 11/27/2018    Greater trochanteric bursitis of both hips 02/03/2020    Lumbar spondylosis 02/03/2020    Retrolisthesis of vertebrae 02/03/2020    Chronic pain 02/13/2020    Neuroforaminal stenosis of lumbar spine 04/28/2020    Lumbar radiculopathy 04/28/2020    DDD (degenerative disc disease), lumbar 04/28/2020    Spinal stenosis of lumbar region with neurogenic claudication 04/28/2020    CKD (chronic kidney disease) stage 3, GFR 30-59 ml/min 06/09/2020    Generalized osteoarthritis  12/04/2013    Benign prostatic hyperplasia 12/04/2013    Hypertensive heart disease without congestive heart failure 04/12/2016    Esophageal reflux 12/04/2013    Sacroiliitis, not elsewhere classified 01/12/2021    Spinal stenosis of lumbar region with radiculopathy 05/26/2021    Overweight (BMI 25.0-29.9) 02/02/2022    Calcified granuloma of lung 02/02/2022    Abdominal aortic atherosclerosis 02/02/2022    Thrombocytopenia, unspecified 02/07/2022    Sick sinus syndrome 02/07/2022     Resolved Ambulatory Problems     Diagnosis Date Noted    Cervicalgia 08/10/2015    Pain in limb 08/10/2015    Muscle weakness 08/10/2015    Poor posture 08/10/2015    Cough     Sinus bradycardia 04/12/2016    Acute combined systolic and diastolic CHF, NYHA class 3 11/15/2018    Chest pain of uncertain etiology 11/27/2018    Weakness of both hips 09/23/2019    Impaired gait and mobility 09/23/2019    Weakness of both legs 01/16/2020    Decreased ROM of lumbar spine 01/16/2020    Chronic bilateral low back pain without sciatica 01/16/2020    Type 2 or unspecified type diabetes mellitus with neurological manifestations 09/25/2020    Chronic bilateral low back pain with bilateral sciatica 08/03/2021    Decreased strength 08/03/2021    Impaired mobility 08/03/2021     Past Medical History:   Diagnosis Date    Arthritis     Diabetes mellitus     DJD (degenerative joint disease)     Hypertension     Prostate enlargement     Sleep apnea     Thyroid disease        PAST SURGICAL HISTORY:  Past Surgical History:   Procedure Laterality Date    CARDIAC PACEMAKER PLACEMENT      EPIDURAL STEROID INJECTION INTO LUMBAR SPINE N/A 6/4/2020    Procedure: Injection-steroid-epidural-lumbar--L4-5;  Surgeon: Angelica Norris Jr., MD;  Location: [facility];  Service: Pain Management;  Laterality: N/A;  sign consent at DOS.    HEMILAMINECTOMY  5/26/2021    Procedure: HEMILAMINECTOMY;  Surgeon: aDyton Sparks MD;   Location: [facility] OR;  Service: Neurosurgery;;  left L4-5    INJECTION OF JOINT Bilateral 2/13/2020    Procedure: Injection, Joint, Bilateral GTB;  Surgeon: Angelica Norris Jr., MD;  Location: [facility] PAIN MGT;  Service: Pain Management;  Laterality: Bilateral;    INJECTION OF STEROID Bilateral 1/12/2021    Procedure: INJECTION, STEROID Bilateral SI Joint Block and Steroid Injection;  Surgeon: Dayton Sparks MD;  Location: [facility] OR;  Service: Neurosurgery;  Laterality: Bilateral;  Procedure: Bilateral SI Joint Block and Steroid Injection  Surgery 't'kristen: 45 min  LOS: 0  Anesthesia:MAC  Radiology: C-arm  Bed: Regular with Pillows  Position: Prone     SPINE SURGERY      TRANSFORAMINAL EPIDURAL INJECTION OF STEROID Left 2/23/2021    Procedure: Injection,steroid,epidural,transforaminal approach--Left L4-5 *Has Pacemaker/ICD*;  Surgeon: Angelica Norris Jr., MD;  Location: [facility] PAIN T;  Service: Pain Management;  Laterality: Left;       SOCIAL HISTORY:   Social History     Socioeconomic History    Marital status:    Tobacco Use    Smoking status: Never Smoker    Smokeless tobacco: Never Used   Substance and Sexual Activity    Alcohol use: No    Drug use: No    Sexual activity: Not Currently     Partners: Female     Social Determinants of Health     Financial Resource Strain: Medium Risk    Difficulty of Paying Living Expenses: Somewhat hard   Food Insecurity: No Food Insecurity    Worried About Running Out of Food in the Last Year: Never true    Ran Out of Food in the Last Year: Never true   Transportation Needs: No Transportation Needs    Lack of Transportation (Medical): No    Lack of Transportation (Non-Medical): No   Physical Activity: Inactive    Days of Exercise per Week: 0 days    Minutes of Exercise per Session: 0 min   Stress: No Stress Concern Present    Feeling of Stress : Not at all   Social Connections: Moderately Integrated    Frequency of Communication with Friends and Family:  More than three times a week    Frequency of Social Gatherings with Friends and Family: More than three times a week    Attends Judaism Services: More than 4 times per year    Active Member of Clubs or Organizations: No    Attends Club or Organization Meetings: Never    Marital Status:    Housing Stability: Low Risk     Unable to Pay for Housing in the Last Year: No    Number of Places Lived in the Last Year: 2    Unstable Housing in the Last Year: No       FAMILY HISTORY:  Family History   Problem Relation Age of Onset    Diabetes Brother     No Known Problems Mother     No Known Problems Father     Diabetes Sister     No Known Problems Daughter     HIV Son        CURRENTS MEDICATIONS:  Current Outpatient Medications on File Prior to Visit   Medication Sig Dispense Refill    betamethasone dipropionate (DIPROLENE) 0.05 % lotion Apply 1 application topically nightly as needed.      calcium/D3/mag ox//carolyn/Zn (CALTRATE + D3 PLUS MINERALS ORAL) Take 1 tablet by mouth 2 (two) times daily.      clotrimazole-betamethasone 1-0.05% (LOTRISONE) cream APPLY  CREAM TOPICALLY TWICE DAILY 45 g 0    diclofenac sodium (VOLTAREN) 1 % Gel APPLY 2 GRAMS TOPICALLY ONCE DAILY 100 g 0    EUTHYROX 100 mcg tablet TAKE 1 TABLET BY MOUTH BEFORE BREAKFAST 90 tablet 0    finasteride (PROSCAR) 5 mg tablet Take 1 tablet (5 mg total) by mouth once daily. 90 tablet 3    furosemide (LASIX) 20 MG tablet Take 1 tablet (20 mg total) by mouth 2 (two) times a day. 180 tablet 3    gabapentin (NEURONTIN) 100 MG capsule Take 1 capsule (100 mg total) by mouth every evening. 90 capsule 3    glimepiride (AMARYL) 2 MG tablet Take 1 tablet (2 mg total) by mouth daily with breakfast. 90 tablet 3    irbesartan (AVAPRO) 150 MG tablet Take 1 tablet (150 mg total) by mouth once daily. 90 tablet 2    melatonin 10 mg Tab Take 1 tablet by mouth nightly as needed.      meloxicam (MOBIC) 15 MG tablet Take 1 tablet (15 mg total) by  mouth daily as needed for Pain. 90 tablet 0    metFORMIN (GLUCOPHAGE-XR) 500 MG ER 24hr tablet Take 1 tablet (500 mg total) by mouth once daily. 90 tablet 3    tamsulosin (FLOMAX) 0.4 mg Cap Take 1 capsule (0.4 mg total) by mouth once daily. 90 capsule 3    triamcinolone acetonide 0.1% (KENALOG) 0.1 % Lotn APPLY LOTION TO THE AFFECTED AREAS ON THE SCALP ONCE DAILY AT BEDTIME AS NEEDED       No current facility-administered medications on file prior to visit.       ALLERGIES:  Review of patient's allergies indicates:   Allergen Reactions    Bactrim [sulfamethoxazole-trimethoprim] Rash    Ciprofloxacin Rash    Latex Rash       REVIEW OF SYSTEMS:  Review of Systems   Constitutional: Negative for diaphoresis, fever and weight loss.   Respiratory: Negative for shortness of breath.    Cardiovascular: Negative for chest pain.   Gastrointestinal: Negative for blood in stool.   Genitourinary: Negative for hematuria.   Endo/Heme/Allergies: Does not bruise/bleed easily.   All other systems reviewed and are negative.        OBJECTIVE:    PHYSICAL EXAMINATION:   There were no vitals filed for this visit.    Physical Exam:  Vitals reviewed.    Constitutional: He appears well-developed and well-nourished.     Eyes: Pupils are equal, round, and reactive to light. Conjunctivae and EOM are normal.     Cardiovascular: Normal distal pulses and no edema.     Abdominal: Soft.     Skin: Skin displays no rash on trunk and no rash on extremities. Skin displays no lesions on trunk and no lesions on extremities.     Psych/Behavior: He is alert. He is oriented to person, place, and time. He has a normal mood and affect.     Musculoskeletal:        Neck: Range of motion is full.     Neurological:        DTRs: Tricep reflexes are 2+ on the right side and 2+ on the left side. Bicep reflexes are 2+ on the right side and 2+ on the left side. Brachioradialis reflexes are 2+ on the right side and 2+ on the left side. Patellar reflexes are 2+ on  the right side and 2+ on the left side. Achilles reflexes are 0 on the right side and 0 on the left side.       Right Hip Exam     Tenderness   The patient is experiencing tenderness in the greater trochanter.      Left Hip Exam     Tenderness   The patient is experiencing tenderness in the greater trochanter.      Back Exam     Tenderness   The patient is experiencing tenderness in the sacroiliac.    Range of Motion   Extension: abnormal   Flexion: abnormal   Lateral bend right: abnormal   Lateral bend left: abnormal   Rotation right: abnormal   Rotation left: abnormal     Muscle Strength   Right Quadriceps:  5/5   Left Quadriceps:  5/5   Right Hamstrings:  5/5   Left Hamstrings:  5/5     Tests   Straight leg raise right: negative  Straight leg raise left: negative    Other   Toe walk: normal  Heel walk: normal            SI joint:   Palpation at the right and left SI joints not painful  SHANE test is negative bilaterally  Gaenslen test is negative bilaterally  Thigh thrust test is negative bilaterally    Neurologic Exam     Mental Status   Oriented to person, place, and time.   Speech: speech is normal   Level of consciousness: alert    Cranial Nerves   Cranial nerves II through XII intact.     CN III, IV, VI   Pupils are equal, round, and reactive to light.  Extraocular motions are normal.     Motor Exam   Muscle bulk: normal  Overall muscle tone: normal    Strength   Right deltoid: 5/5  Left deltoid: 5/5  Right biceps: 5/5  Left biceps: 5/5  Right triceps: 5/5  Left triceps: 5/5  Right wrist flexion: 5/5  Left wrist flexion: 5/5  Right wrist extension: 5/5  Left wrist extension: 5/5  Right interossei: 5/5  Left interossei: 5/5  Right iliopsoas: 5/5  Left iliopsoas: 5/5  Right quadriceps: 5/5  Left quadriceps: 5/5  Right hamstrin/5  Left hamstrin/5  Right anterior tibial: 5/5  Left anterior tibial: 5/5  Right posterior tibial: 5/5  Left posterior tibial: 5/5  Right peroneal: 5/5  Left peroneal: 5/5  Right  gastroc: 5/5  Left gastroc: 5/5    Sensory Exam   Light touch normal.   Pinprick normal.     Gait, Coordination, and Reflexes     Gait  Gait: normal    Coordination   Finger to nose coordination: normal  Tandem walking coordination: normal    Reflexes   Right brachioradialis: 2+  Left brachioradialis: 2+  Right biceps: 2+  Left biceps: 2+  Right triceps: 2+  Left triceps: 2+  Right patellar: 2+  Left patellar: 2+  Right achilles: 0  Left achilles: 0  Right plantar: normal  Left plantar: normal  Right Rodriguez: absent  Left Rodriguez: absent  Right ankle clonus: absent  Left ankle clonus: absent        DIAGNOSTIC DATA:  I personally interpreted the following imaging:   Lumbar spine MRI 12/20 shows hypertrophy of the L5-S1 facet joints, Modic type 1 changes of the sacrum, left L4-5 lateral recess stenosis, left L5-S1 foraminal stenosis  CT lumbar spine and pelvis shows no significant hip osteoarthritis, degenerative SI joint changes with auto fusion of the anterior SI joints    ASSESMENT:  This is a 89 y.o. male with     Problem List Items Addressed This Visit    None     Visit Diagnoses     Trochanteric bursitis of both hips    -  Primary    Relevant Orders    Ambulatory referral/consult to Pain Clinic    Facet arthropathy, lumbosacral        Relevant Orders    Ambulatory referral/consult to Pain Clinic    Status post laminectomy                PLAN:  Will refer back to pain management for bilateral trochanteric bursa injection, possible L5-S1 and L4-5 medial branch block and radiofrequency ablations  Will order SI joint belt  All questions answered          Dayton Sparks MD  Cell:[phone]

## 2022-03-02 DIAGNOSIS — K21.9 GASTROESOPHAGEAL REFLUX DISEASE WITHOUT ESOPHAGITIS: Primary | ICD-10-CM

## 2022-03-02 RX ORDER — OMEPRAZOLE 40 MG/1
40 CAPSULE, DELAYED RELEASE ORAL DAILY
Qty: 90 CAPSULE | Refills: 3 | Status: SHIPPED | OUTPATIENT
Start: 2022-03-02 | End: 2022-03-03

## 2022-03-02 NOTE — TELEPHONE ENCOUNTER
No new care gaps identified.  Powered by [employer]. Reference number: [id number].   3/02/2022 10:09:39 AM CST

## 2022-03-07 ENCOUNTER — OFFICE VISIT (OUTPATIENT)
Dept: PAIN MEDICINE | Facility: CLINIC | Age: 87
End: 2022-03-07
Payer: MEDICARE

## 2022-03-07 ENCOUNTER — TELEPHONE (OUTPATIENT)
Dept: PAIN MEDICINE | Facility: CLINIC | Age: 87
End: 2022-03-07

## 2022-03-07 VITALS — BODY MASS INDEX: 25.39 KG/M2 | WEIGHT: 152.56 LBS

## 2022-03-07 DIAGNOSIS — M70.61 GREATER TROCHANTERIC BURSITIS OF BOTH HIPS: Primary | ICD-10-CM

## 2022-03-07 DIAGNOSIS — M70.62 GREATER TROCHANTERIC BURSITIS OF BOTH HIPS: Primary | ICD-10-CM

## 2022-03-07 DIAGNOSIS — M47.817 FACET ARTHROPATHY, LUMBOSACRAL: ICD-10-CM

## 2022-03-07 DIAGNOSIS — M25.552 CHRONIC HIP PAIN, BILATERAL: ICD-10-CM

## 2022-03-07 DIAGNOSIS — G89.29 CHRONIC HIP PAIN, BILATERAL: ICD-10-CM

## 2022-03-07 DIAGNOSIS — M25.551 CHRONIC HIP PAIN, BILATERAL: ICD-10-CM

## 2022-03-07 PROCEDURE — 1159F MED LIST DOCD IN RCRD: CPT | Mod: CPTII,S$GLB,, | Performed by: NURSE PRACTITIONER

## 2022-03-07 PROCEDURE — 1157F PR ADVANCE CARE PLAN OR EQUIV PRESENT IN MEDICAL RECORD: ICD-10-PCS | Mod: CPTII,S$GLB,, | Performed by: NURSE PRACTITIONER

## 2022-03-07 PROCEDURE — 1160F RVW MEDS BY RX/DR IN RCRD: CPT | Mod: CPTII,S$GLB,, | Performed by: NURSE PRACTITIONER

## 2022-03-07 PROCEDURE — 1125F PR PAIN SEVERITY QUANTIFIED, PAIN PRESENT: ICD-10-PCS | Mod: CPTII,S$GLB,, | Performed by: NURSE PRACTITIONER

## 2022-03-07 PROCEDURE — 99999 PR PBB SHADOW E&M-EST. PATIENT-LVL III: ICD-10-PCS | Mod: PBBFAC,,, | Performed by: NURSE PRACTITIONER

## 2022-03-07 PROCEDURE — 1159F PR MEDICATION LIST DOCUMENTED IN MEDICAL RECORD: ICD-10-PCS | Mod: CPTII,S$GLB,, | Performed by: NURSE PRACTITIONER

## 2022-03-07 PROCEDURE — 99999 PR PBB SHADOW E&M-EST. PATIENT-LVL III: CPT | Mod: PBBFAC,,, | Performed by: NURSE PRACTITIONER

## 2022-03-07 PROCEDURE — 1160F PR REVIEW ALL MEDS BY PRESCRIBER/CLIN PHARMACIST DOCUMENTED: ICD-10-PCS | Mod: CPTII,S$GLB,, | Performed by: NURSE PRACTITIONER

## 2022-03-07 PROCEDURE — 1157F ADVNC CARE PLAN IN RCRD: CPT | Mod: CPTII,S$GLB,, | Performed by: NURSE PRACTITIONER

## 2022-03-07 PROCEDURE — 99213 OFFICE O/P EST LOW 20 MIN: CPT | Mod: S$GLB,,, | Performed by: NURSE PRACTITIONER

## 2022-03-07 PROCEDURE — 99213 PR OFFICE/OUTPT VISIT, EST, LEVL III, 20-29 MIN: ICD-10-PCS | Mod: S$GLB,,, | Performed by: NURSE PRACTITIONER

## 2022-03-07 PROCEDURE — 1125F AMNT PAIN NOTED PAIN PRSNT: CPT | Mod: CPTII,S$GLB,, | Performed by: NURSE PRACTITIONER

## 2022-03-07 NOTE — PROGRESS NOTES
[facility] Established Patient Evaluation  Referred by: Randy Riggs MD  Reason for referral:  Lumbar degenerative disc disease and      Spinal stenosis of lumbar region without neurogenic claudication    CC:   Chief Complaint   Patient presents with    Hip Pain     Bilateral      Last 3 PDI Scores 3/9/2021 2/21/2020 2/3/2020   Pain Disability Index (PDI) 24 60 14     Interval Updates: 3/7/2022 - Mr. Martinez is following up for Hip Pain (Bilateral ).  Pain is currently rate 7/10 with a weekly range 7-8/10.  Very pleasant man presents today complaining of bilateral hip pain, her pain presents when he lays on either side at night.  He did state he tried a new mattress but is still having the same issue.  Recently had a left L4-5 laminectomy medial facetectomy, microdiskectomy on 05/26/2021 per Dr. Sparks as he does have a history of low back pain.  Denies any motor weakness or numbness, denies any bowel or bladder dysfunction today.    3/9/21 - Mr. Martinez returns to clinic for follow up visit reporting continued low  back pain.  Patient is s/p Lumbar Transforaminal Epidural Steroid Injection, Left L4-5 on 2/23/21 with 0% relief. Patient reports no improvement of his pain following injection.   Pain intensity is currently 4/10.      4/28/20 - Mr. Martinez returns to clinic for follow up visit reporting stable back, thigh and leg pain.  Pain intensity is currently 3/10 ranging up to 9/10 with walking or standing.  Sleeping positions exacerbate the pain too.    2/21/2020 - Mr. Martinez returns to clinic for follow up visit reporting worse hip pain.  Patient is s/p Bilateral Greater Trochanter Bursa Injection on 2/13/20 with 0% relief. Patient presents with son for CV, he is reporting right hip pain relief for 6 days about 50%, he reports his left hip pain continues he did not find that the injection helped his left hip pain he continues to have left hip pain with radiating lateral left leg pain to his knee  he reports his pain as throbbing.  He continues in physical therapy, again denies performing home exercises because of increased pain. His son reports to me that he spends 2-3 hours sitting in a chair while at home because he is a .  I recommended consistent movement at least every half hour while at home as well as continuing physical therapy.   Pain intensity is currently 10/10.      Background / HPI:   Vince Martinez is a 89 y.o. male who presents today with multiple complaints.  His chief complaint is bilateral hip pain, which was diagnosed as greater trochanteric bursitis, bilaterally, by my colleagues in Orthopedic surgery.  He received a bilateral GTB injection which provided him relief for approximately 2 weeks.  He has been in physical therapy, but denies performing home exercises because of increasing pain.  Hip pain radiates down the lateral thigh stopping at the lateral aspect of the knee, bilaterally.  His secondary complaint is chronic low back pain, which is nonradiating.  Pain is felt more with certain positions of the low back, specifically, extension.  Pain is partially alleviated by lumbar flexion.  He has a similar complaint of chronic neck pain exacerbated by extension, and improved by flexion.    Location: Lumbar   Onset: six months ago  Current Pain Score: 2/10  Daily Pain of Range: 2-9/10  Quality: Burning and Deep  Radiation: Down both legs to knees  Worsened by: exercise, lying down and walking for more than 10 minutes  Improved by: physical therapy    Previous Therapies:  PT/OT: yes. Last visit on 1/31/20. Patient states he goes 2x a week.   HEP: Yes   Interventions:   02/23/2021 Lumbar Transforaminal Epidural Steroid Injection, Left L4-5     Surgery:Yes 30 years ago. Patient not sure what surgery was done on his back for herniated discs.   Medications:   - NSAIDS:   - MSK Relaxants:   - TCAs:   - SNRIs:   - Topicals:   - Anticonvulsants:  - Opioids:     Current Pain  Medications:  1. Tylenol PM      Review of Systems:  Review of Systems   Constitutional: Negative for chills and fever.   HENT: Negative for nosebleeds.    Eyes: Negative for blurred vision.   Respiratory: Positive for shortness of breath. Negative for hemoptysis.    Cardiovascular: Negative for chest pain, palpitations, orthopnea and claudication.   Gastrointestinal: Negative for abdominal pain, diarrhea, heartburn and vomiting.   Genitourinary: Negative for hematuria.   Musculoskeletal: Positive for back pain, joint pain, myalgias and neck pain.   Skin: Negative for rash.   Neurological: Positive for dizziness. Negative for tingling, seizures and loss of consciousness.   Endo/Heme/Allergies: Does not bruise/bleed easily.   Psychiatric/Behavioral: Negative for depression, hallucinations and memory loss. The patient has insomnia. The patient is not nervous/anxious.        History:    Current Outpatient Medications:     betamethasone dipropionate (DIPROLENE) 0.05 % lotion, Apply 1 application topically nightly as needed., Disp: , Rfl:     calcium/D3/mag ox//carolyn/Zn (CALTRATE + D3 PLUS MINERALS ORAL), Take 1 tablet by mouth 2 (two) times daily., Disp: , Rfl:     clotrimazole-betamethasone 1-0.05% (LOTRISONE) cream, APPLY  CREAM TOPICALLY TWICE DAILY, Disp: 45 g, Rfl: 0    diclofenac sodium (VOLTAREN) 1 % Gel, APPLY 2 GRAMS TOPICALLY ONCE DAILY, Disp: 100 g, Rfl: 0    EUTHYROX 100 mcg tablet, TAKE 1 TABLET BY MOUTH BEFORE BREAKFAST, Disp: 90 tablet, Rfl: 0    finasteride (PROSCAR) 5 mg tablet, Take 1 tablet (5 mg total) by mouth once daily., Disp: 90 tablet, Rfl: 3    furosemide (LASIX) 20 MG tablet, Take 1 tablet (20 mg total) by mouth 2 (two) times a day., Disp: 180 tablet, Rfl: 3    gabapentin (NEURONTIN) 100 MG capsule, Take 1 capsule (100 mg total) by mouth every evening., Disp: 90 capsule, Rfl: 3    glimepiride (AMARYL) 2 MG tablet, Take 1 tablet (2 mg total) by mouth daily with breakfast., Disp: 90  tablet, Rfl: 3    irbesartan (AVAPRO) 150 MG tablet, Take 1 tablet (150 mg total) by mouth once daily., Disp: 90 tablet, Rfl: 2    melatonin 10 mg Tab, Take 1 tablet by mouth nightly as needed., Disp: , Rfl:     meloxicam (MOBIC) 15 MG tablet, Take 1 tablet (15 mg total) by mouth daily as needed for Pain., Disp: 90 tablet, Rfl: 0    metFORMIN (GLUCOPHAGE-XR) 500 MG ER 24hr tablet, Take 1 tablet (500 mg total) by mouth once daily., Disp: 90 tablet, Rfl: 3    omeprazole (PRILOSEC) 40 MG capsule, TAKE 1 CAPSULE BY MOUTH BEFORE BREAKFAST, Disp: 90 capsule, Rfl: 0    tamsulosin (FLOMAX) 0.4 mg Cap, Take 1 capsule (0.4 mg total) by mouth once daily., Disp: 90 capsule, Rfl: 3    triamcinolone acetonide 0.1% (KENALOG) 0.1 % Lotn, APPLY LOTION TO THE AFFECTED AREAS ON THE SCALP ONCE DAILY AT BEDTIME AS NEEDED, Disp: , Rfl:     Past Medical History:   Diagnosis Date    Acute combined systolic and diastolic CHF, NYHA class 3 11/15/2018    Arthritis     Diabetes mellitus     DJD (degenerative joint disease)     Hypertension     Prostate enlargement     Sleep apnea     Thyroid disease        Past Surgical History:   Procedure Laterality Date    CARDIAC PACEMAKER PLACEMENT      EPIDURAL STEROID INJECTION INTO LUMBAR SPINE N/A 6/4/2020    Procedure: Injection-steroid-epidural-lumbar--L4-5;  Surgeon: Angelica Norris Jr., MD;  Location: [facility];  Service: Pain Management;  Laterality: N/A;  sign consent at DOS.    HEMILAMINECTOMY  5/26/2021    Procedure: HEMILAMINECTOMY;  Surgeon: Dayton Sparks MD;  Location: [facility] OR;  Service: Neurosurgery;;  left L4-5    INJECTION OF JOINT Bilateral 2/13/2020    Procedure: Injection, Joint, Bilateral GTB;  Surgeon: Angelica Norris Jr., MD;  Location: [facility];  Service: Pain Management;  Laterality: Bilateral;    INJECTION OF STEROID Bilateral 1/12/2021    Procedure: INJECTION, STEROID Bilateral SI Joint Block and Steroid Injection;  Surgeon: Dayton Sparks  MD;  Location: [facility] OR;  Service: Neurosurgery;  Laterality: Bilateral;  Procedure: Bilateral SI Joint Block and Steroid Injection  Surgery 't'kristen: 45 min  LOS: 0  Anesthesia:MAC  Radiology: C-arm  Bed: Regular with Pillows  Position: Prone     SPINE SURGERY      TRANSFORAMINAL EPIDURAL INJECTION OF STEROID Left 2/23/2021    Procedure: Injection,steroid,epidural,transforaminal approach--Left L4-5 *Has Pacemaker/ICD*;  Surgeon: Angelica Norris Jr., MD;  Location: [facility];  Service: Pain Management;  Laterality: Left;       Family History   Problem Relation Age of Onset    Diabetes Brother     No Known Problems Mother     No Known Problems Father     Diabetes Sister     No Known Problems Daughter     HIV Son        Social History     Socioeconomic History    Marital status:    Tobacco Use    Smoking status: Never Smoker    Smokeless tobacco: Never Used   Substance and Sexual Activity    Alcohol use: No    Drug use: No    Sexual activity: Not Currently     Partners: Female     Social Determinants of Health     Financial Resource Strain: Medium Risk    Difficulty of Paying Living Expenses: Somewhat hard   Food Insecurity: No Food Insecurity    Worried About Running Out of Food in the Last Year: Never true    Ran Out of Food in the Last Year: Never true   Transportation Needs: No Transportation Needs    Lack of Transportation (Medical): No    Lack of Transportation (Non-Medical): No   Physical Activity: Inactive    Days of Exercise per Week: 0 days    Minutes of Exercise per Session: 0 min   Stress: No Stress Concern Present    Feeling of Stress : Not at all   Social Connections: Moderately Integrated    Frequency of Communication with Friends and Family: More than three times a week    Frequency of Social Gatherings with Friends and Family: More than three times a week    Attends Religion Services: More than 4 times per year    Active Member of Clubs or Organizations: No     Attends Club or Organization Meetings: Never    Marital Status:    Housing Stability: Low Risk     Unable to Pay for Housing in the Last Year: No    Number of Places Lived in the Last Year: 2    Unstable Housing in the Last Year: No       Review of patient's allergies indicates:   Allergen Reactions    Bactrim [sulfamethoxazole-trimethoprim] Rash    Ciprofloxacin Rash    Latex Rash       Physical Exam:  Vitals:    03/07/22 1124   Weight: 69.2 kg (152 lb 8.9 oz)   PainSc:   7     General    Nursing note and vitals reviewed.  Constitutional: He is oriented to person, place, and time. He appears well-developed and well-nourished. No distress.   HENT:   Head: Normocephalic and atraumatic.   Nose: Nose normal.   Eyes: Conjunctivae and EOM are normal. Pupils are equal, round, and reactive to light. Right eye exhibits no discharge. Left eye exhibits no discharge. No scleral icterus.   Neck: No JVD present.   Cardiovascular: Intact distal pulses.    Pulmonary/Chest: Effort normal. No respiratory distress.   Abdominal: He exhibits no distension.   Neurological: He is alert and oriented to person, place, and time. Coordination normal.   Psychiatric: He has a normal mood and affect. His behavior is normal. Judgment and thought content normal.     General Musculoskeletal Exam   Gait: normal         Right Hip Exam     Tenderness   The patient tender to palpation of the trochanteric bursa.  Left Hip Exam     Tenderness   The patient tender to palpation of the trochanteric bursa.      Back (L-Spine & T-Spine) / Neck (C-Spine) Exam     Tenderness Posterior midline palpation reveals tenderness of the Upper C-Spine and Lower C-Spine. Right paramedian tenderness of the Lower L-Spine. Left paramedian tenderness of the Lower L-Spine.     Back (L-Spine & T-Spine) Range of Motion   Extension: abnormal Back extension: facet loading is positive and exacerabtes/reproduces the patient's typical low back pain    Flexion: abnormal  Back flexion: limited ROM but partial relief of low back pain noted.     Neck (C-Spine) Range of Motion   Flexion:     Limited  Extension: Limited  Right Lateral Bend: abnormal  Left Lateral Bend: abnormal  Right Rotation: abnormal  Left Rotation: abnormal    Spinal Sensation   Right Side Sensation  C-Spine Level: normal   L-Spine Level: normal  Left Side Sensation  C-Spine Level: normal  L-Spine Level: normal    Neck (C-Spine) Tests   Spurling's Test   Left:  Negative  Right: negative    Other He has no scoliosis .      Muscle Strength   Right Upper Extremity   Biceps: 5/5   Deltoid:  5/5  Triceps:  5/5  Wrist extension: 5/5   Wrist flexion: 5/5   Finger Flexors:  5/5  Finger Extensors:  5/5  Left Upper Extremity  Biceps: 5/5   Deltoid:  5/5  Triceps:  5/5  Wrist extension: 5/5   Wrist flexion: 5/5   Finger Flexors:  5/5  Finger Extensors:  5/5  Right Lower Extremity   Hip Flexion: 5/5   Hip Extensors: 5/5  Quadriceps:  5/5   Hamstrin/5   Gastrocsoleus:  5/5   Left Lower Extremity   Hip Flexion: 5/5   Hip Extensors: 5/5  Quadriceps:  5/5   Hamstrin/5   Gastrocsoleus:  5/5     Reflexes     Left Side  Biceps:  2+  Achilles:  2+  Quadriceps:  2+    Right Side   Biceps:  2+  Achilles:  2+  Quadriceps:  2+      Imaging:  CT LUMBAR SPINE WITHOUT CONTRAST    CLINICAL HISTORY:  Spinal stenosis, lumbar;  Spinal stenosis, lumbar region without neurogenic claudication    TECHNIQUE:  Low-dose axial, sagittal and coronal reformations are obtained through the lumbar spine.  Contrast was not administered.    COMPARISON:  None available    FINDINGS:  Lumbar vertebral body heights are maintained.  There is mild grade 1 retrolisthesis of L3 on L4.  Multilevel discogenic endplate change with disc height loss, most pronounced at L4-L5.  Scattered lumbar intervertebral disc gas phenomenon.  Limited evaluation of the central canal with suspected mild central canal stenosis at L2-L3 and L4-L5 and mild moderate stenosis at  L3-L4.  Partially fused SI joints. There is multilevel facet arthropathy with variable degree of neural foraminal narrowing ranging from mild to severe, most pronounced on the right at L5-S1.    Limited evaluation of intra-abdominal structures demonstrates abdominal aortic atherosclerosis and left lower pole renal cyst.       Labs:  BMP  Lab Results   Component Value Date     02/05/2022    K 4.9 02/05/2022     02/05/2022    CO2 25 02/05/2022    BUN 35 (H) 02/05/2022    CREATININE 1.6 (H) 02/05/2022    CALCIUM 9.3 02/05/2022    ANIONGAP 10 02/05/2022    ESTGFRAFRICA 42.9 (A) 02/05/2022    EGFRNONAA 37.1 (A) 02/05/2022     Lab Results   Component Value Date    ALT 27 02/05/2022    AST 30 02/05/2022    ALKPHOS 56 02/05/2022    BILITOT 0.4 02/05/2022       Assessment:  Problem List Items Addressed This Visit    None     Visit Diagnoses     Trochanteric bursitis of both hips        Facet arthropathy, lumbosacral              2/3/2020 - Vince Martinez is a 89 y.o. male with chronic bilateral hip pain secondary to greater trochanteric bursitis and chronic axial spine pain related to facet arthropathy.  There is likely a contribution from retrolisthesis of L3 on L4 with regards to the low back pain. Patient is most concerned about the bilateral hip pain. I educated him on the appropriateness of repeating the bilateral GTB injection, which should not be the primary focus of his treatment.  The bilateral GTB injection can reduce the symptoms of bursitis temporarily; however, long-term treatment of this condition requires physical therapy and home exercise.  Patient has been compliant with physical therapy but noncompliant with recommendations for home exercise and I tried to encourage him to perform his exercises daily.  He appears to understand.    02/21/[id number]-old male presents with his son for clinic visit.  Patient status post bilateral GT be injections with reported 0% relief.  he is reporting right hip  pain relief for 6 days at about 50%, he reports his left hip pain continues he did not find that the injection helped his left hip pain he continues to have left hip pain with radiating lateral left leg pain to his knee.  This chronic hip pain is secondary to GTB and chronic axial spine pain related to facet arthropathy.  He reports no low back pain today just continued bilateral hip pain. Discussed that I do not recommend repeat injections today, suggested that he continue with physical therapy and that he establishes a home exercise plan for continued exercises daily.  Discussed that I will also recommend a quad cane to help with rising from a seated position as well as standing for long periods of time considering the patient is the past.  Also suggested that patient does not sit for greater than 30-40 minutes, his son reports to me during our visit that patient has a tendency to sit in his chair at home for 2-3 hours.  Recommended continue physical therapy and follow up in 6 weeks.    4/28/20 - 89-year-old male with symptoms of neurogenic claudication.  I recommended a lumbar epidural steroid injection at L4-5 which will hopefully address a combination of central canal or neural foraminal stenosis at L3-4 and L5-S1.    3/9/2021- 89-year-old male presents status post all lumbar HOMAR targeting L4-5 This was recommended per Neurosurgery, patient has a history of chronic lumbar radiculopathy and bilateral stenosis lateral recess of the  lumbar spine. NSGY is  recommending a left minimally invasive L4-5 laminectomy, medial facetectomy and foraminotomy however the time patient refuse which is why he was scheduled for this lumbar HOMAR.  Considering he has not responded well to this injection I recommend he return to Neurosurgery for further evaluation and consideration of a surgical intervention patient agreed understood.    03/07/[id number]-mcle-old male with history of chronic low back and bilateral hip pain.  Primary  source of pain today are his bilateral hips.  He was referred to our office per Neurosurgery for bilateral GT trochanteric bursa injections, the  majority of his pain are in his hips at the moment.  Upon exam he had tenderness to each GTB bilaterally with  mild pain in low back upon palpation.      : Reviewed and consistent with medication use as prescribed.    Treatment Plan:   Procedures: s/f bilateral GTB injection with Dr. Norris under fluoroscopy.   PT/OT/HEP:  Continue physical therapy as scheduled and improve compliance with home exercise program  Medications:  Recommended continued use of Tylenol 500 to a 1000 Q 8 as needed for pain not to exceed 3000 mg in 24 hours.   Labs: reviewed and medications are appropriately dosed for current hepatorenal function.  Imaging: No additional recommended at this time.  Follow Up: RTC afshan Viera, YOMI-C  Interventional Pain Management        Disclaimer: This note was partly generated using dictation software which may occasionally result in transcription errors.

## 2022-03-07 NOTE — H&P (VIEW-ONLY)
[facility] Established Patient Evaluation  Referred by: Randy Riggs MD  Reason for referral:  Lumbar degenerative disc disease and      Spinal stenosis of lumbar region without neurogenic claudication    CC:   Chief Complaint   Patient presents with    Hip Pain     Bilateral      Last 3 PDI Scores 3/9/2021 2/21/2020 2/3/2020   Pain Disability Index (PDI) 24 60 14     Interval Updates: 3/7/2022 - Mr. Martinez is following up for Hip Pain (Bilateral ).  Pain is currently rate 7/10 with a weekly range 7-8/10.  Very pleasant man presents today complaining of bilateral hip pain, her pain presents when he lays on either side at night.  He did state he tried a new mattress but is still having the same issue.  Recently had a left L4-5 laminectomy medial facetectomy, microdiskectomy on 05/26/2021 per Dr. Sparks as he does have a history of low back pain.  Denies any motor weakness or numbness, denies any bowel or bladder dysfunction today.    3/9/21 - Mr. Martinez returns to clinic for follow up visit reporting continued low  back pain.  Patient is s/p Lumbar Transforaminal Epidural Steroid Injection, Left L4-5 on 2/23/21 with 0% relief. Patient reports no improvement of his pain following injection.   Pain intensity is currently 4/10.      4/28/20 - Mr. Martinez returns to clinic for follow up visit reporting stable back, thigh and leg pain.  Pain intensity is currently 3/10 ranging up to 9/10 with walking or standing.  Sleeping positions exacerbate the pain too.    2/21/2020 - Mr. Martinez returns to clinic for follow up visit reporting worse hip pain.  Patient is s/p Bilateral Greater Trochanter Bursa Injection on 2/13/20 with 0% relief. Patient presents with son for CV, he is reporting right hip pain relief for 6 days about 50%, he reports his left hip pain continues he did not find that the injection helped his left hip pain he continues to have left hip pain with radiating lateral left leg pain to his knee  he reports his pain as throbbing.  He continues in physical therapy, again denies performing home exercises because of increased pain. His son reports to me that he spends 2-3 hours sitting in a chair while at home because he is a .  I recommended consistent movement at least every half hour while at home as well as continuing physical therapy.   Pain intensity is currently 10/10.      Background / HPI:   Vince Martinez is a 89 y.o. male who presents today with multiple complaints.  His chief complaint is bilateral hip pain, which was diagnosed as greater trochanteric bursitis, bilaterally, by my colleagues in Orthopedic surgery.  He received a bilateral GTB injection which provided him relief for approximately 2 weeks.  He has been in physical therapy, but denies performing home exercises because of increasing pain.  Hip pain radiates down the lateral thigh stopping at the lateral aspect of the knee, bilaterally.  His secondary complaint is chronic low back pain, which is nonradiating.  Pain is felt more with certain positions of the low back, specifically, extension.  Pain is partially alleviated by lumbar flexion.  He has a similar complaint of chronic neck pain exacerbated by extension, and improved by flexion.    Location: Lumbar   Onset: six months ago  Current Pain Score: 2/10  Daily Pain of Range: 2-9/10  Quality: Burning and Deep  Radiation: Down both legs to knees  Worsened by: exercise, lying down and walking for more than 10 minutes  Improved by: physical therapy    Previous Therapies:  PT/OT: yes. Last visit on 1/31/20. Patient states he goes 2x a week.   HEP: Yes   Interventions:   02/23/2021 Lumbar Transforaminal Epidural Steroid Injection, Left L4-5     Surgery:Yes 30 years ago. Patient not sure what surgery was done on his back for herniated discs.   Medications:   - NSAIDS:   - MSK Relaxants:   - TCAs:   - SNRIs:   - Topicals:   - Anticonvulsants:  - Opioids:     Current Pain  Medications:  1. Tylenol PM      Review of Systems:  Review of Systems   Constitutional: Negative for chills and fever.   HENT: Negative for nosebleeds.    Eyes: Negative for blurred vision.   Respiratory: Positive for shortness of breath. Negative for hemoptysis.    Cardiovascular: Negative for chest pain, palpitations, orthopnea and claudication.   Gastrointestinal: Negative for abdominal pain, diarrhea, heartburn and vomiting.   Genitourinary: Negative for hematuria.   Musculoskeletal: Positive for back pain, joint pain, myalgias and neck pain.   Skin: Negative for rash.   Neurological: Positive for dizziness. Negative for tingling, seizures and loss of consciousness.   Endo/Heme/Allergies: Does not bruise/bleed easily.   Psychiatric/Behavioral: Negative for depression, hallucinations and memory loss. The patient has insomnia. The patient is not nervous/anxious.        History:    Current Outpatient Medications:     betamethasone dipropionate (DIPROLENE) 0.05 % lotion, Apply 1 application topically nightly as needed., Disp: , Rfl:     calcium/D3/mag ox//carolyn/Zn (CALTRATE + D3 PLUS MINERALS ORAL), Take 1 tablet by mouth 2 (two) times daily., Disp: , Rfl:     clotrimazole-betamethasone 1-0.05% (LOTRISONE) cream, APPLY  CREAM TOPICALLY TWICE DAILY, Disp: 45 g, Rfl: 0    diclofenac sodium (VOLTAREN) 1 % Gel, APPLY 2 GRAMS TOPICALLY ONCE DAILY, Disp: 100 g, Rfl: 0    EUTHYROX 100 mcg tablet, TAKE 1 TABLET BY MOUTH BEFORE BREAKFAST, Disp: 90 tablet, Rfl: 0    finasteride (PROSCAR) 5 mg tablet, Take 1 tablet (5 mg total) by mouth once daily., Disp: 90 tablet, Rfl: 3    furosemide (LASIX) 20 MG tablet, Take 1 tablet (20 mg total) by mouth 2 (two) times a day., Disp: 180 tablet, Rfl: 3    gabapentin (NEURONTIN) 100 MG capsule, Take 1 capsule (100 mg total) by mouth every evening., Disp: 90 capsule, Rfl: 3    glimepiride (AMARYL) 2 MG tablet, Take 1 tablet (2 mg total) by mouth daily with breakfast., Disp: 90  tablet, Rfl: 3    irbesartan (AVAPRO) 150 MG tablet, Take 1 tablet (150 mg total) by mouth once daily., Disp: 90 tablet, Rfl: 2    melatonin 10 mg Tab, Take 1 tablet by mouth nightly as needed., Disp: , Rfl:     meloxicam (MOBIC) 15 MG tablet, Take 1 tablet (15 mg total) by mouth daily as needed for Pain., Disp: 90 tablet, Rfl: 0    metFORMIN (GLUCOPHAGE-XR) 500 MG ER 24hr tablet, Take 1 tablet (500 mg total) by mouth once daily., Disp: 90 tablet, Rfl: 3    omeprazole (PRILOSEC) 40 MG capsule, TAKE 1 CAPSULE BY MOUTH BEFORE BREAKFAST, Disp: 90 capsule, Rfl: 0    tamsulosin (FLOMAX) 0.4 mg Cap, Take 1 capsule (0.4 mg total) by mouth once daily., Disp: 90 capsule, Rfl: 3    triamcinolone acetonide 0.1% (KENALOG) 0.1 % Lotn, APPLY LOTION TO THE AFFECTED AREAS ON THE SCALP ONCE DAILY AT BEDTIME AS NEEDED, Disp: , Rfl:     Past Medical History:   Diagnosis Date    Acute combined systolic and diastolic CHF, NYHA class 3 11/15/2018    Arthritis     Diabetes mellitus     DJD (degenerative joint disease)     Hypertension     Prostate enlargement     Sleep apnea     Thyroid disease        Past Surgical History:   Procedure Laterality Date    CARDIAC PACEMAKER PLACEMENT      EPIDURAL STEROID INJECTION INTO LUMBAR SPINE N/A 6/4/2020    Procedure: Injection-steroid-epidural-lumbar--L4-5;  Surgeon: Angelica Norris Jr., MD;  Location: [facility];  Service: Pain Management;  Laterality: N/A;  sign consent at DOS.    HEMILAMINECTOMY  5/26/2021    Procedure: HEMILAMINECTOMY;  Surgeon: Dayton Sparks MD;  Location: [facility] OR;  Service: Neurosurgery;;  left L4-5    INJECTION OF JOINT Bilateral 2/13/2020    Procedure: Injection, Joint, Bilateral GTB;  Surgeon: Angelica Norris Jr., MD;  Location: [facility];  Service: Pain Management;  Laterality: Bilateral;    INJECTION OF STEROID Bilateral 1/12/2021    Procedure: INJECTION, STEROID Bilateral SI Joint Block and Steroid Injection;  Surgeon: Dayton Sparks  MD;  Location: [facility] OR;  Service: Neurosurgery;  Laterality: Bilateral;  Procedure: Bilateral SI Joint Block and Steroid Injection  Surgery 't'kristen: 45 min  LOS: 0  Anesthesia:MAC  Radiology: C-arm  Bed: Regular with Pillows  Position: Prone     SPINE SURGERY      TRANSFORAMINAL EPIDURAL INJECTION OF STEROID Left 2/23/2021    Procedure: Injection,steroid,epidural,transforaminal approach--Left L4-5 *Has Pacemaker/ICD*;  Surgeon: Angelica Norris Jr., MD;  Location: [facility];  Service: Pain Management;  Laterality: Left;       Family History   Problem Relation Age of Onset    Diabetes Brother     No Known Problems Mother     No Known Problems Father     Diabetes Sister     No Known Problems Daughter     HIV Son        Social History     Socioeconomic History    Marital status:    Tobacco Use    Smoking status: Never Smoker    Smokeless tobacco: Never Used   Substance and Sexual Activity    Alcohol use: No    Drug use: No    Sexual activity: Not Currently     Partners: Female     Social Determinants of Health     Financial Resource Strain: Medium Risk    Difficulty of Paying Living Expenses: Somewhat hard   Food Insecurity: No Food Insecurity    Worried About Running Out of Food in the Last Year: Never true    Ran Out of Food in the Last Year: Never true   Transportation Needs: No Transportation Needs    Lack of Transportation (Medical): No    Lack of Transportation (Non-Medical): No   Physical Activity: Inactive    Days of Exercise per Week: 0 days    Minutes of Exercise per Session: 0 min   Stress: No Stress Concern Present    Feeling of Stress : Not at all   Social Connections: Moderately Integrated    Frequency of Communication with Friends and Family: More than three times a week    Frequency of Social Gatherings with Friends and Family: More than three times a week    Attends Worship Services: More than 4 times per year    Active Member of Clubs or Organizations: No     Attends Club or Organization Meetings: Never    Marital Status:    Housing Stability: Low Risk     Unable to Pay for Housing in the Last Year: No    Number of Places Lived in the Last Year: 2    Unstable Housing in the Last Year: No       Review of patient's allergies indicates:   Allergen Reactions    Bactrim [sulfamethoxazole-trimethoprim] Rash    Ciprofloxacin Rash    Latex Rash       Physical Exam:  Vitals:    03/07/22 1124   Weight: 69.2 kg (152 lb 8.9 oz)   PainSc:   7     General    Nursing note and vitals reviewed.  Constitutional: He is oriented to person, place, and time. He appears well-developed and well-nourished. No distress.   HENT:   Head: Normocephalic and atraumatic.   Nose: Nose normal.   Eyes: Conjunctivae and EOM are normal. Pupils are equal, round, and reactive to light. Right eye exhibits no discharge. Left eye exhibits no discharge. No scleral icterus.   Neck: No JVD present.   Cardiovascular: Intact distal pulses.    Pulmonary/Chest: Effort normal. No respiratory distress.   Abdominal: He exhibits no distension.   Neurological: He is alert and oriented to person, place, and time. Coordination normal.   Psychiatric: He has a normal mood and affect. His behavior is normal. Judgment and thought content normal.     General Musculoskeletal Exam   Gait: normal         Right Hip Exam     Tenderness   The patient tender to palpation of the trochanteric bursa.  Left Hip Exam     Tenderness   The patient tender to palpation of the trochanteric bursa.      Back (L-Spine & T-Spine) / Neck (C-Spine) Exam     Tenderness Posterior midline palpation reveals tenderness of the Upper C-Spine and Lower C-Spine. Right paramedian tenderness of the Lower L-Spine. Left paramedian tenderness of the Lower L-Spine.     Back (L-Spine & T-Spine) Range of Motion   Extension: abnormal Back extension: facet loading is positive and exacerabtes/reproduces the patient's typical low back pain    Flexion: abnormal  Back flexion: limited ROM but partial relief of low back pain noted.     Neck (C-Spine) Range of Motion   Flexion:     Limited  Extension: Limited  Right Lateral Bend: abnormal  Left Lateral Bend: abnormal  Right Rotation: abnormal  Left Rotation: abnormal    Spinal Sensation   Right Side Sensation  C-Spine Level: normal   L-Spine Level: normal  Left Side Sensation  C-Spine Level: normal  L-Spine Level: normal    Neck (C-Spine) Tests   Spurling's Test   Left:  Negative  Right: negative    Other He has no scoliosis .      Muscle Strength   Right Upper Extremity   Biceps: 5/5   Deltoid:  5/5  Triceps:  5/5  Wrist extension: 5/5   Wrist flexion: 5/5   Finger Flexors:  5/5  Finger Extensors:  5/5  Left Upper Extremity  Biceps: 5/5   Deltoid:  5/5  Triceps:  5/5  Wrist extension: 5/5   Wrist flexion: 5/5   Finger Flexors:  5/5  Finger Extensors:  5/5  Right Lower Extremity   Hip Flexion: 5/5   Hip Extensors: 5/5  Quadriceps:  5/5   Hamstrin/5   Gastrocsoleus:  5/5   Left Lower Extremity   Hip Flexion: 5/5   Hip Extensors: 5/5  Quadriceps:  5/5   Hamstrin/5   Gastrocsoleus:  5/5     Reflexes     Left Side  Biceps:  2+  Achilles:  2+  Quadriceps:  2+    Right Side   Biceps:  2+  Achilles:  2+  Quadriceps:  2+      Imaging:  CT LUMBAR SPINE WITHOUT CONTRAST    CLINICAL HISTORY:  Spinal stenosis, lumbar;  Spinal stenosis, lumbar region without neurogenic claudication    TECHNIQUE:  Low-dose axial, sagittal and coronal reformations are obtained through the lumbar spine.  Contrast was not administered.    COMPARISON:  None available    FINDINGS:  Lumbar vertebral body heights are maintained.  There is mild grade 1 retrolisthesis of L3 on L4.  Multilevel discogenic endplate change with disc height loss, most pronounced at L4-L5.  Scattered lumbar intervertebral disc gas phenomenon.  Limited evaluation of the central canal with suspected mild central canal stenosis at L2-L3 and L4-L5 and mild moderate stenosis at  L3-L4.  Partially fused SI joints. There is multilevel facet arthropathy with variable degree of neural foraminal narrowing ranging from mild to severe, most pronounced on the right at L5-S1.    Limited evaluation of intra-abdominal structures demonstrates abdominal aortic atherosclerosis and left lower pole renal cyst.       Labs:  BMP  Lab Results   Component Value Date     02/05/2022    K 4.9 02/05/2022     02/05/2022    CO2 25 02/05/2022    BUN 35 (H) 02/05/2022    CREATININE 1.6 (H) 02/05/2022    CALCIUM 9.3 02/05/2022    ANIONGAP 10 02/05/2022    ESTGFRAFRICA 42.9 (A) 02/05/2022    EGFRNONAA 37.1 (A) 02/05/2022     Lab Results   Component Value Date    ALT 27 02/05/2022    AST 30 02/05/2022    ALKPHOS 56 02/05/2022    BILITOT 0.4 02/05/2022       Assessment:  Problem List Items Addressed This Visit    None     Visit Diagnoses     Trochanteric bursitis of both hips        Facet arthropathy, lumbosacral              2/3/2020 - Vince Martinez is a 89 y.o. male with chronic bilateral hip pain secondary to greater trochanteric bursitis and chronic axial spine pain related to facet arthropathy.  There is likely a contribution from retrolisthesis of L3 on L4 with regards to the low back pain. Patient is most concerned about the bilateral hip pain. I educated him on the appropriateness of repeating the bilateral GTB injection, which should not be the primary focus of his treatment.  The bilateral GTB injection can reduce the symptoms of bursitis temporarily; however, long-term treatment of this condition requires physical therapy and home exercise.  Patient has been compliant with physical therapy but noncompliant with recommendations for home exercise and I tried to encourage him to perform his exercises daily.  He appears to understand.    02/21/[id number]-old male presents with his son for clinic visit.  Patient status post bilateral GT be injections with reported 0% relief.  he is reporting right hip  pain relief for 6 days at about 50%, he reports his left hip pain continues he did not find that the injection helped his left hip pain he continues to have left hip pain with radiating lateral left leg pain to his knee.  This chronic hip pain is secondary to GTB and chronic axial spine pain related to facet arthropathy.  He reports no low back pain today just continued bilateral hip pain. Discussed that I do not recommend repeat injections today, suggested that he continue with physical therapy and that he establishes a home exercise plan for continued exercises daily.  Discussed that I will also recommend a quad cane to help with rising from a seated position as well as standing for long periods of time considering the patient is the past.  Also suggested that patient does not sit for greater than 30-40 minutes, his son reports to me during our visit that patient has a tendency to sit in his chair at home for 2-3 hours.  Recommended continue physical therapy and follow up in 6 weeks.    4/28/20 - 89-year-old male with symptoms of neurogenic claudication.  I recommended a lumbar epidural steroid injection at L4-5 which will hopefully address a combination of central canal or neural foraminal stenosis at L3-4 and L5-S1.    3/9/2021- 89-year-old male presents status post all lumbar HOMAR targeting L4-5 This was recommended per Neurosurgery, patient has a history of chronic lumbar radiculopathy and bilateral stenosis lateral recess of the  lumbar spine. NSGY is  recommending a left minimally invasive L4-5 laminectomy, medial facetectomy and foraminotomy however the time patient refuse which is why he was scheduled for this lumbar HOMAR.  Considering he has not responded well to this injection I recommend he return to Neurosurgery for further evaluation and consideration of a surgical intervention patient agreed understood.    03/07/[id number]-kpkf-old male with history of chronic low back and bilateral hip pain.  Primary  source of pain today are his bilateral hips.  He was referred to our office per Neurosurgery for bilateral GT trochanteric bursa injections, the  majority of his pain are in his hips at the moment.  Upon exam he had tenderness to each GTB bilaterally with  mild pain in low back upon palpation.      : Reviewed and consistent with medication use as prescribed.    Treatment Plan:   Procedures: s/f bilateral GTB injection with Dr. Norris under fluoroscopy.   PT/OT/HEP:  Continue physical therapy as scheduled and improve compliance with home exercise program  Medications:  Recommended continued use of Tylenol 500 to a 1000 Q 8 as needed for pain not to exceed 3000 mg in 24 hours.   Labs: reviewed and medications are appropriately dosed for current hepatorenal function.  Imaging: No additional recommended at this time.  Follow Up: RTC afshan Viera, YOMI-C  Interventional Pain Management        Disclaimer: This note was partly generated using dictation software which may occasionally result in transcription errors.

## 2022-03-08 ENCOUNTER — TELEPHONE (OUTPATIENT)
Dept: PAIN MEDICINE | Facility: CLINIC | Age: 87
End: 2022-03-08
Payer: MEDICARE

## 2022-03-09 ENCOUNTER — TELEPHONE (OUTPATIENT)
Dept: PAIN MEDICINE | Facility: CLINIC | Age: 87
End: 2022-03-09
Payer: MEDICARE

## 2022-03-09 DIAGNOSIS — M70.61 TROCHANTERIC BURSITIS OF BOTH HIPS: Primary | ICD-10-CM

## 2022-03-09 DIAGNOSIS — M25.559 HIP PAIN: ICD-10-CM

## 2022-03-09 DIAGNOSIS — M70.62 TROCHANTERIC BURSITIS OF BOTH HIPS: Primary | ICD-10-CM

## 2022-03-09 NOTE — TELEPHONE ENCOUNTER
----- Message from Rafy Posey sent at 3/9/2022  2:46 PM CST -----  Regarding: RE: emma Rajput,    This pt is not followed by our clinic. For a definitive answer you would need to refer to the pt's Cardiologist.     I will say that typically procedures that do not require the use of Electrocautery, reprogramming nor magnet use is needed.     Thanks,  Rafy  ----- Message -----  From: Atiya Jaramillo MA  Sent: 3/9/2022   2:15 PM CST  To: Rafy Posey  Subject: emma key                               Pt scheduled for procedure on 3/15 with Dr. Norris Requesting clearance to place pacemaker/icd into surgical mode or use magnet. Please advise.     AR

## 2022-03-10 NOTE — DISCHARGE INSTRUCTIONS
Home Care Instructions Pain Management:    1.  DIET:    You may resume your normal diet today.    2.  BATHING:    You may shower with luke warm water.    3.  DRESSING:    You may remove your bandage today.    4.  ACTIVITY LEVEL:      You may resume your normal activities 24 hours after your procedure.    5.  MEDICATIONS:    You may resume your normal medications today.    6.  SPECIAL INSTRUCTIONS:    No heat to the injection site for 24 hours including bath or shower, heating pad, moist heat or hot tubs.    Use an ice pack to the injection site for any pain or discomfort.  Apply ice packs for 20 minute intervals as needed.    If you have received any sedatives by mouth today, you can not drive for 12 hours.    If you have received sedation through an IV, you can not drive for 24 hours.    PLEASE CALL YOUR DOCTOR FOR THE FOLLOWIN.  Redness or swelling around the injection site.  2.  Fever of 101 degrees.  3.  Drainage (pus) from the injection site.  4.  For any continuous bleeding (some dried blood over the incision is normal.)    FOR EMERGENCIES:    If any unusual problems or difficulties occur during clinic hours, call [phone] or dial [phone].    Follow up with with your physician in 2-3 weeks.

## 2022-03-14 ENCOUNTER — TELEPHONE (OUTPATIENT)
Dept: PAIN MEDICINE | Facility: CLINIC | Age: 87
End: 2022-03-14
Payer: MEDICARE

## 2022-03-21 ENCOUNTER — TELEPHONE (OUTPATIENT)
Dept: PAIN MEDICINE | Facility: CLINIC | Age: 87
End: 2022-03-21
Payer: MEDICARE

## 2022-03-22 ENCOUNTER — HOSPITAL ENCOUNTER (OUTPATIENT)
Facility: HOSPITAL | Age: 87
Discharge: HOME | End: 2022-03-22
Attending: PAIN MEDICINE | Admitting: PAIN MEDICINE
Payer: MEDICARE

## 2022-03-22 VITALS
OXYGEN SATURATION: 99 % | TEMPERATURE: 98 F | SYSTOLIC BLOOD PRESSURE: 154 MMHG | HEIGHT: 65 IN | DIASTOLIC BLOOD PRESSURE: 73 MMHG | HEART RATE: 60 BPM | RESPIRATION RATE: 16 BRPM | WEIGHT: 145 LBS | BODY MASS INDEX: 24.16 KG/M2

## 2022-03-22 DIAGNOSIS — M70.62 GREATER TROCHANTERIC BURSITIS OF BOTH HIPS: Primary | ICD-10-CM

## 2022-03-22 DIAGNOSIS — M70.61 GREATER TROCHANTERIC BURSITIS OF BOTH HIPS: Primary | ICD-10-CM

## 2022-03-22 DIAGNOSIS — G89.29 CHRONIC PAIN: ICD-10-CM

## 2022-03-22 LAB — POCT GLUCOSE: 94 MG/DL (ref 70–110)

## 2022-03-22 PROCEDURE — 20610 PR DRAIN/INJECT LARGE JOINT/BURSA: ICD-10-PCS | Mod: 50,,, | Performed by: PAIN MEDICINE

## 2022-03-22 PROCEDURE — 20610 DRAIN/INJ JOINT/BURSA W/O US: CPT | Mod: 50,,, | Performed by: PAIN MEDICINE

## 2022-03-22 PROCEDURE — 25500020 PHARM REV CODE 255: Performed by: PAIN MEDICINE

## 2022-03-22 PROCEDURE — 77002 PR FLUOROSCOPIC GUIDANCE NEEDLE PLACEMENT: ICD-10-PCS | Mod: 26,,, | Performed by: PAIN MEDICINE

## 2022-03-22 PROCEDURE — 77002 NEEDLE LOCALIZATION BY XRAY: CPT | Mod: 26,,, | Performed by: PAIN MEDICINE

## 2022-03-22 PROCEDURE — 63600175 PHARM REV CODE 636 W HCPCS: Performed by: PAIN MEDICINE

## 2022-03-22 PROCEDURE — 20605 DRAIN/INJ JOINT/BURSA W/O US: CPT | Mod: 50 | Performed by: PAIN MEDICINE

## 2022-03-22 PROCEDURE — 20610 DRAIN/INJ JOINT/BURSA W/O US: CPT | Mod: 50 | Performed by: PAIN MEDICINE

## 2022-03-22 PROCEDURE — 25000003 PHARM REV CODE 250: Performed by: PAIN MEDICINE

## 2022-03-22 RX ORDER — INDOMETHACIN 25 MG/1
CAPSULE ORAL
Status: DISCONTINUED | OUTPATIENT
Start: 2022-03-22 | End: 2022-03-22 | Stop reason: HOSPADM

## 2022-03-22 RX ORDER — LIDOCAINE HYDROCHLORIDE 10 MG/ML
INJECTION INFILTRATION; PERINEURAL
Status: DISCONTINUED | OUTPATIENT
Start: 2022-03-22 | End: 2022-03-22 | Stop reason: HOSPADM

## 2022-03-22 RX ORDER — METHYLPREDNISOLONE ACETATE 40 MG/ML
INJECTION, SUSPENSION INTRA-ARTICULAR; INTRALESIONAL; INTRAMUSCULAR; SOFT TISSUE
Status: DISCONTINUED | OUTPATIENT
Start: 2022-03-22 | End: 2022-03-22 | Stop reason: HOSPADM

## 2022-03-22 RX ORDER — BUPIVACAINE HYDROCHLORIDE 2.5 MG/ML
INJECTION, SOLUTION EPIDURAL; INFILTRATION; INTRACAUDAL
Status: DISCONTINUED | OUTPATIENT
Start: 2022-03-22 | End: 2022-03-22 | Stop reason: HOSPADM

## 2022-03-22 NOTE — PLAN OF CARE
Pt denies pain or discomfort @ this time. Pt states he is ready to be discharged home @ this time.  Discharge instructions reviewed with patient, with time allotted for questions. Pt verbalizes understanding of instructions and states they have no further questions @ this time. Procedure site is clean, dry and intact. Band-aids in place. Vital signs are stable. No acute distress noted. Staff is at bedside to assist patient.

## 2022-03-22 NOTE — DISCHARGE SUMMARY
[facility]  Discharge Note  Short Stay     Admit Date: 3/22/2022    Discharge Date: 3/22/2022     Attending Physician: Angelica Norris Jr, MD    Diagnoses:  There are no hospital problems to display for this patient.    Discharged Condition: Good     Hospital Course: Patient was admitted for an outpatient interventional pain management procedure and tolerated the procedure well with no complications.     Final Diagnoses: Same as principal problem.     Disposition: Home or Self Care     Follow up/Patient Instructions:        Reconciled Medications:     Medication List      ASK your doctor about these medications    betamethasone dipropionate 0.05 % lotion  Commonly known as: DIPROLENE  Apply 1 application topically nightly as needed.     CALTRATE + D3 PLUS MINERALS ORAL  Take 1 tablet by mouth 2 (two) times daily.     clotrimazole-betamethasone 1-0.05% cream  Commonly known as: LOTRISONE  APPLY  CREAM TOPICALLY TWICE DAILY     diclofenac sodium 1 % Gel  Commonly known as: VOLTAREN  APPLY 2 GRAMS TOPICALLY ONCE DAILY     EUTHYROX 100 MCG tablet  Generic drug: levothyroxine  TAKE 1 TABLET BY MOUTH BEFORE BREAKFAST     finasteride 5 mg tablet  Commonly known as: PROSCAR  Take 1 tablet (5 mg total) by mouth once daily.     furosemide 20 MG tablet  Commonly known as: LASIX  Take 1 tablet (20 mg total) by mouth 2 (two) times a day.     gabapentin 100 MG capsule  Commonly known as: NEURONTIN  Take 1 capsule (100 mg total) by mouth every evening.     glimepiride 2 MG tablet  Commonly known as: AMARYL  Take 1 tablet (2 mg total) by mouth daily with breakfast.     irbesartan 150 MG tablet  Commonly known as: AVAPRO  Take 1 tablet (150 mg total) by mouth once daily.     melatonin 10 mg Tab  Take 1 tablet by mouth nightly as needed.     meloxicam 15 MG tablet  Commonly known as: MOBIC  Take 1 tablet (15 mg total) by mouth daily as needed for Pain.     metFORMIN 500 MG ER 24hr tablet  Commonly known as:  GLUCOPHAGE-XR  Take 1 tablet (500 mg total) by mouth once daily.     omeprazole 40 MG capsule  Commonly known as: PRILOSEC  TAKE 1 CAPSULE BY MOUTH BEFORE BREAKFAST     tamsulosin 0.4 mg Cap  Commonly known as: FLOMAX  Take 1 capsule (0.4 mg total) by mouth once daily.     triamcinolone acetonide 0.1% 0.1 % Lotn  Commonly known as: KENALOG  APPLY LOTION TO THE AFFECTED AREAS ON THE SCALP ONCE DAILY AT BEDTIME AS NEEDED           Discharge Procedure Orders (must include Diet, Follow-up, Activity)   Diet Adult Regular     No driving until:   Order Comments: If you received sedation, no driving for 12 hrs     Remove dressing in 24 hours     Notify your health care provider if you experience any of the following:  temperature >100.4     Notify your health care provider if you experience any of the following:  severe uncontrolled pain     Notify your health care provider if you experience any of the following:  redness, tenderness, or signs of infection (pain, swelling, redness, odor or green/yellow discharge around incision site)     Notify your health care provider if you experience any of the following:  difficulty breathing or increased cough     Notify your health care provider if you experience any of the following:  severe persistent headache     Notify your health care provider if you experience any of the following:  increased confusion or weakness     Activity as tolerated       Angelica Norris Jr, MD  Interventional Pain Medicine / Anesthesiology

## 2022-03-22 NOTE — INTERVAL H&P NOTE
No significant changes were noted from the H&P or last clinic note.    The risks and benefits of this intervention, and alternative therapies were discussed with the patient.  The discussion of risks included infection, bleeding, need for additional procedures or surgery, nerve damage, paralysis, adverse medication reaction(s), stroke, and/or death.  Questions regarding the procedure, risks, expected outcome, and possible side effects were solicited and answered to the patient's satisfaction.  Vince Juan wishes to proceed with the injection or procedure.  Written consent was obtained.    Angelica Norris Jr, MD  Interventional Pain Medicine / Anesthesiology

## 2022-03-22 NOTE — OP NOTE
Procedure Note    Pre-operative diagnosis: Greater Trochanter Bursitis  Post-operative diagnosis: Greater Trochanter Bursitis  Procedure Date: 03/22/2022  Procedure:  (1) Bilateral Greater Trochanter Bursa Injection  (2) Intraoperative Fluoroscopy    Anesthesia: Local    Indication for procedure: Patient has a history of greater trochanter bursitis. The patient is here today for a greater trochanter bursa injection for diagnostic and therapeutic purposes. The patient was deemed an appropriate candidate to undergo the planned procedure. Consent for procedure was obtained.     Findings: Final needle placement consistent with technically sucsessful procedure; demonstration of normal bursagram; without vascular uptake.     Complications: None     Description of procedure in detail: Informed consent obtained after explaining the procedure and potential complications including local discomfort, infection, headache, temporary or permanent weakness and/or numbness of one or both legs, temporary or permanent paraplegia, heart attack and stroke. Patient was brought back to procedure room and placed in a prone position and head resting comfortably in head ring. Prior to the initiation of the procedure, the patient's identity, the site, and the nature of the procedure were verified.     The skin was prepped with chloroprep and sterile drapes were applied. The Left greater trochanter bursa was identified by fluoroscopy in an AP view. Lidocaine 1% 2 mL was used to anesthetize the skin at the skin entry point and subcutaneous tissue with 25g 1 1/2 in needle. A 22G 3.5 inch spinal needle was advanced under intermittent fluoroscopy until os was encountered. There was no paresthesia with needle placement, but there was reproduction of pain. Aspiration was negative for blood. Omnipaque 0.5 mL was injected confirming appropriate position and spread into the bursa without intravascualr uptake. Next, 3 mL containing 40 mg Depomedrol and  3 mL of 0.25% Bupivacaine was injected without difficulty or resistance. Needle was removed with flush and bandaged placed over site of needle insertion.     The same procedure was repeated on the opposite side.    Estimated blood loss: None     Disposition: The patient tolerated the procedure without complaint and was transported to the recovery room in stable condition.     Follow-up: RTC as scheduled    Angelica Norris Jr, MD  Interventional Pain Medicine / Anesthesiology

## 2022-03-31 RX ORDER — LEVOTHYROXINE SODIUM 100 UG/1
100 TABLET ORAL
Qty: 90 TABLET | Refills: 3 | Status: SHIPPED | OUTPATIENT
Start: 2022-03-31 | End: 2022-04-04 | Stop reason: SDUPTHER

## 2022-03-31 NOTE — TELEPHONE ENCOUNTER
No new care gaps identified.  Powered by [employer]. Reference number: [id number].   3/31/2022 8:41:31 AM CDT

## 2022-04-04 NOTE — TELEPHONE ENCOUNTER
No new care gaps identified.  Powered by [employer]. Reference number: [id number].   4/04/2022 4:14:57 PM CDT

## 2022-04-05 RX ORDER — LEVOTHYROXINE SODIUM 100 UG/1
100 TABLET ORAL
Qty: 90 TABLET | Refills: 3 | Status: SHIPPED | OUTPATIENT
Start: 2022-04-05 | End: 2023-05-09 | Stop reason: SDUPTHER

## 2022-04-12 ENCOUNTER — PATIENT OUTREACH (OUTPATIENT)
Dept: ADMINISTRATIVE | Facility: OTHER | Age: 87
End: 2022-04-12
Payer: MEDICARE

## 2022-04-12 NOTE — PROGRESS NOTES
Health Maintenance Due   Topic Date Due    Shingles Vaccine (3 of 3) 09/14/2020    Eye Exam  11/25/2021    Foot Exam  03/10/2022    COVID-19 Vaccine (4 - Booster for Pfizer series) 04/06/2022     Updates were requested from care everywhere.  Chart was reviewed for overdue Proactive [facility] Encounters ([facility]) topics (CRS, Breast Cancer Screening, Eye exam)  Health Maintenance has been updated.  [place] immunization registry triggered.  Immunizations were reconciled.

## 2022-04-13 ENCOUNTER — OFFICE VISIT (OUTPATIENT)
Dept: PAIN MEDICINE | Facility: CLINIC | Age: 87
End: 2022-04-13
Payer: MEDICARE

## 2022-04-13 VITALS
BODY MASS INDEX: 24.14 KG/M2 | DIASTOLIC BLOOD PRESSURE: 77 MMHG | SYSTOLIC BLOOD PRESSURE: 132 MMHG | HEART RATE: 61 BPM | WEIGHT: 145.06 LBS

## 2022-04-13 DIAGNOSIS — M25.551 CHRONIC HIP PAIN, BILATERAL: ICD-10-CM

## 2022-04-13 DIAGNOSIS — M25.559 HIP PAIN: ICD-10-CM

## 2022-04-13 DIAGNOSIS — M25.552 CHRONIC HIP PAIN, BILATERAL: ICD-10-CM

## 2022-04-13 DIAGNOSIS — M48.061 NEUROFORAMINAL STENOSIS OF LUMBAR SPINE: ICD-10-CM

## 2022-04-13 DIAGNOSIS — M51.36 DDD (DEGENERATIVE DISC DISEASE), LUMBAR: ICD-10-CM

## 2022-04-13 DIAGNOSIS — M47.816 LUMBAR SPONDYLOSIS: ICD-10-CM

## 2022-04-13 DIAGNOSIS — M47.817 FACET ARTHROPATHY, LUMBOSACRAL: Primary | ICD-10-CM

## 2022-04-13 DIAGNOSIS — M48.062 SPINAL STENOSIS OF LUMBAR REGION WITH NEUROGENIC CLAUDICATION: ICD-10-CM

## 2022-04-13 DIAGNOSIS — G89.29 CHRONIC HIP PAIN, BILATERAL: ICD-10-CM

## 2022-04-13 DIAGNOSIS — M54.16 LUMBAR RADICULOPATHY: ICD-10-CM

## 2022-04-13 PROCEDURE — 1125F AMNT PAIN NOTED PAIN PRSNT: CPT | Mod: CPTII,S$GLB,, | Performed by: NURSE PRACTITIONER

## 2022-04-13 PROCEDURE — 1157F PR ADVANCE CARE PLAN OR EQUIV PRESENT IN MEDICAL RECORD: ICD-10-PCS | Mod: CPTII,S$GLB,, | Performed by: NURSE PRACTITIONER

## 2022-04-13 PROCEDURE — 99999 PR PBB SHADOW E&M-EST. PATIENT-LVL IV: CPT | Mod: PBBFAC,,, | Performed by: NURSE PRACTITIONER

## 2022-04-13 PROCEDURE — 99999 PR PBB SHADOW E&M-EST. PATIENT-LVL IV: ICD-10-PCS | Mod: PBBFAC,,, | Performed by: NURSE PRACTITIONER

## 2022-04-13 PROCEDURE — 1159F PR MEDICATION LIST DOCUMENTED IN MEDICAL RECORD: ICD-10-PCS | Mod: CPTII,S$GLB,, | Performed by: NURSE PRACTITIONER

## 2022-04-13 PROCEDURE — 99214 OFFICE O/P EST MOD 30 MIN: CPT | Mod: S$GLB,,, | Performed by: NURSE PRACTITIONER

## 2022-04-13 PROCEDURE — 1160F RVW MEDS BY RX/DR IN RCRD: CPT | Mod: CPTII,S$GLB,, | Performed by: NURSE PRACTITIONER

## 2022-04-13 PROCEDURE — 1159F MED LIST DOCD IN RCRD: CPT | Mod: CPTII,S$GLB,, | Performed by: NURSE PRACTITIONER

## 2022-04-13 PROCEDURE — 1157F ADVNC CARE PLAN IN RCRD: CPT | Mod: CPTII,S$GLB,, | Performed by: NURSE PRACTITIONER

## 2022-04-13 PROCEDURE — 1125F PR PAIN SEVERITY QUANTIFIED, PAIN PRESENT: ICD-10-PCS | Mod: CPTII,S$GLB,, | Performed by: NURSE PRACTITIONER

## 2022-04-13 PROCEDURE — 1160F PR REVIEW ALL MEDS BY PRESCRIBER/CLIN PHARMACIST DOCUMENTED: ICD-10-PCS | Mod: CPTII,S$GLB,, | Performed by: NURSE PRACTITIONER

## 2022-04-13 PROCEDURE — 99214 PR OFFICE/OUTPT VISIT, EST, LEVL IV, 30-39 MIN: ICD-10-PCS | Mod: S$GLB,,, | Performed by: NURSE PRACTITIONER

## 2022-04-13 NOTE — PROGRESS NOTES
[facility] Established Patient Evaluation  Referred by: Randy Riggs MD  Reason for referral:  Lumbar degenerative disc disease and      Spinal stenosis of lumbar region without neurogenic claudication    CC:   Chief Complaint   Patient presents with    Hip Pain     Bilateral    Leg Pain     Bilateral      Last 3 PDI Scores 4/13/2022 3/9/2021 2/21/2020   Pain Disability Index (PDI) 18 24 60     Interval Updates: 4/13/2022 -  Juan returns to clinic s/p Bilateral GTB on 3/22/22 with 40% relief.  He reports a pain intensity 3/10 today with a weekly range of 3-4/10.   He continues to report pain in the low back, bilateral posterior lateral leg pain stopping just above his knees. He is reporting less pain in his hips bilaterally.        3/7/2022 - Mr. Martinez is following up for Hip Pain (Bilateral) and Leg Pain (Bilateral ).  Pain is currently rate 7/10 with a weekly range 7-8/10.  Very pleasant man presents today complaining of bilateral hip pain, her pain presents when he lays on either side at night.  He did state he tried a new mattress but is still having the same issue.  Recently had a left L4-5 laminectomy medial facetectomy, microdiskectomy on 05/26/2021 per Dr. Sparks as he does have a history of low back pain.  Denies any motor weakness or numbness, denies any bowel or bladder dysfunction today.    3/9/21 - Mr. Martinez returns to clinic for follow up visit reporting continued low  back pain.  Patient is s/p Lumbar Transforaminal Epidural Steroid Injection, Left L4-5 on 2/23/21 with 0% relief. Patient reports no improvement of his pain following injection.   Pain intensity is currently 4/10.      4/28/20 - Mr. Martinez returns to clinic for follow up visit reporting stable back, thigh and leg pain.  Pain intensity is currently 3/10 ranging up to 9/10 with walking or standing.  Sleeping positions exacerbate the pain too.    2/21/2020 - Mr. Martinez returns to clinic for follow up visit reporting  worse hip pain.  Patient is s/p Bilateral Greater Trochanter Bursa Injection on 2/13/20 with 0% relief. Patient presents with son for CV, he is reporting right hip pain relief for 6 days about 50%, he reports his left hip pain continues he did not find that the injection helped his left hip pain he continues to have left hip pain with radiating lateral left leg pain to his knee he reports his pain as throbbing.  He continues in physical therapy, again denies performing home exercises because of increased pain. His son reports to me that he spends 2-3 hours sitting in a chair while at home because he is a .  I recommended consistent movement at least every half hour while at home as well as continuing physical therapy.   Pain intensity is currently 10/10.      Background / HPI:   Vince Martinez is a 89 y.o. male who presents today with multiple complaints.  His chief complaint is bilateral hip pain, which was diagnosed as greater trochanteric bursitis, bilaterally, by my colleagues in Orthopedic surgery.  He received a bilateral GTB injection which provided him relief for approximately 2 weeks.  He has been in physical therapy, but denies performing home exercises because of increasing pain.  Hip pain radiates down the lateral thigh stopping at the lateral aspect of the knee, bilaterally.  His secondary complaint is chronic low back pain, which is nonradiating.  Pain is felt more with certain positions of the low back, specifically, extension.  Pain is partially alleviated by lumbar flexion.  He has a similar complaint of chronic neck pain exacerbated by extension, and improved by flexion.    Location: Lumbar bilateral leg pain   Onset: six months ago  Current Pain Score: 2/10  Daily Pain of Range: 2-9/10  Quality: Burning and Deep  Radiation: Down both legs to knees  Worsened by: exercise, lying down and walking for more than 10 minutes  Improved by: physical therapy    Previous Therapies:  PT/OT: yes. Last  visit on 1/31/20. Patient states he goes 2x a week.   HEP: Yes   Interventions:   -03/22/2022 Bilateral Greater Trochanter Bursa Injection 40%   02/23/2021 Lumbar Transforaminal Epidural Steroid Injection, Left L4-5     Surgery:Yes 30 years ago. Patient not sure what surgery was done on his back for herniated discs.   Medications:   - NSAIDS:   - MSK Relaxants:   - TCAs:   - SNRIs:   - Topicals:   - Anticonvulsants:  - Opioids:     Current Pain Medications:  1. Tylenol PM      Review of Systems:  Review of Systems   Constitutional: Negative for chills and fever.   HENT: Negative for nosebleeds.    Eyes: Negative for blurred vision.   Respiratory: Positive for shortness of breath. Negative for hemoptysis.    Cardiovascular: Negative for chest pain, palpitations, orthopnea and claudication.   Gastrointestinal: Negative for abdominal pain, diarrhea, heartburn and vomiting.   Genitourinary: Negative for hematuria.   Musculoskeletal: Positive for back pain, joint pain, myalgias and neck pain.   Skin: Negative for rash.   Neurological: Positive for dizziness. Negative for tingling, seizures and loss of consciousness.   Endo/Heme/Allergies: Does not bruise/bleed easily.   Psychiatric/Behavioral: Negative for depression, hallucinations and memory loss. The patient has insomnia. The patient is not nervous/anxious.        History:    Current Outpatient Medications:     betamethasone dipropionate (DIPROLENE) 0.05 % lotion, Apply 1 application topically nightly as needed., Disp: , Rfl:     calcium/D3/mag ox//carolyn/Zn (CALTRATE + D3 PLUS MINERALS ORAL), Take 1 tablet by mouth 2 (two) times daily., Disp: , Rfl:     clotrimazole-betamethasone 1-0.05% (LOTRISONE) cream, APPLY  CREAM TOPICALLY TWICE DAILY, Disp: 45 g, Rfl: 0    diclofenac sodium (VOLTAREN) 1 % Gel, APPLY 2 GRAMS TOPICALLY ONCE DAILY, Disp: 100 g, Rfl: 0    finasteride (PROSCAR) 5 mg tablet, Take 1 tablet (5 mg total) by mouth once daily., Disp: 90 tablet,  Rfl: 3    furosemide (LASIX) 20 MG tablet, Take 1 tablet (20 mg total) by mouth 2 (two) times a day., Disp: 180 tablet, Rfl: 3    gabapentin (NEURONTIN) 100 MG capsule, Take 1 capsule (100 mg total) by mouth every evening., Disp: 90 capsule, Rfl: 3    glimepiride (AMARYL) 2 MG tablet, Take 1 tablet (2 mg total) by mouth daily with breakfast., Disp: 90 tablet, Rfl: 3    irbesartan (AVAPRO) 150 MG tablet, Take 1 tablet (150 mg total) by mouth once daily., Disp: 90 tablet, Rfl: 2    levothyroxine (EUTHYROX) 100 MCG tablet, Take 1 tablet (100 mcg total) by mouth before breakfast., Disp: 90 tablet, Rfl: 3    melatonin 10 mg Tab, Take 1 tablet by mouth nightly as needed., Disp: , Rfl:     meloxicam (MOBIC) 15 MG tablet, Take 1 tablet (15 mg total) by mouth daily as needed for Pain., Disp: 90 tablet, Rfl: 0    metFORMIN (GLUCOPHAGE-XR) 500 MG ER 24hr tablet, Take 1 tablet (500 mg total) by mouth once daily., Disp: 90 tablet, Rfl: 3    omeprazole (PRILOSEC) 40 MG capsule, TAKE 1 CAPSULE BY MOUTH BEFORE BREAKFAST, Disp: 90 capsule, Rfl: 0    tamsulosin (FLOMAX) 0.4 mg Cap, Take 1 capsule (0.4 mg total) by mouth once daily., Disp: 90 capsule, Rfl: 3    triamcinolone acetonide 0.1% (KENALOG) 0.1 % Lotn, APPLY LOTION TO THE AFFECTED AREAS ON THE SCALP ONCE DAILY AT BEDTIME AS NEEDED, Disp: , Rfl:     Past Medical History:   Diagnosis Date    Acute combined systolic and diastolic CHF, 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    HEMILAMINECTOMY   5/26/2021    Procedure: HEMILAMINECTOMY;  Surgeon: Dayton Sparks MD;  Location: [facility] OR;  Service: Neurosurgery;;  left L4-5    INJECTION OF JOINT Bilateral 2/13/2020    Procedure: Injection, Joint, Bilateral GTB;  Surgeon: Angelica Norris Jr., MD;  Location: [facility] PAIN T;  Service: Pain Management;  Laterality: Bilateral;    INJECTION OF JOINT Bilateral 3/22/2022    Procedure: bilateral GTB steriod injection;  Surgeon: Angelica Norris Jr., MD;  Location: [facility] PAIN [facility];  Service: Pain Management;  Laterality: Bilateral;  pacemaker   pt is diabetic     INJECTION OF STEROID Bilateral 1/12/2021    Procedure: INJECTION, STEROID Bilateral SI Joint Block and Steroid Injection;  Surgeon: Dayton Sparks MD;  Location: [facility] OR;  Service: Neurosurgery;  Laterality: Bilateral;  Procedure: Bilateral SI Joint Block and Steroid Injection  Surgery 't'kristen: 45 min  LOS: 0  Anesthesia:MAC  Radiology: C-arm  Bed: Regular with Pillows  Position: Prone     SPINE SURGERY      TRANSFORAMINAL EPIDURAL INJECTION OF STEROID Left 2/23/2021    Procedure: Injection,steroid,epidural,transforaminal approach--Left L4-5 *Has Pacemaker/ICD*;  Surgeon: Angelica Norris Jr., MD;  Location: [facility];  Service: Pain Management;  Laterality: Left;       Family History   Problem Relation Age of Onset    Diabetes Brother     No Known Problems Mother     No Known Problems Father     Diabetes Sister     No Known Problems Daughter     HIV Son        Social History     Socioeconomic History    Marital status:    Tobacco Use    Smoking status: Never Smoker    Smokeless tobacco: Never Used   Substance and Sexual Activity    Alcohol use: No    Drug use: No    Sexual activity: Not Currently     Partners: Female     Social Determinants of Health     Financial Resource Strain: Medium Risk    Difficulty of Paying Living Expenses: Somewhat hard   Food Insecurity: No Food Insecurity    Worried About Running Out of Food in the  Last Year: Never true    Ran Out of Food in the Last Year: Never true   Transportation Needs: No Transportation Needs    Lack of Transportation (Medical): No    Lack of Transportation (Non-Medical): No   Physical Activity: Inactive    Days of Exercise per Week: 0 days    Minutes of Exercise per Session: 0 min   Stress: No Stress Concern Present    Feeling of Stress : Not at all   Social Connections: Moderately Integrated    Frequency of Communication with Friends and Family: More than three times a week    Frequency of Social Gatherings with Friends and Family: More than three times a week    Attends Spiritism Services: More than 4 times per year    Active Member of Clubs or Organizations: No    Attends Club or Organization Meetings: Never    Marital Status:    Housing Stability: Low Risk     Unable to Pay for Housing in the Last Year: No    Number of Places Lived in the Last Year: 2    Unstable Housing in the Last Year: No       Review of patient's allergies indicates:   Allergen Reactions    Bactrim [sulfamethoxazole-trimethoprim] Rash    Ciprofloxacin Rash    Latex Rash       Physical Exam:  Vitals:    04/13/22 0900   BP: 132/77   Pulse: 61   Weight: 65.8 kg (145 lb 1 oz)   PainSc:   3     General    Nursing note and vitals reviewed.  Constitutional: He is oriented to person, place, and time. He appears well-developed and well-nourished. No distress.   HENT:   Head: Normocephalic and atraumatic.   Nose: Nose normal.   Eyes: Conjunctivae and EOM are normal. Pupils are equal, round, and reactive to light. Right eye exhibits no discharge. Left eye exhibits no discharge. No scleral icterus.   Neck: No JVD present.   Cardiovascular: Intact distal pulses.    Pulmonary/Chest: Effort normal. No respiratory distress.   Abdominal: He exhibits no distension.   Neurological: He is alert and oriented to person, place, and time. Coordination normal.   Psychiatric: He has a normal mood and affect. His  behavior is normal. Judgment and thought content normal.     General Musculoskeletal Exam   Gait: normal         Right Hip Exam     Tenderness   The patient tender to palpation of the trochanteric bursa.  Left Hip Exam     Tenderness   The patient tender to palpation of the trochanteric bursa.      Back (L-Spine & T-Spine) / Neck (C-Spine) Exam     Tenderness Posterior midline palpation reveals tenderness of the Upper C-Spine and Lower C-Spine. Right paramedian tenderness of the Lower L-Spine. Left paramedian tenderness of the Lower L-Spine.     Back (L-Spine & T-Spine) Range of Motion   Extension: abnormal Back extension: facet loading is positive and exacerabtes/reproduces the patient's typical low back pain    Flexion: abnormal Back flexion: limited ROM but partial relief of low back pain noted.     Neck (C-Spine) Range of Motion   Flexion:     Limited  Extension: Limited  Right Lateral Bend: abnormal  Left Lateral Bend: abnormal  Right Rotation: abnormal  Left Rotation: abnormal    Spinal Sensation   Right Side Sensation  C-Spine Level: normal   L-Spine Level: normal  Left Side Sensation  C-Spine Level: normal  L-Spine Level: normal    Neck (C-Spine) Tests   Spurling's Test   Left:  Negative  Right: negative    Other He has no scoliosis .      Muscle Strength   Right Upper Extremity   Biceps: 5/5   Deltoid:  5/5  Triceps:  5/5  Wrist extension: 5/5   Wrist flexion: 5/5   Finger Flexors:  5/5  Finger Extensors:  5/5  Left Upper Extremity  Biceps: 5/5   Deltoid:  5/5  Triceps:  5/5  Wrist extension: 5/5   Wrist flexion: 5/5   Finger Flexors:  5/5  Finger Extensors:  5/5  Right Lower Extremity   Hip Flexion: 5/5   Hip Extensors: 5/5  Quadriceps:  5/5   Hamstrin/5   Gastrocsoleus:  5/5   Left Lower Extremity   Hip Flexion: 5/5   Hip Extensors: 5/5  Quadriceps:  5/5   Hamstrin/5   Gastrocsoleus:  5/5     Reflexes     Left Side  Biceps:  2+  Achilles:  2+  Quadriceps:  2+    Right Side   Biceps:   2+  Achilles:  2+  Quadriceps:  2+      Imaging:  CT LUMBAR SPINE WITHOUT CONTRAST    CLINICAL HISTORY:  Spinal stenosis, lumbar;  Spinal stenosis, lumbar region without neurogenic claudication    TECHNIQUE:  Low-dose axial, sagittal and coronal reformations are obtained through the lumbar spine.  Contrast was not administered.    COMPARISON:  None available    FINDINGS:  Lumbar vertebral body heights are maintained.  There is mild grade 1 retrolisthesis of L3 on L4.  Multilevel discogenic endplate change with disc height loss, most pronounced at L4-L5.  Scattered lumbar intervertebral disc gas phenomenon.  Limited evaluation of the central canal with suspected mild central canal stenosis at L2-L3 and L4-L5 and mild moderate stenosis at L3-L4.  Partially fused SI joints. There is multilevel facet arthropathy with variable degree of neural foraminal narrowing ranging from mild to severe, most pronounced on the right at L5-S1.    Limited evaluation of intra-abdominal structures demonstrates abdominal aortic atherosclerosis and left lower pole renal cyst.       Labs:  BMP  Lab Results   Component Value Date     02/05/2022    K 4.9 02/05/2022     02/05/2022    CO2 25 02/05/2022    BUN 35 (H) 02/05/2022    CREATININE 1.6 (H) 02/05/2022    CALCIUM 9.3 02/05/2022    ANIONGAP 10 02/05/2022    ESTGFRAFRICA 42.9 (A) 02/05/2022    EGFRNONAA 37.1 (A) 02/05/2022     Lab Results   Component Value Date    ALT 27 02/05/2022    AST 30 02/05/2022    ALKPHOS 56 02/05/2022    BILITOT 0.4 02/05/2022       Assessment:  Problem List Items Addressed This Visit        Neuro    Lumbar spondylosis    Lumbar radiculopathy    DDD (degenerative disc disease), lumbar    Spinal stenosis of lumbar region with neurogenic claudication       Orthopedic    Neuroforaminal stenosis of lumbar spine    Relevant Orders    Ambulatory referral/consult to Physical/Occupational Therapy      Other Visit Diagnoses     Facet arthropathy, lumbosacral     -  Primary    Relevant Orders    Ambulatory referral/consult to Physical/Occupational Therapy    Chronic hip pain, bilateral        Relevant Orders    Ambulatory referral/consult to Physical/Occupational Therapy    Hip pain              2/3/2020 - Vince Martinez is a 89 y.o. male with chronic bilateral hip pain secondary to greater trochanteric bursitis and chronic axial spine pain related to facet arthropathy.  There is likely a contribution from retrolisthesis of L3 on L4 with regards to the low back pain. Patient is most concerned about the bilateral hip pain. I educated him on the appropriateness of repeating the bilateral GTB injection, which should not be the primary focus of his treatment.  The bilateral GTB injection can reduce the symptoms of bursitis temporarily; however, long-term treatment of this condition requires physical therapy and home exercise.  Patient has been compliant with physical therapy but noncompliant with recommendations for home exercise and I tried to encourage him to perform his exercises daily.  He appears to understand.    02/21/[id number]-hvcb-old male presents with his son for clinic visit.  Patient status post bilateral GT be injections with reported 0% relief.  he is reporting right hip pain relief for 6 days at about 50%, he reports his left hip pain continues he did not find that the injection helped his left hip pain he continues to have left hip pain with radiating lateral left leg pain to his knee.  This chronic hip pain is secondary to GTB and chronic axial spine pain related to facet arthropathy.  He reports no low back pain today just continued bilateral hip pain. Discussed that I do not recommend repeat injections today, suggested that he continue with physical therapy and that he establishes a home exercise plan for continued exercises daily.  Discussed that I will also recommend a quad cane to help with rising from a seated position as well as standing for long periods  of time considering the patient is the past.  Also suggested that patient does not sit for greater than 30-40 minutes, his son reports to me during our visit that patient has a tendency to sit in his chair at home for 2-3 hours.  Recommended continue physical therapy and follow up in 6 weeks.    4/28/20 - 89-year-old male with symptoms of neurogenic claudication.  I recommended a lumbar epidural steroid injection at L4-5 which will hopefully address a combination of central canal or neural foraminal stenosis at L3-4 and L5-S1.    3/9/2021- 89-year-old male presents status post all lumbar HOMAR targeting L4-5 This was recommended per Neurosurgery, patient has a history of chronic lumbar radiculopathy and bilateral stenosis lateral recess of the  lumbar spine. NSGY is  recommending a left minimally invasive L4-5 laminectomy, medial facetectomy and foraminotomy however the time patient refuse which is why he was scheduled for this lumbar HOMAR.  Considering he has not responded well to this injection I recommend he return to Neurosurgery for further evaluation and consideration of a surgical intervention patient agreed understood.    03/07/[id number]-jufo-old male with history of chronic low back and bilateral hip pain.  Primary source of pain today are his bilateral hips.  He was referred to our office per Neurosurgery for bilateral GT trochanteric bursa injections, the  majority of his pain are in his hips at the moment.  Upon exam he had tenderness to each GTB bilaterally with  mild pain in low back upon palpation.     4/13/2022- 89 y/o male with a hx of chronic low back, bilateral hip and leg pain. He reported 40% relief form Bilateral GTB injections, but continued pain in the low back and bilateral legs, likely related to chronic lumbar radiculopathy and bilateral lateral recess stenosis.  He refuses any more injections at this time, recommended that we restart physical therapy as he endorses benefit in the past.      : Reviewed and consistent with medication use as prescribed.    Treatment Plan:   Procedures: none at this time   PT/OT/HEP: restart PT order put in today for low back and brenda leg pain   Medications:  Recommended continued use of Tylenol 500 to a 1000 Q 8 as needed for pain not to exceed 3000 mg in 24 hours.   Labs: reviewed and medications are appropriately dosed for current hepatorenal function.  Imaging: No additional recommended at this time.    Follow Up: p PT completed    Pancho Viera NP-C  Interventional Pain Management        Disclaimer: This note was partly generated using dictation software which may occasionally result in transcription errors.

## 2022-05-13 ENCOUNTER — HOSPITAL ENCOUNTER (OUTPATIENT)
Facility: HOSPITAL | Age: 87
Discharge: HOME HEALTH | End: 2022-05-15
Attending: EMERGENCY MEDICINE | Admitting: INTERNAL MEDICINE
Payer: MEDICARE

## 2022-05-13 DIAGNOSIS — I50.42 CHRONIC COMBINED SYSTOLIC AND DIASTOLIC CHF (CONGESTIVE HEART FAILURE): ICD-10-CM

## 2022-05-13 DIAGNOSIS — G45.9 TIA (TRANSIENT ISCHEMIC ATTACK): ICD-10-CM

## 2022-05-13 DIAGNOSIS — N18.30 STAGE 3 CHRONIC KIDNEY DISEASE, UNSPECIFIED WHETHER STAGE 3A OR 3B CKD: ICD-10-CM

## 2022-05-13 DIAGNOSIS — D64.9 ANEMIA, UNSPECIFIED TYPE: ICD-10-CM

## 2022-05-13 DIAGNOSIS — I11.9 HYPERTENSIVE HEART DISEASE WITHOUT CONGESTIVE HEART FAILURE: ICD-10-CM

## 2022-05-13 DIAGNOSIS — N17.9 ACUTE KIDNEY INJURY: ICD-10-CM

## 2022-05-13 DIAGNOSIS — M15.9 PRIMARY OSTEOARTHRITIS INVOLVING MULTIPLE JOINTS: ICD-10-CM

## 2022-05-13 DIAGNOSIS — E11.65 TYPE 2 DIABETES MELLITUS WITH HYPERGLYCEMIA, WITHOUT LONG-TERM CURRENT USE OF INSULIN: Primary | ICD-10-CM

## 2022-05-13 LAB
ALBUMIN SERPL BCP-MCNC: 4.2 G/DL (ref 3.5–5.2)
ALP SERPL-CCNC: 64 U/L (ref 55–135)
ALT SERPL W/O P-5'-P-CCNC: 28 U/L (ref 10–44)
ANION GAP SERPL CALC-SCNC: 12 MMOL/L (ref 8–16)
AST SERPL-CCNC: 34 U/L (ref 10–40)
BASOPHILS # BLD AUTO: 0.05 K/UL (ref 0–0.2)
BASOPHILS NFR BLD: 1 % (ref 0–1.9)
BILIRUB SERPL-MCNC: 0.3 MG/DL (ref 0.1–1)
BUN SERPL-MCNC: 37 MG/DL (ref 10–30)
CALCIUM SERPL-MCNC: 9.6 MG/DL (ref 8.7–10.5)
CHLORIDE SERPL-SCNC: 105 MMOL/L (ref 95–110)
CO2 SERPL-SCNC: 23 MMOL/L (ref 23–29)
CREAT SERPL-MCNC: 2.3 MG/DL (ref 0.5–1.4)
CRP SERPL-MCNC: 0.8 MG/L (ref 0–8.2)
DIFFERENTIAL METHOD: ABNORMAL
EOSINOPHIL # BLD AUTO: 0.3 K/UL (ref 0–0.5)
EOSINOPHIL NFR BLD: 5.7 % (ref 0–8)
ERYTHROCYTE [DISTWIDTH] IN BLOOD BY AUTOMATED COUNT: 13.2 % (ref 11.5–14.5)
ERYTHROCYTE [SEDIMENTATION RATE] IN BLOOD BY WESTERGREN METHOD: 25 MM/HR (ref 0–10)
EST. GFR  (AFRICAN AMERICAN): 28 ML/MIN/1.73 M^2
EST. GFR  (NON AFRICAN AMERICAN): 24 ML/MIN/1.73 M^2
GLUCOSE SERPL-MCNC: 89 MG/DL (ref 70–110)
HCT VFR BLD AUTO: 32.1 % (ref 40–54)
HGB BLD-MCNC: 10.3 G/DL (ref 14–18)
IMM GRANULOCYTES # BLD AUTO: 0.01 K/UL (ref 0–0.04)
IMM GRANULOCYTES NFR BLD AUTO: 0.2 % (ref 0–0.5)
LEFT EYE DM RETINOPATHY: NEGATIVE
LYMPHOCYTES # BLD AUTO: 2.5 K/UL (ref 1–4.8)
LYMPHOCYTES NFR BLD: 49.8 % (ref 18–48)
MCH RBC QN AUTO: 32.4 PG (ref 27–31)
MCHC RBC AUTO-ENTMCNC: 32.1 G/DL (ref 32–36)
MCV RBC AUTO: 101 FL (ref 82–98)
MONOCYTES # BLD AUTO: 0.5 K/UL (ref 0.3–1)
MONOCYTES NFR BLD: 9.1 % (ref 4–15)
NEUTROPHILS # BLD AUTO: 1.7 K/UL (ref 1.8–7.7)
NEUTROPHILS NFR BLD: 34.2 % (ref 38–73)
NRBC BLD-RTO: 0 /100 WBC
PLATELET # BLD AUTO: 163 K/UL (ref 150–450)
PMV BLD AUTO: 10 FL (ref 9.2–12.9)
POTASSIUM SERPL-SCNC: 5.1 MMOL/L (ref 3.5–5.1)
PROT SERPL-MCNC: 7.5 G/DL (ref 6–8.4)
RBC # BLD AUTO: 3.18 M/UL (ref 4.6–6.2)
RIGHT EYE DM RETINOPATHY: NEGATIVE
SODIUM SERPL-SCNC: 140 MMOL/L (ref 136–145)
TROPONIN I SERPL DL<=0.01 NG/ML-MCNC: <0.006 NG/ML (ref 0–0.03)
WBC # BLD AUTO: 4.94 K/UL (ref 3.9–12.7)

## 2022-05-13 PROCEDURE — 99285 EMERGENCY DEPT VISIT HI MDM: CPT | Mod: 25

## 2022-05-13 PROCEDURE — G0378 HOSPITAL OBSERVATION PER HR: HCPCS

## 2022-05-13 PROCEDURE — 80053 COMPREHEN METABOLIC PANEL: CPT | Performed by: EMERGENCY MEDICINE

## 2022-05-13 PROCEDURE — 86140 C-REACTIVE PROTEIN: CPT | Performed by: EMERGENCY MEDICINE

## 2022-05-13 PROCEDURE — 84484 ASSAY OF TROPONIN QUANT: CPT | Performed by: EMERGENCY MEDICINE

## 2022-05-13 PROCEDURE — 80061 LIPID PANEL: CPT

## 2022-05-13 PROCEDURE — 93005 ELECTROCARDIOGRAM TRACING: CPT

## 2022-05-13 PROCEDURE — 93010 EKG 12-LEAD: ICD-10-PCS | Mod: ,,, | Performed by: INTERNAL MEDICINE

## 2022-05-13 PROCEDURE — 25000003 PHARM REV CODE 250: Performed by: EMERGENCY MEDICINE

## 2022-05-13 PROCEDURE — 85652 RBC SED RATE AUTOMATED: CPT | Performed by: EMERGENCY MEDICINE

## 2022-05-13 PROCEDURE — 93010 ELECTROCARDIOGRAM REPORT: CPT | Mod: ,,, | Performed by: INTERNAL MEDICINE

## 2022-05-13 PROCEDURE — 85025 COMPLETE CBC W/AUTO DIFF WBC: CPT | Performed by: EMERGENCY MEDICINE

## 2022-05-13 RX ORDER — LOSARTAN POTASSIUM 50 MG/1
50 TABLET ORAL DAILY
Refills: 0 | Status: DISCONTINUED | OUTPATIENT
Start: 2022-05-14 | End: 2022-05-15 | Stop reason: HOSPADM

## 2022-05-13 RX ORDER — TALC
10 POWDER (GRAM) TOPICAL NIGHTLY PRN
Status: DISCONTINUED | OUTPATIENT
Start: 2022-05-13 | End: 2022-05-15 | Stop reason: HOSPADM

## 2022-05-13 RX ORDER — GABAPENTIN 100 MG/1
100 CAPSULE ORAL NIGHTLY
Status: DISCONTINUED | OUTPATIENT
Start: 2022-05-14 | End: 2022-05-15 | Stop reason: HOSPADM

## 2022-05-13 RX ORDER — SODIUM CHLORIDE 0.9 % (FLUSH) 0.9 %
10 SYRINGE (ML) INJECTION
Status: DISCONTINUED | OUTPATIENT
Start: 2022-05-13 | End: 2022-05-15 | Stop reason: HOSPADM

## 2022-05-13 RX ORDER — TAMSULOSIN HYDROCHLORIDE 0.4 MG/1
0.4 CAPSULE ORAL DAILY
Status: DISCONTINUED | OUTPATIENT
Start: 2022-05-14 | End: 2022-05-15 | Stop reason: HOSPADM

## 2022-05-13 RX ORDER — SODIUM CHLORIDE 9 MG/ML
1000 INJECTION, SOLUTION INTRAVENOUS
Status: COMPLETED | OUTPATIENT
Start: 2022-05-13 | End: 2022-05-13

## 2022-05-13 RX ORDER — HEPARIN SODIUM 5000 [USP'U]/ML
5000 INJECTION, SOLUTION INTRAVENOUS; SUBCUTANEOUS EVERY 12 HOURS
Status: DISCONTINUED | OUTPATIENT
Start: 2022-05-14 | End: 2022-05-15 | Stop reason: HOSPADM

## 2022-05-13 RX ORDER — FINASTERIDE 5 MG/1
5 TABLET, FILM COATED ORAL DAILY
Status: DISCONTINUED | OUTPATIENT
Start: 2022-05-14 | End: 2022-05-15 | Stop reason: HOSPADM

## 2022-05-13 RX ORDER — ATORVASTATIN CALCIUM 40 MG/1
40 TABLET, FILM COATED ORAL DAILY
Status: DISCONTINUED | OUTPATIENT
Start: 2022-05-14 | End: 2022-05-15 | Stop reason: HOSPADM

## 2022-05-13 RX ORDER — LEVOTHYROXINE SODIUM 50 UG/1
100 TABLET ORAL
Status: DISCONTINUED | OUTPATIENT
Start: 2022-05-14 | End: 2022-05-15 | Stop reason: HOSPADM

## 2022-05-13 RX ORDER — FUROSEMIDE 20 MG/1
20 TABLET ORAL 2 TIMES DAILY
Status: DISCONTINUED | OUTPATIENT
Start: 2022-05-13 | End: 2022-05-15 | Stop reason: HOSPADM

## 2022-05-13 RX ORDER — TRIAMCINOLONE ACETONIDE 1 MG/G
CREAM TOPICAL NIGHTLY PRN
Status: DISCONTINUED | OUTPATIENT
Start: 2022-05-13 | End: 2022-05-15 | Stop reason: HOSPADM

## 2022-05-13 RX ORDER — NAPROXEN SODIUM 220 MG/1
81 TABLET, FILM COATED ORAL DAILY
Status: DISCONTINUED | OUTPATIENT
Start: 2022-05-14 | End: 2022-05-15 | Stop reason: HOSPADM

## 2022-05-13 RX ADMIN — SODIUM CHLORIDE 1000 ML: 0.9 INJECTION, SOLUTION INTRAVENOUS at 10:05

## 2022-05-13 NOTE — ED PROVIDER NOTES
"Encounter Date: 5/13/2022       History     Chief Complaint   Patient presents with    Headache     Pt c/o headaches and intermittent blurred vision x15 days. Denies any nausea, vomiting, dizziness, chest pain or SOB. Neuro intact on arrival     Patient is a 89-year-old male with a past medical history of CHF, diabetes, DJD, hypertension, arthritis who presents to the ED with complaint of headache and vision change.  Patient states that he has been having intermittent right-sided frontal and occipital headache over the past month with associated "blurry vision" he denies any numbness, tingling, nausea, vomiting, chest pain or shortness of breath.  He denies any focal neurological deficits or difficulty walking.  Patient states he has not taken anything for his symptoms.  Patient was seen by his eye doctor, Dr. Suman Duran, who sent the patient to the ED with a prescription pad note stating that the patient had experienced "crescendof amaurosis fugax of the right eye"  and is requesting a workup for TIA.             Review of patient's allergies indicates:   Allergen Reactions    Bactrim [sulfamethoxazole-trimethoprim] Rash    Ciprofloxacin Rash    Latex Rash     Past Medical History:   Diagnosis Date    Acute combined systolic and diastolic CHF, NYHA class 3 11/15/2018    Arthritis     Diabetes mellitus     DJD (degenerative joint disease)     Hypertension     Prostate enlargement     Sleep apnea     Thyroid disease      Past Surgical History:   Procedure Laterality Date    CARDIAC PACEMAKER PLACEMENT      EPIDURAL STEROID INJECTION INTO LUMBAR SPINE N/A 6/4/2020    Procedure: Injection-steroid-epidural-lumbar--L4-5;  Surgeon: Angelica Norris Jr., MD;  Location: [facility] PAIN MGT;  Service: Pain Management;  Laterality: N/A;  sign consent at DOS.    HEMILAMINECTOMY  5/26/2021    Procedure: HEMILAMINECTOMY;  Surgeon: Dayton Sparks MD;  Location: [facility] OR;  Service: Neurosurgery;;  left L4-5    " INJECTION OF JOINT Bilateral 2/13/2020    Procedure: Injection, Joint, Bilateral GTB;  Surgeon: Angelica Norris Jr., MD;  Location: [facility];  Service: Pain Management;  Laterality: Bilateral;    INJECTION OF JOINT Bilateral 3/22/2022    Procedure: bilateral GTB steriod injection;  Surgeon: Angelica Norris Jr., MD;  [place] [facility] [place] [facility]  Service: Pain Management;  Laterality: Bilateral;  pacemaker   pt is diabetic     INJECTION OF STEROID Bilateral 1/12/2021    Procedure: INJECTION, STEROID Bilateral SI Joint Block and Steroid Injection;  Surgeon: Dayton Sparks MD;  Location: [facility] OR;  Service: Neurosurgery;  Laterality: Bilateral;  Procedure: Bilateral SI Joint Block and Steroid Injection  Surgery 't'kristen: 45 min  LOS: 0  Anesthesia:MAC  Radiology: C-arm  Bed: Regular with Pillows  Position: Prone     SPINE SURGERY      TRANSFORAMINAL EPIDURAL INJECTION OF STEROID Left 2/23/2021    Procedure: Injection,steroid,epidural,transforaminal approach--Left L4-5 *Has Pacemaker/ICD*;  Surgeon: Angelica Norris Jr., MD;  Location: [facility];  Service: Pain Management;  Laterality: Left;     Family History   Problem Relation Age of Onset    Diabetes Brother     No Known Problems Mother     No Known Problems Father     Diabetes Sister     No Known Problems Daughter     HIV Son      Social History     Tobacco Use    Smoking status: Never Smoker    Smokeless tobacco: Never Used   Substance Use Topics    Alcohol use: No    Drug use: No     Review of Systems   Constitutional: Negative for chills and fever.   Eyes: Positive for visual disturbance (transient monocular vision loss of R eye that has resolved ). Negative for pain.   Respiratory: Negative for chest tightness and shortness of breath.    Cardiovascular: Negative for chest pain, palpitations and leg swelling.   Gastrointestinal: Negative for abdominal pain, nausea and vomiting.   Genitourinary: Negative for dysuria.   Musculoskeletal:  Negative for back pain and neck pain.   Neurological: Positive for headaches (R occipital ). Negative for dizziness, seizures, syncope, facial asymmetry, speech difficulty, weakness, light-headedness and numbness.   Psychiatric/Behavioral: Negative for agitation, confusion and decreased concentration.       Physical Exam     Initial Vitals [05/13/22 1648]   BP Pulse Resp Temp SpO2   (!) 168/63 60 16 97.5 °F (36.4 °C) (!) 60 %      MAP       --         Physical Exam    Nursing note and vitals reviewed.  Constitutional: He appears well-developed and well-nourished.   HENT:   Head: Normocephalic and atraumatic.   Right Ear: External ear normal.   Left Ear: External ear normal.   Eyes: Conjunctivae and EOM are normal. Pupils are equal, round, and reactive to light.   Neck: Neck supple.   Normal range of motion.  Cardiovascular: Normal rate, regular rhythm, normal heart sounds and intact distal pulses.   Pulmonary/Chest: Breath sounds normal.   Abdominal: Abdomen is soft. Bowel sounds are normal.   Musculoskeletal:         General: Normal range of motion.      Cervical back: Normal range of motion and neck supple.     Neurological: He is alert and oriented to person, place, and time. He has normal strength. GCS score is 15. GCS eye subscore is 4. GCS verbal subscore is 5. GCS motor subscore is 6.   Skin: Skin is warm. Capillary refill takes less than 2 seconds.         ED Course   Procedures  Labs Reviewed   CBC W/ AUTO DIFFERENTIAL - Abnormal; Notable for the following components:       Result Value    RBC 3.18 (*)     Hemoglobin 10.3 (*)     Hematocrit 32.1 (*)      (*)     MCH 32.4 (*)     Gran # (ANC) 1.7 (*)     Gran % 34.2 (*)     Lymph % 49.8 (*)     All other components within normal limits   COMPREHENSIVE METABOLIC PANEL - Abnormal; Notable for the following components:    BUN 37 (*)     Creatinine 2.3 (*)     eGFR if  28 (*)     eGFR if non  24 (*)     All other components  within normal limits   SEDIMENTATION RATE - Abnormal; Notable for the following components:    Sed Rate 25 (*)     All other components within normal limits   C-REACTIVE PROTEIN   TROPONIN I   HEMOGLOBIN A1C   LIPID PANEL   TSH   LIPID PANEL   TSH   SARS-COV-2 RDRP GENE     EKG Readings: (Independently Interpreted)   Initial Reading: No STEMI. Rhythm: Normal Sinus Rhythm.   Sinus rhythm with 1st degree AV block; no STEMI; no ischemic change        Imaging Results          CT Head Without Contrast (Final result)  Result time 05/13/22 19:16:31    Final result by Kira Bernardo MD (05/13/22 19:16:31)                 Impression:      No acute intracranial hemorrhage or evidence of major arterial infarct.    Mild lobular mucosal thickening in the bilateral inferior maxillary sinuses without air-fluid levels is again noted is likely incidental.    Age-related atrophic changes.      Electronically signed by: Kira Bernardo  Date:    05/13/2022  Time:    19:16             Narrative:    EXAMINATION:  CT HEAD WITHOUT CONTRAST    CLINICAL HISTORY:  Transient ischemic attack (TIA);    TECHNIQUE:  Low dose axial images were obtained through the head.  Coronal and sagittal reformations were also performed. Contrast was not administered.    COMPARISON:  MRI brain 02/10/2016    FINDINGS:  There is no evidence of acute intra or extra-axial hemorrhage or hematoma formation.  There is prominence of the ventricles, cisterns and sulci stable since prior exam and consistent with age related atrophy.    Gray-white matter junction differentiation diffusely appears to be intact with no evidence of acute major arterial infarct.  There is no focal mass or mass effect.  Incidental crescentic calcification again noted in the left temporal lobe.    Lobular mucosal thickening again noted in the paranasal sinuses inferiorly bilaterally greater on the right.  Remaining paranasal sinuses are clear and mastoid air cells and middle ears  bilaterally appear clear.    Orbits and retro bulbar structures appear intact with postoperative changes again noted involving the globes.    Bony calvarium appears to be intact with no evidence of fracture lytic or sclerotic osseous lesion.                                 Medications   finasteride tablet 5 mg (5 mg Oral Given 5/14/22 0934)   furosemide tablet 20 mg (20 mg Oral Given 5/14/22 0934)   gabapentin capsule 100 mg (has no administration in time range)   losartan tablet 50 mg (50 mg Oral Given 5/14/22 0934)   levothyroxine tablet 100 mcg (100 mcg Oral Given 5/14/22 0637)   melatonin tablet 9 mg (has no administration in time range)   tamsulosin 24 hr capsule 0.4 mg (0.4 mg Oral Given 5/14/22 0934)   triamcinolone acetonide 0.1% cream (has no administration in time range)   sodium chloride 0.9% flush 10 mL (has no administration in time range)   heparin (porcine) injection 5,000 Units (5,000 Units Subcutaneous Given 5/14/22 0934)   atorvastatin tablet 40 mg (40 mg Oral Given 5/14/22 0933)   aspirin chewable tablet 81 mg (81 mg Per NG tube Given 5/14/22 0933)   glucagon (human recombinant) injection 1 mg (has no administration in time range)   dextrose 10% bolus 125 mL (has no administration in time range)   dextrose 10% bolus 250 mL (has no administration in time range)   insulin aspart U-100 pen 0-5 Units (has no administration in time range)   LIDOcaine 5 % patch 1 patch (1 patch Transdermal Patch Removed 5/14/22 4313)   lactated ringers bolus 500 mL (500 mLs Intravenous New Bag 5/14/22 9237)   0.9%  NaCl infusion (1,000 mLs Intravenous New Bag 5/13/22 1706)   albuterol sulfate nebulizer solution 10 mg (10 mg Nebulization Given 5/14/22 0948)   sodium zirconium cyclosilicate packet 10 g (10 g Oral Given 5/14/22 0934)     Medical Decision Making:   ED Management:  - CT head WO negative for acute intracranial abnormality per final radiology read  - will admit for TIA workup in light of patient's  presentation, elevated Cr, K+   - pt to be admitted to [facility]  for further evaluation and management                       Clinical Impression:   Final diagnoses:  [G45.9] TIA (transient ischemic attack)          ED Disposition Condition    Observation               Maximiliano Mcclendon MD  05/14/22 8162

## 2022-05-14 LAB
ALBUMIN SERPL BCP-MCNC: 3.5 G/DL (ref 3.5–5.2)
ALP SERPL-CCNC: 58 U/L (ref 55–135)
ALT SERPL W/O P-5'-P-CCNC: 22 U/L (ref 10–44)
ANION GAP SERPL CALC-SCNC: 11 MMOL/L (ref 8–16)
ANION GAP SERPL CALC-SCNC: 13 MMOL/L (ref 8–16)
ANION GAP SERPL CALC-SCNC: 15 MMOL/L (ref 8–16)
APTT BLDCRRT: 25.9 SEC (ref 21–32)
AST SERPL-CCNC: 31 U/L (ref 10–40)
AV INDEX (PROSTH): 0.77
AV MEAN GRADIENT: 9 MMHG
AV PEAK GRADIENT: 19 MMHG
AV VALVE AREA: 3.43 CM2
AV VELOCITY RATIO: 0.64
BASOPHILS # BLD AUTO: 0.05 K/UL (ref 0–0.2)
BASOPHILS NFR BLD: 1.1 % (ref 0–1.9)
BILIRUB SERPL-MCNC: 0.3 MG/DL (ref 0.1–1)
BNP SERPL-MCNC: 60 PG/ML (ref 0–99)
BSA FOR ECHO PROCEDURE: 1.75 M2
BUN SERPL-MCNC: 29 MG/DL (ref 10–30)
BUN SERPL-MCNC: 32 MG/DL (ref 10–30)
BUN SERPL-MCNC: 35 MG/DL (ref 10–30)
CALCIUM SERPL-MCNC: 9 MG/DL (ref 8.7–10.5)
CALCIUM SERPL-MCNC: 9.2 MG/DL (ref 8.7–10.5)
CALCIUM SERPL-MCNC: 9.3 MG/DL (ref 8.7–10.5)
CHLORIDE SERPL-SCNC: 102 MMOL/L (ref 95–110)
CHLORIDE SERPL-SCNC: 105 MMOL/L (ref 95–110)
CHLORIDE SERPL-SCNC: 106 MMOL/L (ref 95–110)
CHOLEST SERPL-MCNC: 194 MG/DL (ref 120–199)
CHOLEST/HDLC SERPL: 4.4 {RATIO} (ref 2–5)
CO2 SERPL-SCNC: 22 MMOL/L (ref 23–29)
CO2 SERPL-SCNC: 22 MMOL/L (ref 23–29)
CO2 SERPL-SCNC: 23 MMOL/L (ref 23–29)
CREAT SERPL-MCNC: 1.8 MG/DL (ref 0.5–1.4)
CREAT SERPL-MCNC: 1.9 MG/DL (ref 0.5–1.4)
CREAT SERPL-MCNC: 2.2 MG/DL (ref 0.5–1.4)
CREAT UR-MCNC: 38.6 MG/DL (ref 23–375)
CV ECHO LV RWT: 0.63 CM
DIFFERENTIAL METHOD: ABNORMAL
DOP CALC AO PEAK VEL: 2.17 M/S
DOP CALC AO VTI: 33.84 CM
DOP CALC LVOT AREA: 4.5 CM2
DOP CALC LVOT DIAMETER: 2.39 CM
DOP CALC LVOT PEAK VEL: 1.39 M/S
DOP CALC LVOT STROKE VOLUME: 116.18 CM3
DOP CALC MV VTI: 30.52 CM
DOP CALCLVOT PEAK VEL VTI: 25.91 CM
E WAVE DECELERATION TIME: 323.1 MSEC
E/A RATIO: 0.59
E/E' RATIO: 9.67 M/S
ECHO LV POSTERIOR WALL: 1.5 CM (ref 0.6–1.1)
EJECTION FRACTION: 75 %
EOSINOPHIL # BLD AUTO: 0.4 K/UL (ref 0–0.5)
EOSINOPHIL NFR BLD: 7.4 % (ref 0–8)
ERYTHROCYTE [DISTWIDTH] IN BLOOD BY AUTOMATED COUNT: 13.2 % (ref 11.5–14.5)
EST. GFR  (AFRICAN AMERICAN): 29 ML/MIN/1.73 M^2
EST. GFR  (AFRICAN AMERICAN): 35 ML/MIN/1.73 M^2
EST. GFR  (AFRICAN AMERICAN): 37 ML/MIN/1.73 M^2
EST. GFR  (NON AFRICAN AMERICAN): 25 ML/MIN/1.73 M^2
EST. GFR  (NON AFRICAN AMERICAN): 30 ML/MIN/1.73 M^2
EST. GFR  (NON AFRICAN AMERICAN): 32 ML/MIN/1.73 M^2
ESTIMATED AVG GLUCOSE: 148 MG/DL (ref 68–131)
FOLATE SERPL-MCNC: 17.7 NG/ML (ref 4–24)
FRACTIONAL SHORTENING: 29 % (ref 28–44)
GLUCOSE SERPL-MCNC: 145 MG/DL (ref 70–110)
GLUCOSE SERPL-MCNC: 145 MG/DL (ref 70–110)
GLUCOSE SERPL-MCNC: 90 MG/DL (ref 70–110)
HBA1C MFR BLD: 6.8 % (ref 4–5.6)
HCT VFR BLD AUTO: 29.8 % (ref 40–54)
HDLC SERPL-MCNC: 44 MG/DL (ref 40–75)
HDLC SERPL: 22.7 % (ref 20–50)
HGB BLD-MCNC: 9.6 G/DL (ref 14–18)
IMM GRANULOCYTES # BLD AUTO: 0 K/UL (ref 0–0.04)
IMM GRANULOCYTES NFR BLD AUTO: 0 % (ref 0–0.5)
INR PPP: 1 (ref 0.8–1.2)
INTERVENTRICULAR SEPTUM: 1.14 CM (ref 0.6–1.1)
IVRT: 131.3 MSEC
LA MAJOR: 4.33 CM
LA MINOR: 5.43 CM
LA WIDTH: 3.65 CM
LDLC SERPL CALC-MCNC: 121.4 MG/DL (ref 63–159)
LEFT ATRIUM SIZE: 3.24 CM
LEFT ATRIUM VOLUME INDEX MOD: 16.8 ML/M2
LEFT ATRIUM VOLUME INDEX: 28 ML/M2
LEFT ATRIUM VOLUME MOD: 29.03 CM3
LEFT ATRIUM VOLUME: 48.43 CM3
LEFT INTERNAL DIMENSION IN SYSTOLE: 3.39 CM (ref 2.1–4)
LEFT VENTRICLE DIASTOLIC VOLUME INDEX: 61.02 ML/M2
LEFT VENTRICLE DIASTOLIC VOLUME: 105.56 ML
LEFT VENTRICLE MASS INDEX: 143 G/M2
LEFT VENTRICLE SYSTOLIC VOLUME INDEX: 27.2 ML/M2
LEFT VENTRICLE SYSTOLIC VOLUME: 46.98 ML
LEFT VENTRICULAR INTERNAL DIMENSION IN DIASTOLE: 4.76 CM (ref 3.5–6)
LEFT VENTRICULAR MASS: 248.02 G
LV LATERAL E/E' RATIO: 9.67 M/S
LV SEPTAL E/E' RATIO: 9.67 M/S
LYMPHOCYTES # BLD AUTO: 1.7 K/UL (ref 1–4.8)
LYMPHOCYTES NFR BLD: 36.5 % (ref 18–48)
MAGNESIUM SERPL-MCNC: 2.3 MG/DL (ref 1.6–2.6)
MCH RBC QN AUTO: 32.2 PG (ref 27–31)
MCHC RBC AUTO-ENTMCNC: 32.2 G/DL (ref 32–36)
MCV RBC AUTO: 100 FL (ref 82–98)
MONOCYTES # BLD AUTO: 0.5 K/UL (ref 0.3–1)
MONOCYTES NFR BLD: 10.8 % (ref 4–15)
MV MEAN GRADIENT: 1 MMHG
MV PEAK A VEL: 0.99 M/S
MV PEAK E VEL: 0.58 M/S
MV PEAK GRADIENT: 5 MMHG
MV STENOSIS PRESSURE HALF TIME: 93.7 MS
MV VALVE AREA BY CONTINUITY EQUATION: 3.81 CM2
MV VALVE AREA P 1/2 METHOD: 2.35 CM2
NEUTROPHILS # BLD AUTO: 2.1 K/UL (ref 1.8–7.7)
NEUTROPHILS NFR BLD: 44.2 % (ref 38–73)
NONHDLC SERPL-MCNC: 150 MG/DL
NRBC BLD-RTO: 0 /100 WBC
PHOSPHATE SERPL-MCNC: 3.4 MG/DL (ref 2.7–4.5)
PISA TR MAX VEL: 1.84 M/S
PLATELET # BLD AUTO: 149 K/UL (ref 150–450)
PMV BLD AUTO: 10.4 FL (ref 9.2–12.9)
POCT GLUCOSE: 116 MG/DL (ref 70–110)
POCT GLUCOSE: 138 MG/DL (ref 70–110)
POCT GLUCOSE: 148 MG/DL (ref 70–110)
POCT GLUCOSE: 174 MG/DL (ref 70–110)
POTASSIUM SERPL-SCNC: 4.9 MMOL/L (ref 3.5–5.1)
POTASSIUM SERPL-SCNC: 5.3 MMOL/L (ref 3.5–5.1)
POTASSIUM SERPL-SCNC: 5.7 MMOL/L (ref 3.5–5.1)
PROT SERPL-MCNC: 6.3 G/DL (ref 6–8.4)
PROTHROMBIN TIME: 10.8 SEC (ref 9–12.5)
PV PEAK VELOCITY: 1.53 CM/S
RA MAJOR: 4.64 CM
RA PRESSURE: 3 MMHG
RA WIDTH: 3.48 CM
RBC # BLD AUTO: 2.98 M/UL (ref 4.6–6.2)
RIGHT VENTRICULAR END-DIASTOLIC DIMENSION: 3.13 CM
RV TISSUE DOPPLER FREE WALL SYSTOLIC VELOCITY 1 (APICAL 4 CHAMBER VIEW): 17.82 CM/S
SARS-COV-2 RDRP RESP QL NAA+PROBE: NEGATIVE
SINUS: 4 CM
SODIUM SERPL-SCNC: 139 MMOL/L (ref 136–145)
SODIUM SERPL-SCNC: 140 MMOL/L (ref 136–145)
SODIUM SERPL-SCNC: 140 MMOL/L (ref 136–145)
SODIUM UR-SCNC: 71 MMOL/L (ref 20–250)
STJ: 3.37 CM
TDI LATERAL: 0.06 M/S
TDI SEPTAL: 0.06 M/S
TDI: 0.06 M/S
TR MAX PG: 14 MMHG
TRIGL SERPL-MCNC: 143 MG/DL (ref 30–150)
TSH SERPL DL<=0.005 MIU/L-ACNC: 0.57 UIU/ML (ref 0.4–4)
TV REST PULMONARY ARTERY PRESSURE: 17 MMHG
UUN UR-MCNC: 245 MG/DL (ref 140–1050)
VIT B12 SERPL-MCNC: 1207 PG/ML (ref 210–950)
WBC # BLD AUTO: 4.74 K/UL (ref 3.9–12.7)

## 2022-05-14 PROCEDURE — 63600175 PHARM REV CODE 636 W HCPCS: Performed by: STUDENT IN AN ORGANIZED HEALTH CARE EDUCATION/TRAINING PROGRAM

## 2022-05-14 PROCEDURE — 25000003 PHARM REV CODE 250: Performed by: STUDENT IN AN ORGANIZED HEALTH CARE EDUCATION/TRAINING PROGRAM

## 2022-05-14 PROCEDURE — 96372 THER/PROPH/DIAG INJ SC/IM: CPT | Performed by: STUDENT IN AN ORGANIZED HEALTH CARE EDUCATION/TRAINING PROGRAM

## 2022-05-14 PROCEDURE — G0378 HOSPITAL OBSERVATION PER HR: HCPCS

## 2022-05-14 PROCEDURE — 36415 COLL VENOUS BLD VENIPUNCTURE: CPT | Performed by: STUDENT IN AN ORGANIZED HEALTH CARE EDUCATION/TRAINING PROGRAM

## 2022-05-14 PROCEDURE — 82746 ASSAY OF FOLIC ACID SERUM: CPT | Performed by: STUDENT IN AN ORGANIZED HEALTH CARE EDUCATION/TRAINING PROGRAM

## 2022-05-14 PROCEDURE — 82570 ASSAY OF URINE CREATININE: CPT | Performed by: STUDENT IN AN ORGANIZED HEALTH CARE EDUCATION/TRAINING PROGRAM

## 2022-05-14 PROCEDURE — 25000242 PHARM REV CODE 250 ALT 637 W/ HCPCS: Performed by: STUDENT IN AN ORGANIZED HEALTH CARE EDUCATION/TRAINING PROGRAM

## 2022-05-14 PROCEDURE — 96360 HYDRATION IV INFUSION INIT: CPT | Mod: 59

## 2022-05-14 PROCEDURE — 85730 THROMBOPLASTIN TIME PARTIAL: CPT | Performed by: STUDENT IN AN ORGANIZED HEALTH CARE EDUCATION/TRAINING PROGRAM

## 2022-05-14 PROCEDURE — 94640 AIRWAY INHALATION TREATMENT: CPT

## 2022-05-14 PROCEDURE — 83735 ASSAY OF MAGNESIUM: CPT | Performed by: STUDENT IN AN ORGANIZED HEALTH CARE EDUCATION/TRAINING PROGRAM

## 2022-05-14 PROCEDURE — 80048 BASIC METABOLIC PNL TOTAL CA: CPT | Mod: XB | Performed by: STUDENT IN AN ORGANIZED HEALTH CARE EDUCATION/TRAINING PROGRAM

## 2022-05-14 PROCEDURE — U0002 COVID-19 LAB TEST NON-CDC: HCPCS | Performed by: STUDENT IN AN ORGANIZED HEALTH CARE EDUCATION/TRAINING PROGRAM

## 2022-05-14 PROCEDURE — 84100 ASSAY OF PHOSPHORUS: CPT | Performed by: STUDENT IN AN ORGANIZED HEALTH CARE EDUCATION/TRAINING PROGRAM

## 2022-05-14 PROCEDURE — 82607 VITAMIN B-12: CPT | Performed by: STUDENT IN AN ORGANIZED HEALTH CARE EDUCATION/TRAINING PROGRAM

## 2022-05-14 PROCEDURE — 84300 ASSAY OF URINE SODIUM: CPT | Performed by: STUDENT IN AN ORGANIZED HEALTH CARE EDUCATION/TRAINING PROGRAM

## 2022-05-14 PROCEDURE — 97530 THERAPEUTIC ACTIVITIES: CPT

## 2022-05-14 PROCEDURE — 80048 BASIC METABOLIC PNL TOTAL CA: CPT | Mod: 91,XB | Performed by: STUDENT IN AN ORGANIZED HEALTH CARE EDUCATION/TRAINING PROGRAM

## 2022-05-14 PROCEDURE — 83036 HEMOGLOBIN GLYCOSYLATED A1C: CPT | Performed by: STUDENT IN AN ORGANIZED HEALTH CARE EDUCATION/TRAINING PROGRAM

## 2022-05-14 PROCEDURE — 85025 COMPLETE CBC W/AUTO DIFF WBC: CPT | Performed by: STUDENT IN AN ORGANIZED HEALTH CARE EDUCATION/TRAINING PROGRAM

## 2022-05-14 PROCEDURE — 92610 EVALUATE SWALLOWING FUNCTION: CPT

## 2022-05-14 PROCEDURE — 97116 GAIT TRAINING THERAPY: CPT

## 2022-05-14 PROCEDURE — 97165 OT EVAL LOW COMPLEX 30 MIN: CPT

## 2022-05-14 PROCEDURE — 84540 ASSAY OF URINE/UREA-N: CPT | Performed by: STUDENT IN AN ORGANIZED HEALTH CARE EDUCATION/TRAINING PROGRAM

## 2022-05-14 PROCEDURE — 80053 COMPREHEN METABOLIC PANEL: CPT | Performed by: STUDENT IN AN ORGANIZED HEALTH CARE EDUCATION/TRAINING PROGRAM

## 2022-05-14 PROCEDURE — 85610 PROTHROMBIN TIME: CPT | Performed by: STUDENT IN AN ORGANIZED HEALTH CARE EDUCATION/TRAINING PROGRAM

## 2022-05-14 PROCEDURE — 97161 PT EVAL LOW COMPLEX 20 MIN: CPT

## 2022-05-14 PROCEDURE — 97535 SELF CARE MNGMENT TRAINING: CPT

## 2022-05-14 PROCEDURE — 84443 ASSAY THYROID STIM HORMONE: CPT | Performed by: EMERGENCY MEDICINE

## 2022-05-14 PROCEDURE — 94761 N-INVAS EAR/PLS OXIMETRY MLT: CPT

## 2022-05-14 PROCEDURE — 96361 HYDRATE IV INFUSION ADD-ON: CPT

## 2022-05-14 PROCEDURE — 83880 ASSAY OF NATRIURETIC PEPTIDE: CPT | Performed by: STUDENT IN AN ORGANIZED HEALTH CARE EDUCATION/TRAINING PROGRAM

## 2022-05-14 RX ORDER — GLUCAGON 1 MG
1 KIT INJECTION
Status: DISCONTINUED | OUTPATIENT
Start: 2022-05-14 | End: 2022-05-15 | Stop reason: HOSPADM

## 2022-05-14 RX ORDER — ALBUTEROL SULFATE 2.5 MG/.5ML
10 SOLUTION RESPIRATORY (INHALATION) ONCE
Status: COMPLETED | OUTPATIENT
Start: 2022-05-14 | End: 2022-05-14

## 2022-05-14 RX ORDER — INSULIN ASPART 100 [IU]/ML
0-5 INJECTION, SOLUTION INTRAVENOUS; SUBCUTANEOUS EVERY 6 HOURS PRN
Status: DISCONTINUED | OUTPATIENT
Start: 2022-05-14 | End: 2022-05-15 | Stop reason: HOSPADM

## 2022-05-14 RX ORDER — LIDOCAINE 50 MG/G
1 PATCH TOPICAL
Status: DISCONTINUED | OUTPATIENT
Start: 2022-05-14 | End: 2022-05-15 | Stop reason: HOSPADM

## 2022-05-14 RX ADMIN — ATORVASTATIN CALCIUM 40 MG: 40 TABLET, FILM COATED ORAL at 09:05

## 2022-05-14 RX ADMIN — SODIUM ZIRCONIUM CYCLOSILICATE 10 G: 5 POWDER, FOR SUSPENSION ORAL at 09:05

## 2022-05-14 RX ADMIN — HEPARIN SODIUM 5000 UNITS: 5000 INJECTION, SOLUTION INTRAVENOUS; SUBCUTANEOUS at 09:05

## 2022-05-14 RX ADMIN — FUROSEMIDE 20 MG: 20 TABLET ORAL at 09:05

## 2022-05-14 RX ADMIN — LEVOTHYROXINE SODIUM 100 MCG: 50 TABLET ORAL at 06:05

## 2022-05-14 RX ADMIN — FUROSEMIDE 20 MG: 20 TABLET ORAL at 12:05

## 2022-05-14 RX ADMIN — ALBUTEROL SULFATE 10 MG: 2.5 SOLUTION RESPIRATORY (INHALATION) at 09:05

## 2022-05-14 RX ADMIN — LIDOCAINE 1 PATCH: 50 PATCH CUTANEOUS at 02:05

## 2022-05-14 RX ADMIN — TAMSULOSIN HYDROCHLORIDE 0.4 MG: 0.4 CAPSULE ORAL at 09:05

## 2022-05-14 RX ADMIN — GABAPENTIN 100 MG: 100 CAPSULE ORAL at 09:05

## 2022-05-14 RX ADMIN — FINASTERIDE 5 MG: 5 TABLET, FILM COATED ORAL at 09:05

## 2022-05-14 RX ADMIN — ASPIRIN 81 MG 81 MG: 81 TABLET ORAL at 09:05

## 2022-05-14 RX ADMIN — LOSARTAN POTASSIUM 50 MG: 50 TABLET, FILM COATED ORAL at 09:05

## 2022-05-14 RX ADMIN — SODIUM CHLORIDE, SODIUM LACTATE, POTASSIUM CHLORIDE, AND CALCIUM CHLORIDE 500 ML: .6; .31; .03; .02 INJECTION, SOLUTION INTRAVENOUS at 03:05

## 2022-05-14 NOTE — H&P
[facility] Medicine H&P Note     Admitting Team: [facility] Hospitalist Team B  Attending Physician: Dr. Randolph  Resident: Carmen   Intern:      Date of Admit: 5/13/2022    Chief Complaint     Headache and blurred vision x 3 days     Subjective:      History of Present Illness:  Vince Martinez is a 89 y.o. male with pmhx of HFpEF, SSS s/p PPM 2019, DM2, HTN, Hypothyroidism, DJD who presented to [facility] on 5/13/2022 with a primary complaint of headache x 3 days.     For the past 3 days patient has had intermittent headaches, usually at night lasting 10 minutes that have progressively gotten worse. He has associated blurry vision that occurs with the headaches. Earlier today, patient was at his eye doctor appoint and had an episode of acute loss of vision in the right eye that returned to baseline. He denies any n/v, weakness, slurred speech, facial droop or gait problems. Patient has never had a stroke in the past and denies recent head trauma. On ROS, patient denies chest pain, SOB, abdominal pain, constipation, diarrhea, fevers, chills, dysuria.     Past Medical History:  HFpEF  SSS s/p PPM 2019  DM2  HTN  Hypothyroidism  DJD  BPH    Past Surgical History:  Past Surgical History:   Procedure Laterality Date    CARDIAC PACEMAKER PLACEMENT      EPIDURAL STEROID INJECTION INTO LUMBAR SPINE N/A 6/4/2020    Procedure: Injection-steroid-epidural-lumbar--L4-5;  Surgeon: Angelica Norris Jr., MD;  Location: [facility];  Service: Pain Management;  Laterality: N/A;  sign consent at DOS.    HEMILAMINECTOMY  5/26/2021    Procedure: HEMILAMINECTOMY;  Surgeon: Dayton Sparks MD;  Location: [facility] OR;  Service: Neurosurgery;;  left L4-5    INJECTION OF JOINT Bilateral 2/13/2020    Procedure: Injection, Joint, Bilateral GTB;  Surgeon: Angelica Norris Jr., MD;  Location: [facility];  Service: Pain Management;  Laterality: Bilateral;    INJECTION OF JOINT Bilateral 3/22/2022    Procedure: bilateral  GTB steriod injection;  Surgeon: Angelica Norris Jr., MD;  Location: [facility] PAIN MGT;  Service: Pain Management;  Laterality: Bilateral;  pacemaker   pt is diabetic     INJECTION OF STEROID Bilateral 1/12/2021    Procedure: INJECTION, STEROID Bilateral SI Joint Block and Steroid Injection;  Surgeon: Dayton Sparks MD;  Location: [facility] OR;  Service: Neurosurgery;  Laterality: Bilateral;  Procedure: Bilateral SI Joint Block and Steroid Injection  Surgery 't'kristen: 45 min  LOS: 0  Anesthesia:MAC  Radiology: C-arm  Bed: Regular with Pillows  Position: Prone     SPINE SURGERY      TRANSFORAMINAL EPIDURAL INJECTION OF STEROID Left 2/23/2021    Procedure: Injection,steroid,epidural,transforaminal approach--Left L4-5 *Has Pacemaker/ICD*;  Surgeon: Angelica Norris Jr., MD;  Location: [facility] PAIN MGT;  Service: Pain Management;  Laterality: Left;       Allergies:  Review of patient's allergies indicates:   Allergen Reactions    Bactrim [sulfamethoxazole-trimethoprim] Rash    Ciprofloxacin Rash    Latex Rash       Home Medications:  Prior to Admission medications    Medication Sig Start Date End Date Taking? Authorizing Provider   betamethasone dipropionate (DIPROLENE) 0.05 % lotion Apply 1 application topically nightly as needed. 12/29/21   Historical Provider   calcium/D3/mag ox//carolyn/Zn (CALTRATE + D3 PLUS MINERALS ORAL) Take 1 tablet by mouth 2 (two) times daily.    Historical Provider   clotrimazole-betamethasone 1-0.05% (LOTRISONE) cream APPLY  CREAM TOPICALLY TWICE DAILY 2/5/21   Balaji Medellin DO   diclofenac sodium (VOLTAREN) 1 % Gel APPLY 2 GRAMS TOPICALLY ONCE DAILY 2/22/22   Adam Conroy III, MD   finasteride (PROSCAR) 5 mg tablet Take 1 tablet (5 mg total) by mouth once daily. 2/2/22 2/2/23  Mai Tilley NP   furosemide (LASIX) 20 MG tablet Take 1 tablet (20 mg total) by mouth 2 (two) times a day. 6/28/21 6/28/22  Shlomo Luong MD   gabapentin (NEURONTIN) 100 MG capsule Take 1 capsule  (100 mg total) by mouth every evening. 21  Shlomo Luong MD   glimepiride (AMARYL) 2 MG tablet Take 1 tablet (2 mg total) by mouth daily with breakfast. 21  Mai Tilley NP   irbesartan (AVAPRO) 150 MG tablet Take 1 tablet by mouth once daily 22   Mai Tilley NP   levothyroxine (EUTHYROX) 100 MCG tablet Take 1 tablet (100 mcg total) by mouth before breakfast. 22   Shlomo Luong MD   melatonin 10 mg Tab Take 1 tablet by mouth nightly as needed.    Historical Provider   meloxicam (MOBIC) 15 MG tablet Take 1 tablet (15 mg total) by mouth daily as needed for Pain. 21   Delilah Davidson PA-C   metFORMIN (GLUCOPHAGE-XR) 500 MG ER 24hr tablet Take 1 tablet (500 mg total) by mouth once daily. 21  Shlomo Luong MD   omeprazole (PRILOSEC) 40 MG capsule TAKE 1 CAPSULE BY MOUTH BEFORE BREAKFAST 3/3/22   Shlomo Luong MD   tamsulosin (FLOMAX) 0.4 mg Cap Take 1 capsule (0.4 mg total) by mouth once daily. 22   Mai Tilley NP   triamcinolone acetonide 0.1% (KENALOG) 0.1 % Lotn APPLY LOTION TO THE AFFECTED AREAS ON THE SCALP ONCE DAILY AT BEDTIME AS NEEDED 21   Historical Provider       Family History:  Family History   Problem Relation Age of Onset    Diabetes Brother     No Known Problems Mother     No Known Problems Father     Diabetes Sister     No Known Problems Daughter     HIV Son        Social History:  Tobacco: Never  Alcohol: None  Drugs: None    Review of Systems:  All other systems are reviewed and are negative.    Health Maintaince :   Primary Care Physician: Shlomo Luong MD    Immunizations:   TDap UTD    Flu UTD  Pna UTD  Covid vaccine x 3     Cancer Screening:  Colonoscopy: No longer recommended      Objective:   Last 24 Hour Vital Signs:  BP  Min: 152/64  Max: 185/74  Temp  Av.9 °F (36.6 °C)  Min: 97.5 °F (36.4 °C)  Max: 98.4 °F (36.9 °C)  Pulse  Av  Min: 60  Max:  60  Resp  Av.4  Min: 16  Max: 19  SpO2  Av %  Min: 60 %  Max: 100 %  Weight  Av.1 kg (148 lb)  Min: 67.1 kg (148 lb)  Max: 67.1 kg (148 lb)  Body mass index is 24.63 kg/m².  No intake/output data recorded.    Physical Examination:  General Appearance: alert and oriented, no acute distress.  Head: Normocephalic, atraumatic   Eyes: conjunctivae/corneas clear and non-icteric. PERRL, EOMI  Mouth: lips, tongue and mucosa normal, oropharynx clear  Neck: supple, trachea midline, thyroid not enlarged, no LAD  Lungs: CTAB; no crackles or wheezes, no increased work of breathing  Heart: regular rate and rhythm, S1, S2 normal, no murmurs  Abdomen: soft, non-distended, non-tender; bowel sounds normal  Extremities: extremities normal, atraumatic, no cyanosis or edema   Pulses: 2+ and symmetric   Skin: No obvious ecchymosis, erythema, or rash  Neurologic: AAOx3, no focal neurological deficits noted. CN grossly intact, sensation intact bilaterally, power 5/5 in all extremities     Laboratory:  Most Recent Data:  CBC:   Lab Results   Component Value Date    WBC 4.94 2022    HGB 10.3 (L) 2022    HCT 32.1 (L) 2022     2022     (H) 2022    RDW 13.2 2022     BMP:   Lab Results   Component Value Date     2022    K 5.1 2022     2022    CO2 23 2022    BUN 37 (H) 2022    CREATININE 2.3 (H) 2022    GLU 89 2022    CALCIUM 9.6 2022    PHOS 3.0 2020     LFTs:   Lab Results   Component Value Date    PROT 7.5 2022    ALBUMIN 4.2 2022    BILITOT 0.3 2022    AST 34 2022    ALKPHOS 64 2022    ALT 28 2022     Coags:   Lab Results   Component Value Date    INR 1.0 2021     FLP:   Lab Results   Component Value Date    CHOL 194 2022    HDL 44 2022    LDLCALC 121.4 2022    TRIG 143 2022    CHOLHDL 22.7 2022     DM:   Lab Results   Component Value Date     HGBA1C 7.8 (H) 02/05/2022    HGBA1C 7.6 (H) 05/07/2021    HGBA1C 8.3 (H) 04/16/2021    LDLCALC 121.4 05/13/2022    CREATININE 2.3 (H) 05/13/2022     Thyroid:   Lab Results   Component Value Date    TSH 0.571 05/14/2022     Anemia: No results found for: IRON, TIBC, FERRITIN, [id number], FOLATE  Cardiac:   Lab Results   Component Value Date    TROPONINI <0.006 05/13/2022    BNP 21 11/20/2018     Urinalysis:   Lab Results   Component Value Date    COLORU Yellow 02/05/2022    SPECGRAV 1.015 02/05/2022    NITRITE Negative 02/05/2022    KETONESU Negative 02/05/2022    UROBILINOGEN NEG 05/27/2011    WBCUA 2 07/22/2020       Trended Lab Data:  Recent Labs   Lab 05/13/22  1820   WBC 4.94   HGB 10.3*   HCT 32.1*      *   RDW 13.2      K 5.1      CO2 23   BUN 37*   CREATININE 2.3*   GLU 89   PROT 7.5   ALBUMIN 4.2   BILITOT 0.3   AST 34   ALKPHOS 64   ALT 28       Trended Cardiac Data:  Recent Labs   Lab 05/13/22 1820   TROPONINI <0.006       Microbiology Data:  None    Other Results:  EKG (my interpretation): Sinus with 1st degree block     Radiology:  Imaging Results          CT Head Without Contrast (Final result)  Result time 05/13/22 19:16:31    Final result by Kira Bernardo MD (05/13/22 19:16:31)                 Impression:      No acute intracranial hemorrhage or evidence of major arterial infarct.    Mild lobular mucosal thickening in the bilateral inferior maxillary sinuses without air-fluid levels is again noted is likely incidental.    Age-related atrophic changes.      Electronically signed by: Kira Bernardo  Date:    05/13/2022  Time:    19:16             Narrative:    EXAMINATION:  CT HEAD WITHOUT CONTRAST    CLINICAL HISTORY:  Transient ischemic attack (TIA);    TECHNIQUE:  Low dose axial images were obtained through the head.  Coronal and sagittal reformations were also performed. Contrast was not administered.    COMPARISON:  MRI brain 02/10/2016    FINDINGS:  There is no  evidence of acute intra or extra-axial hemorrhage or hematoma formation.  There is prominence of the ventricles, cisterns and sulci stable since prior exam and consistent with age related atrophy.    Gray-white matter junction differentiation diffusely appears to be intact with no evidence of acute major arterial infarct.  There is no focal mass or mass effect.  Incidental crescentic calcification again noted in the left temporal lobe.    Lobular mucosal thickening again noted in the paranasal sinuses inferiorly bilaterally greater on the right.  Remaining paranasal sinuses are clear and mastoid air cells and middle ears bilaterally appear clear.    Orbits and retro bulbar structures appear intact with postoperative changes again noted involving the globes.    Bony calvarium appears to be intact with no evidence of fracture lytic or sclerotic osseous lesion.                                 Assessment:   89 y.o. male with pmhx of HFpEF, SSS s/p PPM 2019, DM2, HTN, Hypothyroidism, DJD who presented to [facility] on 5/13/2022 with a primary complaint of headaches and blurry vision x 3 days. Earlier today, patient was at his eye doctor appoint and had an episode of acute loss of vision in the right eye that returned to baseline. In ED, HDS with no FND deficit, CN grossly intact on exam. WBC 4.9, Hb 10.3, Cr 2.3, CT head with no acute intracranial changes. Patient admitted to [facility] medicine for TIA workup and SOFI management      Plan:     Transient ischemic attack   - Presenting with worsening headache and visual changes x 3 days. Had an episode of transient vision loss in right eye earlier today at eye doctor appt    - No FND deficits, CN grossly intact, sensation intact bilaterally, power 5/5 in all extremities   - Afebrile, HDS, sating 99% on RA  - CT head negative for hemorrhage or major infarct, age related atrophic changes   - Continue ASA/statin therapy   - Lipid panel, A1c and TSH ordered   -  PT/OT/SLP  - Consider MRI brain vs CTA head/neck (complicated by PPM and SOFI)  - Neurology consulted     SOFI  - BUN/Cr 37/2.3 (baseline Cr ~ 1.5)  - Likely pre-renal in setting of decreased PO intake   - Ordered 1L IVF  - Consider urine studies if no improvement     Macrocytic Anemia    - H/H 10.3/32,   - Baseline Hb ~ 9-11  - Folate, B12 ordered     HFpEF  H/o sick sinus s/p PPM in 2019  - Denies REEDER, edema, orthopnea   - HDS, sating 99% on RA  - BNP and CXR ordered   - Trop negative x 1   - EKG sinus with 1st degree block, paced rate   - PPM [employer], ID card in chart   - Continue home lasix     DM2 - non insulin dependent   - BG 89 on admit   - A1c 7.8 2/5/22, repeat ordered   - Home regimen Metformin 500 mg daily and glimepiride 2 mg daily   - Low dose SSI    HTN  - Hypertensive on admit   - Continue home Losartan 50 mg    BPH  - Continue home flomax and finasteride     Hypothyroidism   - TSH 0.571  - Continue home levothyroxine 100 mcg     DVT ppx: Lovenox   Diet: NPO  Dispo: TIA work up        Code Status:     Full    Anjana Chase MD  [facility] HO-1    [facility] Medicine Hospitalist Pager numbers:   [facility] Hospitalist Medicine Team A (Viri/Ida): [phone]  [facility] Hospitalist Medicine Team B (Agustín/Keith):  [phone]

## 2022-05-14 NOTE — CONSULTS
"[facility] Consult Note    Consultant Attending: Dr. Obregon  Consultant Resident: Steven Chilel MD     Reason for consult: Headaches w/ blurry vision  Informant: [facility]       Other sources of information: patient, relative(s) and past medical records    Chief Complaint and Duration     Headaches with blurry vision x 3 days    Subjective:      History of Present Illness:  Vince Martinez is a 89 y/o M with HFpEF, DM2, Sick sinus syndrome s/p PPM (2019), HTN, Hypothyroidism, DJD, and bilateral cataracts s/p surgical intervention with R eye retinal detachment historically who [place] [facility] doctor [place] with complaints of headache x 3 days with associated blurry vision.     Mr. Martinez reports having a 1 month history of headaches, primary cervicogenic sounding with pain that initially starts in the cervical spine area radiating up the occiput into the vertex of the head, worse on the R side than the left. These headaches are transient lasting 10-15 minutes subsiding without medication, however, can recur throughout the day. The pain is described as a stabbing/throbbing sensation, without photo or phonophobia, nausea, vomiting, positional worsening or without progression in setting of straining (ie Valsalva). Headaches over the past 3 days were associated with blurring of vision, however, there was no laterality to visual disturbance, no field defect, rather "all over". With several chances for clarification, he continues to deny loss of vision in one eye for any period of time, rather "blurring" in both eyes. Mr. Martinez denies any jaw claudication, pain in the temples or recent rash or infection.     Initial story was concerning for Amaurosis fugax, which prompted ED presentation per eye physician, however, this does not seem to be the case after further history evaluation.     Mr. Martinez denies any further loss of vision or blurring of vision, endorses occasional headaches during " admission. Denies any other associated neurologic symptoms including dysarthria, language impairment, weakness, paresthesias, new dizziness or loss of balance.     ROS: Pertinent items are noted in HPI.    Allergies:  Bactrim [sulfamethoxazole-trimethoprim], Ciprofloxacin, and Latex    Home Medications:    Prior to Admission medications    Medication Sig Start Date End Date Taking? Authorizing Provider   betamethasone dipropionate (DIPROLENE) 0.05 % lotion Apply 1 application topically nightly as needed. 12/29/21   Historical Provider   calcium/D3/mag ox//carolyn/Zn (CALTRATE + D3 PLUS MINERALS ORAL) Take 1 tablet by mouth 2 (two) times daily.    Historical Provider   clotrimazole-betamethasone 1-0.05% (LOTRISONE) cream APPLY  CREAM TOPICALLY TWICE DAILY 2/5/21   Balaji Medellin DO   diclofenac sodium (VOLTAREN) 1 % Gel APPLY 2 GRAMS TOPICALLY ONCE DAILY 2/22/22   Adam Conroy III, MD   finasteride (PROSCAR) 5 mg tablet Take 1 tablet (5 mg total) by mouth once daily. 2/2/22 2/2/23  Mai Tilley NP   furosemide (LASIX) 20 MG tablet Take 1 tablet (20 mg total) by mouth 2 (two) times a day. 6/28/21 6/28/22  Shlomo Luong MD   gabapentin (NEURONTIN) 100 MG capsule Take 1 capsule (100 mg total) by mouth every evening. 7/13/21 7/13/22  Shlomo Luong MD   glimepiride (AMARYL) 2 MG tablet Take 1 tablet (2 mg total) by mouth daily with breakfast. 7/28/21 7/28/22  Mai Tilley NP   irbesartan (AVAPRO) 150 MG tablet Take 1 tablet by mouth once daily 4/29/22   Mai Tilley NP   levothyroxine (EUTHYROX) 100 MCG tablet Take 1 tablet (100 mcg total) by mouth before breakfast. 4/5/22   Shlomo Luong MD   melatonin 10 mg Tab Take 1 tablet by mouth nightly as needed.    Historical Provider   meloxicam (MOBIC) 15 MG tablet Take 1 tablet (15 mg total) by mouth daily as needed for Pain. 7/19/21   Delilah Davidson PA-C   metFORMIN (GLUCOPHAGE-XR) 500 MG ER 24hr tablet Take 1  tablet (500 mg total) by mouth once daily. 7/9/21 7/9/22  Shlomo Luong MD   omeprazole (PRILOSEC) 40 MG capsule TAKE 1 CAPSULE BY MOUTH BEFORE BREAKFAST 3/3/22   Shlomo Luong MD   tamsulosin (FLOMAX) 0.4 mg Cap Take 1 capsule (0.4 mg total) by mouth once daily. 2/2/22   Mai Tilley NP   triamcinolone acetonide 0.1% (KENALOG) 0.1 % Lotn APPLY LOTION TO THE AFFECTED AREAS ON THE SCALP ONCE DAILY AT BEDTIME AS NEEDED 6/23/21   Historical Provider     Past Medical/Surgical/Family History:  PMHx:   Past Medical History:   Diagnosis Date    Acute combined systolic and diastolic CHF, NYHA class 3 11/15/2018    Arthritis     Diabetes mellitus     DJD (degenerative joint disease)     Hypertension     Prostate enlargement     Sleep apnea     Thyroid disease       Surgeries:   Past Surgical History:   Procedure Laterality Date    CARDIAC PACEMAKER PLACEMENT      EPIDURAL STEROID INJECTION INTO LUMBAR SPINE N/A 6/4/2020    Procedure: Injection-steroid-epidural-lumbar--L4-5;  Surgeon: Angelica Norris Jr., MD;  Location: [facility]IN [facility]GT;  Service: Pain Management;  Laterality: N/A;  sign consent at DOS.    HEMILAMINECTOMY  5/26/2021    Procedure: HEMILAMINECTOMY;  Surgeon: Dayton Sparks MD;  Location: [facility] OR;  Service: Neurosurgery;;  left L4-5    INJECTION OF JOINT Bilateral 2/13/2020    Procedure: Injection, Joint, Bilateral GTB;  Surgeon: Angelica Norris Jr., MD;  Location: [facility];  Service: Pain Management;  Laterality: Bilateral;    INJECTION OF JOINT Bilateral 3/22/2022    Procedure: bilateral GTB steriod injection;  Surgeon: Angelica Norris Jr., MD;  Location: [facility];  Service: Pain Management;  Laterality: Bilateral;  pacemaker   pt is diabetic     INJECTION OF STEROID Bilateral 1/12/2021    Procedure: INJECTION, STEROID Bilateral SI Joint Block and Steroid Injection;  Surgeon: Dayton Sparks MD;  Location: [facility] OR;  Service: Neurosurgery;  Laterality:  "Bilateral;  Procedure: Bilateral SI Joint Block and Steroid Injection  Surgery 't'kristen: 45 min  LOS: 0  Anesthesia:MAC  Radiology: C-arm  Bed: Regular with Pillows  Position: Prone     SPINE SURGERY      TRANSFORAMINAL EPIDURAL INJECTION OF STEROID Left 2021    Procedure: Injection,steroid,epidural,transforaminal approach--Left L4-5 *Has Pacemaker/ICD*;  Surgeon: Angelica Norris Jr., MD;  Location: [facility];  Service: Pain Management;  Laterality: Left;      Family  Hx:   Family History   Problem Relation Age of Onset    Diabetes Brother     No Known Problems Mother     No Known Problems Father     Diabetes Sister     No Known Problems Daughter     HIV Son      Social History:  Substance Abuse/Dependence History  Tobacco: denied  EtOH: none  Illicit drugs: denies    Objective:     Temp BP HR Resp Rate O2 Sat   96.4 °F (35.8 °C) (!) 165/64  (!) 59 18  98 %    Height: 5' 5" (165.1 cm)  Weight: 66.5 kg (146 lb 9.7 oz)  BMI (Calculated): 24.4    Last 24 Hour Vital Signs:  BP  Min: 138/63  Max: 185/74  Temp  Av.7 °F (36.5 °C)  Min: 96.4 °F (35.8 °C)  Max: 98.4 °F (36.9 °C)  Pulse  Av.5  Min: 58  Max: 71  Resp  Av.7  Min: 16  Max: 19  SpO2  Av.9 %  Min: 60 %  Max: 100 %  Height  Av' 5" (165.1 cm)  Min: 5' 5" (165.1 cm)  Max: 5' 5" (165.1 cm)  Weight  Av.8 kg (147 lb 4.8 oz)  Min: 66.5 kg (146 lb 9.7 oz)  Max: 67.1 kg (148 lb)  Body mass index is 24.4 kg/m².  No intake/output data recorded.    NEUROLOGIC EXAMINATION:  Orientation: person, place, situation, year, month, date and day of week  Memory: recent and remote memory intact  Language: Intact verbal fluency, comprehension, repetition and naming  Cranial Nerves: PERRL, EOMI w/o nystagmus, visual fields grossly symmetric and intact to confrontation, Face grossly symmetric, V1-V3 light touch intact, tongue midline, symmetric palatal elevation, 5/5 TPZ/SCM  Motor:  Pronator drift: absent  5/5 strength including shoulder " abduction, elbow flexion/extension, wrist flexion/extension, finger flexion/extension/abduction, hip flexion, knee flexion/extension, and plantar/dorsiflexion  Tone: normal with paratonia  DTR'S:  2+ bilateral biceps, brachioradialis and patellar  Absent Rodriguez and Clonus at ankle  Sensory:  Intact light touch and vibration throughout  Cerebellar/Gait:  Finger to nose: intact  Heel to shin: intact  Gait: grossly stable, able to perform heel-toe with minimal assistance    LABORATORY STUDIES:  Trended Lab Data:  Recent Labs   Lab 05/13/22  1820 05/14/22  0321   WBC 4.94 4.74   HGB 10.3* 9.6*   HCT 32.1* 29.8*    149*   * 100*   RDW 13.2 13.2    140   K 5.1 5.3*    105   CO2 23 22*   BUN 37* 35*   GLU 89 145*   CALCIUM 9.6 9.0   PROT 7.5 6.3   ALBUMIN 4.2 3.5   AST 34 31   ALKPHOS 64 58   ALT 28 22     RADIOLOGY STUDIES:  I have personally reviewed the images performed.     CT Head Without Contrast  Result Date: 5/13/2022  No acute intracranial hemorrhage or evidence of major arterial infarct. Mild lobular mucosal thickening in the bilateral inferior maxillary sinuses without air-fluid levels is again noted is likely incidental. Age-related atrophic changes.     Assessment:     Vince Martinez is a 89 y.o. male with HFpEF, DM2, Sick sinus syndrome s/p PPM (2019), HTN, Hypothyroidism, DJD, and bilateral cataracts s/p surgical intervention with R eye retinal detachment historically, with subacute/chronic headaches with progression over past several days.     Initial consult was for Amaurosis fugax concern vs TIA, however, on further evaluation, Mr. Martinez' history is not consistent with focal visual deficits rather associated blurring vision bilaterally in setting of headaches. Initial evaluation in 89 year old for progressive headaches should include evaluation for vasculitis (GCA), an ESR acquired in ED is reassuring. Additional evaluation for intracranial process acutely, including  hemorrhagic or recent ischemic process, with CTH has been unremarkable. Further neuro-imaging with MRI/MRA is warranted going forward, however, at this time are not urgent/emergent given reassuring H&P. Other likely contributing factors including cervicogenic process w/ chronic C-spine pain and hypertensive headaches, given hypertension upon arrival.     Given pacemaker present, gathering MRI/MRA would involve transfer to alternative facility. Instead, these images will be performed as an outpatient and further neurologic evaluation can be performed in the outpatient setting. Additional consideration for intra-occular processes can be evaluated by ophthalmology.      Recommendations:     -Outpatient MRI/MRA with arrangement for pacemaker (compatible)  -SBP goal <140 as tolerated  Uncontrolled HTN may factor into headaches  -Symptomatic treatment with Tylenol PRN for pain relief   -Ophthalmology follow up for intra-occular evaluation  -Recommend referral to Neurology at discharge for chronic progressive headaches  -Will hold off on antiplatelet therapy given history not c/w focal neurologic deficits and examination non-focal  -Discussed return precautions with family and patient at bedside    Differential diagnosis was explained to the patient. All questions were answered. Patient understood and agreed to adhere to plan.   Recommendations discussed with staff, Dr. Obregon as well as communicated to primary team.    Thank you for allowing us to participate in the care of this patient. Please call us if you have any questions regarding this consult.    Steven Chilel MD   [facility], PGY-IV

## 2022-05-14 NOTE — PT/OT/SLP EVAL
Speech Language Pathology Evaluation & Discharge Note  Bedside Swallow    Patient Name:  Vince Martinez   MRN:  [id number]  Admitting Diagnosis: <principal problem not specified>    Recommendations:                 General Recommendations:  Follow-up not indicated  Diet recommendations:  Regular, Thin   Aspiration Precautions: Standard aspiration precautions   General Precautions: Standard,    Communication strategies:  Pt understands English, though requests communication partner to speak slowly    History:     Past Medical History:   Diagnosis Date    Acute combined systolic and diastolic CHF, NYHA class 3 11/15/2018    Arthritis     Diabetes mellitus     DJD (degenerative joint disease)     Hypertension     Prostate enlargement     Sleep apnea     Thyroid disease        Past Surgical History:   Procedure Laterality Date    CARDIAC PACEMAKER PLACEMENT      EPIDURAL STEROID INJECTION INTO LUMBAR SPINE N/A 6/4/2020    Procedure: Injection-steroid-epidural-lumbar--L4-5;  Surgeon: Angelica Norris Jr., MD;  Location: [facility];  Service: Pain Management;  Laterality: N/A;  sign consent at DOS.    HEMILAMINECTOMY  5/26/2021    Procedure: HEMILAMINECTOMY;  Surgeon: Dayton Sparks MD;  Location: [facility] OR;  Service: Neurosurgery;;  left L4-5    INJECTION OF JOINT Bilateral 2/13/2020    Procedure: Injection, Joint, Bilateral GTB;  Surgeon: Angelica Norris Jr., MD;  Location: [facility];  Service: Pain Management;  Laterality: Bilateral;    INJECTION OF JOINT Bilateral 3/22/2022    Procedure: bilateral GTB steriod injection;  Surgeon: Angelica Norris Jr., MD;  Location: [facility];  Service: Pain Management;  Laterality: Bilateral;  pacemaker   pt is diabetic     INJECTION OF STEROID Bilateral 1/12/2021    Procedure: INJECTION, STEROID Bilateral SI Joint Block and Steroid Injection;  Surgeon: Dayton Sparks MD;  Location: [facility] OR;  Service: Neurosurgery;  Laterality: Bilateral;  Procedure:  "Bilateral SI Joint Block and Steroid Injection  Surgery 't'kristen: 45 min  LOS: 0  Anesthesia:MAC  Radiology: C-arm  Bed: Regular with Pillows  Position: Prone     SPINE SURGERY      TRANSFORAMINAL EPIDURAL INJECTION OF STEROID Left 2/23/2021    Procedure: Injection,steroid,epidural,transforaminal approach--Left L4-5 *Has Pacemaker/ICD*;  Surgeon: Angelica Norris Jr., MD;  Location: [facility];  Service: Pain Management;  Laterality: Left;       Social History: Patient lives with spouse.    Prior Intubation HX:  None this admit.    Modified Barium Swallow: MBSS completed 2/15/2019 with results as follows:   Impressions  ·  oral and pharyngeal swallow are WNL for age  · Instances of minimal to mild delay in the trigger of the swallow reflex that is judged to be WFL for age  · No significant instances of airway threat or aspiration before, during, or after the swallow during this evaluation      Chest X-Rays:   Impression:       No acute findings.      Prior diet: Regular solids/Thin liquids.    Subjective     Pt seen at the bedside for clinical swallow assessment. Wife, Josie, and son, , were present for exam. SLP offered pt translation services free of charge, though he denied need. He reported, "If you speak slow, I can understand."  Patient goals: "thirsty and hungry"     Pain/Comfort:  · Pain Rating 1: 3/10  · Location - Orientation 1: upper  · Location 1: head  · Pain Addressed 1: Nurse notified    Respiratory Status: Room air    Objective:     Oral Musculature Evaluation  · Oral Musculature: WFL  · Dentition: present and adequate  · Secretion Management: adequate  · Mucosal Quality: good  · Mandibular Strength and Mobility: WFL  · Oral Labial Strength and Mobility: WFL  · Lingual Strength and Mobility: WFL  · Velar Elevation: WFL  · Buccal Strength and Mobility: WFL  · Volitional Swallow:  (timely)  · Voice Prior to PO Intake:  (clear)    Speech Screen:  · Motor Speech: clear, fluent with mild " accent as ESL  · Language: pt reports occasional word finding difficulty associated with aging, no new onset of expressive or receptive language deficits. Followed complex commands and answered complex wh ?s and open-ended questions with 100% acc  · Memory: recalled family members and events of eye dr appt without trouble  · Pt and family deny change in speech-language function.     Bedside Swallow Eval:   Consistencies Assessed:  · Thin liquids : cup sips water x8  · Soft solids : tsp bites diced peaches x8  · Solids : 1 inch bite cracker x3     Oral Phase:   · WFL: good oral acceptance, functional bolus control, timely oral transit, no oral residue or anterior spillage, good rotary chew    Pharyngeal Phase:   · no overt clinical signs/symptoms of aspiration  · no overt clinical signs/symptoms of pharyngeal dysphagia   · Single and consecutive cup sip swallows noted to be timely with functional hyolaryngeal lift/excursion  · No change in vocal quality or coughing/choking noted  · Throat clear x1    Compensatory Strategies  · None    Treatment: Pt is at baseline swallow function. No ST needs at this time.     Assessment:     Vince Martinez is a 89 y.o. male without any SLP diagnoses. He presents with functional speech-language and swallow function without new deficits per pt and family. Pt deemed safe to initiate a Regular/Thin liquid diet given standard aspiration precautions. He is to be discharged from SLP care this date. Please re-consult should change in status occur.     Goals:   Multidisciplinary Problems     SLP Goals     Not on file          Multidisciplinary Problems (Resolved)        Problem: SLP    Goal Priority Disciplines Outcome   SLP Goal   (Resolved)     SLP Met   Description: STGs:   Pt will participate in clinical swallow assessment to determine safest and least restrictive diet level. - Met 5/14/2022                     Plan:     · Patient to be seen:   (n/a)   · Plan of Care expires:   05/14/22  · Plan of Care reviewed with:  patient, spouse, son (RN and MD)   · SLP Follow-Up:  No       Discharge recommendations:  home   Barriers to Discharge:  None    Time Tracking:     SLP Treatment Date:   05/14/22  Speech Start Time:  0919  Speech Stop Time:  0933     Speech Total Time (min):  14 min    Billable Minutes: Eval Swallow and Oral Function 14 mins    05/14/2022

## 2022-05-14 NOTE — PROGRESS NOTES
05/14/22 0135   Admission   Initial VN Admission Questions Complete   Communication Issues? None   Shift   Virtual Nurse - Patient Verbalized Approval Of Camera Use;VN Rounding   Safety/Activity   Patient Rounds visualized patient   Safety Promotion/Fall Prevention instructed to call staff for mobility;Fall Risk reviewed with patient/family   VIRTUAL NURSE: Admission questions completed with patient at this time. Care plan and safety precautions reviewed. Pt c/o neck pain and requesting pain medication, bedside nurse notified of pt complaint/request. Instructed to use call light for assistance, he verbalized understanding.

## 2022-05-14 NOTE — NURSING
Report received from MELCHOR Nolen. Patient transferred to room 430 from emergency room. Head to toe assessment complete no acute distress noted at this time. Will continue to monitor. Vital signs stable. Patient remains stable.

## 2022-05-14 NOTE — PLAN OF CARE
Problem: Occupational Therapy  Goal: Occupational Therapy Goal  Description: Goals to be met by: 5/28/2022    Patient will increase functional independence with ADLs by performing:    UE Dressing with Modified [place].  LE Dressing with Modified [place].  Grooming while standing at sink with Modified [place].  Toileting from toilet with Modified [place] for hygiene and clothing management.   Toilet transfer to toilet with Modified [place].    Outcome: Ongoing, Progressing   OT initial eval completed, tx initiated. Pt. C/o posterior neck pain5/10.  service via [employer] device used during eval and tx. Pt ambulated in room with close supervision to bathroom, sink and back to bs chair without a device. He performed toileting, LB dressing, g/hygiene standing at sink with Supervision. OT recommending HHOT and shower chair.

## 2022-05-14 NOTE — PT/OT/SLP EVAL
Occupational Therapy   Evaluation and tx    Name: Vince Martinez  MRN: [id number]  Admitting Diagnosis:  <principal problem not specified>  Recent Surgery: * No surgery found *    The primary encounter diagnosis was Type 2 diabetes mellitus with hyperglycemia, without long-term current use of insulin. Diagnoses of TIA (transient ischemic attack), Chronic combined systolic and diastolic CHF (congestive heart failure), Hypertensive heart disease without congestive heart failure, Anemia, unspecified type, Stage 3 chronic kidney disease, unspecified whether stage 3a or 3b CKD, and Acute kidney injury were also pertinent to this visit.    Recommendations:     Discharge Recommendations: home health OT  Discharge Equipment Recommendations:  shower chair  Barriers to discharge:  None    Assessment:     Vince Martinez is a 89 y.o. male with a medical diagnosis of <principal problem not specified>.  He presents with Performance deficits affecting function: impaired self care skills, impaired functional mobilty, gait instability, impaired balance, pain, impaired endurance, decreased coordination.    OT initial eval completed, tx initiated. Pt. C/o posterior neck pain 5/10.  service via PivotLink device used during eval and tx. Pt ambulated in room with close supervision to bathroom, sink and back to bs chair without a device. He performed toileting, LB dressing with noted mild shakiness in arms and legs, g/hygiene standing at sink with Supervision. OT recommending HHOT and shower chair.     Rehab Prognosis: Good; patient would benefit from acute skilled OT services to address these deficits and reach maximum level of function.       Plan:     Patient to be seen 3 x/week to address the above listed problems via self-care/home management, therapeutic activities, therapeutic exercises  · Plan of Care Expires: 06/14/22  · Plan of Care Reviewed with: patient    Subjective     Chief Complaint: none  Patient/Family  "Comments/goals: I feel unsteady when I walk.    Occupational Profile:  Living Environment: Pt lives with spouse and 56 y/o son in [facility] with no steps; WIS.  Previous level of function: Indep ADLS, ambulation, drives, wife and son cooks and cleans; pt is retired.   Roles and Routines: active  Equipment Used at Home:  none (pt has RW, SPC but was not using them)  Assistance upon Discharge: spouse and son    Pain/Comfort:  Pain Rating 1: 5/10 (reports a throbbing headache "comes and goes" but was not hurting at time of OT eval)  Location - Side 1: Bilateral  Location - Orientation 1: posterior  Location 1: neck  Pain Addressed 1: Nurse notified (pain patch in place to posterior neck, nurse removed when she entered room)  Pain Rating Post-Intervention 1: 5/10    Patients cultural, spiritual, Taoism conflicts given the current situation: no    Objective:     Communicated with: nurse prior to session.  Patient found HOB elevated with bed alarm, telemetry upon OT entry to room.    General Precautions: Standard, fall, aspiration, diabetic (dull liquid diet)   Orthopedic Precautions:N/A   Braces: N/A  Respiratory Status: Room air    Occupational Performance:    Bed Mobility:    · Patient completed Rolling/Turning to Left with  modified independence  · Patient completed Scooting/Bridging with modified independence  · Patient completed Supine to Sit with modified independence    Functional Mobility/Transfers:  · Patient completed Sit <> Stand Transfer with supervision  with  no assistive device   · Patient completed Bed <> Chair Transfer using Step Transfer technique with supervision with no assistive device  · Patient completed Toilet Transfer Step Transfer technique with supervision with  no AD  · Functional Mobility: ambulated close supervision in room to sink, bathroom back to sink and back to bs chair on opposite side of room (next to bathroom); no LOB; touch points used as pt passed foot of bed, narrow space between " bed and wall.    Activities of Daily Living:  · Feeding:  independence .  · Grooming: supervision washed hands, brushed teeth and washed face standing at sink   · Lower Body Dressing: supervision, mild shakiness in arm and legs noted while donning /doffing socks seated EOB  · Toileting: supervision stood at toilet to urinate, no LOB and managed clothing without issue.    Cognitive/Visual Perceptual:  Cognitive/Psychosocial Skills:     -       Oriented to: Person, Place, Time, Situation and choices given for name of place, pt was accurate choosing [facility]   -       Follows Commands/attention:Follows one-step commands, Follows two-step commands and  used but pt was speaking in english and following commands in english  -       Communication: clear/fluent  -       Memory: No Deficits noted  -       Safety awareness/insight to disability intact  -       Mood/Affect/Coping skills/emotional control: Cooperative  Visual/Perceptual:      -Intact .    Physical Exam:  Balance:    -       sitting: good  dynamic: good  standing: good  dynamic: good-  Postural examination/scapula alignment:    -       Rounded shoulders  Sensation:    -       Intact  Motor Planning:    -       intact  Dominant hand:    -       right  Upper Extremity Range of Motion:     -       Right Upper Extremity: WFL  -       Left Upper Extremity: WFL  Upper Extremity Strength:    -       Right Upper Extremity: WFL  -       Left Upper Extremity: WFL   Strength:    -       Right Upper Extremity: WFL  -       Left Upper Extremity: WFL  Fine Motor Coordination:    -       Intact  Gross motor coordination:   WFL, mild shakiness finger nose  Neurological:    -       Normal tone    AMPAC 6 Click ADL:  AMPAC Total Score: 24    Treatment & Education:  Purpose of OT and POC.  Discharge planning: rec: HHOT, shower chair. Pt in agreement and ask that shower chair be ordered.  All questions/concerns addressed with in scope.   Pt. instructed to call  staff for BTB assist, call button use provided and chair alarm obtained.  FX ambulation on 2nd trial to bathroom/sink with supervision.  Education:    Patient left up in chair with all lines intact, call button in reach, chair alarm on and nurse notified    GOALS:   Multidisciplinary Problems     Occupational Therapy Goals        Problem: Occupational Therapy    Goal Priority Disciplines Outcome Interventions   Occupational Therapy Goal     OT, PT/OT Ongoing, Progressing    Description: Goals to be met by: 5/28/2022    Patient will increase functional independence with ADLs by performing:    UE Dressing with Modified [place].  LE Dressing with Modified [place].  Grooming while standing at sink with Modified [place].  Toileting from toilet with Modified [place] for hygiene and clothing management.   Toilet transfer to toilet with Modified [place].                     History:     Past Medical History:   Diagnosis Date    Acute combined systolic and diastolic CHF, NYHA class 3 11/15/2018    Arthritis     Diabetes mellitus     DJD (degenerative joint disease)     Hypertension     Prostate enlargement     Sleep apnea     Thyroid disease          Past Surgical History:   Procedure Laterality Date    CARDIAC PACEMAKER PLACEMENT      EPIDURAL STEROID INJECTION INTO LUMBAR SPINE N/A 6/4/2020    Procedure: Injection-steroid-epidural-lumbar--L4-5;  Surgeon: Angelica Norris Jr., MD;  Location: [facility] PAIN MGT;  Service: Pain Management;  Laterality: N/A;  sign consent at DOS.    HEMILAMINECTOMY  5/26/2021    Procedure: HEMILAMINECTOMY;  Surgeon: Dayton Sparks MD;  Location: [facility] OR;  Service: Neurosurgery;;  left L4-5    INJECTION OF JOINT Bilateral 2/13/2020    Procedure: Injection, Joint, Bilateral GTB;  Surgeon: Angelica Norris Jr., MD;  Location: [facility] PAIN MGT;  Service: Pain Management;  Laterality: Bilateral;    INJECTION OF JOINT Bilateral 3/22/2022    Procedure: bilateral GTB  steriod injection;  Surgeon: Angelica Norris Jr., MD;  Location: [facility] PAIN T;  Service: Pain Management;  Laterality: Bilateral;  pacemaker   pt is diabetic     INJECTION OF STEROID Bilateral 1/12/2021    Procedure: INJECTION, STEROID Bilateral SI Joint Block and Steroid Injection;  Surgeon: Dayton Sparks MD;  Location: [facility] OR;  Service: Neurosurgery;  Laterality: Bilateral;  Procedure: Bilateral SI Joint Block and Steroid Injection  Surgery 't'kristen: 45 min  LOS: 0  Anesthesia:MAC  Radiology: C-arm  Bed: Regular with Pillows  Position: Prone     SPINE SURGERY      TRANSFORAMINAL EPIDURAL INJECTION OF STEROID Left 2/23/2021    Procedure: Injection,steroid,epidural,transforaminal approach--Left L4-5 *Has Pacemaker/ICD*;  Surgeon: Angelica Norris Jr., MD;  Location: [facility] PAIN T;  Service: Pain Management;  Laterality: Left;       Time Tracking:     OT Date of Treatment: 05/14/22  OT Start Time: 1458  OT Stop Time: 1534  OT Total Time (min): 36 min    Billable Minutes:Re-eval 10  Self Care/Home Management 13  Therapeutic Activity 13  Total Time 36    5/14/2022

## 2022-05-14 NOTE — PROGRESS NOTES
"[facility] Medicine Progress Note    Primary Team: [facility] Hospitalist Team B  Attending Physician: Mumtaz Randolph MD  Resident: Primo  Intern: Salvatore    Subjective:      NAEON, patient sleeping comfortable this morning and easily arousable. He endorses neck pain that is chronic for him which improved with lidocaine patch. No no concerns for chest pain, SOB, abdominal pain, weakness or vision loss.      Objective:     Last 24 Hour Vital Signs:  BP  Min: 138/63  Max: 185/74  Temp  Av.9 °F (36.6 °C)  Min: 97.5 °F (36.4 °C)  Max: 98.4 °F (36.9 °C)  Pulse  Av.6  Min: 58  Max: 71  Resp  Av.6  Min: 16  Max: 19  SpO2  Av.5 %  Min: 60 %  Max: 100 %  Height  Av' 5" (165.1 cm)  Min: 5' 5" (165.1 cm)  Max: 5' 5" (165.1 cm)  Weight  Av.8 kg (147 lb 4.8 oz)  Min: 66.5 kg (146 lb 9.7 oz)  Max: 67.1 kg (148 lb)  No intake/output data recorded.    Physical Examination:  General Appearance: alert and oriented, no acute distress.  Head: Normocephalic, atraumatic   Eyes: conjunctivae/corneas clear and non-icteric. PERRL, EOMI  Mouth: lips, tongue and mucosa normal, oropharynx clear  Neck: supple, trachea midline, thyroid not enlarged, no LAD  Lungs: CTAB; no crackles or wheezes, no increased work of breathing  Heart: regular rate and rhythm, S1, S2 normal, no murmurs  Abdomen: soft, non-distended, non-tender; bowel sounds normal  Extremities: extremities normal, atraumatic, no cyanosis or edema   Pulses: 2+ and symmetric   Skin: No obvious ecchymosis, erythema, or rash  Neurologic: AAOx3, no focal neurological deficits noted. CN grossly intact, sensation intact bilaterally, power 5/5 in all extremities     Laboratory:  Laboratory Data Reviewed: yes    Other Results:  EKG (my interpretation): NSR, paced, 1st degree AV block     Radiology Data Reviewed: yes  Pertinent Findings:  Imaging Results          CT Head Without Contrast (Final result)  Result time 22 19:16:31    Final result by Kira ZELAYA" MD Tova (05/13/22 19:16:31)                 Impression:      No acute intracranial hemorrhage or evidence of major arterial infarct.    Mild lobular mucosal thickening in the bilateral inferior maxillary sinuses without air-fluid levels is again noted is likely incidental.    Age-related atrophic changes.      Electronically signed by: Kira Bernardo  Date:    05/13/2022  Time:    19:16             Narrative:    EXAMINATION:  CT HEAD WITHOUT CONTRAST    CLINICAL HISTORY:  Transient ischemic attack (TIA);    TECHNIQUE:  Low dose axial images were obtained through the head.  Coronal and sagittal reformations were also performed. Contrast was not administered.    COMPARISON:  MRI brain 02/10/2016    FINDINGS:  There is no evidence of acute intra or extra-axial hemorrhage or hematoma formation.  There is prominence of the ventricles, cisterns and sulci stable since prior exam and consistent with age related atrophy.    Gray-white matter junction differentiation diffusely appears to be intact with no evidence of acute major arterial infarct.  There is no focal mass or mass effect.  Incidental crescentic calcification again noted in the left temporal lobe.    Lobular mucosal thickening again noted in the paranasal sinuses inferiorly bilaterally greater on the right.  Remaining paranasal sinuses are clear and mastoid air cells and middle ears bilaterally appear clear.    Orbits and retro bulbar structures appear intact with postoperative changes again noted involving the globes.    Bony calvarium appears to be intact with no evidence of fracture lytic or sclerotic osseous lesion.                                  Current Medications:     Infusions:       Scheduled:   aspirin  81 mg Per NG tube Daily    atorvastatin  40 mg Oral Daily    finasteride  5 mg Oral Daily    furosemide  20 mg Oral BID    gabapentin  100 mg Oral QHS    heparin (porcine)  5,000 Units Subcutaneous Q12H    levothyroxine  100 mcg Oral  Before breakfast    LIDOcaine  1 patch Transdermal Q24H    losartan  50 mg Oral Daily    tamsulosin  0.4 mg Oral Daily        PRN:  dextrose 10%, dextrose 10%, glucagon (human recombinant), insulin aspart U-100, melatonin, sodium chloride 0.9%, triamcinolone acetonide 0.1%      Assessment:   89 y.o. male with pmhx of HFpEF, SSS s/p PPM 2019, DM2, HTN, Hypothyroidism, DJD who presented to [facility] on 5/13/2022 with a primary complaint of headaches and blurry vision x 3 days. Earlier today, patient was at his eye doctor appoint and had an episode of acute loss of vision in the right eye that returned to baseline. In ED, HDS with no FND deficit, CN grossly intact on exam. WBC 4.9, Hb 10.3, Cr 2.3, CT head with no acute intracranial changes. Patient admitted to [facility] medicine for TIA workup and SOFI management      Plan:     Transient ischemic attack   - Presenting with worsening headache and visual changes x 3 days. Had an episode of transient vision loss in right eye earlier today at eye doctor appt    - No FND deficits, CN grossly intact, sensation intact bilaterally, power 5/5 in all extremities   - Afebrile, HDS, sating 99% on RA  - CT head negative for hemorrhage or major infarct, age related atrophic changes   - Continue ASA/statin therapy   - Lipid panel, A1c and TSH ordered   - PT/OT/SLP  - Consider MRI brain vs CTA head/neck (complicated by PPM and SOFI)  - Neurology consulted, appreciate recs   - TTE with bubble study ordered   - Will proceed with MRI brain, will need PPM deactivated      SOFI  - BUN/Cr 37/2.3 (baseline Cr ~ 1.5)  - Likely pre-renal in setting of decreased PO intake   - Cr 2.2 s/p 1L IVF  - Consider urine studies ordered      Macrocytic Anemia    - H/H 10.3/32,   - Baseline Hb ~ 9-11  - Folate, B12 ordered      HFpEF  H/o sick sinus s/p PPM in 2019  - Denies REEDER, edema, orthopnea   - HDS, sating 99% on RA  - BNP and CXR ordered   - Trop negative x 1   - EKG sinus  with 1st degree block, paced rate   - PPM [employer], ID card in chart   - Continue home lasix      DM2 - non insulin dependent   - BG 89 on admit   - A1c 7.8 2/5/22, repeat ordered   - Home regimen Metformin 500 mg daily and glimepiride 2 mg daily   - Low dose SSI     HTN  - Hypertensive on admit   - Continue home Losartan 50 mg     BPH  - Continue home flomax and finasteride      Hypothyroidism   - TSH 0.571  - Continue home levothyroxine 100 mcg      DVT ppx: Lovenox   Diet: NPO  Dispo: TIA work up     Anjana Chase MD  [facility] HO-1    [facility] Medicine Hospitalist Pager numbers:   [facility] Hospitalist Medicine Team A (Viri/Ida): [phone]  [facility] Hospitalist Medicine Team B (Agustín/Keith):  [phone]

## 2022-05-14 NOTE — PT/OT/SLP EVAL
"Physical Therapy Evaluation and Treatment    Patient Name:  Vince Martinez   MRN:  [id number]    Recommendations:     Discharge Recommendations:  home health PT   Discharge Equipment Recommendations: shower chair   Barriers to discharge: None    Assessment:     Vince Martinez is a 89 y.o. male admitted with a medical diagnosis of <principal problem not specified>.  He presents with the following impairments/functional limitations:  impaired self care skills, impaired endurance, impaired functional mobilty, gait instability, impaired balance, pain. Pt required  SBA with bed mobility, SBA with sit<>stand transfer and gait without AD up to 120 ft without AD requiring CG/SBA.  Pt exhibits  min  shakiness in trunk / extremities  that pt/ son reports is  chronic and intermittent.  Pt will benefit from increased PT training to increase independence and safety and may require using his  rw for longer distances/ un level surfaces..    Rehab Prognosis: Good; patient would benefit from acute skilled PT services to address these deficits and reach maximum level of function.    Recent Surgery: * No surgery found *      Plan:     During this hospitalization, patient to be seen 5 x/week to address the identified rehab impairments via gait training, therapeutic activities, therapeutic exercises and progress toward the following goals:    · Plan of Care Expires:  06/14/22    Subjective     Chief Complaint: headache  Patient/Family Comments/goals: " I feel as strong as before but just a little shaky."  However pt and son report was having  shakiness prior to hospitalization.  Pain/Comfort:  · Pain Rating 1: 3/10  · Location - Side 1: Bilateral  · Location - Orientation 1: upper  · Location 1: head  · Pain Addressed 1: Nurse notified  · Pain Rating Post-Intervention 1: 3/10    Patients cultural, spiritual, Sabianist conflicts given the current situation:      Living Environment:  Pt lives with wife and son in [facility], threshold entrance, " REBEK[place] with grab bar.   Prior to admission, patients level of function was independent with ADLS including driving and ambulation without AD household/ community distances..  Equipment used at home: walker, rolling, cane, straight, cane, quad (has a rw , QC and SPC but doesn't use them).  DME owned (not currently used): rolling walker, single point cane and QC.  Upon discharge, patient will have assistance from son and spouse.    Objective:     Communicated with RN prior to session.  Patient found HOB elevated with bed alarm, telemetry  upon PT entry to room.    General Precautions: Standard, fall   Orthopedic Precautions:N/A   Braces: N/A  Respiratory Status: Room air    Exams:  · Cognitive Exam:  Patient is oriented to Person, Place, Time and Situation  · Fine Motor Coordination:    · Gross Motor Coordination:  WFL; intact B heel to shin and repetitive toe-tapping.  · Postural Exam:  Patient presented with the following abnormalities:    · -       Rounded shoulders  · Sensation:    · -       Intact  light/touch B LE  · RLE ROM: WFL  · RLE Strength: WFL  · LLE ROM: WFL  · LLE Strength: WFL    Functional Mobility:  · Bed Mobility:     · Rolling Left:  stand by assistance   · Supine to Sit: stand by assistance   · Sit to Supine: stand by assistance  · Transfers:     · Sit to Stand:  stand by assistance with no AD needing cues for proper wt shifting over ZORA  · Toilet Transfer: stand by assistance with  no AD  using  Step Transfer  · Gait: ambulates 120ft without AD requiring CG/close SBA exhibiting min shakiness in trunk/ extremities but no LOB.  · Balance: F static and dynamic without AD; performed higher level activities with CGAx 1 and no LOB ( SLS holding 5- 8 sec, tandem stance, narrow ZORA stance with and without eyes closed    Therapeutic Activities and Exercises:   Pt/ son were educated in role of PT and POC. Pt was instructed in supine<>sit<>stand as above. Pt ambulated 120 ft without AD requiring CG/close SBA   within posadas  as above needing cues for safety/ balance with turns/ change in direction. Pt rested and ambulated room distances to BR and to sink  with CG/close SBA and cues for safety.     AM-PAC 6 CLICK MOBILITY  Total Score:17     Patient left HOB elevated with call button in reach, bed alarm on, RN notified and family and MD present.    GOALS:   Multidisciplinary Problems     Physical Therapy Goals        Problem: Physical Therapy    Goal Priority Disciplines Outcome Goal Variances Interventions   Physical Therapy Goal     PT, PT/OT Ongoing, Progressing     Description: Goals to be met by: 22     Patient will increase functional independence with mobility by performin. Supine <> sit with Modified [place]  2. Sit to stand transfer with Modified [place]  3. Bed to chair transfer with Modified [place]   4. Gait  x 200 feet with Modified [place] and Supervision with or without AD                     History:     Past Medical History:   Diagnosis Date    Acute combined systolic and diastolic CHF, NYHA class 3 11/15/2018    Arthritis     Diabetes mellitus     DJD (degenerative joint disease)     Hypertension     Prostate enlargement     Sleep apnea     Thyroid disease        Past Surgical History:   Procedure Laterality Date    CARDIAC PACEMAKER PLACEMENT      EPIDURAL STEROID INJECTION INTO LUMBAR SPINE N/A 2020    Procedure: Injection-steroid-epidural-lumbar--L4-5;  Surgeon: Angelica Norris Jr., MD;  [facility] [place] [facility];  Service: Pain Management;  Laterality: N/A;  sign consent at DOS.    HEMILAMINECTOMY  2021    Procedure: HEMILAMINECTOMY;  Surgeon: Dayton Sparks MD;  Location: [facility] OR;  Service: Neurosurgery;;  left L4-5    INJECTION OF JOINT Bilateral 2020    Procedure: Injection, Joint, Bilateral GTB;  Surgeon: Angelica Norris Jr., MD;  Location: [facility] PAIN MGT;  Service: Pain Management;  Laterality: Bilateral;    INJECTION OF JOINT Bilateral  3/22/2022    Procedure: bilateral GTB steriod injection;  Surgeon: Angelica Norris Jr., MD;  Location: [facility] PAIN MGT;  Service: Pain Management;  Laterality: Bilateral;  pacemaker   pt is diabetic     INJECTION OF STEROID Bilateral 1/12/2021    Procedure: INJECTION, STEROID Bilateral SI Joint Block and Steroid Injection;  Surgeon: Dayton Sparks MD;  Location: [facility] OR;  Service: Neurosurgery;  Laterality: Bilateral;  Procedure: Bilateral SI Joint Block and Steroid Injection  Surgery 't'kristen: 45 min  LOS: 0  Anesthesia:MAC  Radiology: C-arm  Bed: Regular with Pillows  Position: Prone     SPINE SURGERY      TRANSFORAMINAL EPIDURAL INJECTION OF STEROID Left 2/23/2021    Procedure: Injection,steroid,epidural,transforaminal approach--Left L4-5 *Has Pacemaker/ICD*;  Surgeon: Angelica Norris Jr., MD;  Location: [facility] PAIN MGT;  Service: Pain Management;  Laterality: Left;       Time Tracking:     PT Received On: 05/14/22  PT Start Time: 1014     PT Stop Time: 1039  PT Total Time (min): 25 min     Billable Minutes: Evaluation 15 and Gait Training 10      05/14/2022

## 2022-05-14 NOTE — PLAN OF CARE
Problem: SLP  Goal: SLP Goal  Description: STGs:   Pt will participate in clinical swallow assessment to determine safest and least restrictive diet level. - Met 5/14/2022    Outcome: Met   Bedside swallow study completed. REC: Regular solids/Thin liquids given standard aspiration precautions. Pt and family deny change in speech/language skills. No further ST needs.

## 2022-05-14 NOTE — PLAN OF CARE
Problem: Physical Therapy  Goal: Physical Therapy Goal  Description: Goals to be met by: 22     Patient will increase functional independence with mobility by performin. Supine <> sit with Modified [place]  2. Sit to stand transfer with Modified [place]  3. Bed to chair transfer with Modified [place]   4. Gait  x 200 feet with Modified [place] and Supervision with or without AD    2022 1612 by Ольга Ponce, PT  Outcome: Ongoing, Progressing   PT evaluation was completed. Pt required  SBA with bed mobility, SBA with sit<>stand transfer and gait without AD up to 120 ft without AD requiring CG/SBA.  Pt exhibits  min  shakiness in trunk / extremities  that pt/ son reports is  chronic and intermittent.  Pt will benefit from increased PT training to increase independence and safety and may require using his  rw for longer distances/ un level surfaces.

## 2022-05-15 VITALS
OXYGEN SATURATION: 98 % | BODY MASS INDEX: 24.43 KG/M2 | RESPIRATION RATE: 18 BRPM | SYSTOLIC BLOOD PRESSURE: 121 MMHG | DIASTOLIC BLOOD PRESSURE: 57 MMHG | HEIGHT: 65 IN | HEART RATE: 78 BPM | WEIGHT: 146.63 LBS | TEMPERATURE: 96 F

## 2022-05-15 PROBLEM — G45.9 TIA (TRANSIENT ISCHEMIC ATTACK): Status: RESOLVED | Noted: 2022-05-13 | Resolved: 2022-05-15

## 2022-05-15 LAB
ALBUMIN SERPL BCP-MCNC: 3.5 G/DL (ref 3.5–5.2)
ALP SERPL-CCNC: 52 U/L (ref 55–135)
ALT SERPL W/O P-5'-P-CCNC: 26 U/L (ref 10–44)
ANION GAP SERPL CALC-SCNC: 13 MMOL/L (ref 8–16)
AST SERPL-CCNC: 35 U/L (ref 10–40)
BASOPHILS # BLD AUTO: 0.05 K/UL (ref 0–0.2)
BASOPHILS NFR BLD: 1.2 % (ref 0–1.9)
BILIRUB SERPL-MCNC: 0.6 MG/DL (ref 0.1–1)
BUN SERPL-MCNC: 26 MG/DL (ref 10–30)
CALCIUM SERPL-MCNC: 9 MG/DL (ref 8.7–10.5)
CHLORIDE SERPL-SCNC: 105 MMOL/L (ref 95–110)
CO2 SERPL-SCNC: 23 MMOL/L (ref 23–29)
CREAT SERPL-MCNC: 1.6 MG/DL (ref 0.5–1.4)
DIFFERENTIAL METHOD: ABNORMAL
EOSINOPHIL # BLD AUTO: 0.3 K/UL (ref 0–0.5)
EOSINOPHIL NFR BLD: 8 % (ref 0–8)
ERYTHROCYTE [DISTWIDTH] IN BLOOD BY AUTOMATED COUNT: 12.8 % (ref 11.5–14.5)
EST. GFR  (AFRICAN AMERICAN): 43 ML/MIN/1.73 M^2
EST. GFR  (NON AFRICAN AMERICAN): 37 ML/MIN/1.73 M^2
GLUCOSE SERPL-MCNC: 111 MG/DL (ref 70–110)
HCT VFR BLD AUTO: 29.1 % (ref 40–54)
HGB BLD-MCNC: 9.5 G/DL (ref 14–18)
IMM GRANULOCYTES # BLD AUTO: 0.01 K/UL (ref 0–0.04)
IMM GRANULOCYTES NFR BLD AUTO: 0.2 % (ref 0–0.5)
LYMPHOCYTES # BLD AUTO: 2.1 K/UL (ref 1–4.8)
LYMPHOCYTES NFR BLD: 50.9 % (ref 18–48)
MAGNESIUM SERPL-MCNC: 2 MG/DL (ref 1.6–2.6)
MCH RBC QN AUTO: 32.1 PG (ref 27–31)
MCHC RBC AUTO-ENTMCNC: 32.6 G/DL (ref 32–36)
MCV RBC AUTO: 98 FL (ref 82–98)
MONOCYTES # BLD AUTO: 0.5 K/UL (ref 0.3–1)
MONOCYTES NFR BLD: 12.4 % (ref 4–15)
NEUTROPHILS # BLD AUTO: 1.1 K/UL (ref 1.8–7.7)
NEUTROPHILS NFR BLD: 27.3 % (ref 38–73)
NRBC BLD-RTO: 0 /100 WBC
PHOSPHATE SERPL-MCNC: 4.3 MG/DL (ref 2.7–4.5)
PLATELET # BLD AUTO: 150 K/UL (ref 150–450)
PMV BLD AUTO: 10.2 FL (ref 9.2–12.9)
POCT GLUCOSE: 125 MG/DL (ref 70–110)
POCT GLUCOSE: 204 MG/DL (ref 70–110)
POCT GLUCOSE: 208 MG/DL (ref 70–110)
POTASSIUM SERPL-SCNC: 4.5 MMOL/L (ref 3.5–5.1)
PROT SERPL-MCNC: 6.2 G/DL (ref 6–8.4)
RBC # BLD AUTO: 2.96 M/UL (ref 4.6–6.2)
SODIUM SERPL-SCNC: 141 MMOL/L (ref 136–145)
WBC # BLD AUTO: 4.11 K/UL (ref 3.9–12.7)

## 2022-05-15 PROCEDURE — 63600175 PHARM REV CODE 636 W HCPCS: Performed by: STUDENT IN AN ORGANIZED HEALTH CARE EDUCATION/TRAINING PROGRAM

## 2022-05-15 PROCEDURE — 36415 COLL VENOUS BLD VENIPUNCTURE: CPT | Performed by: STUDENT IN AN ORGANIZED HEALTH CARE EDUCATION/TRAINING PROGRAM

## 2022-05-15 PROCEDURE — 80053 COMPREHEN METABOLIC PANEL: CPT | Performed by: STUDENT IN AN ORGANIZED HEALTH CARE EDUCATION/TRAINING PROGRAM

## 2022-05-15 PROCEDURE — 96372 THER/PROPH/DIAG INJ SC/IM: CPT | Performed by: STUDENT IN AN ORGANIZED HEALTH CARE EDUCATION/TRAINING PROGRAM

## 2022-05-15 PROCEDURE — 25000003 PHARM REV CODE 250: Performed by: STUDENT IN AN ORGANIZED HEALTH CARE EDUCATION/TRAINING PROGRAM

## 2022-05-15 PROCEDURE — 85025 COMPLETE CBC W/AUTO DIFF WBC: CPT | Performed by: STUDENT IN AN ORGANIZED HEALTH CARE EDUCATION/TRAINING PROGRAM

## 2022-05-15 PROCEDURE — G0378 HOSPITAL OBSERVATION PER HR: HCPCS

## 2022-05-15 PROCEDURE — 84100 ASSAY OF PHOSPHORUS: CPT | Performed by: STUDENT IN AN ORGANIZED HEALTH CARE EDUCATION/TRAINING PROGRAM

## 2022-05-15 PROCEDURE — 83735 ASSAY OF MAGNESIUM: CPT | Performed by: STUDENT IN AN ORGANIZED HEALTH CARE EDUCATION/TRAINING PROGRAM

## 2022-05-15 PROCEDURE — 94761 N-INVAS EAR/PLS OXIMETRY MLT: CPT

## 2022-05-15 RX ORDER — NAPROXEN SODIUM 220 MG/1
81 TABLET, FILM COATED ORAL DAILY
Qty: 30 TABLET | Refills: 11 | Status: SHIPPED | OUTPATIENT
Start: 2022-05-16 | End: 2024-04-02

## 2022-05-15 RX ORDER — ATORVASTATIN CALCIUM 40 MG/1
40 TABLET, FILM COATED ORAL DAILY
Qty: 30 TABLET | Refills: 11 | Status: SHIPPED | OUTPATIENT
Start: 2022-05-16 | End: 2023-01-30 | Stop reason: SDUPTHER

## 2022-05-15 RX ORDER — LIDOCAINE 50 MG/G
1 PATCH TOPICAL DAILY
Qty: 1 PATCH | Refills: 0 | Status: SHIPPED | OUTPATIENT
Start: 2022-05-15 | End: 2022-10-03

## 2022-05-15 RX ADMIN — HEPARIN SODIUM 5000 UNITS: 5000 INJECTION, SOLUTION INTRAVENOUS; SUBCUTANEOUS at 08:05

## 2022-05-15 RX ADMIN — LEVOTHYROXINE SODIUM 100 MCG: 50 TABLET ORAL at 05:05

## 2022-05-15 RX ADMIN — FUROSEMIDE 20 MG: 20 TABLET ORAL at 08:05

## 2022-05-15 RX ADMIN — TAMSULOSIN HYDROCHLORIDE 0.4 MG: 0.4 CAPSULE ORAL at 08:05

## 2022-05-15 RX ADMIN — LIDOCAINE 1 PATCH: 50 PATCH CUTANEOUS at 08:05

## 2022-05-15 RX ADMIN — ASPIRIN 81 MG 81 MG: 81 TABLET ORAL at 08:05

## 2022-05-15 RX ADMIN — ATORVASTATIN CALCIUM 40 MG: 40 TABLET, FILM COATED ORAL at 08:05

## 2022-05-15 RX ADMIN — LOSARTAN POTASSIUM 50 MG: 50 TABLET, FILM COATED ORAL at 08:05

## 2022-05-15 RX ADMIN — INSULIN ASPART 2 UNITS: 100 INJECTION, SOLUTION INTRAVENOUS; SUBCUTANEOUS at 01:05

## 2022-05-15 RX ADMIN — FINASTERIDE 5 MG: 5 TABLET, FILM COATED ORAL at 08:05

## 2022-05-15 NOTE — PLAN OF CARE
Patient remains AAOx4 throughout shift, zero defecits noted, discussed plan of care with patient who verbalizes an understanding of plan discussed.

## 2022-05-15 NOTE — PLAN OF CARE
[facility] - Telemetry    HOME HEALTH ORDERS  FACE TO FACE ENCOUNTER    Patient Name: Vince Martinez  YOB: 1930    PCP: Shlomo Luong MD   PCP Address: [address] / [place] [address]  PCP Phone Number: [phone]  PCP Fax: [phone]    Encounter Date: 5/13/22    Admit to Home Health    Diagnoses:  Active Hospital Problems   No active problems to display.      Resolved Hospital Problems    Diagnosis Date Resolved POA    *TIA (transient ischemic attack) [G45.9] 05/15/2022 Yes     Follow Up Appointments:  Future Appointments   Date Time Provider Department Center   5/18/2022  4:00 PM Nura Hernandez MD [facility]   6/7/2022  1:30 PM Shlomo Luong MD [facility]     Allergies:  Review of patient's allergies indicates:   Allergen Reactions    Bactrim [sulfamethoxazole-trimethoprim] Rash    Ciprofloxacin Rash    Latex Rash     Medications: Review discharge medications with patient and family and provide education.    Current Facility-Administered Medications   Medication Dose Route Frequency Provider Last Rate Last Admin    aspirin chewable tablet 81 mg  81 mg Per NG tube Daily Maverick Morales MD   81 mg at 05/15/22 0815    atorvastatin tablet 40 mg  40 mg Oral Daily Maverick Morales MD   40 mg at 05/15/22 0815    dextrose 10% bolus 125 mL  12.5 g Intravenous PRN Anjana Chase MD        dextrose 10% bolus 250 mL  25 g Intravenous PRN Anjana Chase MD        finasteride tablet 5 mg  5 mg Oral Daily Maverick Morales MD   5 mg at 05/15/22 0815    furosemide tablet 20 mg  20 mg Oral BID Maverick Morales MD   20 mg at 05/15/22 0815    gabapentin capsule 100 mg  100 mg Oral QHS Maverick Morales MD   100 mg at 05/14/22 2106    glucagon (human recombinant) injection 1 mg  1 mg Intramuscular PRN Anjana Chase MD        heparin (porcine) injection 5,000 Units  5,000 Units Subcutaneous Q12H Maverick Morales MD   5,000 Units at  05/15/22 0815    insulin aspart U-100 pen 0-5 Units  0-5 Units Subcutaneous Q6H PRN Anjana Chase MD   2 Units at 05/15/22 1306    levothyroxine tablet 100 mcg  100 mcg Oral Before breakfast Maverick Morales MD   100 mcg at 05/15/22 0527    LIDOcaine 5 % patch 1 patch  1 patch Transdermal Q24H Anjana Chase MD   1 patch at 05/15/22 0815    losartan tablet 50 mg  50 mg Oral Daily Maverick Morales MD   50 mg at 05/15/22 0815    melatonin tablet 9 mg  9 mg Oral Nightly PRN Maverick Morales MD        sodium chloride 0.9% flush 10 mL  10 mL Intravenous PRN Maverick Morales MD        tamsulosin 24 hr capsule 0.4 mg  0.4 mg Oral Daily Maverick Morales MD   0.4 mg at 05/15/22 0815    triamcinolone acetonide 0.1% cream   Topical (Top) Nightly PRN Maverick Morales MD         Current Discharge Medication List      START taking these medications    Details   aspirin 81 MG Chew 1 tablet (81 mg total) by Per NG tube route once daily.  Qty: 30 tablet, Refills: 11      atorvastatin (LIPITOR) 40 MG tablet Take 1 tablet (40 mg total) by mouth once daily.  Qty: 30 tablet, Refills: 11      LIDOcaine (LIDODERM) 5 % Place 1 patch onto the skin once daily. Remove & Discard patch within 12 hours or as directed by MD  Qty: 1 patch, Refills: 0         CONTINUE these medications which have NOT CHANGED    Details   betamethasone dipropionate (DIPROLENE) 0.05 % lotion Apply 1 application topically nightly as needed.      calcium/D3/mag ox//carolyn/Zn (CALTRATE + D3 PLUS MINERALS ORAL) Take 1 tablet by mouth 2 (two) times daily.      clotrimazole-betamethasone 1-0.05% (LOTRISONE) cream APPLY  CREAM TOPICALLY TWICE DAILY  Qty: 45 g, Refills: 0    Associated Diagnoses: Tinea cruris      finasteride (PROSCAR) 5 mg tablet Take 1 tablet (5 mg total) by mouth once daily.  Qty: 90 tablet, Refills: 3    Associated Diagnoses: Benign prostatic hyperplasia, unspecified whether lower urinary tract symptoms present       furosemide (LASIX) 20 MG tablet Take 1 tablet (20 mg total) by mouth 2 (two) times a day.  Qty: 180 tablet, Refills: 3    Associated Diagnoses: Chronic combined systolic and diastolic CHF (congestive heart failure)      gabapentin (NEURONTIN) 100 MG capsule Take 1 capsule (100 mg total) by mouth every evening.  Qty: 90 capsule, Refills: 3    Associated Diagnoses: Primary osteoarthritis involving multiple joints      glimepiride (AMARYL) 2 MG tablet Take 1 tablet (2 mg total) by mouth daily with breakfast.  Qty: 90 tablet, Refills: 3    Associated Diagnoses: Type 2 diabetes mellitus with hyperglycemia, without long-term current use of insulin      irbesartan (AVAPRO) 150 MG tablet Take 1 tablet by mouth once daily  Qty: 90 tablet, Refills: 0    Associated Diagnoses: Essential hypertension      levothyroxine (EUTHYROX) 100 MCG tablet Take 1 tablet (100 mcg total) by mouth before breakfast.  Qty: 90 tablet, Refills: 3      melatonin 10 mg Tab Take 1 tablet by mouth nightly as needed.      metFORMIN (GLUCOPHAGE-XR) 500 MG ER 24hr tablet Take 1 tablet (500 mg total) by mouth once daily.  Qty: 90 tablet, Refills: 3      omeprazole (PRILOSEC) 40 MG capsule TAKE 1 CAPSULE BY MOUTH BEFORE BREAKFAST  Qty: 90 capsule, Refills: 0    Associated Diagnoses: Gastroesophageal reflux disease, unspecified whether esophagitis present      tamsulosin (FLOMAX) 0.4 mg Cap Take 1 capsule (0.4 mg total) by mouth once daily.  Qty: 90 capsule, Refills: 3    Associated Diagnoses: Benign prostatic hyperplasia, unspecified whether lower urinary tract symptoms present      triamcinolone acetonide 0.1% (KENALOG) 0.1 % Lotn APPLY LOTION TO THE AFFECTED AREAS ON THE SCALP ONCE DAILY AT BEDTIME AS NEEDED         STOP taking these medications       diclofenac sodium (VOLTAREN) 1 % Gel Comments:   Reason for Stopping:         meloxicam (MOBIC) 15 MG tablet Comments:   Reason for Stopping:             I have seen and examined this patient within the  last 30 days. My clinical findings that support the need for the home health skilled services and home bound status are the following:no   Weakness/numbness causing balance and gait disturbance due to Stroke and Weakness/Debility making it taxing to leave home.     Diet:   diabetic diet 2000 calorie    Labs:  None Neded     Referrals/ Consults  Physical Therapy to evaluate and treat. Evaluate for home safety and equipment needs; Establish/upgrade home exercise program. Perform / instruct on therapeutic exercises, gait training, transfer training, and Range of Motion.  Occupational Therapy to evaluate and treat. Evaluate home environment for safety and equipment needs. Perform/Instruct on transfers, ADL training, ROM, and therapeutic exercises.    Activities:   activity as tolerated    Nursing:   Agency to admit patient within 24 hours of hospital discharge unless specified on physician order or at patient request    SN to complete comprehensive assessment including routine vital signs. Instruct on disease process and s/s of complications to report to MD. Review/verify medication list sent home with the patient at time of discharge  and instruct patient/caregiver as needed. Frequency may be adjusted depending on start of care date.     Skilled nurse to perform up to 3 visits PRN for symptoms related to diagnosis    Notify MD if SBP > 160 or < 90; DBP > 90 or < 50; HR > 120 or < 50; Temp > 101; O2 < 88%    Ok to schedule additional visits based on staff availability and patient request on consecutive days within the home health episode.    When multiple disciplines ordered:    Start of Care occurs on Sunday - Wednesday schedule remaining discipline evaluations as ordered on separate consecutive days following the start of care.    Thursday SOC -schedule subsequent evaluations Friday and Monday the following week.     Friday - Saturday SOC - schedule subsequent discipline evaluations on consecutive days starting Monday  of the following week.    For all post-discharge communication and subsequent orders please contact patient's primary care physician. If unable to reach primary care physician or do not receive response within 30 minutes, please contact hospitalist team for clinical staff order clarification    Miscellaneous   Diabetic Care:   SN to perform and educate Diabetic management with blood glucose monitoring: and Report CBG < 60 or > 350 to physician.    Home Health Aide:  Physical Therapy Three times weekly    Wound Care Orders  no    I certify that this patient is confined to his home and needs physical therapy and occupational therapy.

## 2022-05-15 NOTE — DISCHARGE SUMMARY
[facility] Hospital Medicine Discharge Summary    Primary Team: [facility] Hospitalist Team B  Attending Physician: Mumtaz Randolph MD  Resident: Sherry  Intern:     Date of Admit: 5/13/2022  Date of Discharge: 5/15/2022    Discharge to: home with home health PT/OT  Condition: stable     Discharge Diagnoses     Patient Active Problem List   Diagnosis    Essential hypertension    Hypertensive heart disease without heart failure    Type 2 diabetes mellitus with hyperglycemia, without long-term current use of insulin    Acquired hypothyroidism    Localized edema    Rheumatic aortic valve insufficiency    Palpitations    PVCs (premature ventricular contractions)    Premature atrial contractions    Chronic combined systolic and diastolic CHF (congestive heart failure)    Greater trochanteric bursitis of both hips    Lumbar spondylosis    Retrolisthesis of vertebrae    Chronic pain    Neuroforaminal stenosis of lumbar spine    Lumbar radiculopathy    DDD (degenerative disc disease), lumbar    Spinal stenosis of lumbar region with neurogenic claudication    CKD (chronic kidney disease) stage 3, GFR 30-59 ml/min    Generalized osteoarthritis    Benign prostatic hyperplasia    Hypertensive heart disease without congestive heart failure    Esophageal reflux    Sacroiliitis, not elsewhere classified    Spinal stenosis of lumbar region with radiculopathy    Overweight (BMI 25.0-29.9)    Calcified granuloma of lung    Abdominal aortic atherosclerosis    Thrombocytopenia, unspecified    Sick sinus syndrome     Consultants and Procedures     Consultants:  Neurology     Procedures:   None    Imaging:  CT Head Without Contrast 05/13/2022     COMPARISON: MRI brain 02/10/2016     FINDINGS:  There is no evidence of acute intra or extra-axial hemorrhage or hematoma formation.  There is prominence of the ventricles, cisterns and sulci stable since prior exam and consistent with age related atrophy. Gray-white matter  junction differentiation diffusely appears to be intact with no evidence of acute major arterial infarct.  There is no focal mass or mass effect.  Incidental crescentic calcification again noted in the left temporal lobe. Lobular mucosal thickening again noted in the paranasal sinuses inferiorly bilaterally greater on the right.  Remaining paranasal sinuses are clear and mastoid air cells and middle ears bilaterally appear clear. Orbits and retro bulbar structures appear intact with postoperative changes again noted involving the globes. Bony calvarium appears to be intact with no evidence of fracture lytic or sclerotic osseous lesion.     Impression:  No acute intracranial hemorrhage or evidence of major arterial infarct.  Mild lobular mucosal thickening in the bilateral inferior maxillary sinuses without air-fluid levels is again noted is likely incidental.  Age-related atrophic changes.    X-Ray Chest AP Portable 05/14/2022    COMPARISON: 05/07/2021  FINDINGS:  Left cardiac pacer lead tips stable.  Lungs are clear.  No pneumothorax or pleural effusions.  Mildly enlarged cardiac silhouette, stable.  Impression: No acute findings.    Brief History of Present Illness      Vince Martinez is a 89 y.o. male with pmhx of HFpEF, SSS s/p PPM 2019, DM2, HTN, Hypothyroidism, DJD who presented to [facility] on 5/13/2022 with a primary complaint of headache x 3 days.   For the past 3 days patient has had intermittent headaches, usually at night lasting 10 minutes that have progressively gotten worse. He has associated blurry vision that occurs with the headaches. The headaches are located in the cervical spine and radiate to the ociput without jaw claudication, photo or phonophobia. Earlier today, patient was at his eye doctor appoint and had an episode of acute loss of vision in the right eye that returned to baseline. He denies any n/v, weakness, slurred speech, facial droop or gait problems. Patient has  never had a stroke in the past and denies recent head trauma. On ROS, patient denies chest pain, SOB, abdominal pain, constipation, diarrhea, fevers, chills, dysuria. He has had no vision changes since admission, and CT head demonstrated no acute intracranial hemorrhage or evidence of major arterial infarct. PT/OT assessed patient and recommended home PT/OT. Patient to be discharged on baby aspirin and high intensity statin.     For the full HPI please refer to the History & Physical from this admission.    General: Patient resting comfortably, no acute respiratory distress    HENT: EOM intact, PERRL, oropharynx clear, mucous membranes clear and moist   Neck: supple, no stridor  Pulmonary: no resp distress, clear to ascultation bilaterally   Cardiovascular: rate wnl, reg rhythm, no murmurs,rubs or gallops  Abdomen: soft, non-tender, no distended no splenomegaly or hepatomegaly   Skin: warm, dry, no erythema, no rashes    Extremities: periph pulses intact, no cyanosis or edema   Neuro: a/ox4, clear speech, follows commands, no weakness or focal neurologic  deficit   Psych: mood and behavior normal     Hospital Course By Problem with Pertinent Findings     Transient ischemic attack   - Presenting with worsening headache and visual changes x 3 days. Had an episode of transient vision loss in right eye earlier today at eye doctor appt    - No FND deficits, CN grossly intact, sensation intact bilaterally, power 5/5 in all extremities   - Afebrile, HDS, sating 99% on RA  - CT head negative for hemorrhage or major infarct, age related atrophic changes   - Continue ASA/statin therapy   - Lipid panel (HDL 44 and ), A1c (6.8)  and TSH (0.571) ordered and reviewed  - PT/OT/SLP: cleared for regular diet, discharged with home health PT/OT  - Consider MRI brain vs CTA head/neck (complicated by PPM and SOFI) outpatient. If he gets an MRI will need his pacemaker deactivated    - Neurology consulted, appreciate recs   - TTE  with no shunt, estimated ejection fraction of 75%   - Brain CT with no acute major infract or hemorrhage      SOFI  - BUN/Cr 37/2.3 (baseline Cr ~ 1.5)  - Likely pre-renal in setting of decreased PO intake   - FeUrea 39.1% consisntent with pre-renal etiology   - Cr 2.2 s/p 1L IVF  - Resolved on discharge      Macrocytic Anemia    - H/H 10.3/32,   - Baseline Hb ~ 9-11  - Folate 17.7 and B12 1207     HFpEF  H/o sick sinus s/p PPM in 2019  - Denies REEDER, edema, orthopnea   - HDS, sating 99% on RA  - BNP 60 and CXR stable   - Trop negative x 1   - EKG sinus with 1st degree block, paced rate   - PPM [employer], ID card in chart   - Continue home lasix      DM2 - non insulin dependent   - BG 89 on admit   - A1c 7.8 2/5/22, repeat 6.8  - Home regimen Metformin 500 mg daily and glimepiride 2 mg daily   - Low dose SSI during admission      HTN  - Hypertensive on admit   - Continue home Losartan 50 mg     BPH  - Continue home flomax and finasteride      Hypothyroidism   - TSH 0.571  - Continue home levothyroxine 100 mcg     Discharge Medications        Medication List      START taking these medications    aspirin 81 MG Chew  1 tablet (81 mg total) by Per NG tube route once daily.  Start taking on: May 16, 2022     atorvastatin 40 MG tablet  Commonly known as: LIPITOR  Take 1 tablet (40 mg total) by mouth once daily.  Start taking on: May 16, 2022     LIDOcaine 5 %  Commonly known as: LIDODERM  Place 1 patch onto the skin once daily. Remove & Discard patch within 12 hours or as directed by MD        CONTINUE taking these medications    betamethasone dipropionate 0.05 % lotion  Commonly known as: DIPROLENE     CALTRATE + D3 PLUS MINERALS ORAL     clotrimazole-betamethasone 1-0.05% cream  Commonly known as: LOTRISONE  APPLY  CREAM TOPICALLY TWICE DAILY     finasteride 5 mg tablet  Commonly known as: PROSCAR  Take 1 tablet (5 mg total) by mouth once daily.     furosemide 20 MG tablet  Commonly known as: LASIX  Take 1 tablet  (20 mg total) by mouth 2 (two) times a day.     gabapentin 100 MG capsule  Commonly known as: NEURONTIN  Take 1 capsule (100 mg total) by mouth every evening.     glimepiride 2 MG tablet  Commonly known as: AMARYL  Take 1 tablet (2 mg total) by mouth daily with breakfast.     irbesartan 150 MG tablet  Commonly known as: AVAPRO  Take 1 tablet by mouth once daily     levothyroxine 100 MCG tablet  Commonly known as: EUTHYROX  Take 1 tablet (100 mcg total) by mouth before breakfast.     melatonin 10 mg Tab     metFORMIN 500 MG ER 24hr tablet  Commonly known as: GLUCOPHAGE-XR  Take 1 tablet (500 mg total) by mouth once daily.     omeprazole 40 MG capsule  Commonly known as: PRILOSEC  TAKE 1 CAPSULE BY MOUTH BEFORE BREAKFAST     tamsulosin 0.4 mg Cap  Commonly known as: FLOMAX  Take 1 capsule (0.4 mg total) by mouth once daily.     triamcinolone acetonide 0.1% 0.1 % Lotn  Commonly known as: KENALOG        STOP taking these medications    diclofenac sodium 1 % Gel  Commonly known as: VOLTAREN     meloxicam 15 MG tablet  Commonly known as: MOBIC           Where to Get Your Medications      These medications were sent to [id number]  [address] [place] [address]    Phone: [phone]   · aspirin 81 MG Chew  · atorvastatin 40 MG tablet  · LIDOcaine 5 %       Discharge Information:   Diet:  Cardiac Diet     Physical Activity:  As tolerated             Instructions:  1. Take all medications as prescribed  2. Keep all follow-up appointments  3. Return to the hospital or call your primary care physicians if any worsening symptoms such as fever, chest pain, shortness of breath, return of symptoms, or any other concerns.    Follow-Up Appointments:  PCP  Neurology    Genet Powell MD  [facility] Internal Medicine, -II

## 2022-05-15 NOTE — PROGRESS NOTES
Just received request for discharge along w/ bath/shower chair from [facility].  CM has submitted the request via fax to [phone].

## 2022-05-18 ENCOUNTER — TELEPHONE (OUTPATIENT)
Dept: NEUROLOGY | Facility: CLINIC | Age: 87
End: 2022-05-18
Payer: MEDICARE

## 2022-05-18 ENCOUNTER — OFFICE VISIT (OUTPATIENT)
Dept: OTOLARYNGOLOGY | Facility: CLINIC | Age: 87
End: 2022-05-18
Payer: MEDICARE

## 2022-05-18 VITALS — TEMPERATURE: 97 F | DIASTOLIC BLOOD PRESSURE: 57 MMHG | SYSTOLIC BLOOD PRESSURE: 150 MMHG | HEART RATE: 60 BPM

## 2022-05-18 DIAGNOSIS — R49.0 DYSPHONIA: Primary | ICD-10-CM

## 2022-05-18 DIAGNOSIS — R13.14 DYSPHAGIA, PHARYNGOESOPHAGEAL: ICD-10-CM

## 2022-05-18 DIAGNOSIS — J31.0 RHINITIS, UNSPECIFIED TYPE: ICD-10-CM

## 2022-05-18 LAB
CTP QC/QA: YES
SARS-COV-2 RDRP RESP QL NAA+PROBE: NEGATIVE

## 2022-05-18 PROCEDURE — 99213 PR OFFICE/OUTPT VISIT, EST, LEVL III, 20-29 MIN: ICD-10-PCS | Mod: 25,S$GLB,, | Performed by: OTOLARYNGOLOGY

## 2022-05-18 PROCEDURE — 1159F MED LIST DOCD IN RCRD: CPT | Mod: CPTII,S$GLB,, | Performed by: OTOLARYNGOLOGY

## 2022-05-18 PROCEDURE — 3288F PR FALLS RISK ASSESSMENT DOCUMENTED: ICD-10-PCS | Mod: CPTII,S$GLB,, | Performed by: OTOLARYNGOLOGY

## 2022-05-18 PROCEDURE — 3288F FALL RISK ASSESSMENT DOCD: CPT | Mod: CPTII,S$GLB,, | Performed by: OTOLARYNGOLOGY

## 2022-05-18 PROCEDURE — 1159F PR MEDICATION LIST DOCUMENTED IN MEDICAL RECORD: ICD-10-PCS | Mod: CPTII,S$GLB,, | Performed by: OTOLARYNGOLOGY

## 2022-05-18 PROCEDURE — 1157F ADVNC CARE PLAN IN RCRD: CPT | Mod: CPTII,S$GLB,, | Performed by: OTOLARYNGOLOGY

## 2022-05-18 PROCEDURE — 1160F RVW MEDS BY RX/DR IN RCRD: CPT | Mod: CPTII,S$GLB,, | Performed by: OTOLARYNGOLOGY

## 2022-05-18 PROCEDURE — 1157F PR ADVANCE CARE PLAN OR EQUIV PRESENT IN MEDICAL RECORD: ICD-10-PCS | Mod: CPTII,S$GLB,, | Performed by: OTOLARYNGOLOGY

## 2022-05-18 PROCEDURE — 1126F PR PAIN SEVERITY QUANTIFIED, NO PAIN PRESENT: ICD-10-PCS | Mod: CPTII,S$GLB,, | Performed by: OTOLARYNGOLOGY

## 2022-05-18 PROCEDURE — 99999 PR PBB SHADOW E&M-EST. PATIENT-LVL IV: CPT | Mod: PBBFAC,,, | Performed by: OTOLARYNGOLOGY

## 2022-05-18 PROCEDURE — U0002: ICD-10-PCS | Mod: QW,S$GLB,, | Performed by: OTOLARYNGOLOGY

## 2022-05-18 PROCEDURE — 31575 PR LARYNGOSCOPY, FLEXIBLE; DIAGNOSTIC: ICD-10-PCS | Mod: S$GLB,,, | Performed by: OTOLARYNGOLOGY

## 2022-05-18 PROCEDURE — 1160F PR REVIEW ALL MEDS BY PRESCRIBER/CLIN PHARMACIST DOCUMENTED: ICD-10-PCS | Mod: CPTII,S$GLB,, | Performed by: OTOLARYNGOLOGY

## 2022-05-18 PROCEDURE — 31575 DIAGNOSTIC LARYNGOSCOPY: CPT | Mod: S$GLB,,, | Performed by: OTOLARYNGOLOGY

## 2022-05-18 PROCEDURE — U0002 COVID-19 LAB TEST NON-CDC: HCPCS | Mod: QW,S$GLB,, | Performed by: OTOLARYNGOLOGY

## 2022-05-18 PROCEDURE — 99999 PR PBB SHADOW E&M-EST. PATIENT-LVL IV: ICD-10-PCS | Mod: PBBFAC,,, | Performed by: OTOLARYNGOLOGY

## 2022-05-18 PROCEDURE — G0180 PR HOME HEALTH MD CERTIFICATION: ICD-10-PCS | Mod: ,,, | Performed by: FAMILY MEDICINE

## 2022-05-18 PROCEDURE — 99213 OFFICE O/P EST LOW 20 MIN: CPT | Mod: 25,S$GLB,, | Performed by: OTOLARYNGOLOGY

## 2022-05-18 PROCEDURE — 1126F AMNT PAIN NOTED NONE PRSNT: CPT | Mod: CPTII,S$GLB,, | Performed by: OTOLARYNGOLOGY

## 2022-05-18 PROCEDURE — 1101F PT FALLS ASSESS-DOCD LE1/YR: CPT | Mod: CPTII,S$GLB,, | Performed by: OTOLARYNGOLOGY

## 2022-05-18 PROCEDURE — G0180 MD CERTIFICATION HHA PATIENT: HCPCS | Mod: ,,, | Performed by: FAMILY MEDICINE

## 2022-05-18 PROCEDURE — 1101F PR PT FALLS ASSESS DOC 0-1 FALLS W/OUT INJ PAST YR: ICD-10-PCS | Mod: CPTII,S$GLB,, | Performed by: OTOLARYNGOLOGY

## 2022-05-18 RX ORDER — AZELASTINE 1 MG/ML
1 SPRAY, METERED NASAL 2 TIMES DAILY
Qty: 30 ML | Refills: 2 | Status: SHIPPED | OUTPATIENT
Start: 2022-05-18 | End: 2022-07-12

## 2022-05-18 NOTE — PROGRESS NOTES
[facility]  Department of Otorhinolaryngology and Communication Sciences    Subjective:      Vince Martinez is a 89 y.o. male who presents for follow-up.  I treated him in 2020 for a fungal laryngitis which resolved with p.o. Diflucan.    Language line  was utilized.  Chief complaints that time of today's visit include clear rhinorrhea, often when eating, as well as nasal obstruction, particularly nuisance at night.  He also characterizes poor smell and poor taste.  These symptoms have been present for at least 6 months.  They are not worsening.  There was no clear inciting event.  He denies facial pain or pressure.  He denies colored nasal discharge.  He incidentally reports mild inconsistent nuisance obstructive dysphagia to large solid food boluses over the last 4-6 weeks.  His voice has been good.    The patient's medications, allergies, past medical, surgical, social and family histories were reviewed and updated as appropriate.    A detailed review of systems was obtained with pertinent positives as per the above HPI, and otherwise negative.      Objective:     BP (!) 150/57   Pulse 60   Temp 97.4 °F (36.3 °C)      Constitutional: comfortable, well dressed  Psychiatric: appropriate affect  Respiratory: comfortably breathing, symmetric chest rise, no stridor  Voice: mild variable roughness/strain  Head: normocephalic  Eyes: conjunctivae and sclerae clear  Nose:  Mucosa mildly congested, no masses, no mucoid purulence  Indirect laryngoscopy is limited due to gag    Procedure  Flexible Laryngoscopy (16180): Laryngoscopy is indicated for assessment of upper aerodigestive structure and function. This was carried out transnasally with a distal chip videoendoscope. After verbal consent was obtained, the patient was positioned and the nose was topically decongested with 1% phenylephrine and topically anesthetized with 4% lidocaine. The endoscope was passed through the most patent nasal cavity  and positioned to image the nasopharynx, larynx, and hypopharynx in detail. The following features were examined: nasopharyngeal, laryngeal, hypopharyngeal masses; velopharyngeal strength, closure, and symmetry of motion; vocal fold range and symmetry of motion; laryngeal mucosal edema, erythema, inflammation, and hydration; salivary pooling; and gross laryngeal sensation. The equipment was removed. The patient tolerated the procedure well without complication. All findings were normal except:  - bilateral vocal fold bowing with sulcus with inconsistent mild right vocal fold paresis  - complete closure  - no pooling    Assessment:     Vince Martinez is a 89 y.o. male with chronic rhinitis and nasal obstruction.     Plan:     I recommended treatment with Flonase and Astelin. Depending on his progress, he may benefit from meeting with one of the rhinologists.    If his dysphagia persists or worsens, we can further investigate this with fluoroscopic swallow study.    He will follow up with me in the future on an as-needed basis.    All questions were answered, and the patient is in agreement with the plan.    Nura Hernandez M.D.  [facility]  Department of Otorhinolaryngology and Communication Sciences

## 2022-05-18 NOTE — TELEPHONE ENCOUNTER
----- Message from Ruthie Bowen sent at 5/18/2022 10:30 AM CDT -----  Good morning,    The patient has a referral from telemetry with a diagnosis of Vascular Neuro (Stroke, Hospital f/u (stroke), TIA. Please assist with scheduling the patient?    Thank you

## 2022-05-21 ENCOUNTER — PATIENT OUTREACH (OUTPATIENT)
Dept: ADMINISTRATIVE | Facility: HOSPITAL | Age: 87
End: 2022-05-21
Payer: MEDICARE

## 2022-05-21 NOTE — PROGRESS NOTES
05/21/2022 Gap report audit performed. Care Everywhere updates requested and reviewed  Overdue HM topic chart audit and/or requested. LINKS triggered and reconciled. Media reviewed    HM Updated dm eye exam 2022    Health Maintenance Due   Topic Date Due    Shingles Vaccine (3 of 3) 09/14/2020    Foot Exam  03/10/2022    COVID-19 Vaccine (4 - Booster for Pfizer series) 04/06/2022

## 2022-06-01 ENCOUNTER — TELEPHONE (OUTPATIENT)
Dept: FAMILY MEDICINE | Facility: CLINIC | Age: 87
End: 2022-06-01
Payer: MEDICARE

## 2022-06-01 NOTE — TELEPHONE ENCOUNTER
----- Message from Cheryl Barker sent at 6/1/2022  9:04 AM CDT -----  Regarding: Blood Work Req  Type:  Needs Medical Advice    Who Called: pt  Would the patient rather a call back or a response via [facility]? call  Best Call Back Number: [phone]  Additional Information: would like blood test on tomorrow for appt on the 7th and would like results sent to his cardiologist (Dr. Gomez)

## 2022-06-01 NOTE — TELEPHONE ENCOUNTER
Spoke to patient and informed him that Dr Ulloa is out until 6/6/2022. He can order labs the day patient is seen. Patient voiced understanding.

## 2022-06-16 ENCOUNTER — EXTERNAL HOME HEALTH (OUTPATIENT)
Dept: HOME HEALTH SERVICES | Facility: HOSPITAL | Age: 87
End: 2022-06-16
Payer: MEDICARE

## 2022-06-20 ENCOUNTER — TELEPHONE (OUTPATIENT)
Dept: FAMILY MEDICINE | Facility: CLINIC | Age: 87
End: 2022-06-20
Payer: MEDICARE

## 2022-06-20 NOTE — TELEPHONE ENCOUNTER
Called patient and informed him that I don't have results yet and I will call him as soon as I have results. Patient voiced understanding

## 2022-06-20 NOTE — TELEPHONE ENCOUNTER
----- Message from Elias Hanson sent at 6/20/2022 11:41 AM CDT -----  Regarding: lab  Type:  Patient Returning Call    Who Called:patient     Who Left Message for Patient:n/a    Does the patient know what this is regarding?:patient would like to know if his results were received   Would the patient rather a call back or a response via [facility]? Call back   Best Call Back Number:[phone]  Additional Information: n/a

## 2022-07-12 ENCOUNTER — TELEPHONE (OUTPATIENT)
Dept: FAMILY MEDICINE | Facility: CLINIC | Age: 87
End: 2022-07-12
Payer: MEDICARE

## 2022-07-12 NOTE — TELEPHONE ENCOUNTER
Left message with Ольга at [facility] stating I was returning Shelly's call in regards to Vince Martinez. She stated Shelly was in the office and she will rely the messsa

## 2022-07-12 NOTE — TELEPHONE ENCOUNTER
----- Message from Rob De La Torre sent at 7/12/2022 12:32 PM CDT -----  Regarding: Patient advice  Contact: [facility]  Shelly from [facility] called in regards to following up on a referral received for Pt       Please advise , Shelly can be reached at [phone]

## 2022-07-20 ENCOUNTER — TELEPHONE (OUTPATIENT)
Dept: OTOLARYNGOLOGY | Facility: CLINIC | Age: 87
End: 2022-07-20
Payer: MEDICARE

## 2022-07-25 ENCOUNTER — LAB VISIT (OUTPATIENT)
Dept: LAB | Facility: HOSPITAL | Age: 87
End: 2022-07-25
Attending: INTERNAL MEDICINE
Payer: MEDICARE

## 2022-07-25 DIAGNOSIS — N18.32 STAGE 3B CHRONIC KIDNEY DISEASE: ICD-10-CM

## 2022-07-25 DIAGNOSIS — N25.81 SECONDARY HYPERPARATHYROIDISM OF RENAL ORIGIN: ICD-10-CM

## 2022-07-25 DIAGNOSIS — N18.32 ANEMIA IN STAGE 3B CHRONIC KIDNEY DISEASE: ICD-10-CM

## 2022-07-25 DIAGNOSIS — E55.9 VITAMIN D DEFICIENCY: ICD-10-CM

## 2022-07-25 DIAGNOSIS — D63.1 ANEMIA IN STAGE 3B CHRONIC KIDNEY DISEASE: ICD-10-CM

## 2022-07-25 LAB
25(OH)D3+25(OH)D2 SERPL-MCNC: 45 NG/ML (ref 30–96)
ALBUMIN SERPL BCP-MCNC: 4.1 G/DL (ref 3.5–5.2)
ANION GAP SERPL CALC-SCNC: 9 MMOL/L (ref 8–16)
BASOPHILS # BLD AUTO: 0.06 K/UL (ref 0–0.2)
BASOPHILS NFR BLD: 1.1 % (ref 0–1.9)
BUN SERPL-MCNC: 30 MG/DL (ref 10–30)
CALCIUM SERPL-MCNC: 9.7 MG/DL (ref 8.7–10.5)
CHLORIDE SERPL-SCNC: 110 MMOL/L (ref 95–110)
CO2 SERPL-SCNC: 26 MMOL/L (ref 23–29)
CREAT SERPL-MCNC: 1.9 MG/DL (ref 0.5–1.4)
DIFFERENTIAL METHOD: ABNORMAL
EOSINOPHIL # BLD AUTO: 0.4 K/UL (ref 0–0.5)
EOSINOPHIL NFR BLD: 7.9 % (ref 0–8)
ERYTHROCYTE [DISTWIDTH] IN BLOOD BY AUTOMATED COUNT: 12.4 % (ref 11.5–14.5)
EST. GFR  (AFRICAN AMERICAN): 35 ML/MIN/1.73 M^2
EST. GFR  (NON AFRICAN AMERICAN): 30 ML/MIN/1.73 M^2
FERRITIN SERPL-MCNC: 168 NG/ML (ref 20–300)
GLUCOSE SERPL-MCNC: 106 MG/DL (ref 70–110)
HCT VFR BLD AUTO: 32.6 % (ref 40–54)
HGB BLD-MCNC: 10.5 G/DL (ref 14–18)
IMM GRANULOCYTES # BLD AUTO: 0 K/UL (ref 0–0.04)
IMM GRANULOCYTES NFR BLD AUTO: 0 % (ref 0–0.5)
IRON SERPL-MCNC: 116 UG/DL (ref 45–160)
LYMPHOCYTES # BLD AUTO: 2.7 K/UL (ref 1–4.8)
LYMPHOCYTES NFR BLD: 48.9 % (ref 18–48)
MCH RBC QN AUTO: 32.6 PG (ref 27–31)
MCHC RBC AUTO-ENTMCNC: 32.2 G/DL (ref 32–36)
MCV RBC AUTO: 101 FL (ref 82–98)
MONOCYTES # BLD AUTO: 0.4 K/UL (ref 0.3–1)
MONOCYTES NFR BLD: 7.4 % (ref 4–15)
NEUTROPHILS # BLD AUTO: 1.9 K/UL (ref 1.8–7.7)
NEUTROPHILS NFR BLD: 34.7 % (ref 38–73)
NRBC BLD-RTO: 0 /100 WBC
PHOSPHATE SERPL-MCNC: 3.4 MG/DL (ref 2.7–4.5)
PLATELET # BLD AUTO: 174 K/UL (ref 150–450)
PMV BLD AUTO: 9.8 FL (ref 9.2–12.9)
POTASSIUM SERPL-SCNC: 5.9 MMOL/L (ref 3.5–5.1)
PTH-INTACT SERPL-MCNC: 98.5 PG/ML (ref 9–77)
RBC # BLD AUTO: 3.22 M/UL (ref 4.6–6.2)
SATURATED IRON: 31 % (ref 20–50)
SODIUM SERPL-SCNC: 145 MMOL/L (ref 136–145)
TOTAL IRON BINDING CAPACITY: 380 UG/DL (ref 250–450)
TRANSFERRIN SERPL-MCNC: 257 MG/DL (ref 200–375)
URATE SERPL-MCNC: 7.4 MG/DL (ref 3.4–7)
WBC # BLD AUTO: 5.44 K/UL (ref 3.9–12.7)

## 2022-07-25 PROCEDURE — 84466 ASSAY OF TRANSFERRIN: CPT | Performed by: INTERNAL MEDICINE

## 2022-07-25 PROCEDURE — 36415 COLL VENOUS BLD VENIPUNCTURE: CPT | Performed by: INTERNAL MEDICINE

## 2022-07-25 PROCEDURE — 82306 VITAMIN D 25 HYDROXY: CPT | Performed by: INTERNAL MEDICINE

## 2022-07-25 PROCEDURE — 80069 RENAL FUNCTION PANEL: CPT | Performed by: INTERNAL MEDICINE

## 2022-07-25 PROCEDURE — 85025 COMPLETE CBC W/AUTO DIFF WBC: CPT | Performed by: INTERNAL MEDICINE

## 2022-07-25 PROCEDURE — 83970 ASSAY OF PARATHORMONE: CPT | Performed by: INTERNAL MEDICINE

## 2022-07-25 PROCEDURE — 84550 ASSAY OF BLOOD/URIC ACID: CPT | Performed by: INTERNAL MEDICINE

## 2022-07-25 PROCEDURE — 82728 ASSAY OF FERRITIN: CPT | Performed by: INTERNAL MEDICINE

## 2022-08-03 ENCOUNTER — HOSPITAL ENCOUNTER (OUTPATIENT)
Dept: RADIOLOGY | Facility: HOSPITAL | Age: 87
Discharge: HOME | End: 2022-08-03
Attending: INTERNAL MEDICINE
Payer: MEDICARE

## 2022-08-03 DIAGNOSIS — N18.32 STAGE 3B CHRONIC KIDNEY DISEASE: ICD-10-CM

## 2022-08-03 PROCEDURE — 76770 US EXAM ABDO BACK WALL COMP: CPT | Mod: 26,,, | Performed by: RADIOLOGY

## 2022-08-03 PROCEDURE — 76770 US EXAM ABDO BACK WALL COMP: CPT | Mod: TC

## 2022-08-03 PROCEDURE — 76770 US RETROPERITONEAL COMPLETE: ICD-10-PCS | Mod: 26,,, | Performed by: RADIOLOGY

## 2022-08-22 ENCOUNTER — LAB VISIT (OUTPATIENT)
Dept: LAB | Facility: HOSPITAL | Age: 87
End: 2022-08-22
Attending: NURSE PRACTITIONER
Payer: MEDICARE

## 2022-08-22 DIAGNOSIS — E11.65 TYPE 2 DIABETES MELLITUS WITH HYPERGLYCEMIA, WITHOUT LONG-TERM CURRENT USE OF INSULIN: ICD-10-CM

## 2022-08-22 DIAGNOSIS — I10 ESSENTIAL HYPERTENSION: ICD-10-CM

## 2022-08-22 DIAGNOSIS — D64.9 ANEMIA, UNSPECIFIED TYPE: ICD-10-CM

## 2022-08-22 LAB
ALBUMIN SERPL BCP-MCNC: 4 G/DL (ref 3.5–5.2)
ALBUMIN SERPL BCP-MCNC: 4 G/DL (ref 3.5–5.2)
ALP SERPL-CCNC: 64 U/L (ref 55–135)
ALT SERPL W/O P-5'-P-CCNC: 26 U/L (ref 10–44)
ANION GAP SERPL CALC-SCNC: 9 MMOL/L (ref 8–16)
ANION GAP SERPL CALC-SCNC: 9 MMOL/L (ref 8–16)
AST SERPL-CCNC: 36 U/L (ref 10–40)
BASOPHILS # BLD AUTO: 0.05 K/UL (ref 0–0.2)
BASOPHILS NFR BLD: 1 % (ref 0–1.9)
BILIRUB SERPL-MCNC: 0.5 MG/DL (ref 0.1–1)
BUN SERPL-MCNC: 36 MG/DL (ref 10–30)
BUN SERPL-MCNC: 36 MG/DL (ref 10–30)
CALCIUM SERPL-MCNC: 9.6 MG/DL (ref 8.7–10.5)
CALCIUM SERPL-MCNC: 9.6 MG/DL (ref 8.7–10.5)
CHLORIDE SERPL-SCNC: 105 MMOL/L (ref 95–110)
CHLORIDE SERPL-SCNC: 105 MMOL/L (ref 95–110)
CHOLEST SERPL-MCNC: 128 MG/DL (ref 120–199)
CHOLEST/HDLC SERPL: 2.6 {RATIO} (ref 2–5)
CO2 SERPL-SCNC: 23 MMOL/L (ref 23–29)
CO2 SERPL-SCNC: 23 MMOL/L (ref 23–29)
CREAT SERPL-MCNC: 1.8 MG/DL (ref 0.5–1.4)
CREAT SERPL-MCNC: 1.8 MG/DL (ref 0.5–1.4)
DIFFERENTIAL METHOD: ABNORMAL
EOSINOPHIL # BLD AUTO: 0.4 K/UL (ref 0–0.5)
EOSINOPHIL NFR BLD: 7 % (ref 0–8)
ERYTHROCYTE [DISTWIDTH] IN BLOOD BY AUTOMATED COUNT: 12.6 % (ref 11.5–14.5)
EST. GFR  (NO RACE VARIABLE): 35 ML/MIN/1.73 M^2
EST. GFR  (NO RACE VARIABLE): 35 ML/MIN/1.73 M^2
ESTIMATED AVG GLUCOSE: 151 MG/DL (ref 68–131)
FERRITIN SERPL-MCNC: 172 NG/ML (ref 20–300)
GLUCOSE SERPL-MCNC: 100 MG/DL (ref 70–110)
GLUCOSE SERPL-MCNC: 100 MG/DL (ref 70–110)
HBA1C MFR BLD: 6.9 % (ref 4–5.6)
HCT VFR BLD AUTO: 31.3 % (ref 40–54)
HDLC SERPL-MCNC: 49 MG/DL (ref 40–75)
HDLC SERPL: 38.3 % (ref 20–50)
HGB BLD-MCNC: 10.1 G/DL (ref 14–18)
IMM GRANULOCYTES # BLD AUTO: 0.01 K/UL (ref 0–0.04)
IMM GRANULOCYTES NFR BLD AUTO: 0.2 % (ref 0–0.5)
IRON SERPL-MCNC: 108 UG/DL (ref 45–160)
LDLC SERPL CALC-MCNC: 57.4 MG/DL (ref 63–159)
LYMPHOCYTES # BLD AUTO: 2.5 K/UL (ref 1–4.8)
LYMPHOCYTES NFR BLD: 50.9 % (ref 18–48)
MCH RBC QN AUTO: 32.5 PG (ref 27–31)
MCHC RBC AUTO-ENTMCNC: 32.3 G/DL (ref 32–36)
MCV RBC AUTO: 101 FL (ref 82–98)
MONOCYTES # BLD AUTO: 0.4 K/UL (ref 0.3–1)
MONOCYTES NFR BLD: 8.7 % (ref 4–15)
NEUTROPHILS # BLD AUTO: 1.6 K/UL (ref 1.8–7.7)
NEUTROPHILS NFR BLD: 32.2 % (ref 38–73)
NONHDLC SERPL-MCNC: 79 MG/DL
NRBC BLD-RTO: 0 /100 WBC
PHOSPHATE SERPL-MCNC: 4 MG/DL (ref 2.7–4.5)
PLATELET # BLD AUTO: 144 K/UL (ref 150–450)
PMV BLD AUTO: 9.8 FL (ref 9.2–12.9)
POTASSIUM SERPL-SCNC: 5.8 MMOL/L (ref 3.5–5.1)
POTASSIUM SERPL-SCNC: 5.8 MMOL/L (ref 3.5–5.1)
PROT SERPL-MCNC: 7.3 G/DL (ref 6–8.4)
RBC # BLD AUTO: 3.11 M/UL (ref 4.6–6.2)
SATURATED IRON: 29 % (ref 20–50)
SODIUM SERPL-SCNC: 137 MMOL/L (ref 136–145)
SODIUM SERPL-SCNC: 137 MMOL/L (ref 136–145)
T4 FREE SERPL-MCNC: 1.09 NG/DL (ref 0.71–1.51)
TOTAL IRON BINDING CAPACITY: 373 UG/DL (ref 250–450)
TRANSFERRIN SERPL-MCNC: 252 MG/DL (ref 200–375)
TRIGL SERPL-MCNC: 108 MG/DL (ref 30–150)
TSH SERPL DL<=0.005 MIU/L-ACNC: 4.67 UIU/ML (ref 0.4–4)
WBC # BLD AUTO: 4.97 K/UL (ref 3.9–12.7)

## 2022-08-22 PROCEDURE — 84443 ASSAY THYROID STIM HORMONE: CPT | Performed by: NURSE PRACTITIONER

## 2022-08-22 PROCEDURE — 80069 RENAL FUNCTION PANEL: CPT | Performed by: INTERNAL MEDICINE

## 2022-08-22 PROCEDURE — 80061 LIPID PANEL: CPT | Performed by: NURSE PRACTITIONER

## 2022-08-22 PROCEDURE — 80053 COMPREHEN METABOLIC PANEL: CPT | Performed by: NURSE PRACTITIONER

## 2022-08-22 PROCEDURE — 83036 HEMOGLOBIN GLYCOSYLATED A1C: CPT | Performed by: NURSE PRACTITIONER

## 2022-08-22 PROCEDURE — 84439 ASSAY OF FREE THYROXINE: CPT | Performed by: NURSE PRACTITIONER

## 2022-08-22 PROCEDURE — 36415 COLL VENOUS BLD VENIPUNCTURE: CPT | Performed by: NURSE PRACTITIONER

## 2022-08-22 PROCEDURE — 84466 ASSAY OF TRANSFERRIN: CPT | Performed by: NURSE PRACTITIONER

## 2022-08-22 PROCEDURE — 85025 COMPLETE CBC W/AUTO DIFF WBC: CPT | Performed by: NURSE PRACTITIONER

## 2022-08-22 PROCEDURE — 82728 ASSAY OF FERRITIN: CPT | Performed by: NURSE PRACTITIONER

## 2022-08-22 RX ORDER — METFORMIN HYDROCHLORIDE 500 MG/1
500 TABLET, EXTENDED RELEASE ORAL
Qty: 90 TABLET | Refills: 3 | Status: SHIPPED | OUTPATIENT
Start: 2022-08-22 | End: 2022-09-12

## 2022-08-22 RX ORDER — DICLOFENAC SODIUM 10 MG/G
2 GEL TOPICAL DAILY PRN
Qty: 100 G | Refills: 2 | Status: SHIPPED | OUTPATIENT
Start: 2022-08-22 | End: 2022-09-12 | Stop reason: SDUPTHER

## 2022-08-22 NOTE — TELEPHONE ENCOUNTER
No new care gaps identified.  [facility] Embedded Care Gaps. Reference number: [id number]. 8/22/2022   8:38:55 AM ROSANAT

## 2022-08-23 ENCOUNTER — HOSPITAL ENCOUNTER (EMERGENCY)
Facility: HOSPITAL | Age: 87
Discharge: HOME | End: 2022-08-23
Attending: EMERGENCY MEDICINE
Payer: MEDICARE

## 2022-08-23 VITALS
HEART RATE: 63 BPM | RESPIRATION RATE: 18 BRPM | HEIGHT: 65 IN | DIASTOLIC BLOOD PRESSURE: 66 MMHG | TEMPERATURE: 98 F | BODY MASS INDEX: 24.99 KG/M2 | OXYGEN SATURATION: 100 % | SYSTOLIC BLOOD PRESSURE: 159 MMHG | WEIGHT: 150 LBS

## 2022-08-23 DIAGNOSIS — E87.5 HYPERKALEMIA: Primary | ICD-10-CM

## 2022-08-23 DIAGNOSIS — R53.83 FATIGUE: ICD-10-CM

## 2022-08-23 LAB
ALBUMIN SERPL BCP-MCNC: 4.4 G/DL (ref 3.5–5.2)
ALP SERPL-CCNC: 79 U/L (ref 55–135)
ALT SERPL W/O P-5'-P-CCNC: 32 U/L (ref 10–44)
ANION GAP SERPL CALC-SCNC: 11 MMOL/L (ref 8–16)
APTT BLDCRRT: 24.7 SEC (ref 21–32)
AST SERPL-CCNC: 44 U/L (ref 10–40)
BASOPHILS # BLD AUTO: 0.05 K/UL (ref 0–0.2)
BASOPHILS NFR BLD: 1 % (ref 0–1.9)
BILIRUB SERPL-MCNC: 0.4 MG/DL (ref 0.1–1)
BUN SERPL-MCNC: 36 MG/DL (ref 10–30)
CALCIUM SERPL-MCNC: 9.6 MG/DL (ref 8.7–10.5)
CHLORIDE SERPL-SCNC: 103 MMOL/L (ref 95–110)
CO2 SERPL-SCNC: 22 MMOL/L (ref 23–29)
CREAT SERPL-MCNC: 2.1 MG/DL (ref 0.5–1.4)
CTP QC/QA: YES
DIFFERENTIAL METHOD: ABNORMAL
EOSINOPHIL # BLD AUTO: 0.3 K/UL (ref 0–0.5)
EOSINOPHIL NFR BLD: 5.7 % (ref 0–8)
ERYTHROCYTE [DISTWIDTH] IN BLOOD BY AUTOMATED COUNT: 12.5 % (ref 11.5–14.5)
EST. GFR  (NO RACE VARIABLE): 29 ML/MIN/1.73 M^2
GLUCOSE SERPL-MCNC: 133 MG/DL (ref 70–110)
HCT VFR BLD AUTO: 37.1 % (ref 40–54)
HGB BLD-MCNC: 12 G/DL (ref 14–18)
IMM GRANULOCYTES # BLD AUTO: 0.01 K/UL (ref 0–0.04)
IMM GRANULOCYTES NFR BLD AUTO: 0.2 % (ref 0–0.5)
INR PPP: 1 (ref 0.8–1.2)
LYMPHOCYTES # BLD AUTO: 2.4 K/UL (ref 1–4.8)
LYMPHOCYTES NFR BLD: 47.6 % (ref 18–48)
MCH RBC QN AUTO: 32.3 PG (ref 27–31)
MCHC RBC AUTO-ENTMCNC: 32.3 G/DL (ref 32–36)
MCV RBC AUTO: 100 FL (ref 82–98)
MONOCYTES # BLD AUTO: 0.4 K/UL (ref 0.3–1)
MONOCYTES NFR BLD: 7.1 % (ref 4–15)
NEUTROPHILS # BLD AUTO: 2 K/UL (ref 1.8–7.7)
NEUTROPHILS NFR BLD: 38.4 % (ref 38–73)
NRBC BLD-RTO: 0 /100 WBC
PLATELET # BLD AUTO: 128 K/UL (ref 150–450)
PMV BLD AUTO: 10 FL (ref 9.2–12.9)
POCT GLUCOSE: 110 MG/DL (ref 70–110)
POTASSIUM SERPL-SCNC: 5.6 MMOL/L (ref 3.5–5.1)
PROT SERPL-MCNC: 8.1 G/DL (ref 6–8.4)
PROTHROMBIN TIME: 10.8 SEC (ref 9–12.5)
RBC # BLD AUTO: 3.71 M/UL (ref 4.6–6.2)
SARS-COV-2 RDRP RESP QL NAA+PROBE: NEGATIVE
SODIUM SERPL-SCNC: 136 MMOL/L (ref 136–145)
TROPONIN I SERPL DL<=0.01 NG/ML-MCNC: 0.01 NG/ML (ref 0–0.03)
WBC # BLD AUTO: 5.1 K/UL (ref 3.9–12.7)

## 2022-08-23 PROCEDURE — 85730 THROMBOPLASTIN TIME PARTIAL: CPT | Performed by: EMERGENCY MEDICINE

## 2022-08-23 PROCEDURE — 85610 PROTHROMBIN TIME: CPT | Performed by: EMERGENCY MEDICINE

## 2022-08-23 PROCEDURE — 85025 COMPLETE CBC W/AUTO DIFF WBC: CPT | Performed by: EMERGENCY MEDICINE

## 2022-08-23 PROCEDURE — 93005 ELECTROCARDIOGRAM TRACING: CPT

## 2022-08-23 PROCEDURE — 25000003 PHARM REV CODE 250: Performed by: EMERGENCY MEDICINE

## 2022-08-23 PROCEDURE — U0002 COVID-19 LAB TEST NON-CDC: HCPCS | Performed by: EMERGENCY MEDICINE

## 2022-08-23 PROCEDURE — 93010 ELECTROCARDIOGRAM REPORT: CPT | Mod: ,,, | Performed by: INTERNAL MEDICINE

## 2022-08-23 PROCEDURE — 84484 ASSAY OF TROPONIN QUANT: CPT | Performed by: EMERGENCY MEDICINE

## 2022-08-23 PROCEDURE — 82962 GLUCOSE BLOOD TEST: CPT

## 2022-08-23 PROCEDURE — 80053 COMPREHEN METABOLIC PANEL: CPT | Performed by: EMERGENCY MEDICINE

## 2022-08-23 PROCEDURE — 93010 EKG 12-LEAD: ICD-10-PCS | Mod: ,,, | Performed by: INTERNAL MEDICINE

## 2022-08-23 PROCEDURE — 99285 EMERGENCY DEPT VISIT HI MDM: CPT | Mod: 25

## 2022-08-23 RX ORDER — SODIUM POLYSTYRENE SULFONATE 15 G/60ML
50 SUSPENSION ORAL; RECTAL ONCE
Status: DISCONTINUED | OUTPATIENT
Start: 2022-08-23 | End: 2022-08-23

## 2022-08-23 RX ORDER — AMLODIPINE BESYLATE 5 MG/1
10 TABLET ORAL
Status: COMPLETED | OUTPATIENT
Start: 2022-08-23 | End: 2022-08-23

## 2022-08-23 RX ORDER — AMLODIPINE BESYLATE 10 MG/1
10 TABLET ORAL DAILY
Qty: 14 TABLET | Refills: 0 | Status: SHIPPED | OUTPATIENT
Start: 2022-08-23 | End: 2022-08-29 | Stop reason: SDUPTHER

## 2022-08-23 RX ORDER — VALSARTAN 40 MG/1
80 TABLET ORAL
Status: DISCONTINUED | OUTPATIENT
Start: 2022-08-23 | End: 2022-08-23

## 2022-08-23 RX ORDER — SODIUM POLYSTYRENE SULFONATE 15 G/60ML
15 SUSPENSION ORAL; RECTAL ONCE
Status: DISCONTINUED | OUTPATIENT
Start: 2022-08-23 | End: 2022-08-23

## 2022-08-23 RX ADMIN — AMLODIPINE BESYLATE 10 MG: 5 TABLET ORAL at 06:08

## 2022-08-23 RX ADMIN — SODIUM POLYSTYRENE SULFONATE 30 G: 15 SUSPENSION ORAL; RECTAL at 08:08

## 2022-08-23 NOTE — ED PROVIDER NOTES
Encounter Date: 8/23/2022    SCRIBE #1 NOTE: I, Jose Ernst Jr, am scribing for, and in the presence of,  Yarelis Daley MD. I have scribed the following portions of the note - Other sections scribed: HPI, ROS.       History     Chief Complaint   Patient presents with    Abnormal Lab     Pt unsure why he was sent to ED, no family with pt recent labs show K+ 5.8      Vince Martinez is a 89 y.o. male who has a past medical history of bradycardia, diabetes mellitus, hemorrhoids, hyperglycemia, hypertension, and lumbar radiculopathy. The patient presents to the ED due to abnormal lab; onset today. Associated symptoms include fatigue, headache, and weakness; onset yesterday. The abnormal lab shows the patient's potassium was + 5.8. Patient reports that his fatigue worsens with exertion. When asked by the provider regarding the color of his stools, the patient said brown. He denies chest pain, cough, fever, leg swelling, and shortness of breath. Patient is not on blood thinners. Patient did not travel recently. 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 The patient is allergic to bactrim, ciprofloxacin, and latex.     The history is provided by the patient. No  was used.     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 PAIN MGT;  Service: Pain Management;  Laterality: N/A;  sign consent at [facility].    HEMILAMINECTOMY  5/26/2021    Procedure: HEMILAMINECTOMY;  Surgeon: Dayton Sparks MD;  Location: [facility] OR;  Service: Neurosurgery;;  left L4-5    INJECTION OF JOINT Bilateral 2/13/2020    Procedure: Injection, Joint, Bilateral GTB;  Surgeon: Angelica Norris Jr., MD;  Location: [facility] PAIN [facility]GT;  Service: Pain Management;  Laterality: Bilateral;    INJECTION OF JOINT Bilateral 3/22/2022    Procedure: bilateral GTB steriod injection;  Surgeon: Angelica Norris Jr., MD;  [place] [facility] [place]  Service: Pain Management;  Laterality: Bilateral;  pacemaker   pt is diabetic     INJECTION OF STEROID Bilateral 1/12/2021    Procedure: INJECTION, STEROID Bilateral SI Joint Block and Steroid Injection;  Surgeon: Dayton Sparks MD;  Location: [facility] OR;  Service: Neurosurgery;  Laterality: Bilateral;  Procedure: Bilateral SI Joint Block and Steroid Injection  Surgery 't'kristen: 45 min  LOS: 0  Anesthesia:MAC  Radiology: C-arm  Bed: Regular with Pillows  Position: Prone     SPINE SURGERY      TRANSFORAMINAL EPIDURAL INJECTION OF STEROID Left 2/23/2021    Procedure: Injection,steroid,epidural,transforaminal approach--Left L4-5 *Has Pacemaker/ICD*;  Surgeon: Angelica Norris Jr., MD;  Location: [facility];  Service: Pain Management;  Laterality: Left;     Family History   Problem Relation Age of Onset    Diabetes Brother     No Known Problems Mother     No Known Problems Father     Diabetes Sister     No Known Problems Daughter     HIV Son      Social History     Tobacco Use    Smoking status: Never Smoker    Smokeless tobacco: Never Used   Substance Use Topics    Alcohol use: No    Drug use: No     Review of Systems   Constitutional: Positive for fatigue. Negative for fever.   Respiratory: Negative for cough and shortness of  breath.    Cardiovascular: Negative for chest pain and leg swelling.   Genitourinary: Negative for dysuria and frequency.   Musculoskeletal: Negative for back pain.   Neurological: Positive for weakness and headaches.   All other systems reviewed and are negative.      Physical Exam     Initial Vitals [08/23/22 1403]   BP Pulse Resp Temp SpO2   (!) 170/71 60 18 97.9 °F (36.6 °C) 98 %      MAP       --         Physical Exam    Nursing note and vitals reviewed.  Constitutional: He appears well-developed and well-nourished. He appears distressed (mild).   Eyes: EOM are normal. Pupils are equal, round, and reactive to light.   Cardiovascular: Normal rate, regular rhythm and normal heart sounds.   Pulmonary/Chest: Breath sounds normal. No respiratory distress. He has no wheezes.   Abdominal: Abdomen is soft. Bowel sounds are normal. He exhibits no distension and no mass. There is no abdominal tenderness. There is no rebound and no guarding.     Neurological: He is alert and oriented to person, place, and time. He has normal strength and normal reflexes. He displays normal reflexes. No cranial nerve deficit or sensory deficit.   Skin: Skin is warm and dry. Capillary refill takes less than 2 seconds.   Evidence of pacemaker device in left upper chest.  No tenderness to palpation, no erythema, warmth or fluctuance   Psychiatric: He has a normal mood and affect.         ED Course   Procedures  Labs Reviewed   CBC W/ AUTO DIFFERENTIAL - Abnormal; Notable for the following components:       Result Value    RBC 3.71 (*)     Hemoglobin 12.0 (*)     Hematocrit 37.1 (*)      (*)     MCH 32.3 (*)     Platelets 128 (*)     All other components within normal limits   COMPREHENSIVE METABOLIC PANEL - Abnormal; Notable for the following components:    Potassium 5.6 (*)     CO2 22 (*)     Glucose 133 (*)     BUN 36 (*)     Creatinine 2.1 (*)     AST 44 (*)     eGFR 29 (*)     All other components within normal limits   TROPONIN I    PROTIME-INR   MAGNESIUM   APTT   SARS-COV-2 RDRP GENE   POCT GLUCOSE     EKG Readings: (Independently Interpreted)   Initial Reading: No STEMI. Heart Rate: 60. Axis: Left Axis Deviation. Other Impression: Paced rhythm, abnormal EKG     ECG Results          EKG 12-lead (Final result)  Result time 08/23/22 17:07:54    Final result by Interface, Lab In [facility] (08/23/22 17:07:54)                 Narrative:    Test Reason : R53.83,    Vent. Rate : 060 BPM     Atrial Rate : 064 BPM     P-R Int : 000 ms          QRS Dur : 088 ms      QT Int : 400 ms       P-R-T Axes : 000 -35 017 degrees     QTc Int : 400 ms    Atrial-paced rhythm  Left axis deviation  Confirmed by Fahad Stiles MD ([id number]) on 8/23/2022 5:07:42 PM    Referred By: JOSHUA   SELF           Confirmed By:Fahad Stiles MD                            Imaging Results          CT Head Without Contrast (Final result)  Result time 08/23/22 17:12:15    Final result by Clyde Hay MD (08/23/22 17:12:15)                 Impression:      No acute large vascular territory infarct or intracranial hemorrhage identified.  If persistent neurologic deficit, MRI brain can be obtained.    Grossly stable additional chronic findings as above.      Electronically signed by: Clyde Hay MD  Date:    08/23/2022  Time:    17:12             Narrative:    EXAMINATION:  CT HEAD WITHOUT CONTRAST    CLINICAL HISTORY:  headache, fatigue;    TECHNIQUE:  Low dose axial CT images obtained throughout the head without intravenous contrast. Sagittal and coronal reconstructions were performed.    COMPARISON:  Head CT 05/13/2022, MRI brain    FINDINGS:  Intracranial compartment:    Generalized cerebral volume loss.  Ventricles are midline and stable in size and configuration without distortion by mass effect or acute hydrocephalus.  No extra-axial blood or fluid collections.    Grossly stable distribution of patchy hypoattenuation of the subcortical and periventricular white matter  consistent with chronic microvascular ischemic change.  Right corona radiata suspected tiny remote lacunar type infarct, unchanged.  Stable crescentic calcification at the left temporal lobe.  Otherwise, no parenchymal mass, hemorrhage, edema or major vascular distribution infarct.    Skull/extracranial contents (limited evaluation): No fracture.  Mild mucosal thickening of the bilateral maxillary sinuses.  Mastoid air cells and remaining imaged paranasal sinuses are essentially clear.  Remote postoperative changes at the bilateral orbits similar to prior.                               X-Ray Chest AP Portable (Final result)  Result time 08/23/22 16:26:01    Final result by Brian Castañeda MD (08/23/22 16:26:01)                 Impression:      No acute radiographic abnormality.      Electronically signed by: Brian Castañeda  Date:    08/23/2022  Time:    16:26             Narrative:    EXAMINATION:  XR CHEST AP PORTABLE    CLINICAL HISTORY:  Fatigue;    TECHNIQUE:  Single frontal view of the chest was performed.    COMPARISON:  05/14/2022    FINDINGS:  Cardiac pacemaker device on the left.The lungs are clear, with normal appearance of pulmonary vasculature and no pleural effusion or pneumothorax.    The cardiac silhouette is normal in size. The hilar and mediastinal contours are unremarkable.    Bones are intact.    No significant change.                              X-Rays:   Independently Interpreted Readings:   Other Readings:  No acute abnormality    Medications   amLODIPine tablet 10 mg (10 mg Oral Given 8/23/22 1830)   sodium polystyrene 15 gram/60 mL suspension 30 g (30 g Oral Given 8/23/22 2041)     Medical Decision Making:   Independently Interpreted Test(s):   I have ordered and independently interpreted X-rays - see prior notes.  I have ordered and independently interpreted EKG Reading(s) - see prior notes  Clinical Tests:   Lab Tests: Ordered and Reviewed  Radiological Study: Ordered  ED  Management:  Patient is a 89-year-old male with history of multiple medical problems who presents to the ED with complaints of abnormal labs.  He was found to be hyperkalemic at 5.6 outpatient.  Patient is taking valsartan daily.  Patient given a dose amlodipine and Kayexalate in the ED. Pt given prescription for amlodipine instead.  Advised him to follow-up with his PCP in 1-2 days.  CT head and labs otherwise were unremarkable (besides K+)          Scribe Attestation:   Scribe #1: I performed the above scribed service and the documentation accurately describes the services I performed. I attest to the accuracy of the note.           ED Course as of 08/24/22 2046   Tue Aug 23, 2022   1614 Spoke with Publ[employer] Alexandria scott, states that she did not find any episodes or abnormalities on her interrogation of patient's pacemaker [MC]      ED Course User Index  [MC] Yarelis Daley MD             Clinical Impression:   Final diagnoses:  [R53.83] Fatigue  [E87.5] Hyperkalemia (Primary)          ED Disposition Condition    Discharge Stable        ED Prescriptions     Medication Sig Dispense Start Date End Date Auth. Provider    amLODIPine (NORVASC) 10 MG tablet Take 1 tablet (10 mg total) by mouth once daily. 14 tablet 8/23/2022  Yarelis Daley MD        Follow-up Information     Follow up With Specialties Details Why Contact Info    Shlomo Luong MD Family Medicine Schedule an appointment as soon as possible for a visit in 2 days  [address] [facility] [address]  [phone]             Yarelis Daley MD  08/24/22 2046

## 2022-08-23 NOTE — DISCHARGE INSTRUCTIONS
If you develop chest pain, shortness of breath, or worsening symptoms, please return to the ED immediately. Talk to your PCP about possibly changing your blood pressure medicine, as it could be causing your potassium levels to increase. I have given you a prescription for amlodipine. Do  not take amlodipine and valsartan at the same time.  Be sure to make an appointment with your PCP to visit in 2 days.   Please take your Kayexalate tonight or in the morning.

## 2022-08-24 ENCOUNTER — TELEPHONE (OUTPATIENT)
Dept: FAMILY MEDICINE | Facility: CLINIC | Age: 87
End: 2022-08-24
Payer: MEDICARE

## 2022-08-26 ENCOUNTER — TELEPHONE (OUTPATIENT)
Dept: FAMILY MEDICINE | Facility: CLINIC | Age: 87
End: 2022-08-26
Payer: MEDICARE

## 2022-08-26 ENCOUNTER — TELEPHONE (OUTPATIENT)
Dept: PRIMARY CARE CLINIC | Facility: CLINIC | Age: 87
End: 2022-08-26
Payer: MEDICARE

## 2022-08-26 DIAGNOSIS — E11.65 TYPE 2 DIABETES MELLITUS WITH HYPERGLYCEMIA, WITHOUT LONG-TERM CURRENT USE OF INSULIN: Primary | ICD-10-CM

## 2022-08-26 RX ORDER — LANCETS
1 EACH MISCELLANEOUS DAILY
Qty: 100 EACH | Refills: 3 | Status: SHIPPED | OUTPATIENT
Start: 2022-08-26 | End: 2023-11-30 | Stop reason: SDUPTHER

## 2022-08-26 RX ORDER — INSULIN PUMP SYRINGE, 3 ML
EACH MISCELLANEOUS
Qty: 1 EACH | Refills: 0 | Status: SHIPPED | OUTPATIENT
Start: 2022-08-26 | End: 2024-04-02

## 2022-08-26 RX ORDER — LANCING DEVICE
1 EACH MISCELLANEOUS DAILY
Qty: 1 EACH | Refills: 0 | Status: SHIPPED | OUTPATIENT
Start: 2022-08-26 | End: 2023-08-26

## 2022-08-26 NOTE — TELEPHONE ENCOUNTER
----- Message from Omaira Conn MA sent at 8/26/2022  9:34 AM CDT -----  Regarding: hosp f/u    ----- Message -----  From: Carolina Reyes  Sent: 8/26/2022   9:20 AM CDT  To: Cherise AGARWAL Staff    Type:  Needs Medical Advice    Who Called: pt  Symptoms (please be specific):    How long has patient had these symptoms:    Pharmacy name and phone #:    Would the patient rather a call back or a response via [facility]? call  Best Call Back Number: [phone] (M)   Additional Information: pt is following up on prev msg he left 2 days about his f/u ER visit of potassium being high

## 2022-08-29 ENCOUNTER — TELEPHONE (OUTPATIENT)
Dept: INTERNAL MEDICINE | Facility: CLINIC | Age: 87
End: 2022-08-29
Payer: MEDICARE

## 2022-08-29 NOTE — TELEPHONE ENCOUNTER
Pt. Notified via , # [id number], [place]:    A1C at 6.9, which shows stable blood sugar for the past 3 months  Iron levels are normal  Liver function is normal  Kidney function is decreased but stable compared to previous reports  Potassium level remains elevated but slightly improved compared to previous result, continue with Lasix as directed  Normal cholesterol and triglyceride levels  Anemia but stable compared to previous reports  Thyroid function is slightly decreased but stable, will monitor for now. Be sure to take levothyroxine in AM at least 30 minutes before breakfastnemia but stable compared to previous reports  Verbalized understanding.

## 2022-08-30 RX ORDER — GABAPENTIN 100 MG/1
CAPSULE ORAL
Qty: 90 CAPSULE | Refills: 3 | Status: SHIPPED | OUTPATIENT
Start: 2022-08-30 | End: 2022-12-13

## 2022-08-30 NOTE — TELEPHONE ENCOUNTER
No new care gaps identified.  [facility] Embedded Care Gaps. Reference number: [id number]. 8/30/2022   10:04:35 AM ROSANAT

## 2022-08-30 NOTE — TELEPHONE ENCOUNTER
----- Message from Rob De La Torre sent at 8/30/2022  2:22 PM CDT -----  Regarding: Patient advice  Contact: Pt  Pt called in regards to having questions/concerns about getting an new machine to check Blood sugar levels       Pt can be reached at [phone]

## 2022-08-30 NOTE — TELEPHONE ENCOUNTER
Spoke to patient and informed him RX was sent to [facility]. Patient voiced understanding.

## 2022-09-01 ENCOUNTER — OFFICE VISIT (OUTPATIENT)
Dept: PRIMARY CARE CLINIC | Facility: CLINIC | Age: 87
End: 2022-09-01
Payer: MEDICARE

## 2022-09-01 VITALS
DIASTOLIC BLOOD PRESSURE: 55 MMHG | BODY MASS INDEX: 25.9 KG/M2 | SYSTOLIC BLOOD PRESSURE: 110 MMHG | TEMPERATURE: 98 F | HEART RATE: 68 BPM | OXYGEN SATURATION: 97 % | WEIGHT: 155.63 LBS

## 2022-09-01 DIAGNOSIS — K21.9 GASTROESOPHAGEAL REFLUX DISEASE, UNSPECIFIED WHETHER ESOPHAGITIS PRESENT: ICD-10-CM

## 2022-09-01 DIAGNOSIS — E87.5 HYPERKALEMIA: Primary | ICD-10-CM

## 2022-09-01 PROCEDURE — 99213 OFFICE O/P EST LOW 20 MIN: CPT | Mod: S$GLB,,, | Performed by: INTERNAL MEDICINE

## 2022-09-01 PROCEDURE — 99999 PR PBB SHADOW E&M-EST. PATIENT-LVL V: CPT | Mod: PBBFAC,,, | Performed by: INTERNAL MEDICINE

## 2022-09-01 PROCEDURE — 99999 PR PBB SHADOW E&M-EST. PATIENT-LVL V: ICD-10-PCS | Mod: PBBFAC,,, | Performed by: INTERNAL MEDICINE

## 2022-09-01 PROCEDURE — 1157F PR ADVANCE CARE PLAN OR EQUIV PRESENT IN MEDICAL RECORD: ICD-10-PCS | Mod: CPTII,S$GLB,, | Performed by: INTERNAL MEDICINE

## 2022-09-01 PROCEDURE — 99213 PR OFFICE/OUTPT VISIT, EST, LEVL III, 20-29 MIN: ICD-10-PCS | Mod: S$GLB,,, | Performed by: INTERNAL MEDICINE

## 2022-09-01 PROCEDURE — 3288F PR FALLS RISK ASSESSMENT DOCUMENTED: ICD-10-PCS | Mod: CPTII,S$GLB,, | Performed by: INTERNAL MEDICINE

## 2022-09-01 PROCEDURE — 1101F PT FALLS ASSESS-DOCD LE1/YR: CPT | Mod: CPTII,S$GLB,, | Performed by: INTERNAL MEDICINE

## 2022-09-01 PROCEDURE — 1101F PR PT FALLS ASSESS DOC 0-1 FALLS W/OUT INJ PAST YR: ICD-10-PCS | Mod: CPTII,S$GLB,, | Performed by: INTERNAL MEDICINE

## 2022-09-01 PROCEDURE — 1157F ADVNC CARE PLAN IN RCRD: CPT | Mod: CPTII,S$GLB,, | Performed by: INTERNAL MEDICINE

## 2022-09-01 PROCEDURE — 1125F PR PAIN SEVERITY QUANTIFIED, PAIN PRESENT: ICD-10-PCS | Mod: CPTII,S$GLB,, | Performed by: INTERNAL MEDICINE

## 2022-09-01 PROCEDURE — 3288F FALL RISK ASSESSMENT DOCD: CPT | Mod: CPTII,S$GLB,, | Performed by: INTERNAL MEDICINE

## 2022-09-01 PROCEDURE — 1159F PR MEDICATION LIST DOCUMENTED IN MEDICAL RECORD: ICD-10-PCS | Mod: CPTII,S$GLB,, | Performed by: INTERNAL MEDICINE

## 2022-09-01 PROCEDURE — 1125F AMNT PAIN NOTED PAIN PRSNT: CPT | Mod: CPTII,S$GLB,, | Performed by: INTERNAL MEDICINE

## 2022-09-01 PROCEDURE — 1159F MED LIST DOCD IN RCRD: CPT | Mod: CPTII,S$GLB,, | Performed by: INTERNAL MEDICINE

## 2022-09-01 RX ORDER — FAMOTIDINE 20 MG/1
20 TABLET, FILM COATED ORAL 2 TIMES DAILY
Qty: 60 TABLET | Refills: 11 | Status: SHIPPED | OUTPATIENT
Start: 2022-09-01

## 2022-09-01 RX ORDER — FUROSEMIDE 40 MG/1
40 TABLET ORAL DAILY
Qty: 30 TABLET | Refills: 11 | Status: SHIPPED | OUTPATIENT
Start: 2022-09-01 | End: 2023-05-09 | Stop reason: SDUPTHER

## 2022-09-01 NOTE — PROGRESS NOTES
[facility]   New Visit Progress Note   Recent ED visit      PRESENTING HISTORY     Chief Complaint/Reason for Admission:  Follow up Hospital Discharge   PCP: Shlomo Luong MD    History of Present Illness:  Mr. Vince Martinez is a 89 y.o. male who was seen in ED 8/23/22 for hyperkalemia.  Per ED notes-   Home dose Valsartan discontinued.  Kayexalate administered.  Amlodipine prescribed as replacement.   Patient discharged to home.    Seen by Nephrology team 8/29/22.  Veltassa initiated MWF dosing.  ARB restarted- irbesartan 75 mg daily.   He returns to Nephrology team 9/26/22 with labs prior.    Patient unaccompanied today.  Ambulatory and independent with ADL's.  Manages his own medications.  Reports compliance with all medication, though several medication discrepancies noted today.       Review of Systems  General ROS: negative for chills, fever or weight loss  Psychological ROS: negative for hallucination, depression or suicidal ideation  Ophthalmic ROS: negative for blurry vision, photophobia or eye pain  ENT ROS: negative for epistaxis, sore throat or rhinorrhea  + GERD   Respiratory ROS: no cough, shortness of breath, or wheezing  Cardiovascular ROS: no chest pain or dyspnea on exertion  Gastrointestinal ROS: no abdominal pain, change in bowel habits, or black/ bloody stools  Genito-Urinary ROS: no dysuria, trouble voiding, or hematuria  Musculoskeletal ROS: negative for gait disturbance or muscular weakness  Neurological ROS: no syncope or seizures; no ataxia  Dermatological ROS: negative for pruritis, rash and jaundice      PAST HISTORY:     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    Arthritis     Bilateral renal cysts     Diabetes mellitus     DJD (degenerative joint disease)     Hyperkalemia     Hypertension     Iron deficiency anemia     Osteopenia of neck of right femur     Prostate enlargement     Sleep apnea     Stage  3b chronic kidney disease     Thyroid disease        Past Surgical History:   Procedure Laterality Date    CARDIAC PACEMAKER PLACEMENT      EPIDURAL STEROID INJECTION INTO LUMBAR SPINE N/A 6/4/2020    Procedure: Injection-steroid-epidural-lumbar--L4-5;  Surgeon: Angelica Norris Jr., MD;  Location: [facility];  Service: Pain Management;  Laterality: N/A;  sign consent at DOS.    HEMILAMINECTOMY  5/26/2021    Procedure: HEMILAMINECTOMY;  Surgeon: Dayton Sparks MD;  Location: [facility] OR;  Service: Neurosurgery;;  left L4-5    INJECTION OF JOINT Bilateral 2/13/2020    Procedure: Injection, Joint, Bilateral GTB;  Surgeon: Angelica Norris Jr., MD;  Location: [facility];  Service: Pain Management;  Laterality: Bilateral;    INJECTION OF JOINT Bilateral 3/22/2022    Procedure: bilateral GTB steriod injection;  Surgeon: Angelica Norris Jr., MD;  Location: [facility];  Service: Pain Management;  Laterality: Bilateral;  pacemaker   pt is diabetic     INJECTION OF STEROID Bilateral 1/12/2021    Procedure: INJECTION, STEROID Bilateral SI Joint Block and Steroid Injection;  Surgeon: Dayton Sparks MD;  Location: [facility] OR;  Service: Neurosurgery;  Laterality: Bilateral;  Procedure: Bilateral SI Joint Block and Steroid Injection  Surgery 't'kristen: 45 min  LOS: 0  Anesthesia:MAC  Radiology: C-arm  Bed: Regular with Pillows  Position: Prone     SPINE SURGERY      TRANSFORAMINAL EPIDURAL INJECTION OF STEROID Left 2/23/2021    Procedure: Injection,steroid,epidural,transforaminal approach--Left L4-5 *Has Pacemaker/ICD*;  Surgeon: Angelica Norris Jr., MD;  Location: [facility];  Service: Pain Management;  Laterality: Left;       Family History   Problem Relation Age of Onset    Diabetes Brother     No Known Problems Mother     No Known Problems Father     Diabetes Sister     No Known Problems Daughter     HIV Son          MEDICATIONS & ALLERGIES:     Current Outpatient Medications on File Prior to Visit   Medication  Sig Dispense Refill    amLODIPine (NORVASC) 10 MG tablet Take 1 tablet (10 mg total) by mouth once daily. 90 tablet 3    aspirin 81 MG Chew 1 tablet (81 mg total) by Per NG tube route once daily. 30 tablet 11    atorvastatin (LIPITOR) 40 MG tablet Take 1 tablet (40 mg total) by mouth once daily. 30 tablet 11    betamethasone dipropionate (DIPROLENE) 0.05 % lotion Apply 1 application topically nightly as needed.      blood sugar diagnostic Strp 1 strip by Misc.(Non-Drug; Combo Route) route once daily at 6am. 100 strip 3    blood-glucose meter kit Use as instructed 1 each 0    calcium/D3/mag ox//carolyn/Zn (CALTRATE + D3 PLUS MINERALS ORAL) Take 1 tablet by mouth 2 (two) times daily.      denosumab (PROLIA) 60 mg/mL Syrg Inject 1 mL (60 mg total) into the skin every 6 (six) months. 2 mL 4    diclofenac sodium (VOLTAREN) 1 % Gel Apply 2 g topically daily as needed (pain). 100 g 2    famotidine (PEPCID) 20 MG tablet Take 20 mg by mouth 2 (two) times daily.      finasteride (PROSCAR) 5 mg tablet Take 1 tablet (5 mg total) by mouth once daily. 90 tablet 3    folic acid (FOLVITE) 1 MG tablet Take 1 mg by mouth once daily.      folic acid/multivit-min/lutein (CENTRUM SILVER ORAL) Take by mouth.      furosemide (LASIX) 40 MG tablet Take 1 tablet (40 mg total) by mouth daily as needed (swelling). 30 tablet 11    gabapentin (NEURONTIN) 100 MG capsule TAKE 1 CAPSULE BY MOUTH IN THE EVENING 90 capsule 3    glimepiride (AMARYL) 2 MG tablet Take 2 mg by mouth before breakfast.      irbesartan (AVAPRO) 75 MG tablet Take 1 tablet (75 mg total) by mouth once daily. 90 tablet 3    lancets (LANCETS,ULTRA THIN) Misc 1 lancet by Misc.(Non-Drug; Combo Route) route once daily at 6am. 100 each 3    lancing device Misc 1 Device by Misc.(Non-Drug; Combo Route) route once daily at 6am. 1 each 0    levothyroxine (EUTHYROX) 100 MCG tablet Take 1 tablet (100 mcg total) by mouth before breakfast. 90 tablet 3    LIDOcaine (LIDODERM) 5 % Place 1  patch onto the skin once daily. Remove & Discard patch within 12 hours or as directed by MD 1 patch 0    loperamide HCl/simethicone (IMODIUM MULTI-SYMPTOM RELIEF ORAL) Take by mouth as needed.      magnesium citrate solution Take 250 mLs by mouth once.      melatonin 10 mg Tab Take 1 tablet by mouth nightly as needed.      metFORMIN (GLUCOPHAGE-XR) 500 MG ER 24hr tablet Take 1 tablet (500 mg total) by mouth daily with breakfast. 90 tablet 3    omega-3 fatty acids/fish oil (FISH OIL-OMEGA-3 FATTY ACIDS) 300-1,000 mg capsule Take by mouth once daily.      omeprazole (PRILOSEC) 40 MG capsule Take 40 mg by mouth once daily.      patiromer calcium sorbitex (VELTASSA) 8.4 gram PwPk Take 1 packet (8.4 g total) by mouth every Mon, Wed, Fri. 13 packet 3    tamsulosin (FLOMAX) 0.4 mg Cap Take 1 capsule (0.4 mg total) by mouth once daily. 90 capsule 3    triamcinolone acetonide 0.1% (KENALOG) 0.1 % Lotn APPLY LOTION TO THE AFFECTED AREAS ON THE SCALP ONCE DAILY AT BEDTIME AS NEEDED       No current facility-administered medications on file prior to visit.        Review of patient's allergies indicates:   Allergen Reactions    Bactrim [sulfamethoxazole-trimethoprim] Rash    Ciprofloxacin Rash    Latex Rash       OBJECTIVE:     Vital Signs:  BP (!) 110/55 (BP Location: Left arm, Patient Position: Sitting, BP Method: Small (Automatic))   Pulse 68   Temp 98.3 °F (36.8 °C) (Oral)   Wt 70.6 kg (155 lb 10.3 oz)   SpO2 97%   BMI 25.90 kg/m²   Wt Readings from Last 3 Encounters:   08/29/22 1313 69.4 kg (153 lb)   08/23/22 1403 68 kg (150 lb)   08/15/22 1135 68 kg (150 lb)     Body mass index is 25.9 kg/m².        Physical Exam:  BP (!) 110/55 (BP Location: Left arm, Patient Position: Sitting, BP Method: Small (Automatic))   Pulse 68   Temp 98.3 °F (36.8 °C) (Oral)   Wt 70.6 kg (155 lb 10.3 oz)   SpO2 97%   BMI 25.90 kg/m²   General appearance: alert, cooperative, no distress  Constitutional:Oriented to person, place, and  time  + appears well-developed and well-nourished.   HEENT: Normocephalic, atraumatic, neck symmetrical, no nasal discharge   Eyes: conjunctivae/corneas clear, PERRL, EOM's intact  Lungs: clear to auscultation bilaterally, no dullness to percussion bilaterally  Heart: regular rate and rhythm without rub; no displacement of the PMI   Abdomen: soft, non-tender; bowel sounds normoactive; no organomegaly  Extremities: extremities symmetric; no clubbing, cyanosis, or edema  Integument: Skin color, texture, turgor normal; no rashes; hair distrubution normal  Neurologic: Alert and oriented X 3, normal strength, normal coordination and gait  Psychiatric: no pressured speech; normal affect; no evidence of impaired cognition     Laboratory  Lab Results   Component Value Date    WBC 5.10 08/23/2022    HGB 12.0 (L) 08/23/2022    HCT 37.1 (L) 08/23/2022     (H) 08/23/2022     (L) 08/23/2022     BMP  Lab Results   Component Value Date     08/23/2022    K 5.6 (H) 08/23/2022     08/23/2022    CO2 22 (L) 08/23/2022    BUN 36 (H) 08/23/2022    CREATININE 2.1 (H) 08/23/2022    CALCIUM 9.6 08/23/2022    ANIONGAP 11 08/23/2022    ESTGFRAFRICA 35 (A) 07/25/2022    EGFRNONAA 30 (A) 07/25/2022     Lab Results   Component Value Date    ALT 32 08/23/2022    AST 44 (H) 08/23/2022    ALKPHOS 79 08/23/2022    BILITOT 0.4 08/23/2022     Lab Results   Component Value Date    INR 1.0 08/23/2022    INR 1.0 05/14/2022    INR 1.0 05/07/2021     Lab Results   Component Value Date    HGBA1C 6.9 (H) 08/22/2022       ASSESSMENT & PLAN:     Hyperkalemia  - on Veltassa MWF per Nephrology team  - Lasix 40 mg ordered prn, but patient taking daily- medcard updated to reflect this   - repeat labs pending prior to Nephrology follow up on 9/26/22    Gastroesophageal reflux disease, unspecified whether esophagitis present  - Prilosec discontinued by Nephrology team due to renal function but is still on his medication list- discontinued  today  - Pepcid bid on his list but he is not taking- patient advised to start this medication for GERD symptom control   -     famotidine (PEPCID) 20 MG tablet; Take 1 tablet (20 mg total) by mouth 2 (two) times daily.  Dispense: 60 tablet; Refill: 11    Other orders  -     furosemide (LASIX) 40 MG tablet; Take 1 tablet (40 mg total) by mouth once daily.  Dispense: 30 tablet; Refill: 11    [facility] will [facility].  He will see his PCP, Dr Ulloa, as scheduled.     Instructions for the patient:      Scheduled Follow-up :  Future Appointments   Date Time Provider Department Center   9/26/2022  2:00 PM Irma Craft MD [facility]       Post Visit Medication List:     Medication List            Accurate as of September 1, 2022 10:07 AM. If you have any questions, ask your nurse or doctor.                CHANGE how you take these medications      furosemide 40 MG tablet  Commonly known as: LASIX  Take 1 tablet (40 mg total) by mouth once daily.  What changed:   when to take this  reasons to take this  Changed by: Jerri Bernal MD            CONTINUE taking these medications      amLODIPine 10 MG tablet  Commonly known as: NORVASC  Take 1 tablet (10 mg total) by mouth once daily.     aspirin 81 MG Chew  1 tablet (81 mg total) by Per NG tube route once daily.     atorvastatin 40 MG tablet  Commonly known as: LIPITOR  Take 1 tablet (40 mg total) by mouth once daily.     betamethasone dipropionate 0.05 % lotion  Commonly known as: DIPROLENE     blood sugar diagnostic Strp  1 strip by Misc.(Non-Drug; Combo Route) route once daily at 6am.     blood-glucose meter kit  Use as instructed     CALTRATE + D3 PLUS MINERALS ORAL     CENTRUM SILVER ORAL     denosumab 60 mg/mL Syrg  Commonly known as: PROLIA  Inject 1 mL (60 mg total) into the skin every 6 (six) months.     diclofenac sodium 1 % Gel  Commonly known as: VOLTAREN  Apply 2 g topically daily as needed (pain).     famotidine 20 MG  tablet  Commonly known as: PEPCID  Take 1 tablet (20 mg total) by mouth 2 (two) times daily.     finasteride 5 mg tablet  Commonly known as: PROSCAR  Take 1 tablet (5 mg total) by mouth once daily.     fish oil-omega-3 fatty acids 300-1,000 mg capsule     folic acid 1 MG tablet  Commonly known as: FOLVITE     gabapentin 100 MG capsule  Commonly known as: NEURONTIN  TAKE 1 CAPSULE BY MOUTH IN THE EVENING     glimepiride 2 MG tablet  Commonly known as: AMARYL     IMODIUM MULTI-SYMPTOM RELIEF ORAL     irbesartan 75 MG tablet  Commonly known as: AVAPRO  Take 1 tablet (75 mg total) by mouth once daily.     lancets Misc  Commonly known as: LANCETS,ULTRA THIN  1 lancet by Misc.(Non-Drug; Combo Route) route once daily at 6am.     lancing device Misc  1 Device by Misc.(Non-Drug; Combo Route) route once daily at 6am.     levothyroxine 100 MCG tablet  Commonly known as: EUTHYROX  Take 1 tablet (100 mcg total) by mouth before breakfast.     LIDOcaine 5 %  Commonly known as: LIDODERM  Place 1 patch onto the skin once daily. Remove & Discard patch within 12 hours or as directed by MD     magnesium citrate solution     melatonin 10 mg Tab     metFORMIN 500 MG ER 24hr tablet  Commonly known as: GLUCOPHAGE-XR  Take 1 tablet (500 mg total) by mouth daily with breakfast.     patiromer calcium sorbitex 8.4 gram Pwpk  Commonly known as: VELTASSA  Take 1 packet (8.4 g total) by mouth every Mon, Wed, Fri.     tamsulosin 0.4 mg Cap  Commonly known as: FLOMAX  Take 1 capsule (0.4 mg total) by mouth once daily.     triamcinolone acetonide 0.1% 0.1 % Lotn  Commonly known as: KENALOG               Where to Get Your Medications        These medications were sent to [id number] - [place] [address] - [address]  [address] [place] [address]      Phone: [phone]   famotidine 20 MG tablet  furosemide 40 MG tablet         Signing Physician:  Jerri Bernal MD

## 2022-09-07 ENCOUNTER — TELEPHONE (OUTPATIENT)
Dept: FAMILY MEDICINE | Facility: CLINIC | Age: 87
End: 2022-09-07
Payer: MEDICARE

## 2022-09-07 ENCOUNTER — TELEPHONE (OUTPATIENT)
Dept: FAMILY MEDICINE | Facility: CLINIC | Age: 87
End: 2022-09-07

## 2022-09-07 NOTE — TELEPHONE ENCOUNTER
----- Message from Marlene Jhaveri, Patient Care Assistant sent at 9/7/2022  9:30 AM CDT -----  Type:  Needs Medical Advice    Who Called:  Pt son   Symptoms (please be specific):  Son would like a call back regarding Annual labs order for the patient visit on 11/09   Would the patient rather a call back or a response via [facility]?  Call   Best Call Back Number:  [phone]  Additional Information:

## 2022-09-12 ENCOUNTER — OFFICE VISIT (OUTPATIENT)
Dept: FAMILY MEDICINE | Facility: CLINIC | Age: 87
End: 2022-09-12
Payer: MEDICARE

## 2022-09-12 ENCOUNTER — TELEPHONE (OUTPATIENT)
Dept: FAMILY MEDICINE | Facility: CLINIC | Age: 87
End: 2022-09-12
Payer: MEDICARE

## 2022-09-12 VITALS
SYSTOLIC BLOOD PRESSURE: 102 MMHG | HEART RATE: 70 BPM | HEIGHT: 65 IN | DIASTOLIC BLOOD PRESSURE: 50 MMHG | WEIGHT: 151.69 LBS | OXYGEN SATURATION: 99 % | BODY MASS INDEX: 25.27 KG/M2

## 2022-09-12 DIAGNOSIS — M15.9 PRIMARY OSTEOARTHRITIS INVOLVING MULTIPLE JOINTS: ICD-10-CM

## 2022-09-12 DIAGNOSIS — E11.65 TYPE 2 DIABETES MELLITUS WITH HYPERGLYCEMIA, WITHOUT LONG-TERM CURRENT USE OF INSULIN: ICD-10-CM

## 2022-09-12 DIAGNOSIS — J30.1 SEASONAL ALLERGIC RHINITIS DUE TO POLLEN: ICD-10-CM

## 2022-09-12 DIAGNOSIS — N18.4 CKD (CHRONIC KIDNEY DISEASE), STAGE IV: ICD-10-CM

## 2022-09-12 DIAGNOSIS — I10 ESSENTIAL HYPERTENSION: Primary | ICD-10-CM

## 2022-09-12 DIAGNOSIS — R60.0 LOCALIZED EDEMA: ICD-10-CM

## 2022-09-12 DIAGNOSIS — I50.42 CHRONIC COMBINED SYSTOLIC AND DIASTOLIC CHF (CONGESTIVE HEART FAILURE): ICD-10-CM

## 2022-09-12 PROCEDURE — 1125F PR PAIN SEVERITY QUANTIFIED, PAIN PRESENT: ICD-10-PCS | Mod: CPTII,S$GLB,, | Performed by: FAMILY MEDICINE

## 2022-09-12 PROCEDURE — 1160F RVW MEDS BY RX/DR IN RCRD: CPT | Mod: CPTII,S$GLB,, | Performed by: FAMILY MEDICINE

## 2022-09-12 PROCEDURE — 3288F PR FALLS RISK ASSESSMENT DOCUMENTED: ICD-10-PCS | Mod: CPTII,S$GLB,, | Performed by: FAMILY MEDICINE

## 2022-09-12 PROCEDURE — 1159F MED LIST DOCD IN RCRD: CPT | Mod: CPTII,S$GLB,, | Performed by: FAMILY MEDICINE

## 2022-09-12 PROCEDURE — 99499 RISK ADDL DX/OHS AUDIT: ICD-10-PCS | Mod: S$GLB,,, | Performed by: FAMILY MEDICINE

## 2022-09-12 PROCEDURE — 1125F AMNT PAIN NOTED PAIN PRSNT: CPT | Mod: CPTII,S$GLB,, | Performed by: FAMILY MEDICINE

## 2022-09-12 PROCEDURE — 3288F FALL RISK ASSESSMENT DOCD: CPT | Mod: CPTII,S$GLB,, | Performed by: FAMILY MEDICINE

## 2022-09-12 PROCEDURE — 1160F PR REVIEW ALL MEDS BY PRESCRIBER/CLIN PHARMACIST DOCUMENTED: ICD-10-PCS | Mod: CPTII,S$GLB,, | Performed by: FAMILY MEDICINE

## 2022-09-12 PROCEDURE — 99999 PR PBB SHADOW E&M-EST. PATIENT-LVL V: CPT | Mod: PBBFAC,,, | Performed by: FAMILY MEDICINE

## 2022-09-12 PROCEDURE — 99999 PR PBB SHADOW E&M-EST. PATIENT-LVL V: ICD-10-PCS | Mod: PBBFAC,,, | Performed by: FAMILY MEDICINE

## 2022-09-12 PROCEDURE — 99499 UNLISTED E&M SERVICE: CPT | Mod: S$GLB,,, | Performed by: FAMILY MEDICINE

## 2022-09-12 PROCEDURE — 1159F PR MEDICATION LIST DOCUMENTED IN MEDICAL RECORD: ICD-10-PCS | Mod: CPTII,S$GLB,, | Performed by: FAMILY MEDICINE

## 2022-09-12 PROCEDURE — 1157F ADVNC CARE PLAN IN RCRD: CPT | Mod: CPTII,S$GLB,, | Performed by: FAMILY MEDICINE

## 2022-09-12 PROCEDURE — 99215 OFFICE O/P EST HI 40 MIN: CPT | Mod: S$GLB,,, | Performed by: FAMILY MEDICINE

## 2022-09-12 PROCEDURE — 1101F PT FALLS ASSESS-DOCD LE1/YR: CPT | Mod: CPTII,S$GLB,, | Performed by: FAMILY MEDICINE

## 2022-09-12 PROCEDURE — 99215 PR OFFICE/OUTPT VISIT, EST, LEVL V, 40-54 MIN: ICD-10-PCS | Mod: S$GLB,,, | Performed by: FAMILY MEDICINE

## 2022-09-12 PROCEDURE — 1101F PR PT FALLS ASSESS DOC 0-1 FALLS W/OUT INJ PAST YR: ICD-10-PCS | Mod: CPTII,S$GLB,, | Performed by: FAMILY MEDICINE

## 2022-09-12 PROCEDURE — 1157F PR ADVANCE CARE PLAN OR EQUIV PRESENT IN MEDICAL RECORD: ICD-10-PCS | Mod: CPTII,S$GLB,, | Performed by: FAMILY MEDICINE

## 2022-09-12 RX ORDER — OMEPRAZOLE 40 MG/1
40 CAPSULE, DELAYED RELEASE ORAL
COMMUNITY
Start: 2022-05-29 | End: 2023-01-30

## 2022-09-12 RX ORDER — DICLOFENAC SODIUM 10 MG/G
2 GEL TOPICAL DAILY PRN
Qty: 100 G | Refills: 2 | Status: SHIPPED | OUTPATIENT
Start: 2022-09-12 | End: 2023-05-09 | Stop reason: SDUPTHER

## 2022-09-12 RX ORDER — METOPROLOL SUCCINATE 25 MG/1
25 TABLET, EXTENDED RELEASE ORAL DAILY
Qty: 30 TABLET | Refills: 11 | Status: SHIPPED | OUTPATIENT
Start: 2022-09-12 | End: 2022-09-12 | Stop reason: SDUPTHER

## 2022-09-12 RX ORDER — FLUTICASONE PROPIONATE 50 MCG
2 SPRAY, SUSPENSION (ML) NASAL 2 TIMES DAILY PRN
Qty: 16 G | Refills: 3 | Status: SHIPPED | OUTPATIENT
Start: 2022-09-12 | End: 2023-02-14

## 2022-09-12 RX ORDER — METOPROLOL SUCCINATE 25 MG/1
25 TABLET, EXTENDED RELEASE ORAL DAILY
Qty: 90 TABLET | Refills: 3 | Status: SHIPPED | OUTPATIENT
Start: 2022-09-12 | End: 2023-05-09 | Stop reason: SDUPTHER

## 2022-09-12 NOTE — TELEPHONE ENCOUNTER
----- Message from Gita Carvajal sent at 9/12/2022 12:59 PM CDT -----  Contact: [phone]  Type:  Same Day Appointment Request    Caller is requesting a same day appointment.  Caller declined first available appointment listed below.    Name of Caller: Pt  When is the first available appointment?10/6  Symptoms: Painful feet swelling  Best Call Back Number:[phone]

## 2022-09-12 NOTE — PROGRESS NOTES
Subjective:       Patient ID: Vince Martinez is a 89 y.o. male.    Chief Complaint: Joint Swelling, Back Pain, and Hip Pain    89 years old male came to the clinic for blood pressure check.  Blood pressure today was in the low side.  No chest pain, palpitation, orthopnea or PND.  Last A1c was stable.  We discussed the possibility of changing the medicine because of his age and decreased kidney function.  Patient with decreased kidney function but stable in comparison with previous reports.  Patient with multiple joints pain.  The pain is 5 of 10 of intensity on and off aggravated with activity and better rest.  Patient with lower extremity edema.    Back Pain  Pertinent negatives include no chest pain.   Hip Pain     Review of Systems   Constitutional: Negative.    HENT: Negative.     Eyes: Negative.    Respiratory: Negative.     Cardiovascular:  Positive for leg swelling. Negative for chest pain, palpitations and claudication.   Gastrointestinal: Negative.    Genitourinary: Negative.    Musculoskeletal:  Positive for arthralgias and back pain.   Integumentary:  Negative.   Neurological: Negative.    Psychiatric/Behavioral: Negative.         Objective:      Physical Exam  Vitals and nursing note reviewed.   Constitutional:       General: He is not in acute distress.     Appearance: He is well-developed. He is not diaphoretic.   HENT:      Head: Normocephalic and atraumatic.      Right Ear: External ear normal.      Left Ear: External ear normal.      Nose: Nose normal.      Mouth/Throat:      Pharynx: No oropharyngeal exudate.   Eyes:      General: No scleral icterus.        Right eye: No discharge.         Left eye: No discharge.      Conjunctiva/sclera: Conjunctivae normal.      Pupils: Pupils are equal, round, and reactive to light.   Neck:      Thyroid: No thyromegaly.      Vascular: No JVD.      Trachea: No tracheal deviation.   Cardiovascular:      Rate and Rhythm: Normal rate and regular rhythm.      Heart  sounds: Normal heart sounds. No murmur heard.    No friction rub. No gallop.   Pulmonary:      Effort: Pulmonary effort is normal. No respiratory distress.      Breath sounds: Normal breath sounds. No stridor. No wheezing or rales.   Chest:      Chest wall: No tenderness.   Abdominal:      General: Bowel sounds are normal. There is no distension.      Palpations: Abdomen is soft. There is no mass.      Tenderness: There is no abdominal tenderness. There is no guarding or rebound.   Musculoskeletal:         General: No tenderness. Normal range of motion.      Cervical back: Normal range of motion and neck supple.      Right lower leg: Edema present.      Left lower leg: Edema present.   Lymphadenopathy:      Cervical: No cervical adenopathy.   Skin:     General: Skin is warm and dry.      Coloration: Skin is not pale.      Findings: No erythema or rash.   Neurological:      Mental Status: He is alert and oriented to person, place, and time.      Cranial Nerves: No cranial nerve deficit.      Motor: No abnormal muscle tone.      Coordination: Coordination normal.      Deep Tendon Reflexes: Reflexes are normal and symmetric. Reflexes normal.   Psychiatric:         Behavior: Behavior normal.         Thought Content: Thought content normal.         Judgment: Judgment normal.       Assessment:       Problem List Items Addressed This Visit       Essential hypertension - Primary    Relevant Medications    metoprolol succinate (TOPROL-XL) 25 MG 24 hr tablet    Other Relevant Orders    Comprehensive Metabolic Panel    Lipid Panel    Type 2 diabetes mellitus with hyperglycemia, without long-term current use of insulin    Relevant Medications    SITagliptin (JANUVIA) 50 MG Tab    Other Relevant Orders    Comprehensive Metabolic Panel    Hemoglobin A1C    Lipid Panel    Localized edema    Chronic combined systolic and diastolic CHF (congestive heart failure)     Other Visit Diagnoses       CKD (chronic kidney disease), stage IV         Seasonal allergic rhinitis due to pollen        Relevant Medications    fluticasone propionate (FLONASE) 50 mcg/actuation nasal spray    Primary osteoarthritis involving multiple joints        Relevant Medications    diclofenac sodium (VOLTAREN) 1 % Gel              Plan:             Vince was seen today for joint swelling, back pain and hip pain.    Diagnoses and all orders for this visit:    Essential hypertension  -     Discontinue: metoprolol succinate (TOPROL-XL) 25 MG 24 hr tablet; Take 1 tablet (25 mg total) by mouth once daily.  -     metoprolol succinate (TOPROL-XL) 25 MG 24 hr tablet; Take 1 tablet (25 mg total) by mouth once daily.  -     Comprehensive Metabolic Panel; Future  -     Lipid Panel; Future    Localized edema    Chronic combined systolic and diastolic CHF (congestive heart failure)    CKD (chronic kidney disease), stage IV    Type 2 diabetes mellitus with hyperglycemia, without long-term current use of insulin  -     SITagliptin (JANUVIA) 50 MG Tab; Take 1 tablet (50 mg total) by mouth once daily.  -     Comprehensive Metabolic Panel; Future  -     Hemoglobin A1C; Future  -     Lipid Panel; Future    Seasonal allergic rhinitis due to pollen  -     fluticasone propionate (FLONASE) 50 mcg/actuation nasal spray; 2 sprays (100 mcg total) by Each Nostril route 2 (two) times daily as needed for Rhinitis.    Primary osteoarthritis involving multiple joints  -     diclofenac sodium (VOLTAREN) 1 % Gel; Apply 2 g topically daily as needed (pain).     Lasix 40 mg twice a day.  Treatment for 3 days and then continue once a day.    Amlodipine was discontinued.

## 2022-09-21 ENCOUNTER — LAB VISIT (OUTPATIENT)
Dept: LAB | Facility: HOSPITAL | Age: 87
End: 2022-09-21
Attending: INTERNAL MEDICINE
Payer: MEDICARE

## 2022-09-21 DIAGNOSIS — N18.32 STAGE 3B CHRONIC KIDNEY DISEASE: ICD-10-CM

## 2022-09-21 LAB
ALBUMIN SERPL BCP-MCNC: 3.8 G/DL (ref 3.5–5.2)
ANION GAP SERPL CALC-SCNC: 6 MMOL/L (ref 8–16)
BASOPHILS # BLD AUTO: 0.05 K/UL (ref 0–0.2)
BASOPHILS NFR BLD: 1 % (ref 0–1.9)
BUN SERPL-MCNC: 41 MG/DL (ref 10–30)
CALCIUM SERPL-MCNC: 9 MG/DL (ref 8.7–10.5)
CHLORIDE SERPL-SCNC: 110 MMOL/L (ref 95–110)
CO2 SERPL-SCNC: 26 MMOL/L (ref 23–29)
CREAT SERPL-MCNC: 1.7 MG/DL (ref 0.5–1.4)
CREAT UR-MCNC: 64.2 MG/DL (ref 23–375)
DIFFERENTIAL METHOD: ABNORMAL
EOSINOPHIL # BLD AUTO: 0.3 K/UL (ref 0–0.5)
EOSINOPHIL NFR BLD: 5.8 % (ref 0–8)
ERYTHROCYTE [DISTWIDTH] IN BLOOD BY AUTOMATED COUNT: 12.8 % (ref 11.5–14.5)
EST. GFR  (NO RACE VARIABLE): 38 ML/MIN/1.73 M^2
GLUCOSE SERPL-MCNC: 160 MG/DL (ref 70–110)
HCT VFR BLD AUTO: 30 % (ref 40–54)
HGB BLD-MCNC: 9.5 G/DL (ref 14–18)
IMM GRANULOCYTES # BLD AUTO: 0.01 K/UL (ref 0–0.04)
IMM GRANULOCYTES NFR BLD AUTO: 0.2 % (ref 0–0.5)
LYMPHOCYTES # BLD AUTO: 2.5 K/UL (ref 1–4.8)
LYMPHOCYTES NFR BLD: 51 % (ref 18–48)
MCH RBC QN AUTO: 31.9 PG (ref 27–31)
MCHC RBC AUTO-ENTMCNC: 31.7 G/DL (ref 32–36)
MCV RBC AUTO: 101 FL (ref 82–98)
MONOCYTES # BLD AUTO: 0.4 K/UL (ref 0.3–1)
MONOCYTES NFR BLD: 8.7 % (ref 4–15)
NEUTROPHILS # BLD AUTO: 1.6 K/UL (ref 1.8–7.7)
NEUTROPHILS NFR BLD: 33.3 % (ref 38–73)
NRBC BLD-RTO: 0 /100 WBC
PHOSPHATE SERPL-MCNC: 3.5 MG/DL (ref 2.7–4.5)
PLATELET # BLD AUTO: 172 K/UL (ref 150–450)
PMV BLD AUTO: 10 FL (ref 9.2–12.9)
POTASSIUM SERPL-SCNC: 5.1 MMOL/L (ref 3.5–5.1)
PROT UR-MCNC: <7 MG/DL (ref 0–15)
PROT/CREAT UR: NORMAL MG/G{CREAT} (ref 0–0.2)
RBC # BLD AUTO: 2.98 M/UL (ref 4.6–6.2)
SODIUM SERPL-SCNC: 142 MMOL/L (ref 136–145)
URATE SERPL-MCNC: 7.8 MG/DL (ref 3.4–7)
WBC # BLD AUTO: 4.82 K/UL (ref 3.9–12.7)

## 2022-09-21 PROCEDURE — 82570 ASSAY OF URINE CREATININE: CPT | Performed by: INTERNAL MEDICINE

## 2022-09-21 PROCEDURE — 80069 RENAL FUNCTION PANEL: CPT | Performed by: INTERNAL MEDICINE

## 2022-09-21 PROCEDURE — 84550 ASSAY OF BLOOD/URIC ACID: CPT | Performed by: INTERNAL MEDICINE

## 2022-09-21 PROCEDURE — 36415 COLL VENOUS BLD VENIPUNCTURE: CPT | Performed by: INTERNAL MEDICINE

## 2022-09-21 PROCEDURE — 85025 COMPLETE CBC W/AUTO DIFF WBC: CPT | Performed by: INTERNAL MEDICINE

## 2022-10-11 ENCOUNTER — TELEPHONE (OUTPATIENT)
Dept: FAMILY MEDICINE | Facility: CLINIC | Age: 87
End: 2022-10-11
Payer: MEDICARE

## 2022-10-11 NOTE — TELEPHONE ENCOUNTER
Attempted to call pt. Via  #[phone], to reschedule appointment on November 9, and his labs. No answer on phone, no voicemail set up.

## 2022-10-13 ENCOUNTER — TELEPHONE (OUTPATIENT)
Dept: FAMILY MEDICINE | Facility: CLINIC | Age: 87
End: 2022-10-13
Payer: MEDICARE

## 2022-10-13 NOTE — TELEPHONE ENCOUNTER
Attempted call via  to notify patient Dr. Jain will not be here Nov. 9 and his appointment needs to be rescheduled. No VM set up at this time.

## 2022-10-18 ENCOUNTER — TELEPHONE (OUTPATIENT)
Dept: FAMILY MEDICINE | Facility: CLINIC | Age: 87
End: 2022-10-18
Payer: MEDICARE

## 2022-10-28 ENCOUNTER — LAB VISIT (OUTPATIENT)
Dept: LAB | Facility: HOSPITAL | Age: 87
End: 2022-10-28
Attending: FAMILY MEDICINE
Payer: MEDICARE

## 2022-10-28 DIAGNOSIS — E11.65 TYPE 2 DIABETES MELLITUS WITH HYPERGLYCEMIA, WITHOUT LONG-TERM CURRENT USE OF INSULIN: ICD-10-CM

## 2022-10-28 DIAGNOSIS — I10 ESSENTIAL HYPERTENSION: ICD-10-CM

## 2022-10-28 LAB
ALBUMIN SERPL BCP-MCNC: 3.9 G/DL (ref 3.5–5.2)
ALP SERPL-CCNC: 51 U/L (ref 55–135)
ALT SERPL W/O P-5'-P-CCNC: 21 U/L (ref 10–44)
ANION GAP SERPL CALC-SCNC: 8 MMOL/L (ref 8–16)
AST SERPL-CCNC: 28 U/L (ref 10–40)
BILIRUB SERPL-MCNC: 0.5 MG/DL (ref 0.1–1)
BUN SERPL-MCNC: 31 MG/DL (ref 10–30)
CALCIUM SERPL-MCNC: 8.7 MG/DL (ref 8.7–10.5)
CHLORIDE SERPL-SCNC: 108 MMOL/L (ref 95–110)
CHOLEST SERPL-MCNC: 105 MG/DL (ref 120–199)
CHOLEST/HDLC SERPL: 2.5 {RATIO} (ref 2–5)
CO2 SERPL-SCNC: 24 MMOL/L (ref 23–29)
CREAT SERPL-MCNC: 1.8 MG/DL (ref 0.5–1.4)
EST. GFR  (NO RACE VARIABLE): 34.9 ML/MIN/1.73 M^2
ESTIMATED AVG GLUCOSE: 180 MG/DL (ref 68–131)
GLUCOSE SERPL-MCNC: 134 MG/DL (ref 70–110)
HBA1C MFR BLD: 7.9 % (ref 4–5.6)
HDLC SERPL-MCNC: 42 MG/DL (ref 40–75)
HDLC SERPL: 40 % (ref 20–50)
LDLC SERPL CALC-MCNC: 39.6 MG/DL (ref 63–159)
NONHDLC SERPL-MCNC: 63 MG/DL
POTASSIUM SERPL-SCNC: 4.2 MMOL/L (ref 3.5–5.1)
PROT SERPL-MCNC: 6.8 G/DL (ref 6–8.4)
SODIUM SERPL-SCNC: 140 MMOL/L (ref 136–145)
TRIGL SERPL-MCNC: 117 MG/DL (ref 30–150)

## 2022-10-28 PROCEDURE — 83036 HEMOGLOBIN GLYCOSYLATED A1C: CPT | Performed by: FAMILY MEDICINE

## 2022-10-28 PROCEDURE — 36415 COLL VENOUS BLD VENIPUNCTURE: CPT | Mod: PO | Performed by: FAMILY MEDICINE

## 2022-10-28 PROCEDURE — 80061 LIPID PANEL: CPT | Performed by: FAMILY MEDICINE

## 2022-10-28 PROCEDURE — 80053 COMPREHEN METABOLIC PANEL: CPT | Performed by: FAMILY MEDICINE

## 2022-11-08 ENCOUNTER — TELEPHONE (OUTPATIENT)
Dept: INTERNAL MEDICINE | Facility: CLINIC | Age: 87
End: 2022-11-08
Payer: MEDICARE

## 2022-11-08 NOTE — TELEPHONE ENCOUNTER
Pt. has an appointment tomorrow at 4pm. Notified via  # [phone] that Dr. Ulloa will not be here. Offered to assist in rescheduling. Rescheduled to see NP on 11/23. Verbalized understanding.

## 2022-12-01 ENCOUNTER — OFFICE VISIT (OUTPATIENT)
Dept: FAMILY MEDICINE | Facility: CLINIC | Age: 87
End: 2022-12-01
Payer: MEDICARE

## 2022-12-01 VITALS
BODY MASS INDEX: 24.53 KG/M2 | SYSTOLIC BLOOD PRESSURE: 136 MMHG | HEIGHT: 65 IN | DIASTOLIC BLOOD PRESSURE: 60 MMHG | WEIGHT: 147.25 LBS

## 2022-12-01 DIAGNOSIS — R09.89 CHEST CONGESTION: ICD-10-CM

## 2022-12-01 DIAGNOSIS — E11.65 TYPE 2 DIABETES MELLITUS WITH HYPERGLYCEMIA, WITHOUT LONG-TERM CURRENT USE OF INSULIN: ICD-10-CM

## 2022-12-01 DIAGNOSIS — D63.8 CHRONIC DISEASE ANEMIA: ICD-10-CM

## 2022-12-01 DIAGNOSIS — N18.32 STAGE 3B CHRONIC KIDNEY DISEASE: ICD-10-CM

## 2022-12-01 DIAGNOSIS — F51.01 PRIMARY INSOMNIA: ICD-10-CM

## 2022-12-01 DIAGNOSIS — I10 ESSENTIAL HYPERTENSION: Primary | ICD-10-CM

## 2022-12-01 PROCEDURE — 1160F PR REVIEW ALL MEDS BY PRESCRIBER/CLIN PHARMACIST DOCUMENTED: ICD-10-PCS | Mod: CPTII,S$GLB,, | Performed by: FAMILY MEDICINE

## 2022-12-01 PROCEDURE — 99215 OFFICE O/P EST HI 40 MIN: CPT | Mod: S$GLB,,, | Performed by: FAMILY MEDICINE

## 2022-12-01 PROCEDURE — 3288F FALL RISK ASSESSMENT DOCD: CPT | Mod: CPTII,S$GLB,, | Performed by: FAMILY MEDICINE

## 2022-12-01 PROCEDURE — 1157F ADVNC CARE PLAN IN RCRD: CPT | Mod: CPTII,S$GLB,, | Performed by: FAMILY MEDICINE

## 2022-12-01 PROCEDURE — 1125F AMNT PAIN NOTED PAIN PRSNT: CPT | Mod: CPTII,S$GLB,, | Performed by: FAMILY MEDICINE

## 2022-12-01 PROCEDURE — 1159F MED LIST DOCD IN RCRD: CPT | Mod: CPTII,S$GLB,, | Performed by: FAMILY MEDICINE

## 2022-12-01 PROCEDURE — 1125F PR PAIN SEVERITY QUANTIFIED, PAIN PRESENT: ICD-10-PCS | Mod: CPTII,S$GLB,, | Performed by: FAMILY MEDICINE

## 2022-12-01 PROCEDURE — 99999 PR PBB SHADOW E&M-EST. PATIENT-LVL V: ICD-10-PCS | Mod: PBBFAC,,, | Performed by: FAMILY MEDICINE

## 2022-12-01 PROCEDURE — 1160F RVW MEDS BY RX/DR IN RCRD: CPT | Mod: CPTII,S$GLB,, | Performed by: FAMILY MEDICINE

## 2022-12-01 PROCEDURE — 1100F PR PT FALLS ASSESS DOC 2+ FALLS/FALL W/INJURY/YR: ICD-10-PCS | Mod: CPTII,S$GLB,, | Performed by: FAMILY MEDICINE

## 2022-12-01 PROCEDURE — 99999 PR PBB SHADOW E&M-EST. PATIENT-LVL V: CPT | Mod: PBBFAC,,, | Performed by: FAMILY MEDICINE

## 2022-12-01 PROCEDURE — 1159F PR MEDICATION LIST DOCUMENTED IN MEDICAL RECORD: ICD-10-PCS | Mod: CPTII,S$GLB,, | Performed by: FAMILY MEDICINE

## 2022-12-01 PROCEDURE — 1100F PTFALLS ASSESS-DOCD GE2>/YR: CPT | Mod: CPTII,S$GLB,, | Performed by: FAMILY MEDICINE

## 2022-12-01 PROCEDURE — 99215 PR OFFICE/OUTPT VISIT, EST, LEVL V, 40-54 MIN: ICD-10-PCS | Mod: S$GLB,,, | Performed by: FAMILY MEDICINE

## 2022-12-01 PROCEDURE — 3288F PR FALLS RISK ASSESSMENT DOCUMENTED: ICD-10-PCS | Mod: CPTII,S$GLB,, | Performed by: FAMILY MEDICINE

## 2022-12-01 PROCEDURE — 1157F PR ADVANCE CARE PLAN OR EQUIV PRESENT IN MEDICAL RECORD: ICD-10-PCS | Mod: CPTII,S$GLB,, | Performed by: FAMILY MEDICINE

## 2022-12-01 RX ORDER — MIRTAZAPINE 7.5 MG/1
7.5 TABLET, FILM COATED ORAL NIGHTLY
Qty: 90 TABLET | Refills: 3 | Status: SHIPPED | OUTPATIENT
Start: 2022-12-01 | End: 2023-01-30

## 2022-12-01 RX ORDER — FLUTICASONE PROPIONATE AND SALMETEROL 250; 50 UG/1; UG/1
1 POWDER RESPIRATORY (INHALATION) 2 TIMES DAILY
Qty: 60 EACH | Refills: 1 | Status: SHIPPED | OUTPATIENT
Start: 2022-12-01 | End: 2022-12-31

## 2022-12-01 NOTE — PROGRESS NOTES
Subjective:       Patient ID: Vince Martinez is a 89 y.o. male.    Chief Complaint: Follow-up    89 years old male came to the clinic for blood pressure check.  No chest pain, palpitation, orthopnea or PND.  Patient with problems with sleeping sometimes requesting a medicine to help with the symptoms.  She also reports chronic chest congestion.  No fever or chills.  Patient also with chronic anemia probably associated with chronic kidney disease.  He is requesting treatment for anemia.  Last A1c was a little bit higher than before.    Follow-up  Associated symptoms include coughing. Pertinent negatives include no chest pain.   Review of Systems   Constitutional: Negative.    HENT: Negative.     Eyes: Negative.    Respiratory:  Positive for cough.    Cardiovascular: Negative.  Negative for chest pain, palpitations, leg swelling and claudication.   Gastrointestinal: Negative.    Genitourinary: Negative.    Musculoskeletal: Negative.    Integumentary:  Negative.   Neurological: Negative.    Psychiatric/Behavioral:  Positive for sleep disturbance.        Objective:      Physical Exam  Vitals and nursing note reviewed.   Constitutional:       General: He is not in acute distress.     Appearance: He is well-developed. He is not diaphoretic.   HENT:      Head: Normocephalic and atraumatic.      Right Ear: External ear normal.      Left Ear: External ear normal.      Nose: Nose normal.      Mouth/Throat:      Pharynx: No oropharyngeal exudate.   Eyes:      General: No scleral icterus.        Right eye: No discharge.         Left eye: No discharge.      Conjunctiva/sclera: Conjunctivae normal.      Pupils: Pupils are equal, round, and reactive to light.   Neck:      Thyroid: No thyromegaly.      Vascular: No JVD.      Trachea: No tracheal deviation.   Cardiovascular:      Rate and Rhythm: Normal rate and regular rhythm.      Heart sounds: Normal heart sounds. No murmur heard.    No friction rub. No gallop.   Pulmonary:       Effort: Pulmonary effort is normal. No respiratory distress.      Breath sounds: Normal breath sounds. No stridor. No wheezing or rales.   Chest:      Chest wall: No tenderness.   Abdominal:      General: Bowel sounds are normal. There is no distension.      Palpations: Abdomen is soft. There is no mass.      Tenderness: There is no abdominal tenderness. There is no guarding or rebound.   Musculoskeletal:         General: No tenderness. Normal range of motion.      Cervical back: Normal range of motion and neck supple.   Lymphadenopathy:      Cervical: No cervical adenopathy.   Skin:     General: Skin is warm and dry.      Coloration: Skin is not pale.      Findings: No erythema or rash.   Neurological:      Mental Status: He is alert and oriented to person, place, and time.      Cranial Nerves: No cranial nerve deficit.      Motor: No abnormal muscle tone.      Coordination: Coordination abnormal.      Gait: Gait abnormal.      Deep Tendon Reflexes: Reflexes are normal and symmetric. Reflexes normal.   Psychiatric:         Behavior: Behavior normal.         Thought Content: Thought content normal.         Judgment: Judgment normal.       Assessment:       Problem List Items Addressed This Visit       Essential hypertension - Primary    Type 2 diabetes mellitus with hyperglycemia, without long-term current use of insulin    Relevant Medications    semaglutide (OZEMPIC) 0.25 mg or 0.5 mg(2 mg/1.5 mL) pen injector    CKD (chronic kidney disease) stage 3, GFR 30-59 ml/min     Other Visit Diagnoses       Chronic disease anemia        Relevant Orders    Ambulatory referral/consult to Hematology / Oncology    Primary insomnia        Relevant Medications    mirtazapine (REMERON) 7.5 MG Tab    Chest congestion        Relevant Medications    fluticasone-salmeterol diskus inhaler 250-50 mcg              Plan:         Vince was seen today for follow-up.    Diagnoses and all orders for this visit:    Essential  hypertension    Type 2 diabetes mellitus with hyperglycemia, without long-term current use of insulin  -     semaglutide (OZEMPIC) 0.25 mg or 0.5 mg(2 mg/1.5 mL) pen injector; Inject 0.5 mg into the skin every 7 days.    Stage 3b chronic kidney disease    Chronic disease anemia  -     Ambulatory referral/consult to Hematology / Oncology; Future    Primary insomnia  -     mirtazapine (REMERON) 7.5 MG Tab; Take 1 tablet (7.5 mg total) by mouth every evening.    Chest congestion  -     fluticasone-salmeterol diskus inhaler 250-50 mcg; Inhale 1 puff into the lungs 2 (two) times daily.         Continue monitoring blood pressure at home, low sodium diet.

## 2022-12-02 ENCOUNTER — TELEPHONE (OUTPATIENT)
Dept: FAMILY MEDICINE | Facility: CLINIC | Age: 87
End: 2022-12-02
Payer: MEDICARE

## 2022-12-02 ENCOUNTER — TELEPHONE (OUTPATIENT)
Dept: ADMINISTRATIVE | Facility: OTHER | Age: 87
End: 2022-12-02
Payer: MEDICARE

## 2022-12-02 NOTE — TELEPHONE ENCOUNTER
Pt states he saw dr galaviz yesterday for his foot and was supposed to be given antibiotics and was not called in to pharmacy

## 2022-12-05 ENCOUNTER — TELEPHONE (OUTPATIENT)
Dept: FAMILY MEDICINE | Facility: CLINIC | Age: 87
End: 2022-12-05

## 2022-12-05 DIAGNOSIS — L03.039 INFECTED NAIL BED OF TOE, UNSPECIFIED LATERALITY: Primary | ICD-10-CM

## 2022-12-05 RX ORDER — DOXYCYCLINE 100 MG/1
100 CAPSULE ORAL 2 TIMES DAILY
Qty: 20 CAPSULE | Refills: 0 | Status: SHIPPED | OUTPATIENT
Start: 2022-12-05 | End: 2022-12-08

## 2022-12-06 NOTE — TELEPHONE ENCOUNTER
----- Message from Jonathan Mead sent at 12/6/2022  2:38 PM CST -----  Contact: [facility]  Type:  Pharmacy Calling to Clarify an RX    Name of Caller: Gabi   Pharmacy Name:[facility]   Prescription Name:doxycycline (MONODOX) 100 MG capsule  What do they need to clarify?: currently on backorder -can they change it to  doxycycline hyclate   Best Call Back Number:[phone]  Additional Information:

## 2022-12-08 ENCOUNTER — TELEPHONE (OUTPATIENT)
Dept: FAMILY MEDICINE | Facility: CLINIC | Age: 87
End: 2022-12-08
Payer: MEDICARE

## 2022-12-08 ENCOUNTER — TELEPHONE (OUTPATIENT)
Dept: FAMILY MEDICINE | Facility: CLINIC | Age: 87
End: 2022-12-08

## 2022-12-08 DIAGNOSIS — L03.039 INFECTED NAIL BED OF TOE, UNSPECIFIED LATERALITY: Primary | ICD-10-CM

## 2022-12-08 RX ORDER — DOXYCYCLINE HYCLATE 100 MG
100 TABLET ORAL 2 TIMES DAILY
Qty: 20 TABLET | Refills: 0 | Status: SHIPPED | OUTPATIENT
Start: 2022-12-08 | End: 2022-12-18

## 2022-12-08 NOTE — TELEPHONE ENCOUNTER
----- Message from Briana Vázquez sent at 12/8/2022  8:50 AM CST -----  Regarding: Medication  Contact: Henna bennett/ [facility]  Pharmacy would like to speak with you about getting an Medication Change,     [facility] wants to change the monodox to (doxycycline , Hyclate )     Please call and advise.     Pharmacy Vf-[phone]

## 2022-12-09 ENCOUNTER — TELEPHONE (OUTPATIENT)
Dept: GASTROENTEROLOGY | Facility: CLINIC | Age: 87
End: 2022-12-09
Payer: MEDICARE

## 2022-12-09 ENCOUNTER — HOSPITAL ENCOUNTER (EMERGENCY)
Facility: HOSPITAL | Age: 87
Discharge: HOME | End: 2022-12-09
Attending: EMERGENCY MEDICINE
Payer: MEDICARE

## 2022-12-09 ENCOUNTER — TELEPHONE (OUTPATIENT)
Dept: HEMATOLOGY/ONCOLOGY | Facility: CLINIC | Age: 87
End: 2022-12-09

## 2022-12-09 ENCOUNTER — OFFICE VISIT (OUTPATIENT)
Dept: HEMATOLOGY/ONCOLOGY | Facility: CLINIC | Age: 87
End: 2022-12-09
Payer: MEDICARE

## 2022-12-09 VITALS
OXYGEN SATURATION: 99 % | DIASTOLIC BLOOD PRESSURE: 60 MMHG | SYSTOLIC BLOOD PRESSURE: 121 MMHG | TEMPERATURE: 98 F | WEIGHT: 145 LBS | BODY MASS INDEX: 24.13 KG/M2 | HEART RATE: 62 BPM | RESPIRATION RATE: 18 BRPM

## 2022-12-09 VITALS
BODY MASS INDEX: 24.69 KG/M2 | DIASTOLIC BLOOD PRESSURE: 78 MMHG | SYSTOLIC BLOOD PRESSURE: 138 MMHG | HEART RATE: 128 BPM | WEIGHT: 148.38 LBS | OXYGEN SATURATION: 97 %

## 2022-12-09 DIAGNOSIS — N18.32 STAGE 3B CHRONIC KIDNEY DISEASE: ICD-10-CM

## 2022-12-09 DIAGNOSIS — R73.9 HYPERGLYCEMIA: Primary | ICD-10-CM

## 2022-12-09 DIAGNOSIS — I50.42 CHRONIC COMBINED SYSTOLIC AND DIASTOLIC CHF (CONGESTIVE HEART FAILURE): ICD-10-CM

## 2022-12-09 DIAGNOSIS — E11.65 TYPE 2 DIABETES MELLITUS WITH HYPERGLYCEMIA, WITHOUT LONG-TERM CURRENT USE OF INSULIN: ICD-10-CM

## 2022-12-09 DIAGNOSIS — D53.9 NUTRITIONAL ANEMIA, UNSPECIFIED: ICD-10-CM

## 2022-12-09 DIAGNOSIS — K92.1 BLACK STOOL: ICD-10-CM

## 2022-12-09 DIAGNOSIS — D63.8 CHRONIC DISEASE ANEMIA: Primary | ICD-10-CM

## 2022-12-09 LAB
ALBUMIN SERPL BCP-MCNC: 4.3 G/DL (ref 3.5–5.2)
ALP SERPL-CCNC: 132 U/L (ref 55–135)
ALT SERPL W/O P-5'-P-CCNC: 50 U/L (ref 10–44)
ANION GAP SERPL CALC-SCNC: 10 MMOL/L (ref 8–16)
AST SERPL-CCNC: 61 U/L (ref 10–40)
BACTERIA #/AREA URNS HPF: NORMAL /HPF
BASOPHILS # BLD AUTO: 0.05 K/UL (ref 0–0.2)
BASOPHILS NFR BLD: 1 % (ref 0–1.9)
BILIRUB SERPL-MCNC: 0.4 MG/DL (ref 0.1–1)
BILIRUB UR QL STRIP: NEGATIVE
BUN SERPL-MCNC: 30 MG/DL (ref 10–30)
CALCIUM SERPL-MCNC: 10 MG/DL (ref 8.7–10.5)
CHLORIDE SERPL-SCNC: 102 MMOL/L (ref 95–110)
CLARITY UR: CLEAR
CO2 SERPL-SCNC: 27 MMOL/L (ref 23–29)
COLOR UR: COLORLESS
CREAT SERPL-MCNC: 1.8 MG/DL (ref 0.5–1.4)
DIFFERENTIAL METHOD: ABNORMAL
EOSINOPHIL # BLD AUTO: 0.3 K/UL (ref 0–0.5)
EOSINOPHIL NFR BLD: 5.9 % (ref 0–8)
ERYTHROCYTE [DISTWIDTH] IN BLOOD BY AUTOMATED COUNT: 12.2 % (ref 11.5–14.5)
EST. GFR  (NO RACE VARIABLE): 35 ML/MIN/1.73 M^2
GLUCOSE SERPL-MCNC: 322 MG/DL (ref 70–110)
GLUCOSE UR QL STRIP: ABNORMAL
HCT VFR BLD AUTO: 31.9 % (ref 40–54)
HGB BLD-MCNC: 10.9 G/DL (ref 14–18)
HGB UR QL STRIP: NEGATIVE
IMM GRANULOCYTES # BLD AUTO: 0.01 K/UL (ref 0–0.04)
IMM GRANULOCYTES NFR BLD AUTO: 0.2 % (ref 0–0.5)
KETONES UR QL STRIP: NEGATIVE
LEUKOCYTE ESTERASE UR QL STRIP: NEGATIVE
LYMPHOCYTES # BLD AUTO: 2.1 K/UL (ref 1–4.8)
LYMPHOCYTES NFR BLD: 42.8 % (ref 18–48)
MCH RBC QN AUTO: 32.8 PG (ref 27–31)
MCHC RBC AUTO-ENTMCNC: 34.2 G/DL (ref 32–36)
MCV RBC AUTO: 96 FL (ref 82–98)
MICROSCOPIC COMMENT: NORMAL
MONOCYTES # BLD AUTO: 0.4 K/UL (ref 0.3–1)
MONOCYTES NFR BLD: 8.6 % (ref 4–15)
NEUTROPHILS # BLD AUTO: 2 K/UL (ref 1.8–7.7)
NEUTROPHILS NFR BLD: 41.5 % (ref 38–73)
NITRITE UR QL STRIP: NEGATIVE
NRBC BLD-RTO: 0 /100 WBC
PH UR STRIP: 6 [PH] (ref 5–8)
PLATELET # BLD AUTO: 136 K/UL (ref 150–450)
PMV BLD AUTO: 10.4 FL (ref 9.2–12.9)
POCT GLUCOSE: 253 MG/DL (ref 70–110)
POCT GLUCOSE: 387 MG/DL (ref 70–110)
POTASSIUM SERPL-SCNC: 4.3 MMOL/L (ref 3.5–5.1)
PROT SERPL-MCNC: 7.8 G/DL (ref 6–8.4)
PROT UR QL STRIP: NEGATIVE
RBC # BLD AUTO: 3.32 M/UL (ref 4.6–6.2)
RBC #/AREA URNS HPF: 0 /HPF (ref 0–4)
SODIUM SERPL-SCNC: 139 MMOL/L (ref 136–145)
SP GR UR STRIP: 1.01 (ref 1–1.03)
SQUAMOUS #/AREA URNS HPF: 0 /HPF
URN SPEC COLLECT METH UR: ABNORMAL
UROBILINOGEN UR STRIP-ACNC: NEGATIVE EU/DL
WBC # BLD AUTO: 4.88 K/UL (ref 3.9–12.7)
WBC #/AREA URNS HPF: 0 /HPF (ref 0–5)
YEAST URNS QL MICRO: NORMAL

## 2022-12-09 PROCEDURE — 1125F PR PAIN SEVERITY QUANTIFIED, PAIN PRESENT: ICD-10-PCS | Mod: CPTII,S$GLB,, | Performed by: NURSE PRACTITIONER

## 2022-12-09 PROCEDURE — 96374 THER/PROPH/DIAG INJ IV PUSH: CPT

## 2022-12-09 PROCEDURE — 99215 OFFICE O/P EST HI 40 MIN: CPT | Mod: S$GLB,,, | Performed by: NURSE PRACTITIONER

## 2022-12-09 PROCEDURE — 96361 HYDRATE IV INFUSION ADD-ON: CPT

## 2022-12-09 PROCEDURE — 1157F ADVNC CARE PLAN IN RCRD: CPT | Mod: CPTII,S$GLB,, | Performed by: NURSE PRACTITIONER

## 2022-12-09 PROCEDURE — 81000 URINALYSIS NONAUTO W/SCOPE: CPT | Performed by: EMERGENCY MEDICINE

## 2022-12-09 PROCEDURE — 25000003 PHARM REV CODE 250: Performed by: EMERGENCY MEDICINE

## 2022-12-09 PROCEDURE — 82962 GLUCOSE BLOOD TEST: CPT

## 2022-12-09 PROCEDURE — 1159F MED LIST DOCD IN RCRD: CPT | Mod: CPTII,S$GLB,, | Performed by: NURSE PRACTITIONER

## 2022-12-09 PROCEDURE — 1101F PR PT FALLS ASSESS DOC 0-1 FALLS W/OUT INJ PAST YR: ICD-10-PCS | Mod: CPTII,S$GLB,, | Performed by: NURSE PRACTITIONER

## 2022-12-09 PROCEDURE — 85025 COMPLETE CBC W/AUTO DIFF WBC: CPT | Mod: 91 | Performed by: EMERGENCY MEDICINE

## 2022-12-09 PROCEDURE — 1101F PT FALLS ASSESS-DOCD LE1/YR: CPT | Mod: CPTII,S$GLB,, | Performed by: NURSE PRACTITIONER

## 2022-12-09 PROCEDURE — 80053 COMPREHEN METABOLIC PANEL: CPT | Mod: 91 | Performed by: EMERGENCY MEDICINE

## 2022-12-09 PROCEDURE — 3288F PR FALLS RISK ASSESSMENT DOCUMENTED: ICD-10-PCS | Mod: CPTII,S$GLB,, | Performed by: NURSE PRACTITIONER

## 2022-12-09 PROCEDURE — 99284 EMERGENCY DEPT VISIT MOD MDM: CPT | Mod: 25

## 2022-12-09 PROCEDURE — 99999 PR PBB SHADOW E&M-EST. PATIENT-LVL V: ICD-10-PCS | Mod: PBBFAC,,, | Performed by: NURSE PRACTITIONER

## 2022-12-09 PROCEDURE — 99999 PR PBB SHADOW E&M-EST. PATIENT-LVL V: CPT | Mod: PBBFAC,,, | Performed by: NURSE PRACTITIONER

## 2022-12-09 PROCEDURE — 1160F PR REVIEW ALL MEDS BY PRESCRIBER/CLIN PHARMACIST DOCUMENTED: ICD-10-PCS | Mod: CPTII,S$GLB,, | Performed by: NURSE PRACTITIONER

## 2022-12-09 PROCEDURE — 63600175 PHARM REV CODE 636 W HCPCS: Performed by: EMERGENCY MEDICINE

## 2022-12-09 PROCEDURE — 3288F FALL RISK ASSESSMENT DOCD: CPT | Mod: CPTII,S$GLB,, | Performed by: NURSE PRACTITIONER

## 2022-12-09 PROCEDURE — 99215 PR OFFICE/OUTPT VISIT, EST, LEVL V, 40-54 MIN: ICD-10-PCS | Mod: S$GLB,,, | Performed by: NURSE PRACTITIONER

## 2022-12-09 PROCEDURE — 1160F RVW MEDS BY RX/DR IN RCRD: CPT | Mod: CPTII,S$GLB,, | Performed by: NURSE PRACTITIONER

## 2022-12-09 PROCEDURE — 1159F PR MEDICATION LIST DOCUMENTED IN MEDICAL RECORD: ICD-10-PCS | Mod: CPTII,S$GLB,, | Performed by: NURSE PRACTITIONER

## 2022-12-09 PROCEDURE — 1125F AMNT PAIN NOTED PAIN PRSNT: CPT | Mod: CPTII,S$GLB,, | Performed by: NURSE PRACTITIONER

## 2022-12-09 PROCEDURE — 1157F PR ADVANCE CARE PLAN OR EQUIV PRESENT IN MEDICAL RECORD: ICD-10-PCS | Mod: CPTII,S$GLB,, | Performed by: NURSE PRACTITIONER

## 2022-12-09 RX ADMIN — INSULIN HUMAN 5 UNITS: 100 INJECTION, SOLUTION PARENTERAL at 08:12

## 2022-12-09 RX ADMIN — SODIUM CHLORIDE 500 ML: 0.9 INJECTION, SOLUTION INTRAVENOUS at 08:12

## 2022-12-09 NOTE — TELEPHONE ENCOUNTER
Spoke to pt and got him scheduled with Karolina CELAYA 12/13 2:30pm. Informed clinic address in [place] [address]. Verbalized understanding.

## 2022-12-09 NOTE — PROGRESS NOTES
Patient ID: Vince Martinez is a 89 y.o. male.    Chief Complaint: anemia    [facility] Divehi interpretor present entire visit    History     HPI    Vince Martinez is a 89yr old male, new to me and this clinic, referred by Dr Luong for anemia evaluation.       Pt with noted mild anemia 2011, then no bloodwork noted until 2018. Pt with anemia chronically it appears with worsening over past year. He was previously told to take iron for this but a year ago stopped at his dr's instruction. Pt endorses fatigue, black stools, acid reflux, arthritic pains shoulders, back, hips, legs. Pt comorbidity includes diabetes, HTN, CHF, ACD, CKD stage 3b, ERNST, thyroid disease, gerd, chronic pain, DJD, osteopenia.    He endorses fatigue, black stools intermittently with last occurring yesterday, joint pain generalized. Denies headache, dizziness, chest pain, sob, abdominal pain, n/v/d, constipation, hemoptysis, hematemesis, hematuria, easy bleeding. States bruises easily if hits self on something. Reports appetite has been good. States weight will change 1-2 pounds if legs swollen but watches this closely at home.           Past medical, surgical, and medication history, past family history reviewed and uptdated with patient today as noted.    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    Arthritis     Bilateral renal cysts     Chronic pain     Diabetes mellitus     DJD (degenerative joint disease)     Hyperkalemia     Hypertension     Iron deficiency anemia     Low back pain     Osteopenia of neck of right femur     Prostate enlargement     Sleep apnea     Stage 3b chronic kidney disease     Thyroid disease        Past Surgical History:   Procedure Laterality Date    CARDIAC PACEMAKER PLACEMENT      EPIDURAL STEROID INJECTION INTO LUMBAR SPINE N/A 06/04/2020    Procedure: Injection-steroid-epidural-lumbar--L4-5;  Surgeon: Angelica Norris Jr., MD;   Location: [facility];  Service: Pain Management;  Laterality: N/A;  sign consent at DOS.    HEMILAMINECTOMY  05/26/2021    Procedure: HEMILAMINECTOMY;  Surgeon: Dayton Sparks MD;  Location: [facility] OR;  Service: Neurosurgery;;  left L4-5    INJECTION OF JOINT Bilateral 02/13/2020    Procedure: Injection, Joint, Bilateral GTB;  Surgeon: Angelica Norris Jr., MD;  Location: [facility];  Service: Pain Management;  Laterality: Bilateral;    INJECTION OF JOINT Bilateral 03/22/2022    Procedure: bilateral GTB steriod injection;  Surgeon: Angelica Norris Jr., MD;  Location: [facility];  Service: Pain Management;  Laterality: Bilateral;  pacemaker   pt is diabetic     INJECTION OF STEROID Bilateral 01/12/2021    Procedure: INJECTION, STEROID Bilateral SI Joint Block and Steroid Injection;  Surgeon: Dayton Sparks MD;  Location: [facility] OR;  Service: Neurosurgery;  Laterality: Bilateral;  Procedure: Bilateral SI Joint Block and Steroid Injection  Surgery 't'kristen: 45 min  LOS: 0  Anesthesia:MAC  Radiology: C-arm  Bed: Regular with Pillows  Position: Prone     SPINE SURGERY      SPINE SURGERY Bilateral     TRANSFORAMINAL EPIDURAL INJECTION OF STEROID Left 02/23/2021    Procedure: Injection,steroid,epidural,transforaminal approach--Left L4-5 *Has Pacemaker/ICD*;  Surgeon: Angelica Norris Jr., MD;  Location: [facility];  Service: Pain Management;  Laterality: Left;       Oncology History:  Oncology History    No history exists.        Review of Systems:  Review of Systems   Constitutional:  Positive for fatigue.   Respiratory:  Negative for shortness of breath.    Cardiovascular:  Negative for chest pain.   Gastrointestinal:  Positive for reflux (at times, on pepcid). Negative for abdominal distention, abdominal pain, constipation, diarrhea, nausea and vomiting.   Genitourinary:  Negative for hematuria.   Musculoskeletal:  Positive for arthralgias.   Neurological:  Negative for dizziness and headaches.    Hematological:  Bruises/bleeds easily.        Physical Exam   Vitals:  /78   Pulse (!) 128   Wt 67.3 kg (148 lb 5.9 oz)   SpO2 97%   BMI 24.69 kg/m²     Labs:     Lab Results   Component Value Date    WBC 4.82 09/21/2022    HGB 9.5 (L) 09/21/2022    HCT 30.0 (L) 09/21/2022     (H) 09/21/2022     09/21/2022     Lab Results   Component Value Date    IRON 108 08/22/2022    TRANSFERRIN 252 08/22/2022    TIBC 373 08/22/2022    FESATURATED 29 08/22/2022      Lab Results   Component Value Date    FERRITIN 172 08/22/2022           Physical Exam:  Physical Exam  Vitals and nursing note reviewed.   Constitutional:       General: He is not in acute distress.     Appearance: Normal appearance. He is not ill-appearing, toxic-appearing or diaphoretic.   HENT:      Head: Normocephalic and atraumatic.      Right Ear: External ear normal.      Left Ear: External ear normal.      Mouth/Throat:      Mouth: Mucous membranes are moist.      Pharynx: Oropharynx is clear.      Comments: No mucosal pallor  Eyes:      General: No scleral icterus.     Conjunctiva/sclera: Conjunctivae normal.   Cardiovascular:      Rate and Rhythm: Normal rate and regular rhythm.      Comments: Apical 84, RRR; nurse triage vitals show 128 pulse  Pulmonary:      Effort: Pulmonary effort is normal. No respiratory distress.      Breath sounds: Normal breath sounds.      Comments: Talkative with no sob/magdaleno  Abdominal:      General: Bowel sounds are normal. There is no distension.      Palpations: Abdomen is soft.      Tenderness: There is no abdominal tenderness. There is no guarding.   Musculoskeletal:         General: No swelling.      Cervical back: Normal range of motion and neck supple. No rigidity.      Right lower leg: No edema.      Left lower leg: No edema.   Lymphadenopathy:      Cervical: No cervical adenopathy.   Skin:     General: Skin is warm and dry.      Coloration: Skin is not jaundiced or pale.   Neurological:       Mental Status: He is alert and oriented to person, place, and time.      Gait: Gait normal.   Psychiatric:         Mood and Affect: Mood normal.         Behavior: Behavior normal.        ECOG:   ECOG SCORE    1 - Restricted in strenuous activity-ambulatory and able to carry out work of a light nature          Discussion     Problem List:  Problem List Items Addressed This Visit          Cardiac/Vascular    Chronic combined systolic and diastolic CHF (congestive heart failure)       Renal/    CKD (chronic kidney disease) stage 3, GFR 30-59 ml/min    Relevant Orders    CBC Auto Differential    Iron and TIBC    Ferritin    CMP    Protein electrophoresis, serum    Vitamin B12    FOLATE    IMMUNOFIXATION ELECTROPHORESIS, SERUM    IMMUNOGLOBULIN FREE LT CHAINS BLOOD    Immunoglobulins (IgG, IgA, IgM) Quantitative    Uric Acid    Lactate Dehydrogenase    Sedimentation rate    Ambulatory referral/consult to Gastroenterology       Endocrine    Type 2 diabetes mellitus with hyperglycemia, without long-term current use of insulin     Other Visit Diagnoses       Chronic disease anemia    -  Primary    Relevant Orders    CBC Auto Differential    Iron and TIBC    Ferritin    CMP    Protein electrophoresis, serum    Vitamin B12    FOLATE    IMMUNOFIXATION ELECTROPHORESIS, SERUM    IMMUNOGLOBULIN FREE LT CHAINS BLOOD    Immunoglobulins (IgG, IgA, IgM) Quantitative    Uric Acid    Lactate Dehydrogenase    Sedimentation rate    Ambulatory referral/consult to Gastroenterology    Nutritional anemia, unspecified        Relevant Orders    Vitamin B12    FOLATE    Black stool        Relevant Orders    Ambulatory referral/consult to Gastroenterology           Anemia, Anemia of chronic disease, History of SOLEDAD  -Pt with anemia noted since 2011 epic labs, worsening since 2021, it is multifactorial with pt having CKD, gerd, and likely with absorption issues as well  -Has been off oral iron supplementation x1 yr  -Pt with fatigue, black  stools  -Will do anemia workup today, call with results, iron infusions if indicated; plan likely will be to repeat labs in 3 months with follow up appt  -Given black stool concern, will refer to GI, given age and chronic disease state, will leave workup for this up to GI re upper GI or colonoscopy benefits    Diabetes with CKD  -A1c 7.9% (10/28/22)  -reports blood sugars lately have been near 200 at home, 205 last night  -management deferred to pcp     HTN  -138/78, stable   -management deferred to pcp    CHF  -no notable swelling or concerning acute changes on exam today, stable  -pt reports monitoring of weight   -management deferred to pcp      Marion Rosario, YOMI-C  [facility]  Hematology/Oncology  [address]  [place]  [id number] [phone]

## 2022-12-09 NOTE — TELEPHONE ENCOUNTER
----- Message from Marion Rosario NP sent at 12/9/2022  3:25 PM CST -----  Can you call with an  and let him know his blood sugar is high at 365, check it at home and if still over 300 he needs to go to emergency room to get it down.         ----- Message -----  From: [place], [facility]  Sent: 12/9/2022  12:30 PM CST  To: Marion Rosario NP

## 2022-12-10 NOTE — ED PROVIDER NOTES
"Encounter Date: 12/9/2022    SCRIBE #1 NOTE: I, Jose Ernst Jr, am scribing for, and in the presence of,  Keith Goldstein MD. I have scribed the following portions of the note - Other sections scribed: HPI, ROS.     History     Chief Complaint   Patient presents with    Hyperglycemia     X a few days. Blood sugar today in clinic 365. Pt was advised to come to ed for evaluation. Polydipsia+ and polyuria+ x2 weeks.     Headache     Generalized HA on/off x1 month. Pt is also c/o generalized, "bone pain" x1 month.     Vince Martinez is a 89-year-old male who has a past medical history of acute combined systolic and diastolic congestive heart failure, arthritis, bilateral renal cysts, diabetes mellitus, degenerative joint disease, hyperkalemia, hypertension, iron deficiency anemia, osteopenia, prostate enlargement, stage III chronic kidney disease, and thyroid disease.    The patient presents to the emergency department for evaluation of hyperglycemia; onset one month. Associated symptoms include: congestion, cough, fatigue, headache, polydipsia, polyuria, and visual disturbance.The patient underwent laboratory testing during a scheduled clinical appointment, and was noted to be hyperglycemic. Due to an elevated blood sugar, the patient was advised to visit the emergency department for further evaluation. In the emergency department, he mentioned his blood sugar levels have been uncontrolled for approximately one month; recently began taking a new medication in attempts to control blood sugar level. In addition to the chief complaint, the patient developed viral upper respiratory symptoms that have not improved since onset. Patient did not take medication for symptoms. He denies abdominal pain, appetite change, diarrhea, nausea, and vomiting. He is allergic to bactrim, ciprofloxacin, and latex.           The history is provided by the patient and a relative. No  was used.   Review of patient's " "[id number] sent Rx request for the following:   lisinopril (ZESTRIL) 20 MG tablet  Sig: Take 1 tablet (20 mg) by mouth daily    Last Prescription Date:   06/04/2020  Last Fill Qty/Refills:         90, R-3    ACE Inhibitors (Including Combos) Protocol Passed - 6/11/2021 10:48 AM       Passed - Blood pressure under 140/90 in past 12 months    BP Readings from Last 3 Encounters:   04/01/21 126/76   03/29/21 126/76   03/16/21 126/78                Passed - Recent (12 mo) or future (30 days) visit within the authorizing provider's specialty    Patient has had an office visit with the authorizing provider or a provider within the authorizing providers department within the previous 12 mos or has a future within next 30 days. See \"Patient Info\" tab in inbasket, or \"Choose Columns\" in Meds & Orders section of the refill encounter.             Passed - Medication is active on med list       Passed - Patient is age 18 or older       Passed - Normal serum creatinine on file in past 12 months    Recent Labs   Lab Test 04/01/21  1431   CR 0.73       Ok to refill medication if creatinine is low         Passed - Normal serum potassium on file in past 12 months    Recent Labs   Lab Test 04/01/21  1431   POTASSIUM 4.7            amitriptyline (ELAVIL) 50 MG tablet  Sig: Take 2 tablets (100 mg) by mouth At Bedtime    Last Prescription Date:   07/16/2020  Last Fill Qty/Refills:         60, R-10    Tricyclic Agents ( Annual appt and no PHQ9) Passed - 6/11/2021 10:48 AM       Passed - Blood Pressure under 140/90 in past 12 mos    BP Readings from Last 3 Encounters:   04/01/21 126/76   03/29/21 126/76   03/16/21 126/78                Passed - Recent (12 mo) or future (30 days) visit within authorizing provider's specialty    Patient has had an office visit with the authorizing provider or a provider within the authorizing providers department within the previous 12 mos or has a future within next 30 days. See \"Patient " "Info\" tab in inbasket, or \"Choose Columns\" in Meds & Orders section of the refill encounter.             Passed - Medication is active on med list       Passed - Patient is age 18 or older     Overridden by Sohail Harris MD on Dec 28, 2020 11:34 AM   Drug-Drug   1. TRICYCLIC ANTIDEPRESSANTS / SEROTONERGIC OPIOIDS (HIGH RISK) [Level: Major] [Reason: Benefit outweighs risk]   Other Orders: traMADol (ULTRAM) 50 MG tablet          traMADol (ULTRAM) 50 MG tablet  Sig Take 1 tablet (50 mg) by mouth every 6 hours as needed for severe pain    Last Prescription Date:   12/28/2020  Last Fill Qty/Refills:         120, R-5    Routing refill request to provider for review/approval because:  Drug not on the [facility] or [facility] refill protocol or controlled substance    Last Office Visit:              04/01/2021 ([place])   Future Office visit:           06/29/2021 ([place])    Routing one Rx for PCP review given controlled substance. Unable to complete prescription refill per RN Medication Refill Policy.................... Elizabeth Son RN ....................  6/11/2021   11:58 AM          " allergies indicates:   Allergen Reactions    Bactrim [sulfamethoxazole-trimethoprim] Rash    Ciprofloxacin Rash    Latex Rash     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    Arthritis     Bilateral renal cysts     Chronic pain     Diabetes mellitus     DJD (degenerative joint disease)     Hyperkalemia     Hypertension     Iron deficiency anemia     Low back pain     Osteopenia of neck of right femur     Prostate enlargement     Sleep apnea     Stage 3b chronic kidney disease     Thyroid disease      Past Surgical History:   Procedure Laterality Date    CARDIAC PACEMAKER PLACEMENT      EPIDURAL STEROID INJECTION INTO LUMBAR SPINE N/A 06/04/2020    Procedure: Injection-steroid-epidural-lumbar--L4-5;  Surgeon: Angelica Norris Jr., MD;  Location: [facility];  Service: Pain Management;  Laterality: N/A;  sign consent at [facility].    HEMILAMINECTOMY  05/26/2021    Procedure: HEMILAMINECTOMY;  Surgeon: Dayton Sparks MD;  Location: [facility] OR;  Service: Neurosurgery;;  left L4-5    INJECTION OF JOINT Bilateral 02/13/2020    Procedure: Injection, Joint, Bilateral GTB;  Surgeon: Angelica Norris Jr., MD;  Location: [facility];  Service: Pain Management;  Laterality: Bilateral;    INJECTION OF JOINT Bilateral 03/22/2022    Procedure: bilateral GTB steriod injection;  Surgeon: Angelica Norris Jr., MD;  Location: [facility];  Service: Pain Management;  Laterality: Bilateral;  pacemaker   pt is diabetic     INJECTION OF STEROID Bilateral 01/12/2021    Procedure: INJECTION, STEROID Bilateral SI Joint Block and Steroid Injection;  Surgeon: Dayton Sparks MD;  Location: [facility] OR;  Service: Neurosurgery;  Laterality: Bilateral;  Procedure: Bilateral SI Joint Block and Steroid Injection  Surgery 't'kristen: 45 min  LOS: 0  Anesthesia:MAC  Radiology: C-arm  Bed: Regular with Pillows  Position: Prone     SPINE SURGERY      SPINE SURGERY Bilateral      TRANSFORAMINAL EPIDURAL INJECTION OF STEROID Left 02/23/2021    Procedure: Injection,steroid,epidural,transforaminal approach--Left L4-5 *Has Pacemaker/ICD*;  Surgeon: Angelica Norris Jr., MD;  Location: [facility];  Service: Pain Management;  Laterality: Left;     Family History   Problem Relation Age of Onset    Diabetes Brother     No Known Problems Mother     No Known Problems Father     Diabetes Sister     No Known Problems Daughter     HIV Son      Social History     Tobacco Use    Smoking status: Never    Smokeless tobacco: Never   Substance Use Topics    Alcohol use: No    Drug use: No     Review of Systems   Constitutional:  Positive for fatigue. Negative for appetite change and fever.        Positive hyperglycemia. Positive polydipsia.   HENT:  Positive for congestion. Negative for sore throat.    Eyes:  Positive for visual disturbance.   Respiratory:  Positive for cough. Negative for shortness of breath.    Cardiovascular:  Negative for chest pain.   Gastrointestinal:  Negative for diarrhea, nausea and vomiting.   Genitourinary:  Negative for dysuria.        Positive polyuria.   Musculoskeletal:  Negative for back pain.   Skin:  Negative for rash.   Neurological:  Positive for headaches. Negative for weakness.   Hematological:  Does not bruise/bleed easily.     Physical Exam     Initial Vitals [12/09/22 1715]   BP Pulse Resp Temp SpO2   (!) 125/57 60 20 98.3 °F (36.8 °C) 98 %      MAP       --         Physical Exam    Nursing note and vitals reviewed.  Constitutional: He appears well-developed and well-nourished. He is not diaphoretic. No distress.   HENT:   Head: Normocephalic and atraumatic.   Nose: Nose normal.   Mouth/Throat: No oropharyngeal exudate.   Eyes: EOM are normal. Pupils are equal, round, and reactive to light.   Neck: Neck supple. No tracheal deviation present. No JVD present.   Normal range of motion.  Cardiovascular:  Normal rate, regular rhythm, normal heart sounds and intact  distal pulses.           Pulmonary/Chest: Breath sounds normal. No respiratory distress. He has no wheezes. He has no rhonchi. He has no rales.   Abdominal: Abdomen is soft. Bowel sounds are normal. He exhibits no distension. There is no abdominal tenderness. There is no rebound and no guarding.   Musculoskeletal:         General: No tenderness or edema. Normal range of motion.      Cervical back: Normal range of motion and neck supple.     Neurological: He is alert and oriented to person, place, and time. He has normal strength.   Skin: Skin is warm and dry. Capillary refill takes less than 2 seconds. No rash noted. No erythema.       ED Course   Procedures  Labs Reviewed   CBC W/ AUTO DIFFERENTIAL - Abnormal; Notable for the following components:       Result Value    RBC 3.32 (*)     Hemoglobin 10.9 (*)     Hematocrit 31.9 (*)     MCH 32.8 (*)     Platelets 136 (*)     All other components within normal limits   COMPREHENSIVE METABOLIC PANEL - Abnormal; Notable for the following components:    Glucose 322 (*)     Creatinine 1.8 (*)     AST 61 (*)     ALT 50 (*)     eGFR 35 (*)     All other components within normal limits   URINALYSIS, REFLEX TO URINE CULTURE - Abnormal; Notable for the following components:    Color, UA Colorless (*)     Glucose, UA 4+ (*)     All other components within normal limits    Narrative:     Specimen Source->Urine   POCT GLUCOSE - Abnormal; Notable for the following components:    POCT Glucose 387 (*)     All other components within normal limits   POCT GLUCOSE - Abnormal; Notable for the following components:    POCT Glucose 253 (*)     All other components within normal limits   URINALYSIS MICROSCOPIC    Narrative:     Specimen Source->Urine          Imaging Results    None          Medications   sodium chloride 0.9% bolus 500 mL (0 mLs Intravenous Stopped 12/9/22 2101)   insulin regular injection 5 Units 0.05 mL (5 Units Intravenous Given 12/9/22 2002)          MDM:    89-year-old  male with past medical history as noted above presenting with hyperglycemia headache.  Physical exam as noted above, ED workup notable for glucose 322, creatinine 1.8, CBC baseline anemia, urinalysis unremarkable.  Patient presentation consistent with hyperglycemia, given IV fluids and small insulin bolus with symptomatic improvement as well as improvement in his glucose to 253.  Patient will need continued management with his outpatient doctor for further treatment likely medication adjustments.  Vital signs stable at this point, patient has no current complaints would like to be discharged.  No evidence for sepsis, DKA, electrolyte abnormality, acute renal failure, or any further surgical medical emergency.  Discussed diagnosis and further treatment with patient and family member at bedside, including f/u.  Return precautions given and all questions answered.  Patient and family member in understanding of plan.  Pt discharged to home improved and stable.          Scribe Attestation:   Scribe #1: I performed the above scribed service and the documentation accurately describes the services I performed. I attest to the accuracy of the note.                 Physician Attestation for Scribe: I, Keith Goldstein M.D., reviewed documentation as scribed in my presence, which is both accurate and complete.   Clinical Impression:   Final diagnoses:  [R73.9] Hyperglycemia (Primary)      ED Disposition Condition    Discharge Stable          ED Prescriptions    None       Follow-up Information       Follow up With Specialties Details Why Contact Info    [place] - Emergency Dept Emergency Medicine Go to  If symptoms worsen [address] [facility] [id number] [phone]    Shlomo Luong MD Family Medicine Go in 1 week As needed [address] [facility] [address]  [phone]               Keith Goldstein MD  12/10/22 3712

## 2022-12-12 ENCOUNTER — TELEPHONE (OUTPATIENT)
Dept: GASTROENTEROLOGY | Facility: CLINIC | Age: 87
End: 2022-12-12
Payer: MEDICARE

## 2022-12-12 NOTE — TELEPHONE ENCOUNTER
Pt and Michaela, granddaughter, pt has appt with Karolina Swann on 12/13/22 at 230pm.  Not additional appt required at this time.

## 2022-12-12 NOTE — TELEPHONE ENCOUNTER
Attempt to schedule new pt appt from referral by Marion Rosario NP.  Michaela, granddaughter to talk to pt and call clinic later to schedule appt.

## 2022-12-12 NOTE — TELEPHONE ENCOUNTER
----- Message from Josie Kennedy MA sent at 12/9/2022 11:00 AM CST -----  Regarding: referral  Good Morning,      Referral was  put in by YOMI Rosario for the above  pt. He is Japanese speaking.

## 2022-12-13 ENCOUNTER — OFFICE VISIT (OUTPATIENT)
Dept: GASTROENTEROLOGY | Facility: CLINIC | Age: 87
End: 2022-12-13
Payer: MEDICARE

## 2022-12-13 ENCOUNTER — LAB VISIT (OUTPATIENT)
Dept: LAB | Facility: HOSPITAL | Age: 87
End: 2022-12-13
Attending: NURSE PRACTITIONER
Payer: MEDICARE

## 2022-12-13 VITALS — BODY MASS INDEX: 24.17 KG/M2 | WEIGHT: 145.06 LBS | HEIGHT: 65 IN

## 2022-12-13 DIAGNOSIS — D63.8 CHRONIC DISEASE ANEMIA: ICD-10-CM

## 2022-12-13 DIAGNOSIS — K92.1 BLACK STOOL: ICD-10-CM

## 2022-12-13 DIAGNOSIS — N18.32 STAGE 3B CHRONIC KIDNEY DISEASE: ICD-10-CM

## 2022-12-13 PROCEDURE — 1157F PR ADVANCE CARE PLAN OR EQUIV PRESENT IN MEDICAL RECORD: ICD-10-PCS | Mod: CPTII,S$GLB,, | Performed by: NURSE PRACTITIONER

## 2022-12-13 PROCEDURE — 36415 COLL VENOUS BLD VENIPUNCTURE: CPT | Performed by: NURSE PRACTITIONER

## 2022-12-13 PROCEDURE — 3288F FALL RISK ASSESSMENT DOCD: CPT | Mod: CPTII,S$GLB,, | Performed by: NURSE PRACTITIONER

## 2022-12-13 PROCEDURE — 1126F AMNT PAIN NOTED NONE PRSNT: CPT | Mod: CPTII,S$GLB,, | Performed by: NURSE PRACTITIONER

## 2022-12-13 PROCEDURE — 3288F PR FALLS RISK ASSESSMENT DOCUMENTED: ICD-10-PCS | Mod: CPTII,S$GLB,, | Performed by: NURSE PRACTITIONER

## 2022-12-13 PROCEDURE — 99203 OFFICE O/P NEW LOW 30 MIN: CPT | Mod: S$GLB,,, | Performed by: NURSE PRACTITIONER

## 2022-12-13 PROCEDURE — 86677 HELICOBACTER PYLORI ANTIBODY: CPT | Performed by: NURSE PRACTITIONER

## 2022-12-13 PROCEDURE — 1101F PR PT FALLS ASSESS DOC 0-1 FALLS W/OUT INJ PAST YR: ICD-10-PCS | Mod: CPTII,S$GLB,, | Performed by: NURSE PRACTITIONER

## 2022-12-13 PROCEDURE — 1101F PT FALLS ASSESS-DOCD LE1/YR: CPT | Mod: CPTII,S$GLB,, | Performed by: NURSE PRACTITIONER

## 2022-12-13 PROCEDURE — 99999 PR PBB SHADOW E&M-EST. PATIENT-LVL III: CPT | Mod: PBBFAC,,, | Performed by: NURSE PRACTITIONER

## 2022-12-13 PROCEDURE — 99203 PR OFFICE/OUTPT VISIT, NEW, LEVL III, 30-44 MIN: ICD-10-PCS | Mod: S$GLB,,, | Performed by: NURSE PRACTITIONER

## 2022-12-13 PROCEDURE — 1159F MED LIST DOCD IN RCRD: CPT | Mod: CPTII,S$GLB,, | Performed by: NURSE PRACTITIONER

## 2022-12-13 PROCEDURE — 99999 PR PBB SHADOW E&M-EST. PATIENT-LVL III: ICD-10-PCS | Mod: PBBFAC,,, | Performed by: NURSE PRACTITIONER

## 2022-12-13 PROCEDURE — 1157F ADVNC CARE PLAN IN RCRD: CPT | Mod: CPTII,S$GLB,, | Performed by: NURSE PRACTITIONER

## 2022-12-13 PROCEDURE — 1126F PR PAIN SEVERITY QUANTIFIED, NO PAIN PRESENT: ICD-10-PCS | Mod: CPTII,S$GLB,, | Performed by: NURSE PRACTITIONER

## 2022-12-13 PROCEDURE — 1159F PR MEDICATION LIST DOCUMENTED IN MEDICAL RECORD: ICD-10-PCS | Mod: CPTII,S$GLB,, | Performed by: NURSE PRACTITIONER

## 2022-12-13 NOTE — PROGRESS NOTES
GASTROENTEROLOGY CLINIC NOTE    Chief Complaint: Diagnoses of Chronic disease anemia, Stage 3b chronic kidney disease, and Black stool were pertinent to this visit.  Referring provider/PCP: Shlomo Luong MD    Vince Martinez is a 89 y.o. male who is a new patient to me with a PMH that's significant for anemia, CHF, CKD Stage 3, DM2, and GERD.  [employer] service is being used for this office visit.  He is here today to establish care for anemia and black stool.  This is not a new problem.  Patient has a history of anemia.  He reports having workup for anemia about 3 years ago which involved upper endoscopy and colonoscopy that were normal.  He has fatigue and weakness.  He also complains of mild dizziness and blurry vision.  Of note his blood sugars have been elevated and he was recently instructed to go to emergency room for hyperglycemia noted on lab work.  He is being followed by Hematology.  He is not currently taking oral iron supplements.  These were discontinued about 1 year ago.  He is not currently taking 81 mg aspirin but he does take omeprazole 40 mg daily.  No known history of H pylori.  When further questioned about black stool, he reports having black spots in his stool and states he does not have entire bowel movements that are black in color.  The black spots in his stool occur about once a month.  He is unsure if it is related to any foods that he may be eating.  He is not taking Pepto-Bismol    Anemia  Presents for initial visit. The condition has lasted for 10 years. Symptoms include anorexia, light-headedness and malaise/fatigue. There has been no abdominal pain, confusion, pallor, palpitations, pica or weight loss. Signs of blood loss that are not present include hematemesis, hematochezia and melena. Treatments tried: PO Iron in the past.     NSAIDs: No  Anticoagulation or Antiplatelet: No    History of H.pylori: no  H.pylori Treatment:  Prior Upper Endoscopy:   Prior  Colonoscopy:  Family h/o Colon Cancer: No  Family h/o Crohn's Disease or Ulcerative Colitis: No  Family h/o Celiac Sprue: No  Abdominal Surgeries: no        Review of Systems   Constitutional:  Positive for malaise/fatigue. Negative for weight loss.   HENT:  Negative for sore throat.    Eyes:  Negative for blurred vision.   Respiratory:  Negative for cough.    Cardiovascular:  Negative for chest pain and palpitations.   Gastrointestinal:  Positive for anorexia. Negative for abdominal pain, blood in stool, constipation, diarrhea, heartburn, hematemesis, hematochezia, melena, nausea and vomiting.   Genitourinary:  Negative for dysuria.   Musculoskeletal:  Negative for myalgias.   Skin:  Negative for pallor and rash.   Neurological:  Positive for dizziness, weakness and light-headedness. Negative for headaches.   Endo/Heme/Allergies:  Negative for environmental allergies.   Psychiatric/Behavioral:  Negative for confusion and suicidal ideas. The patient is not nervous/anxious.      Past Medical History: has a past medical history of Acute combined systolic and diastolic CHF, NYHA class 3, Arthritis, Bilateral renal cysts, Chronic pain, Diabetes mellitus, DJD (degenerative joint disease), Hyperkalemia, Hypertension, Iron deficiency anemia, Low back pain, Osteopenia of neck of right femur, Prostate enlargement, Sleep apnea, Stage 3b chronic kidney disease, and Thyroid disease.    Past Surgical History: has a past surgical history that includes Injection of joint (Bilateral, 02/13/2020); Epidural steroid injection into lumbar spine (N/A, 06/04/2020); Cardiac pacemaker placement; Injection of steroid (Bilateral, 01/12/2021); Transforaminal epidural injection of steroid (Left, 02/23/2021); Hemilaminectomy (05/26/2021); Spine surgery; Injection of joint (Bilateral, 03/22/2022); and Spine surgery (Bilateral).    Family History:family history includes Diabetes in his brother and sister; HIV in his son; No Known Problems in his  daughter, father, and mother.    Allergies:   Review of patient's allergies indicates:   Allergen Reactions    Bactrim [sulfamethoxazole-trimethoprim] Rash    Ciprofloxacin Rash    Latex Rash       Social History: reports that he has never smoked. He has never used smokeless tobacco. He reports that he does not drink alcohol and does not use drugs.    Home medications:   Current Outpatient Medications on File Prior to Visit   Medication Sig Dispense Refill    atorvastatin (LIPITOR) 40 MG tablet Take 1 tablet (40 mg total) by mouth once daily. 30 tablet 11    betamethasone dipropionate (DIPROLENE) 0.05 % lotion Apply 1 application topically nightly as needed.      blood sugar diagnostic Strp 1 strip by Misc.(Non-Drug; Combo Route) route once daily at 6am. 100 strip 3    blood-glucose meter kit Use as instructed 1 each 0    calcium/D3/mag ox//carolyn/Zn (CALTRATE + D3 PLUS MINERALS ORAL) Take 1 tablet by mouth 2 (two) times daily.      denosumab (PROLIA) 60 mg/mL Syrg Inject 1 mL (60 mg total) into the skin every 6 (six) months. 2 mL 4    diclofenac sodium (VOLTAREN) 1 % Gel Apply 2 g topically daily as needed (pain). 100 g 2    doxycycline (VIBRA-TABS) 100 MG tablet Take 1 tablet (100 mg total) by mouth 2 (two) times daily. for 10 days 20 tablet 0    famotidine (PEPCID) 20 MG tablet Take 1 tablet (20 mg total) by mouth 2 (two) times daily. 60 tablet 11    finasteride (PROSCAR) 5 mg tablet Take 1 tablet (5 mg total) by mouth once daily. 90 tablet 3    fluticasone-salmeterol diskus inhaler 250-50 mcg Inhale 1 puff into the lungs 2 (two) times daily. 60 each 1    folic acid (FOLVITE) 1 MG tablet Take 1 mg by mouth once daily.      folic acid/multivit-min/lutein (CENTRUM SILVER ORAL) Take by mouth.      furosemide (LASIX) 40 MG tablet Take 1 tablet (40 mg total) by mouth once daily. 30 tablet 11    irbesartan (AVAPRO) 75 MG tablet Take 1 tablet (75 mg total) by mouth once daily. 90 tablet 3    lancets (LANCETS,ULTRA  "THIN) Misc 1 lancet by Misc.(Non-Drug; Combo Route) route once daily at 6am. 100 each 3    lancing device Misc 1 Device by Misc.(Non-Drug; Combo Route) route once daily at 6am. 1 each 0    levothyroxine (EUTHYROX) 100 MCG tablet Take 1 tablet (100 mcg total) by mouth before breakfast. 90 tablet 3    melatonin 10 mg Tab Take 1 tablet by mouth nightly as needed.      metoprolol succinate (TOPROL-XL) 25 MG 24 hr tablet Take 1 tablet (25 mg total) by mouth once daily. 90 tablet 3    mirtazapine (REMERON) 7.5 MG Tab Take 1 tablet (7.5 mg total) by mouth every evening. 90 tablet 3    omega-3 fatty acids/fish oil (FISH OIL-OMEGA-3 FATTY ACIDS) 300-1,000 mg capsule Take by mouth once daily.      omeprazole (PRILOSEC) 40 MG capsule Take 40 mg by mouth.      patiromer calcium sorbitex (VELTASSA) 8.4 gram PwPk Take 1 packet (8.4 g total) by mouth every Mon, Wed, Fri. 13 packet 3    semaglutide (OZEMPIC) 0.25 mg or 0.5 mg(2 mg/1.5 mL) pen injector Inject 0.5 mg into the skin every 7 days. 1 pen 11    tamsulosin (FLOMAX) 0.4 mg Cap Take 1 capsule (0.4 mg total) by mouth once daily. 90 capsule 3    triamcinolone acetonide 0.1% (KENALOG) 0.1 % Lotn APPLY LOTION TO THE AFFECTED AREAS ON THE SCALP ONCE DAILY AT BEDTIME AS NEEDED      aspirin 81 MG Chew 1 tablet (81 mg total) by Per NG tube route once daily. (Patient not taking: Reported on 12/1/2022) 30 tablet 11    [DISCONTINUED] gabapentin (NEURONTIN) 100 MG capsule TAKE 1 CAPSULE BY MOUTH IN THE EVENING (Patient not taking: Reported on 12/1/2022) 90 capsule 3    [DISCONTINUED] lactobacillus combination no.4 (PROBIOTIC) 3 billion cell Cap Take 1 capsule by mouth once daily. (Patient not taking: Reported on 12/1/2022) 90 capsule 3     No current facility-administered medications on file prior to visit.       Vital signs:  Ht 5' 5" (1.651 m)   Wt 65.8 kg (145 lb 1 oz)   BMI 24.14 kg/m²     Physical Exam  Vitals reviewed.   Constitutional:       General: He is not in acute " distress.     Appearance: Normal appearance. He is not ill-appearing.   HENT:      Head: Normocephalic.   Cardiovascular:      Rate and Rhythm: Normal rate and regular rhythm.      Heart sounds: Normal heart sounds. No murmur heard.  Pulmonary:      Effort: Pulmonary effort is normal. No respiratory distress.      Breath sounds: Normal breath sounds.   Chest:      Chest wall: No tenderness.   Abdominal:      General: Bowel sounds are normal. There is no distension.      Palpations: Abdomen is soft.      Tenderness: There is no abdominal tenderness. Negative signs include Taylor's sign.      Hernia: No hernia is present.   Skin:     General: Skin is warm.   Neurological:      Mental Status: He is alert and oriented to person, place, and time.   Psychiatric:         Mood and Affect: Mood normal.         Behavior: Behavior normal.       Routine labs:  Lab Results   Component Value Date    WBC 4.88 12/09/2022    HGB 10.9 (L) 12/09/2022    HCT 31.9 (L) 12/09/2022    MCV 96 12/09/2022     (L) 12/09/2022     Lab Results   Component Value Date    INR 1.0 08/23/2022     Lab Results   Component Value Date    IRON 98 12/09/2022    FERRITIN 169 12/09/2022    TIBC 329 12/09/2022    FESATURATED 30 12/09/2022     Lab Results   Component Value Date     12/09/2022    K 4.3 12/09/2022     12/09/2022    CO2 27 12/09/2022    BUN 30 12/09/2022    CREATININE 1.8 (H) 12/09/2022     Lab Results   Component Value Date    ALBUMIN 4.3 12/09/2022    ALT 50 (H) 12/09/2022    AST 61 (H) 12/09/2022    ALKPHOS 132 12/09/2022    BILITOT 0.4 12/09/2022     No results found for: GLUCOSE  Lab Results   Component Value Date    TSH 4.673 (H) 08/22/2022     Lab Results   Component Value Date    CALCIUM 10.0 12/09/2022    PHOS 3.5 09/21/2022       Imaging:      I have reviewed prior labs, imaging, and notes.      Assessment:  1. Chronic disease anemia    2. Stage 3b chronic kidney disease    3. Black stool        Plan:  Orders Placed  This Encounter    CBC Auto Differential    H. pylori Antibody, IgG     CBC in one month  H.pylori IgG    Patient with h/o anemia. Reports black spots in stool occurring about once a month. CBC does not reveal an acute blood loss.  Discussed GI workup for anemia in detail with patient. We discussed proceeding with EGD and colonoscopy, EGD with biopsies for H.pylori, or monitoring CBC and checking H.pylori serology. Procedures along with risks vs benefits discussed in detail.  He does report having EGD and colonoscopy within last three years which was normal.  Patient would like to check H.pylori serology and monitor CBC.  Will proceed with this plan of care. Informed patient that if anemia worsens, we may consider repeating EGD at the least. We may also consider VCE in the future.     Encouraged patient to follow up with PCP regarding elevated glucose levels and for monitoring of BP d/t c/o dizziness and blurry vision. He may benefit from endocrinology consult for diabetes management.   Plan of care discussed with patient who is in agreement and verbalized understanding.     I have explained the planned procedures to the patient.The risks, benefits and alternatives of the procedure were also explained in detail. Patient verbalized understanding, all questions were answered. The patient agrees to proceed as planned    Follow Up: Pending Labs          Karolina Ross, APRN,FNP-BC  [facility]    (Portions of this note were dictated using voice recognition software and may contain dictation related errors in spelling/grammar/syntax not found on text review)

## 2022-12-14 ENCOUNTER — TELEPHONE (OUTPATIENT)
Dept: HEMATOLOGY/ONCOLOGY | Facility: CLINIC | Age: 87
End: 2022-12-14
Payer: MEDICARE

## 2022-12-14 DIAGNOSIS — D63.8 CHRONIC DISEASE ANEMIA: Primary | ICD-10-CM

## 2022-12-14 NOTE — TELEPHONE ENCOUNTER
Labs reviewed with pt, thus far spep negative. Will recheck in 3 months, add testosterone at that time.     Pt did go to ED and hyperglycemia managed. Additionally followed with GI yesterday and monitoring of cbc as well as h pylori testing to be completed, will consider egd/colonoscopy any worsening of anemia or symtpoms.

## 2022-12-16 LAB — H PYLORI IGG SERPL QL IA: NORMAL

## 2023-01-30 ENCOUNTER — OFFICE VISIT (OUTPATIENT)
Dept: FAMILY MEDICINE | Facility: CLINIC | Age: 88
End: 2023-01-30
Payer: MEDICARE

## 2023-01-30 VITALS
HEIGHT: 65 IN | OXYGEN SATURATION: 99 % | DIASTOLIC BLOOD PRESSURE: 52 MMHG | WEIGHT: 143.31 LBS | HEART RATE: 65 BPM | BODY MASS INDEX: 23.88 KG/M2 | SYSTOLIC BLOOD PRESSURE: 116 MMHG

## 2023-01-30 DIAGNOSIS — F51.01 PRIMARY INSOMNIA: ICD-10-CM

## 2023-01-30 DIAGNOSIS — N18.32 STAGE 3B CHRONIC KIDNEY DISEASE: Primary | ICD-10-CM

## 2023-01-30 DIAGNOSIS — N39.0 URINARY TRACT INFECTION WITHOUT HEMATURIA, SITE UNSPECIFIED: ICD-10-CM

## 2023-01-30 DIAGNOSIS — E11.65 TYPE 2 DIABETES MELLITUS WITH HYPERGLYCEMIA, WITHOUT LONG-TERM CURRENT USE OF INSULIN: ICD-10-CM

## 2023-01-30 DIAGNOSIS — Z23 NEEDS FLU SHOT: ICD-10-CM

## 2023-01-30 PROCEDURE — 1125F PR PAIN SEVERITY QUANTIFIED, PAIN PRESENT: ICD-10-PCS | Mod: CPTII,S$GLB,, | Performed by: FAMILY MEDICINE

## 2023-01-30 PROCEDURE — 1157F PR ADVANCE CARE PLAN OR EQUIV PRESENT IN MEDICAL RECORD: ICD-10-PCS | Mod: CPTII,S$GLB,, | Performed by: FAMILY MEDICINE

## 2023-01-30 PROCEDURE — 99999 PR PBB SHADOW E&M-EST. PATIENT-LVL V: CPT | Mod: PBBFAC,,, | Performed by: FAMILY MEDICINE

## 2023-01-30 PROCEDURE — 90694 FLU VACCINE - QUADRIVALENT - ADJUVANTED: ICD-10-PCS | Mod: S$GLB,,, | Performed by: FAMILY MEDICINE

## 2023-01-30 PROCEDURE — 1157F ADVNC CARE PLAN IN RCRD: CPT | Mod: CPTII,S$GLB,, | Performed by: FAMILY MEDICINE

## 2023-01-30 PROCEDURE — 99215 OFFICE O/P EST HI 40 MIN: CPT | Mod: 25,S$GLB,, | Performed by: FAMILY MEDICINE

## 2023-01-30 PROCEDURE — 1101F PR PT FALLS ASSESS DOC 0-1 FALLS W/OUT INJ PAST YR: ICD-10-PCS | Mod: CPTII,S$GLB,, | Performed by: FAMILY MEDICINE

## 2023-01-30 PROCEDURE — 1159F PR MEDICATION LIST DOCUMENTED IN MEDICAL RECORD: ICD-10-PCS | Mod: CPTII,S$GLB,, | Performed by: FAMILY MEDICINE

## 2023-01-30 PROCEDURE — 1160F RVW MEDS BY RX/DR IN RCRD: CPT | Mod: CPTII,S$GLB,, | Performed by: FAMILY MEDICINE

## 2023-01-30 PROCEDURE — 1160F PR REVIEW ALL MEDS BY PRESCRIBER/CLIN PHARMACIST DOCUMENTED: ICD-10-PCS | Mod: CPTII,S$GLB,, | Performed by: FAMILY MEDICINE

## 2023-01-30 PROCEDURE — 1159F MED LIST DOCD IN RCRD: CPT | Mod: CPTII,S$GLB,, | Performed by: FAMILY MEDICINE

## 2023-01-30 PROCEDURE — 3288F PR FALLS RISK ASSESSMENT DOCUMENTED: ICD-10-PCS | Mod: CPTII,S$GLB,, | Performed by: FAMILY MEDICINE

## 2023-01-30 PROCEDURE — 99999 PR PBB SHADOW E&M-EST. PATIENT-LVL V: ICD-10-PCS | Mod: PBBFAC,,, | Performed by: FAMILY MEDICINE

## 2023-01-30 PROCEDURE — 90694 VACC AIIV4 NO PRSRV 0.5ML IM: CPT | Mod: S$GLB,,, | Performed by: FAMILY MEDICINE

## 2023-01-30 PROCEDURE — 99215 PR OFFICE/OUTPT VISIT, EST, LEVL V, 40-54 MIN: ICD-10-PCS | Mod: 25,S$GLB,, | Performed by: FAMILY MEDICINE

## 2023-01-30 PROCEDURE — 1125F AMNT PAIN NOTED PAIN PRSNT: CPT | Mod: CPTII,S$GLB,, | Performed by: FAMILY MEDICINE

## 2023-01-30 PROCEDURE — G0008 ADMIN INFLUENZA VIRUS VAC: HCPCS | Mod: S$GLB,,, | Performed by: FAMILY MEDICINE

## 2023-01-30 PROCEDURE — 1101F PT FALLS ASSESS-DOCD LE1/YR: CPT | Mod: CPTII,S$GLB,, | Performed by: FAMILY MEDICINE

## 2023-01-30 PROCEDURE — 3288F FALL RISK ASSESSMENT DOCD: CPT | Mod: CPTII,S$GLB,, | Performed by: FAMILY MEDICINE

## 2023-01-30 PROCEDURE — G0008 FLU VACCINE - QUADRIVALENT - ADJUVANTED: ICD-10-PCS | Mod: S$GLB,,, | Performed by: FAMILY MEDICINE

## 2023-01-30 RX ORDER — ATORVASTATIN CALCIUM 10 MG/1
10 TABLET, FILM COATED ORAL DAILY
Qty: 90 TABLET | Refills: 0 | Status: SHIPPED | OUTPATIENT
Start: 2023-01-30 | End: 2023-04-26 | Stop reason: SDUPTHER

## 2023-01-30 RX ORDER — AMOXICILLIN AND CLAVULANATE POTASSIUM 500; 125 MG/1; MG/1
1 TABLET, FILM COATED ORAL 2 TIMES DAILY
Qty: 6 TABLET | Refills: 0 | Status: SHIPPED | OUTPATIENT
Start: 2023-01-30 | End: 2023-02-02

## 2023-01-30 RX ORDER — ESZOPICLONE 1 MG/1
1 TABLET, FILM COATED ORAL NIGHTLY PRN
Qty: 90 TABLET | Refills: 0 | Status: SHIPPED | OUTPATIENT
Start: 2023-01-30 | End: 2023-05-09

## 2023-01-30 NOTE — PROGRESS NOTES
Subjective:       Patient ID: Vince Martinez is a 89 y.o. male.    Chief Complaint: Insomnia    89 years old male came to the clinic with problems with sleeping for the last month.  He was using Remeron with no significant improvement.  Patient recently evaluated at the hospital with a UTI.  Patient with decreased kidney function but stable in comparison with previous reports.  He is due for his flu shot.    Review of Systems   Constitutional: Negative.    HENT: Negative.     Eyes: Negative.    Respiratory: Negative.     Cardiovascular: Negative.    Gastrointestinal: Negative.    Genitourinary: Negative.    Musculoskeletal: Negative.    Integumentary:  Negative.   Neurological: Negative.    Psychiatric/Behavioral: Negative.         Objective:      Physical Exam  Vitals and nursing note reviewed.   Constitutional:       General: He is not in acute distress.     Appearance: He is well-developed. He is not diaphoretic.   HENT:      Head: Normocephalic and atraumatic.      Right Ear: External ear normal.      Left Ear: External ear normal.      Nose: Nose normal.      Mouth/Throat:      Pharynx: No oropharyngeal exudate.   Eyes:      General: No scleral icterus.        Right eye: No discharge.         Left eye: No discharge.      Conjunctiva/sclera: Conjunctivae normal.      Pupils: Pupils are equal, round, and reactive to light.   Neck:      Thyroid: No thyromegaly.      Vascular: No JVD.      Trachea: No tracheal deviation.   Cardiovascular:      Rate and Rhythm: Normal rate and regular rhythm.      Heart sounds: Normal heart sounds. No murmur heard.    No friction rub. No gallop.   Pulmonary:      Effort: Pulmonary effort is normal. No respiratory distress.      Breath sounds: Normal breath sounds. No stridor. No wheezing or rales.   Chest:      Chest wall: No tenderness.   Abdominal:      General: Bowel sounds are normal. There is no distension.      Palpations: Abdomen is soft. There is no mass.      Tenderness:  There is no abdominal tenderness. There is no guarding or rebound.   Musculoskeletal:         General: No tenderness. Normal range of motion.      Cervical back: Normal range of motion and neck supple.   Lymphadenopathy:      Cervical: No cervical adenopathy.   Skin:     General: Skin is warm and dry.      Coloration: Skin is not pale.      Findings: No erythema or rash.   Neurological:      Mental Status: He is alert and oriented to person, place, and time.      Cranial Nerves: No cranial nerve deficit.      Motor: No abnormal muscle tone.      Coordination: Coordination normal.      Deep Tendon Reflexes: Reflexes are normal and symmetric. Reflexes normal.   Psychiatric:         Behavior: Behavior normal.         Thought Content: Thought content normal.         Judgment: Judgment normal.       Assessment:       Problem List Items Addressed This Visit       Type 2 diabetes mellitus with hyperglycemia, without long-term current use of insulin    Relevant Medications    atorvastatin (LIPITOR) 10 MG tablet    CKD (chronic kidney disease) stage 3, GFR 30-59 ml/min - Primary     Other Visit Diagnoses       Primary insomnia        Relevant Medications    eszopiclone (LUNESTA) 1 MG Tab    Urinary tract infection without hematuria, site unspecified        Relevant Medications    amoxicillin-clavulanate 500-125mg (AUGMENTIN) 500-125 mg Tab    Needs flu shot        Relevant Orders    Influenza (FLUAD) - Quadrivalent (Adjuvanted) *Preferred* (65+) (PF)              Plan:         Vince was seen today for insomnia.    Diagnoses and all orders for this visit:    Stage 3b chronic kidney disease    Type 2 diabetes mellitus with hyperglycemia, without long-term current use of insulin  -     atorvastatin (LIPITOR) 10 MG tablet; Take 1 tablet (10 mg total) by mouth once daily.    Primary insomnia  -     eszopiclone (LUNESTA) 1 MG Tab; Take 1 tablet (1 mg total) by mouth nightly as needed (insomnia).    Urinary tract infection without  hematuria, site unspecified  -     amoxicillin-clavulanate 500-125mg (AUGMENTIN) 500-125 mg Tab; Take 1 tablet (500 mg total) by mouth 2 (two) times daily. for 3 days    Needs flu shot  -     Influenza (FLUAD) - Quadrivalent (Adjuvanted) *Preferred* (65+) (PF)     Decreased kidney function but stable in comparison with previous reports .  Increase fluid intake.  Avoid over-the-counter medicines like naproxen or ibuprofen.

## 2023-03-13 ENCOUNTER — LAB VISIT (OUTPATIENT)
Dept: LAB | Facility: HOSPITAL | Age: 88
End: 2023-03-13
Attending: FAMILY MEDICINE
Payer: MEDICARE

## 2023-03-13 ENCOUNTER — OFFICE VISIT (OUTPATIENT)
Dept: HEMATOLOGY/ONCOLOGY | Facility: CLINIC | Age: 88
End: 2023-03-13
Payer: MEDICARE

## 2023-03-13 VITALS
OXYGEN SATURATION: 99 % | SYSTOLIC BLOOD PRESSURE: 128 MMHG | HEART RATE: 60 BPM | WEIGHT: 140.88 LBS | DIASTOLIC BLOOD PRESSURE: 49 MMHG | BODY MASS INDEX: 23.44 KG/M2

## 2023-03-13 DIAGNOSIS — E11.65 TYPE 2 DIABETES MELLITUS WITH HYPERGLYCEMIA, WITHOUT LONG-TERM CURRENT USE OF INSULIN: ICD-10-CM

## 2023-03-13 DIAGNOSIS — R63.4 WEIGHT LOSS: ICD-10-CM

## 2023-03-13 DIAGNOSIS — I50.42 CHRONIC COMBINED SYSTOLIC AND DIASTOLIC CHF (CONGESTIVE HEART FAILURE): ICD-10-CM

## 2023-03-13 DIAGNOSIS — N18.4 CKD (CHRONIC KIDNEY DISEASE), STAGE IV: ICD-10-CM

## 2023-03-13 DIAGNOSIS — R63.0 DECREASED APPETITE: ICD-10-CM

## 2023-03-13 DIAGNOSIS — D63.8 CHRONIC DISEASE ANEMIA: Primary | ICD-10-CM

## 2023-03-13 DIAGNOSIS — I10 ESSENTIAL HYPERTENSION: ICD-10-CM

## 2023-03-13 DIAGNOSIS — D69.6 THROMBOCYTOPENIA, UNSPECIFIED: ICD-10-CM

## 2023-03-13 DIAGNOSIS — D53.9 NUTRITIONAL ANEMIA, UNSPECIFIED: ICD-10-CM

## 2023-03-13 DIAGNOSIS — D63.8 CHRONIC DISEASE ANEMIA: ICD-10-CM

## 2023-03-13 DIAGNOSIS — N18.32 STAGE 3B CHRONIC KIDNEY DISEASE: ICD-10-CM

## 2023-03-13 LAB
ALBUMIN SERPL BCP-MCNC: 3.8 G/DL (ref 3.5–5.2)
ALP SERPL-CCNC: 100 U/L (ref 55–135)
ALT SERPL W/O P-5'-P-CCNC: 119 U/L (ref 10–44)
ANION GAP SERPL CALC-SCNC: 7 MMOL/L (ref 8–16)
AST SERPL-CCNC: 125 U/L (ref 10–40)
BASOPHILS # BLD AUTO: 0.05 K/UL (ref 0–0.2)
BASOPHILS NFR BLD: 1.3 % (ref 0–1.9)
BILIRUB SERPL-MCNC: 0.5 MG/DL (ref 0.1–1)
BUN SERPL-MCNC: 30 MG/DL (ref 10–30)
CALCIUM SERPL-MCNC: 9 MG/DL (ref 8.7–10.5)
CHLORIDE SERPL-SCNC: 104 MMOL/L (ref 95–110)
CO2 SERPL-SCNC: 29 MMOL/L (ref 23–29)
CREAT SERPL-MCNC: 1.3 MG/DL (ref 0.5–1.4)
DIFFERENTIAL METHOD: ABNORMAL
EOSINOPHIL # BLD AUTO: 0.4 K/UL (ref 0–0.5)
EOSINOPHIL NFR BLD: 10.3 % (ref 0–8)
ERYTHROCYTE [DISTWIDTH] IN BLOOD BY AUTOMATED COUNT: 13.6 % (ref 11.5–14.5)
EST. GFR  (NO RACE VARIABLE): 51.5 ML/MIN/1.73 M^2
FERRITIN SERPL-MCNC: 242 NG/ML (ref 20–300)
GLUCOSE SERPL-MCNC: 191 MG/DL (ref 70–110)
HCT VFR BLD AUTO: 30.8 % (ref 40–54)
HGB BLD-MCNC: 9.8 G/DL (ref 14–18)
IGA SERPL-MCNC: 250 MG/DL (ref 40–350)
IGG SERPL-MCNC: 1287 MG/DL (ref 650–1600)
IGM SERPL-MCNC: 37 MG/DL (ref 50–300)
IMM GRANULOCYTES # BLD AUTO: 0.01 K/UL (ref 0–0.04)
IMM GRANULOCYTES NFR BLD AUTO: 0.3 % (ref 0–0.5)
IRON SERPL-MCNC: 120 UG/DL (ref 45–160)
LYMPHOCYTES # BLD AUTO: 1.5 K/UL (ref 1–4.8)
LYMPHOCYTES NFR BLD: 36.6 % (ref 18–48)
MCH RBC QN AUTO: 32.2 PG (ref 27–31)
MCHC RBC AUTO-ENTMCNC: 31.8 G/DL (ref 32–36)
MCV RBC AUTO: 101 FL (ref 82–98)
MONOCYTES # BLD AUTO: 0.4 K/UL (ref 0.3–1)
MONOCYTES NFR BLD: 9.5 % (ref 4–15)
NEUTROPHILS # BLD AUTO: 1.7 K/UL (ref 1.8–7.7)
NEUTROPHILS NFR BLD: 42 % (ref 38–73)
NRBC BLD-RTO: 0 /100 WBC
PLATELET # BLD AUTO: 129 K/UL (ref 150–450)
PMV BLD AUTO: 11 FL (ref 9.2–12.9)
POTASSIUM SERPL-SCNC: 4.3 MMOL/L (ref 3.5–5.1)
PROT SERPL-MCNC: 6.8 G/DL (ref 6–8.4)
RBC # BLD AUTO: 3.04 M/UL (ref 4.6–6.2)
SATURATED IRON: 35 % (ref 20–50)
SODIUM SERPL-SCNC: 140 MMOL/L (ref 136–145)
TOTAL IRON BINDING CAPACITY: 339 UG/DL (ref 250–450)
TRANSFERRIN SERPL-MCNC: 229 MG/DL (ref 200–375)
URATE SERPL-MCNC: 8.1 MG/DL (ref 3.4–7)
WBC # BLD AUTO: 3.99 K/UL (ref 3.9–12.7)

## 2023-03-13 PROCEDURE — 3288F PR FALLS RISK ASSESSMENT DOCUMENTED: ICD-10-PCS | Mod: CPTII,S$GLB,, | Performed by: NURSE PRACTITIONER

## 2023-03-13 PROCEDURE — 1160F RVW MEDS BY RX/DR IN RCRD: CPT | Mod: CPTII,S$GLB,, | Performed by: NURSE PRACTITIONER

## 2023-03-13 PROCEDURE — 86334 IMMUNOFIX E-PHORESIS SERUM: CPT | Performed by: NURSE PRACTITIONER

## 2023-03-13 PROCEDURE — 84550 ASSAY OF BLOOD/URIC ACID: CPT | Performed by: NURSE PRACTITIONER

## 2023-03-13 PROCEDURE — 84165 PROTEIN E-PHORESIS SERUM: CPT | Mod: 26,,, | Performed by: PATHOLOGY

## 2023-03-13 PROCEDURE — 99999 PR PBB SHADOW E&M-EST. PATIENT-LVL V: CPT | Mod: PBBFAC,,, | Performed by: NURSE PRACTITIONER

## 2023-03-13 PROCEDURE — 1101F PT FALLS ASSESS-DOCD LE1/YR: CPT | Mod: CPTII,S$GLB,, | Performed by: NURSE PRACTITIONER

## 2023-03-13 PROCEDURE — 84403 ASSAY OF TOTAL TESTOSTERONE: CPT | Performed by: NURSE PRACTITIONER

## 2023-03-13 PROCEDURE — 83521 IG LIGHT CHAINS FREE EACH: CPT | Mod: 59 | Performed by: NURSE PRACTITIONER

## 2023-03-13 PROCEDURE — 80053 COMPREHEN METABOLIC PANEL: CPT | Performed by: NURSE PRACTITIONER

## 2023-03-13 PROCEDURE — 1160F PR REVIEW ALL MEDS BY PRESCRIBER/CLIN PHARMACIST DOCUMENTED: ICD-10-PCS | Mod: CPTII,S$GLB,, | Performed by: NURSE PRACTITIONER

## 2023-03-13 PROCEDURE — 1157F ADVNC CARE PLAN IN RCRD: CPT | Mod: CPTII,S$GLB,, | Performed by: NURSE PRACTITIONER

## 2023-03-13 PROCEDURE — 99215 PR OFFICE/OUTPT VISIT, EST, LEVL V, 40-54 MIN: ICD-10-PCS | Mod: S$GLB,,, | Performed by: NURSE PRACTITIONER

## 2023-03-13 PROCEDURE — 82784 ASSAY IGA/IGD/IGG/IGM EACH: CPT | Mod: 59 | Performed by: NURSE PRACTITIONER

## 2023-03-13 PROCEDURE — 86334 IMMUNOFIX E-PHORESIS SERUM: CPT | Mod: 26,,, | Performed by: PATHOLOGY

## 2023-03-13 PROCEDURE — 84270 ASSAY OF SEX HORMONE GLOBUL: CPT | Performed by: NURSE PRACTITIONER

## 2023-03-13 PROCEDURE — 85025 COMPLETE CBC W/AUTO DIFF WBC: CPT | Performed by: NURSE PRACTITIONER

## 2023-03-13 PROCEDURE — 1157F PR ADVANCE CARE PLAN OR EQUIV PRESENT IN MEDICAL RECORD: ICD-10-PCS | Mod: CPTII,S$GLB,, | Performed by: NURSE PRACTITIONER

## 2023-03-13 PROCEDURE — 84165 PATHOLOGIST INTERPRETATION SPE: ICD-10-PCS | Mod: 26,,, | Performed by: PATHOLOGY

## 2023-03-13 PROCEDURE — 84466 ASSAY OF TRANSFERRIN: CPT | Performed by: NURSE PRACTITIONER

## 2023-03-13 PROCEDURE — 1159F MED LIST DOCD IN RCRD: CPT | Mod: CPTII,S$GLB,, | Performed by: NURSE PRACTITIONER

## 2023-03-13 PROCEDURE — 1101F PR PT FALLS ASSESS DOC 0-1 FALLS W/OUT INJ PAST YR: ICD-10-PCS | Mod: CPTII,S$GLB,, | Performed by: NURSE PRACTITIONER

## 2023-03-13 PROCEDURE — 99215 OFFICE O/P EST HI 40 MIN: CPT | Mod: S$GLB,,, | Performed by: NURSE PRACTITIONER

## 2023-03-13 PROCEDURE — 99999 PR PBB SHADOW E&M-EST. PATIENT-LVL V: ICD-10-PCS | Mod: PBBFAC,,, | Performed by: NURSE PRACTITIONER

## 2023-03-13 PROCEDURE — 36415 COLL VENOUS BLD VENIPUNCTURE: CPT | Mod: PO | Performed by: NURSE PRACTITIONER

## 2023-03-13 PROCEDURE — 86334 PATHOLOGIST INTERPRETATION IFE: ICD-10-PCS | Mod: 26,,, | Performed by: PATHOLOGY

## 2023-03-13 PROCEDURE — 1159F PR MEDICATION LIST DOCUMENTED IN MEDICAL RECORD: ICD-10-PCS | Mod: CPTII,S$GLB,, | Performed by: NURSE PRACTITIONER

## 2023-03-13 PROCEDURE — 3288F FALL RISK ASSESSMENT DOCD: CPT | Mod: CPTII,S$GLB,, | Performed by: NURSE PRACTITIONER

## 2023-03-13 PROCEDURE — 84165 PROTEIN E-PHORESIS SERUM: CPT | Performed by: NURSE PRACTITIONER

## 2023-03-13 PROCEDURE — 82728 ASSAY OF FERRITIN: CPT | Performed by: NURSE PRACTITIONER

## 2023-03-13 NOTE — PATIENT INSTRUCTIONS
Labs in 3 months with appt on June 19, 2023 at [facility]  Drink 1 Glucerna daily, safe to take as diabetic.                                        If you  smoke, please stop smoking

## 2023-03-13 NOTE — PROGRESS NOTES
Patient ID: Vnice Martinez is a 89 y.o. male.    Chief Complaint: Anemia of chronic disease      Haitian interpretor via jennifer entire visit including follow up labs and appt set up    History     HPI    Vince Martinez is a 89yr old male, new to me and this clinic, referred by Dr Luong for anemia evaluation.       Pt with noted mild anemia 2011, then no bloodwork noted until 2018. Pt with anemia chronically it appears with worsening over past year. He was previously told to take iron for this but a year ago stopped at his dr's instruction. Pt endorses fatigue, black stools, acid reflux, arthritic pains shoulders, back, hips, legs. Pt comorbidity includes diabetes, HTN, CHF, ACD, CKD stage 3b, ERNST, thyroid disease, gerd, chronic pain, DJD, osteopenia.    He endorses fatigue, black stools intermittently with last occurring yesterday, joint pain generalized. Denies headache, dizziness, chest pain, sob, abdominal pain, n/v/d, constipation, hemoptysis, hematemesis, hematuria, easy bleeding. States bruises easily if hits self on something. Reports appetite has been good. States weight will change 1-2 pounds if legs swollen but watches this closely at home.     INTERVAL  - pt presents for anemia follow up  - ED visit for uti 1/25/23, states no further symptoms, followed up with Dr Luong 1/30/23 post hospitalization  - saw GI   - states his appetite has been decreased, per record review has lost 7.5# since 12/2022 office visit. When walks around the block with dog he feels short of breath, resolves with rest  - denies headaches, dizziness, chest pain, abdominal pain, n/v/d, hematemesis, melena, hematuria.      Past medical, surgical, and medication history, past family history reviewed and uptdated with patient today as noted.    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    Arthritis     Bilateral renal cysts     Chronic pain     Diabetes mellitus      DJD (degenerative joint disease)     Hyperkalemia     Hypertension     Iron deficiency anemia     Low back pain     Osteopenia of neck of right femur     Prostate enlargement     Sleep apnea     Stage 3b chronic kidney disease     Thyroid disease        Past Surgical History:   Procedure Laterality Date    CARDIAC PACEMAKER PLACEMENT      EPIDURAL STEROID INJECTION INTO LUMBAR SPINE N/A 06/04/2020    Procedure: Injection-steroid-epidural-lumbar--L4-5;  Surgeon: Angelica Norris Jr., MD;  Location: [facility] PAIN [facility]GT;  Service: Pain Management;  Laterality: N/A;  sign consent at DOS.    HEMILAMINECTOMY  05/26/2021    Procedure: HEMILAMINECTOMY;  Surgeon: Dayton Sparks MD;  Location: [facility] OR;  Service: Neurosurgery;;  left L4-5    INJECTION OF JOINT Bilateral 02/13/2020    Procedure: Injection, Joint, Bilateral GTB;  Surgeon: Angelica Norris Jr., MD;  Location: [facility] PAIN [facility];  Service: Pain Management;  Laterality: Bilateral;    INJECTION OF JOINT Bilateral 03/22/2022    Procedure: bilateral GTB steriod injection;  Surgeon: Angelica Norris Jr., MD;  Location: [facility]IN [facility];  Service: Pain Management;  Laterality: Bilateral;  pacemaker   pt is diabetic     INJECTION OF STEROID Bilateral 01/12/2021    Procedure: INJECTION, STEROID Bilateral SI Joint Block and Steroid Injection;  Surgeon: Dayton Sparks MD;  Location: [facility] OR;  Service: Neurosurgery;  Laterality: Bilateral;  Procedure: Bilateral SI Joint Block and Steroid Injection  Surgery 't'kristen: 45 min  LOS: 0  Anesthesia:MAC  Radiology: C-arm  Bed: Regular with Pillows  Position: Prone     SPINE SURGERY      SPINE SURGERY Bilateral     TRANSFORAMINAL EPIDURAL INJECTION OF STEROID Left 02/23/2021    Procedure: Injection,steroid,epidural,transforaminal approach--Left L4-5 *Has Pacemaker/ICD*;  Surgeon: Angelica Norris Jr., MD;  Location: [facility] PAIN [facility];  Service: Pain Management;  Laterality: Left;       Oncology History:  Oncology History    No history  exists.        Review of Systems:  Review of Systems   Constitutional:  Positive for appetite change (decreased), fatigue and unexpected weight change (7.5# decrease over past 3 months).   Respiratory:  Positive for shortness of breath (REEDER).    Cardiovascular:  Negative for chest pain and palpitations.   Gastrointestinal:  Positive for reflux. Negative for abdominal distention, abdominal pain, constipation, diarrhea, nausea and vomiting.   Genitourinary:  Negative for hematuria.   Musculoskeletal:  Positive for arthralgias.   Neurological:  Negative for dizziness and headaches.   Hematological:  Bruises/bleeds easily.        Physical Exam   Vitals:  BP (!) 128/49   Pulse 60   Wt 63.9 kg (140 lb 14 oz)   SpO2 99%   BMI 23.44 kg/m²     Labs:     Lab Results   Component Value Date    WBC 8.19 01/25/2023    HGB 9.8 (L) 01/25/2023    HCT 29.0 (L) 01/25/2023    MCV 97 01/25/2023     01/25/2023     Lab Results   Component Value Date    IRON 98 12/09/2022    TRANSFERRIN 222 12/09/2022    TIBC 329 12/09/2022    FESATURATED 30 12/09/2022      Lab Results   Component Value Date    FERRITIN 169 12/09/2022           Physical Exam:  Physical Exam  Vitals and nursing note reviewed.   Constitutional:       General: He is not in acute distress.     Appearance: Normal appearance. He is not ill-appearing, toxic-appearing or diaphoretic.   HENT:      Head: Normocephalic and atraumatic.      Right Ear: External ear normal.      Left Ear: External ear normal.      Mouth/Throat:      Mouth: Mucous membranes are moist.      Pharynx: Oropharynx is clear.      Comments: No mucosal pallor  Eyes:      General: No scleral icterus.     Conjunctiva/sclera: Conjunctivae normal.   Cardiovascular:      Rate and Rhythm: Normal rate and regular rhythm.   Pulmonary:      Effort: Pulmonary effort is normal. No respiratory distress.      Breath sounds: Normal breath sounds.      Comments: Talkative with no sob/reeder  Abdominal:      General:  Bowel sounds are normal. There is no distension.      Palpations: Abdomen is soft.      Tenderness: There is no abdominal tenderness. There is no guarding.   Musculoskeletal:         General: No swelling.      Cervical back: Normal range of motion and neck supple. No rigidity.      Right lower leg: No edema.      Left lower leg: No edema.   Lymphadenopathy:      Cervical: No cervical adenopathy.   Skin:     General: Skin is warm and dry.      Coloration: Skin is not jaundiced or pale.   Neurological:      Mental Status: He is alert and oriented to person, place, and time.      Gait: Gait normal.   Psychiatric:         Mood and Affect: Mood normal.         Behavior: Behavior normal.        ECOG:   ECOG SCORE    1 - Restricted in strenuous activity-ambulatory and able to carry out work of a light nature          Discussion     Problem List:  Problem List Items Addressed This Visit          Cardiac/Vascular    Essential hypertension    Chronic combined systolic and diastolic CHF (congestive heart failure)    Abdominal aortic atherosclerosis    Overview     As seen on CT imaging on 12/22/2020            Renal/    CKD (chronic kidney disease), stage IV       Hematology    Thrombocytopenia, unspecified       Endocrine    Type 2 diabetes mellitus with hyperglycemia, without long-term current use of insulin     Other Visit Diagnoses       Chronic disease anemia    -  Primary    Nutritional anemia, unspecified        Weight loss        Decreased appetite                 Anemia, Anemia of chronic disease, anemia in CKD, History of SOLEDAD  -Pt with anemia noted since 2011 epic labs, worsening since 2021, it is multifactorial with pt having CKD, gerd, and likely with absorption issues as well  -Has been off oral iron supplementation x1 yr  -Pt with fatigue, black stools  -Will do anemia workup today, call with results, iron infusions if indicated; plan likely will be to repeat labs in 3 months with follow up appt  -GI referral  "completed 12/13/22 with [facility] CELAY[facility]. "Discussed proceeding with EGD and colonoscopy, EGD with biopsies for H.pylori, or monitoring CBC and checking H.pylori serology... had EGD and colonoscopy within last three years  normal.  Patient would like to check H.pylori serology (result negative) and monitor CBC.  Will proceed with this plan. Informed patient that if anemia worsens, we may consider repeating EGD at the least. We may also consider VCE in the future."  -Labs pending at appt time, will call pt back with results     Thrombocytopenia  - 179 K/uL (1/25/23), improved  - continue to monitor    Diabetes with CKD, weight loss, decreased appetite  -A1c 7.9% (10/28/22)  -GFR improved at 43 mL/min/1.73m2 (1/25/23)  -7.5# weight loss since last visit  -given samples of glucerna to try, discussed low carb options to improve caloric intake/stabilize weight  -on ozempic now, this may contribute to weight loss as well   -management deferred to pcp and nephrology    HTN  -129/49, stable   -management deferred to pcp    CHF  -no notable swelling or fluid overload evidence on exam today, stable  -pt reports monitoring of weight   -management deferred to pcp      CHARLETTE DriscollC  [facility]  Hematology/Oncology  [address]  [place]  [address] [phone]            "

## 2023-03-14 LAB
ALBUMIN SERPL ELPH-MCNC: 3.88 G/DL (ref 3.35–5.55)
ALPHA1 GLOB SERPL ELPH-MCNC: 0.24 G/DL (ref 0.17–0.41)
ALPHA2 GLOB SERPL ELPH-MCNC: 0.66 G/DL (ref 0.43–0.99)
B-GLOBULIN SERPL ELPH-MCNC: 0.72 G/DL (ref 0.5–1.1)
GAMMA GLOB SERPL ELPH-MCNC: 1.1 G/DL (ref 0.67–1.58)
INTERPRETATION SERPL IFE-IMP: NORMAL
KAPPA LC SER QL IA: 6.95 MG/DL (ref 0.33–1.94)
KAPPA LC/LAMBDA SER IA: 1.72 (ref 0.26–1.65)
LAMBDA LC SER QL IA: 4.03 MG/DL (ref 0.57–2.63)
PATHOLOGIST INTERPRETATION IFE: NORMAL
PATHOLOGIST INTERPRETATION SPE: NORMAL
PROT SERPL-MCNC: 6.6 G/DL (ref 6–8.4)

## 2023-03-20 LAB
ALBUMIN SERPL-MCNC: 4 G/DL (ref 3.6–5.1)
SHBG SERPL-SCNC: 67 NMOL/L (ref 22–77)
TESTOST FREE SERPL-MCNC: 36.2 PG/ML
TESTOST SERPL-MCNC: 497 NG/DL (ref 250–1100)
TESTOSTERONE.FREE+WB SERPL-MCNC: 66.5 NG/DL

## 2023-03-27 ENCOUNTER — OFFICE VISIT (OUTPATIENT)
Dept: URGENT CARE | Facility: CLINIC | Age: 88
End: 2023-03-27
Payer: MEDICARE

## 2023-03-27 VITALS
OXYGEN SATURATION: 98 % | HEIGHT: 65 IN | WEIGHT: 140 LBS | HEART RATE: 61 BPM | RESPIRATION RATE: 18 BRPM | TEMPERATURE: 98 F | DIASTOLIC BLOOD PRESSURE: 59 MMHG | SYSTOLIC BLOOD PRESSURE: 144 MMHG | BODY MASS INDEX: 23.32 KG/M2

## 2023-03-27 DIAGNOSIS — N30.01 ACUTE CYSTITIS WITH HEMATURIA: Primary | ICD-10-CM

## 2023-03-27 DIAGNOSIS — R81 GLUCOSURIA: ICD-10-CM

## 2023-03-27 DIAGNOSIS — R73.9 HYPERGLYCEMIA: ICD-10-CM

## 2023-03-27 LAB
BILIRUB UR QL STRIP: NEGATIVE
GLUCOSE SERPL-MCNC: 363 MG/DL (ref 70–110)
GLUCOSE UR QL STRIP: POSITIVE
KETONES UR QL STRIP: NEGATIVE
LEUKOCYTE ESTERASE UR QL STRIP: POSITIVE
PH, POC UA: 5 (ref 5–8)
POC BLOOD, URINE: POSITIVE
POC NITRATES, URINE: NEGATIVE
PROT UR QL STRIP: NEGATIVE
SP GR UR STRIP: 1.02 (ref 1–1.03)
UROBILINOGEN UR STRIP-ACNC: ABNORMAL (ref 0.3–2.2)

## 2023-03-27 PROCEDURE — 99214 PR OFFICE/OUTPT VISIT, EST, LEVL IV, 30-39 MIN: ICD-10-PCS | Mod: S$GLB,,, | Performed by: PHYSICIAN ASSISTANT

## 2023-03-27 PROCEDURE — 81003 URINALYSIS AUTO W/O SCOPE: CPT | Mod: QW,S$GLB,, | Performed by: PHYSICIAN ASSISTANT

## 2023-03-27 PROCEDURE — 87077 CULTURE AEROBIC IDENTIFY: CPT | Performed by: PHYSICIAN ASSISTANT

## 2023-03-27 PROCEDURE — 82962 POCT GLUCOSE, HAND-HELD DEVICE: ICD-10-PCS | Mod: S$GLB,,, | Performed by: PHYSICIAN ASSISTANT

## 2023-03-27 PROCEDURE — 81003 POCT URINALYSIS, DIPSTICK, AUTOMATED, W/O SCOPE: ICD-10-PCS | Mod: QW,S$GLB,, | Performed by: PHYSICIAN ASSISTANT

## 2023-03-27 PROCEDURE — 87186 SC STD MICRODIL/AGAR DIL: CPT | Performed by: PHYSICIAN ASSISTANT

## 2023-03-27 PROCEDURE — 82962 GLUCOSE BLOOD TEST: CPT | Mod: S$GLB,,, | Performed by: PHYSICIAN ASSISTANT

## 2023-03-27 PROCEDURE — 87088 URINE BACTERIA CULTURE: CPT | Performed by: PHYSICIAN ASSISTANT

## 2023-03-27 PROCEDURE — 99214 OFFICE O/P EST MOD 30 MIN: CPT | Mod: S$GLB,,, | Performed by: PHYSICIAN ASSISTANT

## 2023-03-27 PROCEDURE — 87086 URINE CULTURE/COLONY COUNT: CPT | Performed by: PHYSICIAN ASSISTANT

## 2023-03-27 RX ORDER — AMOXICILLIN AND CLAVULANATE POTASSIUM 875; 125 MG/1; MG/1
1 TABLET, FILM COATED ORAL 2 TIMES DAILY
Qty: 14 TABLET | Refills: 0 | Status: SHIPPED | OUTPATIENT
Start: 2023-03-27 | End: 2023-04-03

## 2023-03-27 NOTE — PROGRESS NOTES
"Subjective:       Patient ID: Vince Martinez is a 89 y.o. male.    Vitals:  height is 5' 5" (1.651 m) and weight is 63.5 kg (140 lb). His temperature is 97.7 °F (36.5 °C). His blood pressure is 144/59 (abnormal) and his pulse is 61. His respiration is 18 and oxygen saturation is 98%.     Chief Complaint: Urinary Tract Infection    89 yr male present  with possible UTI. Symptoms began 3 days ago, symptoms have been dysuria/burning with urination, increased urinary urgency, hematuria, and frequency.  No fever at home, max temperature 99° F last night.  No vomiting.  Patient's son has been giving him cranberry juice, which may be why blood sugars elevated today.  He has eaten today.  States that blood sugar was 150 yesterday, monitors at home, has been doing okay.    Patient was seen 01/25/2023 and ER for the same symptoms, treated for UTI with Keflex and symptoms resolved, urine culture grew Proteus mirabilis.    Urinary Tract Infection   This is a new problem. The current episode started in the past 7 days. The problem occurs every urination. The problem has been gradually worsening. The quality of the pain is described as burning. The pain is at a severity of 6/10. The patient is experiencing no pain. There has been no fever. Associated symptoms include frequency, hematuria and urgency. Pertinent negatives include no chills, flank pain, nausea, vomiting or rash. He has tried acetaminophen for the symptoms. The treatment provided mild relief.     Constitution: Negative for chills, sweating, fatigue and fever.   HENT:  Negative for ear pain, foreign body in ear, congestion, postnasal drip and sore throat.    Neck: Negative for neck pain and neck stiffness.   Cardiovascular:  Negative for chest pain, leg swelling, palpitations and sob on exertion.   Eyes:  Negative for eye itching, eye pain and eye redness.   Respiratory:  Negative for cough, sputum production and shortness of breath.    Gastrointestinal:  Negative for " abdominal pain, nausea, vomiting and diarrhea.   Genitourinary:  Positive for dysuria, frequency, urgency and hematuria. Negative for urine decreased, flank pain, penile discharge, penile pain, penile swelling, scrotal swelling, testicular pain and pelvic pain.   Musculoskeletal:  Negative for pain, joint pain, abnormal ROM of joint and back pain.   Skin:  Negative for color change and rash.   Neurological:  Negative for dizziness, passing out, loss of balance, headaches, disorientation and numbness.   Psychiatric/Behavioral:  Negative for disorientation.      Objective:      Physical Exam   Constitutional: He is oriented to person, place, and time. He appears well-developed.  Non-toxic appearance. He does not appear ill. No distress.   HENT:   Head: Normocephalic and atraumatic.   Ears:   Right Ear: External ear normal.   Left Ear: External ear normal.   Nose: Nose normal.   Mouth/Throat: Mucous membranes are normal.   Eyes: Conjunctivae and lids are normal.   Neck: Trachea normal. Neck supple.   Cardiovascular: Normal rate, regular rhythm and normal heart sounds.   Pulmonary/Chest: Effort normal and breath sounds normal. No respiratory distress.   Abdominal: Normal appearance. He exhibits no distension and no mass. Soft. There is no abdominal tenderness. There is no guarding, no left CVA tenderness and no right CVA tenderness.      Comments: Abdomen soft, no TTP.  No CVA tenderness.   Musculoskeletal: Normal range of motion.         General: Normal range of motion.   Neurological: He is alert and oriented to person, place, and time. He has normal strength.   Skin: Skin is warm, dry, intact, not diaphoretic and not pale.   Psychiatric: His speech is normal and behavior is normal. Judgment and thought content normal.   Nursing note and vitals reviewed.        Results for orders placed or performed in visit on 03/27/23   POCT Urinalysis, Dipstick, Automated, W/O Scope   Result Value Ref Range    POC Blood, Urine  Positive (A) Negative    POC Bilirubin, Urine Negative Negative    POC Urobilinogen, Urine norm 0.3 - 2.2    POC Ketones, Urine Negative Negative    POC Protein, Urine Negative Negative    POC Nitrates, Urine Negative Negative    POC Glucose, Urine Positive (A) Negative    pH, UA 5.0 5 - 8    POC Specific Gravity, Urine 1.025 1.003 - 1.029    POC Leukocytes, Urine Positive (A) Negative   POCT Glucose, Hand-Held Device   Result Value Ref Range    POC Glucose 363 (A) 70 - 110 MG/DL       Assessment:       1. Acute cystitis with hematuria    2. Glucosuria    3. Hyperglycemia            Plan:       - Discussed ddx, home care, tx options, and given follow up precautions.  I have reviewed the patient's chart to view previous visits, labs, and imaging to assess PMH and look for any trends or previous treatments.  Patient allergic to Cipro and Bactrim.  Treated with Keflex 2 months ago.  Will initiate treatment with Augmentin.  Reviewed prior urine culture.  Acute cystitis with hematuria  -     POCT Urinalysis, Dipstick, Automated, W/O Scope  -     Urine culture  -     amoxicillin-clavulanate 875-125mg (AUGMENTIN) 875-125 mg per tablet; Take 1 tablet by mouth 2 (two) times daily. for 7 days  Dispense: 14 tablet; Refill: 0    Glucosuria  -     POCT Glucose, Hand-Held Device    Hyperglycemia      Patient Instructions   - Rest.    - Drink plenty of fluids.    - Acetaminophen (tylenol) or Ibuprofen (advil,motrin) as directed as needed for fever/pain. Avoid tylenol if you have a history of liver disease. Do not take ibuprofen if you have a history of GI bleeding, kidney disease, or if you take blood thinners.      Try to drink at least eight 8 oz glasses of water daily.     -Please take Augmentin (amoxicillin-clavulanate) with food as this medication may cause upset stomach  - You have been given an antibiotic to treat your condition today.    - Please complete the antibiotic as directed on the bottle.   - If you are female and  on oral birth control pills, use additional methods to prevent pregnancy while on antibiotics and for one cycle after.   - you can take otc probiotic to limit upset stomach    - Follow up with your PCP or specialty clinic as directed in the next 1-2 weeks if not improved or as needed.  You can call [phone] to schedule an appointment with the appropriate provider.    - Go to the ER or seek medical attention immediately if you develop new or worsening symptoms.     - You must understand that you have received an Urgent Care treatment only and that you may be released before all of your medical problems are known or treated.   - You, the patient, will arrange for follow up care as instructed.   - If your condition worsens or fails to improve we recommend that you receive another evaluation at the ER immediately or contact your PCP to discuss your concerns or return here.    - blood sugar was elevated today in clinic.  Monitor closely at home, monitor sugar and carbohydrate intake.  Follow-up with PCP closely.  Go to ER for any new or worsening symptoms.

## 2023-03-27 NOTE — PATIENT INSTRUCTIONS
- Rest.    - Drink plenty of fluids.    - Acetaminophen (tylenol) or Ibuprofen (advil,motrin) as directed as needed for fever/pain. Avoid tylenol if you have a history of liver disease. Do not take ibuprofen if you have a history of GI bleeding, kidney disease, or if you take blood thinners.      Try to drink at least eight 8 oz glasses of water daily.     -Please take Augmentin (amoxicillin-clavulanate) with food as this medication may cause upset stomach  - You have been given an antibiotic to treat your condition today.    - Please complete the antibiotic as directed on the bottle.   - If you are female and on oral birth control pills, use additional methods to prevent pregnancy while on antibiotics and for one cycle after.   - you can take otc probiotic to limit upset stomach    - Follow up with your PCP or specialty clinic as directed in the next 1-2 weeks if not improved or as needed.  You can call [phone] to schedule an appointment with the appropriate provider.    - Go to the ER or seek medical attention immediately if you develop new or worsening symptoms.     - You must understand that you have received an Urgent Care treatment only and that you may be released before all of your medical problems are known or treated.   - You, the patient, will arrange for follow up care as instructed.   - If your condition worsens or fails to improve we recommend that you receive another evaluation at the ER immediately or contact your PCP to discuss your concerns or return here.    - blood sugar was elevated today in clinic.  Monitor closely at home, monitor sugar and carbohydrate intake.  Follow-up with PCP closely.  Go to ER for any new or worsening symptoms.

## 2023-03-29 LAB — BACTERIA UR CULT: ABNORMAL

## 2023-03-30 ENCOUNTER — TELEPHONE (OUTPATIENT)
Dept: URGENT CARE | Facility: CLINIC | Age: 88
End: 2023-03-30
Payer: MEDICARE

## 2023-04-10 ENCOUNTER — LAB VISIT (OUTPATIENT)
Dept: LAB | Facility: HOSPITAL | Age: 88
End: 2023-04-10
Attending: INTERNAL MEDICINE
Payer: MEDICARE

## 2023-04-10 DIAGNOSIS — N25.81 SECONDARY HYPERPARATHYROIDISM OF RENAL ORIGIN: ICD-10-CM

## 2023-04-10 DIAGNOSIS — N18.32 ANEMIA IN STAGE 3B CHRONIC KIDNEY DISEASE: ICD-10-CM

## 2023-04-10 DIAGNOSIS — E55.9 VITAMIN D DEFICIENCY: ICD-10-CM

## 2023-04-10 DIAGNOSIS — E11.65 TYPE 2 DIABETES MELLITUS WITH HYPERGLYCEMIA, WITHOUT LONG-TERM CURRENT USE OF INSULIN: ICD-10-CM

## 2023-04-10 DIAGNOSIS — D63.1 ANEMIA IN STAGE 3B CHRONIC KIDNEY DISEASE: ICD-10-CM

## 2023-04-10 LAB
25(OH)D3+25(OH)D2 SERPL-MCNC: 40 NG/ML (ref 30–96)
ALBUMIN SERPL BCP-MCNC: 3.8 G/DL (ref 3.5–5.2)
ANION GAP SERPL CALC-SCNC: 8 MMOL/L (ref 8–16)
BASOPHILS # BLD AUTO: 0.06 K/UL (ref 0–0.2)
BASOPHILS NFR BLD: 1.4 % (ref 0–1.9)
BUN SERPL-MCNC: 28 MG/DL (ref 10–30)
CALCIUM SERPL-MCNC: 8.7 MG/DL (ref 8.7–10.5)
CHLORIDE SERPL-SCNC: 104 MMOL/L (ref 95–110)
CO2 SERPL-SCNC: 25 MMOL/L (ref 23–29)
CREAT SERPL-MCNC: 1.7 MG/DL (ref 0.5–1.4)
DIFFERENTIAL METHOD: ABNORMAL
EOSINOPHIL # BLD AUTO: 0.3 K/UL (ref 0–0.5)
EOSINOPHIL NFR BLD: 6.5 % (ref 0–8)
ERYTHROCYTE [DISTWIDTH] IN BLOOD BY AUTOMATED COUNT: 13.9 % (ref 11.5–14.5)
EST. GFR  (NO RACE VARIABLE): 37 ML/MIN/1.73 M^2
ESTIMATED AVG GLUCOSE: 177 MG/DL (ref 68–131)
FERRITIN SERPL-MCNC: 282 NG/ML (ref 20–300)
GLUCOSE SERPL-MCNC: 256 MG/DL (ref 70–110)
HBA1C MFR BLD: 7.8 % (ref 4–5.6)
HCT VFR BLD AUTO: 31.8 % (ref 40–54)
HGB BLD-MCNC: 10.2 G/DL (ref 14–18)
IMM GRANULOCYTES # BLD AUTO: 0.01 K/UL (ref 0–0.04)
IMM GRANULOCYTES NFR BLD AUTO: 0.2 % (ref 0–0.5)
IRON SERPL-MCNC: 110 UG/DL (ref 45–160)
LYMPHOCYTES # BLD AUTO: 1.7 K/UL (ref 1–4.8)
LYMPHOCYTES NFR BLD: 38.8 % (ref 18–48)
MAGNESIUM SERPL-MCNC: 2.1 MG/DL (ref 1.6–2.6)
MCH RBC QN AUTO: 32.4 PG (ref 27–31)
MCHC RBC AUTO-ENTMCNC: 32.1 G/DL (ref 32–36)
MCV RBC AUTO: 101 FL (ref 82–98)
MONOCYTES # BLD AUTO: 0.4 K/UL (ref 0.3–1)
MONOCYTES NFR BLD: 9 % (ref 4–15)
NEUTROPHILS # BLD AUTO: 1.9 K/UL (ref 1.8–7.7)
NEUTROPHILS NFR BLD: 44.1 % (ref 38–73)
NRBC BLD-RTO: 0 /100 WBC
PHOSPHATE SERPL-MCNC: 2.8 MG/DL (ref 2.7–4.5)
PLATELET # BLD AUTO: 164 K/UL (ref 150–450)
PMV BLD AUTO: 10.3 FL (ref 9.2–12.9)
POTASSIUM SERPL-SCNC: 4.5 MMOL/L (ref 3.5–5.1)
PTH-INTACT SERPL-MCNC: 88.4 PG/ML (ref 9–77)
RBC # BLD AUTO: 3.15 M/UL (ref 4.6–6.2)
SATURATED IRON: 31 % (ref 20–50)
SODIUM SERPL-SCNC: 137 MMOL/L (ref 136–145)
TOTAL IRON BINDING CAPACITY: 351 UG/DL (ref 250–450)
TRANSFERRIN SERPL-MCNC: 237 MG/DL (ref 200–375)
URATE SERPL-MCNC: 6.4 MG/DL (ref 3.4–7)
WBC # BLD AUTO: 4.33 K/UL (ref 3.9–12.7)

## 2023-04-10 PROCEDURE — 83735 ASSAY OF MAGNESIUM: CPT | Performed by: INTERNAL MEDICINE

## 2023-04-10 PROCEDURE — 82728 ASSAY OF FERRITIN: CPT | Performed by: INTERNAL MEDICINE

## 2023-04-10 PROCEDURE — 80069 RENAL FUNCTION PANEL: CPT | Performed by: INTERNAL MEDICINE

## 2023-04-10 PROCEDURE — 85025 COMPLETE CBC W/AUTO DIFF WBC: CPT | Performed by: INTERNAL MEDICINE

## 2023-04-10 PROCEDURE — 83036 HEMOGLOBIN GLYCOSYLATED A1C: CPT | Performed by: INTERNAL MEDICINE

## 2023-04-10 PROCEDURE — 84550 ASSAY OF BLOOD/URIC ACID: CPT | Performed by: INTERNAL MEDICINE

## 2023-04-10 PROCEDURE — 82306 VITAMIN D 25 HYDROXY: CPT | Performed by: INTERNAL MEDICINE

## 2023-04-10 PROCEDURE — 84466 ASSAY OF TRANSFERRIN: CPT | Performed by: INTERNAL MEDICINE

## 2023-04-10 PROCEDURE — 36415 COLL VENOUS BLD VENIPUNCTURE: CPT | Performed by: INTERNAL MEDICINE

## 2023-04-10 PROCEDURE — 83970 ASSAY OF PARATHORMONE: CPT | Performed by: INTERNAL MEDICINE

## 2023-05-02 ENCOUNTER — TELEPHONE (OUTPATIENT)
Dept: FAMILY MEDICINE | Facility: CLINIC | Age: 88
End: 2023-05-02
Payer: MEDICARE

## 2023-05-02 DIAGNOSIS — E11.65 TYPE 2 DIABETES MELLITUS WITH HYPERGLYCEMIA, WITHOUT LONG-TERM CURRENT USE OF INSULIN: ICD-10-CM

## 2023-05-02 DIAGNOSIS — J30.1 SEASONAL ALLERGIC RHINITIS DUE TO POLLEN: ICD-10-CM

## 2023-05-02 RX ORDER — FLUTICASONE PROPIONATE 50 MCG
SPRAY, SUSPENSION (ML) NASAL
Qty: 48 G | Refills: 1 | Status: SHIPPED | OUTPATIENT
Start: 2023-05-02 | End: 2023-10-10 | Stop reason: SDUPTHER

## 2023-05-02 RX ORDER — ATORVASTATIN CALCIUM 10 MG/1
10 TABLET, FILM COATED ORAL DAILY
Qty: 90 TABLET | Refills: 0 | Status: CANCELLED | OUTPATIENT
Start: 2023-05-02

## 2023-05-02 NOTE — TELEPHONE ENCOUNTER
Refill Decision Note   Vince Barbaes  is requesting a refill authorization.  Brief Assessment and Rationale for Refill:  Approve     Medication Therapy Plan:       Medication Reconciliation Completed: No   Comments:     No Care Gaps recommended.     Note composed:1:29 PM 05/02/2023

## 2023-05-09 DIAGNOSIS — M15.9 PRIMARY OSTEOARTHRITIS INVOLVING MULTIPLE JOINTS: ICD-10-CM

## 2023-05-09 DIAGNOSIS — F51.01 PRIMARY INSOMNIA: ICD-10-CM

## 2023-05-09 DIAGNOSIS — N40.0 BENIGN PROSTATIC HYPERPLASIA, UNSPECIFIED WHETHER LOWER URINARY TRACT SYMPTOMS PRESENT: ICD-10-CM

## 2023-05-09 DIAGNOSIS — I10 ESSENTIAL HYPERTENSION: ICD-10-CM

## 2023-05-09 RX ORDER — LEVOTHYROXINE SODIUM 100 UG/1
100 TABLET ORAL
Qty: 90 TABLET | Refills: 3 | Status: SHIPPED | OUTPATIENT
Start: 2023-05-09

## 2023-05-09 RX ORDER — ESZOPICLONE 1 MG/1
TABLET, FILM COATED ORAL
Qty: 90 TABLET | Refills: 0 | Status: SHIPPED | OUTPATIENT
Start: 2023-05-09 | End: 2023-05-17 | Stop reason: SDUPTHER

## 2023-05-09 RX ORDER — DICLOFENAC SODIUM 10 MG/G
2 GEL TOPICAL DAILY PRN
Qty: 100 G | Refills: 2 | Status: SHIPPED | OUTPATIENT
Start: 2023-05-09 | End: 2023-05-30 | Stop reason: SDUPTHER

## 2023-05-09 RX ORDER — FUROSEMIDE 40 MG/1
40 TABLET ORAL DAILY
Qty: 90 TABLET | Refills: 3 | Status: SHIPPED | OUTPATIENT
Start: 2023-05-09 | End: 2024-02-07 | Stop reason: SDUPTHER

## 2023-05-09 RX ORDER — METOPROLOL SUCCINATE 25 MG/1
25 TABLET, EXTENDED RELEASE ORAL DAILY
Qty: 90 TABLET | Refills: 3 | Status: SHIPPED | OUTPATIENT
Start: 2023-05-09 | End: 2023-09-11

## 2023-05-09 RX ORDER — TAMSULOSIN HYDROCHLORIDE 0.4 MG/1
1 CAPSULE ORAL DAILY
Qty: 90 CAPSULE | Refills: 3 | Status: SHIPPED | OUTPATIENT
Start: 2023-05-09 | End: 2023-06-30 | Stop reason: SDUPTHER

## 2023-05-09 NOTE — TELEPHONE ENCOUNTER
No care due was identified.  [facility] Embedded Care Due Messages. Reference number: [id number].   5/09/2023 12:04:23 PM CDT

## 2023-05-10 DIAGNOSIS — F51.01 PRIMARY INSOMNIA: ICD-10-CM

## 2023-05-10 RX ORDER — ESZOPICLONE 1 MG/1
1 TABLET, FILM COATED ORAL NIGHTLY
Qty: 90 TABLET | Refills: 0 | OUTPATIENT
Start: 2023-05-10

## 2023-05-17 DIAGNOSIS — F51.01 PRIMARY INSOMNIA: ICD-10-CM

## 2023-05-17 RX ORDER — ESZOPICLONE 1 MG/1
1 TABLET, FILM COATED ORAL NIGHTLY
Qty: 90 TABLET | Refills: 0 | Status: CANCELLED | OUTPATIENT
Start: 2023-05-17

## 2023-05-17 RX ORDER — ESZOPICLONE 1 MG/1
1 TABLET, FILM COATED ORAL NIGHTLY PRN
Qty: 90 TABLET | Refills: 0 | Status: SHIPPED | OUTPATIENT
Start: 2023-05-17 | End: 2023-05-30

## 2023-05-17 NOTE — TELEPHONE ENCOUNTER
No care due was identified.  [facility] Embedded Care Due Messages. Reference number: [id number].   5/17/2023 9:00:10 AM CDT

## 2023-05-30 ENCOUNTER — OFFICE VISIT (OUTPATIENT)
Dept: FAMILY MEDICINE | Facility: CLINIC | Age: 88
End: 2023-05-30
Payer: MEDICARE

## 2023-05-30 VITALS
WEIGHT: 147.06 LBS | BODY MASS INDEX: 24.5 KG/M2 | HEIGHT: 65 IN | HEART RATE: 72 BPM | SYSTOLIC BLOOD PRESSURE: 112 MMHG | OXYGEN SATURATION: 99 % | DIASTOLIC BLOOD PRESSURE: 72 MMHG

## 2023-05-30 DIAGNOSIS — I10 ESSENTIAL HYPERTENSION: Primary | ICD-10-CM

## 2023-05-30 DIAGNOSIS — H61.91 SKIN LESION OF RIGHT EAR: ICD-10-CM

## 2023-05-30 DIAGNOSIS — E11.65 TYPE 2 DIABETES MELLITUS WITH HYPERGLYCEMIA, WITHOUT LONG-TERM CURRENT USE OF INSULIN: ICD-10-CM

## 2023-05-30 DIAGNOSIS — F51.01 PRIMARY INSOMNIA: ICD-10-CM

## 2023-05-30 DIAGNOSIS — M15.9 PRIMARY OSTEOARTHRITIS INVOLVING MULTIPLE JOINTS: ICD-10-CM

## 2023-05-30 DIAGNOSIS — E03.9 HYPOTHYROIDISM, UNSPECIFIED TYPE: ICD-10-CM

## 2023-05-30 PROCEDURE — 99215 PR OFFICE/OUTPT VISIT, EST, LEVL V, 40-54 MIN: ICD-10-PCS | Mod: S$GLB,,, | Performed by: FAMILY MEDICINE

## 2023-05-30 PROCEDURE — 1160F RVW MEDS BY RX/DR IN RCRD: CPT | Mod: CPTII,S$GLB,, | Performed by: FAMILY MEDICINE

## 2023-05-30 PROCEDURE — 3288F FALL RISK ASSESSMENT DOCD: CPT | Mod: CPTII,S$GLB,, | Performed by: FAMILY MEDICINE

## 2023-05-30 PROCEDURE — 1125F PR PAIN SEVERITY QUANTIFIED, PAIN PRESENT: ICD-10-PCS | Mod: CPTII,S$GLB,, | Performed by: FAMILY MEDICINE

## 2023-05-30 PROCEDURE — 1101F PT FALLS ASSESS-DOCD LE1/YR: CPT | Mod: CPTII,S$GLB,, | Performed by: FAMILY MEDICINE

## 2023-05-30 PROCEDURE — 99215 OFFICE O/P EST HI 40 MIN: CPT | Mod: S$GLB,,, | Performed by: FAMILY MEDICINE

## 2023-05-30 PROCEDURE — 1125F AMNT PAIN NOTED PAIN PRSNT: CPT | Mod: CPTII,S$GLB,, | Performed by: FAMILY MEDICINE

## 2023-05-30 PROCEDURE — 1159F MED LIST DOCD IN RCRD: CPT | Mod: CPTII,S$GLB,, | Performed by: FAMILY MEDICINE

## 2023-05-30 PROCEDURE — 1101F PR PT FALLS ASSESS DOC 0-1 FALLS W/OUT INJ PAST YR: ICD-10-PCS | Mod: CPTII,S$GLB,, | Performed by: FAMILY MEDICINE

## 2023-05-30 PROCEDURE — 1157F PR ADVANCE CARE PLAN OR EQUIV PRESENT IN MEDICAL RECORD: ICD-10-PCS | Mod: CPTII,S$GLB,, | Performed by: FAMILY MEDICINE

## 2023-05-30 PROCEDURE — 99999 PR PBB SHADOW E&M-EST. PATIENT-LVL V: ICD-10-PCS | Mod: PBBFAC,,, | Performed by: FAMILY MEDICINE

## 2023-05-30 PROCEDURE — 1159F PR MEDICATION LIST DOCUMENTED IN MEDICAL RECORD: ICD-10-PCS | Mod: CPTII,S$GLB,, | Performed by: FAMILY MEDICINE

## 2023-05-30 PROCEDURE — 1157F ADVNC CARE PLAN IN RCRD: CPT | Mod: CPTII,S$GLB,, | Performed by: FAMILY MEDICINE

## 2023-05-30 PROCEDURE — 3288F PR FALLS RISK ASSESSMENT DOCUMENTED: ICD-10-PCS | Mod: CPTII,S$GLB,, | Performed by: FAMILY MEDICINE

## 2023-05-30 PROCEDURE — 99999 PR PBB SHADOW E&M-EST. PATIENT-LVL V: CPT | Mod: PBBFAC,,, | Performed by: FAMILY MEDICINE

## 2023-05-30 PROCEDURE — 1160F PR REVIEW ALL MEDS BY PRESCRIBER/CLIN PHARMACIST DOCUMENTED: ICD-10-PCS | Mod: CPTII,S$GLB,, | Performed by: FAMILY MEDICINE

## 2023-05-30 RX ORDER — DICLOFENAC SODIUM 10 MG/G
2 GEL TOPICAL DAILY PRN
Qty: 200 G | Refills: 3 | Status: SHIPPED | OUTPATIENT
Start: 2023-05-30 | End: 2023-06-27 | Stop reason: SDUPTHER

## 2023-05-30 RX ORDER — TRAZODONE HYDROCHLORIDE 50 MG/1
50 TABLET ORAL NIGHTLY PRN
Qty: 90 TABLET | Refills: 3 | Status: SHIPPED | OUTPATIENT
Start: 2023-05-30 | End: 2024-05-29

## 2023-05-30 NOTE — PROGRESS NOTES
Subjective     Patient ID: Vince Martinez is a 89 y.o. male.    Chief Complaint: Follow-up    89 years old male came to the clinic for blood pressure check.  Blood pressure today was stable.  No chest pain, palpitation, orthopnea or PND.  Last A1c was stable.  No Polyuria, polydipsia or polyphagia.  Patient with follow-up with the dermatologist for right ear lesion.  He reports cryotherapy before.  Patient is requesting a medicine to with insomnia.  Patient using Voltaren only as needed to help with the pain.  No recent thyroid test.    Follow-up  Associated symptoms include arthralgias. Pertinent negatives include no chest pain.   Review of Systems   Constitutional: Negative.    HENT: Negative.     Eyes: Negative.    Respiratory: Negative.     Cardiovascular: Negative.  Negative for chest pain, palpitations, leg swelling and claudication.   Gastrointestinal: Negative.    Endocrine: Negative for polydipsia, polyphagia and polyuria.   Genitourinary: Negative.    Musculoskeletal:  Positive for arthralgias.   Integumentary:  Negative.   Neurological: Negative.    Psychiatric/Behavioral: Negative.          Objective     Physical Exam  Vitals and nursing note reviewed.   Constitutional:       General: He is not in acute distress.     Appearance: He is well-developed. He is not diaphoretic.   HENT:      Head: Normocephalic and atraumatic.      Right Ear: External ear normal.      Left Ear: External ear normal.      Nose: Nose normal.      Mouth/Throat:      Pharynx: No oropharyngeal exudate.   Eyes:      General: No scleral icterus.        Right eye: No discharge.         Left eye: No discharge.      Conjunctiva/sclera: Conjunctivae normal.      Pupils: Pupils are equal, round, and reactive to light.   Neck:      Thyroid: No thyromegaly.      Vascular: No JVD.      Trachea: No tracheal deviation.   Cardiovascular:      Rate and Rhythm: Normal rate and regular rhythm.      Heart sounds: Normal heart sounds. No murmur  heard.    No friction rub. No gallop.   Pulmonary:      Effort: Pulmonary effort is normal. No respiratory distress.      Breath sounds: Normal breath sounds. No stridor. No wheezing or rales.   Chest:      Chest wall: No tenderness.   Abdominal:      General: Bowel sounds are normal. There is no distension.      Palpations: Abdomen is soft. There is no mass.      Tenderness: There is no abdominal tenderness. There is no guarding or rebound.   Musculoskeletal:         General: No tenderness. Normal range of motion.      Cervical back: Normal range of motion and neck supple.   Lymphadenopathy:      Cervical: No cervical adenopathy.   Skin:     General: Skin is warm and dry.      Coloration: Skin is not pale.      Findings: No erythema or rash.   Neurological:      Mental Status: He is alert and oriented to person, place, and time.      Cranial Nerves: No cranial nerve deficit.      Motor: No abnormal muscle tone.      Coordination: Coordination abnormal.      Deep Tendon Reflexes: Reflexes are normal and symmetric. Reflexes normal.   Psychiatric:         Behavior: Behavior normal.         Thought Content: Thought content normal.         Judgment: Judgment normal.          Assessment and Plan     1. Essential hypertension  -     Lipid Panel; Future; Expected date: 05/30/2023  -     Comprehensive Metabolic Panel; Future; Expected date: 05/30/2023    2. Type 2 diabetes mellitus with hyperglycemia, without long-term current use of insulin  -     Microalbumin/Creatinine Ratio, Urine; Future; Expected date: 05/30/2023  -     Lipid Panel; Future; Expected date: 05/30/2023  -     Hemoglobin A1C; Future; Expected date: 05/30/2023  -     Comprehensive Metabolic Panel; Future; Expected date: 05/30/2023    3. Primary osteoarthritis involving multiple joints  -     diclofenac sodium (VOLTAREN) 1 % Gel; Apply 2 g topically daily as needed (pain).  Dispense: 200 g; Refill: 3    4. Skin lesion of right ear    5. Primary insomnia  -      traZODone (DESYREL) 50 MG tablet; Take 1 tablet (50 mg total) by mouth nightly as needed for Insomnia.  Dispense: 90 tablet; Refill: 3    6. Hypothyroidism, unspecified type  -     TSH; Future; Expected date: 05/30/2023        Continue monitoring blood pressure at home, low sodium diet.   Continue monitoring blood sugar at home,ADA diet.          Follow up in about 6 months (around 11/30/2023), or if symptoms worsen or fail to improve.

## 2023-06-19 ENCOUNTER — OFFICE VISIT (OUTPATIENT)
Dept: HEMATOLOGY/ONCOLOGY | Facility: CLINIC | Age: 88
End: 2023-06-19
Payer: MEDICARE

## 2023-06-19 ENCOUNTER — LAB VISIT (OUTPATIENT)
Dept: LAB | Facility: HOSPITAL | Age: 88
End: 2023-06-19
Payer: MEDICARE

## 2023-06-19 ENCOUNTER — TELEPHONE (OUTPATIENT)
Dept: HEMATOLOGY/ONCOLOGY | Facility: CLINIC | Age: 88
End: 2023-06-19

## 2023-06-19 VITALS
DIASTOLIC BLOOD PRESSURE: 60 MMHG | OXYGEN SATURATION: 96 % | WEIGHT: 148.56 LBS | HEART RATE: 62 BPM | BODY MASS INDEX: 24.73 KG/M2 | SYSTOLIC BLOOD PRESSURE: 125 MMHG

## 2023-06-19 DIAGNOSIS — N18.32 STAGE 3B CHRONIC KIDNEY DISEASE: ICD-10-CM

## 2023-06-19 DIAGNOSIS — D50.0 IRON DEFICIENCY ANEMIA DUE TO CHRONIC BLOOD LOSS: ICD-10-CM

## 2023-06-19 DIAGNOSIS — R63.0 DECREASED APPETITE: ICD-10-CM

## 2023-06-19 DIAGNOSIS — I10 ESSENTIAL HYPERTENSION: ICD-10-CM

## 2023-06-19 DIAGNOSIS — N18.32 TYPE 2 DIABETES MELLITUS WITH STAGE 3B CHRONIC KIDNEY DISEASE, WITHOUT LONG-TERM CURRENT USE OF INSULIN: ICD-10-CM

## 2023-06-19 DIAGNOSIS — R35.0 URINARY FREQUENCY: ICD-10-CM

## 2023-06-19 DIAGNOSIS — D63.8 CHRONIC DISEASE ANEMIA: ICD-10-CM

## 2023-06-19 DIAGNOSIS — D69.6 THROMBOCYTOPENIA, UNSPECIFIED: ICD-10-CM

## 2023-06-19 DIAGNOSIS — D63.8 CHRONIC DISEASE ANEMIA: Primary | ICD-10-CM

## 2023-06-19 DIAGNOSIS — R19.5 CHANGE IN STOOL: ICD-10-CM

## 2023-06-19 DIAGNOSIS — E11.22 TYPE 2 DIABETES MELLITUS WITH STAGE 3B CHRONIC KIDNEY DISEASE, WITHOUT LONG-TERM CURRENT USE OF INSULIN: ICD-10-CM

## 2023-06-19 DIAGNOSIS — R19.7 DIARRHEA, UNSPECIFIED TYPE: ICD-10-CM

## 2023-06-19 DIAGNOSIS — R63.4 WEIGHT LOSS: ICD-10-CM

## 2023-06-19 DIAGNOSIS — D53.9 NUTRITIONAL ANEMIA, UNSPECIFIED: ICD-10-CM

## 2023-06-19 LAB
ALBUMIN SERPL BCP-MCNC: 3.7 G/DL (ref 3.5–5.2)
ALP SERPL-CCNC: 83 U/L (ref 55–135)
ALT SERPL W/O P-5'-P-CCNC: 31 U/L (ref 10–44)
ANION GAP SERPL CALC-SCNC: 9 MMOL/L (ref 8–16)
AST SERPL-CCNC: 46 U/L (ref 10–40)
BASOPHILS # BLD AUTO: 0.03 K/UL (ref 0–0.2)
BASOPHILS NFR BLD: 0.6 % (ref 0–1.9)
BILIRUB SERPL-MCNC: 0.3 MG/DL (ref 0.1–1)
BUN SERPL-MCNC: 38 MG/DL (ref 10–30)
CALCIUM SERPL-MCNC: 9.2 MG/DL (ref 8.7–10.5)
CHLORIDE SERPL-SCNC: 107 MMOL/L (ref 95–110)
CO2 SERPL-SCNC: 24 MMOL/L (ref 23–29)
CREAT SERPL-MCNC: 1.7 MG/DL (ref 0.5–1.4)
DIFFERENTIAL METHOD: ABNORMAL
EOSINOPHIL # BLD AUTO: 0.4 K/UL (ref 0–0.5)
EOSINOPHIL NFR BLD: 8.3 % (ref 0–8)
ERYTHROCYTE [DISTWIDTH] IN BLOOD BY AUTOMATED COUNT: 13.1 % (ref 11.5–14.5)
EST. GFR  (NO RACE VARIABLE): 37 ML/MIN/1.73 M^2
FERRITIN SERPL-MCNC: 138 NG/ML (ref 20–300)
GLUCOSE SERPL-MCNC: 184 MG/DL (ref 70–110)
HCT VFR BLD AUTO: 28.5 % (ref 40–54)
HGB BLD-MCNC: 9.3 G/DL (ref 14–18)
IGA SERPL-MCNC: 241 MG/DL (ref 40–350)
IGG SERPL-MCNC: 1490 MG/DL (ref 650–1600)
IGM SERPL-MCNC: 37 MG/DL (ref 50–300)
IMM GRANULOCYTES # BLD AUTO: 0.01 K/UL (ref 0–0.04)
IMM GRANULOCYTES NFR BLD AUTO: 0.2 % (ref 0–0.5)
IRON SERPL-MCNC: 86 UG/DL (ref 45–160)
LYMPHOCYTES # BLD AUTO: 2 K/UL (ref 1–4.8)
LYMPHOCYTES NFR BLD: 40 % (ref 18–48)
MCH RBC QN AUTO: 32 PG (ref 27–31)
MCHC RBC AUTO-ENTMCNC: 32.6 G/DL (ref 32–36)
MCV RBC AUTO: 98 FL (ref 82–98)
MONOCYTES # BLD AUTO: 0.5 K/UL (ref 0.3–1)
MONOCYTES NFR BLD: 9.9 % (ref 4–15)
NEUTROPHILS # BLD AUTO: 2.1 K/UL (ref 1.8–7.7)
NEUTROPHILS NFR BLD: 41 % (ref 38–73)
NRBC BLD-RTO: 0 /100 WBC
PLATELET # BLD AUTO: 115 K/UL (ref 150–450)
PMV BLD AUTO: 10.5 FL (ref 9.2–12.9)
POTASSIUM SERPL-SCNC: 4.7 MMOL/L (ref 3.5–5.1)
PROT SERPL-MCNC: 7 G/DL (ref 6–8.4)
RBC # BLD AUTO: 2.91 M/UL (ref 4.6–6.2)
SATURATED IRON: 27 % (ref 20–50)
SODIUM SERPL-SCNC: 140 MMOL/L (ref 136–145)
TOTAL IRON BINDING CAPACITY: 314 UG/DL (ref 250–450)
TRANSFERRIN SERPL-MCNC: 212 MG/DL (ref 200–375)
WBC # BLD AUTO: 5.03 K/UL (ref 3.9–12.7)

## 2023-06-19 PROCEDURE — 99215 PR OFFICE/OUTPT VISIT, EST, LEVL V, 40-54 MIN: ICD-10-PCS | Mod: S$GLB,,, | Performed by: NURSE PRACTITIONER

## 2023-06-19 PROCEDURE — 84466 ASSAY OF TRANSFERRIN: CPT | Performed by: NURSE PRACTITIONER

## 2023-06-19 PROCEDURE — 86334 IMMUNOFIX E-PHORESIS SERUM: CPT | Performed by: NURSE PRACTITIONER

## 2023-06-19 PROCEDURE — 1101F PR PT FALLS ASSESS DOC 0-1 FALLS W/OUT INJ PAST YR: ICD-10-PCS | Mod: CPTII,S$GLB,, | Performed by: NURSE PRACTITIONER

## 2023-06-19 PROCEDURE — 36415 COLL VENOUS BLD VENIPUNCTURE: CPT | Performed by: NURSE PRACTITIONER

## 2023-06-19 PROCEDURE — 99999 PR PBB SHADOW E&M-EST. PATIENT-LVL V: ICD-10-PCS | Mod: PBBFAC,,, | Performed by: NURSE PRACTITIONER

## 2023-06-19 PROCEDURE — 80053 COMPREHEN METABOLIC PANEL: CPT | Performed by: NURSE PRACTITIONER

## 2023-06-19 PROCEDURE — 84165 PATHOLOGIST INTERPRETATION SPE: ICD-10-PCS | Mod: 26,,, | Performed by: PATHOLOGY

## 2023-06-19 PROCEDURE — 1160F PR REVIEW ALL MEDS BY PRESCRIBER/CLIN PHARMACIST DOCUMENTED: ICD-10-PCS | Mod: CPTII,S$GLB,, | Performed by: NURSE PRACTITIONER

## 2023-06-19 PROCEDURE — 1157F PR ADVANCE CARE PLAN OR EQUIV PRESENT IN MEDICAL RECORD: ICD-10-PCS | Mod: CPTII,S$GLB,, | Performed by: NURSE PRACTITIONER

## 2023-06-19 PROCEDURE — 1159F MED LIST DOCD IN RCRD: CPT | Mod: CPTII,S$GLB,, | Performed by: NURSE PRACTITIONER

## 2023-06-19 PROCEDURE — 99215 OFFICE O/P EST HI 40 MIN: CPT | Mod: S$GLB,,, | Performed by: NURSE PRACTITIONER

## 2023-06-19 PROCEDURE — 3288F FALL RISK ASSESSMENT DOCD: CPT | Mod: CPTII,S$GLB,, | Performed by: NURSE PRACTITIONER

## 2023-06-19 PROCEDURE — 1159F PR MEDICATION LIST DOCUMENTED IN MEDICAL RECORD: ICD-10-PCS | Mod: CPTII,S$GLB,, | Performed by: NURSE PRACTITIONER

## 2023-06-19 PROCEDURE — 99999 PR PBB SHADOW E&M-EST. PATIENT-LVL V: CPT | Mod: PBBFAC,,, | Performed by: NURSE PRACTITIONER

## 2023-06-19 PROCEDURE — 82728 ASSAY OF FERRITIN: CPT | Performed by: NURSE PRACTITIONER

## 2023-06-19 PROCEDURE — 1157F ADVNC CARE PLAN IN RCRD: CPT | Mod: CPTII,S$GLB,, | Performed by: NURSE PRACTITIONER

## 2023-06-19 PROCEDURE — 3288F PR FALLS RISK ASSESSMENT DOCUMENTED: ICD-10-PCS | Mod: CPTII,S$GLB,, | Performed by: NURSE PRACTITIONER

## 2023-06-19 PROCEDURE — 84165 PROTEIN E-PHORESIS SERUM: CPT | Mod: 26,,, | Performed by: PATHOLOGY

## 2023-06-19 PROCEDURE — 86334 IMMUNOFIX E-PHORESIS SERUM: CPT | Mod: 26,,, | Performed by: PATHOLOGY

## 2023-06-19 PROCEDURE — 86334 PATHOLOGIST INTERPRETATION IFE: ICD-10-PCS | Mod: 26,,, | Performed by: PATHOLOGY

## 2023-06-19 PROCEDURE — 85025 COMPLETE CBC W/AUTO DIFF WBC: CPT | Performed by: NURSE PRACTITIONER

## 2023-06-19 PROCEDURE — 1101F PT FALLS ASSESS-DOCD LE1/YR: CPT | Mod: CPTII,S$GLB,, | Performed by: NURSE PRACTITIONER

## 2023-06-19 PROCEDURE — 1160F RVW MEDS BY RX/DR IN RCRD: CPT | Mod: CPTII,S$GLB,, | Performed by: NURSE PRACTITIONER

## 2023-06-19 PROCEDURE — 84165 PROTEIN E-PHORESIS SERUM: CPT | Performed by: NURSE PRACTITIONER

## 2023-06-19 PROCEDURE — 83521 IG LIGHT CHAINS FREE EACH: CPT | Mod: 59 | Performed by: NURSE PRACTITIONER

## 2023-06-19 PROCEDURE — 82784 ASSAY IGA/IGD/IGG/IGM EACH: CPT | Mod: 59 | Performed by: NURSE PRACTITIONER

## 2023-06-19 NOTE — PROGRESS NOTES
Patient ID: Vince Martinez is a 89 y.o. male.    Chief Complaint: Chronic disease anemia      Togolese interpretor via [employer] entire visit including follow up labs and appt set up, Emily [phone]    History     HPI    Vince Martinez is a 89yr old male, new to me and this clinic, referred by Dr Luong for anemia evaluation.       Pt with noted mild anemia 2011, then no bloodwork noted until 2018. Pt with anemia chronically it appears with worsening over past year. He was previously told to take iron for this but a year ago stopped at his dr's instruction. Pt endorses fatigue, black stools, acid reflux, arthritic pains shoulders, back, hips, legs. Pt comorbidity includes diabetes, HTN, CHF, ACD, CKD stage 3b, ERNST, thyroid disease, gerd, chronic pain, DJD, osteopenia.    He endorses fatigue, black stools intermittently with last occurring yesterday, joint pain generalized. Denies headache, dizziness, chest pain, sob, abdominal pain, n/v/d, constipation, hemoptysis, hematemesis, hematuria, easy bleeding. States bruises easily if hits self on something. Reports appetite has been good. States weight will change 1-2 pounds if legs swollen but watches this closely at home.     INTERVAL  - pt presents for anemia follow up  - saw GI  12/13/23 Hue NP, states he is having increased bowel movements, some looser than others, denies pain, and wants to see someone for this  - he has urinary frequency and would like to see a urologist; denies hematuria, dysuria  - states his appetite has been decreased due to loss of taste, has increased from 140# to now 148# in past 3 months  - denies headaches, dizziness, chest pain, abdominal pain, n/v, constipation, hematemesis, melena, hematuria.      Past medical, surgical, and medication history, past family history reviewed and uptdated with patient today as noted.    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    Arthritis      Bilateral renal cysts     Chronic pain     Diabetes mellitus     DJD (degenerative joint disease)     Hyperkalemia     Hypertension     Iron deficiency anemia     Low back pain     Osteopenia of neck of right femur     Prostate enlargement     Sleep apnea     Stage 3b chronic kidney disease     Thyroid disease        Past Surgical History:   Procedure Laterality Date    CARDIAC PACEMAKER PLACEMENT      EPIDURAL STEROID INJECTION INTO LUMBAR SPINE N/A 06/04/2020    Procedure: Injection-steroid-epidural-lumbar--L4-5;  Surgeon: Angelica Norris Jr., MD;  Location: [facility] PAIN [facility]GT;  Service: Pain Management;  Laterality: N/A;  sign consent at DOS.    HEMILAMINECTOMY  05/26/2021    Procedure: HEMILAMINECTOMY;  Surgeon: Dayton Saprks MD;  Location: [facility] OR;  Service: Neurosurgery;;  left L4-5    INJECTION OF JOINT Bilateral 02/13/2020    Procedure: Injection, Joint, Bilateral GTB;  Surgeon: Angelica Norris Jr., MD;  Location: [facility];  Service: Pain Management;  Laterality: Bilateral;    INJECTION OF JOINT Bilateral 03/22/2022    Procedure: bilateral GTB steriod injection;  Surgeon: Angelica Norris Jr., MD;  Location: [facility];  Service: Pain Management;  Laterality: Bilateral;  pacemaker   pt is diabetic     INJECTION OF STEROID Bilateral 01/12/2021    Procedure: INJECTION, STEROID Bilateral SI Joint Block and Steroid Injection;  Surgeon: Dayton Sparks MD;  Location: [facility] OR;  Service: Neurosurgery;  Laterality: Bilateral;  Procedure: Bilateral SI Joint Block and Steroid Injection  Surgery 't'kristen: 45 min  LOS: 0  Anesthesia:MAC  Radiology: C-arm  Bed: Regular with Pillows  Position: Prone     SPINE SURGERY      SPINE SURGERY Bilateral     TRANSFORAMINAL EPIDURAL INJECTION OF STEROID Left 02/23/2021    Procedure: Injection,steroid,epidural,transforaminal approach--Left L4-5 *Has Pacemaker/ICD*;  Surgeon: Angelica Norris Jr., MD;  Location: [facility];  Service: Pain Management;  Laterality: Left;        Oncology History:  Oncology History    No history exists.        Review of Systems:  Review of Systems   Constitutional:  Positive for appetite change (decreased) and fatigue. Negative for fever and unexpected weight change.   Respiratory:  Positive for shortness of breath (REEDER).    Cardiovascular:  Negative for chest pain and palpitations.   Gastrointestinal:  Positive for change in bowel habit and change in bowel habit. Negative for abdominal distention, abdominal pain, blood in stool, constipation, diarrhea, nausea, vomiting and reflux.   Genitourinary:  Positive for frequency. Negative for difficulty urinating, dysuria, flank pain, hematuria and testicular pain.   Musculoskeletal:  Positive for arthralgias.   Neurological:  Negative for dizziness and headaches.   Hematological:  Bruises/bleeds easily.        Physical Exam   Vitals:  /60   Pulse 62   Wt 67.4 kg (148 lb 9.4 oz)   SpO2 96%   BMI 24.73 kg/m²     Labs:     Lab Results   Component Value Date    WBC 5.03 06/19/2023    HGB 9.3 (L) 06/19/2023    HCT 28.5 (L) 06/19/2023    MCV 98 06/19/2023     (L) 06/19/2023     Lab Results   Component Value Date    IRON 110 04/10/2023    TRANSFERRIN 237 04/10/2023    TIBC 351 04/10/2023    FESATURATED 31 04/10/2023      Lab Results   Component Value Date    FERRITIN 138 06/19/2023           Physical Exam:  Physical Exam  Vitals and nursing note reviewed.   Constitutional:       General: He is not in acute distress.     Appearance: Normal appearance. He is not ill-appearing, toxic-appearing or diaphoretic.   HENT:      Head: Normocephalic and atraumatic.      Right Ear: External ear normal.      Left Ear: External ear normal.      Mouth/Throat:      Mouth: Mucous membranes are moist.      Pharynx: Oropharynx is clear.      Comments: No mucosal pallor  Eyes:      General: No scleral icterus.     Conjunctiva/sclera: Conjunctivae normal.   Cardiovascular:      Rate and Rhythm: Normal rate and regular  rhythm.   Pulmonary:      Effort: Pulmonary effort is normal. No respiratory distress.      Breath sounds: Normal breath sounds.      Comments: Talkative with no sob/magdaleno  Abdominal:      General: Bowel sounds are normal. There is no distension.      Palpations: Abdomen is soft.      Tenderness: There is no abdominal tenderness. There is no guarding.   Musculoskeletal:         General: No swelling.      Cervical back: Normal range of motion and neck supple. No rigidity.      Right lower leg: No edema.      Left lower leg: No edema.   Lymphadenopathy:      Cervical: No cervical adenopathy.   Skin:     General: Skin is warm and dry.      Coloration: Skin is not jaundiced or pale.   Neurological:      Mental Status: He is alert and oriented to person, place, and time.      Gait: Gait normal.   Psychiatric:         Mood and Affect: Mood normal.         Behavior: Behavior normal.        ECOG:   ECOG SCORE    1 - Restricted in strenuous activity-ambulatory and able to carry out work of a light nature          Discussion     Problem List:  Problem List Items Addressed This Visit          Cardiac/Vascular    Essential hypertension       Hematology    Thrombocytopenia, unspecified     Other Visit Diagnoses       Chronic disease anemia    -  Primary    Nutritional anemia, unspecified        Iron deficiency anemia due to chronic blood loss        Type 2 diabetes mellitus with stage 3b chronic kidney disease, without long-term current use of insulin        Decreased appetite        Weight loss        Change in stool        Diarrhea, unspecified type        Relevant Orders    Ambulatory referral/consult to Gastroenterology    Urinary frequency        Relevant Orders    Ambulatory referral/consult to Urology               Anemia, Anemia of chronic disease, anemia in CKD, History of SOLEDAD  -Pt with anemia noted since 2011 epic labs, worsening since 2021, it is multifactorial with pt having CKD, gerd, and likely with absorption issues  "as well  -Has been off oral iron supplementation x1 yr  -Pt with fatigue, black stools  -Will do anemia workup today, call with results, iron infusions if indicated; plan likely will be to repeat labs in 3 months with follow up appt  -GI referral completed 12/13/22 with Hue CELAYA. "Discussed proceeding with EGD and colonoscopy, EGD with biopsies for H.pylori, or monitoring CBC and checking H.pylori serology... had EGD and colonoscopy within last three years  normal.  Patient would like to check H.pylori serology (result negative) and monitor CBC.  Will proceed with this plan. Informed patient that if anemia worsens, we may consider repeating EGD at the least. We may also consider VCE in the future."  -Labs pending at appt time, will call pt back with results     Thrombocytopenia  - 115 K/uL (1/25/23)  - no bleeding issues  - continue to monitor    Diabetes with CKD, weight loss, decreased appetite  -A1c 7.8% (4/10/23)  -GFR stable 37 mL/min/1.73m2 (6/19/23)  -weight increased 7 pounds since last visit, 141 to 148#  -using glucerna since last visit , discussed low carb options to improve caloric intake/stabilize weight  -on ozempic now, this may contribute to weight loss as well   -management deferred to pcp and nephrology, last saw Dr Anguiano 4/20/23    HTN  -125/60, stable   -management deferred to pcp    CHF  -no notable swelling or fluid overload evidence on exam today, stable  -pt reports monitoring of weight   -management deferred to pcp    Stool change, diarrhea and urinary frequecy    CALL ely Martinez Jr with results, appts at [phone]        Marion Rosario NP-C  [facility]  Hematology/Oncology  [address]  [place]  [address] [phone]              "

## 2023-06-20 LAB
ALBUMIN SERPL ELPH-MCNC: 3.83 G/DL (ref 3.35–5.55)
ALPHA1 GLOB SERPL ELPH-MCNC: 0.29 G/DL (ref 0.17–0.41)
ALPHA2 GLOB SERPL ELPH-MCNC: 0.7 G/DL (ref 0.43–0.99)
B-GLOBULIN SERPL ELPH-MCNC: 0.71 G/DL (ref 0.5–1.1)
GAMMA GLOB SERPL ELPH-MCNC: 1.27 G/DL (ref 0.67–1.58)
INTERPRETATION SERPL IFE-IMP: NORMAL
KAPPA LC SER QL IA: 6.94 MG/DL (ref 0.33–1.94)
KAPPA LC/LAMBDA SER IA: 1.68 (ref 0.26–1.65)
LAMBDA LC SER QL IA: 4.13 MG/DL (ref 0.57–2.63)
PATHOLOGIST INTERPRETATION IFE: NORMAL
PROT SERPL-MCNC: 6.8 G/DL (ref 6–8.4)

## 2023-06-21 ENCOUNTER — TELEPHONE (OUTPATIENT)
Dept: HEMATOLOGY/ONCOLOGY | Facility: CLINIC | Age: 88
End: 2023-06-21
Payer: MEDICARE

## 2023-06-21 LAB — PATHOLOGIST INTERPRETATION SPE: NORMAL

## 2023-06-21 NOTE — TELEPHONE ENCOUNTER
Called  Tony ID# [id number] to call the pt and tell him about his future labs and follow up appointment and that his labs they were waitning on came back normal

## 2023-06-21 NOTE — TELEPHONE ENCOUNTER
----- Message from Marion Rosario NP sent at 6/21/2023  9:04 AM CDT -----  Call pt, may need , and schedule labs cbc, iron/tibc, ferritin, cmp in 4 months with clinic follow up appt. Tell him the labs I was waiting on at time of his appt are normal.      Thanks    ----- Message -----  From: [place], [facility]  Sent: 6/19/2023   9:55 AM CDT  To: Marion Rosario NP

## 2023-06-27 DIAGNOSIS — M15.9 PRIMARY OSTEOARTHRITIS INVOLVING MULTIPLE JOINTS: ICD-10-CM

## 2023-06-27 RX ORDER — DICLOFENAC SODIUM 10 MG/G
2 GEL TOPICAL DAILY PRN
Qty: 200 G | Refills: 3 | Status: SHIPPED | OUTPATIENT
Start: 2023-06-27 | End: 2024-04-02 | Stop reason: SDUPTHER

## 2023-06-30 ENCOUNTER — OFFICE VISIT (OUTPATIENT)
Dept: UROLOGY | Facility: CLINIC | Age: 88
End: 2023-06-30
Payer: MEDICARE

## 2023-06-30 VITALS
DIASTOLIC BLOOD PRESSURE: 66 MMHG | HEIGHT: 65 IN | WEIGHT: 144.75 LBS | HEART RATE: 65 BPM | SYSTOLIC BLOOD PRESSURE: 107 MMHG | BODY MASS INDEX: 24.12 KG/M2

## 2023-06-30 DIAGNOSIS — N40.0 BENIGN PROSTATIC HYPERPLASIA, UNSPECIFIED WHETHER LOWER URINARY TRACT SYMPTOMS PRESENT: ICD-10-CM

## 2023-06-30 DIAGNOSIS — R35.0 URINARY FREQUENCY: ICD-10-CM

## 2023-06-30 PROCEDURE — 99999 PR PBB SHADOW E&M-EST. PATIENT-LVL V: ICD-10-PCS | Mod: PBBFAC,,, | Performed by: UROLOGY

## 2023-06-30 PROCEDURE — 1126F AMNT PAIN NOTED NONE PRSNT: CPT | Mod: CPTII,S$GLB,, | Performed by: UROLOGY

## 2023-06-30 PROCEDURE — 3288F PR FALLS RISK ASSESSMENT DOCUMENTED: ICD-10-PCS | Mod: CPTII,S$GLB,, | Performed by: UROLOGY

## 2023-06-30 PROCEDURE — 1159F PR MEDICATION LIST DOCUMENTED IN MEDICAL RECORD: ICD-10-PCS | Mod: CPTII,S$GLB,, | Performed by: UROLOGY

## 2023-06-30 PROCEDURE — 1157F ADVNC CARE PLAN IN RCRD: CPT | Mod: CPTII,S$GLB,, | Performed by: UROLOGY

## 2023-06-30 PROCEDURE — 99204 OFFICE O/P NEW MOD 45 MIN: CPT | Mod: S$GLB,,, | Performed by: UROLOGY

## 2023-06-30 PROCEDURE — 1159F MED LIST DOCD IN RCRD: CPT | Mod: CPTII,S$GLB,, | Performed by: UROLOGY

## 2023-06-30 PROCEDURE — 1101F PR PT FALLS ASSESS DOC 0-1 FALLS W/OUT INJ PAST YR: ICD-10-PCS | Mod: CPTII,S$GLB,, | Performed by: UROLOGY

## 2023-06-30 PROCEDURE — 1157F PR ADVANCE CARE PLAN OR EQUIV PRESENT IN MEDICAL RECORD: ICD-10-PCS | Mod: CPTII,S$GLB,, | Performed by: UROLOGY

## 2023-06-30 PROCEDURE — 1101F PT FALLS ASSESS-DOCD LE1/YR: CPT | Mod: CPTII,S$GLB,, | Performed by: UROLOGY

## 2023-06-30 PROCEDURE — 3288F FALL RISK ASSESSMENT DOCD: CPT | Mod: CPTII,S$GLB,, | Performed by: UROLOGY

## 2023-06-30 PROCEDURE — 99204 PR OFFICE/OUTPT VISIT, NEW, LEVL IV, 45-59 MIN: ICD-10-PCS | Mod: S$GLB,,, | Performed by: UROLOGY

## 2023-06-30 PROCEDURE — 99999 PR PBB SHADOW E&M-EST. PATIENT-LVL V: CPT | Mod: PBBFAC,,, | Performed by: UROLOGY

## 2023-06-30 PROCEDURE — 1126F PR PAIN SEVERITY QUANTIFIED, NO PAIN PRESENT: ICD-10-PCS | Mod: CPTII,S$GLB,, | Performed by: UROLOGY

## 2023-06-30 RX ORDER — TAMSULOSIN HYDROCHLORIDE 0.4 MG/1
0.8 CAPSULE ORAL DAILY
Qty: 180 CAPSULE | Refills: 3 | Status: SHIPPED | OUTPATIENT
Start: 2023-06-30

## 2023-06-30 RX ORDER — FINASTERIDE 5 MG/1
5 TABLET, FILM COATED ORAL DAILY
Qty: 90 TABLET | Refills: 3 | Status: SHIPPED | OUTPATIENT
Start: 2023-06-30 | End: 2024-06-29

## 2023-06-30 NOTE — PROGRESS NOTES
[place] - Urology   Clinic Note    SUBJECTIVE:     Chief Complaint   Patient presents with    Urinary Frequency       Referral from: Marion Rosario NP.    History of Present Illness:  Vince Martinez is a 89 y.o. male who presents to clinic for LUTS.    Here for evaluation of lower urinary tract symptoms.  Reports bothersome nocturia and urinary frequency hourly.  Reports a weak stream.  He has been on tamsulosin for this.  Reports symptoms have worsened recently.  No history of prostate surgery.    Patient endorses no additional complaints at this time.    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    Arthritis     Bilateral renal cysts     Chronic pain     Diabetes mellitus     DJD (degenerative joint disease)     Hyperkalemia     Hypertension     Iron deficiency anemia     Low back pain     Osteopenia of neck of right femur     Prostate enlargement     Sleep apnea     Stage 3b chronic kidney disease     Thyroid disease        Past Surgical History:   Procedure Laterality Date    CARDIAC PACEMAKER PLACEMENT      EPIDURAL STEROID INJECTION INTO LUMBAR SPINE N/A 06/04/2020    Procedure: Injection-steroid-epidural-lumbar--L4-5;  Surgeon: Angelica Norris Jr., MD;  Location: [facility] [place] [place];  Service: Pain Management;  Laterality: N/A;  sign consent at DOS.    HEMILAMINECTOMY  05/26/2021    Procedure: HEMILAMINECTOMY;  Surgeon: Dayton Sparks MD;  Location: [facility] OR;  Service: Neurosurgery;;  left L4-5    INJECTION OF JOINT Bilateral 02/13/2020    Procedure: Injection, Joint, Bilateral GTB;  Surgeon: Angelica Norris Jr., MD;  Location: [facility] [place] [place];  Service: Pain Management;  Laterality: Bilateral;    INJECTION OF JOINT Bilateral 03/22/2022    Procedure: bilateral GTB steriod injection;  Surgeon: Angelica Norris Jr., MD;  Location: [facility];  Service: Pain Management;  Laterality: Bilateral;  pacemaker   pt is diabetic     INJECTION OF STEROID  Bilateral 01/12/2021    Procedure: INJECTION, STEROID Bilateral SI Joint Block and Steroid Injection;  Surgeon: Dayton Sparks MD;  Location: [facility] OR;  Service: Neurosurgery;  Laterality: Bilateral;  Procedure: Bilateral SI Joint Block and Steroid Injection  Surgery 't'kristen: 45 min  LOS: 0  Anesthesia:MAC  Radiology: C-arm  Bed: Regular with Pillows  Position: Prone     SPINE SURGERY      SPINE SURGERY Bilateral     TRANSFORAMINAL EPIDURAL INJECTION OF STEROID Left 02/23/2021    Procedure: Injection,steroid,epidural,transforaminal approach--Left L4-5 *Has Pacemaker/ICD*;  Surgeon: Angelica Norris Jr., MD;  Location: [facility] PAIN [facility]GT;  Service: Pain Management;  Laterality: Left;       Family History   Problem Relation Age of Onset    Diabetes Brother     No Known Problems Mother     No Known Problems Father     Diabetes Sister     No Known Problems Daughter     HIV Son        Social History     Tobacco Use    Smoking status: Never    Smokeless tobacco: Never   Substance Use Topics    Alcohol use: No    Drug use: No       Current Outpatient Medications on File Prior to Visit   Medication Sig Dispense Refill    atorvastatin (LIPITOR) 10 MG tablet Take 1 tablet by mouth once daily 90 tablet 0    betamethasone dipropionate (DIPROLENE) 0.05 % lotion Apply 1 application topically nightly as needed.      blood sugar diagnostic Strp 1 strip by Misc.(Non-Drug; Combo Route) route once daily at 6am. 100 strip 3    blood-glucose meter kit Use as instructed 1 each 0    calcium/D3/mag ox//carolyn/Zn (CALTRATE + D3 PLUS MINERALS ORAL) Take 1 tablet by mouth 2 (two) times daily.      diclofenac sodium (VOLTAREN) 1 % Gel Apply 2 g topically daily as needed (pain). 200 g 3    famotidine (PEPCID) 20 MG tablet Take 1 tablet (20 mg total) by mouth 2 (two) times daily. 60 tablet 11    fluticasone propionate (FLONASE) 50 mcg/actuation nasal spray USE 2 SPRAY(S) IN EACH NOSTRIL TWICE DAILY AS NEEDED FOR  RHINITIS 48 g 1     fluticasone-salmeterol diskus inhaler 250-50 mcg Inhale 1 puff into the lungs 2 (two) times daily. Controller      folic acid (FOLVITE) 1 MG tablet Take 1 mg by mouth once daily.      folic acid/multivit-min/lutein (CENTRUM SILVER ORAL) Take by mouth.      furosemide (LASIX) 40 MG tablet Take 1 tablet (40 mg total) by mouth once daily. 90 tablet 3    irbesartan (AVAPRO) 75 MG tablet Take 1 tablet (75 mg total) by mouth once daily. 90 tablet 3    lancets (LANCETS,ULTRA THIN) Misc 1 lancet by Misc.(Non-Drug; Combo Route) route once daily at 6am. 100 each 3    lancing device Misc 1 Device by Misc.(Non-Drug; Combo Route) route once daily at 6am. 1 each 0    levothyroxine (EUTHYROX) 100 MCG tablet Take 1 tablet (100 mcg total) by mouth before breakfast. 90 tablet 3    melatonin 10 mg Tab Take 1 tablet by mouth nightly as needed.      metoprolol succinate (TOPROL-XL) 25 MG 24 hr tablet Take 1 tablet (25 mg total) by mouth once daily. 90 tablet 3    omega-3 fatty acids/fish oil (FISH OIL-OMEGA-3 FATTY ACIDS) 300-1,000 mg capsule Take by mouth once daily.      patiromer calcium sorbitex (VELTASSA) 8.4 gram PwPk Take 1 packet (8.4 g total) by mouth every Mon, Wed, Fri. 13 packet 3    polyethylene glycol (GLYCOLAX) 17 gram/dose powder Take 17 g by mouth once daily. 116 g 0    traZODone (DESYREL) 50 MG tablet Take 1 tablet (50 mg total) by mouth nightly as needed for Insomnia. 90 tablet 3    triamcinolone acetonide 0.1% (KENALOG) 0.1 % Lotn APPLY LOTION TO THE AFFECTED AREAS ON THE SCALP ONCE DAILY AT BEDTIME AS NEEDED      [DISCONTINUED] tamsulosin (FLOMAX) 0.4 mg Cap Take 1 capsule (0.4 mg total) by mouth once daily. 90 capsule 3    aspirin 81 MG Chew 1 tablet (81 mg total) by Per NG tube route once daily. 30 tablet 11    denosumab (PROLIA) 60 mg/mL Syrg Inject 1 mL (60 mg total) into the skin every 6 (six) months. (Patient not taking: Reported on 5/30/2023) 2 mL 4     No current facility-administered medications on file  "prior to visit.       Review of patient's allergies indicates:   Allergen Reactions    Bactrim [sulfamethoxazole-trimethoprim] Rash    Ciprofloxacin Rash    Latex Rash       Review of Systems:  A review of 10+ systems was conducted with pertinent positive and negative findings documented in HPI with all other systems reviewed and negative.    OBJECTIVE:     Estimated body mass index is 24.08 kg/m² as calculated from the following:    Height as of this encounter: 5' 5" (1.651 m).    Weight as of this encounter: 65.6 kg (144 lb 11.7 oz).    Vital Signs (Most Recent)  Vitals:    06/30/23 1141   BP: 107/66   Pulse: 65       Physical Exam:  GENERAL: patient sitting comfortably  HEENT: normocephalic  NECK: supple, no JVD  PULM: normal chest rise, no increased WOB  HEART: non-diaphoretic  ABDO: soft, nondistended, nontender  BACK: no CVA tenderness bilaterally  SKIN: warm, dry, well perfused  EXT: no bruising or edema  NEURO: grossly normal with no focal deficits  PSYCH: appropriate mood and affect    Genitourinary Exam:  deferred    Lab Results   Component Value Date    BUN 38 (H) 06/19/2023    CREATININE 1.7 (H) 06/19/2023    WBC 5.03 06/19/2023    HGB 9.3 (L) 06/19/2023    HCT 28.5 (L) 06/19/2023     (L) 06/19/2023    AST 46 (H) 06/19/2023    ALT 31 06/19/2023    ALKPHOS 83 06/19/2023    ALBUMIN 3.7 06/19/2023    HGBA1C 7.8 (H) 04/10/2023    INR 1.0 08/23/2022        Imaging:  I have personally reviewed all relevant imaging studies.    No results found for this or any previous visit (from the past 2160 hour(s)).  No results found for this or any previous visit (from the past 2160 hour(s)).  CT Head Without Contrast  Narrative: EXAMINATION:  CT HEAD WITHOUT CONTRAST    CLINICAL HISTORY:  headache, fatigue;    TECHNIQUE:  Low dose axial CT images obtained throughout the head without intravenous contrast. Sagittal and coronal reconstructions were performed.    COMPARISON:  Head CT 05/13/2022, MRI " brain    FINDINGS:  Intracranial compartment:    Generalized cerebral volume loss.  Ventricles are midline and stable in size and configuration without distortion by mass effect or acute hydrocephalus.  No extra-axial blood or fluid collections.    Grossly stable distribution of patchy hypoattenuation of the subcortical and periventricular white matter consistent with chronic microvascular ischemic change.  Right corona radiata suspected tiny remote lacunar type infarct, unchanged.  Stable crescentic calcification at the left temporal lobe.  Otherwise, no parenchymal mass, hemorrhage, edema or major vascular distribution infarct.    Skull/extracranial contents (limited evaluation): No fracture.  Mild mucosal thickening of the bilateral maxillary sinuses.  Mastoid air cells and remaining imaged paranasal sinuses are essentially clear.  Remote postoperative changes at the bilateral orbits similar to prior.  Impression: No acute large vascular territory infarct or intracranial hemorrhage identified.  If persistent neurologic deficit, MRI brain can be obtained.    Grossly stable additional chronic findings as above.    Electronically signed by: Clyde Hay MD  Date:    08/23/2022  Time:    17:12  X-Ray Chest AP Portable  Narrative: EXAMINATION:  XR CHEST AP PORTABLE    CLINICAL HISTORY:  Fatigue;    TECHNIQUE:  Single frontal view of the chest was performed.    COMPARISON:  05/14/2022    FINDINGS:  Cardiac pacemaker device on the left.The lungs are clear, with normal appearance of pulmonary vasculature and no pleural effusion or pneumothorax.    The cardiac silhouette is normal in size. The hilar and mediastinal contours are unremarkable.    Bones are intact.    No significant change.  Impression: No acute radiographic abnormality.    Electronically signed by: Brian Storm  Date:    08/23/2022  Time:    16:26       PSA:  No results found for: PSA, PSADIAG, PSATOTAL, PSAFREE    Testosterone:  Lab Results   Component  Value Date    TESTOSTERONE 497 03/13/2023    TESTOSTERONE 36.2 03/13/2023        ASSESSMENT     1. Urinary frequency    2. Benign prostatic hyperplasia, unspecified whether lower urinary tract symptoms present        PLAN:     Urinary frequency and Nocturia  BPH with LUTS     - Will increase flomax dose to 0.8mg nightly and add finasteride 5mg daily. Follow up in 3 months    Anant Coats MD  Urology  [facility]    Disclaimer: This note has been generated using voice-recognition software. There may be typographical errors that have been missed during proof-reading.

## 2023-07-10 ENCOUNTER — TELEPHONE (OUTPATIENT)
Dept: FAMILY MEDICINE | Facility: HOSPITAL | Age: 88
End: 2023-07-10
Payer: MEDICARE

## 2023-07-10 ENCOUNTER — OFFICE VISIT (OUTPATIENT)
Dept: GASTROENTEROLOGY | Facility: CLINIC | Age: 88
End: 2023-07-10
Payer: MEDICARE

## 2023-07-10 ENCOUNTER — HOSPITAL ENCOUNTER (OUTPATIENT)
Facility: HOSPITAL | Age: 88
Discharge: HOME | End: 2023-07-11
Attending: EMERGENCY MEDICINE | Admitting: INTERNAL MEDICINE
Payer: MEDICARE

## 2023-07-10 ENCOUNTER — TELEPHONE (OUTPATIENT)
Dept: UROLOGY | Facility: CLINIC | Age: 88
End: 2023-07-10
Payer: MEDICARE

## 2023-07-10 VITALS — WEIGHT: 149.06 LBS | BODY MASS INDEX: 24.83 KG/M2 | HEIGHT: 65 IN

## 2023-07-10 DIAGNOSIS — D63.8 CHRONIC DISEASE ANEMIA: ICD-10-CM

## 2023-07-10 DIAGNOSIS — I50.42 CHRONIC COMBINED SYSTOLIC AND DIASTOLIC CHF (CONGESTIVE HEART FAILURE): ICD-10-CM

## 2023-07-10 DIAGNOSIS — D53.9 MACROCYTIC ANEMIA: ICD-10-CM

## 2023-07-10 DIAGNOSIS — D69.6 THROMBOCYTOPENIA, UNSPECIFIED: ICD-10-CM

## 2023-07-10 DIAGNOSIS — E87.5 HYPERKALEMIA: Primary | ICD-10-CM

## 2023-07-10 DIAGNOSIS — K59.04 CHRONIC IDIOPATHIC CONSTIPATION: Primary | ICD-10-CM

## 2023-07-10 DIAGNOSIS — Z87.448 HISTORY OF CHRONIC KIDNEY DISEASE: ICD-10-CM

## 2023-07-10 DIAGNOSIS — E11.65 TYPE 2 DIABETES MELLITUS WITH HYPERGLYCEMIA, WITHOUT LONG-TERM CURRENT USE OF INSULIN: ICD-10-CM

## 2023-07-10 DIAGNOSIS — R73.9 HYPERGLYCEMIA: ICD-10-CM

## 2023-07-10 LAB
ALBUMIN SERPL BCP-MCNC: 3.7 G/DL (ref 3.5–5.2)
ALBUMIN SERPL BCP-MCNC: 4 G/DL (ref 3.5–5.2)
ALP SERPL-CCNC: 84 U/L (ref 55–135)
ALP SERPL-CCNC: 87 U/L (ref 55–135)
ALT SERPL W/O P-5'-P-CCNC: 34 U/L (ref 10–44)
ALT SERPL W/O P-5'-P-CCNC: 39 U/L (ref 10–44)
ANION GAP SERPL CALC-SCNC: 12 MMOL/L (ref 8–16)
ANION GAP SERPL CALC-SCNC: 7 MMOL/L (ref 8–16)
AST SERPL-CCNC: 43 U/L (ref 10–40)
AST SERPL-CCNC: 49 U/L (ref 10–40)
BASOPHILS # BLD AUTO: 0.05 K/UL (ref 0–0.2)
BASOPHILS NFR BLD: 1 % (ref 0–1.9)
BILIRUB SERPL-MCNC: 0.3 MG/DL (ref 0.1–1)
BILIRUB SERPL-MCNC: 0.3 MG/DL (ref 0.1–1)
BUN SERPL-MCNC: 42 MG/DL (ref 10–30)
BUN SERPL-MCNC: 45 MG/DL (ref 10–30)
CALCIUM SERPL-MCNC: 8.6 MG/DL (ref 8.7–10.5)
CALCIUM SERPL-MCNC: 9.5 MG/DL (ref 8.7–10.5)
CHLORIDE SERPL-SCNC: 104 MMOL/L (ref 95–110)
CHLORIDE SERPL-SCNC: 107 MMOL/L (ref 95–110)
CO2 SERPL-SCNC: 20 MMOL/L (ref 23–29)
CO2 SERPL-SCNC: 22 MMOL/L (ref 23–29)
CREAT SERPL-MCNC: 2 MG/DL (ref 0.5–1.4)
CREAT SERPL-MCNC: 2.2 MG/DL (ref 0.5–1.4)
DIFFERENTIAL METHOD: ABNORMAL
EOSINOPHIL # BLD AUTO: 0.4 K/UL (ref 0–0.5)
EOSINOPHIL NFR BLD: 7 % (ref 0–8)
ERYTHROCYTE [DISTWIDTH] IN BLOOD BY AUTOMATED COUNT: 13.2 % (ref 11.5–14.5)
EST. GFR  (NO RACE VARIABLE): 27 ML/MIN/1.73 M^2
EST. GFR  (NO RACE VARIABLE): 31 ML/MIN/1.73 M^2
ESTIMATED AVG GLUCOSE: 183 MG/DL (ref 68–131)
GLUCOSE SERPL-MCNC: 146 MG/DL (ref 70–110)
GLUCOSE SERPL-MCNC: 251 MG/DL (ref 70–110)
HBA1C MFR BLD: 8 % (ref 4–5.6)
HCT VFR BLD AUTO: 27.8 % (ref 40–54)
HGB BLD-MCNC: 9.1 G/DL (ref 14–18)
IMM GRANULOCYTES # BLD AUTO: 0.01 K/UL (ref 0–0.04)
IMM GRANULOCYTES NFR BLD AUTO: 0.2 % (ref 0–0.5)
LYMPHOCYTES # BLD AUTO: 1.8 K/UL (ref 1–4.8)
LYMPHOCYTES NFR BLD: 35.4 % (ref 18–48)
MAGNESIUM SERPL-MCNC: 2.3 MG/DL (ref 1.6–2.6)
MCH RBC QN AUTO: 32.2 PG (ref 27–31)
MCHC RBC AUTO-ENTMCNC: 32.7 G/DL (ref 32–36)
MCV RBC AUTO: 98 FL (ref 82–98)
MONOCYTES # BLD AUTO: 0.5 K/UL (ref 0.3–1)
MONOCYTES NFR BLD: 10.6 % (ref 4–15)
NEUTROPHILS # BLD AUTO: 2.3 K/UL (ref 1.8–7.7)
NEUTROPHILS NFR BLD: 45.8 % (ref 38–73)
NRBC BLD-RTO: 0 /100 WBC
PLATELET # BLD AUTO: 124 K/UL (ref 150–450)
PMV BLD AUTO: 10.5 FL (ref 9.2–12.9)
POCT GLUCOSE: 77 MG/DL (ref 70–110)
POTASSIUM SERPL-SCNC: 5.3 MMOL/L (ref 3.5–5.1)
POTASSIUM SERPL-SCNC: 5.8 MMOL/L (ref 3.5–5.1)
PROT SERPL-MCNC: 7.2 G/DL (ref 6–8.4)
PROT SERPL-MCNC: 7.8 G/DL (ref 6–8.4)
RBC # BLD AUTO: 2.83 M/UL (ref 4.6–6.2)
SODIUM SERPL-SCNC: 136 MMOL/L (ref 136–145)
SODIUM SERPL-SCNC: 136 MMOL/L (ref 136–145)
WBC # BLD AUTO: 5 K/UL (ref 3.9–12.7)

## 2023-07-10 PROCEDURE — 36415 COLL VENOUS BLD VENIPUNCTURE: CPT

## 2023-07-10 PROCEDURE — 25000003 PHARM REV CODE 250: Performed by: STUDENT IN AN ORGANIZED HEALTH CARE EDUCATION/TRAINING PROGRAM

## 2023-07-10 PROCEDURE — 93010 EKG 12-LEAD: ICD-10-PCS | Mod: 76,,, | Performed by: INTERNAL MEDICINE

## 2023-07-10 PROCEDURE — 99999 PR PBB SHADOW E&M-EST. PATIENT-LVL V: ICD-10-PCS | Mod: PBBFAC,,, | Performed by: NURSE PRACTITIONER

## 2023-07-10 PROCEDURE — 63600175 PHARM REV CODE 636 W HCPCS: Performed by: PHYSICIAN ASSISTANT

## 2023-07-10 PROCEDURE — 25000242 PHARM REV CODE 250 ALT 637 W/ HCPCS: Performed by: PHYSICIAN ASSISTANT

## 2023-07-10 PROCEDURE — 93005 ELECTROCARDIOGRAM TRACING: CPT

## 2023-07-10 PROCEDURE — 25000003 PHARM REV CODE 250

## 2023-07-10 PROCEDURE — 1125F AMNT PAIN NOTED PAIN PRSNT: CPT | Mod: CPTII,S$GLB,, | Performed by: NURSE PRACTITIONER

## 2023-07-10 PROCEDURE — 80053 COMPREHEN METABOLIC PANEL: CPT | Performed by: PHYSICIAN ASSISTANT

## 2023-07-10 PROCEDURE — 1101F PT FALLS ASSESS-DOCD LE1/YR: CPT | Mod: CPTII,S$GLB,, | Performed by: NURSE PRACTITIONER

## 2023-07-10 PROCEDURE — 99214 PR OFFICE/OUTPT VISIT, EST, LEVL IV, 30-39 MIN: ICD-10-PCS | Mod: S$GLB,,, | Performed by: NURSE PRACTITIONER

## 2023-07-10 PROCEDURE — 99900035 HC TECH TIME PER 15 MIN (STAT)

## 2023-07-10 PROCEDURE — 99214 OFFICE O/P EST MOD 30 MIN: CPT | Mod: S$GLB,,, | Performed by: NURSE PRACTITIONER

## 2023-07-10 PROCEDURE — 99999 PR PBB SHADOW E&M-EST. PATIENT-LVL V: CPT | Mod: PBBFAC,,, | Performed by: NURSE PRACTITIONER

## 2023-07-10 PROCEDURE — 1159F PR MEDICATION LIST DOCUMENTED IN MEDICAL RECORD: ICD-10-PCS | Mod: CPTII,S$GLB,, | Performed by: NURSE PRACTITIONER

## 2023-07-10 PROCEDURE — 80053 COMPREHEN METABOLIC PANEL: CPT | Mod: 91

## 2023-07-10 PROCEDURE — 93010 ELECTROCARDIOGRAM REPORT: CPT | Mod: 76,,, | Performed by: INTERNAL MEDICINE

## 2023-07-10 PROCEDURE — 94761 N-INVAS EAR/PLS OXIMETRY MLT: CPT

## 2023-07-10 PROCEDURE — 1157F ADVNC CARE PLAN IN RCRD: CPT | Mod: CPTII,S$GLB,, | Performed by: NURSE PRACTITIONER

## 2023-07-10 PROCEDURE — 25000003 PHARM REV CODE 250: Performed by: PHYSICIAN ASSISTANT

## 2023-07-10 PROCEDURE — G0378 HOSPITAL OBSERVATION PER HR: HCPCS

## 2023-07-10 PROCEDURE — 1159F MED LIST DOCD IN RCRD: CPT | Mod: CPTII,S$GLB,, | Performed by: NURSE PRACTITIONER

## 2023-07-10 PROCEDURE — 25000003 PHARM REV CODE 250: Performed by: INTERNAL MEDICINE

## 2023-07-10 PROCEDURE — 3288F PR FALLS RISK ASSESSMENT DOCUMENTED: ICD-10-PCS | Mod: CPTII,S$GLB,, | Performed by: NURSE PRACTITIONER

## 2023-07-10 PROCEDURE — 93010 ELECTROCARDIOGRAM REPORT: CPT | Mod: ,,, | Performed by: INTERNAL MEDICINE

## 2023-07-10 PROCEDURE — 96375 TX/PRO/DX INJ NEW DRUG ADDON: CPT

## 2023-07-10 PROCEDURE — 1157F PR ADVANCE CARE PLAN OR EQUIV PRESENT IN MEDICAL RECORD: ICD-10-PCS | Mod: CPTII,S$GLB,, | Performed by: NURSE PRACTITIONER

## 2023-07-10 PROCEDURE — 96372 THER/PROPH/DIAG INJ SC/IM: CPT | Performed by: STUDENT IN AN ORGANIZED HEALTH CARE EDUCATION/TRAINING PROGRAM

## 2023-07-10 PROCEDURE — 94640 AIRWAY INHALATION TREATMENT: CPT

## 2023-07-10 PROCEDURE — 83735 ASSAY OF MAGNESIUM: CPT | Mod: 91 | Performed by: PHYSICIAN ASSISTANT

## 2023-07-10 PROCEDURE — 83036 HEMOGLOBIN GLYCOSYLATED A1C: CPT | Performed by: STUDENT IN AN ORGANIZED HEALTH CARE EDUCATION/TRAINING PROGRAM

## 2023-07-10 PROCEDURE — 3288F FALL RISK ASSESSMENT DOCD: CPT | Mod: CPTII,S$GLB,, | Performed by: NURSE PRACTITIONER

## 2023-07-10 PROCEDURE — 96365 THER/PROPH/DIAG IV INF INIT: CPT

## 2023-07-10 PROCEDURE — 1101F PR PT FALLS ASSESS DOC 0-1 FALLS W/OUT INJ PAST YR: ICD-10-PCS | Mod: CPTII,S$GLB,, | Performed by: NURSE PRACTITIONER

## 2023-07-10 PROCEDURE — 36415 COLL VENOUS BLD VENIPUNCTURE: CPT | Performed by: STUDENT IN AN ORGANIZED HEALTH CARE EDUCATION/TRAINING PROGRAM

## 2023-07-10 PROCEDURE — 1125F PR PAIN SEVERITY QUANTIFIED, PAIN PRESENT: ICD-10-PCS | Mod: CPTII,S$GLB,, | Performed by: NURSE PRACTITIONER

## 2023-07-10 PROCEDURE — 85025 COMPLETE CBC W/AUTO DIFF WBC: CPT | Mod: 91 | Performed by: PHYSICIAN ASSISTANT

## 2023-07-10 PROCEDURE — 99285 EMERGENCY DEPT VISIT HI MDM: CPT

## 2023-07-10 PROCEDURE — 63600175 PHARM REV CODE 636 W HCPCS: Performed by: STUDENT IN AN ORGANIZED HEALTH CARE EDUCATION/TRAINING PROGRAM

## 2023-07-10 RX ORDER — FOLIC ACID 1 MG/1
1 TABLET ORAL DAILY
Status: DISCONTINUED | OUTPATIENT
Start: 2023-07-11 | End: 2023-07-11 | Stop reason: HOSPADM

## 2023-07-10 RX ORDER — ENOXAPARIN SODIUM 100 MG/ML
30 INJECTION SUBCUTANEOUS EVERY 24 HOURS
Status: DISCONTINUED | OUTPATIENT
Start: 2023-07-10 | End: 2023-07-11 | Stop reason: HOSPADM

## 2023-07-10 RX ORDER — GLUCAGON 1 MG
1 KIT INJECTION
Status: DISCONTINUED | OUTPATIENT
Start: 2023-07-10 | End: 2023-07-11 | Stop reason: HOSPADM

## 2023-07-10 RX ORDER — SODIUM CHLORIDE 0.9 % (FLUSH) 0.9 %
10 SYRINGE (ML) INJECTION EVERY 12 HOURS PRN
Status: DISCONTINUED | OUTPATIENT
Start: 2023-07-10 | End: 2023-07-11 | Stop reason: HOSPADM

## 2023-07-10 RX ORDER — INSULIN ASPART 100 [IU]/ML
0-5 INJECTION, SOLUTION INTRAVENOUS; SUBCUTANEOUS
Status: DISCONTINUED | OUTPATIENT
Start: 2023-07-10 | End: 2023-07-11 | Stop reason: HOSPADM

## 2023-07-10 RX ORDER — FUROSEMIDE 40 MG/1
40 TABLET ORAL DAILY
Status: DISCONTINUED | OUTPATIENT
Start: 2023-07-10 | End: 2023-07-11 | Stop reason: HOSPADM

## 2023-07-10 RX ORDER — IBUPROFEN 200 MG
24 TABLET ORAL
Status: DISCONTINUED | OUTPATIENT
Start: 2023-07-10 | End: 2023-07-11 | Stop reason: HOSPADM

## 2023-07-10 RX ORDER — ATORVASTATIN CALCIUM 10 MG/1
10 TABLET, FILM COATED ORAL DAILY
Status: DISCONTINUED | OUTPATIENT
Start: 2023-07-11 | End: 2023-07-11 | Stop reason: HOSPADM

## 2023-07-10 RX ORDER — ALBUTEROL SULFATE 2.5 MG/.5ML
10 SOLUTION RESPIRATORY (INHALATION)
Status: COMPLETED | OUTPATIENT
Start: 2023-07-10 | End: 2023-07-10

## 2023-07-10 RX ORDER — CALCIUM GLUCONATE 20 MG/ML
1 INJECTION, SOLUTION INTRAVENOUS
Status: COMPLETED | OUTPATIENT
Start: 2023-07-10 | End: 2023-07-10

## 2023-07-10 RX ORDER — METOPROLOL SUCCINATE 25 MG/1
25 TABLET, EXTENDED RELEASE ORAL DAILY
Status: DISCONTINUED | OUTPATIENT
Start: 2023-07-11 | End: 2023-07-11 | Stop reason: HOSPADM

## 2023-07-10 RX ORDER — ACETAMINOPHEN 325 MG/1
325 TABLET ORAL EVERY 6 HOURS PRN
Status: DISCONTINUED | OUTPATIENT
Start: 2023-07-10 | End: 2023-07-11 | Stop reason: HOSPADM

## 2023-07-10 RX ORDER — FINASTERIDE 5 MG/1
5 TABLET, FILM COATED ORAL DAILY
Status: DISCONTINUED | OUTPATIENT
Start: 2023-07-11 | End: 2023-07-11 | Stop reason: HOSPADM

## 2023-07-10 RX ORDER — IBUPROFEN 200 MG
16 TABLET ORAL
Status: DISCONTINUED | OUTPATIENT
Start: 2023-07-10 | End: 2023-07-11 | Stop reason: HOSPADM

## 2023-07-10 RX ORDER — TALC
3 POWDER (GRAM) TOPICAL NIGHTLY PRN
Status: DISCONTINUED | OUTPATIENT
Start: 2023-07-10 | End: 2023-07-11 | Stop reason: HOSPADM

## 2023-07-10 RX ORDER — LEVOTHYROXINE SODIUM 100 UG/1
100 TABLET ORAL
Status: DISCONTINUED | OUTPATIENT
Start: 2023-07-11 | End: 2023-07-11 | Stop reason: HOSPADM

## 2023-07-10 RX ORDER — FAMOTIDINE 20 MG/1
20 TABLET, FILM COATED ORAL 2 TIMES DAILY
Status: DISCONTINUED | OUTPATIENT
Start: 2023-07-10 | End: 2023-07-11 | Stop reason: HOSPADM

## 2023-07-10 RX ADMIN — ALBUTEROL SULFATE 10 MG: 2.5 SOLUTION RESPIRATORY (INHALATION) at 05:07

## 2023-07-10 RX ADMIN — ACETAMINOPHEN 325 MG: 325 TABLET ORAL at 10:07

## 2023-07-10 RX ADMIN — Medication 3 MG: at 10:07

## 2023-07-10 RX ADMIN — DEXTROSE MONOHYDRATE 250 ML: 100 INJECTION, SOLUTION INTRAVENOUS at 05:07

## 2023-07-10 RX ADMIN — ENOXAPARIN SODIUM 30 MG: 30 INJECTION SUBCUTANEOUS at 06:07

## 2023-07-10 RX ADMIN — FUROSEMIDE 40 MG: 40 TABLET ORAL at 06:07

## 2023-07-10 RX ADMIN — FAMOTIDINE 20 MG: 20 TABLET, FILM COATED ORAL at 10:07

## 2023-07-10 RX ADMIN — CALCIUM GLUCONATE 1 G: 20 INJECTION, SOLUTION INTRAVENOUS at 05:07

## 2023-07-10 RX ADMIN — INSULIN HUMAN 5 UNITS: 100 INJECTION, SOLUTION PARENTERAL at 05:07

## 2023-07-10 NOTE — Clinical Note
Diagnosis: Hyperglycemia [168596]   Future Attending Provider: ROBY SANABRIA [[id number]]   Is the patient being sent to ED Observation?: No   Admitting Provider:: ROBY SANABRIA [[id number]]

## 2023-07-10 NOTE — ED PROVIDER NOTES
Encounter Date: 7/10/2023       History     Chief Complaint   Patient presents with    Abnormal labs     Came to the Ed following the results of a blood draw that showed an elevated potasium. Denies any symptoms at this time.      89-year-old male, PMH CHF, pacemaker, chronic low back pain, DM, HTN, enlarged prostate, CKD stage 3, presents to ED with son with concern of abnormal lab values.  Patient reports he receiving phone call today stating his potassium was critically high.  Per chart review, lab work today showing K of 6.3.  Patient denying any significant complaints at this time.  No chest pain, palpitations, lightheadedness dizziness, abdominal pain, nausea, vomiting, diarrhea, acute urinary complaints.  Unsure of any recent medication changes.  No other acute complaints at this time.    The history is provided by the patient.   Review of patient's allergies indicates:   Allergen Reactions    Bactrim [sulfamethoxazole-trimethoprim] Rash    Ciprofloxacin Rash    Latex Rash     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    Arthritis     Bilateral renal cysts     Chronic pain     Diabetes mellitus     DJD (degenerative joint disease)     Hyperkalemia     Hypertension     Iron deficiency anemia     Low back pain     Osteopenia of neck of right femur     Prostate enlargement     Sleep apnea     Stage 3b chronic kidney disease     Thyroid disease      Past Surgical History:   Procedure Laterality Date    CARDIAC PACEMAKER PLACEMENT      EPIDURAL STEROID INJECTION INTO LUMBAR SPINE N/A 06/04/2020    Procedure: Injection-steroid-epidural-lumbar--L4-5;  Surgeon: Angelica Norris Jr., MD;  Location: [facility] PAIN MGT;  Service: Pain Management;  Laterality: N/A;  sign consent at DOS.    HEMILAMINECTOMY  05/26/2021    Procedure: HEMILAMINECTOMY;  Surgeon: Dayton Sparks MD;  Location: [facility] OR;  Service: Neurosurgery;;  left L4-5    INJECTION OF JOINT  Bilateral 02/13/2020    Procedure: Injection, Joint, Bilateral GTB;  Surgeon: Angelica Norris Jr., MD;  Location: [facility] PAIN T;  Service: Pain Management;  Laterality: Bilateral;    INJECTION OF JOINT Bilateral 03/22/2022    Procedure: bilateral GTB steriod injection;  Surgeon: Angelica Norris Jr., MD;  Location: [facility] PAIN T;  Service: Pain Management;  Laterality: Bilateral;  pacemaker   pt is diabetic     INJECTION OF STEROID Bilateral 01/12/2021    Procedure: INJECTION, STEROID Bilateral SI Joint Block and Steroid Injection;  Surgeon: Dayton Sparks MD;  Location: [facility] OR;  Service: Neurosurgery;  Laterality: Bilateral;  Procedure: Bilateral SI Joint Block and Steroid Injection  Surgery 't'kristen: 45 min  LOS: 0  Anesthesia:MAC  Radiology: C-arm  Bed: Regular with Pillows  Position: Prone     SPINE SURGERY      SPINE SURGERY Bilateral     TRANSFORAMINAL EPIDURAL INJECTION OF STEROID Left 02/23/2021    Procedure: Injection,steroid,epidural,transforaminal approach--Left L4-5 *Has Pacemaker/ICD*;  Surgeon: Angelica Norris Jr., MD;  Location: [facility] PAIN [facility] [place], [place];  Service: Pain Management;  Laterality: Left;     Family History   Problem Relation Age of Onset    Diabetes Brother     No Known Problems Mother     No Known Problems Father     Diabetes Sister     No Known Problems Daughter     HIV Son      Social History     Tobacco Use    Smoking status: Never    Smokeless tobacco: Never   Substance Use Topics    Alcohol use: No    Drug use: No     Review of Systems   Constitutional:  Negative for chills and fever.   Respiratory:  Negative for cough and shortness of breath.    Cardiovascular:  Negative for chest pain and palpitations.   Gastrointestinal:  Negative for abdominal pain, constipation, diarrhea, nausea and vomiting.   Musculoskeletal:  Negative for back pain, myalgias, neck pain and neck stiffness.   Neurological:  Negative for dizziness, weakness, light-headedness and numbness.     Physical Exam      Initial Vitals [07/10/23 1529]   BP Pulse Resp Temp SpO2   (!) 108/55 62 16 97.4 °F (36.3 °C) 97 %      MAP       --         Physical Exam    Nursing note and vitals reviewed.  Constitutional: He appears well-developed and well-nourished. He is cooperative. He does not have a sickly appearance. He does not appear ill. No distress.   Pleasant, resting comfortably, no apparent distress   HENT:   Head: Normocephalic and atraumatic.   Eyes: EOM are normal.   Neck: Neck supple.   Normal range of motion.  Cardiovascular:  Normal rate and regular rhythm.           Pulmonary/Chest: Effort normal and breath sounds normal.   Pacemaker implant to left upper chest wall.  Nontender.  No skin changes or erythema.   Abdominal: Abdomen is soft.   Musculoskeletal:         General: No tenderness.      Cervical back: Normal range of motion and neck supple.     Neurological: He is alert and oriented to person, place, and time. GCS score is 15. GCS eye subscore is 4. GCS verbal subscore is 5. GCS motor subscore is 6.   Skin: Skin is warm and dry.   Psychiatric: He has a normal mood and affect. His speech is normal and behavior is normal. Thought content normal.       ED Course   Procedures  Labs Reviewed   CBC W/ AUTO DIFFERENTIAL - Abnormal; Notable for the following components:       Result Value    RBC 2.83 (*)     Hemoglobin 9.1 (*)     Hematocrit 27.8 (*)     MCH 32.2 (*)     Platelets 124 (*)     All other components within normal limits   COMPREHENSIVE METABOLIC PANEL - Abnormal; Notable for the following components:    Potassium 5.8 (*)     CO2 22 (*)     Glucose 251 (*)     BUN 45 (*)     Creatinine 2.2 (*)     Calcium 8.6 (*)     AST 43 (*)     eGFR 27 (*)     Anion Gap 7 (*)     All other components within normal limits   MAGNESIUM   POCT GLUCOSE MONITORING CONTINUOUS          Imaging Results    None          Medications   albuterol sulfate nebulizer solution 10 mg (has no administration in time range)   calcium gluconate  1 g in NS IVPB (premixed) (has no administration in time range)   insulin regular injection 5 Units 0.05 mL (has no administration in time range)   dextrose 10% bolus 250 mL 250 mL (has no administration in time range)     Medical Decision Making:   Initial Assessment:   Patient presents with concern of abnormal high potassium.  Noted in lab work today to have K of 6.3.  Denying any significant symptoms or complaints.  Afebrile.  Patient resting comfortably on exam and in no distress.  Differential Diagnosis:   Including but not limited to Hyperkalemia, SOFI, renal failure, electrolyte abnormality  ED Management:  CBC, CMP, magnesium, EKG    EKG paced rhythm, rate 60, no acute ST elevation.  No STEMI.  EKG reviewed by attending, Dr. Mcclendon.  CBC appearing grossly near baseline.  No elevation WBC.  CMP is significant for K of 5.8, also noting gradual declining creatinine, 2.2, along with hyperglycemia 251.  Magnesium WNL.    Patient given medication ED for potassium shift.  He is otherwise well-appearing and in no distress.  Will plan to placed in observation.    Patient discussed with [facility] internal medicine team, who has accepted patient for observation.                          Clinical Impression:   Final diagnoses:  [E87.5] Hyperkalemia  [R73.9] Hyperglycemia (Primary)  [Z87.448] History of chronic kidney disease        ED Disposition Condition    Observation                 Miki Martinez PA-C  07/10/23 8876

## 2023-07-10 NOTE — TELEPHONE ENCOUNTER
Rommel from [facility] called with a Critical lab value of Potassium-6.3. High Priority message sent to Dr.Oksana Craft and Dr. Anant Coats whom patient saw 6/20/23. Value entered in Flowsheets. Secure chat sent to Dr. Craft as well.

## 2023-07-10 NOTE — TELEPHONE ENCOUNTER
Called patient and spoke with patient son,siva Burgos to advise patient to go the ED or call patient PCP ASAP, do to potassium level is high. Patient son states he will call the patient PCP. Called patient son to get an update, patient son said he call patient PCP, and left message and the office would give him a call back. I informed patient son to make sure he talk with the PCP due to the level and patient said he will go ahead an take patient to the ED.

## 2023-07-10 NOTE — PROGRESS NOTES
GASTROENTEROLOGY CLINIC NOTE    Chief Complaint: The primary encounter diagnosis was Chronic idiopathic constipation. A diagnosis of Chronic disease anemia was also pertinent to this visit.  Referring provider/PCP: Shlomo Luong MD    Vince Martinez is a 89 y.o. male who is a new patient to me with a PMH that's significant for anemia, CHF, CKD Stage 3, DM2, and GERD.  [employer] service is being used for this office visit.  He is here today to establish care for anemia and black stool.  This is not a new problem.  Patient has a history of anemia.  He reports having workup for anemia about 3 years ago which involved upper endoscopy and colonoscopy that were normal.  He has fatigue and weakness.  He also complains of mild dizziness and blurry vision.  Of note his blood sugars have been elevated and he was recently instructed to go to emergency room for hyperglycemia noted on lab work.  He is being followed by Hematology.  He is not currently taking oral iron supplements.  These were discontinued about 1 year ago.  He is not currently taking 81 mg aspirin but he does take omeprazole 40 mg daily.  No known history of H pylori.  When further questioned about black stool, he reports having black spots in his stool and states he does not have entire bowel movements that are black in color.  The black spots in his stool occur about once a month.  He is unsure if it is related to any foods that he may be eating.  He is not taking Pepto-Bismol    Anemia  Presents for initial visit. The condition has lasted for 10 years. There has been no abdominal pain, anorexia, confusion, light-headedness, malaise/fatigue, pallor, palpitations, pica or weight loss. Signs of blood loss that are not present include hematemesis, hematochezia and melena. Treatments tried: PO Iron in the past.     Interval Note 7/10/2023  Mr. Vince Martinez who is known to me presents to clinic for constipation. He was last seen by me  12/2022 for anemia. At that time we discussed risks vs benefits of further workup with EGD and Colonoscopy. At that time patient elected to check serum H.pylori which was negative and monitor labs. Today he presents with constipation. This is not a new problem as he reports it has been ongoing for awhile.     Constipation    How Long: years   Frequency of Bowel Movements: Every two days  Hard stool   Straining: yes   Complete Evacuation: no   Hematochezia: no   Abdominal Pain: Occasional lower improves after bm   Unexplained Weight Loss/Change in Bowel Habits: no   Water Intake: Little   Daily Fiber Supplement: no     Treatments: OTC medication (cannot recall name) and Miralax     NSAIDs: ASA 81 mg  Anticoagulation or Antiplatelet: No    History of H.pylori: no  H.pylori Treatment:  Prior Upper Endoscopy: 3 years ago per patient report  Prior Colonoscopy: 3 years ago per patient report  Family h/o Colon Cancer: No  Family h/o Crohn's Disease or Ulcerative Colitis: No  Family h/o Celiac Sprue: No  Abdominal Surgeries: no    Review of Systems   Constitutional:  Negative for malaise/fatigue and weight loss.   HENT:  Negative for sore throat.    Eyes:  Negative for blurred vision.   Respiratory:  Negative for cough.    Cardiovascular:  Negative for chest pain and palpitations.   Gastrointestinal:  Positive for constipation. Negative for abdominal pain, anorexia, blood in stool, diarrhea, heartburn, hematemesis, hematochezia, melena, nausea and vomiting.   Genitourinary:  Negative for dysuria.   Musculoskeletal:  Negative for myalgias.   Skin:  Negative for pallor and rash.   Neurological:  Negative for dizziness, weakness, light-headedness and headaches.   Endo/Heme/Allergies:  Negative for environmental allergies.   Psychiatric/Behavioral:  Negative for confusion and suicidal ideas. The patient is not nervous/anxious.      Past Medical History: has a past medical history of Acute combined systolic and diastolic CHF,  NYHA class 3, Arthritis, Bilateral renal cysts, Chronic pain, Diabetes mellitus, DJD (degenerative joint disease), Hyperkalemia, Hypertension, Iron deficiency anemia, Low back pain, Osteopenia of neck of right femur, Prostate enlargement, Sleep apnea, Stage 3b chronic kidney disease, and Thyroid disease.    Past Surgical History: has a past surgical history that includes Injection of joint (Bilateral, 02/13/2020); Epidural steroid injection into lumbar spine (N/A, 06/04/2020); Cardiac pacemaker placement; Injection of steroid (Bilateral, 01/12/2021); Transforaminal epidural injection of steroid (Left, 02/23/2021); Hemilaminectomy (05/26/2021); Spine surgery; Injection of joint (Bilateral, 03/22/2022); and Spine surgery (Bilateral).    Family History:family history includes Diabetes in his brother and sister; HIV in his son; No Known Problems in his daughter, father, and mother.    Allergies:   Review of patient's allergies indicates:   Allergen Reactions    Bactrim [sulfamethoxazole-trimethoprim] Rash    Ciprofloxacin Rash    Latex Rash       Social History: reports that he has never smoked. He has never used smokeless tobacco. He reports that he does not drink alcohol and does not use drugs.    Home medications:   Current Outpatient Medications on File Prior to Visit   Medication Sig Dispense Refill    atorvastatin (LIPITOR) 10 MG tablet Take 1 tablet by mouth once daily 90 tablet 0    betamethasone dipropionate (DIPROLENE) 0.05 % lotion Apply 1 application topically nightly as needed.      blood sugar diagnostic Strp 1 strip by Misc.(Non-Drug; Combo Route) route once daily at 6am. 100 strip 3    blood-glucose meter kit Use as instructed 1 each 0    calcium/D3/mag ox//carolyn/Zn (CALTRATE + D3 PLUS MINERALS ORAL) Take 1 tablet by mouth 2 (two) times daily.      denosumab (PROLIA) 60 mg/mL Syrg Inject 1 mL (60 mg total) into the skin every 6 (six) months. 2 mL 4    diclofenac sodium (VOLTAREN) 1 % Gel Apply 2 g  topically daily as needed (pain). 200 g 3    famotidine (PEPCID) 20 MG tablet Take 1 tablet (20 mg total) by mouth 2 (two) times daily. 60 tablet 11    finasteride (PROSCAR) 5 mg tablet Take 1 tablet (5 mg total) by mouth once daily. 90 tablet 3    fluticasone propionate (FLONASE) 50 mcg/actuation nasal spray USE 2 SPRAY(S) IN EACH NOSTRIL TWICE DAILY AS NEEDED FOR  RHINITIS 48 g 1    fluticasone-salmeterol diskus inhaler 250-50 mcg Inhale 1 puff into the lungs 2 (two) times daily. Controller      folic acid (FOLVITE) 1 MG tablet Take 1 mg by mouth once daily.      folic acid/multivit-min/lutein (CENTRUM SILVER ORAL) Take by mouth.      furosemide (LASIX) 40 MG tablet Take 1 tablet (40 mg total) by mouth once daily. 90 tablet 3    irbesartan (AVAPRO) 75 MG tablet Take 1 tablet (75 mg total) by mouth once daily. 90 tablet 3    lancets (LANCETS,ULTRA THIN) Misc 1 lancet by Misc.(Non-Drug; Combo Route) route once daily at 6am. 100 each 3    lancing device Misc 1 Device by Misc.(Non-Drug; Combo Route) route once daily at 6am. 1 each 0    levothyroxine (EUTHYROX) 100 MCG tablet Take 1 tablet (100 mcg total) by mouth before breakfast. 90 tablet 3    melatonin 10 mg Tab Take 1 tablet by mouth nightly as needed.      metoprolol succinate (TOPROL-XL) 25 MG 24 hr tablet Take 1 tablet (25 mg total) by mouth once daily. 90 tablet 3    omega-3 fatty acids/fish oil (FISH OIL-OMEGA-3 FATTY ACIDS) 300-1,000 mg capsule Take by mouth once daily.      patiromer calcium sorbitex (VELTASSA) 8.4 gram PwPk Take 1 packet (8.4 g total) by mouth every Mon, Wed, Fri. 13 packet 3    tamsulosin (FLOMAX) 0.4 mg Cap Take 2 capsules (0.8 mg total) by mouth once daily. 180 capsule 3    traZODone (DESYREL) 50 MG tablet Take 1 tablet (50 mg total) by mouth nightly as needed for Insomnia. 90 tablet 3    triamcinolone acetonide 0.1% (KENALOG) 0.1 % Lotn APPLY LOTION TO THE AFFECTED AREAS ON THE SCALP ONCE DAILY AT BEDTIME AS NEEDED      [DISCONTINUED]  "polyethylene glycol (GLYCOLAX) 17 gram/dose powder Take 17 g by mouth once daily. 116 g 0    aspirin 81 MG Chew 1 tablet (81 mg total) by Per NG tube route once daily. 30 tablet 11     No current facility-administered medications on file prior to visit.       Vital signs:  Ht 5' 5" (1.651 m)   Wt 67.6 kg (149 lb 0.5 oz)   BMI 24.80 kg/m²     Physical Exam  Vitals reviewed.   Constitutional:       General: He is not in acute distress.     Appearance: Normal appearance. He is not ill-appearing.   HENT:      Head: Normocephalic.   Cardiovascular:      Rate and Rhythm: Normal rate and regular rhythm.      Heart sounds: Normal heart sounds. No murmur heard.  Pulmonary:      Effort: Pulmonary effort is normal. No respiratory distress.      Breath sounds: Normal breath sounds.   Chest:      Chest wall: No tenderness.   Abdominal:      General: Bowel sounds are normal. There is no distension.      Palpations: Abdomen is soft.      Tenderness: There is no abdominal tenderness. Negative signs include Taylor's sign.      Hernia: No hernia is present.   Skin:     General: Skin is warm.   Neurological:      Mental Status: He is alert and oriented to person, place, and time.   Psychiatric:         Mood and Affect: Mood normal.         Behavior: Behavior normal.       Routine labs:  Lab Results   Component Value Date    WBC 4.96 07/10/2023    HGB 10.0 (L) 07/10/2023    HCT 30.9 (L) 07/10/2023     (H) 07/10/2023     (L) 07/10/2023     Lab Results   Component Value Date    INR 1.0 08/23/2022     Lab Results   Component Value Date    IRON 86 06/19/2023    FERRITIN 138 06/19/2023    TIBC 314 06/19/2023    FESATURATED 27 06/19/2023     Lab Results   Component Value Date     06/19/2023    K 4.7 06/19/2023     06/19/2023    CO2 24 06/19/2023    BUN 38 (H) 06/19/2023    CREATININE 1.7 (H) 06/19/2023     Lab Results   Component Value Date    ALBUMIN 3.7 06/19/2023    ALT 31 06/19/2023    AST 46 (H) 06/19/2023    " ALKPHOS 83 06/19/2023    BILITOT 0.3 06/19/2023     No results found for: GLUCOSE  Lab Results   Component Value Date    TSH 4.673 (H) 08/22/2022     Lab Results   Component Value Date    CALCIUM 9.2 06/19/2023    PHOS 2.8 04/10/2023       Imaging:      I have reviewed prior labs, imaging, and notes.      Assessment:  1. Chronic idiopathic constipation    2. Chronic disease anemia      Chronic constipation. Bowel movements occurring every two days. Hard consistency with accompanied straining.   OTC medication and Miralax ineffective. Intermittent lower abdominal discomfort that improves with bowel movements.   Follows with hematology for anemia. CBC stable.     Plan:  Orders Placed This Encounter    linaCLOtide (LINZESS) 72 mcg Cap capsule     Start Linzess 72mcg daily. Patient aware may cause loose bowel movements at first but should improve.   Continue to monitor CBC and follow with hematology as scheduled. If anemia worsens, consider EGD/Colonoscopy.     Plan of care discussed with patient who is in agreement and verbalized understanding.     I have explained the planned procedures to the patient.The risks, benefits and alternatives of the procedure were also explained in detail. Patient verbalized understanding, all questions were answered. The patient agrees to proceed as planned    Follow Up: 8-10 weeks          LYNDON Coy,FNP-BC  [facility] [place]    (Portions of this note were dictated using voice recognition software and may contain dictation related errors in spelling/grammar/syntax not found on text review)

## 2023-07-10 NOTE — TELEPHONE ENCOUNTER
Son called stating his dad has a high potassium, per Dr Sullivan's note son informed to bring his dad to er for high potassium, verb understanding

## 2023-07-11 VITALS
WEIGHT: 149.69 LBS | SYSTOLIC BLOOD PRESSURE: 128 MMHG | HEIGHT: 65 IN | TEMPERATURE: 98 F | RESPIRATION RATE: 18 BRPM | BODY MASS INDEX: 24.94 KG/M2 | HEART RATE: 59 BPM | DIASTOLIC BLOOD PRESSURE: 59 MMHG | OXYGEN SATURATION: 95 %

## 2023-07-11 LAB
ALBUMIN SERPL BCP-MCNC: 3.6 G/DL (ref 3.5–5.2)
ALP SERPL-CCNC: 70 U/L (ref 55–135)
ALT SERPL W/O P-5'-P-CCNC: 36 U/L (ref 10–44)
ANION GAP SERPL CALC-SCNC: 12 MMOL/L (ref 8–16)
AST SERPL-CCNC: 46 U/L (ref 10–40)
BASOPHILS # BLD AUTO: 0.04 K/UL (ref 0–0.2)
BASOPHILS NFR BLD: 0.9 % (ref 0–1.9)
BILIRUB SERPL-MCNC: 0.4 MG/DL (ref 0.1–1)
BUN SERPL-MCNC: 40 MG/DL (ref 10–30)
CALCIUM SERPL-MCNC: 8.9 MG/DL (ref 8.7–10.5)
CHLORIDE SERPL-SCNC: 104 MMOL/L (ref 95–110)
CO2 SERPL-SCNC: 20 MMOL/L (ref 23–29)
CREAT SERPL-MCNC: 1.9 MG/DL (ref 0.5–1.4)
DIFFERENTIAL METHOD: ABNORMAL
EOSINOPHIL # BLD AUTO: 0.4 K/UL (ref 0–0.5)
EOSINOPHIL NFR BLD: 8.5 % (ref 0–8)
ERYTHROCYTE [DISTWIDTH] IN BLOOD BY AUTOMATED COUNT: 13.1 % (ref 11.5–14.5)
EST. GFR  (NO RACE VARIABLE): 33 ML/MIN/1.73 M^2
FOLATE SERPL-MCNC: 16.4 NG/ML (ref 4–24)
GLUCOSE SERPL-MCNC: 127 MG/DL (ref 70–110)
HCT VFR BLD AUTO: 27.8 % (ref 40–54)
HGB BLD-MCNC: 9.2 G/DL (ref 14–18)
IMM GRANULOCYTES # BLD AUTO: 0.01 K/UL (ref 0–0.04)
IMM GRANULOCYTES NFR BLD AUTO: 0.2 % (ref 0–0.5)
LYMPHOCYTES # BLD AUTO: 1.7 K/UL (ref 1–4.8)
LYMPHOCYTES NFR BLD: 39.2 % (ref 18–48)
MAGNESIUM SERPL-MCNC: 2 MG/DL (ref 1.6–2.6)
MCH RBC QN AUTO: 31.9 PG (ref 27–31)
MCHC RBC AUTO-ENTMCNC: 33.1 G/DL (ref 32–36)
MCV RBC AUTO: 97 FL (ref 82–98)
MONOCYTES # BLD AUTO: 0.5 K/UL (ref 0.3–1)
MONOCYTES NFR BLD: 12 % (ref 4–15)
NEUTROPHILS # BLD AUTO: 1.7 K/UL (ref 1.8–7.7)
NEUTROPHILS NFR BLD: 39.2 % (ref 38–73)
NRBC BLD-RTO: 0 /100 WBC
PHOSPHATE SERPL-MCNC: 4 MG/DL (ref 2.7–4.5)
PLATELET # BLD AUTO: 131 K/UL (ref 150–450)
PMV BLD AUTO: 10.4 FL (ref 9.2–12.9)
POCT GLUCOSE: 143 MG/DL (ref 70–110)
POCT GLUCOSE: 153 MG/DL (ref 70–110)
POTASSIUM SERPL-SCNC: 5.1 MMOL/L (ref 3.5–5.1)
PROT SERPL-MCNC: 7 G/DL (ref 6–8.4)
RBC # BLD AUTO: 2.88 M/UL (ref 4.6–6.2)
SODIUM SERPL-SCNC: 136 MMOL/L (ref 136–145)
VIT B12 SERPL-MCNC: 956 PG/ML (ref 210–950)
WBC # BLD AUTO: 4.34 K/UL (ref 3.9–12.7)

## 2023-07-11 PROCEDURE — 84100 ASSAY OF PHOSPHORUS: CPT | Performed by: STUDENT IN AN ORGANIZED HEALTH CARE EDUCATION/TRAINING PROGRAM

## 2023-07-11 PROCEDURE — 36415 COLL VENOUS BLD VENIPUNCTURE: CPT | Performed by: STUDENT IN AN ORGANIZED HEALTH CARE EDUCATION/TRAINING PROGRAM

## 2023-07-11 PROCEDURE — 85025 COMPLETE CBC W/AUTO DIFF WBC: CPT | Performed by: STUDENT IN AN ORGANIZED HEALTH CARE EDUCATION/TRAINING PROGRAM

## 2023-07-11 PROCEDURE — 83735 ASSAY OF MAGNESIUM: CPT | Performed by: STUDENT IN AN ORGANIZED HEALTH CARE EDUCATION/TRAINING PROGRAM

## 2023-07-11 PROCEDURE — 82746 ASSAY OF FOLIC ACID SERUM: CPT | Performed by: STUDENT IN AN ORGANIZED HEALTH CARE EDUCATION/TRAINING PROGRAM

## 2023-07-11 PROCEDURE — 80053 COMPREHEN METABOLIC PANEL: CPT | Performed by: STUDENT IN AN ORGANIZED HEALTH CARE EDUCATION/TRAINING PROGRAM

## 2023-07-11 PROCEDURE — 82607 VITAMIN B-12: CPT | Performed by: STUDENT IN AN ORGANIZED HEALTH CARE EDUCATION/TRAINING PROGRAM

## 2023-07-11 PROCEDURE — 25000003 PHARM REV CODE 250: Performed by: STUDENT IN AN ORGANIZED HEALTH CARE EDUCATION/TRAINING PROGRAM

## 2023-07-11 PROCEDURE — G0378 HOSPITAL OBSERVATION PER HR: HCPCS

## 2023-07-11 RX ADMIN — FOLIC ACID 1 MG: 1 TABLET ORAL at 08:07

## 2023-07-11 RX ADMIN — FUROSEMIDE 40 MG: 40 TABLET ORAL at 08:07

## 2023-07-11 RX ADMIN — LEVOTHYROXINE SODIUM 100 MCG: 100 TABLET ORAL at 05:07

## 2023-07-11 RX ADMIN — FAMOTIDINE 20 MG: 20 TABLET, FILM COATED ORAL at 08:07

## 2023-07-11 RX ADMIN — FINASTERIDE 5 MG: 5 TABLET, FILM COATED ORAL at 08:07

## 2023-07-11 RX ADMIN — METOPROLOL SUCCINATE 25 MG: 25 TABLET, EXTENDED RELEASE ORAL at 08:07

## 2023-07-11 RX ADMIN — ATORVASTATIN CALCIUM 10 MG: 10 TABLET, FILM COATED ORAL at 08:07

## 2023-07-11 RX ADMIN — THERA TABS 1 TABLET: TAB at 08:07

## 2023-07-11 NOTE — PLAN OF CARE
D/C recs noted. Pt to have PCC follow up due to SW calling PCP office and no available until August. Pharmacist will go over home medications and reasons for medications. VN and bedside nurse to reiterate final discharge instructions.       At time of discharge pt will be transported home by family at bedside    DME at discharge: none  Home Health: none    Pt has follow up appointments added to AVS.         07/11/23 1441   Final Note   Assessment Type Final Discharge Note   Anticipated Discharge Disposition Home   What phone number can be called within the next 1-3 days to see how you are doing after discharge? [phone]   Hospital Resources/Appts/Education Provided Appointments scheduled by Navigator/Coordinator   Post-Acute Status   Discharge Delays None known at this time       Future Appointments   Date Time Provider Department Center   7/20/2023  9:00 AM Jerri Bernal MD [facility] IMPRI [facility]   9/6/2023  1:30 PM Karolina Swann NP [facility] GASTRO [facility]   9/29/2023 10:45 AM Anant Coats MD [facility] UROLOGY [place] [facility]   10/20/2023  9:10 AM APPOINTMENT LAB, [facility] LAB [facility]   10/23/2023  9:30 AM Marion Rosario NP [facility] HEM ONC [place] [facility]   11/27/2023  9:00 AM SPECIMEN, [facility] SPECLAB [place]   11/27/2023  9:15 AM LAB, [facility] LAB [place]   11/30/2023 10:00 AM Shlomo Luong MD [facility]  [phone]

## 2023-07-11 NOTE — PROGRESS NOTES
"[facility] Hospital Medicine Progress Note    Primary Team: [facility] Hospitalist Team A  Attending Physician: Michael Meredith MD  Resident: Dr. Vogt  Intern: Dr. Mina    Admission Date:  7/10/2023  Hospital Day: Hospital Day: 2    Chief Complaint:  Hyperkalemia x 1 day         Subjective/Interval History      Patient seen and examined by me this morning. Pt resting comfortably in bed.     Review of Systems:  A comprehensive review of systems was negative unless otherwise stated in the HPI.     Objective     Physical Examination:  /62 (Patient Position: Lying)   Pulse 60   Temp 96.7 °F (35.9 °C) (Oral)   Resp 20   Ht 5' 5" (1.651 m)   Wt 67.9 kg (149 lb 11.1 oz)   SpO2 99%   BMI 24.91 kg/m²      General:  no acute distress, resting comfortably in bed  HEENT:  atraumatic, normocephalic  Chest wall: Pacemaker to L upper chest wall.  Respiratory: Stable on room air, unlabored breathing  Abdominal: Non-distended  Extremities:  no cyanosis or edema  Skin: Non-diaphoretic, warm, dry.     Intake/Output:    Intake/Output Summary (Last 24 hours) at 7/11/2023 0642  Last data filed at 7/11/2023 0008  Gross per 24 hour   Intake 125 ml   Output --   Net 125 ml     Net IO Since Admission: 125 mL [07/11/23 0642]    Laboratory:  Recent Labs   Lab 07/10/23  1129 07/10/23  1616 07/10/23  2156 07/11/23  0555   WBC 4.96 5.00  --  4.34   HGB 10.0* 9.1*  --  9.2*   * 124*  --  131*   * 98  --  97    136 136  --    K 6.3* 5.8* 5.3*  --     107 104  --    CO2 23 22* 20*  --    BUN 43* 45* 42*  --    CREATININE 1.9* 2.2* 2.0*  --    * 251* 146*  --    CALCIUM 9.1 8.6* 9.5  --    PROT  --  7.2 7.8  --    ALBUMIN 3.9 3.7 4.0  --    PHOS 3.3  --   --   --    MG 2.5 2.3  --   --    AST  --  43* 49*  --    ALT  --  34 39  --    ALKPHOS  --  87 84  --      No results for input(s): APTT, INR in the last 168 hours.    Invalid input(s): APT  Lab Results   Component Value Date    MONA Yellow 07/10/2023    " APPEARANCEUA Clear 07/10/2023    SPECGRAV 1.010 07/10/2023    PHUR 6.0 07/10/2023    PROTEINUA Negative 07/10/2023    KETONESU Negative 07/10/2023    LEUKOCYTESUR Negative 07/10/2023    NITRITE Negative 07/10/2023    UROBILINOGEN Negative 07/10/2023       All laboratory data reviewed        EKG Data: Reviewed  Results for orders placed or performed during the hospital encounter of 08/23/22   EKG 12-lead    Collection Time: 08/23/22  3:37 PM    Narrative    Test Reason : R53.83,    Vent. Rate : 060 BPM     Atrial Rate : 064 BPM     P-R Int : 000 ms          QRS Dur : 088 ms      QT Int : 400 ms       P-R-T Axes : 000 -35 017 degrees     QTc Int : 400 ms    Atrial-paced rhythm  Left axis deviation  Confirmed by Fahad Stiles MD ([id number]) on 8/23/2022 5:07:42 PM    Referred By: [email or web address]   SELF           Confirmed By:Fahad Stiles MD       Imaging Data: Reviewed  US Aorta (Non-Screening)    Result Date: 7/3/2023  This result has an attachment that is not available. Examination: Ultrasound Examination of the Abdominal Aorta Date of Interpretation: 6/29/2023 Ordering Provider: Jamal Gomez MD Indication: Pure hypercholesterolemia, Palpitations, Essential hypertension, REEDER (dyspnea on exertion), Chronic renal failure, unspecified CKD stage, Type 2 diabetes mellitus without complication, unspecified whether long term insulin use (CMS/[facility]) Technologist: Marielos Su RDMS, RVT, RDCS Technique: Gray Scale and Doppler flow assessment. Ultrasound Findings: The abdominal aorta was evaluated with 2D, color Doppler, and spectral analysis from the level of the diaphragm through the iliac arteries. The greatest AP or transverse diameter was 2.5cm. · The proximal segment of the abdominal aorta measured 2.5cm. · The mid segment of the aorta measured 1.6cm. · The distal segment of the aorta measured 1.0cm. Ultrasound Summary:   1. Greatest AP or transverse diameter measurement of the abdominal aorta was 2.5cm. This is  suggestive of no aneurysmal dilation. 2. The Gray Scale evaluation of the aortic lumen demonstrated mild atherosclerotic disease. 3. Doppler evaluation of the distal aorta revealed peak systolic velocities of 107cm/sec, consistent with mild stenosis.   4. The Inferior Vena Cava is free of thrombus formation.  Spectral Doppler evaluation demonstrates multi-phasic antegrade venous flow towards the diaphragm. 5. The inflow and outflow of the Aorta and IVC are antegrade with phase appropriate waveforms suggesting flow that is within normal limits.       Current Medications:     Infusions:       Scheduled:   atorvastatin  10 mg Oral Daily    enoxparin  30 mg Subcutaneous Daily    famotidine  20 mg Oral BID    finasteride  5 mg Oral Daily    folic acid  1 mg Oral Daily    furosemide  40 mg Oral Daily    levothyroxine  100 mcg Oral Before breakfast    metoprolol succinate  25 mg Oral Daily    multivitamin  1 tablet Oral Daily        PRN:  acetaminophen, dextrose 10%, dextrose 10%, dextrose 10%, dextrose 10%, glucagon (human recombinant), glucagon (human recombinant), glucose, glucose, glucose, glucose, insulin aspart U-100, melatonin, sodium chloride 0.9%        Assessment/Plan:     Vince Martinez is a 89 y.o. White male with past medical history of  SOLEDAD, CHF, pacemaker, CKD Stage 3, DM2, chronic back pain, thyroid disease and GERD who presented on 7/10/2023 for abnormal labs of elevated potassium of 6.3. Denies any pain or symptoms at this time.      Hyperkalemia (resolved)  CKD Stage 3  SOLEDAD  Macrocytic anemia  Pt asymptomatic and normal EKG  - K on admission was 6.3-> 5.8-> 5.3->5.1  - PTH elevated 117  - Cr 1.9 (at baseline)  - hgb 9.2 (at baseline)  - Mg, phos WNL  - uric acid, U/A, protein/cr ratio urine WNL  - iron and TIBC, ferritin WNL     Plan:  - discontinue ARB --> BP has been normotensive  - low K diet --> consult dietician    - f/u vit B12 and folate results     DM2 without long term use of insulin  -  administer 5U insulin IV LDSS  - POCT glc 143     Essential HTN  Chronic combined systolic and diastolic CHF  - resume home toprol xl 25  - resume home furosemide 40  - resume home atorvastatin 10      Hypothyroidism  - resume home levothyroxine 100 mcg     BPH  - resume home finasteride 5     GERD  - resume home famotidine 20        Healthcare maintenance   -primary care provider is Shlomo Luong MD      Diet:  Diet renal  -   VTE Risk Mitigation (From admission, onward)              Ordered       enoxaparin injection 30 mg  Daily         07/10/23 1801       IP VTE HIGH RISK PATIENT  Once         07/10/23 1801       Place sequential compression device  Until discontinued         07/10/23 1801                          Code Status:  Full Code     Dispo: pending HD, d/c today  Estimated LOS: 1 day    Omaira Mina MD  [facility] Internal Medicine -I     [facility] Medicine Hospitalist Pager numbers:   [facility] Hospitalist Medicine Team A (Viri/Ida):          [phone]  [facility] Hospitalist Medicine Team B (Agustín/Keith):        [phone]

## 2023-07-11 NOTE — PLAN OF CARE
SW met with pt at bedside to complete assessment. Pt is Alert and able to verbally answer assessment questions.  Pt is supported at bedside with spouse and adult son Vince Hudson. Vince Hudson phone number is listed as pt primary phone number to be [phone]. Pt confirmed , PCP, ADL's etc. Pt reports no ADL's assistance, no DME in use, and no need for home health. SW informed family they are able to request home health from PCP if needed. At time of discharge pt family to transport home. SW updated whiteboard with [facility] name and contact information. SW confirmed pt understanding of Observation unit and expected discharge plan. SW will continue to follow pt throughout care and assist with any discharge needs.         [id number]   Discharge Planning   Assessment Type Discharge Planning Brief Assessment   Resource/Environmental Concerns none   Support Systems Spouse/significant other;Children;Family members   Equipment Currently Used at Home none   Current Living Arrangements home   Patient/Family Anticipates Transition to home with family   Patient/Family Anticipated Services at Transition none   DME Needed Upon Discharge  none   Discharge Plan A Home with family       Future Appointments   Date Time Provider Department Center   2023  1:30 PM Karolina Swann NP [facility] GASTRO [facility]   2023 10:45 AM Anant Coats MD [facility] UROLOGY [facility]   10/20/2023  9:10 AM APPOINTMENT ROSETTE MENDES [facility] LAB [facility]   10/23/2023  9:30 AM Marion Rosario NP [facility] HEM ONC [facility]   2023  9:00 AM SPECIMEN, DIMITRI SCHMITT [facility]CL[facility] [place]   2023  9:15 AM [facility] LAB [place]   2023 10:00 AM Shlomo Luong MD [facility] Dimitri Bain [facility]  [facility]  [phone]

## 2023-07-11 NOTE — DISCHARGE SUMMARY
[facility] Medicine Discharge Summary    Primary Team: [facility] Hospitalist Team A  Attending Physician: Michael Meredith MD  Resident: Dr. Vogt  Intern: Dr. Mina    Date of Admit: 7/10/2023  Date of Discharge: 7/11/2023    Discharge to: Home/Self-Care  Condition: Stable    Discharge Diagnoses     Patient Active Problem List   Diagnosis    Essential hypertension    Hypertensive heart disease without heart failure    Type 2 diabetes mellitus with hyperglycemia, without long-term current use of insulin    Acquired hypothyroidism    Localized edema    Rheumatic aortic valve insufficiency    Palpitations    PVCs (premature ventricular contractions)    Premature atrial contractions    Chronic combined systolic and diastolic CHF (congestive heart failure)    Greater trochanteric bursitis of both hips    Lumbar spondylosis    Retrolisthesis of vertebrae    Chronic pain    Neuroforaminal stenosis of lumbar spine    Lumbar radiculopathy    DDD (degenerative disc disease), lumbar    Spinal stenosis of lumbar region with neurogenic claudication    CKD (chronic kidney disease) stage 3, GFR 30-59 ml/min    Generalized osteoarthritis    Benign prostatic hyperplasia    Hypertensive heart disease without congestive heart failure    Esophageal reflux    Sacroiliitis, not elsewhere classified    Spinal stenosis of lumbar region with radiculopathy    Overweight (BMI 25.0-29.9)    Calcified granuloma of lung    Abdominal aortic atherosclerosis    Thrombocytopenia, unspecified    Sick sinus syndrome    Hyperkalemia    CKD (chronic kidney disease), stage IV       Consultants and Procedures     Consultants:  none    Procedures:   none    Imaging:  none    Brief History of Present Illness       Vince Martinez is a 89 y.o. White male with past medical history of  SOLEDAD, CHF, pacemaker, CKD Stage 3, DM2, chronic back pain, thyroid disease and GERD who presented on 7/10/2023 for abnormal labs of elevated potassium of 6.3. Denied any pain or  "symptoms at this time on admission and today.  On the day of discharge, patient was afebrile with stable vital signs and will be discharged in stable condition with discharged back to home.    For the full HPI please refer to the History & Physical from this admission.    Hospital Course By Problem with Pertinent Findings     Hyperkalemia (resolved)  Hx CKD Stage 3  Hx SOLEDAD with macrocytic anemia  Pt asymptomatic and normal EKG  - K on admission was 6.3-> 5.8-> 5.3->5.1  - PTH elevated 117  - Cr 1.9 (at baseline)  - hgb 9.2 (at baseline)  - Mg, phos WNL  - uric acid, U/A, protein/cr ratio urine WNL  - iron and TIBC, ferritin WNL     Plan:  - discontinue ARB likely contributing to his hyperkalemia --> BP has been normotensive  -  on low K diet   - f/u vit B12 and folate results     DM2 without long term use of insulin  - administered 5U insulin IV LDSS  - POCT glc 143     Essential HTN  Chronic combined systolic and diastolic CHF  - resume home toprol xl 25  - resume home furosemide 40  - resume home atorvastatin 10      Hypothyroidism  - resume home levothyroxine 100 mcg     BPH  - resume home finasteride 5     GERD  - resume home famotidine 20         Discharge vital signs       BP (!) 128/59 (BP Location: Right arm, Patient Position: Lying)   Pulse (!) 59   Temp 98 °F (36.7 °C) (Oral)   Resp 18   Ht 5' 5" (1.651 m)   Wt 67.9 kg (149 lb 11.1 oz)   SpO2 95% on RA  BMI 24.91 kg/m²      Discharge Medications        Medication List        CONTINUE taking these medications      aspirin 81 MG Chew  1 tablet (81 mg total) by Per NG tube route once daily.     atorvastatin 10 MG tablet  Commonly known as: LIPITOR  Take 1 tablet by mouth once daily     betamethasone dipropionate 0.05 % lotion  Commonly known as: DIPROLENE     blood sugar diagnostic Strp  1 strip by Misc.(Non-Drug; Combo Route) route once daily at 6am.     blood-glucose meter kit  Use as instructed     CALTRATE + D3 PLUS MINERALS ORAL     CENTRUM " SILVER ORAL     diclofenac sodium 1 % Gel  Commonly known as: VOLTAREN  Apply 2 g topically daily as needed (pain).     famotidine 20 MG tablet  Commonly known as: PEPCID  Take 1 tablet (20 mg total) by mouth 2 (two) times daily.     finasteride 5 mg tablet  Commonly known as: PROSCAR  Take 1 tablet (5 mg total) by mouth once daily.     fish oil-omega-3 fatty acids 300-1,000 mg capsule     fluticasone propionate 50 mcg/actuation nasal spray  Commonly known as: FLONASE  USE 2 SPRAY(S) IN EACH NOSTRIL TWICE DAILY AS NEEDED FOR  RHINITIS     fluticasone-salmeterol 250-50 mcg/dose 250-50 mcg/dose diskus inhaler  Commonly known as: ADVAIR     folic acid 1 MG tablet  Commonly known as: FOLVITE     furosemide 40 MG tablet  Commonly known as: LASIX  Take 1 tablet (40 mg total) by mouth once daily.     lancets Misc  Commonly known as: LANCETS,ULTRA THIN  1 lancet by Misc.(Non-Drug; Combo Route) route once daily at 6am.     lancing device Misc  1 Device by Misc.(Non-Drug; Combo Route) route once daily at 6am.     levothyroxine 100 MCG tablet  Commonly known as: EUTHYROX  Take 1 tablet (100 mcg total) by mouth before breakfast.     linaCLOtide 72 mcg Cap capsule  Commonly known as: LINZESS  Take 1 capsule (72 mcg total) by mouth before breakfast.     melatonin 10 mg Tab     metoprolol succinate 25 MG 24 hr tablet  Commonly known as: TOPROL-XL  Take 1 tablet (25 mg total) by mouth once daily.     patiromer calcium sorbitex 8.4 gram Pwpk  Commonly known as: VELTASSA  Take 1 packet (8.4 g total) by mouth every Mon, Wed, Fri.     tamsulosin 0.4 mg Cap  Commonly known as: FLOMAX  Take 2 capsules (0.8 mg total) by mouth once daily.     traZODone 50 MG tablet  Commonly known as: DESYREL  Take 1 tablet (50 mg total) by mouth nightly as needed for Insomnia.     triamcinolone acetonide 0.1% 0.1 % Lotn  Commonly known as: KENALOG            STOP taking these medications      irbesartan 75 MG tablet  Commonly known as: AVAPRO                Discharge Information:   Diet:  Diet renal    Physical Activity:  As tolerated             Instructions:  1. Take all medications as prescribed  2. Keep all follow-up appointments  3. Return to the hospital or call your primary care physicians if any worsening symptoms such as fever, chest pain, shortness of breath, return of symptoms, or any other concerns.    Follow-Up Appointments:  - PCP      Omaira Mina MD  [facility] Internal Medicine HO-I  07/11/2023 2:34 PM

## 2023-07-11 NOTE — H&P
[facility] Medicine H&P Note     Admitting Team: [facility] Hospitalist Team A  Attending Physician: Michael Meredith MD  Resident: Dr. Vogt  Intern: Dr. Mina    Date of Admit: 7/10/2023    Chief Complaint     Hyperkalemia x 1 day      Subjective:      History of Present Illness:  Vince Martinez is a 89 y.o. White male with past medical history of  SOLEDAD, CHF, pacemaker, CKD Stage 3, DM2, chronic back pain, thyroid disease and GERD who presented on 7/10/2023 for abnormal labs of elevated potassium of 6.3. Complains of neck pain with headache. No chest pain, SOB, palpitations, lightheadedness dizziness, abdominal pain, nausea, vomiting, diarrhea, acute urinary complaints. Reports taking meds this AM.    Initial ED Vitals [07/10/23 1529]   BP Pulse Resp Temp SpO2   (!) 108/55 62 16 97.4 °F (36.3 °C)          Past Medical History:    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    Arthritis     Bilateral renal cysts     Chronic pain     Diabetes mellitus     DJD (degenerative joint disease)     Hyperkalemia     Hypertension     Iron deficiency anemia     Low back pain     Osteopenia of neck of right femur     Prostate enlargement     Sleep apnea     Stage 3b chronic kidney disease     Thyroid disease        Past Surgical History:    Past Surgical History:   Procedure Laterality Date    CARDIAC PACEMAKER PLACEMENT      EPIDURAL STEROID INJECTION INTO LUMBAR SPINE N/A 06/04/2020    Procedure: Injection-steroid-epidural-lumbar--L4-5;  Surgeon: Angelica Norris Jr., MD;  Location: [facility];  Service: Pain Management;  Laterality: N/A;  sign consent at DOS.    HEMILAMINECTOMY  05/26/2021    Procedure: HEMILAMINECTOMY;  Surgeon: Dayton Sparks MD;  Location: [facility] OR;  Service: Neurosurgery;;  left L4-5    INJECTION OF JOINT Bilateral 02/13/2020    Procedure: Injection, Joint, Bilateral GTB;  Surgeon: Angelica Norris Jr., MD;  Location: [facility];   Service: Pain Management;  Laterality: Bilateral;    INJECTION OF JOINT Bilateral 03/22/2022    Procedure: bilateral GTB steriod injection;  Surgeon: Angelica Norris Jr., MD;  Location: [facility];  Service: Pain Management;  Laterality: Bilateral;  pacemaker   pt is diabetic     INJECTION OF STEROID Bilateral 01/12/2021    Procedure: INJECTION, STEROID Bilateral SI Joint Block and Steroid Injection;  Surgeon: Dayton Sparks MD;  Location: [facility] OR;  Service: Neurosurgery;  Laterality: Bilateral;  Procedure: Bilateral SI Joint Block and Steroid Injection  Surgery 't'kristen: 45 min  LOS: 0  Anesthesia:MAC  Radiology: C-arm  Bed: Regular with Pillows  Position: Prone     SPINE SURGERY      SPINE SURGERY Bilateral     TRANSFORAMINAL EPIDURAL INJECTION OF STEROID Left 02/23/2021    Procedure: Injection,steroid,epidural,transforaminal approach--Left L4-5 *Has Pacemaker/ICD*;  Surgeon: Angelica Norris Jr., MD;  Location: [facility];  Service: Pain Management;  Laterality: Left;       Allergies:    Review of patient's allergies indicates:   Allergen Reactions    Bactrim [sulfamethoxazole-trimethoprim] Rash    Ciprofloxacin Rash    Latex Rash       Home Medications:      Current Outpatient Medications   Medication Instructions    aspirin 81 mg, Per NG tube, Daily    atorvastatin (LIPITOR) 10 MG tablet Take 1 tablet by mouth once daily    betamethasone dipropionate (DIPROLENE) 0.05 % lotion 1 application , Topical (Top), Nightly PRN    blood sugar diagnostic Strp 1 strip, Misc.(Non-Drug; Combo Route), Daily    blood-glucose meter kit Use as instructed    calcium/D3/mag ox//carolyn/Zn (CALTRATE + D3 PLUS MINERALS ORAL) 1 tablet, Oral, 2 times daily    diclofenac sodium (VOLTAREN) 2 g, Topical (Top), Daily PRN    famotidine (PEPCID) 20 mg, Oral, 2 times daily    finasteride (PROSCAR) 5 mg, Oral, Daily    fluticasone propionate (FLONASE) 50 mcg/actuation nasal spray USE 2 SPRAY(S) IN EACH NOSTRIL TWICE DAILY AS NEEDED  FOR  RHINITIS    fluticasone-salmeterol diskus inhaler 250-50 mcg 1 puff, Inhalation, 2 times daily, Controller    folic acid (FOLVITE) 1 mg, Oral, Daily    folic acid/multivit-min/lutein (CENTRUM SILVER ORAL) Oral    furosemide (LASIX) 40 mg, Oral, Daily    irbesartan (AVAPRO) 75 mg, Oral, Daily    lancets (LANCETS,ULTRA THIN) Misc 1 lancet , Misc.(Non-Drug; Combo Route), Daily    lancing device Misc 1 Device, Misc.(Non-Drug; Combo Route), Daily    levothyroxine (EUTHYROX) 100 mcg, Oral, Before breakfast    linaCLOtide (LINZESS) 72 mcg, Oral, Before breakfast    melatonin 10 mg Tab 1 tablet, Oral, Nightly PRN    metoprolol succinate (TOPROL-XL) 25 mg, Oral, Daily    omega-3 fatty acids/fish oil (FISH OIL-OMEGA-3 FATTY ACIDS) 300-1,000 mg capsule Oral, Daily    patiromer calcium sorbitex (VELTASSA) 8.4 g, Oral, Every Mon, Wed, Fri    tamsulosin (FLOMAX) 0.8 mg, Oral, Daily    traZODone (DESYREL) 50 mg, Oral, Nightly PRN    triamcinolone acetonide 0.1% (KENALOG) 0.1 % Lotn APPLY LOTION TO THE AFFECTED AREAS ON THE SCALP ONCE DAILY AT BEDTIME AS NEEDED       Family History:    Family History   Problem Relation Age of Onset    Diabetes Brother     No Known Problems Mother     No Known Problems Father     Diabetes Sister     No Known Problems Daughter     HIV Son        Social History:    Social History     Tobacco Use    Smoking status: Never    Smokeless tobacco: Never   Substance Use Topics    Alcohol use: No    Drug use: No       Review of Systems:  A comprehensive review of systems was negative unless otherwise stated in the HPI.     Health Maintaince :   Primary Care Physician:  Shlomo Luong MD     Immunizations:   Currently on File:   Most Recent Immunizations   Administered Date(s) Administered    COVID-19, MRNA, LN-S, PF (Pfizer) (Purple Cap) 01/06/2022    Influenza (FLUAD) - Quadrivalent - Adjuvanted - PF *Preferred* (65+) 01/30/2023    Influenza - High Dose - PF (65 years and older) 09/19/2019     "Influenza - Quadrivalent - High Dose - PF (65 years and older) 10/30/2021    Influenza A (H1N1) 2009 Monovalent - IM - PF 11/10/2009    Influenza Split 10/21/2010    Pneumococcal Conjugate - 13 Valent 09/08/2017    Pneumococcal Polysaccharide - 23 Valent 11/22/2005    Td (ADULT) 12/15/2008    Tdap 07/20/2020    Zoster 06/03/2016    Zoster Recombinant 07/20/2020          Objective       Physical Examination:    /66 (Patient Position: Lying)   Pulse 60   Temp 96.6 °F (35.9 °C)   Resp 18   Ht 5' 5" (1.651 m)   Wt 66.9 kg (147 lb 7.8 oz)   SpO2 97%   BMI 24.54 kg/m²      Wt Readings from Last 1 Encounters:   07/10/23 66.9 kg (147 lb 7.8 oz)     Body mass index is 24.54 kg/m².  No intake or output data in the 24 hours ending 07/10/23 2314    General: A&Ox3, no acute distress, resting comfortably in bed  HEENT: PERRL, EOMI, atraumatic, normocephalic, moist mucous membranes w/no erythema noted  Neck: Trachea midline, no masses, no lymphadenopathy  Cardiovascular: Regular rate and rhythm, normal S1 and S2, no extra heart sounds or murmurs appreciated   Chest wall: Non-tender to palpation, no gross deformities noted. Pacemaker to L upper chest wall.  Back: No abnormal curvature noted, symmetric rise with respiration   Respiratory: Stable on room air, normal work of breathing, no conversational dyspnea, no accessory muscle use noted, clear to auscultation bilaterally, no wheezes or crackles   Abdominal: Non-distended, soft, normoactive bowel sounds, non-tender to palpation, no guarding  Extremities: Atraumatic, no cyanosis or edema, moving all four extremities  Pulses: 2+ and symmetric radial artery, dorsalis pedis artery  Skin: Non-diaphoretic, warm, dry. No rashes or erythema noted, no ecchymosis.  Neurologic: 5/5 Upper and lower extremity strength, face and smile symmetric, strong shoulder shrug  Psychiatric: Normal mood and affect      Laboratory:  Recent Labs   Lab 07/10/23  1129 07/10/23  1616 " 07/10/23  2156   WBC 4.96 5.00  --    HGB 10.0* 9.1*  --    * 124*  --    * 98  --     136 136   K 6.3* 5.8* 5.3*    107 104   CO2 23 22* 20*   BUN 43* 45* 42*   CREATININE 1.9* 2.2* 2.0*   * 251* 146*   CALCIUM 9.1 8.6* 9.5   PROT  --  7.2 7.8   ALBUMIN 3.9 3.7 4.0   PHOS 3.3  --   --    MG 2.5 2.3  --    AST  --  43* 49*   ALT  --  34 39   ALKPHOS  --  87 84     No results for input(s): APTT, INR in the last 168 hours.    Invalid input(s): APT  Lab Results   Component Value Date    COLORU Yellow 07/10/2023    APPEARANCEUA Clear 07/10/2023    SPECGRAV 1.010 07/10/2023    PHUR 6.0 07/10/2023    PROTEINUA Negative 07/10/2023    KETONESU Negative 07/10/2023    LEUKOCYTESUR Negative 07/10/2023    NITRITE Negative 07/10/2023    UROBILINOGEN Negative 07/10/2023         All laboratory data reviewed        EKG Data: Reviewed  Results for orders placed or performed during the hospital encounter of 08/23/22   EKG 12-lead    Collection Time: 08/23/22  3:37 PM    Narrative    Test Reason : R53.83,    Vent. Rate : 060 BPM     Atrial Rate : 064 BPM     P-R Int : 000 ms          QRS Dur : 088 ms      QT Int : 400 ms       P-R-T Axes : 000 -35 017 degrees     QTc Int : 400 ms    Atrial-paced rhythm  Left axis deviation  Confirmed by Fahad Stiles MD ([id number]) on 8/23/2022 5:07:42 PM    Referred By: [email or web address]   SELF           Confirmed By:Fahad Stiles MD       Imaging Data: Reviewed  US Aorta (Non-Screening)    Result Date: 7/3/2023  This result has an attachment that is not available. Examination: Ultrasound Examination of the Abdominal Aorta Date of Interpretation: 6/29/2023 Ordering Provider: Jamal Gomez MD Indication: Pure hypercholesterolemia, Palpitations, Essential hypertension, REEDER (dyspnea on exertion), Chronic renal failure, unspecified CKD stage, Type 2 diabetes mellitus without complication, unspecified whether long term insulin use (CMS/[facility]) Technologist: Marielos Su,  RDMS, RVT, RDCS Technique: Gray Scale and Doppler flow assessment. Ultrasound Findings: The abdominal aorta was evaluated with 2D, color Doppler, and spectral analysis from the level of the diaphragm through the iliac arteries. The greatest AP or transverse diameter was 2.5cm. · The proximal segment of the abdominal aorta measured 2.5cm. · The mid segment of the aorta measured 1.6cm. · The distal segment of the aorta measured 1.0cm. Ultrasound Summary:   1. Greatest AP or transverse diameter measurement of the abdominal aorta was 2.5cm. This is suggestive of no aneurysmal dilation. 2. The Gray Scale evaluation of the aortic lumen demonstrated mild atherosclerotic disease. 3. Doppler evaluation of the distal aorta revealed peak systolic velocities of 107cm/sec, consistent with mild stenosis.   4. The Inferior Vena Cava is free of thrombus formation.  Spectral Doppler evaluation demonstrates multi-phasic antegrade venous flow towards the diaphragm. 5. The inflow and outflow of the Aorta and IVC are antegrade with phase appropriate waveforms suggesting flow that is within normal limits.       Assessment/Plan     Vince Martinez is a 89 y.o. White male with past medical history of  SOLEDAD, CHF, pacemaker, CKD Stage 3, DM2, chronic back pain, thyroid disease and GERD who presented on 7/10/2023 for abnormal labs of elevated potassium of 6.3. Denies any pain or symptoms at this time.     Hyperkalemia  CKD Stage 3  SOLEDAD  Pt asymptomatic and normal EKG  - K on admission was 6.3-> 5.8  - PTH elevated 117  - glc 223  - Cr 1.9 (at baseline)  - hgb 10 (at baseline)  - uric acid, U/A, protein/cr ratio urine WNL  - iron and TIBC, ferritin WNL    Plan:  - stop ARB --> monitor BP  - f/u repeat CMP   - low K diet --> consult dietician      DM2 without long term use of insulin  - administer 5U insulin IV LDSS    Essential HTN  Chronic combined systolic and diastolic CHF  - resume home toprol xl 25  - resume home furosemide 40  - resume  home atorvastatin 10     Hypothyroidism  - resume home levothyroxine 100 mcg    BPH  - resume home finasteride 5    GERD  - resume home famotidine 20      Healthcare maintenance   -primary care provider is Shlomo Luong MD     Diet:  Diet renal  -   VTE Risk Mitigation (From admission, onward)           Ordered     enoxaparin injection 30 mg  Daily         07/10/23 1801     IP VTE HIGH RISK PATIENT  Once         07/10/23 1801     Place sequential compression device  Until discontinued         07/10/23 1801                  -   Anticoagulants   Medication Route Frequency    enoxaparin injection 30 mg Subcutaneous Daily     Code Status:  Full Code    Dispo: pending HD  Estimated LOS: 1-2 days    Omaira Mina MD  [facility] HO-I     [facility] Medicine Hospitalist Pager numbers:   [facility] Hospitalist Medicine Team A (Viri/Ida):          [phone]  [facility] Hospitalist Medicine Team B (Agustín/Keith):        [phone]

## 2023-07-11 NOTE — PROGRESS NOTES
Discharge orders noted. Additional clinical references attached. Patient's discharge instructions given by bedside RN and reviewed via this VN.  Attempt discharge instructions with  #[id number]. Education provided on new medication, diagnosis, and follow-up appointments to patient's son Vince Martinez Jr who speaks fluent english.  Teach back method used. Patient's son verbalized understanding. All questions answered. Transport to [facility] requested. Floor nurse notified.   07/11/23 8211   AVS Confirmation   Discharge instructions and AVS given to and reviewed with patient and/or significant other. Yes

## 2023-07-20 ENCOUNTER — DOCUMENTATION ONLY (OUTPATIENT)
Dept: PRIMARY CARE CLINIC | Facility: CLINIC | Age: 88
End: 2023-07-20

## 2023-07-20 ENCOUNTER — LAB VISIT (OUTPATIENT)
Dept: LAB | Facility: HOSPITAL | Age: 88
End: 2023-07-20
Attending: INTERNAL MEDICINE
Payer: MEDICARE

## 2023-07-20 ENCOUNTER — OFFICE VISIT (OUTPATIENT)
Dept: PRIMARY CARE CLINIC | Facility: CLINIC | Age: 88
End: 2023-07-20
Payer: MEDICARE

## 2023-07-20 VITALS
SYSTOLIC BLOOD PRESSURE: 108 MMHG | BODY MASS INDEX: 24.58 KG/M2 | WEIGHT: 147.69 LBS | DIASTOLIC BLOOD PRESSURE: 48 MMHG | HEART RATE: 60 BPM | OXYGEN SATURATION: 97 %

## 2023-07-20 DIAGNOSIS — N18.32 STAGE 3B CHRONIC KIDNEY DISEASE: ICD-10-CM

## 2023-07-20 DIAGNOSIS — E87.5 HYPERKALEMIA: ICD-10-CM

## 2023-07-20 DIAGNOSIS — E87.5 HYPERKALEMIA: Primary | ICD-10-CM

## 2023-07-20 PROBLEM — N18.4 CKD (CHRONIC KIDNEY DISEASE), STAGE IV: Status: RESOLVED | Noted: 2023-03-13 | Resolved: 2023-07-20

## 2023-07-20 LAB
ANION GAP SERPL CALC-SCNC: 13 MMOL/L (ref 8–16)
BUN SERPL-MCNC: 38 MG/DL (ref 10–30)
CALCIUM SERPL-MCNC: 8.6 MG/DL (ref 8.7–10.5)
CHLORIDE SERPL-SCNC: 107 MMOL/L (ref 95–110)
CO2 SERPL-SCNC: 19 MMOL/L (ref 23–29)
CREAT SERPL-MCNC: 1.6 MG/DL (ref 0.5–1.4)
EST. GFR  (NO RACE VARIABLE): 40 ML/MIN/1.73 M^2
GLUCOSE SERPL-MCNC: 212 MG/DL (ref 70–110)
POTASSIUM SERPL-SCNC: 4.4 MMOL/L (ref 3.5–5.1)
SODIUM SERPL-SCNC: 139 MMOL/L (ref 136–145)

## 2023-07-20 PROCEDURE — 99214 PR OFFICE/OUTPT VISIT, EST, LEVL IV, 30-39 MIN: ICD-10-PCS | Mod: S$GLB,,, | Performed by: INTERNAL MEDICINE

## 2023-07-20 PROCEDURE — 1157F PR ADVANCE CARE PLAN OR EQUIV PRESENT IN MEDICAL RECORD: ICD-10-PCS | Mod: CPTII,S$GLB,, | Performed by: INTERNAL MEDICINE

## 2023-07-20 PROCEDURE — 1101F PT FALLS ASSESS-DOCD LE1/YR: CPT | Mod: CPTII,S$GLB,, | Performed by: INTERNAL MEDICINE

## 2023-07-20 PROCEDURE — 99999 PR PBB SHADOW E&M-EST. PATIENT-LVL III: ICD-10-PCS | Mod: PBBFAC,,, | Performed by: INTERNAL MEDICINE

## 2023-07-20 PROCEDURE — 1160F PR REVIEW ALL MEDS BY PRESCRIBER/CLIN PHARMACIST DOCUMENTED: ICD-10-PCS | Mod: CPTII,S$GLB,, | Performed by: INTERNAL MEDICINE

## 2023-07-20 PROCEDURE — 36415 COLL VENOUS BLD VENIPUNCTURE: CPT | Performed by: INTERNAL MEDICINE

## 2023-07-20 PROCEDURE — 1160F RVW MEDS BY RX/DR IN RCRD: CPT | Mod: CPTII,S$GLB,, | Performed by: INTERNAL MEDICINE

## 2023-07-20 PROCEDURE — 1125F PR PAIN SEVERITY QUANTIFIED, PAIN PRESENT: ICD-10-PCS | Mod: CPTII,S$GLB,, | Performed by: INTERNAL MEDICINE

## 2023-07-20 PROCEDURE — 1125F AMNT PAIN NOTED PAIN PRSNT: CPT | Mod: CPTII,S$GLB,, | Performed by: INTERNAL MEDICINE

## 2023-07-20 PROCEDURE — 3288F PR FALLS RISK ASSESSMENT DOCUMENTED: ICD-10-PCS | Mod: CPTII,S$GLB,, | Performed by: INTERNAL MEDICINE

## 2023-07-20 PROCEDURE — 80048 BASIC METABOLIC PNL TOTAL CA: CPT | Performed by: INTERNAL MEDICINE

## 2023-07-20 PROCEDURE — 1159F PR MEDICATION LIST DOCUMENTED IN MEDICAL RECORD: ICD-10-PCS | Mod: CPTII,S$GLB,, | Performed by: INTERNAL MEDICINE

## 2023-07-20 PROCEDURE — 99999 PR PBB SHADOW E&M-EST. PATIENT-LVL III: CPT | Mod: PBBFAC,,, | Performed by: INTERNAL MEDICINE

## 2023-07-20 PROCEDURE — 99214 OFFICE O/P EST MOD 30 MIN: CPT | Mod: S$GLB,,, | Performed by: INTERNAL MEDICINE

## 2023-07-20 PROCEDURE — 1157F ADVNC CARE PLAN IN RCRD: CPT | Mod: CPTII,S$GLB,, | Performed by: INTERNAL MEDICINE

## 2023-07-20 PROCEDURE — 3288F FALL RISK ASSESSMENT DOCD: CPT | Mod: CPTII,S$GLB,, | Performed by: INTERNAL MEDICINE

## 2023-07-20 PROCEDURE — 1101F PR PT FALLS ASSESS DOC 0-1 FALLS W/OUT INJ PAST YR: ICD-10-PCS | Mod: CPTII,S$GLB,, | Performed by: INTERNAL MEDICINE

## 2023-07-20 PROCEDURE — 1159F MED LIST DOCD IN RCRD: CPT | Mod: CPTII,S$GLB,, | Performed by: INTERNAL MEDICINE

## 2023-07-20 NOTE — PROGRESS NOTES
[facility]   New Visit Progress Note   Recent Hospital Discharge     PRESENTING HISTORY     Chief Complaint/Reason for Admission:  Follow up Hospital Discharge   PCP: Shlomo Luong MD    History of Present Illness:  Mr. Vince Martinez is a 89 y.o. male who was recently admitted to the hospital.    [facility] Medicine Discharge Summary  Primary Team: [facility] Hospitalist Team A  Attending Physician: Michael Meredith MD  Resident: Dr. Vogt  Intern: Dr. Mina  Date of Admit: 7/10/2023  Date of Discharge: 7/11/2023  Discharge to: Home/Self-Care  Condition: Stable   __________________________________________________________________    Today:  [facility] for [facility] follow up.  Recently hospitalized for management of hyperkalemia 6.3 noted on outpatient labs.  Patient with known CKD 3.  Potassium shifted in ED with Albuterol, Calcium gluconate, and insulin.   Admitted to [facility] Medicine service.   Home dose Irbesartan discontinued.   Potassium level monitored; trended down then normalized.  Patent discharged to home.     Patient unaccompanied today.  Friend is in waiting room.  Ambulatory and independent with ADL's.  Did not bring medication bottles for review.  Brought a typed medication list which has multiple discrepancies when compared to [facility] medication list.  However, it does appear he has stopped Irbesartan as directed.   Patient seen today with assistance of professional .     Review of Systems  General ROS: negative for chills, fever or weight loss  Psychological ROS: negative for hallucination, depression or suicidal ideation  Ophthalmic ROS: negative for blurry vision, photophobia or eye pain  ENT ROS: negative for epistaxis, sore throat or rhinorrhea  Respiratory ROS: no cough, shortness of breath, or wheezing  Cardiovascular ROS: no chest pain or dyspnea on exertion  Gastrointestinal ROS: no abdominal pain, change in bowel habits, or black/ bloody  stools  Genito-Urinary ROS: no dysuria, trouble voiding, or hematuria  Musculoskeletal ROS: negative for gait disturbance or muscular weakness  Neurological ROS: no syncope or seizures; no ataxia  Dermatological ROS: negative for pruritis, rash and jaundice          PAST HISTORY:     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    Arthritis     Bilateral renal cysts     Chronic pain     Diabetes mellitus     DJD (degenerative joint disease)     Hyperkalemia     Hypertension     Iron deficiency anemia     Low back pain     Osteopenia of neck of right femur     Prostate enlargement     Sleep apnea     Stage 3b chronic kidney disease     Thyroid disease        Past Surgical History:   Procedure Laterality Date    CARDIAC PACEMAKER PLACEMENT      EPIDURAL STEROID INJECTION INTO LUMBAR SPINE N/A 06/04/2020    Procedure: Injection-steroid-epidural-lumbar--L4-5;  Surgeon: Angelica Norris Jr., MD;  Location: [facility] PAIN [facility]GT;  Service: Pain Management;  Laterality: N/A;  sign consent at DOS.    HEMILAMINECTOMY  05/26/2021    Procedure: HEMILAMINECTOMY;  Surgeon: Dayton Sparks MD;  Location: [facility] OR;  Service: Neurosurgery;;  left L4-5    INJECTION OF JOINT Bilateral 02/13/2020    Procedure: Injection, Joint, Bilateral GTB;  Surgeon: Angelica Norris Jr., MD;  Location: [facility] PAIN [facility]GT;  Service: Pain Management;  Laterality: Bilateral;    INJECTION OF JOINT Bilateral 03/22/2022    Procedure: bilateral GTB steriod injection;  Surgeon: Angelica Norris Jr., MD;  Location: [facility] PAIN [facility];  Service: Pain Management;  Laterality: Bilateral;  pacemaker   pt is diabetic     INJECTION OF STEROID Bilateral 01/12/2021    Procedure: INJECTION, STEROID Bilateral SI Joint Block and Steroid Injection;  Surgeon: Dayton Sparks MD;  Location: [facility] OR;  Service: Neurosurgery;  Laterality: Bilateral;  Procedure: Bilateral SI Joint Block and Steroid Injection  Surgery 't'kristen:  45 min  LOS: 0  Anesthesia:MAC  Radiology: C-arm  Bed: Regular with Pillows  Position: Prone     SPINE SURGERY      SPINE SURGERY Bilateral     TRANSFORAMINAL EPIDURAL INJECTION OF STEROID Left 02/23/2021    Procedure: Injection,steroid,epidural,transforaminal approach--Left L4-5 *Has Pacemaker/ICD*;  Surgeon: Angelica Norris Jr., MD;  Location: [facility];  Service: Pain Management;  Laterality: Left;       Family History   Problem Relation Age of Onset    Diabetes Brother     No Known Problems Mother     No Known Problems Father     Diabetes Sister     No Known Problems Daughter     HIV Son          MEDICATIONS & ALLERGIES:     Current Outpatient Medications on File Prior to Visit   Medication Sig Dispense Refill    aspirin 81 MG Chew 1 tablet (81 mg total) by Per NG tube route once daily. 30 tablet 11    atorvastatin (LIPITOR) 10 MG tablet Take 1 tablet by mouth once daily 90 tablet 0    betamethasone dipropionate (DIPROLENE) 0.05 % lotion Apply 1 application topically nightly as needed.      blood sugar diagnostic Strp 1 strip by Misc.(Non-Drug; Combo Route) route once daily at 6am. 100 strip 3    blood-glucose meter kit Use as instructed 1 each 0    calcium/D3/mag ox//carolyn/Zn (CALTRATE + D3 PLUS MINERALS ORAL) Take 1 tablet by mouth 2 (two) times daily.      diclofenac sodium (VOLTAREN) 1 % Gel Apply 2 g topically daily as needed (pain). 200 g 3    famotidine (PEPCID) 20 MG tablet Take 1 tablet (20 mg total) by mouth 2 (two) times daily. 60 tablet 11    finasteride (PROSCAR) 5 mg tablet Take 1 tablet (5 mg total) by mouth once daily. 90 tablet 3    fluticasone propionate (FLONASE) 50 mcg/actuation nasal spray USE 2 SPRAY(S) IN EACH NOSTRIL TWICE DAILY AS NEEDED FOR  RHINITIS 48 g 1    fluticasone-salmeterol diskus inhaler 250-50 mcg Inhale 1 puff into the lungs 2 (two) times daily. Controller      folic acid (FOLVITE) 1 MG tablet Take 1 mg by mouth once daily.      folic acid/multivit-min/lutein  (CENTRUM SILVER ORAL) Take by mouth.      furosemide (LASIX) 40 MG tablet Take 1 tablet (40 mg total) by mouth once daily. 90 tablet 3    lancets (LANCETS,ULTRA THIN) Misc 1 lancet by Misc.(Non-Drug; Combo Route) route once daily at 6am. 100 each 3    lancing device Misc 1 Device by Misc.(Non-Drug; Combo Route) route once daily at 6am. 1 each 0    levothyroxine (EUTHYROX) 100 MCG tablet Take 1 tablet (100 mcg total) by mouth before breakfast. 90 tablet 3    linaCLOtide (LINZESS) 72 mcg Cap capsule Take 1 capsule (72 mcg total) by mouth before breakfast. 30 capsule 11    melatonin 10 mg Tab Take 1 tablet by mouth nightly as needed.      metoprolol succinate (TOPROL-XL) 25 MG 24 hr tablet Take 1 tablet (25 mg total) by mouth once daily. 90 tablet 3    omega-3 fatty acids/fish oil (FISH OIL-OMEGA-3 FATTY ACIDS) 300-1,000 mg capsule Take by mouth once daily.      patiromer calcium sorbitex (VELTASSA) 8.4 gram PwPk Take 1 packet (8.4 g total) by mouth every Mon, Wed, Fri. 13 packet 3    tamsulosin (FLOMAX) 0.4 mg Cap Take 2 capsules (0.8 mg total) by mouth once daily. 180 capsule 3    traZODone (DESYREL) 50 MG tablet Take 1 tablet (50 mg total) by mouth nightly as needed for Insomnia. 90 tablet 3    triamcinolone acetonide 0.1% (KENALOG) 0.1 % Lotn APPLY LOTION TO THE AFFECTED AREAS ON THE SCALP ONCE DAILY AT BEDTIME AS NEEDED       No current facility-administered medications on file prior to visit.        Review of patient's allergies indicates:   Allergen Reactions    Bactrim [sulfamethoxazole-trimethoprim] Rash    Ciprofloxacin Rash    Latex Rash       OBJECTIVE:     Vital Signs:  BP (!) 108/48 (BP Location: Right arm, Patient Position: Sitting, BP Method: Small (Automatic))   Pulse 60   Wt 67 kg (147 lb 11.3 oz)   SpO2 97%   BMI 24.58 kg/m²   Wt Readings from Last 3 Encounters:   07/20/23 0911 67 kg (147 lb 11.3 oz)   07/11/23 0549 67.9 kg (149 lb 11.1 oz)   07/10/23 2017 66.9 kg (147 lb 7.8 oz)   07/10/23 1528  65.8 kg (145 lb)   07/10/23 1050 67.6 kg (149 lb 0.5 oz)     Body mass index is 24.58 kg/m².        Physical Exam:  BP (!) 108/48 (BP Location: Right arm, Patient Position: Sitting, BP Method: Small (Automatic))   Pulse 60   Wt 67 kg (147 lb 11.3 oz)   SpO2 97%   BMI 24.58 kg/m²   General appearance: alert, cooperative, no distress  Constitutional:Oriented to person, place, and time  + appears well-developed and well-nourished.   HEENT: Normocephalic, atraumatic, neck symmetrical, no nasal discharge   Eyes: conjunctivae/corneas clear, PERRL, EOM's intact  Lungs: clear to auscultation bilaterally, no dullness to percussion bilaterally  Heart: regular rate and rhythm without rub; no displacement of the PMI   Abdomen: soft, non-tender; bowel sounds normoactive; no organomegaly  Extremities: extremities symmetric; no clubbing, cyanosis, or edema  Integument: Skin color, texture, turgor normal; no rashes; hair distrubution normal  Neurologic: Alert and oriented X 3, normal strength, normal coordination and gait  Psychiatric: no pressured speech; normal affect; no evidence of impaired cognition     Laboratory  Lab Results   Component Value Date    WBC 4.34 07/11/2023    HGB 9.2 (L) 07/11/2023    HCT 27.8 (L) 07/11/2023    MCV 97 07/11/2023     (L) 07/11/2023     BMP  Lab Results   Component Value Date     07/11/2023    K 5.1 07/11/2023     07/11/2023    CO2 20 (L) 07/11/2023    BUN 40 (H) 07/11/2023    CREATININE 1.9 (H) 07/11/2023    CALCIUM 8.9 07/11/2023    ANIONGAP 12 07/11/2023    EGFRNORACEVR 33 (A) 07/11/2023     Lab Results   Component Value Date    ALT 36 07/11/2023    AST 46 (H) 07/11/2023    ALKPHOS 70 07/11/2023    BILITOT 0.4 07/11/2023     Lab Results   Component Value Date    INR 1.0 08/23/2022    INR 1.0 05/14/2022    INR 1.0 05/07/2021     Lab Results   Component Value Date    HGBA1C 8.0 (H) 07/10/2023         ASSESSMENT & PLAN:     Hyperkalemia  Stage 3b chronic kidney disease  -  recent hospitalization as above  - ARB discontinued by hospital team  - repeat labs today  - rescheduled his missed Nephrology appt - will see Nephrology team 7/31/23   -     Basic Metabolic Panel; Future; Expected date: 07/20/2023    Patient will be released from hospital follow up clinic.  He will see his PCP, Dr Ulloa, 11/30/23.  Bring ALL MEDICATION bottles for next visit for reconciliation.     Instructions for the patient:      Scheduled Follow-up :  Future Appointments   Date Time Provider Department Center   7/31/2023  8:30 AM Irma Craft MD [facility]   9/6/2023  1:30 PM Karolina Swann NP [facility] GASTRO [facility]   9/29/2023 10:45 AM Anant Coats MD [facility] UROLOGY [facility]   10/20/2023  9:10 AM APPOINTMENT LAB, ROSETTE REARDON [facility] LAB [facility]   10/23/2023  9:30 AM Marion Rosario NP [facility] HEM ONC [facility]   11/27/2023  9:00 AM SPECIMEN, DIMITRI MEI SPECLAB [place]   11/27/2023  9:15 AM LAB, ROSETTE SCHMITT LAB [place]   11/30/2023 10:00 AM Shlomo Luong MD [facility]       Post Visit Medication List:     Medication List            Accurate as of July 20, 2023 12:27 PM. If you have any questions, ask your nurse or doctor.                CONTINUE taking these medications      aspirin 81 MG Chew  1 tablet (81 mg total) by Per NG tube route once daily.     atorvastatin 10 MG tablet  Commonly known as: LIPITOR  Take 1 tablet by mouth once daily     betamethasone dipropionate 0.05 % lotion  Commonly known as: DIPROLENE     blood sugar diagnostic Strp  1 strip by Misc.(Non-Drug; Combo Route) route once daily at 6am.     blood-glucose meter kit  Use as instructed     CALTRATE + D3 PLUS MINERALS ORAL     CENTRUM SILVER ORAL     diclofenac sodium 1 % Gel  Commonly known as: VOLTAREN  Apply 2 g topically daily as needed (pain).     famotidine 20 MG tablet  Commonly known as: PEPCID  Take 1 tablet (20 mg total) by mouth 2 (two) times daily.     finasteride 5 mg  tablet  Commonly known as: PROSCAR  Take 1 tablet (5 mg total) by mouth once daily.     fish oil-omega-3 fatty acids 300-1,000 mg capsule     fluticasone propionate 50 mcg/actuation nasal spray  Commonly known as: FLONASE  USE 2 SPRAY(S) IN EACH NOSTRIL TWICE DAILY AS NEEDED FOR  RHINITIS     fluticasone-salmeterol 250-50 mcg/dose 250-50 mcg/dose diskus inhaler  Commonly known as: ADVAIR     folic acid 1 MG tablet  Commonly known as: FOLVITE     furosemide 40 MG tablet  Commonly known as: LASIX  Take 1 tablet (40 mg total) by mouth once daily.     lancets Misc  Commonly known as: LANCETS,ULTRA THIN  1 lancet by Misc.(Non-Drug; Combo Route) route once daily at 6am.     lancing device Misc  1 Device by Misc.(Non-Drug; Combo Route) route once daily at 6am.     levothyroxine 100 MCG tablet  Commonly known as: EUTHYROX  Take 1 tablet (100 mcg total) by mouth before breakfast.     linaCLOtide 72 mcg Cap capsule  Commonly known as: LINZESS  Take 1 capsule (72 mcg total) by mouth before breakfast.     melatonin 10 mg Tab     metoprolol succinate 25 MG 24 hr tablet  Commonly known as: TOPROL-XL  Take 1 tablet (25 mg total) by mouth once daily.     patiromer calcium sorbitex 8.4 gram Pwpk  Commonly known as: VELTASSA  Take 1 packet (8.4 g total) by mouth every Mon, Wed, Fri.     tamsulosin 0.4 mg Cap  Commonly known as: FLOMAX  Take 2 capsules (0.8 mg total) by mouth once daily.     traZODone 50 MG tablet  Commonly known as: DESYREL  Take 1 tablet (50 mg total) by mouth nightly as needed for Insomnia.     triamcinolone acetonide 0.1% 0.1 % Lotn  Commonly known as: KENALOG              Signing Physician:  Jerri Bernal MD

## 2023-07-21 DIAGNOSIS — E11.65 TYPE 2 DIABETES MELLITUS WITH HYPERGLYCEMIA, WITHOUT LONG-TERM CURRENT USE OF INSULIN: ICD-10-CM

## 2023-07-21 RX ORDER — ATORVASTATIN CALCIUM 10 MG/1
TABLET, FILM COATED ORAL
Qty: 90 TABLET | Refills: 0 | Status: SHIPPED | OUTPATIENT
Start: 2023-07-21 | End: 2023-10-27 | Stop reason: SDUPTHER

## 2023-08-01 DIAGNOSIS — K59.04 CHRONIC IDIOPATHIC CONSTIPATION: ICD-10-CM

## 2023-09-06 ENCOUNTER — LAB VISIT (OUTPATIENT)
Dept: LAB | Facility: HOSPITAL | Age: 88
End: 2023-09-06
Payer: MEDICARE

## 2023-09-06 ENCOUNTER — OFFICE VISIT (OUTPATIENT)
Dept: GASTROENTEROLOGY | Facility: CLINIC | Age: 88
End: 2023-09-06
Payer: MEDICARE

## 2023-09-06 VITALS — HEIGHT: 65 IN | BODY MASS INDEX: 23.52 KG/M2 | WEIGHT: 141.13 LBS

## 2023-09-06 DIAGNOSIS — K59.04 CHRONIC IDIOPATHIC CONSTIPATION: ICD-10-CM

## 2023-09-06 DIAGNOSIS — D63.8 CHRONIC DISEASE ANEMIA: ICD-10-CM

## 2023-09-06 DIAGNOSIS — D63.8 CHRONIC DISEASE ANEMIA: Primary | ICD-10-CM

## 2023-09-06 LAB
ERYTHROCYTE [DISTWIDTH] IN BLOOD BY AUTOMATED COUNT: 13.2 % (ref 11.5–14.5)
HCT VFR BLD AUTO: 32.3 % (ref 40–54)
HGB BLD-MCNC: 10.9 G/DL (ref 14–18)
IRON SERPL-MCNC: 130 UG/DL (ref 45–160)
MCH RBC QN AUTO: 33.1 PG (ref 27–31)
MCHC RBC AUTO-ENTMCNC: 33.7 G/DL (ref 32–36)
MCV RBC AUTO: 98 FL (ref 82–98)
PLATELET # BLD AUTO: 142 K/UL (ref 150–450)
PMV BLD AUTO: 10.6 FL (ref 9.2–12.9)
RBC # BLD AUTO: 3.29 M/UL (ref 4.6–6.2)
SATURATED IRON: 36 % (ref 20–50)
TOTAL IRON BINDING CAPACITY: 364 UG/DL (ref 250–450)
TRANSFERRIN SERPL-MCNC: 246 MG/DL (ref 200–375)
WBC # BLD AUTO: 5.6 K/UL (ref 3.9–12.7)

## 2023-09-06 PROCEDURE — 85027 COMPLETE CBC AUTOMATED: CPT | Performed by: NURSE PRACTITIONER

## 2023-09-06 PROCEDURE — 99214 OFFICE O/P EST MOD 30 MIN: CPT | Mod: S$GLB,,, | Performed by: NURSE PRACTITIONER

## 2023-09-06 PROCEDURE — 99214 PR OFFICE/OUTPT VISIT, EST, LEVL IV, 30-39 MIN: ICD-10-PCS | Mod: S$GLB,,, | Performed by: NURSE PRACTITIONER

## 2023-09-06 PROCEDURE — 1126F PR PAIN SEVERITY QUANTIFIED, NO PAIN PRESENT: ICD-10-PCS | Mod: CPTII,S$GLB,, | Performed by: NURSE PRACTITIONER

## 2023-09-06 PROCEDURE — 99999 PR PBB SHADOW E&M-EST. PATIENT-LVL IV: ICD-10-PCS | Mod: PBBFAC,,, | Performed by: NURSE PRACTITIONER

## 2023-09-06 PROCEDURE — 99999 PR PBB SHADOW E&M-EST. PATIENT-LVL IV: CPT | Mod: PBBFAC,,, | Performed by: NURSE PRACTITIONER

## 2023-09-06 PROCEDURE — 84466 ASSAY OF TRANSFERRIN: CPT | Performed by: NURSE PRACTITIONER

## 2023-09-06 PROCEDURE — 1101F PR PT FALLS ASSESS DOC 0-1 FALLS W/OUT INJ PAST YR: ICD-10-PCS | Mod: CPTII,S$GLB,, | Performed by: NURSE PRACTITIONER

## 2023-09-06 PROCEDURE — 1126F AMNT PAIN NOTED NONE PRSNT: CPT | Mod: CPTII,S$GLB,, | Performed by: NURSE PRACTITIONER

## 2023-09-06 PROCEDURE — 83540 ASSAY OF IRON: CPT | Performed by: NURSE PRACTITIONER

## 2023-09-06 PROCEDURE — 1157F PR ADVANCE CARE PLAN OR EQUIV PRESENT IN MEDICAL RECORD: ICD-10-PCS | Mod: CPTII,S$GLB,, | Performed by: NURSE PRACTITIONER

## 2023-09-06 PROCEDURE — 1157F ADVNC CARE PLAN IN RCRD: CPT | Mod: CPTII,S$GLB,, | Performed by: NURSE PRACTITIONER

## 2023-09-06 PROCEDURE — 36415 COLL VENOUS BLD VENIPUNCTURE: CPT | Performed by: NURSE PRACTITIONER

## 2023-09-06 PROCEDURE — 3288F FALL RISK ASSESSMENT DOCD: CPT | Mod: CPTII,S$GLB,, | Performed by: NURSE PRACTITIONER

## 2023-09-06 PROCEDURE — 3288F PR FALLS RISK ASSESSMENT DOCUMENTED: ICD-10-PCS | Mod: CPTII,S$GLB,, | Performed by: NURSE PRACTITIONER

## 2023-09-06 PROCEDURE — 1159F MED LIST DOCD IN RCRD: CPT | Mod: CPTII,S$GLB,, | Performed by: NURSE PRACTITIONER

## 2023-09-06 PROCEDURE — 1101F PT FALLS ASSESS-DOCD LE1/YR: CPT | Mod: CPTII,S$GLB,, | Performed by: NURSE PRACTITIONER

## 2023-09-06 PROCEDURE — 1159F PR MEDICATION LIST DOCUMENTED IN MEDICAL RECORD: ICD-10-PCS | Mod: CPTII,S$GLB,, | Performed by: NURSE PRACTITIONER

## 2023-09-06 RX ORDER — SENNOSIDES 8.6 MG/1
1 TABLET ORAL DAILY
Qty: 30 TABLET | Refills: 11 | Status: SHIPPED | OUTPATIENT
Start: 2023-09-06 | End: 2024-09-05

## 2023-09-06 NOTE — PATIENT INSTRUCTIONS
Labs to monitor blood counts  Stop iron  Continue Miralax  Start stool softener. Sent to pharmacy.   Stop Linzess since causing too frequent bowel movements.   Continue pepcid for heartburn

## 2023-09-06 NOTE — PROGRESS NOTES
GASTROENTEROLOGY CLINIC NOTE    Chief Complaint: The primary encounter diagnosis was Chronic disease anemia. A diagnosis of Chronic idiopathic constipation was also pertinent to this visit.  Referring provider/PCP: Shlomo Luong MD    Vince Martinez is a 89 y.o. male who is a new patient to me with a PMH that's significant for anemia, CHF, CKD Stage 3, DM2, and GERD.  [employer] service is being used for this office visit.  He is here today to establish care for anemia and black stool.  This is not a new problem.  Patient has a history of anemia.  He reports having workup for anemia about 3 years ago which involved upper endoscopy and colonoscopy that were normal.  He has fatigue and weakness.  He also complains of mild dizziness and blurry vision.  Of note his blood sugars have been elevated and he was recently instructed to go to emergency room for hyperglycemia noted on lab work.  He is being followed by Hematology.  He is not currently taking oral iron supplements.  These were discontinued about 1 year ago.  He is not currently taking 81 mg aspirin but he does take omeprazole 40 mg daily.  No known history of H pylori.  When further questioned about black stool, he reports having black spots in his stool and states he does not have entire bowel movements that are black in color.  The black spots in his stool occur about once a month.  He is unsure if it is related to any foods that he may be eating.  He is not taking Pepto-Bismol    Anemia  Presents for initial visit. The condition has lasted for 10 years. Symptoms include abdominal pain (intermittent lower abdominal pain) and malaise/fatigue. There has been no pica or weight loss. Signs of blood loss that are not present include melena. Treatments tried: PO Iron in the past.       Interval Note 7/10/2023  Mr. Vince Martinez who is known to me presents to clinic for constipation. He was last seen by me 12/2022 for anemia. At that time we  discussed risks vs benefits of further workup with EGD and Colonoscopy. At that time patient elected to check serum H.pylori which was negative and monitor labs. Today he presents with constipation. This is not a new problem as he reports it has been ongoing for awhile.     Constipation    How Long: years   Frequency of Bowel Movements: Every two days  Hard stool   Straining: yes   Complete Evacuation: no   Hematochezia: no   Abdominal Pain: Occasional lower improves after bm   Unexplained Weight Loss/Change in Bowel Habits: no   Water Intake: Little   Daily Fiber Supplement: no     Interval Note 9/6/2023  Mr. Vince Martinez who is known to me presents for follow up regarding anemia and constipation. Following last office visit, Linzess was initiated for constipation.   Linzess caused too frequent bowel movements. Currently taking Miralax three times a week but continues straining with bowel movements and is passing hard stool.   Intermittent lower abdominal pain improves following bowel movement. Increasing Miralax causes too frequent bowel movements.   Last H/H 9.2/27.8 obtained 7/11/2023. Continues to follow with hematology.   He reports being advised to restart taking PO iron but this caused worsening constipation.   Per hematology note: Will do anemia workup today, call with results, iron infusions if indicated; plan likely will be to repeat labs in 3 months with follow up appt  He does endorse increased fatigue and dyspnea with exertion.       Treatments: OTC medication (cannot recall name) and Miralax, Linzess 72mcg (caused diarrhea)    NSAIDs: ASA 81 mg  Anticoagulation or Antiplatelet: No    History of H.pylori: no  H.pylori Treatment:  Prior Upper Endoscopy: 3 years ago per patient report  Prior Colonoscopy: 3 years ago per patient report  Family h/o Colon Cancer: No  Family h/o Crohn's Disease or Ulcerative Colitis: No  Family h/o Celiac Sprue: No  Abdominal Surgeries: no    Review of Systems    Constitutional:  Positive for malaise/fatigue. Negative for weight loss.   HENT:  Negative for sore throat.    Eyes:  Negative for blurred vision.   Respiratory:  Positive for shortness of breath. Negative for cough.    Cardiovascular:  Negative for chest pain.   Gastrointestinal:  Positive for abdominal pain (intermittent lower abdominal pain) and constipation. Negative for blood in stool, diarrhea, heartburn, melena, nausea and vomiting.   Genitourinary:  Negative for dysuria.   Musculoskeletal:  Negative for myalgias.   Skin:  Negative for rash.   Neurological:  Positive for dizziness. Negative for headaches.   Endo/Heme/Allergies:  Negative for environmental allergies.   Psychiatric/Behavioral:  Negative for suicidal ideas. The patient is not nervous/anxious.        Past Medical History: has a past medical history of Acute combined systolic and diastolic CHF, NYHA class 3, Arthritis, Bilateral renal cysts, Chronic pain, Diabetes mellitus, DJD (degenerative joint disease), Hyperkalemia, Hypertension, Iron deficiency anemia, Low back pain, Osteopenia of neck of right femur, Prostate enlargement, Sleep apnea, Stage 3b chronic kidney disease, and Thyroid disease.    Past Surgical History: has a past surgical history that includes Injection of joint (Bilateral, 02/13/2020); Epidural steroid injection into lumbar spine (N/A, 06/04/2020); Cardiac pacemaker placement; Injection of steroid (Bilateral, 01/12/2021); Transforaminal epidural injection of steroid (Left, 02/23/2021); Hemilaminectomy (05/26/2021); Spine surgery; Injection of joint (Bilateral, 03/22/2022); and Spine surgery (Bilateral).    Family History:family history includes Diabetes in his brother and sister; HIV in his son; No Known Problems in his daughter, father, and mother.    Allergies:   Review of patient's allergies indicates:   Allergen Reactions    Bactrim [sulfamethoxazole-trimethoprim] Rash    Ciprofloxacin Rash    Latex Rash       Social  History: reports that he has never smoked. He has never used smokeless tobacco. He reports that he does not drink alcohol and does not use drugs.    Home medications:   Current Outpatient Medications on File Prior to Visit   Medication Sig Dispense Refill    atorvastatin (LIPITOR) 10 MG tablet Take 1 tablet by mouth once daily 90 tablet 0    betamethasone dipropionate (DIPROLENE) 0.05 % lotion Apply 1 application topically nightly as needed.      blood sugar diagnostic Strp 1 strip by Misc.(Non-Drug; Combo Route) route once daily at 6am. 100 strip 3    calcium/D3/mag ox//carolyn/Zn (CALTRATE + D3 PLUS MINERALS ORAL) Take 1 tablet by mouth 2 (two) times daily.      diclofenac sodium (VOLTAREN) 1 % Gel Apply 2 g topically daily as needed (pain). 200 g 3    famotidine (PEPCID) 20 MG tablet Take 1 tablet (20 mg total) by mouth 2 (two) times daily. 60 tablet 11    finasteride (PROSCAR) 5 mg tablet Take 1 tablet (5 mg total) by mouth once daily. 90 tablet 3    fluticasone propionate (FLONASE) 50 mcg/actuation nasal spray USE 2 SPRAY(S) IN EACH NOSTRIL TWICE DAILY AS NEEDED FOR  RHINITIS 48 g 1    fluticasone-salmeterol diskus inhaler 250-50 mcg Inhale 1 puff into the lungs 2 (two) times daily. Controller      folic acid (FOLVITE) 1 MG tablet Take 1 mg by mouth once daily.      folic acid/multivit-min/lutein (CENTRUM SILVER ORAL) Take by mouth.      furosemide (LASIX) 40 MG tablet Take 1 tablet (40 mg total) by mouth once daily. 90 tablet 3    lancets (LANCETS,ULTRA THIN) Misc 1 lancet by Misc.(Non-Drug; Combo Route) route once daily at 6am. 100 each 3    levothyroxine (EUTHYROX) 100 MCG tablet Take 1 tablet (100 mcg total) by mouth before breakfast. 90 tablet 3    melatonin 10 mg Tab Take 1 tablet by mouth nightly as needed.      metoprolol succinate (TOPROL-XL) 25 MG 24 hr tablet Take 1 tablet (25 mg total) by mouth once daily. 90 tablet 3    omega-3 fatty acids/fish oil (FISH OIL-OMEGA-3 FATTY ACIDS) 300-1,000 mg  "capsule Take by mouth once daily.      patiromer calcium sorbitex (VELTASSA) 8.4 gram PwPk Take 1 packet (8.4 g total) by mouth every Mon, Wed, Fri. 13 packet 3    tamsulosin (FLOMAX) 0.4 mg Cap Take 2 capsules (0.8 mg total) by mouth once daily. 180 capsule 3    triamcinolone acetonide 0.1% (KENALOG) 0.1 % Lotn APPLY LOTION TO THE AFFECTED AREAS ON THE SCALP ONCE DAILY AT BEDTIME AS NEEDED      [DISCONTINUED] linaCLOtide (LINZESS) 72 mcg Cap capsule Take 1 capsule (72 mcg total) by mouth before breakfast. 90 capsule 3    aspirin 81 MG Chew 1 tablet (81 mg total) by Per NG tube route once daily. 30 tablet 11    blood-glucose meter kit Use as instructed 1 each 0    lancing device Misc 1 Device by Misc.(Non-Drug; Combo Route) route once daily at 6am. 1 each 0    traZODone (DESYREL) 50 MG tablet Take 1 tablet (50 mg total) by mouth nightly as needed for Insomnia. (Patient not taking: Reported on 7/20/2023) 90 tablet 3     No current facility-administered medications on file prior to visit.       Vital signs:  Ht 5' 5" (1.651 m)   Wt 64 kg (141 lb 1.5 oz)   BMI 23.48 kg/m²     Physical Exam  Vitals reviewed.   Constitutional:       General: He is not in acute distress.     Appearance: Normal appearance. He is not ill-appearing.   HENT:      Head: Normocephalic.   Cardiovascular:      Rate and Rhythm: Regular rhythm.      Heart sounds: Normal heart sounds. No murmur heard.  Pulmonary:      Effort: Pulmonary effort is normal. No respiratory distress.      Breath sounds: Normal breath sounds.   Chest:      Chest wall: No tenderness.   Abdominal:      General: Bowel sounds are normal. There is no distension.      Palpations: Abdomen is soft.      Tenderness: There is no abdominal tenderness. Negative signs include Taylor's sign.      Hernia: No hernia is present.   Skin:     General: Skin is warm.   Neurological:      Mental Status: He is alert and oriented to person, place, and time.   Psychiatric:         Mood and " "Affect: Mood normal.         Behavior: Behavior normal.         Routine labs:  Lab Results   Component Value Date    WBC 4.34 07/11/2023    HGB 9.2 (L) 07/11/2023    HCT 27.8 (L) 07/11/2023    MCV 97 07/11/2023     (L) 07/11/2023     Lab Results   Component Value Date    INR 1.0 08/23/2022     Lab Results   Component Value Date    IRON 126 07/10/2023    FERRITIN 201 07/10/2023    TIBC 337 07/10/2023    FESATURATED 37 07/10/2023     Lab Results   Component Value Date     07/20/2023    K 4.4 07/20/2023     07/20/2023    CO2 19 (L) 07/20/2023    BUN 38 (H) 07/20/2023    CREATININE 1.6 (H) 07/20/2023     Lab Results   Component Value Date    ALBUMIN 3.6 07/11/2023    ALT 36 07/11/2023    AST 46 (H) 07/11/2023    ALKPHOS 70 07/11/2023    BILITOT 0.4 07/11/2023     No results found for: "GLUCOSE"  Lab Results   Component Value Date    TSH 4.673 (H) 08/22/2022     Lab Results   Component Value Date    CALCIUM 8.6 (L) 07/20/2023    PHOS 4.0 07/11/2023       Imaging:      I have reviewed prior labs, imaging, and notes.      Assessment:  1. Chronic disease anemia    2. Chronic idiopathic constipation        Chronic constipation. Bowel movements occurring every other day. Hard consistency with accompanied straining.   Linzess caused diarrhea. Taking Miralax every three days. Intermittent lower abdominal discomfort that improves with bowel movements.   Follows with hematology for anemia. Last CBC stable but patient reports REEDER, fatigue, and dizziness.     Plan:  Orders Placed This Encounter    CBC Without Differential    Iron and TIBC    SENNA 8.6 mg tablet     Repeat CBC  Continue Miralax three times a week  Start Senna at night.   Continue Pepcid as previously prescribed.     Continue to monitor CBC and follow with hematology as scheduled. If anemia worsens, consider EGD/Colonoscopy.     Plan of care discussed with patient who is in agreement and verbalized understanding.     I have explained the planned " procedures to the patient.The risks, benefits and alternatives of the procedure were also explained in detail. Patient verbalized understanding, all questions were answered. The patient agrees to proceed as planned    Follow Up: Pending Workup          LYNDON Coy,FNP-BC [facility]    (Portions of this note were dictated using voice recognition software and may contain dictation related errors in spelling/grammar/syntax not found on text review)

## 2023-09-07 ENCOUNTER — TELEPHONE (OUTPATIENT)
Dept: GASTROENTEROLOGY | Facility: CLINIC | Age: 88
End: 2023-09-07
Payer: MEDICARE

## 2023-09-07 NOTE — TELEPHONE ENCOUNTER
----- Message from Karolina Swann NP sent at 9/6/2023  9:34 PM CDT -----  Please let patient know blood counts are stable and have improved slightly. Keep follow up with hematology in October. Follow up with PCP regarding dizziness and shortness of breath.

## 2023-09-08 ENCOUNTER — LAB VISIT (OUTPATIENT)
Dept: LAB | Facility: HOSPITAL | Age: 88
End: 2023-09-08
Payer: MEDICARE

## 2023-09-08 ENCOUNTER — TELEPHONE (OUTPATIENT)
Dept: GASTROENTEROLOGY | Facility: CLINIC | Age: 88
End: 2023-09-08
Payer: MEDICARE

## 2023-09-08 DIAGNOSIS — E79.0 HYPERURICEMIA: ICD-10-CM

## 2023-09-08 DIAGNOSIS — N25.81 SECONDARY HYPERPARATHYROIDISM OF RENAL ORIGIN: ICD-10-CM

## 2023-09-08 DIAGNOSIS — N18.32 STAGE 3B CHRONIC KIDNEY DISEASE: ICD-10-CM

## 2023-09-08 DIAGNOSIS — N18.32 ANEMIA IN STAGE 3B CHRONIC KIDNEY DISEASE: ICD-10-CM

## 2023-09-08 DIAGNOSIS — D63.1 ANEMIA IN STAGE 3B CHRONIC KIDNEY DISEASE: ICD-10-CM

## 2023-09-08 DIAGNOSIS — R80.9 PROTEINURIA, UNSPECIFIED TYPE: ICD-10-CM

## 2023-09-08 LAB
ALBUMIN SERPL BCP-MCNC: 4.2 G/DL (ref 3.5–5.2)
ANION GAP SERPL CALC-SCNC: 11 MMOL/L (ref 8–16)
BASOPHILS # BLD AUTO: 0.05 K/UL (ref 0–0.2)
BASOPHILS NFR BLD: 0.9 % (ref 0–1.9)
BILIRUB UR QL STRIP: NEGATIVE
BUN SERPL-MCNC: 44 MG/DL (ref 10–30)
CALCIUM SERPL-MCNC: 9 MG/DL (ref 8.7–10.5)
CHLORIDE SERPL-SCNC: 107 MMOL/L (ref 95–110)
CLARITY UR: CLEAR
CO2 SERPL-SCNC: 22 MMOL/L (ref 23–29)
COLOR UR: YELLOW
CREAT SERPL-MCNC: 1.7 MG/DL (ref 0.5–1.4)
CREAT UR-MCNC: 43.5 MG/DL (ref 23–375)
DIFFERENTIAL METHOD: ABNORMAL
EOSINOPHIL # BLD AUTO: 0.4 K/UL (ref 0–0.5)
EOSINOPHIL NFR BLD: 7.2 % (ref 0–8)
ERYTHROCYTE [DISTWIDTH] IN BLOOD BY AUTOMATED COUNT: 13.2 % (ref 11.5–14.5)
EST. GFR  (NO RACE VARIABLE): 37 ML/MIN/1.73 M^2
FERRITIN SERPL-MCNC: 220 NG/ML (ref 20–300)
GLUCOSE SERPL-MCNC: 137 MG/DL (ref 70–110)
GLUCOSE UR QL STRIP: NEGATIVE
HCT VFR BLD AUTO: 31.5 % (ref 40–54)
HGB BLD-MCNC: 10.3 G/DL (ref 14–18)
HGB UR QL STRIP: NEGATIVE
IMM GRANULOCYTES # BLD AUTO: 0.01 K/UL (ref 0–0.04)
IMM GRANULOCYTES NFR BLD AUTO: 0.2 % (ref 0–0.5)
KETONES UR QL STRIP: NEGATIVE
LEUKOCYTE ESTERASE UR QL STRIP: NEGATIVE
LYMPHOCYTES # BLD AUTO: 2.8 K/UL (ref 1–4.8)
LYMPHOCYTES NFR BLD: 48.5 % (ref 18–48)
MAGNESIUM SERPL-MCNC: 2 MG/DL (ref 1.6–2.6)
MCH RBC QN AUTO: 32.1 PG (ref 27–31)
MCHC RBC AUTO-ENTMCNC: 32.7 G/DL (ref 32–36)
MCV RBC AUTO: 98 FL (ref 82–98)
MONOCYTES # BLD AUTO: 0.6 K/UL (ref 0.3–1)
MONOCYTES NFR BLD: 9.8 % (ref 4–15)
NEUTROPHILS # BLD AUTO: 2 K/UL (ref 1.8–7.7)
NEUTROPHILS NFR BLD: 33.4 % (ref 38–73)
NITRITE UR QL STRIP: NEGATIVE
NRBC BLD-RTO: 0 /100 WBC
PH UR STRIP: 5 [PH] (ref 5–8)
PHOSPHATE SERPL-MCNC: 3.2 MG/DL (ref 2.7–4.5)
PLATELET # BLD AUTO: 124 K/UL (ref 150–450)
PMV BLD AUTO: 10.5 FL (ref 9.2–12.9)
POTASSIUM SERPL-SCNC: 4.1 MMOL/L (ref 3.5–5.1)
PROT UR QL STRIP: NEGATIVE
PROT UR-MCNC: <7 MG/DL (ref 0–15)
PROT/CREAT UR: NORMAL MG/G{CREAT} (ref 0–0.2)
PTH-INTACT SERPL-MCNC: 70.7 PG/ML (ref 9–77)
RBC # BLD AUTO: 3.21 M/UL (ref 4.6–6.2)
SODIUM SERPL-SCNC: 140 MMOL/L (ref 136–145)
SP GR UR STRIP: 1.01 (ref 1–1.03)
URATE SERPL-MCNC: 7.3 MG/DL (ref 3.4–7)
URN SPEC COLLECT METH UR: NORMAL
UROBILINOGEN UR STRIP-ACNC: NEGATIVE EU/DL
WBC # BLD AUTO: 5.83 K/UL (ref 3.9–12.7)

## 2023-09-08 PROCEDURE — 83970 ASSAY OF PARATHORMONE: CPT | Performed by: INTERNAL MEDICINE

## 2023-09-08 PROCEDURE — 84466 ASSAY OF TRANSFERRIN: CPT | Performed by: INTERNAL MEDICINE

## 2023-09-08 PROCEDURE — 84550 ASSAY OF BLOOD/URIC ACID: CPT | Performed by: INTERNAL MEDICINE

## 2023-09-08 PROCEDURE — 85025 COMPLETE CBC W/AUTO DIFF WBC: CPT | Performed by: INTERNAL MEDICINE

## 2023-09-08 PROCEDURE — 82570 ASSAY OF URINE CREATININE: CPT | Performed by: INTERNAL MEDICINE

## 2023-09-08 PROCEDURE — 83540 ASSAY OF IRON: CPT | Performed by: INTERNAL MEDICINE

## 2023-09-08 PROCEDURE — 82728 ASSAY OF FERRITIN: CPT | Performed by: INTERNAL MEDICINE

## 2023-09-08 PROCEDURE — 81003 URINALYSIS AUTO W/O SCOPE: CPT | Performed by: INTERNAL MEDICINE

## 2023-09-08 PROCEDURE — 36415 COLL VENOUS BLD VENIPUNCTURE: CPT | Performed by: INTERNAL MEDICINE

## 2023-09-08 PROCEDURE — 83735 ASSAY OF MAGNESIUM: CPT | Performed by: INTERNAL MEDICINE

## 2023-09-08 PROCEDURE — 80069 RENAL FUNCTION PANEL: CPT | Performed by: INTERNAL MEDICINE

## 2023-09-08 NOTE — TELEPHONE ENCOUNTER
Patient notified and understands results and to call his PCP if he continues with the SOB and dizziness to get a sooner appt.      ----- Message from Karolina Swann NP sent at 9/6/2023  9:34 PM CDT -----  Please let patient know blood counts are stable and have improved slightly. Keep follow up with hematology in October. Follow up with PCP regarding dizziness and shortness of breath.

## 2023-09-09 LAB
IRON SERPL-MCNC: 104 UG/DL (ref 45–160)
SATURATED IRON: 30 % (ref 20–50)
TOTAL IRON BINDING CAPACITY: 345 UG/DL (ref 250–450)
TRANSFERRIN SERPL-MCNC: 233 MG/DL (ref 200–375)

## 2023-10-05 DIAGNOSIS — M25.551 BILATERAL HIP PAIN: Primary | ICD-10-CM

## 2023-10-05 DIAGNOSIS — M25.552 BILATERAL HIP PAIN: Primary | ICD-10-CM

## 2023-10-06 ENCOUNTER — TELEPHONE (OUTPATIENT)
Dept: FAMILY MEDICINE | Facility: CLINIC | Age: 88
End: 2023-10-06
Payer: MEDICARE

## 2023-10-06 ENCOUNTER — HOSPITAL ENCOUNTER (OUTPATIENT)
Dept: RADIOLOGY | Facility: HOSPITAL | Age: 88
Discharge: HOME | End: 2023-10-06
Attending: ORTHOPAEDIC SURGERY
Payer: MEDICARE

## 2023-10-06 ENCOUNTER — OFFICE VISIT (OUTPATIENT)
Dept: ORTHOPEDICS | Facility: CLINIC | Age: 88
End: 2023-10-06
Payer: MEDICARE

## 2023-10-06 VITALS — HEIGHT: 65 IN | WEIGHT: 145 LBS | BODY MASS INDEX: 24.16 KG/M2

## 2023-10-06 DIAGNOSIS — M25.552 BILATERAL HIP PAIN: ICD-10-CM

## 2023-10-06 DIAGNOSIS — M25.551 BILATERAL HIP PAIN: ICD-10-CM

## 2023-10-06 DIAGNOSIS — M96.1 FAILED BACK SYNDROME: Primary | ICD-10-CM

## 2023-10-06 PROCEDURE — 73521 X-RAY EXAM HIPS BI 2 VIEWS: CPT | Mod: 26,,, | Performed by: INTERNAL MEDICINE

## 2023-10-06 PROCEDURE — 1157F PR ADVANCE CARE PLAN OR EQUIV PRESENT IN MEDICAL RECORD: ICD-10-PCS | Mod: CPTII,S$GLB,, | Performed by: ORTHOPAEDIC SURGERY

## 2023-10-06 PROCEDURE — 1101F PT FALLS ASSESS-DOCD LE1/YR: CPT | Mod: CPTII,S$GLB,, | Performed by: ORTHOPAEDIC SURGERY

## 2023-10-06 PROCEDURE — 99999 PR PBB SHADOW E&M-EST. PATIENT-LVL IV: ICD-10-PCS | Mod: PBBFAC,,, | Performed by: ORTHOPAEDIC SURGERY

## 2023-10-06 PROCEDURE — 3288F FALL RISK ASSESSMENT DOCD: CPT | Mod: CPTII,S$GLB,, | Performed by: ORTHOPAEDIC SURGERY

## 2023-10-06 PROCEDURE — 1125F AMNT PAIN NOTED PAIN PRSNT: CPT | Mod: CPTII,S$GLB,, | Performed by: ORTHOPAEDIC SURGERY

## 2023-10-06 PROCEDURE — 1160F PR REVIEW ALL MEDS BY PRESCRIBER/CLIN PHARMACIST DOCUMENTED: ICD-10-PCS | Mod: CPTII,S$GLB,, | Performed by: ORTHOPAEDIC SURGERY

## 2023-10-06 PROCEDURE — 1157F ADVNC CARE PLAN IN RCRD: CPT | Mod: CPTII,S$GLB,, | Performed by: ORTHOPAEDIC SURGERY

## 2023-10-06 PROCEDURE — 99214 PR OFFICE/OUTPT VISIT, EST, LEVL IV, 30-39 MIN: ICD-10-PCS | Mod: S$GLB,,, | Performed by: ORTHOPAEDIC SURGERY

## 2023-10-06 PROCEDURE — 1159F MED LIST DOCD IN RCRD: CPT | Mod: CPTII,S$GLB,, | Performed by: ORTHOPAEDIC SURGERY

## 2023-10-06 PROCEDURE — 73521 X-RAY EXAM HIPS BI 2 VIEWS: CPT | Mod: TC,PN

## 2023-10-06 PROCEDURE — 73521 XR HIPS BILATERAL 2 VIEW INCL AP PELVIS: ICD-10-PCS | Mod: 26,,, | Performed by: INTERNAL MEDICINE

## 2023-10-06 PROCEDURE — 1160F RVW MEDS BY RX/DR IN RCRD: CPT | Mod: CPTII,S$GLB,, | Performed by: ORTHOPAEDIC SURGERY

## 2023-10-06 PROCEDURE — 99999 PR PBB SHADOW E&M-EST. PATIENT-LVL IV: CPT | Mod: PBBFAC,,, | Performed by: ORTHOPAEDIC SURGERY

## 2023-10-06 PROCEDURE — 1125F PR PAIN SEVERITY QUANTIFIED, PAIN PRESENT: ICD-10-PCS | Mod: CPTII,S$GLB,, | Performed by: ORTHOPAEDIC SURGERY

## 2023-10-06 PROCEDURE — 1159F PR MEDICATION LIST DOCUMENTED IN MEDICAL RECORD: ICD-10-PCS | Mod: CPTII,S$GLB,, | Performed by: ORTHOPAEDIC SURGERY

## 2023-10-06 PROCEDURE — 1101F PR PT FALLS ASSESS DOC 0-1 FALLS W/OUT INJ PAST YR: ICD-10-PCS | Mod: CPTII,S$GLB,, | Performed by: ORTHOPAEDIC SURGERY

## 2023-10-06 PROCEDURE — 3288F PR FALLS RISK ASSESSMENT DOCUMENTED: ICD-10-PCS | Mod: CPTII,S$GLB,, | Performed by: ORTHOPAEDIC SURGERY

## 2023-10-06 PROCEDURE — 99214 OFFICE O/P EST MOD 30 MIN: CPT | Mod: S$GLB,,, | Performed by: ORTHOPAEDIC SURGERY

## 2023-10-06 NOTE — PROGRESS NOTES
Subjective:      Patient ID: Vince Martinez is a 89 y.o. male.    Chief Complaint: Consult (Madhu Hip Pain ) and Hip Pain (/)    HPI    The patient complains of bilateral hip pain.  He points to his lower back as the source of the pain.  Reports pain is worse when he sleeps on his back at night.  Denies distal neurologic symptoms.  Denies claudication like symptoms.  The patient underwent lumbar decompression in 2021.  He did not find this helpful.  He also has had multiple trochanteric injections reportedly without benefit.  The patient requests physical therapy referral          Review of Systems   Constitutional: Negative for fever and weight loss.   HENT:  Negative for congestion.    Eyes:  Negative for visual disturbance.   Cardiovascular:  Negative for chest pain.   Respiratory:  Negative for shortness of breath.    Hematologic/Lymphatic: Negative for bleeding problem. Does not bruise/bleed easily.   Skin:  Negative for poor wound healing.   Musculoskeletal:  Positive for back pain and joint pain.   Gastrointestinal:  Negative for abdominal pain.   Genitourinary:  Negative for dysuria.   Neurological:  Negative for seizures.   Psychiatric/Behavioral:  Negative for altered mental status.    Allergic/Immunologic: Negative for persistent infections.         Objective:      Ortho/SPM Exam      Right hip    The patient is not in acute distress.   Body habitus is:normal.   Sclerae normal  Flexed posture  The patient walks without a limp.   Respiratory distress:  none  The skin over the hip is:intact.   There is no local tenderness.   Range of motion- Flexion full, External rotation full, internal rotation full.  Resisted SLR negative.  Pain with rotation negative  Sciatic tension findings equivocal at 80°  Shortening/lengthening compared to the contralateral side absent.  Pulses DP present, PT present.  Motor normal 5/5 strength in all tested muscle groups.   Sensory normal.    The left hip is identical.    I reviewed  the relevant radiographic images for the patient's condition:  Radiographs of both hips and pelvis show no fracture, no dislocation, no localized bone lesion and no significant loss of joint space.  Advanced degenerative changes of the lumbosacral spine are partially visualized.            Assessment:       Encounter Diagnoses   Name Primary?    Failed back syndrome Yes    Bilateral hip pain         There is no objective evidence of any intrinsic hip pathology whatsoever.  The available evidence strongly suggest that all of the patient's perceived hip pain emanates from nerve root compression in his lumbosacral spine.          Plan:       Vince was seen today for consult and hip pain.    Diagnoses and all orders for this visit:    Failed back syndrome    Bilateral hip pain          I explained my assessment to the patient in layman's terms in detail.    There is no further medication or procedure that I can safely suggest at this time.    I did prescribe physical therapy per patient request.      As far as my clinical capabilities go, the patient has reached a therapeutic endpoint with regard to this problem.  I explained the implications of this in detail.    An implantable spinal stimulator might be an option for this patient.  I explained that he would need to see a neurosurgeon if he was interested in pursuing this.

## 2023-10-07 DIAGNOSIS — E11.65 TYPE 2 DIABETES MELLITUS WITH HYPERGLYCEMIA, WITHOUT LONG-TERM CURRENT USE OF INSULIN: ICD-10-CM

## 2023-10-07 NOTE — TELEPHONE ENCOUNTER
No care due was identified.  [facility] Embedded Care Due Messages. Reference number: [id number].   10/07/2023 10:24:35 AM CDT

## 2023-10-08 RX ORDER — SITAGLIPTIN 50 MG/1
50 TABLET, FILM COATED ORAL
Qty: 90 TABLET | Refills: 0 | OUTPATIENT
Start: 2023-10-08

## 2023-10-08 NOTE — TELEPHONE ENCOUNTER
Refill Decision Note   Vince Martinez  is requesting a refill authorization.  Brief Assessment and Rationale for Refill:  Quick Discontinue     Medication Therapy Plan:       Medication Reconciliation Completed: No   Comments:     No Care Gaps recommended.     Note composed:12:17 PM 10/08/2023

## 2023-10-10 DIAGNOSIS — J30.1 SEASONAL ALLERGIC RHINITIS DUE TO POLLEN: ICD-10-CM

## 2023-10-10 NOTE — TELEPHONE ENCOUNTER
Care Due:                  Date            Visit Type   Department     Provider  --------------------------------------------------------------------------------                                EP -                              PRIMARY      [facility]  Last Visit: 05-      CARE ([facility])   MONIE Luong                              EP -                              PRIMARY      [facility]  Next Visit: 11-      CARE ([facility])   MONIE Luong                                                            Last  Test          Frequency    Reason                     Performed    Due Date  --------------------------------------------------------------------------------    TSH.........  12 months..  levothyroxine............  08- 08-    [facility] Embedded Care Due Messages. Reference number: [id number].   10/10/2023 1:26:49 PM CDT

## 2023-10-11 RX ORDER — FLUTICASONE PROPIONATE 50 MCG
1 SPRAY, SUSPENSION (ML) NASAL 2 TIMES DAILY
Qty: 48 G | Refills: 0 | Status: SHIPPED | OUTPATIENT
Start: 2023-10-11

## 2023-10-11 NOTE — TELEPHONE ENCOUNTER
Provider Staff:  Action required. This patient has received emergency care.     Please schedule patient for a follow up appointment within next 90 days.     Thanks!  [facility]     Appointments      Date Provider   Last Visit   5/30/2023 Shlomo Luong MD   Next Visit   11/30/2023 Shlomo Luong MD        Refill Decision Note   Vince Martinez  is requesting a refill authorization.  Brief Assessment and Rationale for Refill:  Approve     Medication Therapy Plan: ED documentation reviewed. No changes to therapy noted. FLOS 11/27/23      Pharmacist review requested: Yes   Extended chart review required: Yes   Comments:     Note composed:2:06 PM 10/11/2023

## 2023-10-11 NOTE — TELEPHONE ENCOUNTER
Refill request routed to [facility] Review Pool for Pharmacist Review.

## 2023-10-12 ENCOUNTER — TELEPHONE (OUTPATIENT)
Dept: FAMILY MEDICINE | Facility: CLINIC | Age: 88
End: 2023-10-12
Payer: MEDICARE

## 2023-10-17 RX ORDER — IPRATROPIUM BROMIDE 21 UG/1
2 SPRAY, METERED NASAL 2 TIMES DAILY
Qty: 30 ML | Refills: 1 | Status: SHIPPED | OUTPATIENT
Start: 2023-10-17 | End: 2024-01-15

## 2023-10-17 NOTE — TELEPHONE ENCOUNTER
No care due was identified.  [facility] Embedded Care Due Messages. Reference number: [id number].   10/17/2023 10:48:19 AM CDT

## 2023-10-25 DIAGNOSIS — E11.65 TYPE 2 DIABETES MELLITUS WITH HYPERGLYCEMIA, WITHOUT LONG-TERM CURRENT USE OF INSULIN: ICD-10-CM

## 2023-10-27 DIAGNOSIS — E11.65 TYPE 2 DIABETES MELLITUS WITH HYPERGLYCEMIA, WITHOUT LONG-TERM CURRENT USE OF INSULIN: ICD-10-CM

## 2023-10-27 NOTE — TELEPHONE ENCOUNTER
No care due was identified.  [facility] Embedded Care Due Messages. Reference number: [id number].   10/27/2023 4:50:00 PM CDT

## 2023-10-28 RX ORDER — ATORVASTATIN CALCIUM 10 MG/1
10 TABLET, FILM COATED ORAL DAILY
Qty: 90 TABLET | Refills: 0 | Status: SHIPPED | OUTPATIENT
Start: 2023-10-28 | End: 2023-10-30

## 2023-10-28 NOTE — TELEPHONE ENCOUNTER
Provider Staff:  Action required. This patient has received emergency care.     Please schedule patient for a follow up appointment.     Thanks!  [facility]     Appointments      Date Provider   Last Visit   5/30/2023 Shlomo Luong MD   Next Visit   11/30/2023 Shlomo Luong MD          Refill Decision Note   Vince Martinez  is requesting a refill authorization.  Brief Assessment and Rationale for Refill:  Approve     Medication Therapy Plan:         Comments:     Note composed:1:56 PM 10/28/2023

## 2023-10-30 RX ORDER — ATORVASTATIN CALCIUM 10 MG/1
TABLET, FILM COATED ORAL
Qty: 90 TABLET | Refills: 3 | Status: SHIPPED | OUTPATIENT
Start: 2023-10-30

## 2023-11-10 ENCOUNTER — LAB VISIT (OUTPATIENT)
Dept: LAB | Facility: HOSPITAL | Age: 88
End: 2023-11-10
Attending: INTERNAL MEDICINE
Payer: MEDICARE

## 2023-11-10 DIAGNOSIS — N18.32 ANEMIA IN STAGE 3B CHRONIC KIDNEY DISEASE: ICD-10-CM

## 2023-11-10 DIAGNOSIS — D63.1 ANEMIA IN STAGE 3B CHRONIC KIDNEY DISEASE: ICD-10-CM

## 2023-11-10 DIAGNOSIS — N18.32 STAGE 3B CHRONIC KIDNEY DISEASE: ICD-10-CM

## 2023-11-10 DIAGNOSIS — N25.81 SECONDARY HYPERPARATHYROIDISM OF RENAL ORIGIN: ICD-10-CM

## 2023-11-10 LAB
ALBUMIN SERPL BCP-MCNC: 4 G/DL (ref 3.5–5.2)
ANION GAP SERPL CALC-SCNC: 12 MMOL/L (ref 8–16)
BASOPHILS # BLD AUTO: 0.07 K/UL (ref 0–0.2)
BASOPHILS NFR BLD: 1.4 % (ref 0–1.9)
BUN SERPL-MCNC: 35 MG/DL (ref 10–30)
CALCIUM SERPL-MCNC: 9.6 MG/DL (ref 8.7–10.5)
CHLORIDE SERPL-SCNC: 103 MMOL/L (ref 95–110)
CO2 SERPL-SCNC: 25 MMOL/L (ref 23–29)
CREAT SERPL-MCNC: 1.6 MG/DL (ref 0.5–1.4)
DIFFERENTIAL METHOD: ABNORMAL
EOSINOPHIL # BLD AUTO: 0.5 K/UL (ref 0–0.5)
EOSINOPHIL NFR BLD: 9.5 % (ref 0–8)
ERYTHROCYTE [DISTWIDTH] IN BLOOD BY AUTOMATED COUNT: 13 % (ref 11.5–14.5)
EST. GFR  (NO RACE VARIABLE): 40 ML/MIN/1.73 M^2
FERRITIN SERPL-MCNC: 223 NG/ML (ref 20–300)
GLUCOSE SERPL-MCNC: 230 MG/DL (ref 70–110)
HCT VFR BLD AUTO: 34.6 % (ref 40–54)
HGB BLD-MCNC: 11.6 G/DL (ref 14–18)
IMM GRANULOCYTES # BLD AUTO: 0.01 K/UL (ref 0–0.04)
IMM GRANULOCYTES NFR BLD AUTO: 0.2 % (ref 0–0.5)
IRON SERPL-MCNC: 118 UG/DL (ref 45–160)
LYMPHOCYTES # BLD AUTO: 2.1 K/UL (ref 1–4.8)
LYMPHOCYTES NFR BLD: 42.3 % (ref 18–48)
MAGNESIUM SERPL-MCNC: 2.1 MG/DL (ref 1.6–2.6)
MCH RBC QN AUTO: 32.9 PG (ref 27–31)
MCHC RBC AUTO-ENTMCNC: 33.5 G/DL (ref 32–36)
MCV RBC AUTO: 98 FL (ref 82–98)
MONOCYTES # BLD AUTO: 0.5 K/UL (ref 0.3–1)
MONOCYTES NFR BLD: 9.1 % (ref 4–15)
NEUTROPHILS # BLD AUTO: 1.9 K/UL (ref 1.8–7.7)
NEUTROPHILS NFR BLD: 37.7 % (ref 38–73)
NRBC BLD-RTO: 0 /100 WBC
PHOSPHATE SERPL-MCNC: 3.2 MG/DL (ref 2.7–4.5)
PLATELET # BLD AUTO: 146 K/UL (ref 150–450)
PMV BLD AUTO: 10.9 FL (ref 9.2–12.9)
POTASSIUM SERPL-SCNC: 4.1 MMOL/L (ref 3.5–5.1)
PTH-INTACT SERPL-MCNC: 58.8 PG/ML (ref 9–77)
RBC # BLD AUTO: 3.53 M/UL (ref 4.6–6.2)
SATURATED IRON: 32 % (ref 20–50)
SODIUM SERPL-SCNC: 140 MMOL/L (ref 136–145)
TOTAL IRON BINDING CAPACITY: 371 UG/DL (ref 250–450)
TRANSFERRIN SERPL-MCNC: 251 MG/DL (ref 200–375)
URATE SERPL-MCNC: 6.1 MG/DL (ref 3.4–7)
WBC # BLD AUTO: 4.96 K/UL (ref 3.9–12.7)

## 2023-11-10 PROCEDURE — 36415 COLL VENOUS BLD VENIPUNCTURE: CPT | Performed by: INTERNAL MEDICINE

## 2023-11-10 PROCEDURE — 84466 ASSAY OF TRANSFERRIN: CPT | Performed by: INTERNAL MEDICINE

## 2023-11-10 PROCEDURE — 83540 ASSAY OF IRON: CPT | Performed by: INTERNAL MEDICINE

## 2023-11-10 PROCEDURE — 80069 RENAL FUNCTION PANEL: CPT | Performed by: INTERNAL MEDICINE

## 2023-11-10 PROCEDURE — 83735 ASSAY OF MAGNESIUM: CPT | Performed by: INTERNAL MEDICINE

## 2023-11-10 PROCEDURE — 83970 ASSAY OF PARATHORMONE: CPT | Performed by: INTERNAL MEDICINE

## 2023-11-10 PROCEDURE — 85025 COMPLETE CBC W/AUTO DIFF WBC: CPT | Performed by: INTERNAL MEDICINE

## 2023-11-10 PROCEDURE — 84550 ASSAY OF BLOOD/URIC ACID: CPT | Performed by: INTERNAL MEDICINE

## 2023-11-10 PROCEDURE — 82728 ASSAY OF FERRITIN: CPT | Performed by: INTERNAL MEDICINE

## 2023-11-17 ENCOUNTER — CLINICAL SUPPORT (OUTPATIENT)
Dept: REHABILITATION | Facility: HOSPITAL | Age: 88
End: 2023-11-17
Attending: ORTHOPAEDIC SURGERY
Payer: MEDICARE

## 2023-11-17 DIAGNOSIS — R68.89 DECREASED STRENGTH, ENDURANCE, AND MOBILITY: ICD-10-CM

## 2023-11-17 DIAGNOSIS — G89.29 CHRONIC MIDLINE LOW BACK PAIN WITH BILATERAL SCIATICA: Primary | ICD-10-CM

## 2023-11-17 DIAGNOSIS — M96.1 FAILED BACK SYNDROME: ICD-10-CM

## 2023-11-17 DIAGNOSIS — R53.1 DECREASED STRENGTH, ENDURANCE, AND MOBILITY: ICD-10-CM

## 2023-11-17 DIAGNOSIS — M54.42 CHRONIC MIDLINE LOW BACK PAIN WITH BILATERAL SCIATICA: Primary | ICD-10-CM

## 2023-11-17 DIAGNOSIS — M54.41 CHRONIC MIDLINE LOW BACK PAIN WITH BILATERAL SCIATICA: Primary | ICD-10-CM

## 2023-11-17 DIAGNOSIS — Z74.09 DECREASED STRENGTH, ENDURANCE, AND MOBILITY: ICD-10-CM

## 2023-11-17 PROCEDURE — 97110 THERAPEUTIC EXERCISES: CPT | Mod: PN

## 2023-11-17 PROCEDURE — 97530 THERAPEUTIC ACTIVITIES: CPT | Mod: PN

## 2023-11-17 PROCEDURE — 97162 PT EVAL MOD COMPLEX 30 MIN: CPT | Mod: PN

## 2023-11-27 ENCOUNTER — LAB VISIT (OUTPATIENT)
Dept: LAB | Facility: HOSPITAL | Age: 88
End: 2023-11-27
Attending: FAMILY MEDICINE
Payer: MEDICARE

## 2023-11-27 DIAGNOSIS — I10 ESSENTIAL HYPERTENSION: ICD-10-CM

## 2023-11-27 DIAGNOSIS — E03.9 HYPOTHYROIDISM, UNSPECIFIED TYPE: ICD-10-CM

## 2023-11-27 DIAGNOSIS — E11.65 TYPE 2 DIABETES MELLITUS WITH HYPERGLYCEMIA, WITHOUT LONG-TERM CURRENT USE OF INSULIN: ICD-10-CM

## 2023-11-27 PROBLEM — R68.89 DECREASED STRENGTH, ENDURANCE, AND MOBILITY: Status: ACTIVE | Noted: 2021-08-03

## 2023-11-27 PROBLEM — M96.1 FAILED BACK SYNDROME: Status: ACTIVE | Noted: 2023-11-27

## 2023-11-27 PROBLEM — Z74.09 DECREASED STRENGTH, ENDURANCE, AND MOBILITY: Status: ACTIVE | Noted: 2021-08-03

## 2023-11-27 PROBLEM — M54.50 LOW BACK PAIN: Status: ACTIVE | Noted: 2021-08-03

## 2023-11-27 LAB
ALBUMIN SERPL BCP-MCNC: 3.7 G/DL (ref 3.5–5.2)
ALP SERPL-CCNC: 77 U/L (ref 55–135)
ALT SERPL W/O P-5'-P-CCNC: 42 U/L (ref 10–44)
ANION GAP SERPL CALC-SCNC: 9 MMOL/L (ref 8–16)
AST SERPL-CCNC: 50 U/L (ref 10–40)
BILIRUB SERPL-MCNC: 0.4 MG/DL (ref 0.1–1)
BUN SERPL-MCNC: 41 MG/DL (ref 10–30)
CALCIUM SERPL-MCNC: 9.4 MG/DL (ref 8.7–10.5)
CHLORIDE SERPL-SCNC: 105 MMOL/L (ref 95–110)
CHOLEST SERPL-MCNC: 107 MG/DL (ref 120–199)
CHOLEST/HDLC SERPL: 2.3 {RATIO} (ref 2–5)
CO2 SERPL-SCNC: 25 MMOL/L (ref 23–29)
CREAT SERPL-MCNC: 1.6 MG/DL (ref 0.5–1.4)
EST. GFR  (NO RACE VARIABLE): 39.9 ML/MIN/1.73 M^2
ESTIMATED AVG GLUCOSE: 183 MG/DL (ref 68–131)
GLUCOSE SERPL-MCNC: 120 MG/DL (ref 70–110)
HBA1C MFR BLD: 8 % (ref 4–5.6)
HDLC SERPL-MCNC: 47 MG/DL (ref 40–75)
HDLC SERPL: 43.9 % (ref 20–50)
LDLC SERPL CALC-MCNC: 46 MG/DL (ref 63–159)
NONHDLC SERPL-MCNC: 60 MG/DL
POTASSIUM SERPL-SCNC: 4.7 MMOL/L (ref 3.5–5.1)
PROT SERPL-MCNC: 7.5 G/DL (ref 6–8.4)
SODIUM SERPL-SCNC: 139 MMOL/L (ref 136–145)
TRIGL SERPL-MCNC: 70 MG/DL (ref 30–150)
TSH SERPL DL<=0.005 MIU/L-ACNC: 3.88 UIU/ML (ref 0.4–4)

## 2023-11-27 PROCEDURE — 80061 LIPID PANEL: CPT | Performed by: FAMILY MEDICINE

## 2023-11-27 PROCEDURE — 36415 COLL VENOUS BLD VENIPUNCTURE: CPT | Mod: PO | Performed by: FAMILY MEDICINE

## 2023-11-27 PROCEDURE — 84443 ASSAY THYROID STIM HORMONE: CPT | Performed by: FAMILY MEDICINE

## 2023-11-27 PROCEDURE — 80053 COMPREHEN METABOLIC PANEL: CPT | Performed by: FAMILY MEDICINE

## 2023-11-27 PROCEDURE — 83036 HEMOGLOBIN GLYCOSYLATED A1C: CPT | Performed by: FAMILY MEDICINE

## 2023-11-30 ENCOUNTER — OFFICE VISIT (OUTPATIENT)
Dept: FAMILY MEDICINE | Facility: CLINIC | Age: 88
End: 2023-11-30
Payer: MEDICARE

## 2023-11-30 VITALS
DIASTOLIC BLOOD PRESSURE: 50 MMHG | SYSTOLIC BLOOD PRESSURE: 120 MMHG | BODY MASS INDEX: 23.91 KG/M2 | HEART RATE: 72 BPM | OXYGEN SATURATION: 98 % | HEIGHT: 65 IN | WEIGHT: 143.5 LBS

## 2023-11-30 DIAGNOSIS — J84.10 CALCIFIED GRANULOMA OF LUNG: ICD-10-CM

## 2023-11-30 DIAGNOSIS — R63.0 LOSS OF APPETITE: ICD-10-CM

## 2023-11-30 DIAGNOSIS — E11.65 TYPE 2 DIABETES MELLITUS WITH HYPERGLYCEMIA, WITHOUT LONG-TERM CURRENT USE OF INSULIN: ICD-10-CM

## 2023-11-30 DIAGNOSIS — D63.8 CHRONIC DISEASE ANEMIA: ICD-10-CM

## 2023-11-30 DIAGNOSIS — R43.0 LOSS OF SMELL: ICD-10-CM

## 2023-11-30 DIAGNOSIS — I10 ESSENTIAL HYPERTENSION: ICD-10-CM

## 2023-11-30 DIAGNOSIS — M46.1 SACROILIITIS, NOT ELSEWHERE CLASSIFIED: ICD-10-CM

## 2023-11-30 DIAGNOSIS — E03.9 HYPOTHYROIDISM, UNSPECIFIED TYPE: ICD-10-CM

## 2023-11-30 DIAGNOSIS — J32.8 OTHER CHRONIC SINUSITIS: ICD-10-CM

## 2023-11-30 DIAGNOSIS — Z23 NEEDS FLU SHOT: Primary | ICD-10-CM

## 2023-11-30 DIAGNOSIS — I70.0 ABDOMINAL AORTIC ATHEROSCLEROSIS: ICD-10-CM

## 2023-11-30 DIAGNOSIS — G31.9 DEGENERATIVE DISEASE OF NERVOUS SYSTEM, UNSPECIFIED: ICD-10-CM

## 2023-11-30 PROCEDURE — 1159F MED LIST DOCD IN RCRD: CPT | Mod: CPTII,S$GLB,, | Performed by: FAMILY MEDICINE

## 2023-11-30 PROCEDURE — 1157F PR ADVANCE CARE PLAN OR EQUIV PRESENT IN MEDICAL RECORD: ICD-10-PCS | Mod: CPTII,S$GLB,, | Performed by: FAMILY MEDICINE

## 2023-11-30 PROCEDURE — 1159F PR MEDICATION LIST DOCUMENTED IN MEDICAL RECORD: ICD-10-PCS | Mod: CPTII,S$GLB,, | Performed by: FAMILY MEDICINE

## 2023-11-30 PROCEDURE — 99215 PR OFFICE/OUTPT VISIT, EST, LEVL V, 40-54 MIN: ICD-10-PCS | Mod: 25,S$GLB,, | Performed by: FAMILY MEDICINE

## 2023-11-30 PROCEDURE — G0008 FLU VACCINE - QUADRIVALENT - ADJUVANTED: ICD-10-PCS | Mod: S$GLB,,, | Performed by: FAMILY MEDICINE

## 2023-11-30 PROCEDURE — G0008 ADMIN INFLUENZA VIRUS VAC: HCPCS | Mod: S$GLB,,, | Performed by: FAMILY MEDICINE

## 2023-11-30 PROCEDURE — 1101F PT FALLS ASSESS-DOCD LE1/YR: CPT | Mod: CPTII,S$GLB,, | Performed by: FAMILY MEDICINE

## 2023-11-30 PROCEDURE — 90694 FLU VACCINE - QUADRIVALENT - ADJUVANTED: ICD-10-PCS | Mod: S$GLB,,, | Performed by: FAMILY MEDICINE

## 2023-11-30 PROCEDURE — 99215 OFFICE O/P EST HI 40 MIN: CPT | Mod: 25,S$GLB,, | Performed by: FAMILY MEDICINE

## 2023-11-30 PROCEDURE — 99999 PR PBB SHADOW E&M-EST. PATIENT-LVL IV: ICD-10-PCS | Mod: PBBFAC,,, | Performed by: FAMILY MEDICINE

## 2023-11-30 PROCEDURE — 99999 PR PBB SHADOW E&M-EST. PATIENT-LVL IV: CPT | Mod: PBBFAC,,, | Performed by: FAMILY MEDICINE

## 2023-11-30 PROCEDURE — 1157F ADVNC CARE PLAN IN RCRD: CPT | Mod: CPTII,S$GLB,, | Performed by: FAMILY MEDICINE

## 2023-11-30 PROCEDURE — 3288F PR FALLS RISK ASSESSMENT DOCUMENTED: ICD-10-PCS | Mod: CPTII,S$GLB,, | Performed by: FAMILY MEDICINE

## 2023-11-30 PROCEDURE — 1160F PR REVIEW ALL MEDS BY PRESCRIBER/CLIN PHARMACIST DOCUMENTED: ICD-10-PCS | Mod: CPTII,S$GLB,, | Performed by: FAMILY MEDICINE

## 2023-11-30 PROCEDURE — 1101F PR PT FALLS ASSESS DOC 0-1 FALLS W/OUT INJ PAST YR: ICD-10-PCS | Mod: CPTII,S$GLB,, | Performed by: FAMILY MEDICINE

## 2023-11-30 PROCEDURE — 3288F FALL RISK ASSESSMENT DOCD: CPT | Mod: CPTII,S$GLB,, | Performed by: FAMILY MEDICINE

## 2023-11-30 PROCEDURE — 90694 VACC AIIV4 NO PRSRV 0.5ML IM: CPT | Mod: S$GLB,,, | Performed by: FAMILY MEDICINE

## 2023-11-30 PROCEDURE — 1160F RVW MEDS BY RX/DR IN RCRD: CPT | Mod: CPTII,S$GLB,, | Performed by: FAMILY MEDICINE

## 2023-11-30 RX ORDER — AMLODIPINE BESYLATE 10 MG/1
10 TABLET ORAL
COMMUNITY
End: 2024-04-02 | Stop reason: SDUPTHER

## 2023-11-30 RX ORDER — LANCETS
1 EACH MISCELLANEOUS DAILY
Qty: 100 EACH | Refills: 3 | Status: SHIPPED | OUTPATIENT
Start: 2023-11-30

## 2023-11-30 RX ORDER — DIPHENHYDRAMINE HCL 25 MG
25 CAPSULE ORAL EVERY 6 HOURS PRN
COMMUNITY
End: 2024-04-02

## 2023-11-30 RX ORDER — DULAGLUTIDE 0.75 MG/.5ML
0.75 INJECTION, SOLUTION SUBCUTANEOUS
Qty: 12 PEN | Refills: 3 | Status: SHIPPED | OUTPATIENT
Start: 2023-11-30 | End: 2024-11-29

## 2023-11-30 RX ORDER — MIRTAZAPINE 7.5 MG/1
7.5 TABLET, FILM COATED ORAL NIGHTLY
Qty: 90 TABLET | Refills: 3 | Status: SHIPPED | OUTPATIENT
Start: 2023-11-30 | End: 2024-04-02

## 2023-11-30 NOTE — PROGRESS NOTES
Subjective     Patient ID: Vince Martinez is a 89 y.o. male.    Chief Complaint: Back Pain and Follow-up    89 years old male came to the clinic for blood pressure check.  Blood pressure today was stable But in the low side.  No chest pain, palpitation, orthopnea or PND.  Patient reports significant GI symptoms taking Janumet.  Patient is requesting a medicine to help with the diabetes without the gastrointestinal symptoms.  Last A1c was 8.  Patient with mild anemia but stable in comparison with previous reports probably secondary to chronic kidney disease.  Last thyroid test was normal.  He was previously diagnosed with neuropathy.  Patient also with chronic sinusitis using over-the-counter medicines with no significant improvement.  He reports loss of smell.  Patient also with loss of appetite.  He is willing to try medicine to help with his appetite.  He was previously diagnosed with aortic arthrosclerosis  And granuloma of the lungs.  No recent flare ups.    Back Pain  Pertinent negatives include no chest pain or fever.   Follow-up  Pertinent negatives include no chest pain or fever.     Review of Systems   Constitutional:  Positive for appetite change. Negative for fever.   HENT:  Positive for rhinorrhea and sinus pressure/congestion.    Eyes: Negative.    Respiratory: Negative.     Cardiovascular: Negative.  Negative for chest pain, palpitations, leg swelling and claudication.   Gastrointestinal: Negative.    Genitourinary: Negative.    Musculoskeletal:  Positive for back pain.   Integumentary:  Negative.   Neurological: Negative.    Psychiatric/Behavioral: Negative.            Objective     Physical Exam       Assessment and Plan     1. Needs flu shot  -     Influenza (FLUAD) - Quadrivalent (Adjuvanted) *Preferred* (65+) (PF)  -     Influenza (FLUAD) - Quadrivalent (Adjuvanted) *Preferred* (65+) (PF)    2. Loss of appetite  -     mirtazapine (REMERON) 7.5 MG Tab; Take 1 tablet (7.5 mg total) by mouth every  evening.  Dispense: 90 tablet; Refill: 3    3. Essential hypertension  -     Lipid Panel; Future; Expected date: 11/30/2023  -     Comprehensive Metabolic Panel; Future; Expected date: 11/30/2023  -     TSH; Future; Expected date: 11/30/2023    4. Type 2 diabetes mellitus with hyperglycemia, without long-term current use of insulin  -     dulaglutide (TRULICITY) 0.75 mg/0.5 mL pen injector; Inject 0.75 mg into the skin every 7 days.  Dispense: 12 pen ; Refill: 3  -     Microalbumin/Creatinine Ratio, Urine; Future; Expected date: 11/30/2023  -     Lipid Panel; Future; Expected date: 11/30/2023  -     Hemoglobin A1C; Future; Expected date: 11/30/2023  -     Comprehensive Metabolic Panel; Future; Expected date: 11/30/2023  -     blood sugar diagnostic Strp; 1 strip by Misc.(Non-Drug; Combo Route) route once daily.  Dispense: 100 strip; Refill: 3  -     lancets (LANCETS,ULTRA THIN) Misc; 1 lancet  by Misc.(Non-Drug; Combo Route) route once daily.  Dispense: 100 each; Refill: 3    5. Chronic disease anemia    6. Hypothyroidism, unspecified type  -     TSH; Future; Expected date: 11/30/2023    7. Loss of smell  -     Ambulatory referral/consult to ENT; Future; Expected date: 12/07/2023    8. Other chronic sinusitis  -     Ambulatory referral/consult to ENT; Future; Expected date: 12/07/2023    9. Degenerative disease of nervous system, unspecified    10. Sacroiliitis, not elsewhere classified    11. 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   Continue monitoring blood pressure at home, low sodium diet.          Follow up in about 4 months (around 3/30/2024), or if symptoms worsen or fail to improve.

## 2023-12-01 ENCOUNTER — DOCUMENTATION ONLY (OUTPATIENT)
Dept: REHABILITATION | Facility: HOSPITAL | Age: 88
End: 2023-12-01
Payer: MEDICARE

## 2023-12-01 NOTE — PROGRESS NOTES
Patient called to cancel due to illness. He was informed of his next scheduled appointment which he expects  to attend.

## 2023-12-08 ENCOUNTER — DOCUMENTATION ONLY (OUTPATIENT)
Dept: REHABILITATION | Facility: HOSPITAL | Age: 88
End: 2023-12-08
Payer: MEDICARE

## 2023-12-08 ENCOUNTER — TELEPHONE (OUTPATIENT)
Dept: REHABILITATION | Facility: HOSPITAL | Age: 88
End: 2023-12-08
Payer: MEDICARE

## 2023-12-11 ENCOUNTER — CLINICAL SUPPORT (OUTPATIENT)
Dept: REHABILITATION | Facility: HOSPITAL | Age: 88
End: 2023-12-11
Payer: MEDICARE

## 2023-12-11 DIAGNOSIS — M96.1 FAILED BACK SYNDROME: ICD-10-CM

## 2023-12-11 DIAGNOSIS — R53.1 DECREASED STRENGTH, ENDURANCE, AND MOBILITY: Primary | ICD-10-CM

## 2023-12-11 DIAGNOSIS — R68.89 DECREASED STRENGTH, ENDURANCE, AND MOBILITY: Primary | ICD-10-CM

## 2023-12-11 DIAGNOSIS — Z74.09 DECREASED STRENGTH, ENDURANCE, AND MOBILITY: Primary | ICD-10-CM

## 2023-12-11 PROCEDURE — 97530 THERAPEUTIC ACTIVITIES: CPT | Mod: PN

## 2023-12-11 PROCEDURE — 97110 THERAPEUTIC EXERCISES: CPT | Mod: PN

## 2023-12-11 NOTE — PROGRESS NOTES
"[facility]   Physical Therapy Treatment Note      Name: Vince Martinez  Clinic Number: [id number]    Therapy Diagnosis:   Encounter Diagnoses   Name Primary?    Decreased strength, endurance, and mobility Yes    Failed back syndrome      Physician: Randy Riggs MD    Visit Date: 12/11/2023    Physician Orders: PT Eval and Treat   Medical Diagnosis from Referral: M96.1 (ICD-10-CM) - Failed back syndrome   Evaluation Date: 11/17/2023  Authorization Period Expiration: 12/31/2023  Plan of Care Expiration: 12/29/2023  Visit # / Visits authorized: 1/1     Time In: 1:05 pm  Time Out: 2:00 pm  Total Appointment Time (timed & untimed codes): 55 minutes (1 TE) (1 TA)     Precautions: Standard; CHF; diabetes  No interpretor necessary   Subjective     Patient reports: he missed the previous physical therapy appointments because he was in too much pain. Patient reports increased low back pain following initial evaluation. Reporting 5/10 low back pain at the moment and 10/10 low back pain last night  He was compliant with home exercise program.  Response to previous treatment: no change   Functional change: no change     Pain: 5/10  Location: low back     Objective      Objective Measures updated at progress report unless specified.     Treatment     Vince received the treatments listed below:      therapeutic exercises to develop strength, endurance, ROM, flexibility, and core stabilization for 30 minutes including:  SKTC: 5x10"  Piriformis stretch: 3x30"  Hip adduction with transverse abdominis contraction: 5"x20   Hip abduction isometric: 5"x20 - blue theraband   Seated swiss ball rollouts: 20x    therapeutic activities to improve functional performance for 25  minutes, including:  Nu-step: 8'xL4  Cybex leg press: 2x10 - 5 plates   Cybex hamstring curls: 2x10 - 5 plates    Patient Education and Home Exercises       Education provided:   - home exercise program   - POC/Prognosis     Written Home " Exercises Provided: Patient instructed to cont prior HEP. Exercises were reviewed and Vince was able to demonstrate them prior to the end of the session.  Vince demonstrated good  understanding of the education provided. See Electronic Medical Record under Patient Instructions for exercises provided during therapy sessions    Assessment   Vince is a 89 y.o. male referred to outpatient Physical Therapy with a medical diagnosis of failed back syndrome. Patient has signs and symptoms presenting as low back pain due to strength and endurance deficits. Initiated mobility and strength program today. Planning to monitor patient's response to exercise progressions.     Vince Is progressing well towards his goals.   Patient prognosis is Good.     Patient will continue to benefit from skilled outpatient physical therapy to address the deficits listed in the problem list box on initial evaluation, provide pt/family education and to maximize pt's level of independence in the home and community environment.     Patient's spiritual, cultural and educational needs considered and pt agreeable to plan of care and goals.     Anticipated barriers to physical therapy: age    Goals:   Short Term Goals: 4 weeks  1. Patient will be independent with HEP in order to supplement pain free lumbar ROM - PROGRESSING, NOT MET  2. Pt will improve hip flexibility to WNL to promote functional mobility - PROGRESSING, NOT MET  3. Patient will display the ability to perform an isometric transverse abdominis isometric contraction in supine for improved trunk stability - PROGRESSING, NOT MET      Long Term Goals: 8 weeks   1. Pt will improve lumbar FOTO survey to </=48% limited in order to return to ADLs without limitation - PROGRESSING, NOT MET  2. Patient will improve hip flexion, abduction, and extension strength to a 5/5 bilaterally for improved trunk support. - PROGRESSING, NOT MET  3. Pt will report no pain during lumbar AROM in order to promote  functional mobility - PROGRESSING, NOT MET  4. Patient will display the ability and understanding of performing a proper hip hinge with quality movement coordination for lifting mechanics. - PROGRESSING, NOT MET     Plan   Low back/hip mobility   Core stabilization   Gluteus and hip strengthening     Kar Vargas, PT

## 2023-12-14 ENCOUNTER — CLINICAL SUPPORT (OUTPATIENT)
Dept: REHABILITATION | Facility: HOSPITAL | Age: 88
End: 2023-12-14
Payer: MEDICARE

## 2023-12-14 DIAGNOSIS — R53.1 DECREASED STRENGTH, ENDURANCE, AND MOBILITY: Primary | ICD-10-CM

## 2023-12-14 DIAGNOSIS — Z74.09 DECREASED STRENGTH, ENDURANCE, AND MOBILITY: Primary | ICD-10-CM

## 2023-12-14 DIAGNOSIS — M96.1 FAILED BACK SYNDROME: ICD-10-CM

## 2023-12-14 DIAGNOSIS — R68.89 DECREASED STRENGTH, ENDURANCE, AND MOBILITY: Primary | ICD-10-CM

## 2023-12-14 PROCEDURE — 97110 THERAPEUTIC EXERCISES: CPT | Mod: PN

## 2023-12-14 PROCEDURE — 97530 THERAPEUTIC ACTIVITIES: CPT | Mod: PN

## 2023-12-14 NOTE — PROGRESS NOTES
"[facility]   Physical Therapy Treatment Note      Name: Vince Martinez  Clinic Number: [id number]    Therapy Diagnosis:   Encounter Diagnoses   Name Primary?    Decreased strength, endurance, and mobility Yes    Failed back syndrome      Physician: Randy Riggs MD    Visit Date: 12/14/2023    Physician Orders: PT Eval and Treat   Medical Diagnosis from Referral: M96.1 (ICD-10-CM) - Failed back syndrome   Evaluation Date: 11/17/2023  Authorization Period Expiration: 12/31/2023  Plan of Care Expiration: 12/29/2023  Visit # / Visits authorized: 1/1     Time In: 10:05 pm  Time Out: 11:00 am  Total Appointment Time (timed & untimed codes): 45 minutes (2 TE) (1 TA)     Precautions: Standard; CHF; diabetes  No interpretor necessary   Subjective     Patient reports: his low back is still hurting, but he believes it is a little better following last visit. Patient reports a pain level of 4/10 this morning.   He was compliant with home exercise program.  Response to previous treatment: no change   Functional change: no change     Pain: 5/10  Location: low back     Objective      Objective Measures updated at progress report unless specified.     Treatment     Vince received the treatments listed below:      therapeutic exercises to develop strength, endurance, ROM, flexibility, and core stabilization for 30 minutes including:  SKTC: 5x10"  Piriformis stretch: 3x30"  Hip adduction with transverse abdominis contraction: 5"x20   Hip abduction isometric: 5"x20 - blue theraband   Seated swiss ball rollouts: 20x    therapeutic activities to improve functional performance for 25  minutes, including:  Nu-step: 8'xL4  Cybex leg press: 2x10 - 5 plates   Cybex hamstring curls: 2x10 - 6 plates    Patient Education and Home Exercises       Education provided:   - home exercise program   - POC/Prognosis     Written Home Exercises Provided: Patient instructed to cont prior HEP. Exercises were reviewed and Vince " was able to demonstrate them prior to the end of the session.  Vince demonstrated good  understanding of the education provided. See Electronic Medical Record under Patient Instructions for exercises provided during therapy sessions    Assessment   Vince is a 89 y.o. male referred to outpatient Physical Therapy with a medical diagnosis of failed back syndrome. Patient has signs and symptoms presenting as low back pain due to strength and endurance deficits. Continued with mobility and strength program, which patient responded well to previous visit. Further progressed load with cybex leg press.     Vince Is progressing well towards his goals.   Patient prognosis is Good.     Patient will continue to benefit from skilled outpatient physical therapy to address the deficits listed in the problem list box on initial evaluation, provide pt/family education and to maximize pt's level of independence in the home and community environment.     Patient's spiritual, cultural and educational needs considered and pt agreeable to plan of care and goals.     Anticipated barriers to physical therapy: age    Goals:   Short Term Goals: 4 weeks  1. Patient will be independent with HEP in order to supplement pain free lumbar ROM - PROGRESSING, NOT MET  2. Pt will improve hip flexibility to WNL to promote functional mobility - PROGRESSING, NOT MET  3. Patient will display the ability to perform an isometric transverse abdominis isometric contraction in supine for improved trunk stability - PROGRESSING, NOT MET      Long Term Goals: 8 weeks   1. Pt will improve lumbar FOTO survey to </=48% limited in order to return to ADLs without limitation - PROGRESSING, NOT MET  2. Patient will improve hip flexion, abduction, and extension strength to a 5/5 bilaterally for improved trunk support. - PROGRESSING, NOT MET  3. Pt will report no pain during lumbar AROM in order to promote functional mobility - PROGRESSING, NOT MET  4. Patient will display  the ability and understanding of performing a proper hip hinge with quality movement coordination for lifting mechanics. - PROGRESSING, NOT MET     Plan   Low back/hip mobility   Core stabilization   Gluteus and hip strengthening     Kar Vargas, PT

## 2023-12-15 ENCOUNTER — OFFICE VISIT (OUTPATIENT)
Dept: OTOLARYNGOLOGY | Facility: CLINIC | Age: 88
End: 2023-12-15
Payer: MEDICARE

## 2023-12-15 VITALS
HEART RATE: 61 BPM | BODY MASS INDEX: 23 KG/M2 | WEIGHT: 138.25 LBS | SYSTOLIC BLOOD PRESSURE: 138 MMHG | DIASTOLIC BLOOD PRESSURE: 51 MMHG

## 2023-12-15 DIAGNOSIS — J31.0 CHRONIC RHINITIS: Chronic | ICD-10-CM

## 2023-12-15 DIAGNOSIS — R43.0 LOSS OF SMELL: Primary | Chronic | ICD-10-CM

## 2023-12-15 DIAGNOSIS — J34.2 NASAL SEPTAL DEVIATION: Chronic | ICD-10-CM

## 2023-12-15 PROCEDURE — 1101F PT FALLS ASSESS-DOCD LE1/YR: CPT | Mod: CPTII,S$GLB,, | Performed by: OTOLARYNGOLOGY

## 2023-12-15 PROCEDURE — 1126F AMNT PAIN NOTED NONE PRSNT: CPT | Mod: CPTII,S$GLB,, | Performed by: OTOLARYNGOLOGY

## 2023-12-15 PROCEDURE — 1159F MED LIST DOCD IN RCRD: CPT | Mod: CPTII,S$GLB,, | Performed by: OTOLARYNGOLOGY

## 2023-12-15 PROCEDURE — 31231 NASAL/SINUS ENDOSCOPY: ICD-10-PCS | Mod: S$GLB,,, | Performed by: OTOLARYNGOLOGY

## 2023-12-15 PROCEDURE — 31231 NASAL ENDOSCOPY DX: CPT | Mod: S$GLB,,, | Performed by: OTOLARYNGOLOGY

## 2023-12-15 PROCEDURE — 1157F ADVNC CARE PLAN IN RCRD: CPT | Mod: CPTII,S$GLB,, | Performed by: OTOLARYNGOLOGY

## 2023-12-15 PROCEDURE — 1160F RVW MEDS BY RX/DR IN RCRD: CPT | Mod: CPTII,S$GLB,, | Performed by: OTOLARYNGOLOGY

## 2023-12-15 PROCEDURE — 1126F PR PAIN SEVERITY QUANTIFIED, NO PAIN PRESENT: ICD-10-PCS | Mod: CPTII,S$GLB,, | Performed by: OTOLARYNGOLOGY

## 2023-12-15 PROCEDURE — 99999 PR PBB SHADOW E&M-EST. PATIENT-LVL IV: CPT | Mod: PBBFAC,,, | Performed by: OTOLARYNGOLOGY

## 2023-12-15 PROCEDURE — 1159F PR MEDICATION LIST DOCUMENTED IN MEDICAL RECORD: ICD-10-PCS | Mod: CPTII,S$GLB,, | Performed by: OTOLARYNGOLOGY

## 2023-12-15 PROCEDURE — 99214 OFFICE O/P EST MOD 30 MIN: CPT | Mod: 25,S$GLB,, | Performed by: OTOLARYNGOLOGY

## 2023-12-15 PROCEDURE — 1160F PR REVIEW ALL MEDS BY PRESCRIBER/CLIN PHARMACIST DOCUMENTED: ICD-10-PCS | Mod: CPTII,S$GLB,, | Performed by: OTOLARYNGOLOGY

## 2023-12-15 PROCEDURE — 99999 PR PBB SHADOW E&M-EST. PATIENT-LVL IV: ICD-10-PCS | Mod: PBBFAC,,, | Performed by: OTOLARYNGOLOGY

## 2023-12-15 PROCEDURE — 3288F FALL RISK ASSESSMENT DOCD: CPT | Mod: CPTII,S$GLB,, | Performed by: OTOLARYNGOLOGY

## 2023-12-15 PROCEDURE — 3288F PR FALLS RISK ASSESSMENT DOCUMENTED: ICD-10-PCS | Mod: CPTII,S$GLB,, | Performed by: OTOLARYNGOLOGY

## 2023-12-15 PROCEDURE — 1157F PR ADVANCE CARE PLAN OR EQUIV PRESENT IN MEDICAL RECORD: ICD-10-PCS | Mod: CPTII,S$GLB,, | Performed by: OTOLARYNGOLOGY

## 2023-12-15 PROCEDURE — 1101F PR PT FALLS ASSESS DOC 0-1 FALLS W/OUT INJ PAST YR: ICD-10-PCS | Mod: CPTII,S$GLB,, | Performed by: OTOLARYNGOLOGY

## 2023-12-15 PROCEDURE — 99214 PR OFFICE/OUTPT VISIT, EST, LEVL IV, 30-39 MIN: ICD-10-PCS | Mod: 25,S$GLB,, | Performed by: OTOLARYNGOLOGY

## 2023-12-15 RX ORDER — AMOXICILLIN AND CLAVULANATE POTASSIUM 875; 125 MG/1; MG/1
1 TABLET, FILM COATED ORAL 2 TIMES DAILY
Qty: 20 TABLET | Refills: 0 | Status: SHIPPED | OUTPATIENT
Start: 2023-12-15 | End: 2023-12-25

## 2023-12-15 NOTE — PROGRESS NOTES
Chief Complaint   Patient presents with    Sinus Problem   Sea [id number]    HPI:   Vince Martinez is a 89 y.o. male who presents for evaluation of loss of sense of smell/taste.  He states that he can not smell any scents.  He feels his appetite has been affected. He reports that it started 6 months ago suddenly. He does not recall infection at that time. He did have COVID infection 1 year ago.  He denies any head injuries/trauma.  He denies history of CVA. He admits to headache. He denies facial pain or pressure. He admits tor rhinorrhea/post-nasal drip which has been present for years. He is using Flonase/ipratropium nasal sprays.       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    Arthritis     Bilateral renal cysts     Chronic pain     Diabetes mellitus     DJD (degenerative joint disease)     Hyperkalemia     Hypertension     Iron deficiency anemia     Low back pain     Osteopenia of neck of right femur     Prostate enlargement     Sleep apnea     Stage 3b chronic kidney disease     Thyroid disease      Social History     Socioeconomic History    Marital status:    Tobacco Use    Smoking status: Never    Smokeless tobacco: Never   Substance and Sexual Activity    Alcohol use: No    Drug use: No    Sexual activity: Not Currently     Partners: Female     Social Determinants of Health     Financial Resource Strain: Medium Risk (2/2/2022)    Overall Financial Resource Strain (CARDIA)     Difficulty of Paying Living Expenses: Somewhat hard   Food Insecurity: No Food Insecurity (2/2/2022)    Hunger Vital Sign     Worried About Running Out of Food in the Last Year: Never true     Ran Out of Food in the Last Year: Never true   Transportation Needs: No Transportation Needs (2/2/2022)    PRAPARE - Transportation     Lack of Transportation (Medical): No     Lack of Transportation (Non-Medical): No   Physical Activity: Inactive (2/2/2022)     Exercise Vital Sign     Days of Exercise per Week: 0 days     Minutes of Exercise per Session: 0 min   Stress: No Stress Concern Present (2/2/2022)    [place] [employer] - Occupational Stress Questionnaire     Feeling of Stress : Not at all   Social Connections: Moderately Integrated (2/2/2022)    Social Connection and Isolation Panel [NHANES]     Frequency of Communication with Friends and Family: More than three times a week     Frequency of Social Gatherings with Friends and Family: More than three times a week     Attends Synagogue Services: More than 4 times per year     Active Member of Clubs or Organizations: No     Attends Club or Organization Meetings: Never     Marital Status:    Housing Stability: Low Risk  (2/2/2022)    Housing Stability Vital Sign     Unable to Pay for Housing in the Last Year: No     Number of Places Lived in the Last Year: 2     Unstable Housing in the Last Year: No     Past Surgical History:   Procedure Laterality Date    CARDIAC PACEMAKER PLACEMENT      EPIDURAL STEROID INJECTION INTO LUMBAR SPINE N/A 06/04/2020    Procedure: Injection-steroid-epidural-lumbar--L4-5;  Surgeon: Angelica Norris Jr., MD;  Location: [facility];  Service: Pain Management;  Laterality: N/A;  sign consent at DOS.    HEMILAMINECTOMY  05/26/2021    Procedure: HEMILAMINECTOMY;  Surgeon: Dayton Sparks MD;  Location: [facility] OR;  Service: Neurosurgery;;  left L4-5    INJECTION OF JOINT Bilateral 02/13/2020    Procedure: Injection, Joint, Bilateral GTB;  Surgeon: Angelica Norris Jr., MD;  Location: [facility];  Service: Pain Management;  Laterality: Bilateral;    INJECTION OF JOINT Bilateral 03/22/2022    Procedure: bilateral GTB steriod injection;  Surgeon: Angelica Norris Jr., MD;  Location: [facility];  Service: Pain Management;  Laterality: Bilateral;  pacemaker   pt is diabetic     INJECTION OF STEROID Bilateral 01/12/2021    Procedure: INJECTION, STEROID Bilateral  SI Joint Block and Steroid Injection;  Surgeon: Dayton Sparks MD;  Location: [facility] OR;  Service: Neurosurgery;  Laterality: Bilateral;  Procedure: Bilateral SI Joint Block and Steroid Injection  Surgery 't'kristen: 45 min  LOS: 0  Anesthesia:MAC  Radiology: C-arm  Bed: Regular with Pillows  Position: Prone     SPINE SURGERY      SPINE SURGERY Bilateral     TRANSFORAMINAL EPIDURAL INJECTION OF STEROID Left 02/23/2021    Procedure: Injection,steroid,epidural,transforaminal approach--Left L4-5 *Has Pacemaker/ICD*;  Surgeon: Angelica Norris Jr., MD;  Location: [facility];  Service: Pain Management;  Laterality: Left;     Family History   Problem Relation Age of Onset    Diabetes Brother     No Known Problems Mother     No Known Problems Father     Diabetes Sister     No Known Problems Daughter     HIV Son            Review of Systems  General: negative for chills, fever or weight loss  Psychological: negative for mood changes or depression  Ophthalmic: negative for blurry vision, photophobia or eye pain  ENT: see HPI  Respiratory: no cough, shortness of breath, or wheezing  Cardiovascular: no chest pain or dyspnea on exertion  Gastrointestinal: no abdominal pain, change in bowel habits, or black/ bloody stools  Musculoskeletal: negative for gait disturbance or muscular weakness  Neurological: no syncope or seizures; no ataxia  Dermatological: negative for puritis,  rash and jaundice  Hematologic/lymphatic: no easy bruising, no new lumps or bumps      Physical Exam:    Vitals:    12/15/23 1102   BP: (!) 138/51   Pulse: 61       Constitutional: Well appearing / communicating without difficutly.  NAD.  Eyes: EOM I Bilaterally  Head/Face: Normocephalic.  Negative paranasal sinus pressure/tenderness.  Salivary glands WNL.  House Brackmann I Bilaterally.    Right Ear: Auricle normal appearance. External Auditory Canal within normal limits,TM w/o masses/lesions/perforations. TM mobility noted.   Left Ear: Auricle normal  appearance. External Auditory Canal WNL,TM w/o masses/lesions/perforations. TM mobility noted.  Nose: +nasal septal deviation to the left. Inferior Turbinates 3+ bilaterally. No septal perforation. No masses/lesions. External nasal skin appears normal without masses/lesions.  Oral Cavity: Gingiva/lips within normal limits.  Dentition/gingiva healthy appearing. Mucus membranes moist. Floor of mouth soft, no masses palpated. Oral Tongue mobile. Hard Palate appears normal.    Oropharynx: Base of tongue appears normal. No masses/lesions noted. Tonsillar fossa/pharyngeal wall without lesions. Posterior oropharynx WNL.  Soft palate without masses. Midline uvula.   Neck/Lymphatic: No LAD I-VI bilaterally.  No thyromegaly.  No masses noted on exam.    See separate procedure note for nasal endoscopy.     Assessment:    ICD-10-CM ICD-9-CM    1. Loss of smell  R43.0 781.1 Ambulatory referral/consult to ENT      CT Medtronic Sinuses without      Nasal/sinus endoscopy      2. Chronic rhinitis  J31.0 472.0       3. Nasal septal deviation  J34.2 470         The primary encounter diagnosis was Loss of smell. Diagnoses of Chronic rhinitis and Nasal septal deviation were also pertinent to this visit.      Plan:  Orders Placed This Encounter   Procedures    CT Medtronic Sinuses without    Nasal/sinus endoscopy     Discussed that there are several factors that can cause loss of sense of smell/taste including significant head trauma, tumors. Chronic sinusitis(polyps etc), post-viral olfactory neuropathy, age related decline in gustatory and olfaction acuity to list a few.  I suspect certainly age related decline which can occur alone or concurrently with other factors. I will obtain CT scan of the sinuses to further assess the cribriform plate/olfactory nerve. MRI deferred for now due to pacemaker but could be obtained in future is necessary.   Recommend nasal saline rinses BID  Continue Flonase 2 spray per nostril daily  Continue  ipratropium 2 sprays per nostril BID  Start Augmentin 875 mg PO BID  Start smell retraining therapy. Instructional handout provided.     Billie Vo MD

## 2023-12-15 NOTE — PROCEDURES
Nasal/sinus endoscopy    Date/Time: 12/15/2023 11:00 AM    Performed by: Billie Hinojosa MD  Authorized by: Billie Hinojosa MD    Consent Done?:  Yes (Verbal)  Anesthesia:     Local anesthetic:  Lidocaine 2% and Javi-Synephrine 1/2%  Nose:     Procedure Performed:  Nasal Endoscopy  External:      No external nasal deformity  Intranasal:      Mucosa no polyps     Mucosa ulcers not present     No mucosa lesions present     Turbinates not enlarged     Septum gross deformity (NSD to the left)  Nasopharynx:      Posterior choanae patent     Eustachian tube patent

## 2023-12-15 NOTE — PATIENT INSTRUCTIONS
Smell Retraining Therapy  Smell retraining therapy is a way to help rebuild the connection between the nose and the brain to recover the sense of smell. Similar to exercising a muscle, practicing smelling can strengthen the olfactory system. This concept is used to help train professionals like sommeliers or perfume creators, but can also be applied to our patients who have lost or experienced a significant decline in their sense of smell.  ?  Research has shown that a regimen of practicing smelling can aid in recovery of the sense of smell. Patients who participated in a smell retraining regimen had significant improvement in their sense of smell, compared to those who did not. The original research was done with 4 essential oils- bahman, eucalyptus, lemon and clove- but these scents are just a few examples and you can also choose to use items from your home or spice cabinet for your training rather than investing in essential oils. When choosing which scents to start with, it is often helpful to think of the main scent families which are: floral, fruity, aromatic, resinous, burnt and foul. Some examples of common household smells a patient may practice with at home are: vanilla or almond extract, fresh lemon, dried clove, fresh mint or menthol and lavender. It can be helpful to start with smells that were familiar and appealing to you prior to your smell loss. If you choose smells with you find or previously found noxious it may be difficult to stick with your program.  ?  Practice as follow:  - choose 4 scents as a starting scent library  - smell each for 10 seconds very close to the nose twice daily  - it can be helpful to keep a journal of your progress, but this is not required  - the original study was performed for 12 weeks but you may find if you recover certain scents more quickly, you may choose to switch other scents into your scent library more rapidly. You can continue the therapy for as long as it is  helpful for you.

## 2023-12-18 ENCOUNTER — CLINICAL SUPPORT (OUTPATIENT)
Dept: REHABILITATION | Facility: HOSPITAL | Age: 88
End: 2023-12-18
Payer: MEDICARE

## 2023-12-18 DIAGNOSIS — R53.1 DECREASED STRENGTH, ENDURANCE, AND MOBILITY: Primary | ICD-10-CM

## 2023-12-18 DIAGNOSIS — Z74.09 DECREASED STRENGTH, ENDURANCE, AND MOBILITY: Primary | ICD-10-CM

## 2023-12-18 DIAGNOSIS — R68.89 DECREASED STRENGTH, ENDURANCE, AND MOBILITY: Primary | ICD-10-CM

## 2023-12-18 DIAGNOSIS — M96.1 FAILED BACK SYNDROME: ICD-10-CM

## 2023-12-18 PROCEDURE — 97530 THERAPEUTIC ACTIVITIES: CPT | Mod: PN

## 2023-12-18 PROCEDURE — 97110 THERAPEUTIC EXERCISES: CPT | Mod: PN

## 2023-12-18 NOTE — PROGRESS NOTES
"[facility]   Physical Therapy Treatment Note      Name: Vince Martinez  Clinic Number: [id number]    Therapy Diagnosis:   Encounter Diagnoses   Name Primary?    Decreased strength, endurance, and mobility Yes    Failed back syndrome      Physician: Randy Riggs MD    Visit Date: 12/18/2023    Physician Orders: PT Eval and Treat   Medical Diagnosis from Referral: M96.1 (ICD-10-CM) - Failed back syndrome   Evaluation Date: 11/17/2023  Authorization Period Expiration: 12/31/2023  Plan of Care Expiration: 12/29/2023  Visit # / Visits authorized: 3/24     Time In: 10:05 pm  Time Out: 11:00 am  Total Appointment Time (timed & untimed codes): 55 minutes (1 TE) (1 TA)     Precautions: Standard; CHF; diabetes  No interpretor necessary   Subjective     Patient reports: he believes the exercises are helping a little bit, but he doesn't perform any exercises at home due to pain.  He was compliant with home exercise program.  Response to previous treatment: no change   Functional change: no change     Pain: 5/10  Location: low back     Objective      Objective Measures updated at progress report unless specified.     Treatment     Vince received the treatments listed below:      therapeutic exercises to develop strength, endurance, ROM, flexibility, and core stabilization for 30 minutes including:  SKTC: 5x10"  Piriformis stretch: 3x30"  Seated swiss ball rollouts: 20x    therapeutic activities to improve functional performance for 25 minutes, including:  Nu-step: 8'xL4  Cybex leg press: 3x10 - 5 plates   Cybex long arc quad: 3x10 - 20#  Sit to stands from standard chair: 3x10  Standing hip abduction: 3x10 - red theraband      Patient Education and Home Exercises       Education provided:   - home exercise program   - POC/Prognosis     Written Home Exercises Provided: Patient instructed to cont prior HEP. Exercises were reviewed and Vince was able to demonstrate them prior to the end of the session. "  Vince demonstrated good  understanding of the education provided. See Electronic Medical Record under Patient Instructions for exercises provided during therapy sessions    Assessment   Vince is a 89 y.o. male referred to outpatient Physical Therapy with a medical diagnosis of failed back syndrome. Patient has signs and symptoms presenting as low back pain due to strength and endurance deficits. Continued with mobility and strength program and progressed towards standing/cybex strengthening.     Vince Is progressing well towards his goals.   Patient prognosis is Good.     Patient will continue to benefit from skilled outpatient physical therapy to address the deficits listed in the problem list box on initial evaluation, provide pt/family education and to maximize pt's level of independence in the home and community environment.     Patient's spiritual, cultural and educational needs considered and pt agreeable to plan of care and goals.     Anticipated barriers to physical therapy: age    Goals:   Short Term Goals: 4 weeks  1. Patient will be independent with HEP in order to supplement pain free lumbar ROM - PROGRESSING, NOT MET  2. Pt will improve hip flexibility to WNL to promote functional mobility - PROGRESSING, NOT MET  3. Patient will display the ability to perform an isometric transverse abdominis isometric contraction in supine for improved trunk stability - PROGRESSING, NOT MET      Long Term Goals: 8 weeks   1. Pt will improve lumbar FOTO survey to </=48% limited in order to return to ADLs without limitation - PROGRESSING, NOT MET  2. Patient will improve hip flexion, abduction, and extension strength to a 5/5 bilaterally for improved trunk support. - PROGRESSING, NOT MET  3. Pt will report no pain during lumbar AROM in order to promote functional mobility - PROGRESSING, NOT MET  4. Patient will display the ability and understanding of performing a proper hip hinge with quality movement coordination for  lifting mechanics. - PROGRESSING, NOT MET     Plan   Low back/hip mobility   Core stabilization   Gluteus and hip strengthening     Kar Vargas, PT

## 2023-12-21 ENCOUNTER — CLINICAL SUPPORT (OUTPATIENT)
Dept: REHABILITATION | Facility: HOSPITAL | Age: 88
End: 2023-12-21
Payer: MEDICARE

## 2023-12-21 DIAGNOSIS — M96.1 FAILED BACK SYNDROME: ICD-10-CM

## 2023-12-21 DIAGNOSIS — R68.89 DECREASED STRENGTH, ENDURANCE, AND MOBILITY: Primary | ICD-10-CM

## 2023-12-21 DIAGNOSIS — Z74.09 DECREASED STRENGTH, ENDURANCE, AND MOBILITY: Primary | ICD-10-CM

## 2023-12-21 DIAGNOSIS — R53.1 DECREASED STRENGTH, ENDURANCE, AND MOBILITY: Primary | ICD-10-CM

## 2023-12-21 PROCEDURE — 97110 THERAPEUTIC EXERCISES: CPT | Mod: PN

## 2023-12-21 PROCEDURE — 97530 THERAPEUTIC ACTIVITIES: CPT | Mod: PN

## 2023-12-21 NOTE — PROGRESS NOTES
"[facility]   Physical Therapy Treatment Note      Name: Vince Martinez  Clinic Number: [id number]    Therapy Diagnosis:   Encounter Diagnoses   Name Primary?    Decreased strength, endurance, and mobility Yes    Failed back syndrome      Physician: Randy Riggs MD    Visit Date: 12/21/2023    Physician Orders: PT Eval and Treat   Medical Diagnosis from Referral: M96.1 (ICD-10-CM) - Failed back syndrome   Evaluation Date: 11/17/2023  Authorization Period Expiration: 12/31/2023  Plan of Care Expiration: 12/29/2023  Visit # / Visits authorized: 4/24     Time In: 10:05 pm  Time Out: 11:00 am  Total Appointment Time (timed & untimed codes): 55 minutes      Precautions: Standard; CHF; diabetes  No interpretor necessary   Subjective     Patient reports: reports knife-like pain in his hips and lumbosacral spine. Gets relief following therapy for ~3 hours after session until it returns.     He was compliant with home exercise program.  Response to previous treatment: no change   Functional change: no change     Pain: 7/10  Location: low back     Objective      Objective Measures updated at progress report unless specified.     Treatment     Vince received the treatments listed below:      therapeutic exercises to develop strength, endurance, ROM, flexibility, and core stabilization for 30 minutes including:  Seated swiss ball rollouts: 20x  DKTC with peanut: 20x5"  Hook-lying isometric hip abduction: blue - 20x5"  LTR: 15x each   Piriformis stretch: 3x30" bilateral  SKTC: 5x10"    therapeutic activities to improve functional performance for 25 minutes, including:  Nu-step: 8'xL4  Cybex leg press: 3x10 - 5 plates   Cybex hamstring curls: 3x10 - 4 plates  Cybex long arc quad: 3x10 - 20#  Sit to stands from standard chair: 3x10  Standing hip abduction: 3x10 - red theraband    Standing hip extension: 3x10 - red theraband    Patient Education and Home Exercises       Education provided:   - home " exercise program   - POC/Prognosis     Written Home Exercises Provided: Patient instructed to cont prior HEP. Exercises were reviewed and Vince was able to demonstrate them prior to the end of the session.  Vince demonstrated good  understanding of the education provided. See Electronic Medical Record under Patient Instructions for exercises provided during therapy sessions    Assessment   Vince is a 89 y.o. male referred to outpatient Physical Therapy with a medical diagnosis of failed back syndrome. Patient has signs and symptoms presenting as low back pain due to strength and endurance deficits. Continued flexion biased low back program with quadriceps and hip strengthening. Progressed to cybex resisted hamstring curls and resisted hip extension. Increased difficulty with hip extension activation versus abduction. Vague subjective report through out session.    Vince Is progressing well towards his goals.   Patient prognosis is Good.     Patient will continue to benefit from skilled outpatient physical therapy to address the deficits listed in the problem list box on initial evaluation, provide pt/family education and to maximize pt's level of independence in the home and community environment.     Patient's spiritual, cultural and educational needs considered and pt agreeable to plan of care and goals.     Anticipated barriers to physical therapy: age    Goals:   Short Term Goals: 4 weeks  1. Patient will be independent with HEP in order to supplement pain free lumbar ROM - PROGRESSING, NOT MET  2. Pt will improve hip flexibility to WNL to promote functional mobility - PROGRESSING, NOT MET  3. Patient will display the ability to perform an isometric transverse abdominis isometric contraction in supine for improved trunk stability - PROGRESSING, NOT MET      Long Term Goals: 8 weeks   1. Pt will improve lumbar FOTO survey to </=48% limited in order to return to ADLs without limitation - PROGRESSING, NOT MET  2.  Patient will improve hip flexion, abduction, and extension strength to a 5/5 bilaterally for improved trunk support. - PROGRESSING, NOT MET  3. Pt will report no pain during lumbar AROM in order to promote functional mobility - PROGRESSING, NOT MET  4. Patient will display the ability and understanding of performing a proper hip hinge with quality movement coordination for lifting mechanics. - PROGRESSING, NOT MET     Plan   Low back/hip mobility   Core stabilization   Gluteus and hip strengthening     Nicol Dickens, PT

## 2023-12-26 ENCOUNTER — CLINICAL SUPPORT (OUTPATIENT)
Dept: REHABILITATION | Facility: HOSPITAL | Age: 88
End: 2023-12-26
Payer: MEDICARE

## 2023-12-26 DIAGNOSIS — Z74.09 DECREASED STRENGTH, ENDURANCE, AND MOBILITY: Primary | ICD-10-CM

## 2023-12-26 DIAGNOSIS — R53.1 DECREASED STRENGTH, ENDURANCE, AND MOBILITY: Primary | ICD-10-CM

## 2023-12-26 DIAGNOSIS — M96.1 FAILED BACK SYNDROME: ICD-10-CM

## 2023-12-26 DIAGNOSIS — R68.89 DECREASED STRENGTH, ENDURANCE, AND MOBILITY: Primary | ICD-10-CM

## 2023-12-26 PROCEDURE — 97530 THERAPEUTIC ACTIVITIES: CPT | Mod: PN

## 2023-12-26 PROCEDURE — 97110 THERAPEUTIC EXERCISES: CPT | Mod: PN

## 2023-12-26 NOTE — PROGRESS NOTES
"[facility]   Physical Therapy Treatment Note      Name: Vince Martinez  Clinic Number: [id number]    Therapy Diagnosis:   Encounter Diagnoses   Name Primary?    Decreased strength, endurance, and mobility Yes    Failed back syndrome      Physician: Randy Riggs MD    Visit Date: 12/26/2023    Physician Orders: PT Eval and Treat   Medical Diagnosis from Referral: M96.1 (ICD-10-CM) - Failed back syndrome   Evaluation Date: 11/17/2023  Authorization Period Expiration: 12/31/2023  Plan of Care Expiration: 12/29/2023  Visit # / Visits authorized: 6/24     Time In: 10:05 pm  Time Out: 11:00 am  Total Appointment Time (timed & untimed codes): 30 minutes (1 TE) (1 TA)     Precautions: Standard; CHF; diabetes  No interpretor necessary   Subjective     Patient reports: he believes the exercises are helping and his low back pain is less. Patient still reporting 7/10 low back pain.    He was compliant with home exercise program.  Response to previous treatment: no change   Functional change: no change     Pain: 7/10  Location: low back     Objective      Objective Measures updated at progress report unless specified.     Treatment     Vince received the treatments listed below:      therapeutic exercises to develop strength, endurance, ROM, flexibility, and core stabilization for 30 minutes including:  Seated swiss ball rollouts: 20x  DKTC with peanut: 20x5"  Hook-lying isometric hip abduction: blue - 20x5"  LTR: 15x each   Piriformis stretch: 3x30" bilateral  SKTC: 5x10"    therapeutic activities to improve functional performance for 25 minutes, including:  Nu-step: 8'xL4  Cybex leg press: 3x10 - 5 plates   Cybex hamstring curls: 3x10 - 4 plates  Cybex long arc quad: 3x10 - 20#  Sit to stands from standard chair: 3x10  Standing hip abduction: 3x10 - red theraband    Standing hip extension: 3x10 - red theraband    Patient Education and Home Exercises       Education provided:   - home exercise " program   - POC/Prognosis     Written Home Exercises Provided: Patient instructed to cont prior HEP. Exercises were reviewed and Vince was able to demonstrate them prior to the end of the session.  Vince demonstrated good  understanding of the education provided. See Electronic Medical Record under Patient Instructions for exercises provided during therapy sessions    Assessment   Vince is a 89 y.o. male referred to outpatient Physical Therapy with a medical diagnosis of failed back syndrome. Patient has signs and symptoms presenting as low back pain due to strength and endurance deficits. Continued flexion biased low back program with quadriceps and hip strengthening. Progressed to cybex resisted strengthening and continuing with gross lower extremity strength and conditioning.     Vince Is progressing well towards his goals.   Patient prognosis is Good.     Patient will continue to benefit from skilled outpatient physical therapy to address the deficits listed in the problem list box on initial evaluation, provide pt/family education and to maximize pt's level of independence in the home and community environment.     Patient's spiritual, cultural and educational needs considered and pt agreeable to plan of care and goals.     Anticipated barriers to physical therapy: age    Goals:   Short Term Goals: 4 weeks  1. Patient will be independent with HEP in order to supplement pain free lumbar ROM - PROGRESSING, NOT MET  2. Pt will improve hip flexibility to WNL to promote functional mobility - PROGRESSING, NOT MET  3. Patient will display the ability to perform an isometric transverse abdominis isometric contraction in supine for improved trunk stability - PROGRESSING, NOT MET      Long Term Goals: 8 weeks   1. Pt will improve lumbar FOTO survey to </=48% limited in order to return to ADLs without limitation - PROGRESSING, NOT MET  2. Patient will improve hip flexion, abduction, and extension strength to a 5/5 bilaterally  for improved trunk support. - PROGRESSING, NOT MET  3. Pt will report no pain during lumbar AROM in order to promote functional mobility - PROGRESSING, NOT MET  4. Patient will display the ability and understanding of performing a proper hip hinge with quality movement coordination for lifting mechanics. - PROGRESSING, NOT MET     Plan   Low back/hip mobility   Core stabilization   Gluteus and hip strengthening     Kar Vargas, PT

## 2023-12-28 NOTE — PROGRESS NOTES
"[facility]   Physical Therapy Treatment Note      Name: Vince Martinez  Clinic Number: [id number]    Therapy Diagnosis:   No diagnosis found.    Physician: Randy Riggs MD    Visit Date: 12/29/2023    Physician Orders: PT Eval and Treat   Medical Diagnosis from Referral: M96.1 (ICD-10-CM) - Failed back syndrome   Evaluation Date: 11/17/2023  Authorization Period Expiration: 12/31/2023  Plan of Care Expiration: 12/29/2023  Visit # / Visits authorized: 7/24     Time In: 10:00 pm  Time Out: 10:55 am  Total Appointment Time (timed & untimed codes): 30 minutes (1 TE) (1 TA)     Precautions: Standard; CHF; diabetes  No interpretor necessary   Subjective     Patient reports: he believes the exercises are helping and his low back pain is less. Patient still reporting 3/10 low back pain.    He was not compliant with home exercise program.  Response to previous treatment: no change   Functional change: no change     Pain: 3/10  Location: low back     Objective      Objective Measures updated at progress report unless specified.     Treatment     Vince received the treatments listed below:      therapeutic exercises to develop strength, endurance, ROM, flexibility, and core stabilization for 15 minutes including: Additional time supervised   Seated swiss ball rollouts: 20x  DKTC with peanut: 20x5"  Hook-lying isometric hip abduction: blue - 20x5"  LTR: 15x each   Piriformis stretch: 3x30" bilateral  SKTC: 5x10"    therapeutic activities to improve functional performance for 25 minutes, including: Additional time supervised   Nu-step: 8'xL4  Cybex leg press: 3x10 - 5 plates   Cybex hamstring curls: 3x10 - 4 plates  Cybex long arc quad: 3x10 - 20#  Sit to stands from standard chair: 3x10  Standing hip abduction: 3x10 - red theraband    Standing hip extension: 3x10 - red theraband    Patient Education and Home Exercises       Education provided:   - home exercise program   - POC/Prognosis "     Written Home Exercises Provided: Patient instructed to cont prior HEP. Exercises were reviewed and Vince was able to demonstrate them prior to the end of the session.  Vince demonstrated good  understanding of the education provided. See Electronic Medical Record under Patient Instructions for exercises provided during therapy sessions    Assessment   Vince is a 89 y.o. male referred to outpatient Physical Therapy with a medical diagnosis of failed back syndrome. Patient has signs and symptoms presenting as low back pain due to strength and endurance deficits. Continued flexion biased low back program with quadriceps and hip strengthening. Presents with significantly less pain this morning. Continued cybex resisted strengthening and continuing with gross lower extremity strength and conditioning.     Vince Is progressing well towards his goals.   Patient prognosis is Good.     Patient will continue to benefit from skilled outpatient physical therapy to address the deficits listed in the problem list box on initial evaluation, provide pt/family education and to maximize pt's level of independence in the home and community environment.     Patient's spiritual, cultural and educational needs considered and pt agreeable to plan of care and goals.     Anticipated barriers to physical therapy: age    Goals:   Short Term Goals: 4 weeks  1. Patient will be independent with HEP in order to supplement pain free lumbar ROM - PROGRESSING, NOT MET  2. Pt will improve hip flexibility to WNL to promote functional mobility - PROGRESSING, NOT MET  3. Patient will display the ability to perform an isometric transverse abdominis isometric contraction in supine for improved trunk stability - PROGRESSING, NOT MET      Long Term Goals: 8 weeks   1. Pt will improve lumbar FOTO survey to </=48% limited in order to return to ADLs without limitation - PROGRESSING, NOT MET  2. Patient will improve hip flexion, abduction, and extension  strength to a 5/5 bilaterally for improved trunk support. - PROGRESSING, NOT MET  3. Pt will report no pain during lumbar AROM in order to promote functional mobility - PROGRESSING, NOT MET  4. Patient will display the ability and understanding of performing a proper hip hinge with quality movement coordination for lifting mechanics. - PROGRESSING, NOT MET     Plan   Low back/hip mobility   Core stabilization   Gluteus and hip strengthening     Donavon Bello, PTA

## 2023-12-29 ENCOUNTER — CLINICAL SUPPORT (OUTPATIENT)
Dept: REHABILITATION | Facility: HOSPITAL | Age: 88
End: 2023-12-29
Payer: MEDICARE

## 2023-12-29 DIAGNOSIS — R68.89 DECREASED STRENGTH, ENDURANCE, AND MOBILITY: Primary | ICD-10-CM

## 2023-12-29 DIAGNOSIS — R53.1 DECREASED STRENGTH, ENDURANCE, AND MOBILITY: Primary | ICD-10-CM

## 2023-12-29 DIAGNOSIS — Z74.09 DECREASED STRENGTH, ENDURANCE, AND MOBILITY: Primary | ICD-10-CM

## 2023-12-29 DIAGNOSIS — M96.1 FAILED BACK SYNDROME: ICD-10-CM

## 2023-12-29 PROCEDURE — 97110 THERAPEUTIC EXERCISES: CPT | Mod: PN,CQ

## 2023-12-29 PROCEDURE — 97530 THERAPEUTIC ACTIVITIES: CPT | Mod: PN,CQ

## 2024-01-09 ENCOUNTER — CLINICAL SUPPORT (OUTPATIENT)
Dept: REHABILITATION | Facility: HOSPITAL | Age: 89
End: 2024-01-09
Payer: MEDICARE

## 2024-01-09 DIAGNOSIS — R68.89 DECREASED STRENGTH, ENDURANCE, AND MOBILITY: Primary | ICD-10-CM

## 2024-01-09 DIAGNOSIS — M96.1 FAILED BACK SYNDROME: ICD-10-CM

## 2024-01-09 DIAGNOSIS — Z74.09 DECREASED STRENGTH, ENDURANCE, AND MOBILITY: Primary | ICD-10-CM

## 2024-01-09 DIAGNOSIS — R53.1 DECREASED STRENGTH, ENDURANCE, AND MOBILITY: Primary | ICD-10-CM

## 2024-01-09 PROCEDURE — 97530 THERAPEUTIC ACTIVITIES: CPT | Mod: PN

## 2024-01-09 PROCEDURE — 97110 THERAPEUTIC EXERCISES: CPT | Mod: PN

## 2024-01-09 NOTE — PROGRESS NOTES
[facility]   Physical Therapy Treatment Note      Name: Vince Martinez  Clinic Number: [id number]    Therapy Diagnosis:   Encounter Diagnoses   Name Primary?    Decreased strength, endurance, and mobility Yes    Failed back syndrome        Physician: Randy Riggs MD    Visit Date: 1/9/2024    Physician Orders: PT Eval and Treat   Medical Diagnosis from Referral: M96.1 (ICD-10-CM) - Failed back syndrome   Evaluation Date: 11/17/2023  Authorization Period Expiration: 12/31/2023  Plan of Care Expiration: 12/29/2023  Visit # / Visits authorized: 9/24     Time In: 10:00 pm  Time Out: 10:55 am  Total Appointment Time (timed & untimed codes): 55 minutes (2 TE) (2 TA)     Precautions: Standard; CHF; diabetes  No interpretor necessary   Subjective     Patient reports: he believes the exercises are helping and his low back pain is less. Patient still reporting 3/10 low back pain.    He was not compliant with home exercise program.  Response to previous treatment: no change   Functional change: no change     Pain: 4/10  Location: low back     Objective    (1/9/2024):  Lumbar Range of Motion:     Degrees Pain   Flexion  65 degrees     Minor low back pain   Crystal's sign ( - )   Extension  10 degrees     Low back pain   Left Side Bending  75%  Minor low back pain   Right Side Bending  75%  Minor low back pain   Left rotation     75%  Tightness   Right Rotation     75%  Tightness         Hip Range of Motion:    Right Passive Left Passive   External Rotation  25 degrees  25 degrees   Internal Rotation  25 degrees  25 degrees   Flexion  110 degrees  105 degrees      Lower Extremity Strength  Right LE   Left LE     Knee extension:  9.6 --> 15.1 kg Knee extension:  13.1 --> 16.2 kg   Knee flexion:  11.8 --> 13.1 kg Knee flexion:  8.0 --> 14.3 kg   Hip flexion: 4+/5  Hip flexion: 4+/5    Hip extension:  4/5 Hip extension: 4/5   Hip abduction: 4/5 Hip abduction: 4/5           Treatment     Vince monroe  "the treatments listed below:      therapeutic activities to improve functional performance for 30 minutes, including: Additional time supervised   Objective tests and measures  FOTO  Plan of care update    iVnce received therapeutic exercises to develop strength, endurance, ROM, flexibility and posture for 25 minutes including:   Nu-step: 8'xL4  SKTC: 10x10"  Straight leg raises: 2x10   Seated swiss ball rollouts: 20x  Cybex leg press: 3x10 - 5 plates   Cybex hamstring curls: 3x10 - 4 plates  Cybex long arc quad: 3x10 - 20#    Not today:  Sit to stands from standard chair: 3x10  Standing hip abduction: 3x10 - red theraband    Standing hip extension: 3x10 - red theraband    Patient Education and Home Exercises       Education provided:   - home exercise program   - POC/Prognosis     Written Home Exercises Provided: Patient instructed to cont prior HEP. Exercises were reviewed and Vince was able to demonstrate them prior to the end of the session.  Vince demonstrated good  understanding of the education provided. See Electronic Medical Record under Patient Instructions for exercises provided during therapy sessions    Assessment   Vince is a 89 y.o. male referred to outpatient Physical Therapy with a medical diagnosis of failed back syndrome. Patient has signs and symptoms presenting as low back pain due to strength and endurance deficits. Continued flexion biased low back program with quadriceps and hip strengthening. Presents with significantly less pain this morning. Continued cybex resisted strengthening and continuing with gross lower extremity strength and conditioning.     Vince Is progressing well towards his goals.   Patient prognosis is Good.     Patient will continue to benefit from skilled outpatient physical therapy to address the deficits listed in the problem list box on initial evaluation, provide pt/family education and to maximize pt's level of independence in the home and community environment. "     Patient's spiritual, cultural and educational needs considered and pt agreeable to plan of care and goals.     Anticipated barriers to physical therapy: age    Goals:   Short Term Goals: 4 weeks  1. Patient will be independent with HEP in order to supplement pain free lumbar ROM - PROGRESSING, NOT MET  2. Pt will improve hip flexibility to WNL to promote functional mobility - PROGRESSING, NOT MET  3. Patient will display the ability to perform an isometric transverse abdominis isometric contraction in supine for improved trunk stability - PROGRESSING, NOT MET      Long Term Goals: 8 weeks   1. Pt will improve lumbar FOTO survey to </=48% limited in order to return to ADLs without limitation - PROGRESSING, NOT MET  2. Patient will improve hip flexion, abduction, and extension strength to a 5/5 bilaterally for improved trunk support. - PROGRESSING, NOT MET  3. Pt will report no pain during lumbar AROM in order to promote functional mobility - PROGRESSING, NOT MET  4. Patient will display the ability and understanding of performing a proper hip hinge with quality movement coordination for lifting mechanics. - PROGRESSING, NOT MET     Plan   Low back/hip mobility   Core stabilization   Gluteus and hip strengthening     Kar Vargas, PT

## 2024-01-12 ENCOUNTER — TELEPHONE (OUTPATIENT)
Dept: OTOLARYNGOLOGY | Facility: CLINIC | Age: 89
End: 2024-01-12
Payer: MEDICARE

## 2024-01-12 NOTE — TELEPHONE ENCOUNTER
I left patient an message letting him know I rescheduled his CT scan on the date provided.     ----- Message from Yarelis Locke sent at 1/12/2024  2:29 PM CST -----  Type: CT [employer] without    Who Called: Pt   Would the patient rather a call back or a response via [facility]? Call back   Best Call Back Number: [phone]  Additional Information: Please be advised, when trying to reschedule CT for pt, could not complete due to orders experiencing, pt is trying to have it done next week Wednesday 01/17

## 2024-01-17 ENCOUNTER — CLINICAL SUPPORT (OUTPATIENT)
Dept: REHABILITATION | Facility: HOSPITAL | Age: 89
End: 2024-01-17
Payer: MEDICARE

## 2024-01-17 ENCOUNTER — HOSPITAL ENCOUNTER (OUTPATIENT)
Dept: RADIOLOGY | Facility: HOSPITAL | Age: 89
Discharge: HOME | End: 2024-01-17
Attending: OTOLARYNGOLOGY
Payer: MEDICARE

## 2024-01-17 DIAGNOSIS — R68.89 DECREASED STRENGTH, ENDURANCE, AND MOBILITY: Primary | ICD-10-CM

## 2024-01-17 DIAGNOSIS — R53.1 DECREASED STRENGTH, ENDURANCE, AND MOBILITY: Primary | ICD-10-CM

## 2024-01-17 DIAGNOSIS — R43.0 LOSS OF SMELL: Chronic | ICD-10-CM

## 2024-01-17 DIAGNOSIS — Z74.09 DECREASED STRENGTH, ENDURANCE, AND MOBILITY: Primary | ICD-10-CM

## 2024-01-17 DIAGNOSIS — M96.1 FAILED BACK SYNDROME: ICD-10-CM

## 2024-01-17 PROCEDURE — 70486 CT MAXILLOFACIAL W/O DYE: CPT | Mod: TC

## 2024-01-17 PROCEDURE — 97110 THERAPEUTIC EXERCISES: CPT | Mod: PN

## 2024-01-17 PROCEDURE — 70486 CT MAXILLOFACIAL W/O DYE: CPT | Mod: 26,,, | Performed by: RADIOLOGY

## 2024-01-17 NOTE — PLAN OF CARE
[facility]  Physical Therapy Plan of Care Note     Name: Vince Martinez  Clinic Number: [id number]    Therapy Diagnosis:   Encounter Diagnoses   Name Primary?    Decreased strength, endurance, and mobility Yes    Failed back syndrome      Physician: Randy Riggs MD    Visit Date: 1/9/2024    Physician Orders: PT Eval and Treat   Medical Diagnosis from Referral: M96.1 (ICD-10-CM) - Failed back syndrome   Evaluation Date: 11/17/2023  Authorization Period Expiration: 12/31/2023  Plan of Care Expiration: 12/29/2023  Visit # / Visits authorized: 9/24    Precautions: Standard, Diabetes, and CHF  Functional Level Prior to Evaluation:  see initial evaluation    SUBJECTIVE     Update:   Patient reports: he believes the exercises are helping and his low back pain is less. Patient still reporting 3/10 low back pain.     He was not compliant with home exercise program.  Response to previous treatment: no change   Functional change: no change      Pain: 4/10  Location: low back     OBJECTIVE     Update:   (1/9/2024):  Lumbar Range of Motion:     Degrees Pain   Flexion  65 degrees     Minor low back pain   Master's sign ( - )   Extension  10 degrees     Low back pain   Left Side Bending  75%  Minor low back pain   Right Side Bending  75%  Minor low back pain   Left rotation     75%  Tightness   Right Rotation     75%  Tightness         Hip Range of Motion:    Right Passive Left Passive   External Rotation  25 degrees  25 degrees   Internal Rotation  25 degrees  25 degrees   Flexion  110 degrees  105 degrees         Lower Extremity Strength  Right LE   Left LE     Knee extension:  9.6 --> 15.1 kg Knee extension:  13.1 --> 16.2 kg   Knee flexion:  11.8 --> 13.1 kg Knee flexion:  8.0 --> 14.3 kg   Hip flexion: 4-/5 - LBP Hip flexion: 3+/5 - LBP   Hip extension:  4-/5 Hip extension: 4-/5   Hip abduction: 4-/5 Hip abduction: 4-/5           ASSESSMENT     Update: Vince is a 89 y.o. male referred to  outpatient Physical Therapy with a medical diagnosis of failed back syndrome. Patient has signs and symptoms presenting as low back pain due to strength and endurance deficits. Patient presenting without directional preference and primary objective limitations include lower extremity strength deficits. With objective tests and measures, patient is displaying signs of progress with subjective pain report, mobility, and lower extremity strength. Patient would benefit from further physical therapy continuing to work on hip and lumbar mobility in combination with lower extremity strength and endurance training.     Previous Short Term Goals Status: partially met   New Short Term Goals Status: continue   Long Term Goal Status: continue per initial plan of care.  Reasons for Recertification of Therapy: POC out of date    PLAN     Updated Certification Period: 12/29/2023 to 2/16/2024   Recommended Treatment Plan: 2 times per week for 8 weeks:  Cervical/Lumbar Traction, Gait Training, Manual Therapy, Moist Heat/ Ice, Neuromuscular Re-ed, Patient Education, Self Care, Therapeutic Activities, and Therapeutic Exercise    Kar Vargas, PT

## 2024-01-17 NOTE — PROGRESS NOTES
"[facility]   Physical Therapy Treatment Note      Name: Vince Martinez  Clinic Number: [id number]    Therapy Diagnosis:   Encounter Diagnoses   Name Primary?    Decreased strength, endurance, and mobility Yes    Failed back syndrome        Physician: Randy Riggs MD    Visit Date: 1/17/2024    Physician Orders: PT Eval and Treat   Medical Diagnosis from Referral: M96.1 (ICD-10-CM) - Failed back syndrome   Evaluation Date: 11/17/2023  Authorization Period Expiration: 12/31/2023  Plan of Care Expiration: 12/29/2023; 2/16/2024  Visit # / Visits authorized: 10/24     Time In: 10:00 pm  Time Out: 10:55 am  Total Appointment Time (timed & untimed codes): 30 minutes (1 TE) (1 TA)     Precautions: Standard; CHF; diabetes  No interpretor necessary   Subjective     Patient reports: he is doing well today, but he wore his back brace because his low back had some increased pain today.     He was not compliant with home exercise program.  Response to previous treatment: no change   Functional change: no change     Pain: 5/10  Location: low back     Objective    See (1/9/2024) note    Treatment     Vince received the treatments listed below:      Vince received therapeutic exercises to develop strength, endurance, ROM, flexibility and posture for 55 minutes including:   Nu-step: 8'xL4  SKTC: 10x10"  Straight leg raises: 2x10   Seated swiss ball rollouts: 20x  Cybex leg press: 3x10 - 5 plates   Cybex hamstring curls: 3x10 - 4 plates  Cybex long arc quad: 3x10 - 20#  Sit to stands from standard chair: 3x10  Standing hip abduction: 3x10 - red theraband    Standing hip extension: 3x10 - red theraband    Patient Education and Home Exercises       Education provided:   - home exercise program   - POC/Prognosis     Written Home Exercises Provided: Patient instructed to cont prior HEP. Exercises were reviewed and Vince was able to demonstrate them prior to the end of the session.  Vince demonstrated good  " understanding of the education provided. See Electronic Medical Record under Patient Instructions for exercises provided during therapy sessions    Assessment   Vince is a 89 y.o. male referred to outpatient Physical Therapy with a medical diagnosis of failed back syndrome. Patient has signs and symptoms presenting as low back pain due to strength and endurance deficits. Continued flexion biased low back program with quadriceps and hip strengthening. Continued cybex resisted strengthening and continuing with gross lower extremity strength and conditioning. Planning to further incorporate core stability next visit.     Vince Is progressing well towards his goals.   Patient prognosis is Good.     Patient will continue to benefit from skilled outpatient physical therapy to address the deficits listed in the problem list box on initial evaluation, provide pt/family education and to maximize pt's level of independence in the home and community environment.     Patient's spiritual, cultural and educational needs considered and pt agreeable to plan of care and goals.     Anticipated barriers to physical therapy: age    Goals:   Short Term Goals: 4 weeks  1. Patient will be independent with HEP in order to supplement pain free lumbar ROM -MET  2. Pt will improve hip flexibility to WNL to promote functional mobility - MET  3. Patient will display the ability to perform an isometric transverse abdominis isometric contraction in supine for improved trunk stability - MET      Long Term Goals: 8 weeks   1. Pt will improve lumbar FOTO survey to </=48% limited in order to return to ADLs without limitation - PROGRESSING, NOT MET  2. Patient will improve hip flexion, abduction, and extension strength to a 5/5 bilaterally for improved trunk support. - PROGRESSING, NOT MET  3. Pt will report no pain during lumbar AROM in order to promote functional mobility - PROGRESSING, NOT MET  4. Patient will display the ability and understanding of  performing a proper hip hinge with quality movement coordination for lifting mechanics. - PROGRESSING, NOT MET     Plan   Low back/hip mobility   Core stabilization   Gluteus and hip strengthening     Kar Vargas, PT

## 2024-01-25 ENCOUNTER — TELEPHONE (OUTPATIENT)
Dept: OTOLARYNGOLOGY | Facility: CLINIC | Age: 89
End: 2024-01-25
Payer: MEDICARE

## 2024-01-25 NOTE — TELEPHONE ENCOUNTER
----- Message from Mary Nixon sent at 1/25/2024  1:41 PM CST -----  Type:  Test Results    Who Called: pt son   Name of Test (Lab/Mammo/Etc): CT Scan   Date of Test: 01/17  Ordering Provider: Berhane   Where the test was performed: [facility]   Would the patient rather a call back or a response via [facility]? Call   Best Call Back Number: [phone]  Additional Information:

## 2024-02-02 ENCOUNTER — TELEPHONE (OUTPATIENT)
Dept: OTOLARYNGOLOGY | Facility: CLINIC | Age: 89
End: 2024-02-02
Payer: MEDICARE

## 2024-02-02 NOTE — TELEPHONE ENCOUNTER
I called patient granddaughter to share results and schedule patients follow up appointment.    ----- Message from Olivia Leigh sent at 2/2/2024  1:00 PM CST -----  Type:  Test Results    Who Called:  pt's granddaughter  Name of Test (Lab/Mammo/Etc):  CT scan  Date of Test:  1/17/24  Ordering Provider:  Dr. Last  Where the test was performed:  [facility]  Would the patient rather a call back or a response via [facility]? Call   Best Call Back Number:    Additional Information:

## 2024-02-06 ENCOUNTER — LAB VISIT (OUTPATIENT)
Dept: LAB | Facility: HOSPITAL | Age: 89
End: 2024-02-06
Attending: INTERNAL MEDICINE
Payer: MEDICARE

## 2024-02-06 DIAGNOSIS — N18.32 ANEMIA IN STAGE 3B CHRONIC KIDNEY DISEASE: ICD-10-CM

## 2024-02-06 DIAGNOSIS — E11.65 TYPE 2 DIABETES MELLITUS WITH HYPERGLYCEMIA, WITHOUT LONG-TERM CURRENT USE OF INSULIN: ICD-10-CM

## 2024-02-06 DIAGNOSIS — N18.32 STAGE 3B CHRONIC KIDNEY DISEASE: ICD-10-CM

## 2024-02-06 DIAGNOSIS — N25.81 SECONDARY HYPERPARATHYROIDISM OF RENAL ORIGIN: ICD-10-CM

## 2024-02-06 DIAGNOSIS — D63.1 ANEMIA IN STAGE 3B CHRONIC KIDNEY DISEASE: ICD-10-CM

## 2024-02-06 LAB
ALBUMIN SERPL BCP-MCNC: 3.7 G/DL (ref 3.5–5.2)
ANION GAP SERPL CALC-SCNC: 9 MMOL/L (ref 8–16)
BASOPHILS # BLD AUTO: 0.09 K/UL (ref 0–0.2)
BASOPHILS NFR BLD: 1.5 % (ref 0–1.9)
BUN SERPL-MCNC: 41 MG/DL (ref 10–30)
CALCIUM SERPL-MCNC: 9.4 MG/DL (ref 8.7–10.5)
CHLORIDE SERPL-SCNC: 106 MMOL/L (ref 95–110)
CO2 SERPL-SCNC: 23 MMOL/L (ref 23–29)
CREAT SERPL-MCNC: 1.4 MG/DL (ref 0.5–1.4)
DIFFERENTIAL METHOD BLD: ABNORMAL
EOSINOPHIL # BLD AUTO: 0.5 K/UL (ref 0–0.5)
EOSINOPHIL NFR BLD: 8.1 % (ref 0–8)
ERYTHROCYTE [DISTWIDTH] IN BLOOD BY AUTOMATED COUNT: 13.2 % (ref 11.5–14.5)
EST. GFR  (NO RACE VARIABLE): 47 ML/MIN/1.73 M^2
ESTIMATED AVG GLUCOSE: 189 MG/DL (ref 68–131)
FERRITIN SERPL-MCNC: 247 NG/ML (ref 20–300)
GLUCOSE SERPL-MCNC: 256 MG/DL (ref 70–110)
HBA1C MFR BLD: 8.2 % (ref 4–5.6)
HCT VFR BLD AUTO: 33.9 % (ref 40–54)
HGB BLD-MCNC: 11.3 G/DL (ref 14–18)
IMM GRANULOCYTES # BLD AUTO: 0.01 K/UL (ref 0–0.04)
IMM GRANULOCYTES NFR BLD AUTO: 0.2 % (ref 0–0.5)
IRON SERPL-MCNC: 112 UG/DL (ref 45–160)
LYMPHOCYTES # BLD AUTO: 2.5 K/UL (ref 1–4.8)
LYMPHOCYTES NFR BLD: 42.5 % (ref 18–48)
MAGNESIUM SERPL-MCNC: 2 MG/DL (ref 1.6–2.6)
MCH RBC QN AUTO: 32.5 PG (ref 27–31)
MCHC RBC AUTO-ENTMCNC: 33.3 G/DL (ref 32–36)
MCV RBC AUTO: 97 FL (ref 82–98)
MONOCYTES # BLD AUTO: 0.4 K/UL (ref 0.3–1)
MONOCYTES NFR BLD: 7.3 % (ref 4–15)
NEUTROPHILS # BLD AUTO: 2.4 K/UL (ref 1.8–7.7)
NEUTROPHILS NFR BLD: 40.4 % (ref 38–73)
NRBC BLD-RTO: 0 /100 WBC
PHOSPHATE SERPL-MCNC: 2.8 MG/DL (ref 2.7–4.5)
PLATELET # BLD AUTO: 186 K/UL (ref 150–450)
PMV BLD AUTO: 10.5 FL (ref 9.2–12.9)
POTASSIUM SERPL-SCNC: 4.4 MMOL/L (ref 3.5–5.1)
PTH-INTACT SERPL-MCNC: 68.2 PG/ML (ref 9–77)
RBC # BLD AUTO: 3.48 M/UL (ref 4.6–6.2)
SATURATED IRON: 31 % (ref 20–50)
SODIUM SERPL-SCNC: 138 MMOL/L (ref 136–145)
TOTAL IRON BINDING CAPACITY: 358 UG/DL (ref 250–450)
TRANSFERRIN SERPL-MCNC: 242 MG/DL (ref 200–375)
URATE SERPL-MCNC: 5.9 MG/DL (ref 3.4–7)
WBC # BLD AUTO: 5.91 K/UL (ref 3.9–12.7)

## 2024-02-06 PROCEDURE — 82728 ASSAY OF FERRITIN: CPT | Performed by: INTERNAL MEDICINE

## 2024-02-06 PROCEDURE — 84550 ASSAY OF BLOOD/URIC ACID: CPT | Performed by: INTERNAL MEDICINE

## 2024-02-06 PROCEDURE — 83735 ASSAY OF MAGNESIUM: CPT | Performed by: INTERNAL MEDICINE

## 2024-02-06 PROCEDURE — 83970 ASSAY OF PARATHORMONE: CPT | Performed by: INTERNAL MEDICINE

## 2024-02-06 PROCEDURE — 83540 ASSAY OF IRON: CPT | Performed by: INTERNAL MEDICINE

## 2024-02-06 PROCEDURE — 36415 COLL VENOUS BLD VENIPUNCTURE: CPT | Performed by: INTERNAL MEDICINE

## 2024-02-06 PROCEDURE — 80069 RENAL FUNCTION PANEL: CPT | Performed by: INTERNAL MEDICINE

## 2024-02-06 PROCEDURE — 83036 HEMOGLOBIN GLYCOSYLATED A1C: CPT | Performed by: INTERNAL MEDICINE

## 2024-02-06 PROCEDURE — 85025 COMPLETE CBC W/AUTO DIFF WBC: CPT | Performed by: INTERNAL MEDICINE

## 2024-02-07 RX ORDER — FUROSEMIDE 40 MG/1
40 TABLET ORAL DAILY
Qty: 90 TABLET | Refills: 3 | Status: SHIPPED | OUTPATIENT
Start: 2024-02-07 | End: 2025-02-06

## 2024-02-07 NOTE — TELEPHONE ENCOUNTER
No care due was identified.  [facility] Embedded Care Due Messages. Reference number: [id number].   2/07/2024 10:09:35 AM CST

## 2024-02-07 NOTE — TELEPHONE ENCOUNTER
Refill Decision Note   Vince Martinez  is requesting a refill authorization.  Brief Assessment and Rationale for Refill:  Approve     Medication Therapy Plan:         Comments:     Note composed:12:49 PM 02/07/2024             Appointments     Last Visit   11/30/2023 Shlomo Luong MD   Next Visit   4/2/2024 Shlomo Luong MD

## 2024-04-02 ENCOUNTER — OFFICE VISIT (OUTPATIENT)
Dept: FAMILY MEDICINE | Facility: CLINIC | Age: 89
End: 2024-04-02
Payer: MEDICARE

## 2024-04-02 VITALS
HEIGHT: 65 IN | WEIGHT: 144 LBS | BODY MASS INDEX: 23.99 KG/M2 | SYSTOLIC BLOOD PRESSURE: 118 MMHG | OXYGEN SATURATION: 99 % | HEART RATE: 76 BPM | DIASTOLIC BLOOD PRESSURE: 44 MMHG

## 2024-04-02 DIAGNOSIS — M15.9 PRIMARY OSTEOARTHRITIS INVOLVING MULTIPLE JOINTS: ICD-10-CM

## 2024-04-02 DIAGNOSIS — R63.0 LOSS OF APPETITE: ICD-10-CM

## 2024-04-02 DIAGNOSIS — I10 ESSENTIAL HYPERTENSION: Primary | ICD-10-CM

## 2024-04-02 PROCEDURE — 1101F PT FALLS ASSESS-DOCD LE1/YR: CPT | Mod: CPTII,S$GLB,, | Performed by: FAMILY MEDICINE

## 2024-04-02 PROCEDURE — 99999 PR PBB SHADOW E&M-EST. PATIENT-LVL III: CPT | Mod: PBBFAC,,, | Performed by: FAMILY MEDICINE

## 2024-04-02 PROCEDURE — 3288F FALL RISK ASSESSMENT DOCD: CPT | Mod: CPTII,S$GLB,, | Performed by: FAMILY MEDICINE

## 2024-04-02 PROCEDURE — 1157F ADVNC CARE PLAN IN RCRD: CPT | Mod: CPTII,S$GLB,, | Performed by: FAMILY MEDICINE

## 2024-04-02 PROCEDURE — 1159F MED LIST DOCD IN RCRD: CPT | Mod: CPTII,S$GLB,, | Performed by: FAMILY MEDICINE

## 2024-04-02 PROCEDURE — 99215 OFFICE O/P EST HI 40 MIN: CPT | Mod: S$GLB,,, | Performed by: FAMILY MEDICINE

## 2024-04-02 PROCEDURE — 1160F RVW MEDS BY RX/DR IN RCRD: CPT | Mod: CPTII,S$GLB,, | Performed by: FAMILY MEDICINE

## 2024-04-02 RX ORDER — DICLOFENAC SODIUM 10 MG/G
2 GEL TOPICAL DAILY PRN
Qty: 200 G | Refills: 3 | Status: SHIPPED | OUTPATIENT
Start: 2024-04-02 | End: 2024-04-02 | Stop reason: SDUPTHER

## 2024-04-02 RX ORDER — DICLOFENAC SODIUM 10 MG/G
2 GEL TOPICAL DAILY PRN
Qty: 200 G | Refills: 3 | Status: SHIPPED | OUTPATIENT
Start: 2024-04-02

## 2024-04-02 RX ORDER — AMLODIPINE BESYLATE 5 MG/1
5 TABLET ORAL DAILY
Qty: 90 TABLET | Refills: 3 | Status: SHIPPED | OUTPATIENT
Start: 2024-04-02 | End: 2024-06-11

## 2024-04-02 RX ORDER — CYPROHEPTADINE HYDROCHLORIDE 4 MG/1
4 TABLET ORAL 2 TIMES DAILY PRN
Qty: 60 TABLET | Refills: 0 | Status: SHIPPED | OUTPATIENT
Start: 2024-04-02

## 2024-04-02 NOTE — TELEPHONE ENCOUNTER
No care due was identified.  [facility] Embedded Care Due Messages. Reference number: [id number].   4/02/2024 2:40:33 PM CDT

## 2024-04-02 NOTE — PROGRESS NOTES
Subjective     Patient ID: Vince Martinez is a 89 y.o. male.    Chief Complaint: Follow-up and Arthritis    89 years old male came to the clinic for blood pressure check.  Chronic blood pressure was stable.  Blood pressure today was low.  He is currently asymptomatic.  No chest pain, palpitation, orthopnea or PND.  Patient with chronic arthritis in multiple joints.  He has requesting a medicine to help with the pain.  The pain is 6/10 of intensity on and off aggravated with activity and better with rest.  He reports also chronic loss of appetite.  He was not taking mirtazapine.  He is willing to try a new medicine.    Follow-up  Associated symptoms include arthralgias. Pertinent negatives include no chest pain.   Arthritis      Review of Systems   Constitutional:  Positive for appetite change. Negative for unexpected weight change.   HENT: Negative.     Eyes: Negative.    Respiratory: Negative.     Cardiovascular: Negative.  Negative for chest pain, palpitations, leg swelling and claudication.   Gastrointestinal: Negative.    Genitourinary: Negative.    Musculoskeletal:  Positive for arthralgias, arthritis and back pain.   Integumentary:  Negative.   Neurological: Negative.    Psychiatric/Behavioral: Negative.            Objective     Physical Exam  Vitals and nursing note reviewed.   Constitutional:       General: He is not in acute distress.     Appearance: He is well-developed. He is not diaphoretic.   HENT:      Head: Normocephalic and atraumatic.      Right Ear: External ear normal.      Left Ear: External ear normal.      Nose: Nose normal.      Mouth/Throat:      Pharynx: No oropharyngeal exudate.   Eyes:      General: No scleral icterus.        Right eye: No discharge.         Left eye: No discharge.      Conjunctiva/sclera: Conjunctivae normal.      Pupils: Pupils are equal, round, and reactive to light.   Neck:      Thyroid: No thyromegaly.      Vascular: No JVD.      Trachea: No tracheal deviation.    Cardiovascular:      Rate and Rhythm: Normal rate and regular rhythm.      Heart sounds: Normal heart sounds. No murmur heard.     No friction rub. No gallop.   Pulmonary:      Effort: Pulmonary effort is normal. No respiratory distress.      Breath sounds: Normal breath sounds. No stridor. No wheezing or rales.   Chest:      Chest wall: No tenderness.   Abdominal:      General: Bowel sounds are normal. There is no distension.      Palpations: Abdomen is soft. There is no mass.      Tenderness: There is no abdominal tenderness. There is no guarding or rebound.   Musculoskeletal:         General: Normal range of motion.      Cervical back: Normal range of motion and neck supple.      Lumbar back: Spasms and tenderness present. Negative right straight leg raise test and negative left straight leg raise test.      Right knee: Tenderness present.      Left knee: Tenderness present.   Lymphadenopathy:      Cervical: No cervical adenopathy.   Skin:     General: Skin is warm and dry.      Coloration: Skin is not pale.      Findings: No erythema or rash.   Neurological:      Mental Status: He is alert and oriented to person, place, and time.      Cranial Nerves: No cranial nerve deficit.      Motor: No abnormal muscle tone.      Coordination: Coordination normal.      Deep Tendon Reflexes: Reflexes are normal and symmetric. Reflexes normal.   Psychiatric:         Behavior: Behavior normal.         Thought Content: Thought content normal.         Judgment: Judgment normal.            Assessment and Plan     1. Essential hypertension  -     amLODIPine (NORVASC) 5 MG tablet; Take 1 tablet (5 mg total) by mouth once daily.  Dispense: 90 tablet; Refill: 3    2. Primary osteoarthritis involving multiple joints  -     diclofenac sodium (VOLTAREN) 1 % Gel; Apply 2 g topically daily as needed (pain).  Dispense: 200 g; Refill: 3    3. Loss of appetite  -     cyproheptadine (PERIACTIN) 4 mg tablet; Take 1 tablet (4 mg total) by  mouth 2 (two) times daily as needed (appetite).  Dispense: 60 tablet; Refill: 0        Continue monitoring blood pressure at home, low sodium diet.   Increase physical activity with intermittent weight training.         Follow-up in 6 months.

## 2024-04-09 ENCOUNTER — OFFICE VISIT (OUTPATIENT)
Dept: OTOLARYNGOLOGY | Facility: CLINIC | Age: 89
End: 2024-04-09
Payer: MEDICARE

## 2024-04-09 VITALS — BODY MASS INDEX: 23.26 KG/M2 | WEIGHT: 139.75 LBS

## 2024-04-09 DIAGNOSIS — J31.0 CHRONIC RHINITIS: Primary | Chronic | ICD-10-CM

## 2024-04-09 DIAGNOSIS — R49.0 DYSPHONIA: ICD-10-CM

## 2024-04-09 DIAGNOSIS — J34.2 NASAL SEPTAL DEVIATION: Chronic | ICD-10-CM

## 2024-04-09 PROCEDURE — 99999 PR PBB SHADOW E&M-EST. PATIENT-LVL IV: CPT | Mod: PBBFAC,,, | Performed by: OTOLARYNGOLOGY

## 2024-04-09 PROCEDURE — 1101F PT FALLS ASSESS-DOCD LE1/YR: CPT | Mod: CPTII,S$GLB,, | Performed by: OTOLARYNGOLOGY

## 2024-04-09 PROCEDURE — 3288F FALL RISK ASSESSMENT DOCD: CPT | Mod: CPTII,S$GLB,, | Performed by: OTOLARYNGOLOGY

## 2024-04-09 PROCEDURE — 1159F MED LIST DOCD IN RCRD: CPT | Mod: CPTII,S$GLB,, | Performed by: OTOLARYNGOLOGY

## 2024-04-09 PROCEDURE — 99214 OFFICE O/P EST MOD 30 MIN: CPT | Mod: S$GLB,,, | Performed by: OTOLARYNGOLOGY

## 2024-04-09 PROCEDURE — 1125F AMNT PAIN NOTED PAIN PRSNT: CPT | Mod: CPTII,S$GLB,, | Performed by: OTOLARYNGOLOGY

## 2024-04-09 PROCEDURE — 1157F ADVNC CARE PLAN IN RCRD: CPT | Mod: CPTII,S$GLB,, | Performed by: OTOLARYNGOLOGY

## 2024-04-09 PROCEDURE — 1160F RVW MEDS BY RX/DR IN RCRD: CPT | Mod: CPTII,S$GLB,, | Performed by: OTOLARYNGOLOGY

## 2024-04-09 RX ORDER — IPRATROPIUM BROMIDE 21 UG/1
2 SPRAY, METERED NASAL 3 TIMES DAILY
Qty: 30 ML | Refills: 3 | Status: SHIPPED | OUTPATIENT
Start: 2024-04-09 | End: 2024-05-29 | Stop reason: SDUPTHER

## 2024-04-09 RX ORDER — LEVOCETIRIZINE DIHYDROCHLORIDE 5 MG/1
5 TABLET, FILM COATED ORAL NIGHTLY
Qty: 30 TABLET | Refills: 11 | Status: SHIPPED | OUTPATIENT
Start: 2024-04-09 | End: 2024-05-02

## 2024-04-09 NOTE — PROGRESS NOTES
Chief Complaint   Patient presents with    Follow-up     Nasal discharge     HPI:   Vince Martinez is a 89 y.o. male who presents for evaluation of loss of sense of smell/taste.  He states that he can not smell any scents.  He feels his appetite has been affected. He reports that it started 6 months ago suddenly. He does not recall infection at that time. He did have COVID infection 1 year ago.  He denies any head injuries/trauma.  He denies history of CVA. He admits to headache. He denies facial pain or pressure. He admits tor rhinorrhea/post-nasal drip which has been present for years. He is using Flonase/ipratropium nasal sprays.     Interval HPI 4/9/2024:  Follow up visit.  He feels nasal congestion in both nostrils. Still with post-nasal drip. He feels this affects his voice. He does have dysphonia and has been evaluated with laryngology in the past. 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   Feels sense of smell has been decreased.  He has been using nasal saline rinses, flonase, and ipratroprium.     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    Arthritis     Bilateral renal cysts     Chronic pain     Diabetes mellitus     DJD (degenerative joint disease)     Hyperkalemia     Hypertension     Iron deficiency anemia     Low back pain     Osteopenia of neck of right femur     Prostate enlargement     Sleep apnea     Stage 3b chronic kidney disease     Thyroid disease      Social History     Socioeconomic History    Marital status:    Tobacco Use    Smoking status: Never    Smokeless tobacco: Never   Substance and Sexual Activity    Alcohol use: No    Drug use: No    Sexual activity: Not Currently     Partners: Female     Social Determinants of Health     Financial Resource Strain: Medium Risk (2/2/2022)    Overall Financial Resource Strain (CARDIA)     Difficulty of Paying Living Expenses:  Somewhat hard   Food Insecurity: No Food Insecurity (2/2/2022)    Hunger Vital Sign     Worried About Running Out of Food in the Last Year: Never true     Ran Out of Food in the Last Year: Never true   Transportation Needs: No Transportation Needs (2/2/2022)    PRAPARE - Transportation     Lack of Transportation (Medical): No     Lack of Transportation (Non-Medical): No   Physical Activity: Inactive (2/2/2022)    Exercise Vital Sign     Days of Exercise per Week: 0 days     Minutes of Exercise per Session: 0 min   Stress: No Stress Concern Present (2/2/2022)    [employer] - Occupational Stress Questionnaire     Feeling of Stress : Not at all   Social Connections: Moderately Integrated (2/2/2022)    Social Connection and Isolation Panel [NHANES]     Frequency of Communication with Friends and Family: More than three times a week     Frequency of Social Gatherings with Friends and Family: More than three times a week     Attends Christian Services: More than 4 times per year     Active Member of Clubs or Organizations: No     Attends Club or Organization Meetings: Never     Marital Status:    Housing Stability: Low Risk  (2/2/2022)    Housing Stability Vital Sign     Unable to Pay for Housing in the Last Year: No     Number of Places Lived in the Last Year: 2     Unstable Housing in the Last Year: No     Past Surgical History:   Procedure Laterality Date    CARDIAC PACEMAKER PLACEMENT      EPIDURAL STEROID INJECTION INTO LUMBAR SPINE N/A 06/04/2020    Procedure: Injection-steroid-epidural-lumbar--L4-5;  Surgeon: Angelica Norris Jr., MD;  Location: [facility];  Service: Pain Management;  Laterality: N/A;  sign consent at DOS.    HEMILAMINECTOMY  05/26/2021    Procedure: HEMILAMINECTOMY;  Surgeon: Dayton Sparks MD;  Location: [facility] OR;  Service: Neurosurgery;;  left L4-5    INJECTION OF JOINT Bilateral 02/13/2020    Procedure: Injection, Joint, Bilateral GTB;  Surgeon: Angelica JACOBS  Myrna Romeo MD;  Location: [facility];  Service: Pain Management;  Laterality: Bilateral;    INJECTION OF JOINT Bilateral 03/22/2022    Procedure: bilateral GTB steriod injection;  Surgeon: Angelica Norris Jr., MD;  Location: [facility];  Service: Pain Management;  Laterality: Bilateral;  pacemaker   pt is diabetic     INJECTION OF STEROID Bilateral 01/12/2021    Procedure: INJECTION, STEROID Bilateral SI Joint Block and Steroid Injection;  Surgeon: Dayton Sparks MD;  Location: [facility] OR;  Service: Neurosurgery;  Laterality: Bilateral;  Procedure: Bilateral SI Joint Block and Steroid Injection  Surgery 't'kristen: 45 min  LOS: 0  Anesthesia:MAC  Radiology: C-arm  Bed: Regular with Pillows  Position: Prone     SPINE SURGERY      SPINE SURGERY Bilateral     TRANSFORAMINAL EPIDURAL INJECTION OF STEROID Left 02/23/2021    Procedure: Injection,steroid,epidural,transforaminal approach--Left L4-5 *Has Pacemaker/ICD*;  Surgeon: Angelica Norris Jr., MD;  Location: [facility];  Service: Pain Management;  Laterality: Left;     Family History   Problem Relation Age of Onset    Diabetes Brother     No Known Problems Mother     No Known Problems Father     Diabetes Sister     No Known Problems Daughter     HIV Son            Review of Systems  General: negative for chills, fever or weight loss  Psychological: negative for mood changes or depression  Ophthalmic: negative for blurry vision, photophobia or eye pain  ENT: see HPI  Respiratory: no cough, shortness of breath, or wheezing  Cardiovascular: no chest pain or dyspnea on exertion  Gastrointestinal: no abdominal pain, change in bowel habits, or black/ bloody stools  Musculoskeletal: negative for gait disturbance or muscular weakness  Neurological: no syncope or seizures; no ataxia  Dermatological: negative for puritis,  rash and jaundice  Hematologic/lymphatic: no easy bruising, no new lumps or bumps      Physical Exam:    There were no vitals filed for this  visit.      Constitutional: Well appearing / communicating without difficutly.  NAD.  Eyes: EOM I Bilaterally  Head/Face: Normocephalic.  Negative paranasal sinus pressure/tenderness.  Salivary glands WNL.  House Brackmann I Bilaterally.    Right Ear: Auricle normal appearance. External Auditory Canal within normal limits,TM w/o masses/lesions/perforations. TM mobility noted.   Left Ear: Auricle normal appearance. External Auditory Canal WNL,TM w/o masses/lesions/perforations. TM mobility noted.  Nose: +nasal septal deviation to the left. Inferior Turbinates 3+ bilaterally. No septal perforation. No masses/lesions. External nasal skin appears normal without masses/lesions.  Oral Cavity: Gingiva/lips within normal limits.  Dentition/gingiva healthy appearing. Mucus membranes moist. Floor of mouth soft, no masses palpated. Oral Tongue mobile. Hard Palate appears normal.    Oropharynx: Base of tongue appears normal. No masses/lesions noted. Tonsillar fossa/pharyngeal wall without lesions. Posterior oropharynx WNL.  Soft palate without masses. Midline uvula.   Neck/Lymphatic: No LAD I-VI bilaterally.  No thyromegaly.  No masses noted on exam.    CT sinus 1/17/2023: images interpreted by me and discussed with patient in detail.     FINDINGS:  Frontal sinuses are clear.  Ethmoid sinuses are clear.  Mucosal membrane thickening of the base of the right maxillary sinus measuring up to 1.0 cm.  Possible underlying polyp or mucous retention cyst in the right maxillary antrum.  Mucosal membrane thickening of the base of the left maxillary sinus measuring up to 0.5 cm.  Sphenoid sinuses are clear.     Ostiomeatal units are patent.  Nasal cavity is unremarkable.     Limited intracranial evaluation is unremarkable.  Postoperative changes of the orbits.  Osseous structures are intact.    Assessment:    ICD-10-CM ICD-9-CM    1. Chronic rhinitis  J31.0 472.0       2. Nasal septal deviation  J34.2 470       3. Dysphonia  R49.0 784.42            The primary encounter diagnosis was Chronic rhinitis. Diagnoses of Nasal septal deviation and Dysphonia were also pertinent to this visit.      Plan:  No orders of the defined types were placed in this encounter.    CT scan discussed with patient in detail. I discussed that ethmoid sinuses are clear and while CT is not as sensitive as MRI I do not see any pathology along the path of the olfactory nerves. Reassurance provided.   Continue nasal saline rinses BID  Continue Flonase 2 spray per nostril daily  Increase ipratropium 2 sprays per nostril TID  Start xyzal 5mg PO daily    Billie Vo MD

## 2024-04-11 ENCOUNTER — LAB VISIT (OUTPATIENT)
Dept: LAB | Facility: HOSPITAL | Age: 89
End: 2024-04-11
Attending: INTERNAL MEDICINE
Payer: MEDICARE

## 2024-04-11 DIAGNOSIS — N18.32 STAGE 3B CHRONIC KIDNEY DISEASE: ICD-10-CM

## 2024-04-11 LAB
BILIRUB UR QL STRIP: NEGATIVE
CLARITY UR: CLEAR
COLOR UR: YELLOW
CREAT UR-MCNC: 53.7 MG/DL (ref 23–375)
GLUCOSE UR QL STRIP: NEGATIVE
HGB UR QL STRIP: NEGATIVE
KETONES UR QL STRIP: NEGATIVE
LEUKOCYTE ESTERASE UR QL STRIP: NEGATIVE
NITRITE UR QL STRIP: NEGATIVE
PH UR STRIP: 6 [PH] (ref 5–8)
PROT UR QL STRIP: NEGATIVE
PROT UR-MCNC: 10 MG/DL (ref 0–15)
PROT/CREAT UR: 0.19 MG/G{CREAT} (ref 0–0.2)
SP GR UR STRIP: 1.01 (ref 1–1.03)
URN SPEC COLLECT METH UR: NORMAL
UROBILINOGEN UR STRIP-ACNC: NEGATIVE EU/DL

## 2024-04-11 PROCEDURE — 81003 URINALYSIS AUTO W/O SCOPE: CPT | Performed by: INTERNAL MEDICINE

## 2024-04-11 PROCEDURE — 84156 ASSAY OF PROTEIN URINE: CPT | Performed by: INTERNAL MEDICINE

## 2024-04-17 NOTE — PROGRESS NOTES
Health Maintenance Due   Topic Date Due    Eye Exam  10/19/1940    Influenza Vaccine (1) 08/01/2020     Updates were requested from care everywhere.  Chart was reviewed for overdue Proactive [facility] Encounters ([facility]) topics (CRS, Breast Cancer Screening, Eye exam)  Health Maintenance has been updated.  LINKS immunization registry triggered.  Immunizations were reconciled.      
Pre-printed instructions given for drain care

## 2024-05-02 ENCOUNTER — OFFICE VISIT (OUTPATIENT)
Dept: FAMILY MEDICINE | Facility: CLINIC | Age: 89
End: 2024-05-02
Payer: MEDICARE

## 2024-05-02 VITALS
SYSTOLIC BLOOD PRESSURE: 104 MMHG | HEART RATE: 82 BPM | DIASTOLIC BLOOD PRESSURE: 56 MMHG | OXYGEN SATURATION: 99 % | WEIGHT: 136.81 LBS | HEIGHT: 65 IN | BODY MASS INDEX: 22.8 KG/M2

## 2024-05-02 DIAGNOSIS — I10 ESSENTIAL HYPERTENSION: Primary | ICD-10-CM

## 2024-05-02 DIAGNOSIS — J30.1 SEASONAL ALLERGIC RHINITIS DUE TO POLLEN: ICD-10-CM

## 2024-05-02 DIAGNOSIS — E11.65 TYPE 2 DIABETES MELLITUS WITH HYPERGLYCEMIA, WITHOUT LONG-TERM CURRENT USE OF INSULIN: ICD-10-CM

## 2024-05-02 DIAGNOSIS — D63.8 CHRONIC DISEASE ANEMIA: ICD-10-CM

## 2024-05-02 DIAGNOSIS — R49.0 HOARSENESS: ICD-10-CM

## 2024-05-02 DIAGNOSIS — N18.32 STAGE 3B CHRONIC KIDNEY DISEASE: ICD-10-CM

## 2024-05-02 DIAGNOSIS — K21.9 GASTROESOPHAGEAL REFLUX DISEASE WITHOUT ESOPHAGITIS: ICD-10-CM

## 2024-05-02 PROCEDURE — 1160F RVW MEDS BY RX/DR IN RCRD: CPT | Mod: CPTII,S$GLB,, | Performed by: FAMILY MEDICINE

## 2024-05-02 PROCEDURE — 99215 OFFICE O/P EST HI 40 MIN: CPT | Mod: S$GLB,,, | Performed by: FAMILY MEDICINE

## 2024-05-02 PROCEDURE — 3288F FALL RISK ASSESSMENT DOCD: CPT | Mod: CPTII,S$GLB,, | Performed by: FAMILY MEDICINE

## 2024-05-02 PROCEDURE — 99999 PR PBB SHADOW E&M-EST. PATIENT-LVL III: CPT | Mod: PBBFAC,,, | Performed by: FAMILY MEDICINE

## 2024-05-02 PROCEDURE — 1101F PT FALLS ASSESS-DOCD LE1/YR: CPT | Mod: CPTII,S$GLB,, | Performed by: FAMILY MEDICINE

## 2024-05-02 PROCEDURE — 1157F ADVNC CARE PLAN IN RCRD: CPT | Mod: CPTII,S$GLB,, | Performed by: FAMILY MEDICINE

## 2024-05-02 PROCEDURE — 1159F MED LIST DOCD IN RCRD: CPT | Mod: CPTII,S$GLB,, | Performed by: FAMILY MEDICINE

## 2024-05-02 RX ORDER — PANTOPRAZOLE SODIUM 40 MG/1
40 TABLET, DELAYED RELEASE ORAL
Qty: 90 TABLET | Refills: 0 | Status: SHIPPED | OUTPATIENT
Start: 2024-05-02 | End: 2025-05-02

## 2024-05-02 RX ORDER — LEVOCETIRIZINE DIHYDROCHLORIDE 5 MG/1
5 TABLET, FILM COATED ORAL NIGHTLY
Qty: 30 TABLET | Refills: 0 | Status: SHIPPED | OUTPATIENT
Start: 2024-05-02 | End: 2025-05-02

## 2024-05-02 NOTE — PROGRESS NOTES
Subjective     Patient ID: Vince Martinez is a 89 y.o. male.    Chief Complaint: Diabetes    89 years old male came to the clinic for diabetes follow-up.  Last A1c was elevated.  Patient is willing to try medicine to control better the sugar.  For his age the A1c needs to be less than 8 no strictly 7 or less.  Patient with hoarseness for the last month associated with reflux and using Flonase nasal treatment.  No fever or chills.  Blood pressure was stable.  Patient was evaluated by the cardiologist yesterday .No chest pain, palpitation, orthopnea or PND.  Patient with seasonal allergies.  Patient with decreased kidney function but stable in comparison previous reports.  Chronic Anemia present.    Diabetes  He presents for his follow-up diabetic visit. He has type 2 diabetes mellitus. No MedicAlert identification noted. His disease course has been worsening. There are no hypoglycemic associated symptoms. Pertinent negatives for hypoglycemia include no headaches or sweats. Pertinent negatives for diabetes include no blurred vision, no chest pain, no fatigue, no foot paresthesias, no foot ulcerations, no polydipsia, no polyphagia, no polyuria, no visual change, no weakness and no weight loss. There are no hypoglycemic complications. Symptoms are worsening. Diabetic complications include nephropathy. Pertinent negatives for diabetic complications include no CVA. Risk factors for coronary artery disease include male sex, sedentary lifestyle, hypertension and diabetes mellitus. Current diabetic treatment includes oral agent (dual therapy). He is compliant with treatment all of the time. He is following a generally healthy diet. When asked about meal planning, he reported none. He has not had a previous visit with a dietitian. He never participates in exercise. An ACE inhibitor/angiotensin II receptor blocker is not being taken. He does not see a podiatrist.Eye exam is not current.   Hypertension  This is a chronic  problem. The current episode started more than 1 year ago. The problem is unchanged. Pertinent negatives include no anxiety, blurred vision, chest pain, headaches, orthopnea, palpitations, peripheral edema, PND, shortness of breath or sweats. There are no associated agents to hypertension. Risk factors for coronary artery disease include male gender, diabetes mellitus and sedentary lifestyle. Past treatments include calcium channel blockers and diuretics. The current treatment provides significant improvement. There are no compliance problems.  Hypertensive end-organ damage includes kidney disease. There is no history of CVA. Identifiable causes of hypertension include chronic renal disease. There is no history of a hypertension causing med.     Review of Systems   Constitutional: Negative.  Negative for fatigue, fever and weight loss.   HENT:  Positive for nasal congestion, postnasal drip, rhinorrhea and voice change.    Eyes: Negative.  Negative for blurred vision.   Respiratory: Negative.  Negative for shortness of breath.    Cardiovascular: Negative.  Negative for chest pain, palpitations, orthopnea, PND and claudication.   Gastrointestinal: Negative.    Endocrine: Negative for polydipsia, polyphagia and polyuria.   Genitourinary: Negative.    Musculoskeletal: Negative.    Integumentary:  Negative.   Neurological: Negative.  Negative for weakness and headaches.   Psychiatric/Behavioral: Negative.            Objective     Physical Exam  Vitals and nursing note reviewed.   Constitutional:       General: He is not in acute distress.     Appearance: He is well-developed. He is not diaphoretic.   HENT:      Head: Normocephalic and atraumatic.      Right Ear: External ear normal.      Left Ear: External ear normal.      Nose: Nose normal.      Mouth/Throat:      Pharynx: No oropharyngeal exudate.   Eyes:      General: No scleral icterus.        Right eye: No discharge.         Left eye: No discharge.       Conjunctiva/sclera: Conjunctivae normal.      Pupils: Pupils are equal, round, and reactive to light.   Neck:      Thyroid: No thyromegaly.      Vascular: No JVD.      Trachea: No tracheal deviation.   Cardiovascular:      Rate and Rhythm: Normal rate and regular rhythm.      Heart sounds: Normal heart sounds. No murmur heard.     No friction rub. No gallop.   Pulmonary:      Effort: Pulmonary effort is normal. No respiratory distress.      Breath sounds: Normal breath sounds. No stridor. No wheezing or rales.   Chest:      Chest wall: No tenderness.   Abdominal:      General: Bowel sounds are normal. There is no distension.      Palpations: Abdomen is soft. There is no mass.      Tenderness: There is no abdominal tenderness. There is no guarding or rebound.   Musculoskeletal:         General: No tenderness. Normal range of motion.      Cervical back: Normal range of motion and neck supple.   Lymphadenopathy:      Cervical: No cervical adenopathy.   Skin:     General: Skin is warm and dry.      Coloration: Skin is not pale.      Findings: No erythema or rash.   Neurological:      Mental Status: He is alert and oriented to person, place, and time.      Cranial Nerves: No cranial nerve deficit.      Motor: No abnormal muscle tone.      Coordination: Coordination normal.      Deep Tendon Reflexes: Reflexes are normal and symmetric. Reflexes normal.   Psychiatric:         Behavior: Behavior normal.         Thought Content: Thought content normal.         Judgment: Judgment normal.            Assessment and Plan     1. Essential hypertension  -     Lipid Panel; Future; Expected date: 05/02/2024  -     Comprehensive Metabolic Panel; Future; Expected date: 05/02/2024  -     CBC Auto Differential; Future; Expected date: 05/02/2024    2. Hoarseness  -     Ambulatory referral/consult to ENT; Future; Expected date: 05/09/2024  -     pantoprazole (PROTONIX) 40 MG tablet; Take 1 tablet (40 mg total) by mouth before breakfast.   Dispense: 90 tablet; Refill: 0    3. Type 2 diabetes mellitus with hyperglycemia, without long-term current use of insulin  -     DIABETIC SUPPLIES FOR HOME USE  -     empagliflozin (JARDIANCE) 10 mg tablet; Take 1 tablet (10 mg total) by mouth once daily.  Dispense: 90 tablet; Refill: 0  -     Microalbumin/Creatinine Ratio, Urine; Future; Expected date: 05/02/2024  -     Lipid Panel; Future; Expected date: 05/02/2024  -     Hemoglobin A1C; Future; Expected date: 05/02/2024  -     Comprehensive Metabolic Panel; Future; Expected date: 05/02/2024    4. Chronic disease anemia  -     CBC Auto Differential; Future; Expected date: 05/02/2024    5. Stage 3b chronic kidney disease  -     Comprehensive Metabolic Panel; Future; Expected date: 05/02/2024  -     CBC Auto Differential; Future; Expected date: 05/02/2024    6. Seasonal allergic rhinitis due to pollen  -     levocetirizine (XYZAL) 5 MG tablet; Take 1 tablet (5 mg total) by mouth every evening.  Dispense: 30 tablet; Refill: 0    7. Gastroesophageal reflux disease without esophagitis  -     pantoprazole (PROTONIX) 40 MG tablet; Take 1 tablet (40 mg total) by mouth before breakfast.  Dispense: 90 tablet; Refill: 0        Continue monitoring blood sugar at home,ADA diet.   Continue monitoring blood pressure at home, low sodium diet.   Decreased kidney function but stable in comparison with previous reports .  Increase fluid intake.  Avoid over-the-counter medicines like naproxen or ibuprofen.   Discontinue Flonase.         Follow up in about 3 months (around 8/2/2024), or if symptoms worsen or fail to improve.

## 2024-05-11 RX ORDER — LEVOTHYROXINE SODIUM 100 UG/1
100 TABLET ORAL
Qty: 90 TABLET | Refills: 1 | Status: SHIPPED | OUTPATIENT
Start: 2024-05-11

## 2024-05-11 NOTE — TELEPHONE ENCOUNTER
No care due was identified.  [facility] Embedded Care Due Messages. Reference number: [id number].   5/11/2024 9:29:35 AM CDT

## 2024-05-11 NOTE — TELEPHONE ENCOUNTER
Refill Decision Note   Vince Martinez  is requesting a refill authorization.  Brief Assessment and Rationale for Refill:  Approve     Medication Therapy Plan:         Comments:     Note composed:4:18 PM 05/11/2024

## 2024-05-18 DIAGNOSIS — F51.01 PRIMARY INSOMNIA: ICD-10-CM

## 2024-05-18 NOTE — TELEPHONE ENCOUNTER
No care due was identified.  [facility] Embedded Care Due Messages. Reference number: [id number].   5/18/2024 7:03:14 AM CDT

## 2024-05-19 RX ORDER — TRAZODONE HYDROCHLORIDE 50 MG/1
50 TABLET ORAL NIGHTLY
Qty: 90 TABLET | Refills: 3 | Status: SHIPPED | OUTPATIENT
Start: 2024-05-19

## 2024-05-20 NOTE — TELEPHONE ENCOUNTER
Refill Decision Note   Vince Martinez  is requesting a refill authorization.  Brief Assessment and Rationale for Refill:  Approve     Medication Therapy Plan:         Comments:     Note composed:7:07 PM 05/19/2024

## 2024-05-22 DIAGNOSIS — I10 ESSENTIAL HYPERTENSION: ICD-10-CM

## 2024-05-22 RX ORDER — METOPROLOL SUCCINATE 25 MG/1
25 TABLET, EXTENDED RELEASE ORAL
Qty: 90 TABLET | Refills: 0 | OUTPATIENT
Start: 2024-05-22

## 2024-05-22 NOTE — TELEPHONE ENCOUNTER
Refill Decision Note   Vince Martinez  is requesting a refill authorization.    Brief Assessment and Rationale for Refill:   Quick Discontinue       Medication Therapy Plan:   discontinued on 9/11/2023 by Irma Craft MD.      Comments:     Note composed:2:04 PM 05/22/2024

## 2024-05-22 NOTE — TELEPHONE ENCOUNTER
No care due was identified.  [facility] Embedded Care Due Messages. Reference number: [id number].   5/22/2024 7:02:24 AM CDT

## 2024-05-23 ENCOUNTER — TELEPHONE (OUTPATIENT)
Dept: OTOLARYNGOLOGY | Facility: CLINIC | Age: 89
End: 2024-05-23
Payer: MEDICARE

## 2024-05-23 NOTE — TELEPHONE ENCOUNTER
Spoke with patient and got him scheduled for 1:40pm patient soons really bad explained to him if it gets worst follow up with a ER visit  ----- Message from Kaylynn Hdz sent at 5/23/2024  1:59 PM CDT -----  Type:  Same Day Appointment Request    Caller is requesting a same day appointment.  Caller declined first available appointment listed below.    Name of Caller:pt  When is the first available appointment?july  Symptoms:hoarseness loss of voice   Best Call Back Number:[phone]  Additional Information:

## 2024-05-29 ENCOUNTER — OFFICE VISIT (OUTPATIENT)
Dept: OTOLARYNGOLOGY | Facility: CLINIC | Age: 89
End: 2024-05-29
Payer: MEDICARE

## 2024-05-29 VITALS
HEART RATE: 61 BPM | SYSTOLIC BLOOD PRESSURE: 129 MMHG | WEIGHT: 132.94 LBS | DIASTOLIC BLOOD PRESSURE: 47 MMHG | BODY MASS INDEX: 22.12 KG/M2

## 2024-05-29 DIAGNOSIS — J30.0 VASOMOTOR RHINITIS: ICD-10-CM

## 2024-05-29 DIAGNOSIS — R49.0 HOARSENESS: ICD-10-CM

## 2024-05-29 DIAGNOSIS — J34.2 NASAL SEPTAL DEVIATION: ICD-10-CM

## 2024-05-29 DIAGNOSIS — R49.0 DYSPHONIA: ICD-10-CM

## 2024-05-29 DIAGNOSIS — J31.0 CHRONIC RHINITIS: Primary | ICD-10-CM

## 2024-05-29 PROCEDURE — 1157F ADVNC CARE PLAN IN RCRD: CPT | Mod: CPTII,S$GLB,, | Performed by: OTOLARYNGOLOGY

## 2024-05-29 PROCEDURE — 3288F FALL RISK ASSESSMENT DOCD: CPT | Mod: CPTII,S$GLB,, | Performed by: OTOLARYNGOLOGY

## 2024-05-29 PROCEDURE — 31575 DIAGNOSTIC LARYNGOSCOPY: CPT | Mod: S$GLB,,, | Performed by: OTOLARYNGOLOGY

## 2024-05-29 PROCEDURE — 1160F RVW MEDS BY RX/DR IN RCRD: CPT | Mod: CPTII,S$GLB,, | Performed by: OTOLARYNGOLOGY

## 2024-05-29 PROCEDURE — 1125F AMNT PAIN NOTED PAIN PRSNT: CPT | Mod: CPTII,S$GLB,, | Performed by: OTOLARYNGOLOGY

## 2024-05-29 PROCEDURE — 99214 OFFICE O/P EST MOD 30 MIN: CPT | Mod: 25,S$GLB,, | Performed by: OTOLARYNGOLOGY

## 2024-05-29 PROCEDURE — 1101F PT FALLS ASSESS-DOCD LE1/YR: CPT | Mod: CPTII,S$GLB,, | Performed by: OTOLARYNGOLOGY

## 2024-05-29 PROCEDURE — 99999 PR PBB SHADOW E&M-EST. PATIENT-LVL III: CPT | Mod: PBBFAC,,, | Performed by: OTOLARYNGOLOGY

## 2024-05-29 PROCEDURE — 1159F MED LIST DOCD IN RCRD: CPT | Mod: CPTII,S$GLB,, | Performed by: OTOLARYNGOLOGY

## 2024-05-29 RX ORDER — IPRATROPIUM BROMIDE 21 UG/1
2 SPRAY, METERED NASAL 2 TIMES DAILY
Qty: 30 ML | Refills: 3 | Status: SHIPPED | OUTPATIENT
Start: 2024-05-29

## 2024-05-29 NOTE — PROGRESS NOTES
Chief Complaint   Patient presents with    Follow-up     No improvement voice. Also stated that nasal sprays are not working     Hoarse   Note: patient seen with professional [place]  via videoconferencing #[id number]          Interval HPI 5/29/2024:   Follow up visit. He states that his nose drains a lot when he eats.   He has been using nasal saline rinses, flonase, and ipratroprium TID.  He feels this has not been helpful. He feels the nasal sprays affects his voice negatively; increased hoarseness.         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    Arthritis     Bilateral renal cysts     Chronic pain     Diabetes mellitus     DJD (degenerative joint disease)     Hyperkalemia     Hypertension     Iron deficiency anemia     Low back pain     Osteopenia of neck of right femur     Prostate enlargement     Sleep apnea     Stage 3b chronic kidney disease     Thyroid disease      Social History     Socioeconomic History    Marital status:    Tobacco Use    Smoking status: Never    Smokeless tobacco: Never   Substance and Sexual Activity    Alcohol use: No    Drug use: No    Sexual activity: Not Currently     Partners: Female     Social Determinants of Health     Financial Resource Strain: Medium Risk (2/2/2022)    Overall Financial Resource Strain (CARDIA)     Difficulty of Paying Living Expenses: Somewhat hard   Food Insecurity: No Food Insecurity (2/2/2022)    Hunger Vital Sign     Worried About Running Out of Food in the Last Year: Never true     Ran Out of Food in the Last Year: Never true   Transportation Needs: No Transportation Needs (2/2/2022)    PRAPARE - Transportation     Lack of Transportation (Medical): No     Lack of Transportation (Non-Medical): No   Physical Activity: Inactive (2/2/2022)    Exercise Vital Sign     Days of Exercise per Week: 0 days     Minutes of Exercise per Session: 0 min   Stress: No Stress  Concern Present (2/2/2022)    Citizen of [place] of Occupational Health - Occupational Stress Questionnaire     Feeling of Stress : Not at all   Housing Stability: Low Risk  (2/2/2022)    Housing Stability Vital Sign     Unable to Pay for Housing in the Last Year: No     Number of Places Lived in the Last Year: 2     Unstable Housing in the Last Year: No     Past Surgical History:   Procedure Laterality Date    CARDIAC PACEMAKER PLACEMENT      EPIDURAL STEROID INJECTION INTO LUMBAR SPINE N/A 06/04/2020    Procedure: Injection-steroid-epidural-lumbar--L4-5;  Surgeon: Angelica Norris Jr., MD;  Location: [facility];  Service: Pain Management;  Laterality: N/A;  sign consent at DOS.    HEMILAMINECTOMY  05/26/2021    Procedure: HEMILAMINECTOMY;  Surgeon: Dayton Sparks MD;  Location: [facility] OR;  Service: Neurosurgery;;  left L4-5    INJECTION OF JOINT Bilateral 02/13/2020    Procedure: Injection, Joint, Bilateral GTB;  Surgeon: Angelica Norris Jr., MD;  Location: [facility];  Service: Pain Management;  Laterality: Bilateral;    INJECTION OF JOINT Bilateral 03/22/2022    Procedure: bilateral GTB steriod injection;  Surgeon: Angelica Norris Jr., MD;  Location: [facility];  Service: Pain Management;  Laterality: Bilateral;  pacemaker   pt is diabetic     INJECTION OF STEROID Bilateral 01/12/2021    Procedure: INJECTION, STEROID Bilateral SI Joint Block and Steroid Injection;  Surgeon: Dayton Sparks MD;  Location: [facility] OR;  Service: Neurosurgery;  Laterality: Bilateral;  Procedure: Bilateral SI Joint Block and Steroid Injection  Surgery 't'kristen: 45 min  LOS: 0  Anesthesia:MAC  Radiology: C-arm  Bed: Regular with Pillows  Position: Prone     SPINE SURGERY      SPINE SURGERY Bilateral     TRANSFORAMINAL EPIDURAL INJECTION OF STEROID Left 02/23/2021    Procedure: Injection,steroid,epidural,transforaminal approach--Left L4-5 *Has Pacemaker/ICD*;  Surgeon: Angelica Norris Jr., MD;  Location: [facility];   Service: Pain Management;  Laterality: Left;     Family History   Problem Relation Name Age of Onset    Diabetes Brother x4     No Known Problems Mother      No Known Problems Father      Diabetes Sister x1     No Known Problems Daughter x1     HIV Son x3            Review of Systems  General: negative for chills, fever or weight loss  Psychological: negative for mood changes or depression  Ophthalmic: negative for blurry vision, photophobia or eye pain  ENT: see HPI  Respiratory: no cough, shortness of breath, or wheezing  Cardiovascular: no chest pain or dyspnea on exertion  Gastrointestinal: no abdominal pain, change in bowel habits, or black/ bloody stools  Musculoskeletal: negative for gait disturbance or muscular weakness  Neurological: no syncope or seizures; no ataxia  Dermatological: negative for puritis,  rash and jaundice  Hematologic/lymphatic: no easy bruising, no new lumps or bumps      Physical Exam:    Vitals:    05/29/24 1334   BP: (!) 129/47   Pulse: 61         Constitutional: Well appearing / communicating without difficutly.  NAD.  Eyes: EOM I Bilaterally  Head/Face: Normocephalic.  Negative paranasal sinus pressure/tenderness.  Salivary glands WNL.  House Brackmann I Bilaterally.    Right Ear: Auricle normal appearance. External Auditory Canal within normal limits,TM w/o masses/lesions/perforations. TM mobility noted.   Left Ear: Auricle normal appearance. External Auditory Canal WNL,TM w/o masses/lesions/perforations. TM mobility noted.  Nose: +nasal septal deviation to the left. Inferior Turbinates 3+ bilaterally. No septal perforation. No masses/lesions. External nasal skin appears normal without masses/lesions.  Oral Cavity: Gingiva/lips within normal limits.  Dentition/gingiva healthy appearing. Mucus membranes moist. Floor of mouth soft, no masses palpated. Oral Tongue mobile. Hard Palate appears normal.    Oropharynx: Base of tongue appears normal. No masses/lesions noted. Tonsillar  fossa/pharyngeal wall without lesions. Posterior oropharynx WNL.  Soft palate without masses. Midline uvula.   Neck/Lymphatic: No LAD I-VI bilaterally.  No thyromegaly.  No masses noted on exam.    CT sinus 1/17/2023: images interpreted by me and discussed with patient in detail.     FINDINGS:  Frontal sinuses are clear.  Ethmoid sinuses are clear.  Mucosal membrane thickening of the base of the right maxillary sinus measuring up to 1.0 cm.  Possible underlying polyp or mucous retention cyst in the right maxillary antrum.  Mucosal membrane thickening of the base of the left maxillary sinus measuring up to 0.5 cm.  Sphenoid sinuses are clear.     Ostiomeatal units are patent.  Nasal cavity is unremarkable.     Limited intracranial evaluation is unremarkable.  Postoperative changes of the orbits.  Osseous structures are intact.    Assessment:    ICD-10-CM ICD-9-CM    1. Chronic rhinitis  J31.0 472.0       2. Nasal septal deviation  J34.2 470       3. Dysphonia  R49.0 784.42 Laryngoscopy      4. Hoarseness  R49.0 784.42 Ambulatory referral/consult to ENT      5. Vasomotor rhinitis  J30.0 477.9 Ambulatory referral/consult to ENT          The primary encounter diagnosis was Chronic rhinitis. Diagnoses of Nasal septal deviation, Dysphonia, Hoarseness, and Vasomotor rhinitis were also pertinent to this visit.      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 He did see laryngology in 2022.  I offered follow up evaluation but he declined.     Continue nasal saline rinses BID  Continue Flonase 2 spray per nostril daily  Continue  ipratropium 2 sprays per nostril BID  Continue xyzal 5mg PO daily  May use breath rite strips to nose nightly   Will refer to rhinology- patient with poor response to nasal sprays to control  vasomotor rhinitis- ?candidate for procedure to address posterior nasal nerve??      Billie Vo MD

## 2024-05-29 NOTE — PROCEDURES
Laryngoscopy    Date/Time: 5/29/2024 1:40 PM    Performed by: Billie Hinojosa MD  Authorized by: Billie Hinojosa MD    Consent Done?:  Yes (Verbal)  Anesthesia:     Local anesthetic:  Lidocaine 2% and Javi-Synephrine 1/2%  Laryngoscopy:     Areas examined:  Nasal cavities, nasopharynx, oropharynx, hypopharynx, larynx and vocal cords  Nose External:      No external nasal deformity  Nose Intranasal:      Mucosa no polyps     Mucosa ulcers not present     No mucosa lesions present     No septum gross deformity     Turbinates not enlarged  Nasopharynx:      No mucosa lesions     Adenoids not present     Posterior choanae patent     Eustachian tube patent  Larynx/hypopharynx:      No epiglottis lesions     No epiglottis edema     No AE folds lesions     No vocal cord polyps     Equal and normal bilateral     No hypopharynx lesions     No piriform sinus pooling     No piriform sinus lesions     No post cricoid edema     No post cricoid erythema     - bilateral vocal fold bowing with sulcus with inconsistent right vocal fold paresis  - no pooling

## 2024-05-31 DIAGNOSIS — I10 ESSENTIAL HYPERTENSION: ICD-10-CM

## 2024-05-31 RX ORDER — METOPROLOL SUCCINATE 25 MG/1
25 TABLET, EXTENDED RELEASE ORAL
Qty: 90 TABLET | Refills: 0 | OUTPATIENT
Start: 2024-05-31

## 2024-05-31 NOTE — TELEPHONE ENCOUNTER
Refill Decision Note   Vince Martinez  is requesting a refill authorization.  Brief Assessment and Rationale for Refill:  Quick Discontinue     Medication Therapy Plan: Metoprolol dc'd on 9/11/23      Comments:     Note composed:11:38 AM 05/31/2024

## 2024-05-31 NOTE — TELEPHONE ENCOUNTER
No care due was identified.  [facility] Embedded Care Due Messages. Reference number: [id number].   5/31/2024 8:21:30 AM CDT

## 2024-06-04 DIAGNOSIS — I10 ESSENTIAL HYPERTENSION: ICD-10-CM

## 2024-06-04 RX ORDER — METOPROLOL SUCCINATE 25 MG/1
25 TABLET, EXTENDED RELEASE ORAL
Qty: 90 TABLET | Refills: 0 | OUTPATIENT
Start: 2024-06-04

## 2024-06-04 NOTE — TELEPHONE ENCOUNTER
No care due was identified.  Health [facility] Embedded Care Due Messages. Reference number: [id number].   6/04/2024 9:06:30 AM CDT

## 2024-06-04 NOTE — TELEPHONE ENCOUNTER
Refill Decision Note   Vince Martinez  is requesting a refill authorization.  Brief Assessment and Rationale for Refill:  Quick Discontinue     Medication Therapy Plan:  discontinued on 9/11/2023 by Irma Craft MD      Comments:     Note composed:10:42 AM 06/04/2024             Appointments     Last Visit   5/2/2024 Shlomo Luong MD   Next Visit   8/6/2024 Shlomo Luong MD

## 2024-06-05 RX ORDER — METOPROLOL SUCCINATE 25 MG/1
25 TABLET, EXTENDED RELEASE ORAL
COMMUNITY
End: 2024-06-05 | Stop reason: SDUPTHER

## 2024-06-05 RX ORDER — METOPROLOL SUCCINATE 25 MG/1
25 TABLET, EXTENDED RELEASE ORAL DAILY
Qty: 90 TABLET | Refills: 3 | Status: SHIPPED | OUTPATIENT
Start: 2024-06-05 | End: 2025-06-05

## 2024-06-05 NOTE — TELEPHONE ENCOUNTER
No care due was identified.  [facility] Embedded Care Due Messages. Reference number: [id number].   6/05/2024 9:06:30 AM CDT   97

## 2024-06-10 ENCOUNTER — LAB VISIT (OUTPATIENT)
Dept: LAB | Facility: HOSPITAL | Age: 89
End: 2024-06-10
Attending: FAMILY MEDICINE
Payer: MEDICARE

## 2024-06-10 DIAGNOSIS — N18.32 ANEMIA IN STAGE 3B CHRONIC KIDNEY DISEASE: ICD-10-CM

## 2024-06-10 DIAGNOSIS — D63.1 ANEMIA IN STAGE 3B CHRONIC KIDNEY DISEASE: ICD-10-CM

## 2024-06-10 DIAGNOSIS — E11.65 TYPE 2 DIABETES MELLITUS WITH HYPERGLYCEMIA, WITHOUT LONG-TERM CURRENT USE OF INSULIN: ICD-10-CM

## 2024-06-10 DIAGNOSIS — N18.32 STAGE 3B CHRONIC KIDNEY DISEASE: ICD-10-CM

## 2024-06-10 LAB
ALBUMIN SERPL BCP-MCNC: 3.9 G/DL (ref 3.5–5.2)
ANION GAP SERPL CALC-SCNC: 12 MMOL/L (ref 8–16)
BACTERIA #/AREA URNS HPF: NORMAL /HPF
BASOPHILS # BLD AUTO: 0.04 K/UL (ref 0–0.2)
BASOPHILS NFR BLD: 0.7 % (ref 0–1.9)
BILIRUB UR QL STRIP: NEGATIVE
BUN SERPL-MCNC: 34 MG/DL (ref 10–30)
CALCIUM SERPL-MCNC: 9.9 MG/DL (ref 8.7–10.5)
CHLORIDE SERPL-SCNC: 105 MMOL/L (ref 95–110)
CLARITY UR: CLEAR
CO2 SERPL-SCNC: 24 MMOL/L (ref 23–29)
COLOR UR: YELLOW
CREAT SERPL-MCNC: 1.5 MG/DL (ref 0.5–1.4)
CREAT UR-MCNC: 39.6 MG/DL (ref 23–375)
DIFFERENTIAL METHOD BLD: ABNORMAL
EOSINOPHIL # BLD AUTO: 0.3 K/UL (ref 0–0.5)
EOSINOPHIL NFR BLD: 5.2 % (ref 0–8)
ERYTHROCYTE [DISTWIDTH] IN BLOOD BY AUTOMATED COUNT: 13.5 % (ref 11.5–14.5)
EST. GFR  (NO RACE VARIABLE): 43 ML/MIN/1.73 M^2
ESTIMATED AVG GLUCOSE: 203 MG/DL (ref 68–131)
FERRITIN SERPL-MCNC: 204 NG/ML (ref 20–300)
GLUCOSE SERPL-MCNC: 166 MG/DL (ref 70–110)
GLUCOSE UR QL STRIP: ABNORMAL
HBA1C MFR BLD: 8.7 % (ref 4–5.6)
HCT VFR BLD AUTO: 39 % (ref 40–54)
HGB BLD-MCNC: 13 G/DL (ref 14–18)
HGB UR QL STRIP: NEGATIVE
IMM GRANULOCYTES # BLD AUTO: 0.01 K/UL (ref 0–0.04)
IMM GRANULOCYTES NFR BLD AUTO: 0.2 % (ref 0–0.5)
IRON SERPL-MCNC: 89 UG/DL (ref 45–160)
KETONES UR QL STRIP: NEGATIVE
LEUKOCYTE ESTERASE UR QL STRIP: NEGATIVE
LYMPHOCYTES # BLD AUTO: 2.7 K/UL (ref 1–4.8)
LYMPHOCYTES NFR BLD: 46.7 % (ref 18–48)
MAGNESIUM SERPL-MCNC: 2.2 MG/DL (ref 1.6–2.6)
MCH RBC QN AUTO: 32.7 PG (ref 27–31)
MCHC RBC AUTO-ENTMCNC: 33.3 G/DL (ref 32–36)
MCV RBC AUTO: 98 FL (ref 82–98)
MICROSCOPIC COMMENT: NORMAL
MONOCYTES # BLD AUTO: 0.5 K/UL (ref 0.3–1)
MONOCYTES NFR BLD: 7.8 % (ref 4–15)
NEUTROPHILS # BLD AUTO: 2.3 K/UL (ref 1.8–7.7)
NEUTROPHILS NFR BLD: 39.4 % (ref 38–73)
NITRITE UR QL STRIP: NEGATIVE
NRBC BLD-RTO: 0 /100 WBC
PH UR STRIP: 6 [PH] (ref 5–8)
PHOSPHATE SERPL-MCNC: 3.5 MG/DL (ref 2.7–4.5)
PLATELET # BLD AUTO: 148 K/UL (ref 150–450)
PMV BLD AUTO: 10.1 FL (ref 9.2–12.9)
POTASSIUM SERPL-SCNC: 4.4 MMOL/L (ref 3.5–5.1)
PROT UR QL STRIP: NEGATIVE
PROT UR-MCNC: 11 MG/DL (ref 0–15)
PROT/CREAT UR: 0.28 MG/G{CREAT} (ref 0–0.2)
PTH-INTACT SERPL-MCNC: 66.6 PG/ML (ref 9–77)
RBC # BLD AUTO: 3.97 M/UL (ref 4.6–6.2)
SATURATED IRON: 24 % (ref 20–50)
SODIUM SERPL-SCNC: 141 MMOL/L (ref 136–145)
SP GR UR STRIP: 1.01 (ref 1–1.03)
SQUAMOUS #/AREA URNS HPF: 0 /HPF
TOTAL IRON BINDING CAPACITY: 366 UG/DL (ref 250–450)
TRANSFERRIN SERPL-MCNC: 247 MG/DL (ref 200–375)
URATE SERPL-MCNC: 5.6 MG/DL (ref 3.4–7)
URN SPEC COLLECT METH UR: ABNORMAL
UROBILINOGEN UR STRIP-ACNC: NEGATIVE EU/DL
WBC # BLD AUTO: 5.74 K/UL (ref 3.9–12.7)
YEAST URNS QL MICRO: NORMAL

## 2024-06-10 PROCEDURE — 82728 ASSAY OF FERRITIN: CPT | Performed by: INTERNAL MEDICINE

## 2024-06-10 PROCEDURE — 81000 URINALYSIS NONAUTO W/SCOPE: CPT | Performed by: INTERNAL MEDICINE

## 2024-06-10 PROCEDURE — 85025 COMPLETE CBC W/AUTO DIFF WBC: CPT | Performed by: INTERNAL MEDICINE

## 2024-06-10 PROCEDURE — 83735 ASSAY OF MAGNESIUM: CPT | Performed by: INTERNAL MEDICINE

## 2024-06-10 PROCEDURE — 84550 ASSAY OF BLOOD/URIC ACID: CPT | Performed by: INTERNAL MEDICINE

## 2024-06-10 PROCEDURE — 83036 HEMOGLOBIN GLYCOSYLATED A1C: CPT | Performed by: INTERNAL MEDICINE

## 2024-06-10 PROCEDURE — 83970 ASSAY OF PARATHORMONE: CPT | Performed by: INTERNAL MEDICINE

## 2024-06-10 PROCEDURE — 80069 RENAL FUNCTION PANEL: CPT | Performed by: INTERNAL MEDICINE

## 2024-06-10 PROCEDURE — 36415 COLL VENOUS BLD VENIPUNCTURE: CPT | Performed by: INTERNAL MEDICINE

## 2024-06-10 PROCEDURE — 83540 ASSAY OF IRON: CPT | Performed by: INTERNAL MEDICINE

## 2024-06-10 PROCEDURE — 84156 ASSAY OF PROTEIN URINE: CPT | Performed by: INTERNAL MEDICINE

## 2024-06-12 RX ORDER — FINASTERIDE 5 MG/1
5 TABLET, FILM COATED ORAL
Qty: 90 TABLET | Refills: 0 | Status: SHIPPED | OUTPATIENT
Start: 2024-06-12

## 2024-06-25 DIAGNOSIS — N40.0 BENIGN PROSTATIC HYPERPLASIA, UNSPECIFIED WHETHER LOWER URINARY TRACT SYMPTOMS PRESENT: ICD-10-CM

## 2024-06-25 RX ORDER — TAMSULOSIN HYDROCHLORIDE 0.4 MG/1
1 CAPSULE ORAL
Qty: 90 CAPSULE | Refills: 0 | OUTPATIENT
Start: 2024-06-25

## 2024-06-25 NOTE — TELEPHONE ENCOUNTER
No care due was identified.  Health [facility] Embedded Care Due Messages. Reference number: [id number].   6/25/2024 7:02:51 AM CDT

## 2024-06-25 NOTE — TELEPHONE ENCOUNTER
Refill Decision Note   Vince Martinez  is requesting a refill authorization.  Brief Assessment and Rationale for Refill:  Quick Discontinue     Medication Therapy Plan:  Flomax 1 QD was Discontinued by: Anant Coats MD on 6/30/2023 and changed to 2 caps daily.      Comments:     Note composed:7:08 AM 06/25/2024

## 2024-07-01 DIAGNOSIS — N40.0 BENIGN PROSTATIC HYPERPLASIA, UNSPECIFIED WHETHER LOWER URINARY TRACT SYMPTOMS PRESENT: ICD-10-CM

## 2024-07-01 NOTE — TELEPHONE ENCOUNTER
No care due was identified.  [facility] Embedded Care Due Messages. Reference number: [id number].   7/01/2024 1:26:16 PM CDT

## 2024-07-01 NOTE — TELEPHONE ENCOUNTER
Refill Routing Note   Medication(s) are not appropriate for processing by [facility] for the following reason(s):        No active prescription written by provider    [facility] action(s):  Defer        Medication Therapy Plan: Last ordered: 1 year ago (6/30/2023) by Anant Coats MD      Appointments  past 12m or future 3m with PCP    Date Provider   Last Visit   5/2/2024 Shlomo Luong MD   Next Visit   8/6/2024 Shlomo Luong MD   ED visits in past 90 days: 0        Note composed:2:43 PM 07/01/2024

## 2024-07-03 ENCOUNTER — OFFICE VISIT (OUTPATIENT)
Dept: OTOLARYNGOLOGY | Facility: CLINIC | Age: 89
End: 2024-07-03
Payer: MEDICARE

## 2024-07-03 DIAGNOSIS — J31.0 CHRONIC RHINITIS: Primary | Chronic | ICD-10-CM

## 2024-07-03 DIAGNOSIS — R49.0 DYSPHONIA: Chronic | ICD-10-CM

## 2024-07-03 DIAGNOSIS — J30.0 VASOMOTOR RHINITIS: Chronic | ICD-10-CM

## 2024-07-03 DIAGNOSIS — J34.2 NASAL SEPTAL DEVIATION: ICD-10-CM

## 2024-07-03 DIAGNOSIS — J38.7 PRESBYLARYNX: Chronic | ICD-10-CM

## 2024-07-03 PROCEDURE — 99999 PR PBB SHADOW E&M-EST. PATIENT-LVL II: CPT | Mod: PBBFAC,,, | Performed by: OTOLARYNGOLOGY

## 2024-07-03 RX ORDER — TAMSULOSIN HYDROCHLORIDE 0.4 MG/1
0.8 CAPSULE ORAL DAILY
Qty: 180 CAPSULE | Refills: 3 | Status: SHIPPED | OUTPATIENT
Start: 2024-07-03

## 2024-07-03 NOTE — PROGRESS NOTES
Chief Complaint   Patient presents with    Follow-up   Pt offered ; however, he declined today.     Interval HPI 7/3/2024:  Follow up visit. He states that he has continued rhinorrhea/post-nasal drip.    He has been using nasal saline rinses, flonase, and ipratroprium TID.  He feels this has not been helpful. Referral to rhinology suggested at last visit but he did not seek this out. He feels the nasal sprays affects his voice negatively; increased hoarseness.  He reports his voice has been raspy and experiences trail off of voice when he speaks for longer periods of time. No throat pain.        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    Arthritis     Bilateral renal cysts     Chronic pain     Diabetes mellitus     DJD (degenerative joint disease)     Hyperkalemia     Hypertension     Iron deficiency anemia     Low back pain     Osteopenia of neck of right femur     Prostate enlargement     Sleep apnea     Stage 3b chronic kidney disease     Thyroid disease      Social History     Socioeconomic History    Marital status:    Tobacco Use    Smoking status: Never    Smokeless tobacco: Never   Substance and Sexual Activity    Alcohol use: No    Drug use: No    Sexual activity: Not Currently     Partners: Female     Social Determinants of Health     Financial Resource Strain: Medium Risk (2/2/2022)    Overall Financial Resource Strain (CARDIA)     Difficulty of Paying Living Expenses: Somewhat hard   Food Insecurity: No Food Insecurity (2/2/2022)    Hunger Vital Sign     Worried About Running Out of Food in the Last Year: Never true     Ran Out of Food in the Last Year: Never true   Transportation Needs: No Transportation Needs (2/2/2022)    PRAPARE - Transportation     Lack of Transportation (Medical): No     Lack of Transportation (Non-Medical): No   Physical Activity: Inactive (2/2/2022)    Exercise Vital Sign     Days  of Exercise per Week: 0 days     Minutes of Exercise per Session: 0 min   Stress: No Stress Concern Present (2/2/2022)    [employer] - Occupational Stress Questionnaire     Feeling of Stress : Not at all   Housing Stability: Low Risk  (2/2/2022)    Housing Stability Vital Sign     Unable to Pay for Housing in the Last Year: No     Number of Places Lived in the Last Year: 2     Unstable Housing in the Last Year: No     Past Surgical History:   Procedure Laterality Date    CARDIAC PACEMAKER PLACEMENT      EPIDURAL STEROID INJECTION INTO LUMBAR SPINE N/A 06/04/2020    Procedure: Injection-steroid-epidural-lumbar--L4-5;  Surgeon: Angelica Norris Jr., MD;  [place] [facility] [place] [facility] [place]  Service: Pain Management;  Laterality: N/A;  sign consent at DOS.    HEMILAMINECTOMY  05/26/2021    Procedure: HEMILAMINECTOMY;  Surgeon: Dayton Sparks MD;  Location: [facility] OR;  Service: Neurosurgery;;  left L4-5    INJECTION OF JOINT Bilateral 02/13/2020    Procedure: Injection, Joint, Bilateral GTB;  Surgeon: Angelica Norris Jr., MD;  Location: [facility] PAIN T;  Service: Pain Management;  Laterality: Bilateral;    INJECTION OF JOINT Bilateral 03/22/2022    Procedure: bilateral GTB steriod injection;  Surgeon: Angelica Norris Jr., MD;  Location: [facility];  Service: Pain Management;  Laterality: Bilateral;  pacemaker   pt is diabetic     INJECTION OF STEROID Bilateral 01/12/2021    Procedure: INJECTION, STEROID Bilateral SI Joint Block and Steroid Injection;  Surgeon: Dayton Sparks MD;  Location: [facility] OR;  Service: Neurosurgery;  Laterality: Bilateral;  Procedure: Bilateral SI Joint Block and Steroid Injection  Surgery 't'kristen: 45 min  LOS: 0  Anesthesia:MAC  Radiology: C-arm  Bed: Regular with Pillows  Position: Prone     SPINE SURGERY      SPINE SURGERY Bilateral     TRANSFORAMINAL EPIDURAL INJECTION OF STEROID Left 02/23/2021    Procedure: Injection,steroid,epidural,transforaminal approach--Left  L4-5 *Has Pacemaker/ICD*;  Surgeon: Angelica Norris Jr., MD;  Location: [facility];  Service: Pain Management;  Laterality: Left;     Family History   Problem Relation Name Age of Onset    Diabetes Brother x4     No Known Problems Mother      No Known Problems Father      Diabetes Sister x1     No Known Problems Daughter x1     HIV Son x3            Review of Systems  General: negative for chills, fever or weight loss  Psychological: negative for mood changes or depression  Ophthalmic: negative for blurry vision, photophobia or eye pain  ENT: see HPI  Respiratory: no cough, shortness of breath, or wheezing  Cardiovascular: no chest pain or dyspnea on exertion  Gastrointestinal: no abdominal pain, change in bowel habits, or black/ bloody stools  Musculoskeletal: negative for gait disturbance or muscular weakness  Neurological: no syncope or seizures; no ataxia  Dermatological: negative for puritis,  rash and jaundice  Hematologic/lymphatic: no easy bruising, no new lumps or bumps      Physical Exam:    There were no vitals filed for this visit.        Constitutional: Well appearing / communicating without difficutly.  NAD.  Eyes: EOM I Bilaterally  Head/Face: Normocephalic.  Negative paranasal sinus pressure/tenderness.  Salivary glands WNL.  House Brackmann I Bilaterally.    Right Ear: Auricle normal appearance. External Auditory Canal within normal limits,TM w/o masses/lesions/perforations. TM mobility noted.   Left Ear: Auricle normal appearance. External Auditory Canal WNL,TM w/o masses/lesions/perforations. TM mobility noted.  Nose: +nasal septal deviation to the left. Inferior Turbinates 3+ bilaterally. No septal perforation. No masses/lesions. External nasal skin appears normal without masses/lesions.  Oral Cavity: Gingiva/lips within normal limits.  Dentition/gingiva healthy appearing. Mucus membranes moist. Floor of mouth soft, no masses palpated. Oral Tongue mobile. Hard Palate appears normal.     Oropharynx: Base of tongue appears normal. No masses/lesions noted. Tonsillar fossa/pharyngeal wall without lesions. Posterior oropharynx WNL.  Soft palate without masses. Midline uvula.   Neck/Lymphatic: No LAD I-VI bilaterally.  No thyromegaly.  No masses noted on exam.    CT sinus 1/17/2023: images interpreted by me and discussed with patient in detail.     FINDINGS:  Frontal sinuses are clear.  Ethmoid sinuses are clear.  Mucosal membrane thickening of the base of the right maxillary sinus measuring up to 1.0 cm.  Possible underlying polyp or mucous retention cyst in the right maxillary antrum.  Mucosal membrane thickening of the base of the left maxillary sinus measuring up to 0.5 cm.  Sphenoid sinuses are clear.     Ostiomeatal units are patent.  Nasal cavity is unremarkable.     Limited intracranial evaluation is unremarkable.  Postoperative changes of the orbits.  Osseous structures are intact.    Assessment:    ICD-10-CM ICD-9-CM    1. Chronic rhinitis  J31.0 472.0       2. Nasal septal deviation  J34.2 470       3. 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 Vasomotor rhinitis  J30.0 477.9             The primary encounter diagnosis was Chronic rhinitis. Diagnoses of Nasal septal deviation, Dysphonia, Presbylarynx, and Vasomotor rhinitis were also pertinent to this visit.      Plan:  No orders of the defined types were placed in this encounter.    We reviewed the findings from his recent scope examination and that the findings are consistent with presbylarynx and right vocal fold paresis. He did see laryngology in 2022.  I offered follow up evaluation but he declined.     Start budesonide nasal saline rinses  Continue  ipratropium 2 sprays per nostril BID  Continue xyzal 5mg PO daily  May use breath rite strips to nose nightly   Patient referred to rhinology at last visit but has not had evaluation. I will put in referral again. Patient with poor response to nasal sprays to  control vasomotor rhinitis- ?candidate for procedure to address posterior nasal nerve; although NSD may preclude this option?      Billie Vo MD

## 2024-07-03 NOTE — PROCEDURES
Laryngoscopy    Date/Time: 7/3/2024 10:00 AM    Performed by: Billie Hinojosa MD  Authorized by: Billie Hinojosa MD    Consent Done?:  Yes (Verbal)  Anesthesia:     Local anesthetic:  Lidocaine 2% and Javi-Synephrine 1/2%  Laryngoscopy:     Areas examined:  Nasal cavities, nasopharynx, oropharynx, hypopharynx, larynx and vocal cords  Nose External:      No external nasal deformity  Nose Intranasal:      Mucosa no polyps     Mucosa ulcers not present     No mucosa lesions present     No septum gross deformity     Turbinates not enlarged  Nasopharynx:      No mucosa lesions     Adenoids not present     Posterior choanae patent     Eustachian tube patent  Larynx/hypopharynx:      No epiglottis lesions     No epiglottis edema     No AE folds lesions     No vocal cord polyps     Equal and normal bilateral     No hypopharynx lesions     No piriform sinus pooling     No piriform sinus lesions     No post cricoid edema     No post cricoid erythema      bilateral vocal fold bowing with sulcus with inconsistent right vocal fold paresis; no pooling. No new findings.

## 2024-08-06 ENCOUNTER — LAB VISIT (OUTPATIENT)
Dept: LAB | Facility: HOSPITAL | Age: 89
End: 2024-08-06
Attending: FAMILY MEDICINE
Payer: MEDICARE

## 2024-08-06 DIAGNOSIS — E11.65 TYPE 2 DIABETES MELLITUS WITH HYPERGLYCEMIA, WITHOUT LONG-TERM CURRENT USE OF INSULIN: ICD-10-CM

## 2024-08-06 DIAGNOSIS — E03.9 HYPOTHYROIDISM, UNSPECIFIED TYPE: ICD-10-CM

## 2024-08-06 DIAGNOSIS — I10 ESSENTIAL HYPERTENSION: ICD-10-CM

## 2024-08-06 DIAGNOSIS — N18.32 STAGE 3B CHRONIC KIDNEY DISEASE: ICD-10-CM

## 2024-08-06 LAB
ALBUMIN SERPL BCP-MCNC: 3.8 G/DL (ref 3.5–5.2)
ALBUMIN SERPL BCP-MCNC: 3.8 G/DL (ref 3.5–5.2)
ALBUMIN/CREAT UR: 77.4 UG/MG (ref 0–30)
ALP SERPL-CCNC: 67 U/L (ref 55–135)
ALT SERPL W/O P-5'-P-CCNC: 68 U/L (ref 10–44)
ANION GAP SERPL CALC-SCNC: 11 MMOL/L (ref 8–16)
ANION GAP SERPL CALC-SCNC: 11 MMOL/L (ref 8–16)
AST SERPL-CCNC: 77 U/L (ref 10–40)
BASOPHILS # BLD AUTO: 0.08 K/UL (ref 0–0.2)
BASOPHILS NFR BLD: 1.4 % (ref 0–1.9)
BILIRUB SERPL-MCNC: 0.5 MG/DL (ref 0.1–1)
BILIRUB UR QL STRIP: NEGATIVE
BUN SERPL-MCNC: 30 MG/DL (ref 10–30)
BUN SERPL-MCNC: 30 MG/DL (ref 10–30)
CALCIUM SERPL-MCNC: 9.3 MG/DL (ref 8.7–10.5)
CALCIUM SERPL-MCNC: 9.3 MG/DL (ref 8.7–10.5)
CHLORIDE SERPL-SCNC: 104 MMOL/L (ref 95–110)
CHLORIDE SERPL-SCNC: 104 MMOL/L (ref 95–110)
CHOLEST SERPL-MCNC: 128 MG/DL (ref 120–199)
CHOLEST/HDLC SERPL: 2.6 {RATIO} (ref 2–5)
CLARITY UR REFRACT.AUTO: CLEAR
CO2 SERPL-SCNC: 24 MMOL/L (ref 23–29)
CO2 SERPL-SCNC: 24 MMOL/L (ref 23–29)
COLOR UR AUTO: YELLOW
CREAT SERPL-MCNC: 1.2 MG/DL (ref 0.5–1.4)
CREAT SERPL-MCNC: 1.2 MG/DL (ref 0.5–1.4)
CREAT UR-MCNC: 62 MG/DL (ref 23–375)
CREAT UR-MCNC: 62 MG/DL (ref 23–375)
DIFFERENTIAL METHOD BLD: ABNORMAL
EOSINOPHIL # BLD AUTO: 0.5 K/UL (ref 0–0.5)
EOSINOPHIL NFR BLD: 9.2 % (ref 0–8)
ERYTHROCYTE [DISTWIDTH] IN BLOOD BY AUTOMATED COUNT: 13.1 % (ref 11.5–14.5)
EST. GFR  (NO RACE VARIABLE): 56.4 ML/MIN/1.73 M^2
EST. GFR  (NO RACE VARIABLE): 56.4 ML/MIN/1.73 M^2
ESTIMATED AVG GLUCOSE: 169 MG/DL (ref 68–131)
ESTIMATED AVG GLUCOSE: 169 MG/DL (ref 68–131)
GLUCOSE SERPL-MCNC: 127 MG/DL (ref 70–110)
GLUCOSE SERPL-MCNC: 127 MG/DL (ref 70–110)
GLUCOSE UR QL STRIP: NEGATIVE
HBA1C MFR BLD: 7.5 % (ref 4–5.6)
HBA1C MFR BLD: 7.5 % (ref 4–5.6)
HCT VFR BLD AUTO: 37.1 % (ref 40–54)
HDLC SERPL-MCNC: 49 MG/DL (ref 40–75)
HDLC SERPL: 38.3 % (ref 20–50)
HGB BLD-MCNC: 12.1 G/DL (ref 14–18)
HGB UR QL STRIP: NEGATIVE
IMM GRANULOCYTES # BLD AUTO: 0.01 K/UL (ref 0–0.04)
IMM GRANULOCYTES NFR BLD AUTO: 0.2 % (ref 0–0.5)
KETONES UR QL STRIP: NEGATIVE
LDLC SERPL CALC-MCNC: 56 MG/DL (ref 63–159)
LEUKOCYTE ESTERASE UR QL STRIP: NEGATIVE
LYMPHOCYTES # BLD AUTO: 2.9 K/UL (ref 1–4.8)
LYMPHOCYTES NFR BLD: 49.9 % (ref 18–48)
MAGNESIUM SERPL-MCNC: 2.1 MG/DL (ref 1.6–2.6)
MCH RBC QN AUTO: 33.2 PG (ref 27–31)
MCHC RBC AUTO-ENTMCNC: 32.6 G/DL (ref 32–36)
MCV RBC AUTO: 102 FL (ref 82–98)
MICROALBUMIN UR DL<=1MG/L-MCNC: 48 UG/ML
MONOCYTES # BLD AUTO: 0.6 K/UL (ref 0.3–1)
MONOCYTES NFR BLD: 11.2 % (ref 4–15)
NEUTROPHILS # BLD AUTO: 1.6 K/UL (ref 1.8–7.7)
NEUTROPHILS NFR BLD: 28.1 % (ref 38–73)
NITRITE UR QL STRIP: NEGATIVE
NONHDLC SERPL-MCNC: 79 MG/DL
NRBC BLD-RTO: 0 /100 WBC
PH UR STRIP: 7 [PH] (ref 5–8)
PHOSPHATE SERPL-MCNC: 3.1 MG/DL (ref 2.7–4.5)
PLATELET # BLD AUTO: 134 K/UL (ref 150–450)
PMV BLD AUTO: 11.2 FL (ref 9.2–12.9)
POTASSIUM SERPL-SCNC: 4.3 MMOL/L (ref 3.5–5.1)
POTASSIUM SERPL-SCNC: 4.3 MMOL/L (ref 3.5–5.1)
PROT SERPL-MCNC: 7.4 G/DL (ref 6–8.4)
PROT UR QL STRIP: NEGATIVE
PROT UR-MCNC: 11 MG/DL (ref 0–15)
PROT/CREAT UR: 0.18 MG/G{CREAT} (ref 0–0.2)
PTH-INTACT SERPL-MCNC: 43.8 PG/ML (ref 9–77)
RBC # BLD AUTO: 3.64 M/UL (ref 4.6–6.2)
SODIUM SERPL-SCNC: 139 MMOL/L (ref 136–145)
SODIUM SERPL-SCNC: 139 MMOL/L (ref 136–145)
SP GR UR STRIP: 1.01 (ref 1–1.03)
TRIGL SERPL-MCNC: 115 MG/DL (ref 30–150)
TSH SERPL DL<=0.005 MIU/L-ACNC: 3.07 UIU/ML (ref 0.4–4)
URATE SERPL-MCNC: 5.4 MG/DL (ref 3.4–7)
URN SPEC COLLECT METH UR: NORMAL
WBC # BLD AUTO: 5.73 K/UL (ref 3.9–12.7)

## 2024-08-06 PROCEDURE — 84100 ASSAY OF PHOSPHORUS: CPT | Performed by: INTERNAL MEDICINE

## 2024-08-06 PROCEDURE — 80061 LIPID PANEL: CPT | Performed by: FAMILY MEDICINE

## 2024-08-06 PROCEDURE — 36415 COLL VENOUS BLD VENIPUNCTURE: CPT | Mod: PO | Performed by: FAMILY MEDICINE

## 2024-08-06 PROCEDURE — 82043 UR ALBUMIN QUANTITATIVE: CPT | Performed by: INTERNAL MEDICINE

## 2024-08-06 PROCEDURE — 80053 COMPREHEN METABOLIC PANEL: CPT | Performed by: FAMILY MEDICINE

## 2024-08-06 PROCEDURE — 85025 COMPLETE CBC W/AUTO DIFF WBC: CPT | Performed by: INTERNAL MEDICINE

## 2024-08-06 PROCEDURE — 83735 ASSAY OF MAGNESIUM: CPT | Performed by: INTERNAL MEDICINE

## 2024-08-06 PROCEDURE — 82570 ASSAY OF URINE CREATININE: CPT | Performed by: INTERNAL MEDICINE

## 2024-08-06 PROCEDURE — 81003 URINALYSIS AUTO W/O SCOPE: CPT | Performed by: INTERNAL MEDICINE

## 2024-08-06 PROCEDURE — 83036 HEMOGLOBIN GLYCOSYLATED A1C: CPT | Performed by: FAMILY MEDICINE

## 2024-08-06 PROCEDURE — 84443 ASSAY THYROID STIM HORMONE: CPT | Performed by: FAMILY MEDICINE

## 2024-08-06 PROCEDURE — 80069 RENAL FUNCTION PANEL: CPT | Performed by: INTERNAL MEDICINE

## 2024-08-06 PROCEDURE — 84156 ASSAY OF PROTEIN URINE: CPT | Performed by: INTERNAL MEDICINE

## 2024-08-06 PROCEDURE — 84550 ASSAY OF BLOOD/URIC ACID: CPT | Performed by: INTERNAL MEDICINE

## 2024-08-06 PROCEDURE — 83970 ASSAY OF PARATHORMONE: CPT | Performed by: INTERNAL MEDICINE

## 2024-08-12 ENCOUNTER — LAB VISIT (OUTPATIENT)
Dept: LAB | Facility: HOSPITAL | Age: 89
End: 2024-08-12
Attending: INTERNAL MEDICINE
Payer: MEDICARE

## 2024-08-12 DIAGNOSIS — I10 ESSENTIAL HYPERTENSION: ICD-10-CM

## 2024-08-12 DIAGNOSIS — E11.65 TYPE 2 DIABETES MELLITUS WITH HYPERGLYCEMIA, WITHOUT LONG-TERM CURRENT USE OF INSULIN: ICD-10-CM

## 2024-08-12 DIAGNOSIS — N18.32 STAGE 3B CHRONIC KIDNEY DISEASE: ICD-10-CM

## 2024-08-12 DIAGNOSIS — D63.8 CHRONIC DISEASE ANEMIA: ICD-10-CM

## 2024-08-12 LAB
BASOPHILS # BLD AUTO: 0.05 K/UL (ref 0–0.2)
BASOPHILS NFR BLD: 1 % (ref 0–1.9)
BILIRUB UR QL STRIP: NEGATIVE
CLARITY UR: CLEAR
COLOR UR: YELLOW
CREAT UR-MCNC: 46.4 MG/DL (ref 23–375)
DIFFERENTIAL METHOD BLD: ABNORMAL
EOSINOPHIL # BLD AUTO: 0.4 K/UL (ref 0–0.5)
EOSINOPHIL NFR BLD: 8.3 % (ref 0–8)
ERYTHROCYTE [DISTWIDTH] IN BLOOD BY AUTOMATED COUNT: 12.6 % (ref 11.5–14.5)
GLUCOSE UR QL STRIP: NEGATIVE
HCT VFR BLD AUTO: 36 % (ref 40–54)
HGB BLD-MCNC: 11.8 G/DL (ref 14–18)
HGB UR QL STRIP: NEGATIVE
IMM GRANULOCYTES # BLD AUTO: 0.01 K/UL (ref 0–0.04)
IMM GRANULOCYTES NFR BLD AUTO: 0.2 % (ref 0–0.5)
KETONES UR QL STRIP: NEGATIVE
LEUKOCYTE ESTERASE UR QL STRIP: NEGATIVE
LYMPHOCYTES # BLD AUTO: 2.4 K/UL (ref 1–4.8)
LYMPHOCYTES NFR BLD: 49.2 % (ref 18–48)
MCH RBC QN AUTO: 33 PG (ref 27–31)
MCHC RBC AUTO-ENTMCNC: 32.8 G/DL (ref 32–36)
MCV RBC AUTO: 101 FL (ref 82–98)
MONOCYTES # BLD AUTO: 0.5 K/UL (ref 0.3–1)
MONOCYTES NFR BLD: 9.7 % (ref 4–15)
NEUTROPHILS # BLD AUTO: 1.6 K/UL (ref 1.8–7.7)
NEUTROPHILS NFR BLD: 31.6 % (ref 38–73)
NITRITE UR QL STRIP: NEGATIVE
NRBC BLD-RTO: 0 /100 WBC
PH UR STRIP: 6 [PH] (ref 5–8)
PLATELET # BLD AUTO: 128 K/UL (ref 150–450)
PMV BLD AUTO: 10.8 FL (ref 9.2–12.9)
PROT UR QL STRIP: NEGATIVE
PROT UR-MCNC: <7 MG/DL (ref 0–15)
PROT/CREAT UR: NORMAL MG/G{CREAT} (ref 0–0.2)
RBC # BLD AUTO: 3.58 M/UL (ref 4.6–6.2)
SP GR UR STRIP: 1.01 (ref 1–1.03)
URN SPEC COLLECT METH UR: NORMAL
UROBILINOGEN UR STRIP-ACNC: NEGATIVE EU/DL
WBC # BLD AUTO: 4.96 K/UL (ref 3.9–12.7)

## 2024-08-12 PROCEDURE — 84156 ASSAY OF PROTEIN URINE: CPT | Performed by: INTERNAL MEDICINE

## 2024-08-12 PROCEDURE — 36415 COLL VENOUS BLD VENIPUNCTURE: CPT | Performed by: FAMILY MEDICINE

## 2024-08-12 PROCEDURE — 85025 COMPLETE CBC W/AUTO DIFF WBC: CPT | Performed by: FAMILY MEDICINE

## 2024-08-12 PROCEDURE — 81003 URINALYSIS AUTO W/O SCOPE: CPT | Performed by: INTERNAL MEDICINE

## 2024-08-26 ENCOUNTER — OFFICE VISIT (OUTPATIENT)
Dept: FAMILY MEDICINE | Facility: CLINIC | Age: 89
End: 2024-08-26
Payer: MEDICARE

## 2024-08-26 VITALS
HEART RATE: 80 BPM | OXYGEN SATURATION: 100 % | HEIGHT: 65 IN | DIASTOLIC BLOOD PRESSURE: 56 MMHG | WEIGHT: 143.94 LBS | BODY MASS INDEX: 23.98 KG/M2 | SYSTOLIC BLOOD PRESSURE: 140 MMHG

## 2024-08-26 DIAGNOSIS — D69.6 THROMBOCYTOPENIA, UNSPECIFIED: ICD-10-CM

## 2024-08-26 DIAGNOSIS — N18.31 STAGE 3A CHRONIC KIDNEY DISEASE: ICD-10-CM

## 2024-08-26 DIAGNOSIS — I50.42 CHRONIC COMBINED SYSTOLIC AND DIASTOLIC CHF (CONGESTIVE HEART FAILURE): ICD-10-CM

## 2024-08-26 DIAGNOSIS — M46.1 SACROILIITIS, NOT ELSEWHERE CLASSIFIED: ICD-10-CM

## 2024-08-26 DIAGNOSIS — R49.0 HOARSENESS: ICD-10-CM

## 2024-08-26 DIAGNOSIS — G31.9 DEGENERATIVE DISEASE OF NERVOUS SYSTEM, UNSPECIFIED: ICD-10-CM

## 2024-08-26 DIAGNOSIS — K64.9 HEMORRHOIDS, UNSPECIFIED HEMORRHOID TYPE: Primary | ICD-10-CM

## 2024-08-26 DIAGNOSIS — K92.1 HEMATOCHEZIA: ICD-10-CM

## 2024-08-26 DIAGNOSIS — K21.9 GASTROESOPHAGEAL REFLUX DISEASE WITHOUT ESOPHAGITIS: ICD-10-CM

## 2024-08-26 DIAGNOSIS — D63.8 CHRONIC DISEASE ANEMIA: ICD-10-CM

## 2024-08-26 DIAGNOSIS — J84.10 CALCIFIED GRANULOMA OF LUNG: ICD-10-CM

## 2024-08-26 DIAGNOSIS — I49.5 SICK SINUS SYNDROME: ICD-10-CM

## 2024-08-26 PROCEDURE — 1159F MED LIST DOCD IN RCRD: CPT | Mod: CPTII,S$GLB,, | Performed by: FAMILY MEDICINE

## 2024-08-26 PROCEDURE — 3288F FALL RISK ASSESSMENT DOCD: CPT | Mod: CPTII,S$GLB,, | Performed by: FAMILY MEDICINE

## 2024-08-26 PROCEDURE — 99215 OFFICE O/P EST HI 40 MIN: CPT | Mod: 25,S$GLB,, | Performed by: FAMILY MEDICINE

## 2024-08-26 PROCEDURE — 1160F RVW MEDS BY RX/DR IN RCRD: CPT | Mod: CPTII,S$GLB,, | Performed by: FAMILY MEDICINE

## 2024-08-26 PROCEDURE — 1101F PT FALLS ASSESS-DOCD LE1/YR: CPT | Mod: CPTII,S$GLB,, | Performed by: FAMILY MEDICINE

## 2024-08-26 PROCEDURE — 99999 PR PBB SHADOW E&M-EST. PATIENT-LVL IV: CPT | Mod: PBBFAC,,, | Performed by: FAMILY MEDICINE

## 2024-08-26 PROCEDURE — 1157F ADVNC CARE PLAN IN RCRD: CPT | Mod: CPTII,S$GLB,, | Performed by: FAMILY MEDICINE

## 2024-08-26 PROCEDURE — 1125F AMNT PAIN NOTED PAIN PRSNT: CPT | Mod: CPTII,S$GLB,, | Performed by: FAMILY MEDICINE

## 2024-08-26 PROCEDURE — 96372 THER/PROPH/DIAG INJ SC/IM: CPT | Mod: S$GLB,,, | Performed by: FAMILY MEDICINE

## 2024-08-26 RX ORDER — TRIAMCINOLONE ACETONIDE 40 MG/ML
40 INJECTION, SUSPENSION INTRA-ARTICULAR; INTRAMUSCULAR
Status: COMPLETED | OUTPATIENT
Start: 2024-08-26 | End: 2024-08-26

## 2024-08-26 RX ORDER — PREGABALIN 50 MG/1
50 CAPSULE ORAL DAILY
Qty: 90 CAPSULE | Refills: 0 | Status: SHIPPED | OUTPATIENT
Start: 2024-08-26

## 2024-08-26 RX ORDER — PANTOPRAZOLE SODIUM 40 MG/1
40 TABLET, DELAYED RELEASE ORAL
Qty: 90 TABLET | Refills: 0 | Status: SHIPPED | OUTPATIENT
Start: 2024-08-26 | End: 2025-08-26

## 2024-08-26 RX ORDER — PREGABALIN 50 MG/1
50 CAPSULE ORAL DAILY
COMMUNITY
Start: 2024-06-13 | End: 2024-08-26 | Stop reason: SDUPTHER

## 2024-08-26 RX ORDER — PREGABALIN 50 MG/1
50 CAPSULE ORAL DAILY
Status: CANCELLED | OUTPATIENT
Start: 2024-08-26

## 2024-08-26 RX ADMIN — TRIAMCINOLONE ACETONIDE 40 MG: 40 INJECTION, SUSPENSION INTRA-ARTICULAR; INTRAMUSCULAR at 11:08

## 2024-08-26 NOTE — TELEPHONE ENCOUNTER
No care due was identified.  Health [facility] Embedded Care Due Messages. Reference number: [id number].   8/26/2024 11:02:11 AM CDT

## 2024-08-26 NOTE — PROGRESS NOTES
Subjective     Patient ID: Vince Martinez is a 89 y.o. male.    Chief Complaint: Back Pain and Hip Pain    89 years old male came to the clinic with hoarseness associated with reflux.  Patient with follow-up with ENT.  Patient with chronic back pain associated with hip and lower extremity symptoms.  Patient is using Lyrica with partial improvement of the symptoms.  He reports previously diagnosed as of hemorrhoids.  He was not using a topical medicine because was expensive.  Patient is bleeding at least once every day.  Patient with anemia associated with thrombocytopenia but stable in comparison with previous reports.  Patient with chronic kidney disease with no significant changes.  Patient was previously diagnosed with calcified granuloma no significant symptoms.    Back Pain  This is a chronic problem. The current episode started more than 1 year ago. The problem is unchanged. The pain is present in the lumbar spine and sacro-iliac. The quality of the pain is described as aching. The pain is at a severity of 6/10. The pain is moderate. The pain is The same all the time. The symptoms are aggravated by position. Stiffness is present All day. Associated symptoms include weakness. Pertinent negatives include no chest pain, numbness or tingling. Risk factors include lack of exercise.   Hip Pain   The incident occurred more than 1 week ago. The incident occurred at home. There was no injury mechanism. The pain is present in the right hip and left hip. The quality of the pain is described as aching. The pain is at a severity of 7/10. The pain is moderate. The pain has been Intermittent since onset. Associated symptoms include muscle weakness. Pertinent negatives include no numbness or tingling. The symptoms are aggravated by movement and palpation. He has tried nothing for the symptoms. The treatment provided no relief.   Gastroesophageal Reflux  He complains of a hoarse voice. He reports no belching, no chest pain or  no sore throat. This is a chronic problem. The current episode started more than 1 year ago. The problem occurs frequently. The problem has been unchanged. Associated symptoms include anemia, fatigue and muscle weakness. Risk factors include lack of exercise. He has tried nothing for the symptoms. The treatment provided no relief.     Review of Systems   Constitutional:  Positive for fatigue.   HENT:  Positive for hoarse voice. Negative for sore throat.    Eyes: Negative.    Respiratory: Negative.     Cardiovascular: Negative.  Negative for chest pain.   Gastrointestinal:  Positive for blood in stool, constipation and reflux. Negative for rectal pain.   Genitourinary: Negative.    Musculoskeletal:  Positive for back pain and muscle weakness.   Integumentary:  Negative.   Neurological:  Positive for weakness. Negative for tingling and numbness.   Psychiatric/Behavioral: Negative.            Objective     Physical Exam  Vitals and nursing note reviewed.   Constitutional:       General: He is not in acute distress.     Appearance: He is well-developed. He is not diaphoretic.   HENT:      Head: Normocephalic and atraumatic.      Right Ear: External ear normal.      Left Ear: External ear normal.      Nose: Nose normal.      Mouth/Throat:      Pharynx: No oropharyngeal exudate.   Eyes:      General: No scleral icterus.        Right eye: No discharge.         Left eye: No discharge.      Conjunctiva/sclera: Conjunctivae normal.      Pupils: Pupils are equal, round, and reactive to light.   Neck:      Thyroid: No thyromegaly.      Vascular: No JVD.      Trachea: No tracheal deviation.   Cardiovascular:      Rate and Rhythm: Normal rate and regular rhythm.      Heart sounds: Normal heart sounds. No murmur heard.     No friction rub. No gallop.   Pulmonary:      Effort: Pulmonary effort is normal. No respiratory distress.      Breath sounds: Normal breath sounds. No stridor. No wheezing or rales.   Chest:      Chest wall: No  tenderness.   Abdominal:      General: Bowel sounds are normal. There is no distension.      Palpations: Abdomen is soft. There is no mass.      Tenderness: There is no abdominal tenderness. There is no guarding or rebound.   Musculoskeletal:         General: Normal range of motion.      Cervical back: Normal range of motion and neck supple.      Lumbar back: Tenderness present.      Right hip: Tenderness present.      Left hip: Tenderness present.      Right lower leg: Tenderness present.      Left lower leg: Tenderness present.   Lymphadenopathy:      Cervical: No cervical adenopathy.   Skin:     General: Skin is warm and dry.      Coloration: Skin is not pale.      Findings: No erythema or rash.   Neurological:      Mental Status: He is alert and oriented to person, place, and time.      Cranial Nerves: No cranial nerve deficit.      Motor: Weakness present. No abnormal muscle tone.      Coordination: Coordination abnormal.      Gait: Gait abnormal.      Deep Tendon Reflexes: Reflexes are normal and symmetric. Reflexes normal.   Psychiatric:         Mood and Affect: Mood is anxious.         Behavior: Behavior normal.         Thought Content: Thought content normal.         Judgment: Judgment normal.            Assessment and Plan     1. Hemorrhoids, unspecified hemorrhoid type    2. Calcified granuloma of lung  Overview:  As seen on CT imaging on 6/19/2020      3. Chronic combined systolic and diastolic CHF (congestive heart failure)    4. Sick sinus syndrome    5. Sacroiliitis, not elsewhere classified  -     triamcinolone acetonide injection 40 mg  -     pregabalin (LYRICA) 50 MG capsule; Take 1 capsule (50 mg total) by mouth once daily.  Dispense: 90 capsule; Refill: 0    6. Thrombocytopenia, unspecified    7. Degenerative disease of nervous system, unspecified  -     pregabalin (LYRICA) 50 MG capsule; Take 1 capsule (50 mg total) by mouth once daily.  Dispense: 90 capsule; Refill: 0    8. Hematochezia  -      Ambulatory referral/consult to Colorectal Surgery; Future; Expected date: 09/02/2024    9. Stage 3a chronic kidney disease    10. Chronic disease anemia    11. Hoarseness  -     pantoprazole (PROTONIX) 40 MG tablet; Take 1 tablet (40 mg total) by mouth before breakfast.  Dispense: 90 tablet; Refill: 0  -     triamcinolone acetonide injection 40 mg    12. Gastroesophageal reflux disease without esophagitis  -     pantoprazole (PROTONIX) 40 MG tablet; Take 1 tablet (40 mg total) by mouth before breakfast.  Dispense: 90 tablet; Refill: 0        Decreased kidney function but stable in comparison with previous reports .  Increase fluid intake.  Avoid over-the-counter medicines like naproxen or ibuprofen.   I spent a total of 45 minutes on the day of the visit.This includes face to face time and non-face to face time preparing to see the patient (eg, review of tests), obtaining and/or reviewing separately obtained history, documenting clinical information in the electronic or other health record, independently interpreting results and communicating results to the patient/family/caregiver, or care coordinator.        Follow up in about 6 months (around 2/26/2025), or if symptoms worsen or fail to improve.

## 2024-08-27 ENCOUNTER — OFFICE VISIT (OUTPATIENT)
Dept: OTOLARYNGOLOGY | Facility: CLINIC | Age: 89
End: 2024-08-27
Payer: MEDICARE

## 2024-08-27 VITALS
BODY MASS INDEX: 23.66 KG/M2 | WEIGHT: 142.19 LBS | DIASTOLIC BLOOD PRESSURE: 64 MMHG | SYSTOLIC BLOOD PRESSURE: 176 MMHG | HEART RATE: 71 BPM

## 2024-08-27 DIAGNOSIS — J34.3 NASAL TURBINATE HYPERTROPHY: ICD-10-CM

## 2024-08-27 DIAGNOSIS — R49.0 DYSPHONIA: ICD-10-CM

## 2024-08-27 DIAGNOSIS — J30.0 VASOMOTOR RHINITIS: Primary | ICD-10-CM

## 2024-08-27 PROCEDURE — 1159F MED LIST DOCD IN RCRD: CPT | Mod: CPTII,S$GLB,, | Performed by: OTOLARYNGOLOGY

## 2024-08-27 PROCEDURE — 31231 NASAL ENDOSCOPY DX: CPT | Mod: S$GLB,,, | Performed by: OTOLARYNGOLOGY

## 2024-08-27 PROCEDURE — 99999 PR PBB SHADOW E&M-EST. PATIENT-LVL III: CPT | Mod: PBBFAC,,, | Performed by: OTOLARYNGOLOGY

## 2024-08-27 PROCEDURE — 1101F PT FALLS ASSESS-DOCD LE1/YR: CPT | Mod: CPTII,S$GLB,, | Performed by: OTOLARYNGOLOGY

## 2024-08-27 PROCEDURE — 1157F ADVNC CARE PLAN IN RCRD: CPT | Mod: CPTII,S$GLB,, | Performed by: OTOLARYNGOLOGY

## 2024-08-27 PROCEDURE — 1160F RVW MEDS BY RX/DR IN RCRD: CPT | Mod: CPTII,S$GLB,, | Performed by: OTOLARYNGOLOGY

## 2024-08-27 PROCEDURE — 99214 OFFICE O/P EST MOD 30 MIN: CPT | Mod: 25,S$GLB,, | Performed by: OTOLARYNGOLOGY

## 2024-08-27 PROCEDURE — 1126F AMNT PAIN NOTED NONE PRSNT: CPT | Mod: CPTII,S$GLB,, | Performed by: OTOLARYNGOLOGY

## 2024-08-27 PROCEDURE — 3288F FALL RISK ASSESSMENT DOCD: CPT | Mod: CPTII,S$GLB,, | Performed by: OTOLARYNGOLOGY

## 2024-08-27 RX ORDER — AZELASTINE 1 MG/ML
1 SPRAY, METERED NASAL 2 TIMES DAILY
Qty: 30 ML | Refills: 2 | Status: SHIPPED | OUTPATIENT
Start: 2024-08-27 | End: 2024-09-26

## 2024-08-27 NOTE — PROGRESS NOTES
Subjective:      Vince Martinez is a 89 y.o. male who was referred to me by Dr. Billie Blanco* in consultation for rhinorrhea.    He relates a long history for many years of perennial nasal dripping that is bilateral and mostly associated with meals.  He notes that his son also has the same problem.  He notes associated mild nasal congestion and mouth breathing at night.  He has not had relief with nasal sprays including ipratropium and flonase.  He denies postnasal drip, hyposmia or notable sinus infections.  He also describes dysphonia that is chronic but has worsened over the past 6 months.  He reportedly saw Dr. 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      Current sinonasal medications as above.  The last course of antibiotics was a long time ago.  He does not regularly use nasal decongestant sprays.    He does not recall previously having allergy testing .    He denies a history of asthma.    He denies a history of reflux symptoms.    He denies a diagnosis of obstructive sleep apnea.     He has not had sinonasal surgery.    He does not recall a prior history of nasal trauma.        %         Past Medical History  He has a past medical history of Acute combined systolic and diastolic CHF, NYHA class 3, Arthritis, Bilateral renal cysts, Chronic pain, Diabetes mellitus, DJD (degenerative joint disease), Hyperkalemia, Hypertension, Iron deficiency anemia, Low back pain, Osteopenia of neck of right femur, Prostate enlargement, Sleep apnea, Stage 3b chronic kidney disease, and Thyroid disease.    Past Surgical History  He has a past surgical history that includes Injection of joint (Bilateral, 02/13/2020); Epidural steroid injection into lumbar spine (N/A, 06/04/2020); Cardiac pacemaker placement; Injection of steroid (Bilateral, 01/12/2021); Transforaminal epidural injection of steroid (Left, 02/23/2021); Hemilaminectomy (05/26/2021); Spine surgery;  Injection of joint (Bilateral, 03/22/2022); and Spine surgery (Bilateral).    Family History  His family history includes Diabetes in his brother and sister; HIV in his son; No Known Problems in his daughter, father, and mother.    Social History  He reports that he has never smoked. He has never used smokeless tobacco. He reports that he does not drink alcohol and does not use drugs.    Allergies  He is allergic to bactrim [sulfamethoxazole-trimethoprim], ciprofloxacin, janumet [sitagliptin phos-metformin], and latex.    Medications   He has a current medication list which includes the following prescription(s): atorvastatin, betamethasone dipropionate, blood sugar diagnostic, calcium/d3/mag ox//carolyn/zn, cyproheptadine, diclofenac sodium, trulicity, empagliflozin, finasteride, fluticasone-salmeterol 250-50 mcg/dose, folic acid, folic acid/multivit-min/lutein, furosemide, ipratropium, lancets, levocetirizine, levothyroxine, melatonin, metoprolol succinate, fish oil-omega-3 fatty acids, pantoprazole, pregabalin, senna, tamsulosin, trazodone, aspirin, azelastine, blood-glucose meter, and lancing device.    Review of Systems     Constitutional: Negative for appetite change, chills, fatigue, fever and unexpected weight loss.      HENT: Positive for runny nose and stuffy nose.  Negative for ear discharge, ear infection, ear pain, facial swelling, hearing loss, mouth sores, nosebleeds, postnasal drip, ringing in the ears, sinus infection, sinus pressure, sore throat, tonsil infection, dental problems, trouble swallowing and voice change.      Eyes:  Negative for change in eyesight, eye drainage, eye itching and photophobia.     Respiratory:  Negative for cough, shortness of breath, sleep apnea, snoring and wheezing.      Cardiovascular:  Negative for chest pain, foot swelling, irregular heartbeat and swollen veins.     Gastrointestinal:  Negative for abdominal pain, acid reflux, constipation, diarrhea, heartburn and  vomiting.     Genitourinary: Negative for difficulty urinating, sexual problems and frequent urination.     Musc: Negative for aching joints, aching muscles, back pain and neck pain.     Skin: Negative for rash.     Allergy: Negative for food allergies and seasonal allergies.     Endocrine: Negative for cold intolerance and heat intolerance.      Neurological: Negative for dizziness, headaches, light-headedness, seizures and tremors.      Hematologic: Negative for bruises/bleeds easily and swollen glands.      Psychiatric: Negative for decreased concentration, depression, nervous/anxious and sleep disturbance.               Objective:     BP (!) 176/64 (BP Location: Right arm, Patient Position: Sitting, BP Method: Small (Automatic))   Pulse 71   Wt 64.5 kg (142 lb 3.2 oz)   BMI 23.66 kg/m²        Constitutional:   He appears well-developed. He is cooperative. Normal speech.  No hoarse voice.      Head:  Normocephalic. Salivary glands normal.  Facial strength is normal.      Ears:    Right Ear: No drainage or tenderness. Tympanic membrane is not perforated. Tympanic membrane mobility is normal. No middle ear effusion. No decreased hearing is noted.   Left Ear: No drainage or tenderness. Tympanic membrane is not perforated. Tympanic membrane mobility is normal.  No middle ear effusion. No decreased hearing is noted.     Nose:  Septal deviation present. No mucosal edema, rhinorrhea or polyps. No epistaxis. Turbinate hypertrophy.  Turbinates normal and no turbinate masses.  Right sinus exhibits no maxillary sinus tenderness and no frontal sinus tenderness. Left sinus exhibits no maxillary sinus tenderness and no frontal sinus tenderness.     Mouth/Throat  Oropharynx clear and moist without lesions or asymmetry and normal uvula midline. He does not have dentures. Normal dentition. No oral lesions or mucous membrane lesions. No oropharyngeal exudate or posterior oropharyngeal erythema. Mirror exam not performed due to  "patient tolerance.  Mirror exam not performed due to patient tolerance.      Neck:  Neck normal without thyromegaly masses, asymmetry, normal tracheal structure, crepitus, and tenderness, thyroid normal, trachea normal and no adenopathy. Normal range of motion present.     He has no cervical adenopathy.     Cardiovascular:    Regular rhythm.              Pulmonary/Chest:   Effort normal.     Psychiatric:   He has a normal mood and affect. His speech is normal and behavior is normal.     Neurological:   No cranial nerve deficit.     Skin:   No rash noted.       Procedure    Nasal endoscopy performed.  See procedure note.        Data Reviewed    WBC (K/uL)   Date Value   08/12/2024 4.96     Eosinophil % (%)   Date Value   08/12/2024 8.3 (H)     Eos # (K/uL)   Date Value   08/12/2024 0.4     Platelets (K/uL)   Date Value   08/12/2024 128 (L)     Glucose (mg/dL)   Date Value   08/06/2024 127 (H)   08/06/2024 127 (H)     No results found for: "IGE"    No sinus imaging available.       Assessment:     1. Vasomotor rhinitis    2. Dysphonia    3. Nasal turbinate hypertrophy         Plan:     I had a long discussion with the patient and his son  regarding his condition and the further workup and management options.  There is not a focal source of rhinorrhea nor nasal mucus and there is a significant left-sided septal deviation that precludes a transnasal procedure such as PNN.  I prescribed the daily use of azelastine spray to treat his nasal allergy.  If that does not give good results then consideration may also be given to off-label use of capsaicin spray.    Follow up if symptoms worsen or fail to improve.      "

## 2024-08-27 NOTE — LETTER
2024    Billie Hinojosa MD  [address]  [address]  [place] [address]         OTORHINOLARYNGOLOGY AND COMMUNICATION SCIENCES    System Chair  Rommel Travis MD      Carl Kauffman MD, MPH    Chair, [facility]  Qing Vidal MD    Head & Neck Surgical Oncology  Garret Jolly MD, Section Head  MD Laney Hall MD Issam Eid, MD Emily Kamen, MD Brian Moore, MD Maggie Brignac, APRN, FNP-C    Facial Plastic and Reconstructive Surgery  MD NIMCO Doe III, MD    Otology and Neurotology  Dixon Kee, MD Harrison Ribera, MD Norbert Montoya, MD Yasmin Salas, [facility], FNP-C    Rhinology and Sinus Surgery  MD Carl Hall MD, MPH  Corbin Roldan, PA-C    Otolaryngic Allergy  FAZAL Lopez, MD Qing Vidal, MD    Laryngology  Nura Hernandez MD    Sleep Surgery  FAZAL Lopez, MD Camden Stein, MD Merlene Coronel, MD    Pediatric Otolaryngology  Nancy An, MD Rommel Travis, MD SHELLEY Martinez, MD ASHLEY Saucedo, MD Radha Bonds, MD Anne-Marie Saucedo, PA-C  Gaby Obando, APRN, PNP-C    Comprehensive Otolaryngology  Ana Jackson, MD Dwight Mercado, MD Jeana Seymour, MD Quinn Solano, MD FAZAL Lopez, MD Paulette Hall, MD Winnie Bueno, MD Camden Stein, MD Merlene Coronel, MD Qing Vidal, MD Sarah Miguel, MD Norbert Vincent, MD Robbi Webb, MD Billie Vo, MD Omaira Yin, PA-C  Nora Palencia, APRN-CNP  Alisia Blanton APRN, FNP-C  Ainsley Cortez, FNP-C  Pato Presley, PA-C  Maria Dolores Dalal, APRN, FNP-C  Ruthie Canseco, PA-C  Loretta Leigh, PA-C    Director, Audiology  RADHA Huerta, CCC-A    Director, Speech-Language Pathology  Charla Estrada MA, CCC-SLP       Re:  Vince Martinez  :  10/19/1930    Dear Dr. Berhane Vo,    Thank you for referring your patient, Vince Martinez, to us for evaluation and treatment.  I have enclosed a copy of my clinic note  for your review and records.  If you have any questions please do not hesitate to contact our office.     Thank you for allowing me to participate in the care of your patient.    Sincerely,        Carl Kauffman MD, MPH, FACS    Director, Rhinology and Sinus Surgery  Department of Otorhinolaryngology  [facility]    Encl:  Progress note     [facility] [address] [place] [address]  phone [phone]  fax [phone]  [email or web address]

## 2024-08-27 NOTE — PROCEDURES
Nasal/sinus endoscopy    Date/Time: 8/27/2024 2:30 PM    Performed by: Carl Kauffman MD  Authorized by: Carl Kauffman MD    Anesthesia:     Local anesthetic:  Lidocaine 4% and Javi-Synephrine 1/2%    Patient tolerance:  Patient tolerated the procedure well with no immediate complications  Nose:     Procedure Performed:  Nasal Endoscopy  External:      No external nasal deformity  Intranasal:      Mucosa polyps     Mucosa ulcers not present     No mucosa lesions present     Enlarged turbinates     No septum gross deformity  Nasopharynx:      No mucosa lesions     Adenoids not present     Posterior choanae patent     Eustachian tube not patent     No focal nasal mucus or discharge  Leftward obstructive septal deviation

## 2024-08-28 ENCOUNTER — PATIENT MESSAGE (OUTPATIENT)
Dept: OTOLARYNGOLOGY | Facility: CLINIC | Age: 89
End: 2024-08-28
Payer: MEDICARE

## 2024-09-19 RX ORDER — LEVOTHYROXINE SODIUM 100 UG/1
100 TABLET ORAL
Qty: 90 TABLET | Refills: 3 | Status: SHIPPED | OUTPATIENT
Start: 2024-09-19

## 2024-09-19 NOTE — TELEPHONE ENCOUNTER
No care due was identified.  [facility] Embedded Care Due Messages. Reference number: [id number].   9/19/2024 7:04:17 AM CDT

## 2024-09-19 NOTE — TELEPHONE ENCOUNTER
Refill Decision Note   Vince Martinez  is requesting a refill authorization.  Brief Assessment and Rationale for Refill:  Approve     Medication Therapy Plan:        Comments:     Note composed:6:15 PM 09/19/2024

## 2024-10-01 ENCOUNTER — OFFICE VISIT (OUTPATIENT)
Dept: SURGERY | Facility: CLINIC | Age: 89
End: 2024-10-01
Payer: MEDICARE

## 2024-10-01 VITALS
HEIGHT: 65 IN | DIASTOLIC BLOOD PRESSURE: 79 MMHG | HEART RATE: 75 BPM | OXYGEN SATURATION: 98 % | SYSTOLIC BLOOD PRESSURE: 190 MMHG | WEIGHT: 139.75 LBS | BODY MASS INDEX: 23.28 KG/M2

## 2024-10-01 DIAGNOSIS — K92.1 HEMATOCHEZIA: ICD-10-CM

## 2024-10-01 DIAGNOSIS — K64.1 GRADE II HEMORRHOIDS: Primary | ICD-10-CM

## 2024-10-01 PROCEDURE — 3288F FALL RISK ASSESSMENT DOCD: CPT | Mod: CPTII,S$GLB,, | Performed by: COLON & RECTAL SURGERY

## 2024-10-01 PROCEDURE — 99999 PR PBB SHADOW E&M-EST. PATIENT-LVL III: CPT | Mod: PBBFAC,,, | Performed by: COLON & RECTAL SURGERY

## 2024-10-01 PROCEDURE — 1157F ADVNC CARE PLAN IN RCRD: CPT | Mod: CPTII,S$GLB,, | Performed by: COLON & RECTAL SURGERY

## 2024-10-01 PROCEDURE — 1159F MED LIST DOCD IN RCRD: CPT | Mod: CPTII,S$GLB,, | Performed by: COLON & RECTAL SURGERY

## 2024-10-01 PROCEDURE — 1101F PT FALLS ASSESS-DOCD LE1/YR: CPT | Mod: CPTII,S$GLB,, | Performed by: COLON & RECTAL SURGERY

## 2024-10-01 PROCEDURE — 1125F AMNT PAIN NOTED PAIN PRSNT: CPT | Mod: CPTII,S$GLB,, | Performed by: COLON & RECTAL SURGERY

## 2024-10-01 PROCEDURE — 99203 OFFICE O/P NEW LOW 30 MIN: CPT | Mod: 25,S$GLB,, | Performed by: COLON & RECTAL SURGERY

## 2024-10-01 PROCEDURE — 46221 LIGATION OF HEMORRHOID(S): CPT | Mod: S$GLB,,, | Performed by: COLON & RECTAL SURGERY

## 2024-10-01 PROCEDURE — 1160F RVW MEDS BY RX/DR IN RCRD: CPT | Mod: CPTII,S$GLB,, | Performed by: COLON & RECTAL SURGERY

## 2024-10-01 NOTE — PROGRESS NOTES
CRS Office Visit History and Physical    Referring Md:   Shlomo Conteh Md  [address]  [place] [address]    SUBJECTIVE:     Chief Complaint: hematochezia    History of Present Illness:  The patient is a new patient to this practice.   Course is as follows:  Patient is a 89 y.o. male presents with hematochezia  Presents with a six-month history of bright red blood per rectum mostly on the toilet paper.  Not mixed in with the stool.  He has 2 bowel movements per day.  Last colonoscopy was 10 years ago and was normal.  He has had a weight loss of 10 lb over the past several months.  No prior abdominal or anorectal surgeries.  Not on any blood thinners.    Last Colonoscopy:  Ten years ago per his report.  Normal.    Review of patient's allergies indicates:   Allergen Reactions    Bactrim [sulfamethoxazole-trimethoprim] Rash    Ciprofloxacin Rash    Janumet [sitagliptin phos-metformin] Nausea Only     Severe GERD    Latex Rash       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    Arthritis     Bilateral renal cysts     Chronic pain     Diabetes mellitus     DJD (degenerative joint disease)     Hyperkalemia     Hypertension     Iron deficiency anemia     Low back pain     Osteopenia of neck of right femur     Prostate enlargement     Sleep apnea     Stage 3b chronic kidney disease     Thyroid disease      Past Surgical History:   Procedure Laterality Date    CARDIAC PACEMAKER PLACEMENT      EPIDURAL STEROID INJECTION INTO LUMBAR SPINE N/A 06/04/2020    Procedure: Injection-steroid-epidural-lumbar--L4-5;  Surgeon: Angelica Norris Jr., MD;  Location: [facility] PAIN MGT;  Service: Pain Management;  Laterality: N/A;  sign consent at DOS.    HEMILAMINECTOMY  05/26/2021    Procedure: HEMILAMINECTOMY;  Surgeon: Dayton Sparks MD;  Location: [facility] OR;  Service: Neurosurgery;;  left L4-5    INJECTION OF JOINT Bilateral 02/13/2020    Procedure:  Injection, Joint, Bilateral GTB;  Surgeon: Angelica Norris Jr., MD;  [place] [facility] [place] [facility]  Service: Pain Management;  Laterality: Bilateral;    INJECTION OF JOINT Bilateral 03/22/2022    Procedure: bilateral GTB steriod injection;  Surgeon: Angelica Norris Jr., MD;  Location: [facility];  Service: Pain Management;  Laterality: Bilateral;  pacemaker   pt is diabetic     INJECTION OF STEROID Bilateral 01/12/2021    Procedure: INJECTION, STEROID Bilateral SI Joint Block and Steroid Injection;  Surgeon: Dayton Sparks MD;  Location: [facility] OR;  Service: Neurosurgery;  Laterality: Bilateral;  Procedure: Bilateral SI Joint Block and Steroid Injection  Surgery 't'kristen: 45 min  LOS: 0  Anesthesia:MAC  Radiology: C-arm  Bed: Regular with Pillows  Position: Prone     SPINE SURGERY      SPINE SURGERY Bilateral     TRANSFORAMINAL EPIDURAL INJECTION OF STEROID Left 02/23/2021    Procedure: Injection,steroid,epidural,transforaminal approach--Left L4-5 *Has Pacemaker/ICD*;  Surgeon: Angelica Norris Jr., MD;  Location: [facility];  Service: Pain Management;  Laterality: Left;     Family History   Problem Relation Name Age of Onset    Diabetes Brother x4     No Known Problems Mother      No Known Problems Father      Diabetes Sister x1     No Known Problems Daughter x1     HIV Son x3      Social History     Tobacco Use    Smoking status: Never    Smokeless tobacco: Never   Substance Use Topics    Alcohol use: No    Drug use: No        Review of Systems:  Review of Systems   Constitutional:  Positive for weight loss. Negative for chills, diaphoresis, fever and malaise/fatigue.   HENT:  Negative for congestion.    Respiratory:  Negative for shortness of breath.    Cardiovascular:  Negative for chest pain and leg swelling.   Gastrointestinal:  Positive for blood in stool. Negative for abdominal pain, constipation, nausea and vomiting.   Genitourinary:  Negative for dysuria.   Musculoskeletal:  Negative for back pain and  "myalgias.   Skin:  Negative for rash.   Neurological:  Negative for dizziness and weakness.   Endo/Heme/Allergies:  Does not bruise/bleed easily.   Psychiatric/Behavioral:  Negative for depression.        OBJECTIVE:     Vital Signs (Most Recent)  BP (!) 190/79 (BP Location: Right arm, Patient Position: Sitting)   Pulse 75   Ht 5' 5" (1.651 m)   Wt 63.4 kg (139 lb 12.4 oz)   SpO2 98%   BMI 23.26 kg/m²     Physical Exam:  General: White male in no distress   Neuro: alert and oriented x 4.  Moves all extremities.     HEENT: no icterus.  Trachea midline  Respiratory: respirations are even and unlabored  Cardiac: regular rate  Abdomen:  Soft, nontender, no masses  Extremities: Warm dry and intact  Skin: no rashes  Anorectal:  External exam was normal.  Digital exam performed with soft internal hemorrhoids palpated.  Detailed anoscopy performed with grade 2 friable hemorrhoids seen left lateral and right anterior with signs of prolapse and recent bleeding with overlying ulceration.    Labs: H&H 12 and 36. Alb 3.8.  Creatinine 1.2.    Imaging: no abdominal imaging.       ASSESSMENT/PLAN:     Vince was seen today for hemorrhoids, rectal bleeding and rectal pain.    Diagnoses and all orders for this visit:    Grade II hemorrhoids    Hematochezia  -     Ambulatory referral/consult to Colorectal Surgery        89 y.o. male with bleeding grade 2 internal hemorrhoids.  Banding was offered and performed today without complication.  Recommended taking MiraLax daily to assist in avoidance of constipation.  He will follow up with me if there is ongoing bleeding    Rubber Band Ligation:  Verbal consent was obtained.   Clear plastic anoscope inserted.    Grade II hemorrhoids seen on the left lateral and right anterior position  Suction applicator was placed above the dentate line.   Rubber bands were deployed in the left lateral and right anterior position.    0.25% marcaine was injected above the rubber band into the banded " hemorrhoidal tissue.   Patient tolerated the procedure well.       MARLENA Nick MD, FACS, FASCRS  Staff Surgeon  Colon & Rectal Surgery

## 2024-10-07 ENCOUNTER — TELEPHONE (OUTPATIENT)
Dept: SURGERY | Facility: CLINIC | Age: 89
End: 2024-10-07
Payer: MEDICARE

## 2024-10-07 NOTE — TELEPHONE ENCOUNTER
Pt's son says pt is still bleeding. Appt made for him to f/u next week. Offered this week but the son is unable to bring him,

## 2024-10-07 NOTE — TELEPHONE ENCOUNTER
----- Message from Elida sent at 10/7/2024 10:26 AM CDT -----  Contact: pt @ [phone]  Vince Martinez son calling regarding Patient Advice (message) for #is calling to let office know that pt is still bleeding and to figure out if that s normal asking for call back

## 2024-10-10 ENCOUNTER — TELEPHONE (OUTPATIENT)
Dept: SURGERY | Facility: CLINIC | Age: 89
End: 2024-10-10
Payer: MEDICARE

## 2024-10-10 ENCOUNTER — NURSE TRIAGE (OUTPATIENT)
Dept: ADMINISTRATIVE | Facility: CLINIC | Age: 89
End: 2024-10-10
Payer: MEDICARE

## 2024-10-10 NOTE — TELEPHONE ENCOUNTER
Called patient  with language line ( María, ID # [id number]). Patient states that he is having bleeding since the banding. States that the bleeding is worse than before, but denies clots, dizziness, light headedness. Explained that I will call him back once I speak to Dr. Nick. Patient verbalizes understanding.    1433- Called patient back with Language Line (  Genoveva, ID # [id number]). Explained to patient that bleeding will stop in a few days (per Dr. Nick) and that this is normal. Instructed to call back for any questions or concerns. Patient verbalizes understanding.

## 2024-10-10 NOTE — TELEPHONE ENCOUNTER
Reason for Disposition   Caller has NON-URGENT question and triager unable to answer question    Additional Information   Negative: Passed out (e.g., fainted, lost consciousness, blacked out and was not responding)   Negative: Shock suspected (e.g., cold/pale/clammy skin, too weak to stand, low BP, rapid pulse)   Negative: Vomiting red blood or black (coffee ground) material   Negative: Sounds like a life-threatening emergency to the triager   Negative: Sounds like a life-threatening emergency to the triager   Negative: Chest pain   Negative: Difficulty breathing   Negative: Bright red, wide-spread, sunburn-like rash   Negative: SEVERE headache (e.g., excruciating) and after spinal (epidural) anesthesia   Negative: Vomiting and persists > 4 hours   Negative: Drinking very little and dehydration suspected (e.g., no urine > 12 hours, very dry mouth, very lightheaded)   Negative: Vomiting and abdomen looks much more swollen than usual   Negative: Patient sounds very sick or weak to the triager   Negative: Fever > 100.4 F (38.0 C)   Negative: Caller has URGENT question and triager unable to answer question   Negative: SEVERE post-op pain (e.g., excruciating, pain scale 8-10) that is not controlled with pain medications   Negative: MILD - MODERATE headache (e.g., pain scale 1-7)  and after spinal (epidural) anesthesia   Negative: Fever present > 3 days (72 hours)   Negative: Patient wants to be seen   Negative: MILD TO MODERATE post-op pain (e.g., pain scale 1-7) that is not controlled with pain medications    Protocols used: Rectal Bleeding-A-OH, Post-Op Symptoms and Otxcradbz-T-BN  Spoke with pt via  Peggy ID [id number]. Pt states he's trying to reach Dr. Nick. States he had hemorrhoid surgery last week. States he keeps having drops of blood on tissue and in the toilet. Denies enough blood to turn the toilet water red. States he is concerned because he is also a diabetic. Denies any other symptoms.  Advised this message will go to the Provider and Office nurse to contact him today. Instructed to call OOC back if symptoms worsen. Pt verbalized understanding.

## 2024-10-11 ENCOUNTER — OFFICE VISIT (OUTPATIENT)
Dept: OTOLARYNGOLOGY | Facility: CLINIC | Age: 89
End: 2024-10-11
Payer: MEDICARE

## 2024-10-11 VITALS — SYSTOLIC BLOOD PRESSURE: 154 MMHG | DIASTOLIC BLOOD PRESSURE: 72 MMHG | HEART RATE: 65 BPM

## 2024-10-11 DIAGNOSIS — D14.1 LARYNGEAL PAPILLOMA: ICD-10-CM

## 2024-10-11 DIAGNOSIS — R49.0 DYSPHONIA: ICD-10-CM

## 2024-10-11 DIAGNOSIS — J38.01 UNILATERAL PARTIAL VOCAL FOLD PARALYSIS: Primary | ICD-10-CM

## 2024-10-11 PROCEDURE — 99999 PR PBB SHADOW E&M-EST. PATIENT-LVL III: CPT | Mod: PBBFAC,,, | Performed by: OTOLARYNGOLOGY

## 2024-10-11 NOTE — PROGRESS NOTES
[facility]  Department of Otorhinolaryngology and Communication Sciences    Subjective:      Vince Martinez is a 89 y.o. male who presents for follow-up.  I met him many years ago regarding fungal laryngitis which resolved with Diflucan    He returns today with his son.  Together, they report gradual onset of dysphonia beginning a couple of months ago, slowly progressing since that time.  He denies dysphagia, throat pain, odynophagia, otalgia, hemoptysis, hematemesis, neck mass.  He is a lifetime nonsmoker.    The patient's medications, allergies, past medical, surgical, social and family histories were reviewed and updated as appropriate.    A detailed review of systems was obtained with pertinent positives as per the above HPI, and otherwise negative.      Objective:     BP (!) 154/72   Pulse 65      Constitutional: comfortable, well dressed  Psychiatric: appropriate affect  Respiratory: comfortably breathing, symmetric chest rise, no stridor  Voice:  mild-moderate variable breathy strained  Head: normocephalic  Eyes: conjunctivae and sclerae clear  Indirect laryngoscopy is limited due to gag    Procedure  Flexible Laryngeal Videostroboscopy (73950): Laryngeal videostroboscopy is indicated to assess laryngeal vibratory biomechanics and vocal fold oscillation, which cannot be assessed with a plain light examination. This was carried out transnasally with a distal chip videoendoscope. After verbal consent was obtained, the patient was positioned and the nose was topically decongested with 1% phenylephrine and topically anesthetized with 4% lidocaine. The endoscope was passed through the most patent nasal cavity and positioned to image the nasopharynx, larynx, and hypopharynx in detail. The following features were examined: nasopharyngeal, laryngeal, hypopharyngeal masses; velopharyngeal strength, closure, and symmetry of motion; vocal fold range and symmetry of motion; laryngeal mucosal edema, erythema,  inflammation, and hydration; salivary pooling; and gross laryngeal sensation. During phonation, the vocal folds were assessed for glottal closure; mucosal wave; vocal fold lesions; vibratory periodicity, amplitude, and phase symmetry; and vertical height match. The equipment was removed. The patient tolerated the procedure well without complication. All findings were normal except:  - variable mild right vocal fold motion impairment with granulation at right vocal process  - sessile papillomatous change along posterior 2/3 of right true vocal fold, including medial arytenoid  - premature contact, irregular closure, impaired mucosal wave propagation bilaterally      Assessment:     Vince Martinez is a 89 y.o. male with mild right vocal fold motion impairment and right vocal fold epithelial irregularities.  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    I recommended that he complete a CT of the neck with contrast    I recommended that we proceed with biopsy of the lesion.  We discussed both microlaryngoscopy and office based approaches.  Hitting his age and overall comorbidities, together we decided that office based biopsy would be preferable.  I discussed with them the risks and benefits thereof.  We will arrange this in the coming weeks.    All questions were answered, and the patient is in agreement with the plan.    Nura Hernandez M.D.  [facility]  Department of Otorhinolaryngology and Communication Sciences

## 2024-10-15 ENCOUNTER — OFFICE VISIT (OUTPATIENT)
Dept: SURGERY | Facility: CLINIC | Age: 89
End: 2024-10-15
Payer: MEDICARE

## 2024-10-15 VITALS
DIASTOLIC BLOOD PRESSURE: 68 MMHG | BODY MASS INDEX: 23.36 KG/M2 | HEIGHT: 65 IN | SYSTOLIC BLOOD PRESSURE: 160 MMHG | WEIGHT: 140.19 LBS | HEART RATE: 68 BPM

## 2024-10-15 DIAGNOSIS — K62.5 RECTAL BLEEDING: Primary | ICD-10-CM

## 2024-10-15 PROCEDURE — 99999 PR PBB SHADOW E&M-EST. PATIENT-LVL III: CPT | Mod: PBBFAC,,, | Performed by: COLON & RECTAL SURGERY

## 2024-10-15 NOTE — PROGRESS NOTES
CRS Office Visit Followup    Referring Md:   No referring provider defined for this encounter.    SUBJECTIVE:     Chief Complaint: hematochezia    History of Present Illness:  Course is as follows:  Patient is a 89 y.o. male presents with hematochezia  Presents with a six-month history of bright red blood per rectum mostly on the toilet paper.  Not mixed in with the stool.  He has 2 bowel movements per day.  Last colonoscopy was 10 years ago and was normal.  He has had a weight loss of 10 lb over the past several months.  No prior abdominal or anorectal surgeries.  Not on any blood thinners.    10/1/24: Detailed anoscopy performed with grade 2 friable hemorrhoids seen left lateral and right anterior with signs of prolapse and recent bleeding with overlying ulceration.  Rubber band ligation done in clinic x 2    10/15/24:  He had bleeding post banding that has slowly improved over time.  He presents for evaluation today with the assistance of an .  He is having bowel movements twice per day.  Spending 30 minutes on the toilet for each bowel movement because he does not feel like he can completely evacuate.  He is taking MiraLax once per day.  Blood is mostly on the toilet paper or small droplets in the toilet    Last Colonoscopy:  Ten years ago per his report.  Normal.      Review of Systems:  Review of Systems   Constitutional:  Positive for weight loss. Negative for chills, diaphoresis, fever and malaise/fatigue.   HENT:  Negative for congestion.    Respiratory:  Negative for shortness of breath.    Cardiovascular:  Negative for chest pain and leg swelling.   Gastrointestinal:  Positive for blood in stool. Negative for abdominal pain, constipation, nausea and vomiting.   Genitourinary:  Negative for dysuria.   Musculoskeletal:  Negative for back pain and myalgias.   Skin:  Negative for rash.   Neurological:  Negative for dizziness and weakness.   Endo/Heme/Allergies:  Does not bruise/bleed easily.  "  Psychiatric/Behavioral:  Negative for depression.        OBJECTIVE:     Vital Signs (Most Recent)  BP (!) 160/68   Pulse 68   Ht 5' 5" (1.651 m)   Wt 63.6 kg (140 lb 3.4 oz)   BMI 23.33 kg/m²     Physical Exam:  General: White male in no distress   Neuro: alert and oriented x 4.  Moves all extremities.     HEENT: no icterus.  Trachea midline  Respiratory: respirations are even and unlabored  Cardiac: regular rate  Abdomen:  Soft, nontender, no masses  Extremities: Warm dry and intact  Skin: no rashes  Anorectal:  External exam was normal.  Digital exam performed with soft internal hemorrhoids palpated.  Detailed anoscopy performed with right anterior ulceration consistent with prior banding.  Otherwise healthy-appearing    Labs: H&H 12 and 36. Alb 3.8.  Creatinine 1.2.    Imaging: no abdominal imaging.       ASSESSMENT/PLAN:     Vince was seen today for rectal bleeding.    Diagnoses and all orders for this visit:    Rectal bleeding          89 y.o. male with bleeding grade 2 friable internal hemorrhoids.  Banding performed x 2 on 10/1/24.  He has a small anterior ulceration consistent with prior banding that is likely causing his bleeding.  Reassurance provided.  Recommended that he spend roughly 5 minutes on the toilet for each bowel movement rather than 30.  Continue with the MiraLax.  He can follow up with me with any issues.          MARLENA Nick MD, FACS, FASCRS  Staff Surgeon  Colon & Rectal Surgery        "

## 2024-10-16 ENCOUNTER — HOSPITAL ENCOUNTER (OUTPATIENT)
Dept: RADIOLOGY | Facility: HOSPITAL | Age: 89
Discharge: HOME | End: 2024-10-16
Attending: OTOLARYNGOLOGY
Payer: MEDICARE

## 2024-10-16 DIAGNOSIS — J38.01 UNILATERAL PARTIAL VOCAL FOLD PARALYSIS: ICD-10-CM

## 2024-10-16 DIAGNOSIS — R49.0 DYSPHONIA: ICD-10-CM

## 2024-10-16 LAB
CREAT SERPL-MCNC: 1.5 MG/DL (ref 0.5–1.4)
SAMPLE: ABNORMAL

## 2024-10-16 PROCEDURE — 25500020 PHARM REV CODE 255: Performed by: OTOLARYNGOLOGY

## 2024-10-16 PROCEDURE — 70491 CT SOFT TISSUE NECK W/DYE: CPT | Mod: TC

## 2024-10-16 PROCEDURE — 70491 CT SOFT TISSUE NECK W/DYE: CPT | Mod: 26,,, | Performed by: RADIOLOGY

## 2024-10-16 RX ADMIN — IOHEXOL 75 ML: 350 INJECTION, SOLUTION INTRAVENOUS at 08:10

## 2024-11-01 ENCOUNTER — PROCEDURE VISIT (OUTPATIENT)
Dept: OTOLARYNGOLOGY | Facility: CLINIC | Age: 89
End: 2024-11-01
Payer: MEDICARE

## 2024-11-01 VITALS — HEART RATE: 60 BPM | SYSTOLIC BLOOD PRESSURE: 127 MMHG | DIASTOLIC BLOOD PRESSURE: 66 MMHG

## 2024-11-01 DIAGNOSIS — D38.0 NEOPLASM OF UNCERTAIN BEHAVIOR OF LARYNX: ICD-10-CM

## 2024-11-01 DIAGNOSIS — J38.01 UNILATERAL PARTIAL VOCAL FOLD PARALYSIS: Primary | ICD-10-CM

## 2024-11-01 RX ORDER — AMLODIPINE BESYLATE 10 MG/1
10 TABLET ORAL DAILY
COMMUNITY
Start: 2024-05-20

## 2024-11-01 RX ORDER — KETOCONAZOLE 20 MG/G
60 CREAM TOPICAL DAILY
COMMUNITY
Start: 2024-10-29

## 2024-11-01 RX ORDER — LINACLOTIDE 72 UG/1
1 CAPSULE, GELATIN COATED ORAL EVERY MORNING
COMMUNITY
Start: 2024-10-02 | End: 2025-01-30

## 2024-11-01 RX ORDER — TRAMADOL HYDROCHLORIDE 50 MG/1
1 TABLET ORAL EVERY 12 HOURS PRN
COMMUNITY
Start: 2024-07-12

## 2024-11-11 ENCOUNTER — LAB VISIT (OUTPATIENT)
Dept: LAB | Facility: HOSPITAL | Age: 89
End: 2024-11-11
Attending: INTERNAL MEDICINE
Payer: MEDICARE

## 2024-11-11 DIAGNOSIS — E11.65 TYPE 2 DIABETES MELLITUS WITH HYPERGLYCEMIA, WITHOUT LONG-TERM CURRENT USE OF INSULIN: ICD-10-CM

## 2024-11-11 LAB
ALBUMIN/CREAT UR: 68.6 UG/MG (ref 0–30)
BILIRUB UR QL STRIP: NEGATIVE
CLARITY UR: CLEAR
COLOR UR: YELLOW
CREAT UR-MCNC: 90.4 MG/DL (ref 23–375)
CREAT UR-MCNC: 90.4 MG/DL (ref 23–375)
GLUCOSE UR QL STRIP: NEGATIVE
HGB UR QL STRIP: NEGATIVE
KETONES UR QL STRIP: NEGATIVE
LEUKOCYTE ESTERASE UR QL STRIP: NEGATIVE
MICROALBUMIN UR DL<=1MG/L-MCNC: 62 UG/ML
NITRITE UR QL STRIP: NEGATIVE
PH UR STRIP: 6 [PH] (ref 5–8)
PROT UR QL STRIP: NEGATIVE
PROT UR-MCNC: 14 MG/DL (ref 0–15)
PROT/CREAT UR: 0.15 MG/G{CREAT} (ref 0–0.2)
SP GR UR STRIP: 1.02 (ref 1–1.03)
URN SPEC COLLECT METH UR: NORMAL
UROBILINOGEN UR STRIP-ACNC: NEGATIVE EU/DL

## 2024-11-11 PROCEDURE — 84156 ASSAY OF PROTEIN URINE: CPT | Performed by: INTERNAL MEDICINE

## 2024-11-11 PROCEDURE — 81003 URINALYSIS AUTO W/O SCOPE: CPT | Performed by: INTERNAL MEDICINE

## 2024-11-11 PROCEDURE — 82043 UR ALBUMIN QUANTITATIVE: CPT | Performed by: INTERNAL MEDICINE

## 2024-11-15 ENCOUNTER — PROCEDURE VISIT (OUTPATIENT)
Dept: OTOLARYNGOLOGY | Facility: CLINIC | Age: 89
End: 2024-11-15
Payer: MEDICARE

## 2024-11-15 VITALS — DIASTOLIC BLOOD PRESSURE: 61 MMHG | SYSTOLIC BLOOD PRESSURE: 161 MMHG | HEART RATE: 65 BPM

## 2024-11-15 DIAGNOSIS — Q31.9 DYSPLASIA OF LARYNX: ICD-10-CM

## 2024-11-15 DIAGNOSIS — J38.01 UNILATERAL PARTIAL VOCAL FOLD PARALYSIS: ICD-10-CM

## 2024-11-15 DIAGNOSIS — R49.0 DYSPHONIA: Primary | ICD-10-CM

## 2024-11-15 NOTE — PROCEDURES
[facility]  Department of Otorhinolaryngology and Communication Sciences    Awake Laryngeal Procedure Operative Report    Preoperative Diagnosis: 1. Laryngeal dysplasia.  2.   Right vocal fold motion impairment  .    Postoperative Diagnosis: 1. Laryngeal dysplasia.  2.   Right vocal fold motion impairment .    Procedure: Transnasal magnified laryngoscopy with pulsed 532 nm KTP laser photoablation of bilateral vocal fold lesions.    Surgeon: Nura Hernandez M.D.     Estimated blood loss: None.    Anesthesia: Local and topical.    Complications: None.    Findings: Right vocal fold with sessile lesion along medial arytenoid and middle third right vocal fold. 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 He presents for awake office-based KTP laser treatment of the laryngeal mucosal abnormality. Risks of dl proecedure were reviewed with the patient, including but not limited to bleeding, infection, allergic reaction to the medication, scarring, worsening of voice, need for additional procedures, pain, epistaxis, laryngospasm, laser injury (vision loss, thermal injury), and airway obstruction which could necessitate need for intubation or tracheostomy. All questions were answered and the patient agreed to move forward with the procedure. Informed consent was obtained.    Procedure in detail: Vince Martinez was positively identified with two identifiers and was brought into the procedure suite. He was seated upright in a comfortable position. Final time-out was performed for verification purposes. The nasal cavity was topically decongested with 1% phenylephrine and topically anesthetized with 4% lidocaine. The distal chip digital videolaryngoscope was advanced through the patients most patent nasal cavity. The larynx was inspected under  maximal magnification, with findings as noted above.    Attention was turned toward anesthetizing the endolarynx, which was achieved with a total volume of 3 mL of  4% lidocaine administered in consecutive aliquots through the side port of the laryngoscope using the laryngeal gargle technique.    After an adequate degree of anesthesia was obtained, attention was turned to KTP laser treatment. The laryngoscope was temporarily removed. A 0.4 mm KTP laser fiber was advanced through the working channel of the laryngoscope. The fiber was connected to the unit. The patient and all personnel were equipped with the appropriate protective eyeware. The laryngoscope was reinserted through the nasal cavity and advanced into the endolarynx. The laser was activated with settings of  30 Joy, 15 msec pulses, 2 pulses per second. Under the guidance of magnified laryngoscopy, the pulsed KTP laser was used to treat the laryngeal abnormality. Appropriate treatment response was noted, with blanching and desiccation of the abnormal tissue. Once treated, the debris was cleared with the inline suction and inactive tip of the laser fiber. The equipment was removed. The patient tolerated the procedure well without complication. All instrument counts were correct at the completion of the case.    Attestation: As the attending of record, I was present and participated in all portions of this procedure.    Disposition: The patient was observed briefly before being discharged home in good condition. He was instructed to call the office or return to the emergency department should he develop a fever greater than 101 degrees F, increasing pain not relieved by over-the-counter medication, shortness of breath or noisy breathing, difficulty swallowing, swelling, bleeding, or any other concerns. Post-procedure instructions were provided and were reviewed with the patient and his daughter , who acknowledged understanding. He will follow up with me  for another treatment in about 2 months. All questions were answered, and the patient is in agreement with the above.    Nura Hernandez M.D.  [facility]  Department of Otorhinolaryngology and Communication Sciences

## 2024-11-20 ENCOUNTER — TELEPHONE (OUTPATIENT)
Dept: OTOLARYNGOLOGY | Facility: CLINIC | Age: 89
End: 2024-11-20
Payer: MEDICARE

## 2024-11-20 DIAGNOSIS — R49.0 HOARSENESS: ICD-10-CM

## 2024-11-20 DIAGNOSIS — K21.9 GASTROESOPHAGEAL REFLUX DISEASE WITHOUT ESOPHAGITIS: ICD-10-CM

## 2024-11-20 RX ORDER — PANTOPRAZOLE SODIUM 40 MG/1
40 TABLET, DELAYED RELEASE ORAL
Qty: 90 TABLET | Refills: 3 | Status: SHIPPED | OUTPATIENT
Start: 2024-11-20

## 2024-11-20 NOTE — TELEPHONE ENCOUNTER
Refill Decision Note   Vince Martinez  is requesting a refill authorization.    Brief Assessment and Rationale for Refill:   Approve       Medication Therapy Plan:         Comments:     Note composed:2:27 PM 11/20/2024

## 2024-11-20 NOTE — TELEPHONE ENCOUNTER
----- Message from Cindi sent at 11/20/2024 11:30 AM CST -----  Regarding: pt advice  Contact: [phone]  Pt granddaughter Toña is calling to speak with someone in provider office  in regards to the pts voice after surgery Toña would like to know if this is normal considering the pt just had surgery Toña is asking for a return call please call her at [phone]

## 2024-11-20 NOTE — TELEPHONE ENCOUNTER
No care due was identified.  [facility] Embedded Care Due Messages. Reference number: [id number].   11/20/2024 9:47:41 AM CST

## 2024-12-17 ENCOUNTER — TELEPHONE (OUTPATIENT)
Dept: SURGERY | Facility: CLINIC | Age: 89
End: 2024-12-17
Payer: MEDICARE

## 2024-12-17 NOTE — TELEPHONE ENCOUNTER
Left voicemail with call back number on granddaughter's cell phone regarding sooner appointment.    Spoke with patient and appointment scheduled for 1:00 pm on Thursday at [place] office. Details confirmed verbally over phone.

## 2024-12-19 ENCOUNTER — OFFICE VISIT (OUTPATIENT)
Dept: SURGERY | Facility: CLINIC | Age: 89
End: 2024-12-19
Payer: MEDICARE

## 2024-12-19 VITALS
DIASTOLIC BLOOD PRESSURE: 74 MMHG | BODY MASS INDEX: 23.36 KG/M2 | HEART RATE: 70 BPM | WEIGHT: 140.19 LBS | HEIGHT: 65 IN | SYSTOLIC BLOOD PRESSURE: 142 MMHG

## 2024-12-19 DIAGNOSIS — K64.0 GRADE I HEMORRHOIDS: Primary | ICD-10-CM

## 2024-12-19 PROCEDURE — 99999 PR PBB SHADOW E&M-EST. PATIENT-LVL V: CPT | Mod: PBBFAC,,, | Performed by: COLON & RECTAL SURGERY

## 2024-12-19 NOTE — PROGRESS NOTES
CRS Office Visit Followup    Referring Md:   No referring provider defined for this encounter.    SUBJECTIVE:     Chief Complaint: hematochezia    History of Present Illness:  Course is as follows:  Patient is a 89 y.o. male presents with hematochezia  Presents with a six-month history of bright red blood per rectum mostly on the toilet paper.  Not mixed in with the stool.  He has 2 bowel movements per day.  Last colonoscopy was 10 years ago and was normal.  He has had a weight loss of 10 lb over the past several months.  No prior abdominal or anorectal surgeries.  Not on any blood thinners.    10/1/24: Detailed anoscopy performed with grade 2 friable hemorrhoids seen left lateral and right anterior with signs of prolapse and recent bleeding with overlying ulceration.  Rubber band ligation done in clinic x 2    10/15/24:  He had bleeding post banding that has slowly improved over time.  He presents for evaluation today with the assistance of an .  He is having bowel movements twice per day.  Spending 30 minutes on the toilet for each bowel movement because he does not feel like he can completely evacuate.  He is taking MiraLax once per day.  Blood is mostly on the toilet paper or small droplets in the toilet   - He has a small anterior ulceration consistent with prior banding that is likely causing his bleeding.  12/19/24: presents with ongoing bleeding after bowel movements.  He is having two bowel movements per day and spending 5 minutes on the toilet.  No prolapse or mass.  He has bright red blood per rectum with his bowel movements on the outside of the BM without melena.  He does not recall his prior banding or if he improved.     Last Colonoscopy:  Ten years ago per his report.  Normal.      Review of Systems:  Review of Systems   Constitutional:  Positive for weight loss. Negative for chills, diaphoresis, fever and malaise/fatigue.   HENT:  Negative for congestion.    Respiratory:  Negative for  "shortness of breath.    Cardiovascular:  Negative for chest pain and leg swelling.   Gastrointestinal:  Positive for blood in stool. Negative for abdominal pain, constipation, nausea and vomiting.   Genitourinary:  Negative for dysuria.   Musculoskeletal:  Negative for back pain and myalgias.   Skin:  Negative for rash.   Neurological:  Negative for dizziness and weakness.   Endo/Heme/Allergies:  Does not bruise/bleed easily.   Psychiatric/Behavioral:  Negative for depression.        OBJECTIVE:     Vital Signs (Most Recent)  BP (!) 142/74 (BP Location: Left arm, Patient Position: Sitting)   Pulse 70   Ht 5' 5" (1.651 m)   Wt 63.6 kg (140 lb 3.4 oz)   BMI 23.33 kg/m²     Physical Exam:  General: White male in no distress   Neuro: alert and oriented x 4.  Moves all extremities.     HEENT: no icterus.  Trachea midline  Respiratory: respirations are even and unlabored  Cardiac: regular rate  Abdomen:  Soft, nontender, no masses  Extremities: Warm dry and intact  Skin: no rashes  Anorectal:  External exam was normal.  Digital exam performed with soft internal hemorrhoids palpated.  Detailed anoscopy performed with right posterior large grade I hemorrhoid and smaller left lateral grade I internal hemorrhoid.     Labs: H&H 12 and 36. Alb 3.8.  Creatinine 1.2.    Imaging: no abdominal imaging.       ASSESSMENT/PLAN:     Vince was seen today for rectal bleeding.    Diagnoses and all orders for this visit:    Grade I hemorrhoids            89 y.o. male with bleeding grade 2 friable internal hemorrhoids.  Banding performed x 2 on 10/1/24.  He has ongoing bleeding with smaller internal hemorrhoids.  Repeat banding was offered and performed without complication.         Rubber Band Ligation:  Verbal consent was obtained.   Clear plastic anoscope inserted.    Right posterior large grade I hemorrhoid and smaller left lateral grade I internal hemorrhoid.    Suction applicator was placed above the dentate line.   Rubber bands " were deployed in the right posterior and left lateral position.    0.25% marcaine was injected above the rubber band into the banded hemorrhoidal tissue.   Patient tolerated the procedure well.       MARLENA Nick MD, FACS, FASCRS  Staff Surgeon  Colon & Rectal Surgery

## 2025-01-08 ENCOUNTER — TELEPHONE (OUTPATIENT)
Dept: OTOLARYNGOLOGY | Facility: CLINIC | Age: OVER 89
End: 2025-01-08
Payer: MEDICARE

## 2025-01-08 NOTE — TELEPHONE ENCOUNTER
----- Message from Tech Jasimine sent at 2025  4:20 PM CST -----  Regardin/31 procedure  Contact: [phone]  Type: Change  procedure time    Who Called: the pt granddaughter is calling to change appt time      Would the patient rather a call back or a response via [facility]? call  Best Call Back Number: [phone]

## 2025-01-27 ENCOUNTER — LAB VISIT (OUTPATIENT)
Dept: LAB | Facility: HOSPITAL | Age: OVER 89
End: 2025-01-27
Attending: INTERNAL MEDICINE
Payer: MEDICARE

## 2025-01-27 DIAGNOSIS — N25.81 SECONDARY HYPERPARATHYROIDISM OF RENAL ORIGIN: ICD-10-CM

## 2025-01-27 DIAGNOSIS — E11.65 TYPE 2 DIABETES MELLITUS WITH HYPERGLYCEMIA, WITHOUT LONG-TERM CURRENT USE OF INSULIN: ICD-10-CM

## 2025-01-27 LAB
ALBUMIN SERPL BCP-MCNC: 3.8 G/DL (ref 3.5–5.2)
ANION GAP SERPL CALC-SCNC: 12 MMOL/L (ref 8–16)
BASOPHILS # BLD AUTO: 0.06 K/UL (ref 0–0.2)
BASOPHILS NFR BLD: 0.9 % (ref 0–1.9)
BUN SERPL-MCNC: 34 MG/DL (ref 10–30)
CALCIUM SERPL-MCNC: 9.3 MG/DL (ref 8.7–10.5)
CHLORIDE SERPL-SCNC: 102 MMOL/L (ref 95–110)
CO2 SERPL-SCNC: 24 MMOL/L (ref 23–29)
CREAT SERPL-MCNC: 1.4 MG/DL (ref 0.5–1.4)
DIFFERENTIAL METHOD BLD: ABNORMAL
EOSINOPHIL # BLD AUTO: 0.3 K/UL (ref 0–0.5)
EOSINOPHIL NFR BLD: 4.7 % (ref 0–8)
ERYTHROCYTE [DISTWIDTH] IN BLOOD BY AUTOMATED COUNT: 12.4 % (ref 11.5–14.5)
EST. GFR  (NO RACE VARIABLE): 47 ML/MIN/1.73 M^2
ESTIMATED AVG GLUCOSE: 192 MG/DL (ref 68–131)
GLUCOSE SERPL-MCNC: 192 MG/DL (ref 70–110)
HBA1C MFR BLD: 8.3 % (ref 4–5.6)
HCT VFR BLD AUTO: 36.9 % (ref 40–54)
HGB BLD-MCNC: 12 G/DL (ref 14–18)
IMM GRANULOCYTES # BLD AUTO: 0.02 K/UL (ref 0–0.04)
IMM GRANULOCYTES NFR BLD AUTO: 0.3 % (ref 0–0.5)
LYMPHOCYTES # BLD AUTO: 3.4 K/UL (ref 1–4.8)
LYMPHOCYTES NFR BLD: 49.8 % (ref 18–48)
MAGNESIUM SERPL-MCNC: 2 MG/DL (ref 1.6–2.6)
MCH RBC QN AUTO: 32.1 PG (ref 27–31)
MCHC RBC AUTO-ENTMCNC: 32.5 G/DL (ref 32–36)
MCV RBC AUTO: 99 FL (ref 82–98)
MONOCYTES # BLD AUTO: 0.5 K/UL (ref 0.3–1)
MONOCYTES NFR BLD: 7.7 % (ref 4–15)
NEUTROPHILS # BLD AUTO: 2.5 K/UL (ref 1.8–7.7)
NEUTROPHILS NFR BLD: 36.6 % (ref 38–73)
NRBC BLD-RTO: 0 /100 WBC
PHOSPHATE SERPL-MCNC: 2.8 MG/DL (ref 2.7–4.5)
PLATELET # BLD AUTO: 152 K/UL (ref 150–450)
PMV BLD AUTO: 10.2 FL (ref 9.2–12.9)
POTASSIUM SERPL-SCNC: 4.6 MMOL/L (ref 3.5–5.1)
PTH-INTACT SERPL-MCNC: 62 PG/ML (ref 9–77)
RBC # BLD AUTO: 3.74 M/UL (ref 4.6–6.2)
SODIUM SERPL-SCNC: 138 MMOL/L (ref 136–145)
URATE SERPL-MCNC: 7.1 MG/DL (ref 3.4–7)
WBC # BLD AUTO: 6.87 K/UL (ref 3.9–12.7)

## 2025-01-27 PROCEDURE — 36415 COLL VENOUS BLD VENIPUNCTURE: CPT | Performed by: INTERNAL MEDICINE

## 2025-01-27 PROCEDURE — 84550 ASSAY OF BLOOD/URIC ACID: CPT | Performed by: INTERNAL MEDICINE

## 2025-01-27 PROCEDURE — 80069 RENAL FUNCTION PANEL: CPT | Performed by: INTERNAL MEDICINE

## 2025-01-27 PROCEDURE — 85025 COMPLETE CBC W/AUTO DIFF WBC: CPT | Performed by: INTERNAL MEDICINE

## 2025-01-27 PROCEDURE — 83970 ASSAY OF PARATHORMONE: CPT | Performed by: INTERNAL MEDICINE

## 2025-01-27 PROCEDURE — 83735 ASSAY OF MAGNESIUM: CPT | Performed by: INTERNAL MEDICINE

## 2025-01-27 PROCEDURE — 83036 HEMOGLOBIN GLYCOSYLATED A1C: CPT | Performed by: INTERNAL MEDICINE

## 2025-01-31 ENCOUNTER — PROCEDURE VISIT (OUTPATIENT)
Dept: OTOLARYNGOLOGY | Facility: CLINIC | Age: OVER 89
End: 2025-01-31
Payer: MEDICARE

## 2025-01-31 VITALS — SYSTOLIC BLOOD PRESSURE: 171 MMHG | HEART RATE: 66 BPM | DIASTOLIC BLOOD PRESSURE: 57 MMHG

## 2025-01-31 DIAGNOSIS — R49.0 DYSPHONIA: Primary | ICD-10-CM

## 2025-01-31 DIAGNOSIS — Q31.9 DYSPLASIA OF LARYNX: ICD-10-CM

## 2025-01-31 PROCEDURE — 31572 LARGSC W/LASER DSTRJ LES: CPT | Mod: RT,S$GLB,, | Performed by: OTOLARYNGOLOGY

## 2025-01-31 NOTE — PROCEDURES
[facility]  Department of Otorhinolaryngology and Communication Sciences    Awake Laryngeal Procedure Operative Report    Preoperative Diagnosis: 1. Laryngeal dysplasia.  2.   Right vocal fold motion impairment  .    Postoperative Diagnosis: 1. Laryngeal dysplasia.  2.   Right vocal fold motion impairment .    Procedure: Transnasal magnified laryngoscopy with pulsed 532 nm KTP laser photoablation of bilateral vocal fold lesions.    Surgeon: Nura Hernandez M.D.     Estimated blood loss: None.    Anesthesia: Local and topical.    Complications: None.    Findings: Right vocal fold with sessile lesion along medial arytenoid and middle third right vocal fold. Left vocal fold with faint sessile lesion along superior aspect.  Interval decrease in mucosal disease since last treatment.    Indications for procedure: Vince Martinez is a 89 y.o. 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 He presents for awake office-based KTP laser treatment of the laryngeal mucosal abnormality. Risks of dl proecedure were reviewed with the patient, including but not limited to bleeding, infection, allergic reaction to the medication, scarring, worsening of voice, need for additional procedures, pain, epistaxis, laryngospasm, laser injury (vision loss, thermal injury), and airway obstruction which could necessitate need for intubation or tracheostomy. All questions were answered and the patient agreed to move forward with the procedure. Informed consent was obtained.    Procedure in detail: Vince Martinez was positively identified with two identifiers and was brought into the procedure suite. He was seated upright in a comfortable position. Final time-out was performed for verification purposes. The nasal cavity was topically decongested with 1% phenylephrine and topically anesthetized with 4% lidocaine. The distal chip digital videolaryngoscope was advanced through the patients  most patent nasal cavity. The larynx was inspected under maximal magnification, with findings as noted above.    Attention was turned toward anesthetizing the endolarynx, which was achieved with a total volume of 3 mL of  4% lidocaine administered in consecutive aliquots through the side port of the laryngoscope using the laryngeal gargle technique.    After an adequate degree of anesthesia was obtained, attention was turned to KTP laser treatment. The laryngoscope was temporarily removed. A 0.4 mm KTP laser fiber was advanced through the working channel of the laryngoscope. The fiber was connected to the unit. The patient and all personnel were equipped with the appropriate protective eyeware. The laryngoscope was reinserted through the nasal cavity and advanced into the endolarynx. The laser was activated with settings of  30 Joy, 15 msec pulses, 2 pulses per second. Under the guidance of magnified laryngoscopy, the pulsed KTP laser was used to treat the laryngeal abnormality. Appropriate treatment response was noted, with blanching and desiccation of the abnormal tissue. Once treated, the debris was cleared with the inline suction and inactive tip of the laser fiber. The equipment was removed. The patient tolerated the procedure well without complication. All instrument counts were correct at the completion of the case.    Attestation: As the attending of record, I was present and participated in all portions of this procedure.    Disposition: The patient was observed briefly before being discharged home in good condition. He was instructed to call the office or return to the emergency department should he develop a fever greater than 101 degrees F, increasing pain not relieved by over-the-counter medication, shortness of breath or noisy breathing, difficulty swallowing, swelling, bleeding, or any other concerns. Post-procedure instructions were provided and were reviewed with the patient and his daughter , who  acknowledged understanding. He will follow up with me for another treatment in about 6 weeks.  All questions were answered, and the patient is in agreement with the above.    Nura Hernandez M.D.  [facility]  Department of Otorhinolaryngology and Communication Sciences

## 2025-02-19 ENCOUNTER — TELEPHONE (OUTPATIENT)
Dept: FAMILY MEDICINE | Facility: CLINIC | Age: OVER 89
End: 2025-02-19
Payer: MEDICARE

## 2025-02-19 NOTE — TELEPHONE ENCOUNTER
----- Message from Jonathan sent at 2/19/2025  9:58 AM CST -----  Contact: pt  Type: Requesting to speak with Tutu Called: PT son Regarding: need blood work orders put in Would the patient rather a call back or a response via [facility]? [facility] Back Number: [phone] Additional Information:

## 2025-02-21 DIAGNOSIS — E11.65 TYPE 2 DIABETES MELLITUS WITH HYPERGLYCEMIA, WITHOUT LONG-TERM CURRENT USE OF INSULIN: Primary | ICD-10-CM

## 2025-02-26 ENCOUNTER — OFFICE VISIT (OUTPATIENT)
Dept: FAMILY MEDICINE | Facility: CLINIC | Age: OVER 89
End: 2025-02-26
Payer: MEDICARE

## 2025-02-26 VITALS
BODY MASS INDEX: 23.82 KG/M2 | HEART RATE: 79 BPM | DIASTOLIC BLOOD PRESSURE: 46 MMHG | HEIGHT: 65 IN | WEIGHT: 143 LBS | SYSTOLIC BLOOD PRESSURE: 100 MMHG | OXYGEN SATURATION: 98 %

## 2025-02-26 DIAGNOSIS — J84.10 PULMONARY FIBROSIS, UNSPECIFIED: ICD-10-CM

## 2025-02-26 DIAGNOSIS — I50.42 CHRONIC COMBINED SYSTOLIC AND DIASTOLIC CHF (CONGESTIVE HEART FAILURE): ICD-10-CM

## 2025-02-26 DIAGNOSIS — I49.5 SICK SINUS SYNDROME: ICD-10-CM

## 2025-02-26 DIAGNOSIS — M15.0 PRIMARY OSTEOARTHRITIS INVOLVING MULTIPLE JOINTS: ICD-10-CM

## 2025-02-26 DIAGNOSIS — R22.31 AXILLARY LUMP, RIGHT: ICD-10-CM

## 2025-02-26 DIAGNOSIS — Z23 NEEDS FLU SHOT: ICD-10-CM

## 2025-02-26 DIAGNOSIS — Z23 NEED FOR COVID-19 VACCINE: ICD-10-CM

## 2025-02-26 DIAGNOSIS — E11.59 TYPE 2 DIABETES MELLITUS WITH OTHER CIRCULATORY COMPLICATIONS: Primary | ICD-10-CM

## 2025-02-26 PROCEDURE — G0008 ADMIN INFLUENZA VIRUS VAC: HCPCS | Mod: S$GLB,,, | Performed by: FAMILY MEDICINE

## 2025-02-26 PROCEDURE — 1101F PT FALLS ASSESS-DOCD LE1/YR: CPT | Mod: CPTII,S$GLB,, | Performed by: FAMILY MEDICINE

## 2025-02-26 PROCEDURE — 90480 ADMN SARSCOV2 VAC 1/ONLY CMP: CPT | Mod: S$GLB,,, | Performed by: FAMILY MEDICINE

## 2025-02-26 PROCEDURE — 1157F ADVNC CARE PLAN IN RCRD: CPT | Mod: CPTII,S$GLB,, | Performed by: FAMILY MEDICINE

## 2025-02-26 PROCEDURE — 91322 SARSCOV2 VAC 50 MCG/0.5ML IM: CPT | Mod: S$GLB,,, | Performed by: FAMILY MEDICINE

## 2025-02-26 PROCEDURE — 3288F FALL RISK ASSESSMENT DOCD: CPT | Mod: CPTII,S$GLB,, | Performed by: FAMILY MEDICINE

## 2025-02-26 PROCEDURE — 1125F AMNT PAIN NOTED PAIN PRSNT: CPT | Mod: CPTII,S$GLB,, | Performed by: FAMILY MEDICINE

## 2025-02-26 PROCEDURE — 99215 OFFICE O/P EST HI 40 MIN: CPT | Mod: S$GLB,,, | Performed by: FAMILY MEDICINE

## 2025-02-26 PROCEDURE — 1160F RVW MEDS BY RX/DR IN RCRD: CPT | Mod: CPTII,S$GLB,, | Performed by: FAMILY MEDICINE

## 2025-02-26 PROCEDURE — 99999 PR PBB SHADOW E&M-EST. PATIENT-LVL III: CPT | Mod: PBBFAC,,, | Performed by: FAMILY MEDICINE

## 2025-02-26 PROCEDURE — 1159F MED LIST DOCD IN RCRD: CPT | Mod: CPTII,S$GLB,, | Performed by: FAMILY MEDICINE

## 2025-02-26 PROCEDURE — 90653 IIV ADJUVANT VACCINE IM: CPT | Mod: S$GLB,,, | Performed by: FAMILY MEDICINE

## 2025-02-26 RX ORDER — METOPROLOL SUCCINATE 25 MG/1
25 TABLET, EXTENDED RELEASE ORAL NIGHTLY
Start: 2025-02-26 | End: 2026-02-26

## 2025-02-26 RX ORDER — DICLOFENAC SODIUM 10 MG/G
2 GEL TOPICAL DAILY PRN
Qty: 200 G | Refills: 3 | Status: SHIPPED | OUTPATIENT
Start: 2025-02-26

## 2025-02-27 NOTE — PROGRESS NOTES
Subjective     Patient ID: Vince Martinez is a 89 y.o. male.    Chief Complaint: Back Pain, Hip Pain, and Neck Pain    89 years old male came to the clinic for chronic diabetes follow-up.  Last A1c was elevated.  Patient currently on Jardiance and glipizide.  Medicine was previously adjusted.  No flare ups pulmonary fibrosis.  Patient  denies CHF symptoms.  Patient with good compliance with medical regimen.  He was previously diagnosed with sick sinus syndrome.  He reports right axilla mobile lump.    Back Pain  This is a chronic problem. The current episode started more than 1 year ago. The problem occurs intermittently. The problem is unchanged. The pain is present in the lumbar spine. The quality of the pain is described as aching. The pain is at a severity of 5/10. The pain is moderate. The pain is The same all the time. The symptoms are aggravated by bending and twisting. Stiffness is present All day. Risk factors include lack of exercise. He has tried NSAIDs for the symptoms. The treatment provided mild relief.   Hip Pain   The incident occurred more than 1 week ago. The incident occurred at home. There was no injury mechanism. The pain is present in the right hip and left hip. The quality of the pain is described as aching. The pain is at a severity of 5/10. The pain is moderate. The pain has been Intermittent since onset. He reports no foreign bodies present. He has tried NSAIDs for the symptoms. The treatment provided mild relief.   Neck Pain   This is a chronic problem. The current episode started more than 1 year ago. The problem occurs intermittently. The problem has been waxing and waning. The pain is present in the occipital region. The quality of the pain is described as aching. The pain is at a severity of 5/10. The pain is moderate. The symptoms are aggravated by bending and position. The pain is Same all the time. He has tried NSAIDs for the symptoms. The treatment provided mild relief.     Review  of Systems   Constitutional: Negative.    HENT: Negative.     Eyes: Negative.    Respiratory: Negative.     Cardiovascular: Negative.    Gastrointestinal: Negative.    Genitourinary: Negative.    Musculoskeletal:  Positive for arthralgias, back pain and neck pain.   Integumentary:  Negative.   Neurological: Negative.    Psychiatric/Behavioral: Negative.            Objective     Physical Exam  Vitals and nursing note reviewed.   Constitutional:       General: He is not in acute distress.     Appearance: He is well-developed. He is not diaphoretic.   HENT:      Head: Normocephalic and atraumatic.      Right Ear: External ear normal.      Left Ear: External ear normal.      Nose: Nose normal.      Mouth/Throat:      Pharynx: No oropharyngeal exudate.   Eyes:      General: No scleral icterus.        Right eye: No discharge.         Left eye: No discharge.      Conjunctiva/sclera: Conjunctivae normal.      Pupils: Pupils are equal, round, and reactive to light.   Neck:      Thyroid: No thyromegaly.      Vascular: No JVD.      Trachea: No tracheal deviation.   Cardiovascular:      Rate and Rhythm: Normal rate and regular rhythm.      Heart sounds: Normal heart sounds. No murmur heard.     No friction rub. No gallop.   Pulmonary:      Effort: Pulmonary effort is normal. No respiratory distress.      Breath sounds: Normal breath sounds. No stridor. No wheezing or rales.   Chest:      Chest wall: No tenderness.   Abdominal:      General: Bowel sounds are normal. There is no distension.      Palpations: Abdomen is soft. There is no mass.      Tenderness: There is no abdominal tenderness. There is no guarding or rebound.   Musculoskeletal:         General: Normal range of motion.      Cervical back: Normal range of motion and neck supple. Spasms and tenderness present.      Lumbar back: Spasms and tenderness present.   Lymphadenopathy:      Cervical: No cervical adenopathy.   Skin:     General: Skin is warm and dry.       Coloration: Skin is not pale.      Findings: No erythema or rash.      Comments: Right axilla lump.   Neurological:      Mental Status: He is alert and oriented to person, place, and time.      Cranial Nerves: No cranial nerve deficit.      Motor: Weakness present. No abnormal muscle tone.      Coordination: Coordination abnormal.      Gait: Gait abnormal.      Deep Tendon Reflexes: Reflexes are normal and symmetric. Reflexes normal.   Psychiatric:         Mood and Affect: Mood is anxious.         Behavior: Behavior normal.         Thought Content: Thought content normal.         Judgment: Judgment normal.            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 Needs flu shot  -     influenza (adjuvanted) (Fluad) 45 mcg/0.5 mL IM vaccine (> or = 66 yo) 0.5 mL    3. Need for COVID-19 vaccine  -     COVID-19 (Moderna) 50 mcg/0.5 mL IM vaccine (>/= 13 yo) 0.5 mL    4. Primary osteoarthritis involving multiple joints  -     diclofenac sodium (VOLTAREN) 1 % Gel; Apply 2 g topically daily as needed (pain).  Dispense: 200 g; Refill: 3    5. Pulmonary fibrosis, unspecified    6. Chronic combined systolic and diastolic CHF (congestive heart failure)  -     metoprolol succinate (TOPROL-XL) 25 MG 24 hr tablet; Take 1 tablet (25 mg total) by mouth every evening.    7. Sick sinus syndrome    8. Axillary lump, right  -     US Soft Tissue Axilla, Right; Future; Expected date: 02/26/2025        I spent a total of 45 minutes on the day of the visit.This includes face to face time and non-face to face time preparing to see the patient (eg, review of tests), obtaining and/or reviewing separately obtained history, documenting  clinical information in the electronic or other health record, independently interpreting results and communicating results to the patient/family/caregiver, or care coordinator.          Follow up in about 3 months (around 5/26/2025), or if symptoms worsen or fail to improve.

## 2025-03-06 ENCOUNTER — RESULTS FOLLOW-UP (OUTPATIENT)
Dept: FAMILY MEDICINE | Facility: CLINIC | Age: OVER 89
End: 2025-03-06

## 2025-03-06 ENCOUNTER — HOSPITAL ENCOUNTER (OUTPATIENT)
Dept: RADIOLOGY | Facility: HOSPITAL | Age: OVER 89
Discharge: HOME | End: 2025-03-06
Attending: FAMILY MEDICINE
Payer: MEDICARE

## 2025-03-06 DIAGNOSIS — R22.31 AXILLARY LUMP, RIGHT: ICD-10-CM

## 2025-03-06 PROCEDURE — 76882 US LMTD JT/FCL EVL NVASC XTR: CPT | Mod: TC,RT

## 2025-03-06 PROCEDURE — 76882 US LMTD JT/FCL EVL NVASC XTR: CPT | Mod: 26,RT,, | Performed by: RADIOLOGY

## 2025-03-17 ENCOUNTER — TELEPHONE (OUTPATIENT)
Dept: OTOLARYNGOLOGY | Facility: CLINIC | Age: OVER 89
End: 2025-03-17
Payer: MEDICARE

## 2025-03-19 ENCOUNTER — PROCEDURE VISIT (OUTPATIENT)
Dept: OTOLARYNGOLOGY | Facility: CLINIC | Age: OVER 89
End: 2025-03-19
Payer: MEDICARE

## 2025-03-19 VITALS
BODY MASS INDEX: 24.51 KG/M2 | SYSTOLIC BLOOD PRESSURE: 171 MMHG | WEIGHT: 147.25 LBS | DIASTOLIC BLOOD PRESSURE: 66 MMHG

## 2025-03-19 DIAGNOSIS — Q31.9 DYSPLASIA OF LARYNX: ICD-10-CM

## 2025-03-19 DIAGNOSIS — R49.0 DYSPHONIA: Primary | ICD-10-CM

## 2025-03-19 DIAGNOSIS — J38.01 UNILATERAL PARTIAL VOCAL FOLD PARALYSIS: ICD-10-CM

## 2025-03-19 NOTE — PROCEDURES
[facility]  Department of Otorhinolaryngology and Communication Sciences    Awake Laryngeal Procedure Operative Report    Preoperative Diagnosis: 1. Laryngeal dysplasia.  2.   Right vocal fold motion impairment  .    Postoperative Diagnosis: 1. Laryngeal dysplasia.  2.   Right vocal fold motion impairment .    Procedure: Transnasal magnified laryngoscopy with pulsed 532 nm KTP laser photoablation of bilateral vocal fold lesions.    Surgeon: Nura Hernandez M.D.     Estimated blood loss: None.    Anesthesia: Local and topical.    Complications: None.    Findings: Right vocal fold with sessile lesion along medial arytenoid and middle third right vocal fold; interval involution since last treatment.  Left vocal fold with sessile neovascularization changes along free edge; interval involution of epithelial disease since last treatment.    Indications for procedure: Vince Martinez is a 89 y.o. 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 He presents for awake office-based KTP laser treatment of the laryngeal mucosal abnormality. Risks of dl proecedure were reviewed with the patient, including but not limited to bleeding, infection, allergic reaction to the medication, scarring, worsening of voice, need for additional procedures, pain, epistaxis, laryngospasm, laser injury (vision loss, thermal injury), and airway obstruction which could necessitate need for intubation or tracheostomy. All questions were answered and the patient agreed to move forward with the procedure. Informed consent was obtained.    Procedure in detail: Vince Martinez was positively identified with two identifiers and was brought into the procedure suite. He was seated upright in a comfortable position. Final time-out was performed for verification purposes. The nasal cavity was topically decongested with 1% phenylephrine and topically anesthetized with 4% lidocaine. The distal chip digital  videolaryngoscope was advanced through the patients most patent nasal cavity. The larynx was inspected under maximal magnification, with findings as noted above.    Attention was turned toward anesthetizing the endolarynx, which was achieved with a total volume of 3 mL of  4% lidocaine administered in consecutive aliquots through the side port of the laryngoscope using the laryngeal gargle technique.    After an adequate degree of anesthesia was obtained, attention was turned to KTP laser treatment. The laryngoscope was temporarily removed. A 0.4 mm KTP laser fiber was advanced through the working channel of the laryngoscope. The fiber was connected to the unit. The patient and all personnel were equipped with the appropriate protective eyeware. The laryngoscope was reinserted through the nasal cavity and advanced into the endolarynx. The laser was activated with settings of  30 Joy, 15 msec pulses, 2 pulses per second. Under the guidance of magnified laryngoscopy, the pulsed KTP laser was used to treat the laryngeal abnormality. Appropriate treatment response was noted, with blanching and desiccation of the abnormal tissue. Once treated, the debris was cleared with the inline suction and inactive tip of the laser fiber. The equipment was removed.  The endpoint treatment was reached as laryngeal anesthetic effect dissipated.  The patient tolerated the procedure well without complication. All instrument counts were correct at the completion of the case.    Attestation: As the attending of record, I was present and participated in all portions of this procedure.    Disposition: The patient was observed briefly before being discharged home in good condition. He was instructed to call the office or return to the emergency department should he develop a fever greater than 101 degrees F, increasing pain not relieved by over-the-counter medication, shortness of breath or noisy breathing, difficulty swallowing, swelling,  bleeding, or any other concerns. Post-procedure instructions were provided and were reviewed with the patient and his daughter , who acknowledged understanding. He will follow up with me for another treatment in about 6 weeks.  All questions were answered, and the patient is in agreement with the above.    Nura Hernandez M.D.  [facility]  Department of Otorhinolaryngology and Communication Sciences

## 2025-03-24 ENCOUNTER — LAB VISIT (OUTPATIENT)
Dept: LAB | Facility: HOSPITAL | Age: OVER 89
End: 2025-03-24
Attending: INTERNAL MEDICINE
Payer: MEDICARE

## 2025-03-24 DIAGNOSIS — E11.65 TYPE 2 DIABETES MELLITUS WITH HYPERGLYCEMIA, WITHOUT LONG-TERM CURRENT USE OF INSULIN: ICD-10-CM

## 2025-03-24 DIAGNOSIS — J38.01 UNILATERAL PARTIAL VOCAL FOLD PARALYSIS: ICD-10-CM

## 2025-03-24 DIAGNOSIS — R49.0 DYSPHONIA: ICD-10-CM

## 2025-03-24 DIAGNOSIS — E55.9 VITAMIN D DEFICIENCY: ICD-10-CM

## 2025-03-24 DIAGNOSIS — N25.81 SECONDARY HYPERPARATHYROIDISM OF RENAL ORIGIN: ICD-10-CM

## 2025-03-24 LAB
25(OH)D3+25(OH)D2 SERPL-MCNC: 41 NG/ML (ref 30–96)
ABSOLUTE EOSINOPHIL (OHS): 0.39 K/UL
ABSOLUTE MONOCYTE (OHS): 0.5 K/UL (ref 0.3–1)
ABSOLUTE NEUTROPHIL COUNT (OHS): 1.63 K/UL (ref 1.8–7.7)
ALBUMIN SERPL BCP-MCNC: 3.6 G/DL (ref 3.5–5.2)
ANION GAP (OHS): 7 MMOL/L (ref 8–16)
BASOPHILS # BLD AUTO: 0.06 K/UL
BASOPHILS NFR BLD AUTO: 1.3 %
BILIRUB UR QL STRIP.AUTO: NEGATIVE
BUN SERPL-MCNC: 28 MG/DL (ref 10–30)
CALCIUM SERPL-MCNC: 9.2 MG/DL (ref 8.7–10.5)
CHLORIDE SERPL-SCNC: 105 MMOL/L (ref 95–110)
CLARITY UR: CLEAR
CO2 SERPL-SCNC: 23 MMOL/L (ref 23–29)
COLOR UR AUTO: YELLOW
CREAT SERPL-MCNC: 1.2 MG/DL (ref 0.5–1.4)
EAG (OHS): 157 MG/DL (ref 68–131)
ERYTHROCYTE [DISTWIDTH] IN BLOOD BY AUTOMATED COUNT: 13.4 % (ref 11.5–14.5)
GFR SERPLBLD CREATININE-BSD FMLA CKD-EPI: 56 ML/MIN/1.73/M2
GLUCOSE SERPL-MCNC: 236 MG/DL (ref 70–110)
GLUCOSE UR QL STRIP: NEGATIVE
HBA1C MFR BLD: 7.1 % (ref 4–5.6)
HCT VFR BLD AUTO: 36 % (ref 40–54)
HGB BLD-MCNC: 11.6 GM/DL (ref 14–18)
HGB UR QL STRIP: NEGATIVE
IMM GRANULOCYTES # BLD AUTO: 0.01 K/UL (ref 0–0.04)
IMM GRANULOCYTES NFR BLD AUTO: 0.2 % (ref 0–0.5)
KETONES UR QL STRIP: NEGATIVE
LEUKOCYTE ESTERASE UR QL STRIP: NEGATIVE
LYMPHOCYTES # BLD AUTO: 2 K/UL (ref 1–4.8)
MAGNESIUM SERPL-MCNC: 1.9 MG/DL (ref 1.6–2.6)
MCH RBC QN AUTO: 32.7 PG (ref 27–50)
MCHC RBC AUTO-ENTMCNC: 32.2 G/DL (ref 32–36)
MCV RBC AUTO: 101 FL (ref 82–98)
NITRITE UR QL STRIP: NEGATIVE
NUCLEATED RBC (/100WBC) (OHS): 0 /100 WBC
PH UR STRIP: 6 [PH]
PHOSPHATE SERPL-MCNC: 3.3 MG/DL (ref 2.7–4.5)
PLATELET # BLD AUTO: 140 K/UL (ref 150–450)
PMV BLD AUTO: 10.7 FL (ref 9.2–12.9)
POTASSIUM SERPL-SCNC: 5.1 MMOL/L (ref 3.5–5.1)
PROT UR QL STRIP: NEGATIVE
PTH-INTACT SERPL-MCNC: 31.8 PG/ML (ref 9–77)
RBC # BLD AUTO: 3.55 M/UL (ref 4.6–6.2)
RELATIVE EOSINOPHIL (OHS): 8.5 %
RELATIVE LYMPHOCYTE (OHS): 43.6 % (ref 18–48)
RELATIVE MONOCYTE (OHS): 10.9 % (ref 4–15)
RELATIVE NEUTROPHIL (OHS): 35.5 % (ref 38–73)
SODIUM SERPL-SCNC: 135 MMOL/L (ref 136–145)
SP GR UR STRIP: 1.01
URATE SERPL-MCNC: 5.1 MG/DL (ref 3.4–7)
UROBILINOGEN UR STRIP-ACNC: NEGATIVE EU/DL
WBC # BLD AUTO: 4.59 K/UL (ref 3.9–12.7)

## 2025-03-24 PROCEDURE — 36415 COLL VENOUS BLD VENIPUNCTURE: CPT

## 2025-03-24 PROCEDURE — 83036 HEMOGLOBIN GLYCOSYLATED A1C: CPT

## 2025-03-24 PROCEDURE — 81003 URINALYSIS AUTO W/O SCOPE: CPT

## 2025-03-24 PROCEDURE — 85025 COMPLETE CBC W/AUTO DIFF WBC: CPT

## 2025-03-24 PROCEDURE — 82040 ASSAY OF SERUM ALBUMIN: CPT

## 2025-03-24 PROCEDURE — 83735 ASSAY OF MAGNESIUM: CPT

## 2025-03-24 PROCEDURE — 82570 ASSAY OF URINE CREATININE: CPT

## 2025-03-24 PROCEDURE — 82306 VITAMIN D 25 HYDROXY: CPT

## 2025-03-24 PROCEDURE — 83970 ASSAY OF PARATHORMONE: CPT

## 2025-03-24 PROCEDURE — 84550 ASSAY OF BLOOD/URIC ACID: CPT

## 2025-03-25 LAB
ALBUMIN/CREAT UR: 118.4 UG/MG
CREAT UR-MCNC: 49 MG/DL (ref 23–375)
MICROALBUMIN UR-MCNC: 58 UG/ML (ref ?–5000)

## 2025-03-26 LAB
CREAT UR-MCNC: 46.1 MG/DL (ref 23–375)
PROT UR-MCNC: 12 MG/DL
PROT/CREAT UR: 0.26 MG/G{CREAT}

## 2025-03-26 PROCEDURE — 84156 ASSAY OF PROTEIN URINE: CPT | Performed by: INTERNAL MEDICINE

## 2025-04-08 ENCOUNTER — PATIENT MESSAGE (OUTPATIENT)
Dept: OTOLARYNGOLOGY | Facility: CLINIC | Age: OVER 89
End: 2025-04-08
Payer: MEDICARE

## 2025-05-05 ENCOUNTER — PROCEDURE VISIT (OUTPATIENT)
Dept: OTOLARYNGOLOGY | Facility: CLINIC | Age: OVER 89
End: 2025-05-05
Payer: MEDICARE

## 2025-05-05 VITALS — HEART RATE: 76 BPM | DIASTOLIC BLOOD PRESSURE: 61 MMHG | SYSTOLIC BLOOD PRESSURE: 179 MMHG

## 2025-05-05 DIAGNOSIS — R49.0 DYSPHONIA: Primary | ICD-10-CM

## 2025-05-05 DIAGNOSIS — J38.01 UNILATERAL PARTIAL VOCAL FOLD PARALYSIS: ICD-10-CM

## 2025-05-05 DIAGNOSIS — Q31.9 DYSPLASIA OF LARYNX: ICD-10-CM

## 2025-05-05 PROCEDURE — 31572 LARGSC W/LASER DSTRJ LES: CPT | Mod: RT,S$GLB,, | Performed by: OTOLARYNGOLOGY

## 2025-05-05 NOTE — PROCEDURES
[facility]  Department of Otorhinolaryngology and Communication Sciences    Awake Laryngeal Procedure Operative Report    Preoperative Diagnosis: 1. Laryngeal dysplasia.  2.  Right vocal fold motion impairment .    Postoperative Diagnosis: 1. Laryngeal dysplasia.  2.  Right vocal fold motion impairment.    Procedure: Transnasal magnified laryngoscopy with pulsed 532 nm KTP laser photoablation of right vocal fold lesion.    Surgeon: Nura Hernandez M.D.     Estimated blood loss: None.    Anesthesia: Local and topical.    Complications: None.    Findings: Right true vocal fold with sessile lesion along medial arytenoid and middle third right vocal fold; mild interval involution since last treatment.  Left vocal fold with sessile neovascularization changes along free edge, with no elizaebth evidence of disease.     Indications for procedure: Vince Martinez is a 89 y.o. 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 He presents for awake office-based KTP laser treatment of the laryngeal mucosal abnormality. Risks of the procedure were reviewed with the patient, including but not limited to bleeding, infection, allergic reaction to the medication, scarring, worsening of voice, need for additional procedures, pain, epistaxis, laryngospasm, laser injury (vision loss, thermal injury), and airway obstruction which could necessitate need for intubation or tracheostomy. All questions were answered and the patient agreed to move forward with the procedure. Informed consent was obtained.    Procedure in detail: Vince Martinez was positively identified with two identifiers and was brought into the procedure suite. He was seated upright in a comfortable position. Final time-out was performed for verification purposes. The nasal cavity was topically decongested with 1% phenylephrine and topically anesthetized with 4% lidocaine. The distal chip digital videolaryngoscope was  advanced through the patients most patent nasal cavity. The larynx was inspected under maximal magnification, with findings as noted above.    Attention was turned toward anesthetizing the endolarynx, which was achieved with a total volume of 3 mL of  4% lidocaine administered in consecutive aliquots through the side port of the laryngoscope using the laryngeal gargle technique.    After an adequate degree of anesthesia was obtained, attention was turned to KTP laser treatment. The laryngoscope was temporarily removed. A 0.4 mm KTP laser fiber was advanced through the working channel of the laryngoscope. The fiber was connected to the unit. The patient and all personnel were equipped with the appropriate protective eyeware. The laryngoscope was reinserted through the nasal cavity and advanced into the endolarynx. The laser was activated with settings of  30 Joy, 15 msec pulses, 2 pulses per second. Under the guidance of magnified laryngoscopy, the pulsed KTP laser was used to treat the laryngeal abnormality. Appropriate treatment response was noted, with blanching and desiccation of the abnormal tissue. Once treated, the debris was cleared with the inline suction and inactive tip of the laser fiber. The equipment was removed.  The endpoint treatment was reached as laryngeal anesthetic effect dissipated.  The patient tolerated the procedure well without complication. All instrument counts were correct at the completion of the case.    Attestation: As the attending of record, I was present and participated in all portions of this procedure.    Disposition: The patient was observed briefly before being discharged home in good condition. He was instructed to call the office or return to the emergency department should he develop a fever greater than 101 degrees F, increasing pain not relieved by over-the-counter medication, shortness of breath or noisy breathing, difficulty swallowing, swelling, bleeding, or any  other concerns. Post-procedure instructions were provided and were reviewed with the patient and his son, who acknowledged understanding. He will follow up with me for another treatment in about 4 weeks.  In the meantime, I also recommended that he complete an updated CT of the neck with contrast.  All questions were answered, and the patient is in agreement with the above.    Nura Hernandez M.D.  [facility]  Department of Otorhinolaryngology and Communication Sciences

## 2025-05-19 ENCOUNTER — HOSPITAL ENCOUNTER (OUTPATIENT)
Dept: RADIOLOGY | Facility: HOSPITAL | Age: OVER 89
Discharge: HOME | End: 2025-05-19
Attending: OTOLARYNGOLOGY
Payer: MEDICARE

## 2025-05-19 DIAGNOSIS — Q31.9 DYSPLASIA OF LARYNX: ICD-10-CM

## 2025-05-19 DIAGNOSIS — J38.01 UNILATERAL PARTIAL VOCAL FOLD PARALYSIS: ICD-10-CM

## 2025-05-19 PROCEDURE — 25500020 PHARM REV CODE 255: Performed by: OTOLARYNGOLOGY

## 2025-05-19 PROCEDURE — 70491 CT SOFT TISSUE NECK W/DYE: CPT | Mod: TC

## 2025-05-19 PROCEDURE — 70491 CT SOFT TISSUE NECK W/DYE: CPT | Mod: 26,,, | Performed by: RADIOLOGY

## 2025-05-19 RX ADMIN — IOHEXOL 75 ML: 350 INJECTION, SOLUTION INTRAVENOUS at 09:05

## 2025-05-24 DIAGNOSIS — I50.42 CHRONIC COMBINED SYSTOLIC AND DIASTOLIC CHF (CONGESTIVE HEART FAILURE): ICD-10-CM

## 2025-05-24 NOTE — TELEPHONE ENCOUNTER
Care Due:                  Date            Visit Type   Department     Provider  --------------------------------------------------------------------------------                                EP -                              PRIMARY      [facility]  Last Visit: 02-      CARE ([facility])   MONIE Luong                              EP -                              PRIMARY      [facility]  Next Visit: 05-      CARE ([facility])   MONIE Luong                                                            Last  Test          Frequency    Reason                     Performed    Due Date  --------------------------------------------------------------------------------    CMP.........  12 months..  atorvastatin.............  08- 08-    Lipid Panel.  12 months..  atorvastatin.............  08- 08-    TSH.........  12 months..  levothyroxine............  08- 08-    [facility] Embedded Care Due Messages. Reference number: [id number].   5/24/2025 7:02:39 AM CDT

## 2025-05-25 RX ORDER — METOPROLOL SUCCINATE 25 MG/1
25 TABLET, EXTENDED RELEASE ORAL DAILY
Qty: 90 TABLET | Refills: 3 | Status: SHIPPED | OUTPATIENT
Start: 2025-05-25

## 2025-05-25 NOTE — TELEPHONE ENCOUNTER
Refill Routing Note   Medication(s) are not appropriate for processing by [facility] for the following reason(s):        Required vitals abnormal    [facility] action(s):  Defer     Requires labs : Yes    Medication Therapy Plan: Lab(s) recommended: TSH      Appointments  past 12m or future 3m with PCP    Date Provider   Last Visit   2/26/2025 Shlomo Luong MD   Next Visit   5/29/2025 Shlomo Luong MD   ED visits in past 90 days: 0        Note composed:6:45 AM 05/25/2025

## 2025-05-26 ENCOUNTER — LAB VISIT (OUTPATIENT)
Dept: LAB | Facility: HOSPITAL | Age: OVER 89
End: 2025-05-26
Attending: FAMILY MEDICINE
Payer: MEDICARE

## 2025-05-26 DIAGNOSIS — E11.59 TYPE 2 DIABETES MELLITUS WITH OTHER CIRCULATORY COMPLICATIONS: ICD-10-CM

## 2025-05-26 LAB
ALBUMIN SERPL BCP-MCNC: 3.7 G/DL (ref 3.5–5.2)
ALBUMIN/CREAT UR: 83.3 UG/MG
ALP SERPL-CCNC: 82 UNIT/L (ref 40–150)
ALT SERPL W/O P-5'-P-CCNC: 35 UNIT/L (ref 10–44)
ANION GAP (OHS): 11 MMOL/L (ref 8–16)
AST SERPL-CCNC: 47 UNIT/L (ref 11–45)
BILIRUB SERPL-MCNC: 0.3 MG/DL (ref 0.1–1)
BUN SERPL-MCNC: 21 MG/DL (ref 10–30)
CALCIUM SERPL-MCNC: 9.5 MG/DL (ref 8.7–10.5)
CHLORIDE SERPL-SCNC: 104 MMOL/L (ref 95–110)
CHOLEST SERPL-MCNC: 122 MG/DL (ref 120–199)
CHOLEST/HDLC SERPL: 2.5 {RATIO} (ref 2–5)
CO2 SERPL-SCNC: 22 MMOL/L (ref 23–29)
CREAT SERPL-MCNC: 1.3 MG/DL (ref 0.5–1.4)
CREAT UR-MCNC: 42 MG/DL (ref 23–375)
EAG (OHS): 148 MG/DL (ref 68–131)
GFR SERPLBLD CREATININE-BSD FMLA CKD-EPI: 51 ML/MIN/1.73/M2
GLUCOSE SERPL-MCNC: 102 MG/DL (ref 70–110)
HBA1C MFR BLD: 6.8 % (ref 4–5.6)
HDLC SERPL-MCNC: 49 MG/DL (ref 40–75)
HDLC SERPL: 40.2 % (ref 20–50)
LDLC SERPL CALC-MCNC: 49.2 MG/DL (ref 63–159)
MICROALBUMIN UR-MCNC: 35 UG/ML (ref ?–5000)
NONHDLC SERPL-MCNC: 73 MG/DL
POTASSIUM SERPL-SCNC: 4.8 MMOL/L (ref 3.5–5.1)
PROT SERPL-MCNC: 7.7 GM/DL (ref 6–8.4)
SODIUM SERPL-SCNC: 137 MMOL/L (ref 136–145)
TRIGL SERPL-MCNC: 119 MG/DL (ref 30–150)

## 2025-05-26 PROCEDURE — 80053 COMPREHEN METABOLIC PANEL: CPT

## 2025-05-26 PROCEDURE — 82465 ASSAY BLD/SERUM CHOLESTEROL: CPT

## 2025-05-26 PROCEDURE — 83036 HEMOGLOBIN GLYCOSYLATED A1C: CPT

## 2025-05-26 PROCEDURE — 36415 COLL VENOUS BLD VENIPUNCTURE: CPT | Mod: PO

## 2025-05-26 PROCEDURE — 82043 UR ALBUMIN QUANTITATIVE: CPT

## 2025-05-29 ENCOUNTER — OFFICE VISIT (OUTPATIENT)
Dept: FAMILY MEDICINE | Facility: CLINIC | Age: OVER 89
End: 2025-05-29
Payer: MEDICARE

## 2025-05-29 VITALS
OXYGEN SATURATION: 97 % | HEART RATE: 82 BPM | WEIGHT: 151.38 LBS | HEIGHT: 65 IN | DIASTOLIC BLOOD PRESSURE: 54 MMHG | BODY MASS INDEX: 25.22 KG/M2 | SYSTOLIC BLOOD PRESSURE: 134 MMHG

## 2025-05-29 DIAGNOSIS — D17.79 LIPOMA OF OTHER SPECIFIED SITES: ICD-10-CM

## 2025-05-29 DIAGNOSIS — I73.9 CLAUDICATION: ICD-10-CM

## 2025-05-29 DIAGNOSIS — E11.65 TYPE 2 DIABETES MELLITUS WITH HYPERGLYCEMIA, WITHOUT LONG-TERM CURRENT USE OF INSULIN: ICD-10-CM

## 2025-05-29 DIAGNOSIS — K21.9 GASTROESOPHAGEAL REFLUX DISEASE WITHOUT ESOPHAGITIS: ICD-10-CM

## 2025-05-29 DIAGNOSIS — R26.89 BALANCE PROBLEMS: ICD-10-CM

## 2025-05-29 DIAGNOSIS — R80.9 MICROALBUMINURIA: ICD-10-CM

## 2025-05-29 DIAGNOSIS — Z01.00 EXAMINATION OF EYES AND VISION: ICD-10-CM

## 2025-05-29 DIAGNOSIS — R29.898 WEAKNESS OF BOTH LEGS: ICD-10-CM

## 2025-05-29 DIAGNOSIS — N18.31 STAGE 3A CHRONIC KIDNEY DISEASE: ICD-10-CM

## 2025-05-29 DIAGNOSIS — E03.9 HYPOTHYROIDISM, UNSPECIFIED TYPE: ICD-10-CM

## 2025-05-29 DIAGNOSIS — I10 ESSENTIAL HYPERTENSION: Primary | ICD-10-CM

## 2025-05-29 PROCEDURE — 99999 PR PBB SHADOW E&M-EST. PATIENT-LVL IV: CPT | Mod: PBBFAC,,, | Performed by: FAMILY MEDICINE

## 2025-05-29 RX ORDER — FAMOTIDINE 20 MG/1
20 TABLET, FILM COATED ORAL NIGHTLY
Qty: 90 TABLET | Refills: 3 | Status: SHIPPED | OUTPATIENT
Start: 2025-05-29 | End: 2026-05-29

## 2025-05-29 NOTE — PROGRESS NOTES
Subjective     Patient ID: Vince Martinez is a 89 y.o. male.    Chief Complaint: Back Pain, Hip Pain, Diabetes, and Hypertension    89 years old male came to the clinic with episodic claudication associated with weakness in both lower extremities.  He reports problems with balance sometimes.  Patient is taking a PPI only once a day because of his kidney function.  He is still reports reflux sometimes.  Last urine with evidence of microalbuminuria.  Last A1c was stable.  Patient with good compliance with levothyroxine for hypothyroidism.    Back Pain  This is a chronic problem. The current episode started more than 1 year ago. The problem has been gradually worsening since onset. The pain is present in the lumbar spine. The quality of the pain is described as aching. The pain is mild. The pain is The same all the time. Associated symptoms include weakness. Pertinent negatives include no chest pain or numbness. He has tried analgesics for the symptoms. The treatment provided mild relief.   Hip Pain   The incident occurred more than 1 week ago. The incident occurred at home. The pain is present in the left hip and right hip. The quality of the pain is described as aching. The pain is at a severity of 6/10. The pain is moderate. Pertinent negatives include no numbness. He has tried acetaminophen for the symptoms. The treatment provided no relief.   Diabetes  He presents for his follow-up diabetic visit. He has type 2 diabetes mellitus. His disease course has been stable. There are no hypoglycemic associated symptoms. Associated symptoms include weakness. Pertinent negatives for diabetes include no chest pain, no polydipsia, no polyphagia and no polyuria. There are no hypoglycemic complications. Symptoms are stable. Diabetic complications include nephropathy. Risk factors for coronary artery disease include hypertension, male sex and sedentary lifestyle. Current diabetic treatment includes diet. He is compliant with  treatment most of the time. He has not had a previous visit with a dietitian. Eye exam is not current.   Hypertension  This is a chronic problem. The current episode started more than 1 year ago. The problem is controlled. Pertinent negatives include no chest pain, orthopnea, palpitations or peripheral edema. There are no associated agents to hypertension. Risk factors for coronary artery disease include diabetes mellitus, male gender and sedentary lifestyle. Past treatments include beta blockers and calcium channel blockers. The current treatment provides significant improvement. There is no history of angina. Identifiable causes of hypertension include a thyroid problem. There is no history of a hypertension causing med.   Thyroid Problem  Presents for follow-up visit. Symptoms include hoarse voice. Patient reports no palpitations. The symptoms have been stable.     Review of Systems   Constitutional: Negative.    HENT:  Positive for hoarse voice.    Eyes: Negative.    Respiratory: Negative.     Cardiovascular: Negative.  Negative for chest pain, palpitations and orthopnea.   Gastrointestinal: Negative.    Endocrine: Negative for polydipsia, polyphagia and polyuria.   Genitourinary: Negative.    Musculoskeletal:  Positive for back pain.   Integumentary:  Negative.   Neurological:  Positive for weakness. Negative for numbness.   Psychiatric/Behavioral: Negative.            Objective     Physical Exam       Assessment and Plan     1. Essential hypertension  -     Lipid Panel; Future; Expected date: 05/29/2025  -     Comprehensive Metabolic Panel; Future; Expected date: 05/29/2025  -     TSH; Future; Expected date: 05/29/2025    2. Weakness of both legs  -     Ambulatory Referral/Consult to Physical Therapy  -     TSH; Future; Expected date: 05/29/2025    3. Claudication  -     US Lower Extremity Arteries Bilateral; Future; Expected date: 05/29/2025    4. Lipoma of other specified sites    5. Gastroesophageal reflux  disease without esophagitis  -     famotidine (PEPCID) 20 MG tablet; Take 1 tablet (20 mg total) by mouth every evening.  Dispense: 90 tablet; Refill: 3    6. Balance problems  -     Ambulatory Referral/Consult to Physical Therapy    7. Examination of eyes and vision  -     Ambulatory referral/consult to Optometry; Future; Expected date: 06/05/2025    8. Stage 3a chronic kidney disease  -     Comprehensive Metabolic Panel; Future; Expected date: 05/29/2025  -     CBC Auto Differential; Future; Expected date: 05/29/2025    9. Microalbuminuria  -     Microalbumin/Creatinine Ratio, Urine; Future; Expected date: 05/29/2025    10. Type 2 diabetes mellitus with hyperglycemia, without long-term current use of insulin  -     Lipid Panel; Future; Expected date: 05/29/2025  -     Hemoglobin A1C; Future; Expected date: 05/29/2025  -     Comprehensive Metabolic Panel; Future; Expected date: 05/29/2025  -     CBC Auto Differential; Future; Expected date: 05/29/2025    11. Hypothyroidism, unspecified type  -     Comprehensive Metabolic Panel; Future; Expected date: 05/29/2025  -     TSH; Future; Expected date: 05/29/2025    Anti reflux measures.  Continue monitoring blood pressure at home, low sodium diet.   Continue monitoring blood sugar at home,ADA diet.   I spent a total of 44 minutes on the day of the visit.This includes face to face time and non-face to face time preparing to see the patient (eg, review of tests), obtaining and/or reviewing separately obtained history, documenting clinical information in the electronic or other health record, independently interpreting results and communicating results to the patient/family/caregiver, or care coordinator.        Follow up in about 6 months (around 11/29/2025), or if symptoms worsen or fail to improve.

## 2025-06-03 ENCOUNTER — PROCEDURE VISIT (OUTPATIENT)
Dept: OTOLARYNGOLOGY | Facility: CLINIC | Age: OVER 89
End: 2025-06-03
Payer: MEDICAID

## 2025-06-03 VITALS — SYSTOLIC BLOOD PRESSURE: 178 MMHG | DIASTOLIC BLOOD PRESSURE: 61 MMHG | HEART RATE: 63 BPM

## 2025-06-03 DIAGNOSIS — R49.0 DYSPHONIA: Primary | ICD-10-CM

## 2025-06-03 DIAGNOSIS — J38.01 UNILATERAL PARTIAL VOCAL FOLD PARALYSIS: ICD-10-CM

## 2025-06-03 DIAGNOSIS — Q31.9 DYSPLASIA OF LARYNX: ICD-10-CM

## 2025-06-03 PROCEDURE — 31572 LARGSC W/LASER DSTRJ LES: CPT | Mod: PBBFAC | Performed by: OTOLARYNGOLOGY

## 2025-06-03 PROCEDURE — 31572 LARGSC W/LASER DSTRJ LES: CPT | Mod: S$PBB,RT,, | Performed by: OTOLARYNGOLOGY

## 2025-06-06 ENCOUNTER — HOSPITAL ENCOUNTER (OUTPATIENT)
Dept: RADIOLOGY | Facility: HOSPITAL | Age: OVER 89
Discharge: HOME | End: 2025-06-06
Attending: FAMILY MEDICINE
Payer: MEDICAID

## 2025-06-06 DIAGNOSIS — I73.9 CLAUDICATION: ICD-10-CM

## 2025-06-06 PROCEDURE — 93925 LOWER EXTREMITY STUDY: CPT | Mod: TC

## 2025-06-06 PROCEDURE — 93925 LOWER EXTREMITY STUDY: CPT | Mod: 26,,, | Performed by: RADIOLOGY

## 2025-06-12 ENCOUNTER — RESULTS FOLLOW-UP (OUTPATIENT)
Dept: FAMILY MEDICINE | Facility: CLINIC | Age: OVER 89
End: 2025-06-12

## 2025-06-12 ENCOUNTER — LAB VISIT (OUTPATIENT)
Dept: LAB | Facility: HOSPITAL | Age: OVER 89
End: 2025-06-12
Attending: INTERNAL MEDICINE
Payer: MEDICARE

## 2025-06-12 DIAGNOSIS — N18.32 ANEMIA IN STAGE 3B CHRONIC KIDNEY DISEASE: ICD-10-CM

## 2025-06-12 DIAGNOSIS — N25.81 SECONDARY HYPERPARATHYROIDISM OF RENAL ORIGIN: ICD-10-CM

## 2025-06-12 DIAGNOSIS — I73.9 PAD (PERIPHERAL ARTERY DISEASE): Primary | ICD-10-CM

## 2025-06-12 DIAGNOSIS — D63.1 ANEMIA IN STAGE 3B CHRONIC KIDNEY DISEASE: ICD-10-CM

## 2025-06-12 DIAGNOSIS — E11.65 TYPE 2 DIABETES MELLITUS WITH HYPERGLYCEMIA, WITHOUT LONG-TERM CURRENT USE OF INSULIN: ICD-10-CM

## 2025-06-12 LAB
ABSOLUTE EOSINOPHIL (OHS): 0.41 K/UL
ABSOLUTE MONOCYTE (OHS): 0.44 K/UL (ref 0.3–1)
ABSOLUTE NEUTROPHIL COUNT (OHS): 1.44 K/UL (ref 1.8–7.7)
ALBUMIN SERPL BCP-MCNC: 3.7 G/DL (ref 3.5–5.2)
ALBUMIN/CREAT UR: 160.4 UG/MG
ANION GAP (OHS): 8 MMOL/L (ref 8–16)
BASOPHILS # BLD AUTO: 0.04 K/UL
BASOPHILS NFR BLD AUTO: 0.9 %
BILIRUB UR QL STRIP.AUTO: NEGATIVE
BUN SERPL-MCNC: 25 MG/DL (ref 10–30)
CALCIUM SERPL-MCNC: 9.2 MG/DL (ref 8.7–10.5)
CHLORIDE SERPL-SCNC: 106 MMOL/L (ref 95–110)
CLARITY UR: CLEAR
CO2 SERPL-SCNC: 23 MMOL/L (ref 23–29)
COLOR UR AUTO: YELLOW
CREAT SERPL-MCNC: 1.3 MG/DL (ref 0.5–1.4)
CREAT UR-MCNC: 51.3 MG/DL (ref 23–375)
CREAT UR-MCNC: 53 MG/DL (ref 23–375)
EAG (OHS): 154 MG/DL (ref 68–131)
ERYTHROCYTE [DISTWIDTH] IN BLOOD BY AUTOMATED COUNT: 13.1 % (ref 11.5–14.5)
GFR SERPLBLD CREATININE-BSD FMLA CKD-EPI: 51 ML/MIN/1.73/M2
GLUCOSE SERPL-MCNC: 124 MG/DL (ref 70–110)
GLUCOSE UR QL STRIP: NEGATIVE
HBA1C MFR BLD: 7 % (ref 4–5.6)
HCT VFR BLD AUTO: 35.4 % (ref 40–54)
HGB BLD-MCNC: 11.9 GM/DL (ref 14–18)
HGB UR QL STRIP: NEGATIVE
IMM GRANULOCYTES # BLD AUTO: 0.01 K/UL (ref 0–0.04)
IMM GRANULOCYTES NFR BLD AUTO: 0.2 % (ref 0–0.5)
KETONES UR QL STRIP: NEGATIVE
LEUKOCYTE ESTERASE UR QL STRIP: NEGATIVE
LYMPHOCYTES # BLD AUTO: 2.24 K/UL (ref 1–4.8)
MAGNESIUM SERPL-MCNC: 1.9 MG/DL (ref 1.6–2.6)
MCH RBC QN AUTO: 32.8 PG (ref 27–31)
MCHC RBC AUTO-ENTMCNC: 33.6 G/DL (ref 32–36)
MCV RBC AUTO: 98 FL (ref 82–98)
MICROALBUMIN UR-MCNC: 85 UG/ML (ref ?–5000)
NITRITE UR QL STRIP: NEGATIVE
NUCLEATED RBC (/100WBC) (OHS): 0 /100 WBC
PH UR STRIP: 6 [PH]
PHOSPHATE SERPL-MCNC: 3.5 MG/DL (ref 2.7–4.5)
PLATELET # BLD AUTO: 140 K/UL (ref 150–450)
PLATELET BLD QL SMEAR: ABNORMAL
PMV BLD AUTO: 10.1 FL (ref 9.2–12.9)
POTASSIUM SERPL-SCNC: 4.4 MMOL/L (ref 3.5–5.1)
PROT UR QL STRIP: NEGATIVE
PROT UR-MCNC: 17 MG/DL
PROT/CREAT UR: 0.33 MG/G{CREAT}
PTH-INTACT SERPL-MCNC: 47.9 PG/ML (ref 9–77)
RBC # BLD AUTO: 3.63 M/UL (ref 4.6–6.2)
RELATIVE EOSINOPHIL (OHS): 9 %
RELATIVE LYMPHOCYTE (OHS): 48.9 % (ref 18–48)
RELATIVE MONOCYTE (OHS): 9.6 % (ref 4–15)
RELATIVE NEUTROPHIL (OHS): 31.4 % (ref 38–73)
SODIUM SERPL-SCNC: 137 MMOL/L (ref 136–145)
SP GR UR STRIP: 1.01
URATE SERPL-MCNC: 4.7 MG/DL (ref 3.4–7)
UROBILINOGEN UR STRIP-ACNC: NEGATIVE EU/DL
WBC # BLD AUTO: 4.58 K/UL (ref 3.9–12.7)

## 2025-06-12 PROCEDURE — 85025 COMPLETE CBC W/AUTO DIFF WBC: CPT

## 2025-06-12 PROCEDURE — 83970 ASSAY OF PARATHORMONE: CPT

## 2025-06-12 PROCEDURE — 84156 ASSAY OF PROTEIN URINE: CPT

## 2025-06-12 PROCEDURE — 82043 UR ALBUMIN QUANTITATIVE: CPT

## 2025-06-12 PROCEDURE — 36415 COLL VENOUS BLD VENIPUNCTURE: CPT

## 2025-06-12 PROCEDURE — 84550 ASSAY OF BLOOD/URIC ACID: CPT

## 2025-06-12 PROCEDURE — 80069 RENAL FUNCTION PANEL: CPT

## 2025-06-12 PROCEDURE — 81003 URINALYSIS AUTO W/O SCOPE: CPT

## 2025-06-12 PROCEDURE — 83036 HEMOGLOBIN GLYCOSYLATED A1C: CPT

## 2025-06-12 PROCEDURE — 83735 ASSAY OF MAGNESIUM: CPT

## 2025-06-13 ENCOUNTER — CLINICAL SUPPORT (OUTPATIENT)
Dept: REHABILITATION | Facility: HOSPITAL | Age: OVER 89
End: 2025-06-13
Payer: MEDICARE

## 2025-06-13 DIAGNOSIS — M54.42 CHRONIC MIDLINE LOW BACK PAIN WITH BILATERAL SCIATICA: Primary | ICD-10-CM

## 2025-06-13 DIAGNOSIS — R68.89 DECREASED STRENGTH, ENDURANCE, AND MOBILITY: ICD-10-CM

## 2025-06-13 DIAGNOSIS — Z74.09 DECREASED STRENGTH, ENDURANCE, AND MOBILITY: ICD-10-CM

## 2025-06-13 DIAGNOSIS — M54.41 CHRONIC MIDLINE LOW BACK PAIN WITH BILATERAL SCIATICA: Primary | ICD-10-CM

## 2025-06-13 DIAGNOSIS — R53.1 DECREASED STRENGTH, ENDURANCE, AND MOBILITY: ICD-10-CM

## 2025-06-13 DIAGNOSIS — G89.29 CHRONIC MIDLINE LOW BACK PAIN WITH BILATERAL SCIATICA: Primary | ICD-10-CM

## 2025-06-13 PROCEDURE — 97161 PT EVAL LOW COMPLEX 20 MIN: CPT | Mod: PN

## 2025-06-13 PROCEDURE — 97530 THERAPEUTIC ACTIVITIES: CPT | Mod: PN

## 2025-06-13 NOTE — PROGRESS NOTES
Outpatient Rehab    Physical Therapy Evaluation    Patient Name: Vince Martinez  MRN: [id number]  YOB: 1930  Encounter Date: 6/13/2025    Therapy Diagnosis:   Encounter Diagnoses   Name Primary?    Chronic midline low back pain with bilateral sciatica Yes    Decreased strength, endurance, and mobility      Physician: Shlomo Luong*    Physician Orders: Eval and Treat  Medical Diagnosis: Weakness of both legs  Balance problems  Surgical Diagnosis: Not applicable for this Episode   Surgical Date: Not applicable for this Episode  Days Since Last Surgery: Not applicable for this Episode    Visit # / Visits Authorized:  1 / 1  Insurance Authorization Period: 5/29/2025 to 12/31/2025  Date of Evaluation: 6/13/2025  Plan of Care Certification: 6/13/2025 to 8/13/2025     Time In:  1105  Time Out:  1200  Total Time (in minutes):   55  Total Billable Time (in minutes):  55    Intake Outcome Measure for FOTO Survey    Therapist reviewed FOTO scores for Vince Martinez on 6/13/2025.   FOTO report - see Media section or FOTO account episode details.     Intake Score:  %    Precautions:       Subjective   History of Present Illness  Vince is a 89 y.o. male who reports to physical therapy with a chief concern of LE weakness.     The patient reports a medical diagnosis of R29.898 (ICD-10-CM) - Weakness of both legs  R26.89 (ICD-10-CM) - Balance problems.    Diagnostic tests related to this condition: X-ray.        History of Present Condition/Illness: Patient reports that he is coming to physical therapy for weakness in his legs along with low back pain, hip pain, neck pain, and shoulder pain. Patient also reports having some balance problems, but he has not had any falls. Pain is typically worst at night and whenever he increased his activity levels. Easing factors include sitting down and resting.     Pain     Patient reports a current pain level of 5/10. Pain at best is reported as 3/10. Pain at worst is  reported as 9/10.   Location: Knee  Clinical Progression (since onset): Worsening  Pain Qualities: Aching, Burning  Pain-Relieving Factors: Rest  Pain-Aggravating Factors: Bending, Movement, Stair climbing, Squatting, Walking           Past Medical History/Physical Systems Review:   Vince Martinez  has a past medical history of Acute combined systolic and diastolic CHF, NYHA class 3, Arthritis, Bilateral renal cysts, Chronic pain, Diabetes mellitus, DJD (degenerative joint disease), Hyperkalemia, Hypertension, Iron deficiency anemia, Low back pain, Osteopenia of neck of right femur, Prostate enlargement, Sleep apnea, Stage 3b chronic kidney disease, and Thyroid disease.    Vince Martinez  has a past surgical history that includes Injection of joint (Bilateral, 02/13/2020); Epidural steroid injection into lumbar spine (N/A, 06/04/2020); Cardiac pacemaker placement; Injection of steroid (Bilateral, 01/12/2021); Transforaminal epidural injection of steroid (Left, 02/23/2021); Hemilaminectomy (05/26/2021); Spine surgery; Injection of joint (Bilateral, 03/22/2022); and Spine surgery (Bilateral).    Vince has a current medication list which includes the following prescription(s): acetaminophen, amlodipine, aspirin, atorvastatin, betamethasone dipropionate, blood sugar diagnostic, blood-glucose meter, calcium/d3/mag ox//carolyn/zn, diclofenac sodium, trulicity, famotidine, finasteride, folic acid, multivit-min/folic acid/lutein, furosemide, glipizide, ketoconazole, ketoconazole, lancets, lancing device, levothyroxine, linaclotide, melatonin, metoprolol succinate, fish oil-omega-3 fatty acids, pantoprazole, pregabalin, and tamsulosin.    Review of patient's allergies indicates:   Allergen Reactions    Bactrim [sulfamethoxazole-trimethoprim] Rash    Ciprofloxacin Rash    Janumet [sitagliptin phos-metformin] Nausea Only     Severe GERD    Latex Rash        Objective      Lumbar Range of Motion   Active (deg) Passive (deg) Pain  "  Flexion 60       Extension 10       Right Lateral Flexion  (Limited)       Right Rotation  (Limited)       Left Lateral Flexion  (Limited)       Left Rotation  (Limited)                Hip Range of Motion   Right Hip   Active (deg) Passive (deg) Pain   Flexion         Extension         ABduction         ADduction         External Rotation 90/90 20       External Rotation Prone         Internal Rotation 90/90 20       Internal Rotation Prone             Left Hip   Active (deg) Passive (deg) Pain   Flexion         Extension         ABduction         ADduction         External Rotation 90/90 20       External Rotation Prone         Internal Rotation 90/90 20       Internal Rotation Prone                            Hip Strength - Planes of Motion   Right Strength Right Pain Left Strength Left  Pain   Flexion (L2) 4   4     Extension 4   4     ABduction 4   4     ADduction 4   4     Internal Rotation 4   4     External Rotation 4   4         Knee Strength   Right Strength Right Pain Left Strength Left  Pain   Flexion (S2) 4   4     Prone Flexion           Extension (L3) 4+   4+                   Fall Risk  Functional mobility test results suggest the patient is not: At Risk for Falls  Sit to Stand Testing      The patient completed 9 repetitions of a sit to stand transfer in 30 seconds. no UE assist              Treatment:  Therapeutic Activity  TA 1: SKTC: 10x10"  TA 2: Bridges: 2x10  TA 3: LTR: 20x    Time Entry(in minutes):       Assessment & Plan   Assessment  Vince presents with a condition of Low complexity.   Presentation of Symptoms: Stable       Functional Limitations: Activity tolerance, Completing work/school activities, Pain when reaching, Pain with ADLs/IADLs, Sitting tolerance, Disrupted sleep pattern, Range of motion, Performing household chores, Participating in leisure activities, Painful locomotion/ambulation  Impairments: Activity intolerance, Abnormal or restricted range of motion, Impaired physical " strength, Lack of appropriate home exercise program, Pain with functional activity    Prognosis: Excellent  Assessment Details: Patient is a 89 year old male referred to physical therapy for lower extremity weakness and balance deficits. Patient reports chronic low back pain and demonstrates deficits with range of motion, strength, and function that limit ability to participate in school, work, and recreational activities. Primary deficits include hip/lumbar range of motion deficits and gross lower extremity strength, endurance, and mobility deficits. They would benefit from skilled PT services to normalize kinetic chain mobility, strength, and function to safely return to their prior level of activity.    Plan  From a physical therapy perspective, the patient would benefit from: Skilled Rehab Services    Planned therapy interventions include: Therapeutic exercise, Therapeutic activities, Neuromuscular re-education, Manual therapy, ADLs/IADLs, and Gait training.    Planned modalities to include: Cryotherapy (cold pack), Thermotherapy (hot pack), Mechanical traction, Other (Comment), and Electrical stimulation - passive/unattended. Dry needling prn      Visit Frequency: 2 times Per Week for 6 Weeks.  Other/tapered frequency details: frequency and duration will be adjusted PRN    This plan was discussed with Patient and Therapy assistant.   Discussion participants: Agreed Upon Plan of Care             The patient's spiritual, cultural, and educational needs were considered, and the patient is agreeable to the plan of care and goals.           Goals:   Active       Ambulation/movement       Patient will walk 1 mile pain free.       Start:  06/27/25    Expected End:  08/08/25               Pain       Patient will report pain of 0/10 demonstrating a reduction of overall pain       Start:  06/27/25    Expected End:  08/08/25               Range of Motion       Patient will achieve spinal flexion to 75 degrees.        Start:  06/27/25    Expected End:  08/08/25               Strength       Patient will achieve bilateral hip flexion strength of 5/5       Start:  06/27/25    Expected End:  08/08/25            Patient will achieve bilateral hip abduction strength of 5/5       Start:  06/27/25    Expected End:  08/08/25            Patient will achieve bilateral knee flexion strength of 5/5       Start:  06/27/25    Expected End:  08/08/25            Patient will achieve bilateral knee extension strength of 5/5       Start:  06/27/25    Expected End:  08/08/25                Kar Vargas, PT

## 2025-06-25 ENCOUNTER — OFFICE VISIT (OUTPATIENT)
Dept: OPTOMETRY | Facility: CLINIC | Age: OVER 89
End: 2025-06-25
Payer: COMMERCIAL

## 2025-06-25 DIAGNOSIS — H43.393 VISUAL FLOATERS, BILATERAL: Primary | ICD-10-CM

## 2025-06-25 DIAGNOSIS — Z01.00 EXAMINATION OF EYES AND VISION: ICD-10-CM

## 2025-06-25 DIAGNOSIS — H52.203 MYOPIA WITH ASTIGMATISM AND PRESBYOPIA, BILATERAL: ICD-10-CM

## 2025-06-25 DIAGNOSIS — Z96.1 PSEUDOPHAKIA: ICD-10-CM

## 2025-06-25 DIAGNOSIS — E11.9 TYPE 2 DIABETES MELLITUS WITHOUT RETINOPATHY: ICD-10-CM

## 2025-06-25 DIAGNOSIS — H52.13 MYOPIA WITH ASTIGMATISM AND PRESBYOPIA, BILATERAL: ICD-10-CM

## 2025-06-25 DIAGNOSIS — H53.2 TRANSIENT DIPLOPIA: ICD-10-CM

## 2025-06-25 DIAGNOSIS — H52.4 MYOPIA WITH ASTIGMATISM AND PRESBYOPIA, BILATERAL: ICD-10-CM

## 2025-06-25 DIAGNOSIS — H50.22 HYPERTROPIA OF LEFT EYE: ICD-10-CM

## 2025-06-25 PROCEDURE — 99999 PR PBB SHADOW E&M-EST. PATIENT-LVL III: CPT | Mod: PBBFAC,,, | Performed by: OPTOMETRIST

## 2025-06-25 PROCEDURE — 92015 DETERMINE REFRACTIVE STATE: CPT | Mod: S$GLB,,, | Performed by: OPTOMETRIST

## 2025-06-25 PROCEDURE — 92004 COMPRE OPH EXAM NEW PT 1/>: CPT | Mod: S$GLB,,, | Performed by: OPTOMETRIST

## 2025-06-25 NOTE — PROGRESS NOTES
HPI    TINO: 2024  Chief complaint (CC): Patient here to establish care and for ocular health   check  States that he is not able to see up close as well as before.   Glasses? + year old rx  Contacts? -  H/o eye surgery, injections or laser: PCIOL SX OU  H/o eye injury: -  Known eye conditions? -  Family h/o eye conditions? -  Eye gtts? -      (-) Flashes (++)  Floaters (-) Mucous   (-)  Tearing (-) Itching (-) Burning   (-) Headaches (-) Eye Pain/discomfort (-) Irritation   (-)  Redness (+) Double vision- at distance, only sometimes and can happen   at any time of the day, diplopia is vertical, goes away when he covers the   left eye, lasts for only 20 minutes, last occurred yesterday (+) Blurry   vision    Diabetic? -  A1c? Hemoglobin A1C            03/24/2025               7.1 (H)             4.0 - 5.6 %           Final              Last edited by Jesús Mendez, OD on 6/25/2025  2:13 PM.            Assessment /Plan     For exam results, see Encounter Report.      Visual floaters, bilateral  No e/o h/b/t 360 degrees OU. Monitor for worsening of symptoms or S/Sx of RD.     Examination of eyes and vision  -     Ambulatory referral/consult to Optometry    Transient diplopia  Hypertropia of left eye  Unable to elicit in clinic. Seldom occurs per pt but lasts for about 20 min. Last instance was yesterday. Monitor. RTC STAT if S/Sx get worse.     Type 2 diabetes mellitus without retinopathy  BS control. No signs of diabetic retinopathy. Monitor with annual exam.    Pseudophakia  Good result.     Myopia with astigmatism and presbyopia, bilateral  SRx released to patient. Patient educated on lens options. Normal ocular health. RTC 1 year for routine exam.

## 2025-06-27 ENCOUNTER — CLINICAL SUPPORT (OUTPATIENT)
Dept: REHABILITATION | Facility: HOSPITAL | Age: OVER 89
End: 2025-06-27
Payer: MEDICARE

## 2025-06-27 DIAGNOSIS — Z74.09 DECREASED STRENGTH, ENDURANCE, AND MOBILITY: ICD-10-CM

## 2025-06-27 DIAGNOSIS — M54.41 CHRONIC MIDLINE LOW BACK PAIN WITH BILATERAL SCIATICA: Primary | ICD-10-CM

## 2025-06-27 DIAGNOSIS — G89.29 CHRONIC MIDLINE LOW BACK PAIN WITH BILATERAL SCIATICA: Primary | ICD-10-CM

## 2025-06-27 DIAGNOSIS — M54.42 CHRONIC MIDLINE LOW BACK PAIN WITH BILATERAL SCIATICA: Primary | ICD-10-CM

## 2025-06-27 DIAGNOSIS — R53.1 DECREASED STRENGTH, ENDURANCE, AND MOBILITY: ICD-10-CM

## 2025-06-27 DIAGNOSIS — R68.89 DECREASED STRENGTH, ENDURANCE, AND MOBILITY: ICD-10-CM

## 2025-06-27 PROCEDURE — 97530 THERAPEUTIC ACTIVITIES: CPT | Mod: PN

## 2025-06-27 PROCEDURE — 97112 NEUROMUSCULAR REEDUCATION: CPT | Mod: PN

## 2025-06-27 NOTE — PROGRESS NOTES
"  Outpatient Rehab    Physical Therapy Visit    Patient Name: Vince Martinez  MRN: [id number]  YOB: 1930  Encounter Date: 6/27/2025    Therapy Diagnosis:   Encounter Diagnoses   Name Primary?    Chronic midline low back pain with bilateral sciatica Yes    Decreased strength, endurance, and mobility      Physician: Shlomo Luong*    Physician Orders: Eval and Treat  Medical Diagnosis: Weakness of both legs  Balance problems  Surgical Diagnosis: Not applicable for this Episode   Surgical Date: Not applicable for this Episode  Days Since Last Surgery: Not applicable for this Episode    Visit # / Visits Authorized:  1 / 20  Insurance Authorization Period: 6/13/2025 to 12/31/2025  Date of Evaluation: 6/13/2025  Plan of Care Certification: 6/27/2025 to 8/13/2025      PT/PTA:     Number of PTA visits since last PT visit:   Time In:  900  Time Out:  955  Total Time (in minutes):   55  Total Billable Time (in minutes):   30    FOTO:  Intake Score:  %  Survey Score 2:  %  Survey Score 3:  %    Precautions:       Subjective doing okay today, but his hips are giving him some pain.   Pain: 4/10 - low back/hips    Objective            Treatment:  Balance/Neuromuscular Re-Education: 30 minutes   Bridges: 3x10  Hook lying hip abduction iso: 5"x20 - blue TB  SLR: 2x10 - bilateral  Side lying clamshells: 2x10 - bilateral  Standing hip abduction: 2x10 - red theraband    Therapeutic Activity: 25 minutes   SKTC: 10x10"  Hook lying bent knee fall outs: 5"x20  Nu-step: 8'xL4  Lateral step overs: 2x10 - green step    Time Entry(in minutes): 55 minutes        Assessment & Plan   Assessment:  Patient is a 89 year old male referred to physical therapy for lower extremity weakness and balance deficits. Patient reports chronic low back pain and demonstrates deficits with range of motion, strength, and function that limit ability to participate in school, work, and recreational activities. Primary deficits include " hip/lumbar range of motion deficits and gross lower extremity strength, endurance, and mobility deficits. Planning to monitor response to first follow up next week.     The patient will continue to benefit from skilled outpatient physical therapy in order to address the deficits listed in the problem list on the initial evaluation, provide patient and family education, and maximize the patients level of independence in the home and community environments.     The patient's spiritual, cultural, and educational needs were considered, and the patient is agreeable to the plan of care and goals.           Plan:  continue PT plan of care    Goals:   Active       Ambulation/movement       Patient will walk 1 mile pain free.       Start:  06/27/25    Expected End:  08/08/25               Pain       Patient will report pain of 0/10 demonstrating a reduction of overall pain       Start:  06/27/25    Expected End:  08/08/25               Range of Motion       Patient will achieve spinal flexion to 75 degrees.       Start:  06/27/25    Expected End:  08/08/25               Strength       Patient will achieve bilateral hip flexion strength of 5/5       Start:  06/27/25    Expected End:  08/08/25            Patient will achieve bilateral hip abduction strength of 5/5       Start:  06/27/25    Expected End:  08/08/25            Patient will achieve bilateral knee flexion strength of 5/5       Start:  06/27/25    Expected End:  08/08/25            Patient will achieve bilateral knee extension strength of 5/5       Start:  06/27/25    Expected End:  08/08/25                Kar Vargas, PT

## 2025-07-02 ENCOUNTER — CLINICAL SUPPORT (OUTPATIENT)
Dept: REHABILITATION | Facility: HOSPITAL | Age: OVER 89
End: 2025-07-02
Payer: MEDICARE

## 2025-07-02 DIAGNOSIS — R68.89 DECREASED STRENGTH, ENDURANCE, AND MOBILITY: ICD-10-CM

## 2025-07-02 DIAGNOSIS — G89.29 CHRONIC MIDLINE LOW BACK PAIN WITH BILATERAL SCIATICA: Primary | ICD-10-CM

## 2025-07-02 DIAGNOSIS — M54.41 CHRONIC MIDLINE LOW BACK PAIN WITH BILATERAL SCIATICA: Primary | ICD-10-CM

## 2025-07-02 DIAGNOSIS — Z74.09 DECREASED STRENGTH, ENDURANCE, AND MOBILITY: ICD-10-CM

## 2025-07-02 DIAGNOSIS — M54.42 CHRONIC MIDLINE LOW BACK PAIN WITH BILATERAL SCIATICA: Primary | ICD-10-CM

## 2025-07-02 DIAGNOSIS — R53.1 DECREASED STRENGTH, ENDURANCE, AND MOBILITY: ICD-10-CM

## 2025-07-02 PROCEDURE — 97530 THERAPEUTIC ACTIVITIES: CPT | Mod: PN

## 2025-07-02 PROCEDURE — 97112 NEUROMUSCULAR REEDUCATION: CPT | Mod: PN

## 2025-07-02 NOTE — PROGRESS NOTES
"  Outpatient Rehab    Physical Therapy Visit    Patient Name: Vince Martinez  MRN: [id number]  YOB: 1930  Encounter Date: 7/2/2025    Therapy Diagnosis:   Encounter Diagnoses   Name Primary?    Chronic midline low back pain with bilateral sciatica Yes    Decreased strength, endurance, and mobility      Physician: Shlomo Luong*    Physician Orders: Eval and Treat  Medical Diagnosis: Weakness of both legs  Balance problems  Surgical Diagnosis: Not applicable for this Episode   Surgical Date: Not applicable for this Episode  Days Since Last Surgery: Not applicable for this Episode    Visit # / Visits Authorized:  2 / 20  Insurance Authorization Period: 6/13/2025 to 12/31/2025  Date of Evaluation: 6/13/2025  Plan of Care Certification: 6/27/2025 to 8/13/2025      PT/PTA:     Number of PTA visits since last PT visit:   Time In:  1005  Time Out:  1100  Total Time (in minutes):   55  Total Billable Time (in minutes):   30    FOTO:  Intake Score:  %  Survey Score 2:  %  Survey Score 3:  %    Precautions:       Subjective tolerated first follow up well. Low back pain is feeling about the same.   Pain: 4/10 - low back/hips    Objective            Treatment:  Balance/Neuromuscular Re-Education: 30 minutes   Bridges: 3x10  Hook lying hip abduction iso: 5"x20 - blue TB  SLR: 2x10 - bilateral  Side lying clamshells: 2x10 - bilateral  Standing hip abduction: 2x10 - red theraband    Therapeutic Activity: 25 minutes   SKTC: 10x10"  Hook lying bent knee fall outs: 5"x20  Nu-step: 8'xL4  Lateral step overs: 2x10 - green step    Time Entry(in minutes): 30 minutes        Assessment & Plan   Assessment:  Patient is a 89 year old male referred to physical therapy for lower extremity weakness and balance deficits. Patient reports chronic low back pain and demonstrates deficits with range of motion, strength, and function that limit ability to participate in school, work, and recreational activities. Primary deficits " include hip/lumbar range of motion deficits and gross lower extremity strength, endurance, and mobility deficits. Patient tolerating plan of care well thus far.     The patient will continue to benefit from skilled outpatient physical therapy in order to address the deficits listed in the problem list on the initial evaluation, provide patient and family education, and maximize the patients level of independence in the home and community environments.     The patient's spiritual, cultural, and educational needs were considered, and the patient is agreeable to the plan of care and goals.           Plan:  continue PT plan of care    Goals:   Active       Ambulation/movement       Patient will walk 1 mile pain free.       Start:  06/27/25    Expected End:  08/08/25               Pain       Patient will report pain of 0/10 demonstrating a reduction of overall pain       Start:  06/27/25    Expected End:  08/08/25               Range of Motion       Patient will achieve spinal flexion to 75 degrees.       Start:  06/27/25    Expected End:  08/08/25               Strength       Patient will achieve bilateral hip flexion strength of 5/5       Start:  06/27/25    Expected End:  08/08/25            Patient will achieve bilateral hip abduction strength of 5/5       Start:  06/27/25    Expected End:  08/08/25            Patient will achieve bilateral knee flexion strength of 5/5       Start:  06/27/25    Expected End:  08/08/25            Patient will achieve bilateral knee extension strength of 5/5       Start:  06/27/25    Expected End:  08/08/25                Kar Vargas, PT

## 2025-07-03 NOTE — PROGRESS NOTES
[facility] Consult Note    Referring provider: Nura Hernandez MD    Assessment:  Vince Martinez is a 89 y.o. 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   Chronic R TVF motion impariment  Pacemaker.   ECOG: (1) Restricted in physically strenuous activity, ambulatory and able to do work of light nature      Plan:  Discussed pathology with in office biopsy, possiblity of more superficial sampling, wherein this may represent a more invasive process.   Treatment options were discussed with the patient including TLS, radiotherapy, and observation.  We discussed the goals of treatment.  The risks, benefits, scheduling, alternatives to and rationale of radiation therapy were explained in detail.    Discussed both short term and long term risks of radiotherapy and close observation.   After this discussion, he will meet with H&N surgery and I will see them again in a week to finalize plans.     Entire visit assisted by  Adrian PUENTE      Oncologic History:  He has a PMHx as below and has followed with Dr. 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  He is a lifetime nonsmoker and met with Dr. Hernandez in 10/2024 with gradual onset dysphonia. Flex laryngeal videostroboscopy demonstrated variable mild right vocal fold motion impairment with granulation at right vocal process, sessile papillomatous change along posterior 2/3 of right true vocal fold, including medial arytenoid. 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    Subsequent CT neck on 10/16/24 demonstrated medialized right vocal cord corresponds to history right vocal cord paralysis, but no evidence for soft tissue mass or adenopathy throughout the neck to correspond with right vocal cord paralysis. 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    November 15, 2024 he underwent Transnasal magnified laryngoscopy with pulsed KTP laser photoablation of bilateral vocal fold lesions. He underwent this procedure a total of 5 times, most recently 6/3/25. His biopsies and photoablation have been in clinic as he is averse to sedation.  CT neck 5/19/25 demonstrated Nodular appearing right vocal cord in this patient status post laryngoscopy and photoablation of a right focal cord lesion. Could represent residual lesion or granulation tissue along the right vocal cord.   Per laryngology, he has reduced the burden of his disease by about 70% from initial, yet the patient has persistent bulky disease along his right arytenoid.  This region is difficult to treat in the office because it is very sensate and because the arytenoid moves fairly consistently through the respiratory cycle.  He is uninterested in going under general anesthesia due to generalized neurocognitive risk.      Possibility of pregnancy: N/A  History of prior irradiation: No  History of prior systemic anti-cancer therapy: No  History of collagen vascular disease: No  Implanted electronic device (pacer/defib/nerve stimulator): Yes -  Pacemaker     History of Present Illness:  Vince Martinez presents today to discuss the role of radiotherapy.    He has no H&N complaints other than chronic voice changes. He lives with his son, independent in most ADLs. Does not cook. Spends most of his day up and around, not in bed or chair. No prior tobacco or alcohol use. No prior RT or chemo.     Her with family.     Review of Systems:  ROS as above    Social History:  Social History[1]    Past Medical History:  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    Arthritis     Bilateral renal cysts     Chronic pain     Diabetes mellitus     DJD (degenerative joint  disease)     Hyperkalemia     Hypertension     Iron deficiency anemia     Low back pain     Osteopenia of neck of right femur     Prostate enlargement     Sleep apnea     Stage 3b chronic kidney disease     Thyroid disease        Past Surgical History:   Procedure Laterality Date    CARDIAC PACEMAKER PLACEMENT      EPIDURAL STEROID INJECTION INTO LUMBAR SPINE N/A 06/04/2020    Procedure: Injection-steroid-epidural-lumbar--L4-5;  Surgeon: Angelica Norris Jr., MD;  Location: [facility];  Service: Pain Management;  Laterality: N/A;  sign consent at DOS.    HEMILAMINECTOMY  05/26/2021    Procedure: HEMILAMINECTOMY;  Surgeon: Dayton Sparks MD;  Location: [facility] OR;  Service: Neurosurgery;;  left L4-5    INJECTION OF JOINT Bilateral 02/13/2020    Procedure: Injection, Joint, Bilateral GTB;  Surgeon: Angelica Norris Jr., MD;  Location: [facility];  Service: Pain Management;  Laterality: Bilateral;    INJECTION OF JOINT Bilateral 03/22/2022    Procedure: bilateral GTB steriod injection;  Surgeon: Angelica Norris Jr., MD;  Location: [facility];  Service: Pain Management;  Laterality: Bilateral;  pacemaker   pt is diabetic     INJECTION OF STEROID Bilateral 01/12/2021    Procedure: INJECTION, STEROID Bilateral SI Joint Block and Steroid Injection;  Surgeon: Dayton Sparks MD;  Location: [facility] OR;  Service: Neurosurgery;  Laterality: Bilateral;  Procedure: Bilateral SI Joint Block and Steroid Injection  Surgery 't'kristen: 45 min  LOS: 0  Anesthesia:MAC  Radiology: C-arm  Bed: Regular with Pillows  Position: Prone     SPINE SURGERY      SPINE SURGERY Bilateral     TRANSFORAMINAL EPIDURAL INJECTION OF STEROID Left 02/23/2021    Procedure: Injection,steroid,epidural,transforaminal approach--Left L4-5 *Has Pacemaker/ICD*;  Surgeon: Angelica Norris Jr., MD;  Location: [facility];  Service: Pain Management;  Laterality: Left;         Medications:  Medications Ordered Prior to Encounter[2]    Allergies:  Review of  "patient's allergies indicates:   Allergen Reactions    Bactrim [sulfamethoxazole-trimethoprim] Rash    Ciprofloxacin Rash    Janumet [sitagliptin phos-metformin] Nausea Only     Severe GERD    Latex Rash       Exam:  Vitals:    07/08/25 0852   BP: (!) 186/62   Pulse: 83   Resp: 15   Temp: 98.3 °F (36.8 °C)   SpO2: 97%   Weight: 69.4 kg (153 lb)   Height: 5' 5" (1.651 m)     Constitutional: Pleasant 89 y.o. male in no acute distress.  Well nourished. Well groomed. Appears younger than stated age.  HEENT: no appreciated FOM, oral tongue, buccal, soft palate, hard palate, tonsillar fossa lesions.   Lymph: no appreciated cervical adenopathy  Cardiovascular: Upper extremities warm to touch  Lungs: No audible wheezing.  Normal effort.   Musculoskeletal: No gross MSK deformities. Ambulates without assistance  Skin: No rashes appreciated.  Psych: Alert and oriented with appropriate mood and affect.  Neuro:   Grossly normal.    Data Review:  Information obtained via chart review and discussion with Mr. Martinez and his family.       CT neck was reviewed with exophytic mass vs fullness in the posterior right vocal fold.           I spent a total of 40 minutes on the day of the visit.  This includes face to face time and non-face to face time preparing to see the patient (eg, review of tests), obtaining and/or reviewing separately obtained history, documenting clinical information in the electronic or other health record, independently interpreting results and communicating results to the patient/family/caregiver, or care coordinator.        Esa Cheema MD  Radiation Oncology           [1]   Social History  Tobacco Use    Smoking status: Never    Smokeless tobacco: Never   Substance Use Topics    Alcohol use: No    Drug use: No   [2]   Current Outpatient Medications on File Prior to Visit   Medication Sig Dispense Refill    acetaminophen (TYLENOL) 650 MG TbSR Take 650 mg by mouth as needed.      amLODIPine (NORVASC) 10 MG " tablet Take 10 mg by mouth once daily.      aspirin 81 MG Chew 1 tablet (81 mg total) by Per NG tube route once daily. 30 tablet 11    atorvastatin (LIPITOR) 10 MG tablet Take 1 tablet by mouth once daily 90 tablet 3    betamethasone dipropionate (DIPROLENE) 0.05 % lotion Apply 1 application topically nightly as needed.      blood sugar diagnostic Strp 1 strip by Misc.(Non-Drug; Combo Route) route once daily. 100 strip 3    blood-glucose meter kit Use as instructed 1 each 0    calcium/D3/mag ox//carolyn/Zn (CALTRATE + D3 PLUS MINERALS ORAL) Take 1 tablet by mouth 2 (two) times daily.      diclofenac sodium (VOLTAREN) 1 % Gel Apply 2 g topically daily as needed (pain). 200 g 3    dulaglutide (TRULICITY) 4.5 mg/0.5 mL pen injector Inject 4.5 mg into the skin every 7 days. 12 Pen 3    famotidine (PEPCID) 20 MG tablet Take 1 tablet (20 mg total) by mouth every evening. 90 tablet 3    finasteride (PROSCAR) 5 mg tablet Take 1 tablet by mouth once daily 90 tablet 0    folic acid (FOLVITE) 1 MG tablet Take 1 mg by mouth once daily.      folic acid/multivit-min/lutein (CENTRUM SILVER ORAL) Take by mouth.      furosemide (LASIX) 40 MG tablet Take 1 tablet (40 mg total) by mouth daily as needed (swelling). 30 tablet 3    glipiZIDE 5 MG TR24 Take 1 tablet (5 mg total) by mouth daily with breakfast. 90 tablet 3    ketoconazole (NIZORAL) 2 % cream Apply topically 2 (two) times daily.      ketoconazole (NIZORAL) 2 % shampoo SMARTSIG:Topical 2-3 Times Weekly      lancets (LANCETS,ULTRA THIN) Misc 1 lancet  by Misc.(Non-Drug; Combo Route) route once daily. 100 each 3    lancing device Misc 1 Device by Misc.(Non-Drug; Combo Route) route once daily at 6am. 1 each 0    levothyroxine (SYNTHROID) 100 MCG tablet TAKE 1 TABLET BY MOUTH BEFORE BREAKFAST 90 tablet 3    linaCLOtide (LINZESS) 290 mcg Cap capsule Take 1 capsule (290 mcg total) by mouth before breakfast. 90 capsule 3    melatonin 10 mg Tab Take 1 tablet by mouth nightly as  needed.      metoprolol succinate (TOPROL-XL) 25 MG 24 hr tablet Take 1 tablet (25 mg total) by mouth once daily. 90 tablet 3    omega-3 fatty acids/fish oil (FISH OIL-OMEGA-3 FATTY ACIDS) 300-1,000 mg capsule Take by mouth once daily.      pantoprazole (PROTONIX) 40 MG tablet TAKE 1 TABLET BY MOUTH BEFORE BREAKFAST 90 tablet 3    pregabalin (LYRICA) 50 MG capsule Take 1 capsule by mouth once daily 90 capsule 0    tamsulosin (FLOMAX) 0.4 mg Cap Take 2 capsules (0.8 mg total) by mouth once daily. 180 capsule 3     No current facility-administered medications on file prior to visit.

## 2025-07-07 ENCOUNTER — CLINICAL SUPPORT (OUTPATIENT)
Dept: REHABILITATION | Facility: HOSPITAL | Age: OVER 89
End: 2025-07-07
Payer: MEDICARE

## 2025-07-07 DIAGNOSIS — M54.42 CHRONIC MIDLINE LOW BACK PAIN WITH BILATERAL SCIATICA: Primary | ICD-10-CM

## 2025-07-07 DIAGNOSIS — R53.1 DECREASED STRENGTH, ENDURANCE, AND MOBILITY: ICD-10-CM

## 2025-07-07 DIAGNOSIS — R68.89 DECREASED STRENGTH, ENDURANCE, AND MOBILITY: ICD-10-CM

## 2025-07-07 DIAGNOSIS — Z74.09 DECREASED STRENGTH, ENDURANCE, AND MOBILITY: ICD-10-CM

## 2025-07-07 DIAGNOSIS — M54.41 CHRONIC MIDLINE LOW BACK PAIN WITH BILATERAL SCIATICA: Primary | ICD-10-CM

## 2025-07-07 DIAGNOSIS — G89.29 CHRONIC MIDLINE LOW BACK PAIN WITH BILATERAL SCIATICA: Primary | ICD-10-CM

## 2025-07-07 PROCEDURE — 97530 THERAPEUTIC ACTIVITIES: CPT | Mod: PN

## 2025-07-07 PROCEDURE — 97112 NEUROMUSCULAR REEDUCATION: CPT | Mod: PN

## 2025-07-07 NOTE — PROGRESS NOTES
"Outpatient Rehab    Physical Therapy Visit    Patient Name: Vince Martinez  MRN: [id number]  YOB: 1930  Encounter Date: 7/7/2025    Therapy Diagnosis:   Encounter Diagnoses   Name Primary?    Chronic midline low back pain with bilateral sciatica Yes    Decreased strength, endurance, and mobility      Physician: Shlomo Luong*    Physician Orders: Eval and Treat  Medical Diagnosis: Weakness of both legs  Balance problems  Surgical Diagnosis: Not applicable for this Episode   Surgical Date: Not applicable for this Episode  Days Since Last Surgery: Not applicable for this Episode    Visit # / Visits Authorized:  3 / 20  Insurance Authorization Period: 6/13/2025 to 12/31/2025  Date of Evaluation: 6/13/2025  Plan of Care Certification: 6/27/2025 to 8/13/2025      PT/PTA:     Number of PTA visits since last PT visit:   Time In:  1305  Time Out:  1400  Total Time (in minutes):   55  Total Billable Time (in minutes):   55    FOTO:  Intake Score:  %  Survey Score 2:  %  Survey Score 3:  %    Precautions:       Subjective  doing well today. Some low back pain after gardening. Patient reports improvement from therapy exercises.   Pain: 1/10 - low back/hips    Objective            Treatment:  Balance/Neuromuscular Re-Education: 30 minutes   Bridges: 3x10  Hook lying hip abduction iso: 5"x20 - blue TB  SLR: 2x10 - bilateral  Side lying clamshells: 2x10 - bilateral  Standing hip abduction: 2x10 - red theraband  Cybex leg press: 2x10 - 4 plates     Therapeutic Activity: 25 minutes   Nu-step: 8'xL4  SKTC: 10x10"  Piriformis stretch: 3x30" - bilateral   Standing hamstring stretch at stairs: 10x10"    Time Entry(in minutes): 30 minutes        Assessment & Plan   Assessment:  Patient is a 89 year old male referred to physical therapy for lower extremity weakness and balance deficits. Patient reports chronic low back pain and demonstrates deficits with range of motion, strength, and function that limit ability to " participate in school, work, and recreational activities. Primary deficits include hip/lumbar range of motion deficits and gross lower extremity strength, endurance, and mobility deficits. Patient tolerating plan of care well thus far.     The patient will continue to benefit from skilled outpatient physical therapy in order to address the deficits listed in the problem list on the initial evaluation, provide patient and family education, and maximize the patients level of independence in the home and community environments.     The patient's spiritual, cultural, and educational needs were considered, and the patient is agreeable to the plan of care and goals.           Plan:  continue PT plan of care    Goals:   Active       Ambulation/movement       Patient will walk 1 mile pain free.       Start:  06/27/25    Expected End:  08/08/25               Pain       Patient will report pain of 0/10 demonstrating a reduction of overall pain       Start:  06/27/25    Expected End:  08/08/25               Range of Motion       Patient will achieve spinal flexion to 75 degrees.       Start:  06/27/25    Expected End:  08/08/25               Strength       Patient will achieve bilateral hip flexion strength of 5/5       Start:  06/27/25    Expected End:  08/08/25            Patient will achieve bilateral hip abduction strength of 5/5       Start:  06/27/25    Expected End:  08/08/25            Patient will achieve bilateral knee flexion strength of 5/5       Start:  06/27/25    Expected End:  08/08/25            Patient will achieve bilateral knee extension strength of 5/5       Start:  06/27/25    Expected End:  08/08/25                Kar Vargas, PT

## 2025-07-08 ENCOUNTER — OFFICE VISIT (OUTPATIENT)
Dept: OTOLARYNGOLOGY | Facility: CLINIC | Age: OVER 89
End: 2025-07-08
Payer: MEDICARE

## 2025-07-08 ENCOUNTER — OFFICE VISIT (OUTPATIENT)
Dept: RADIATION ONCOLOGY | Facility: CLINIC | Age: OVER 89
End: 2025-07-08
Payer: MEDICARE

## 2025-07-08 VITALS
DIASTOLIC BLOOD PRESSURE: 62 MMHG | TEMPERATURE: 98 F | SYSTOLIC BLOOD PRESSURE: 186 MMHG | BODY MASS INDEX: 25.49 KG/M2 | WEIGHT: 153 LBS | RESPIRATION RATE: 15 BRPM | HEART RATE: 83 BPM | OXYGEN SATURATION: 97 % | HEIGHT: 65 IN

## 2025-07-08 VITALS — BODY MASS INDEX: 25.46 KG/M2 | SYSTOLIC BLOOD PRESSURE: 190 MMHG | DIASTOLIC BLOOD PRESSURE: 70 MMHG | WEIGHT: 153 LBS

## 2025-07-08 DIAGNOSIS — Q31.9 DYSPLASIA OF LARYNX: ICD-10-CM

## 2025-07-08 DIAGNOSIS — Q31.9 DYSPLASIA OF LARYNX: Primary | ICD-10-CM

## 2025-07-08 PROCEDURE — 1101F PT FALLS ASSESS-DOCD LE1/YR: CPT | Mod: CPTII,S$GLB,, | Performed by: STUDENT IN AN ORGANIZED HEALTH CARE EDUCATION/TRAINING PROGRAM

## 2025-07-08 PROCEDURE — 99203 OFFICE O/P NEW LOW 30 MIN: CPT | Mod: S$GLB,,, | Performed by: STUDENT IN AN ORGANIZED HEALTH CARE EDUCATION/TRAINING PROGRAM

## 2025-07-08 PROCEDURE — 1125F AMNT PAIN NOTED PAIN PRSNT: CPT | Mod: CPTII,S$GLB,, | Performed by: STUDENT IN AN ORGANIZED HEALTH CARE EDUCATION/TRAINING PROGRAM

## 2025-07-08 PROCEDURE — 3288F FALL RISK ASSESSMENT DOCD: CPT | Mod: CPTII,S$GLB,, | Performed by: STUDENT IN AN ORGANIZED HEALTH CARE EDUCATION/TRAINING PROGRAM

## 2025-07-08 PROCEDURE — 1157F ADVNC CARE PLAN IN RCRD: CPT | Mod: CPTII,S$GLB,, | Performed by: STUDENT IN AN ORGANIZED HEALTH CARE EDUCATION/TRAINING PROGRAM

## 2025-07-08 PROCEDURE — 99999 PR PBB SHADOW E&M-EST. PATIENT-LVL III: CPT | Mod: PBBFAC,,, | Performed by: STUDENT IN AN ORGANIZED HEALTH CARE EDUCATION/TRAINING PROGRAM

## 2025-07-08 PROCEDURE — 99999 PR PBB SHADOW E&M-EST. PATIENT-LVL II: CPT | Mod: PBBFAC,,, | Performed by: STUDENT IN AN ORGANIZED HEALTH CARE EDUCATION/TRAINING PROGRAM

## 2025-07-09 NOTE — PROGRESS NOTES
Note to patients: In accordance with the  Century Cures Act, patients are now granted immediate electronic access to their medical records. This note is primarily intended for communication among medical professionals. As a result, it may incorporate medical terminology, abbreviations, or language that could appear blunt or unfamiliar. If you have questions about this document, we encourage you to discuss it with your physician.      [facility]  [facility]    Patient: Vince Martinez    : 10/19/1930    MRN: [id number]  Primary Care Provider: Shlomo Luong MD  Referring Provider: Dr. David Meehan Onc: Dr. 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 male who presents today for follow up of vocal cord dysplasia. He reports mild improvement in his voice symptoms compared to previous status. He is generally healthy and independent. He maintains mobility and is able to perform daily activities without significant limitations. He is described as strong and active for his age. Medical history is significant for pacemaker placement. He had a previous office KTP laser procedure with Dr. Hernandez for vocal cord dysplasia.    He is being seen today due to concerns that he has persistent or progressive disease.    Patient is here with his daughter.    ALLERGIES:  Review of patient's allergies indicates:   Allergen Reactions    Bactrim [sulfamethoxazole-trimethoprim] Rash    Ciprofloxacin Rash    Janumet [sitagliptin phos-metformin] Nausea Only     Severe GERD    Latex Rash         MEDICATIONS:  Current Medications[1]    PAST MEDICAL HISTORY:  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    Arthritis     Bilateral renal cysts     Chronic pain     Diabetes mellitus     DJD  (degenerative joint disease)     Hyperkalemia     Hypertension     Iron deficiency anemia     Low back pain     Osteopenia of neck of right femur     Prostate enlargement     Sleep apnea     Stage 3b chronic kidney disease     Thyroid disease         PAST SURGICAL HISTORY:  Past Surgical History:   Procedure Laterality Date    CARDIAC PACEMAKER PLACEMENT      EPIDURAL STEROID INJECTION INTO LUMBAR SPINE N/A 06/04/2020    Procedure: Injection-steroid-epidural-lumbar--L4-5;  Surgeon: Angelica Norris Jr., MD;  Location: [facility] PAIN [facility]GT;  Service: Pain Management;  Laterality: N/A;  sign consent at DOS.    HEMILAMINECTOMY  05/26/2021    Procedure: HEMILAMINECTOMY;  Surgeon: Dayton Sparks MD;  Location: [facility] OR;  Service: Neurosurgery;;  left L4-5    INJECTION OF JOINT Bilateral 02/13/2020    Procedure: Injection, Joint, Bilateral GTB;  Surgeon: Angelica Norris Jr., MD;  Location: [facility] PAIN [facility]GT;  Service: Pain Management;  Laterality: Bilateral;    INJECTION OF JOINT Bilateral 03/22/2022    Procedure: bilateral GTB steriod injection;  Surgeon: Angelica Norris Jr., MD;  Location: [facility] PAIN T;  Service: Pain Management;  Laterality: Bilateral;  pacemaker   pt is diabetic     INJECTION OF STEROID Bilateral 01/12/2021    Procedure: INJECTION, STEROID Bilateral SI Joint Block and Steroid Injection;  Surgeon: Dayton Sparks MD;  Location: [facility] OR;  Service: Neurosurgery;  Laterality: Bilateral;  Procedure: Bilateral SI Joint Block and Steroid Injection  Surgery 't'kristen: 45 min  LOS: 0  Anesthesia:MAC  Radiology: C-arm  Bed: Regular with Pillows  Position: Prone     SPINE SURGERY      SPINE SURGERY Bilateral     TRANSFORAMINAL EPIDURAL INJECTION OF STEROID Left 02/23/2021    Procedure: Injection,steroid,epidural,transforaminal approach--Left L4-5 *Has Pacemaker/ICD*;  Surgeon: Angelica Norris Jr., MD;  Location: [facility] PAIN [facility];  Service: Pain Management;  Laterality: Left;        FAMILY HISTORY:  Family History    Problem Relation Name Age of Onset    Diabetes Brother x4     No Known Problems Mother      No Known Problems Father      Diabetes Sister x1     No Known Problems Daughter x1     HIV Son x3        SOCIAL HISTORY:  Social History[2]  See above substance history    REVIEW OF SYSTEMS:   Comprehensive review of systems was discussed with the patient.  It is positive only for the above complaints.    PHYSICAL EXAMINATION:  Blood pressure (!) 190/70, weight 69.4 kg (153 lb).    Constitutional: Non-toxic appearing.   Psychiatric: Appropriate mood and affect. Cooperative.  Voice: Non-dysphonic, speaking in full sentences.   Neurologic: Cranial nerves grossly intact, no focal deficits.  Head and face: Salivary glands are not enlarged. Face is symmetric. CN VII strength intact.  Skin: No concerning skin lesions.   Eyes: Vision grossly intact, bilateral extraocular movements intact  Ears: Bilateral pinna, mastoid, external ear canal normal. Hearing intact.   Nose: External nose appears normal.   Lips: No ulcers or lesions  Oral cavity: Mucosa is pink and moist. Buccal mucosa, gingiva, anterior tongue, floor of mouth, and hard palate appear normal. No leukoplakia, erythroplakia, ulceration, masses or lesions.  Oropharynx: Mucosa is pink and moist. Soft palate and base of tongue are normal. Posterior pharyngeal wall normal. Tonsils are normal. No lesions.  Neck: Soft and flat,  no masses or lymphadenopathy. No palpable thyroid enlargement or nodules.  Respiratory: Chest expansion symmetric, no audible stridor or stertor. Breathing is unlabored. No active cough.    CLINICAL PHOTOS:      PROCEDURES:    FLEXIBLE LARYNGOSCOPY  Provider: Ana Hammond MD  Indication: History of dysplasia  The patient was unable to tolerate mirror laryngoscopy.  The procedure was explained to the patient and verbal consent was obtained.   Anesthesia: topical lidocaine and neosynephrine applied within one or both nares with an atomizer    The scope  was introduced in the usual fashion.    Findings:  Mucosa: normal  Inferior turbinates: no polypoid changes or hypertrophy  Septum: no lesion or ulcer  Middle meatus: no purulence or polypoid change  Nasopharynx:  Adenoids: normal  Fossa of Rosenmuller: normal  Eustachian tubes: normal  Superior and posterior pharyngeal walls: normal   Epiglottis, vallecula, and base of tongue: normal   Pyriform sinuses: no pooling of secretions or saliva in pyriform sinuses, mucosa normal  False vocal cords, arytenoids, aryepiglottic folds: right arytenoid swollen, slight decreased mobility on right  True vocal cords are mobile but 2mm white plaque present on right posterior 1/2. Superficial chanes present of remainder of right vocal fold and entire left vocal fold - unclear if scar vs persistent disease  Laryngeal sensation appears intact. There are no masses or ulcerations.     The scope was withdrawn. The patient tolerated the procedure well with no complications.        DATA REVIEWED:     LABORATORY:  N/A    PATHOLOGY:  2024    1. Right vocal fold, lesion, biopsy:Squamous mucosa with high-grade dysplasia.  Negative for invasive carcinoma.    2. Left vocal fold, lesion, biopsy:Squamous mucosa with high-grade dysplasia.  Negative for invasive carcinoma.       [id number]  Nodular appearing right vocal cord in this patient status post laryngoscopy and photoablation of a right focal cord lesion.  Could represent residual lesion or granulation tissue along the right vocal cord.     Mild inflammatory changes at the bilateral maxillary sinuses.    RADIOLOGY REVIEWED:  I have independently viewed and agree with the above images and reports which demonstrate nodular right vocal cord c/w recurrent disease.    ASSESSMENT AND PLAN:  1. Dysplasia of larynx         Vince Martinez is a 89 y.o. male with previous bilateral vocal fold dysplasia, now with findings concerning for recurrent disease that may be invasive.    MALIGNANT  NEOPLASM OF VOCAL CORD (GLOTTIS):  - Identified persistent cancer on vocal cord, visible during FFL.  - Recommend intervention due to likely future growth of cancer.  - Considered multiple treatment options:  - 1. Watchful waiting (not recommended due to previous growth)  - 2. Awake laser procedure (similar to Dr. Hernandez's previous treatment)  - 3. Surgery under general anesthesia to remove cancer  - 4. Radiation therapy (option discussed with Dr. Cheema).    - Weighed risks of anesthesia against age and overall health status.  - Outlined risks of anesthesia, including potential for heart attack or stroke.  - Explained concept of pre-surgical testing as risk mitigation strategy.  - Described potential voice changes post-op, including possibility of permanent changes.  - Explained inverse relationship between breathing ease and voice quality post-op.  - Discussed risks of airway surgery, including bleeding, infection, and airway fire.    - Considered radiation as alternative to surgery, acknowledging side effects but noting Dr. Cheema's expertise in focused treatment.  - Provided information on radiation therapy, including duration and potential side effects.  - Performed endoscopic exam of vocal cords.    FOLLOW-UP PLAN:  - Will coordinate with Dr. Cheema regarding discussed treatment options.  - If awake laser procedure is preferred, will attempt to organize with Dr. Han (Dr. Hernandez's replacement) in a few months.  - Follow up in a few days to communicate decision on treatment options.  - Contact the office via phone or patient portal to communicate decision.  - If radiation therapy is chosen, follow up after completion to monitor for recurrence.     Patient encouraged to call with any questions, concerns, or new or worsening symptoms.     Follow up for surgical scheduling vs surveillance following RT         [1]   Current Outpatient Medications:     acetaminophen (TYLENOL) 650 MG TbSR, Take 650 mg by mouth as  needed., Disp: , Rfl:     amLODIPine (NORVASC) 10 MG tablet, Take 10 mg by mouth once daily., Disp: , Rfl:     atorvastatin (LIPITOR) 10 MG tablet, Take 1 tablet by mouth once daily, Disp: 90 tablet, Rfl: 3    betamethasone dipropionate (DIPROLENE) 0.05 % lotion, Apply 1 application topically nightly as needed., Disp: , Rfl:     blood sugar diagnostic Strp, 1 strip by Misc.(Non-Drug; Combo Route) route once daily., Disp: 100 strip, Rfl: 3    calcium/D3/mag ox//carolyn/Zn (CALTRATE + D3 PLUS MINERALS ORAL), Take 1 tablet by mouth 2 (two) times daily., Disp: , Rfl:     diclofenac sodium (VOLTAREN) 1 % Gel, Apply 2 g topically daily as needed (pain)., Disp: 200 g, Rfl: 3    dulaglutide (TRULICITY) 4.5 mg/0.5 mL pen injector, Inject 4.5 mg into the skin every 7 days., Disp: 12 Pen, Rfl: 3    famotidine (PEPCID) 20 MG tablet, Take 1 tablet (20 mg total) by mouth every evening., Disp: 90 tablet, Rfl: 3    finasteride (PROSCAR) 5 mg tablet, Take 1 tablet by mouth once daily, Disp: 90 tablet, Rfl: 0    folic acid (FOLVITE) 1 MG tablet, Take 1 mg by mouth once daily., Disp: , Rfl:     folic acid/multivit-min/lutein (CENTRUM SILVER ORAL), Take by mouth., Disp: , Rfl:     furosemide (LASIX) 40 MG tablet, Take 1 tablet (40 mg total) by mouth daily as needed (swelling)., Disp: 30 tablet, Rfl: 3    glipiZIDE 5 MG TR24, Take 1 tablet (5 mg total) by mouth daily with breakfast., Disp: 90 tablet, Rfl: 3    ketoconazole (NIZORAL) 2 % cream, Apply topically 2 (two) times daily., Disp: , Rfl:     ketoconazole (NIZORAL) 2 % shampoo, SMARTSIG:Topical 2-3 Times Weekly, Disp: , Rfl:     lancets (LANCETS,ULTRA THIN) Misc, 1 lancet  by Misc.(Non-Drug; Combo Route) route once daily., Disp: 100 each, Rfl: 3    levothyroxine (SYNTHROID) 100 MCG tablet, TAKE 1 TABLET BY MOUTH BEFORE BREAKFAST, Disp: 90 tablet, Rfl: 3    linaCLOtide (LINZESS) 290 mcg Cap capsule, Take 1 capsule (290 mcg total) by mouth before breakfast., Disp: 90 capsule, Rfl:  3    melatonin 10 mg Tab, Take 1 tablet by mouth nightly as needed., Disp: , Rfl:     metoprolol succinate (TOPROL-XL) 25 MG 24 hr tablet, Take 1 tablet (25 mg total) by mouth once daily., Disp: 90 tablet, Rfl: 3    omega-3 fatty acids/fish oil (FISH OIL-OMEGA-3 FATTY ACIDS) 300-1,000 mg capsule, Take by mouth once daily., Disp: , Rfl:     pantoprazole (PROTONIX) 40 MG tablet, TAKE 1 TABLET BY MOUTH BEFORE BREAKFAST, Disp: 90 tablet, Rfl: 3    pregabalin (LYRICA) 50 MG capsule, Take 1 capsule by mouth once daily, Disp: 90 capsule, Rfl: 0    tamsulosin (FLOMAX) 0.4 mg Cap, Take 2 capsules (0.8 mg total) by mouth once daily., Disp: 180 capsule, Rfl: 3    aspirin 81 MG Chew, 1 tablet (81 mg total) by Per NG tube route once daily., Disp: 30 tablet, Rfl: 11    blood-glucose meter kit, Use as instructed, Disp: 1 each, Rfl: 0    lancing device Misc, 1 Device by Misc.(Non-Drug; Combo Route) route once daily at 6am., Disp: 1 each, Rfl: 0  [2]   Social History  Socioeconomic History    Marital status:    Tobacco Use    Smoking status: Never    Smokeless tobacco: Never   Substance and Sexual Activity    Alcohol use: No    Drug use: No    Sexual activity: Not Currently     Partners: Female     Social Drivers of Health     Financial Resource Strain: Medium Risk (2/2/2022)    Overall Financial Resource Strain (CARDIA)     Difficulty of Paying Living Expenses: Somewhat hard   Food Insecurity: No Food Insecurity (2/2/2022)    Hunger Vital Sign     Worried About Running Out of Food in the Last Year: Never true     Ran Out of Food in the Last Year: Never true   Transportation Needs: No Transportation Needs (2/2/2022)    PRAPARE - Transportation     Lack of Transportation (Medical): No     Lack of Transportation (Non-Medical): No   Physical Activity: Inactive (2/2/2022)    Exercise Vital Sign     Days of Exercise per Week: 0 days     Minutes of Exercise per Session: 0 min   Stress: No Stress Concern Present (2/2/2022)     [facility] of Occupational Health - Occupational Stress Questionnaire     Feeling of Stress : Not at all   Housing Stability: Low Risk  (2/2/2022)    Housing Stability Vital Sign     Unable to Pay for Housing in the Last Year: No     Number of Places Lived in the Last Year: 2     Unstable Housing in the Last Year: No

## 2025-07-11 ENCOUNTER — CLINICAL SUPPORT (OUTPATIENT)
Dept: REHABILITATION | Facility: HOSPITAL | Age: OVER 89
End: 2025-07-11
Payer: MEDICARE

## 2025-07-11 ENCOUNTER — TELEPHONE (OUTPATIENT)
Dept: RADIATION ONCOLOGY | Facility: CLINIC | Age: OVER 89
End: 2025-07-11
Payer: MEDICARE

## 2025-07-11 DIAGNOSIS — Z74.09 DECREASED STRENGTH, ENDURANCE, AND MOBILITY: ICD-10-CM

## 2025-07-11 DIAGNOSIS — R68.89 DECREASED STRENGTH, ENDURANCE, AND MOBILITY: ICD-10-CM

## 2025-07-11 DIAGNOSIS — G89.29 CHRONIC MIDLINE LOW BACK PAIN WITH BILATERAL SCIATICA: Primary | ICD-10-CM

## 2025-07-11 DIAGNOSIS — M54.41 CHRONIC MIDLINE LOW BACK PAIN WITH BILATERAL SCIATICA: Primary | ICD-10-CM

## 2025-07-11 DIAGNOSIS — M54.42 CHRONIC MIDLINE LOW BACK PAIN WITH BILATERAL SCIATICA: Primary | ICD-10-CM

## 2025-07-11 DIAGNOSIS — R53.1 DECREASED STRENGTH, ENDURANCE, AND MOBILITY: ICD-10-CM

## 2025-07-11 PROCEDURE — 97110 THERAPEUTIC EXERCISES: CPT | Mod: PN,CQ

## 2025-07-11 PROCEDURE — 97112 NEUROMUSCULAR REEDUCATION: CPT | Mod: PN,CQ

## 2025-07-11 NOTE — PROGRESS NOTES
"Outpatient Rehab    Physical Therapy Visit    Patient Name: Vince Martinez  MRN: [id number]  YOB: 1930  Encounter Date: 7/11/2025    Therapy Diagnosis:   Encounter Diagnoses   Name Primary?    Chronic midline low back pain with bilateral sciatica Yes    Decreased strength, endurance, and mobility      Physician: Shlomo Luong*    Physician Orders: Eval and Treat  Medical Diagnosis: Weakness of both legs  Balance problems  Surgical Diagnosis: Not applicable for this Episode   Surgical Date: Not applicable for this Episode  Days Since Last Surgery: Not applicable for this Episode    Visit # / Visits Authorized:  4 / 20  Insurance Authorization Period: 6/13/2025 to 12/31/2025  Date of Evaluation: 6/13/2025  Plan of Care Certification: 6/27/2025 to 8/13/2025      PT/PTA:     Number of PTA visits since last PT visit:   Time In: 99244  Time Out: 1055  Total Time (in minutes): 55   Total Billable Time (in minutes): 55     FOTO:  Intake Score:  %  Survey Score 2:  %  Survey Score 3:  %    Precautions:       Subjective  doing well today. Some low back pain after gardening. Patient reports improvement from therapy exercises.   Pain: 1/10 - low back/hips    Objective            Treatment:  Balance/Neuromuscular Re-Education: 30 minutes   Bridges: 3x10  Hook lying hip abduction iso: 5"x20 - blue TB  SLR: 2x10 - bilateral  Side lying clamshells: 2x10 - bilateral  Standing hip abduction: 2x10 - red theraband  Cybex leg press: 2x10 - 4 plates     Therapeutic Activity: 25 minutes   Nu-step: 8'xL4  SKTC: 10x10"  Piriformis stretch: 3x30" - bilateral   Standing hamstring stretch at stairs: 10x10"    Time Entry(in minutes): 30 minutes   Neuromuscular Re-Education Time Entry: 30  Therapeutic Exercise Time Entry: 25    Assessment & Plan   Assessment:  Patient is a 89 year old male referred to physical therapy for lower extremity weakness and balance deficits. Patient reports chronic low back pain and demonstrates " deficits with range of motion, strength, and function that limit ability to participate in school, work, and recreational activities. Primary deficits include hip/lumbar range of motion deficits and gross lower extremity strength, endurance, and mobility deficits. Patient tolerating plan of care well thus far.     The patient will continue to benefit from skilled outpatient physical therapy in order to address the deficits listed in the problem list on the initial evaluation, provide patient and family education, and maximize the patients level of independence in the home and community environments.     The patient's spiritual, cultural, and educational needs were considered, and the patient is agreeable to the plan of care and goals.           Plan:  continue PT plan of care    Goals:   Active       Ambulation/movement       Patient will walk 1 mile pain free.       Start:  06/27/25    Expected End:  08/08/25               Pain       Patient will report pain of 0/10 demonstrating a reduction of overall pain       Start:  06/27/25    Expected End:  08/08/25               Range of Motion       Patient will achieve spinal flexion to 75 degrees.       Start:  06/27/25    Expected End:  08/08/25               Strength       Patient will achieve bilateral hip flexion strength of 5/5       Start:  06/27/25    Expected End:  08/08/25            Patient will achieve bilateral hip abduction strength of 5/5       Start:  06/27/25    Expected End:  08/08/25            Patient will achieve bilateral knee flexion strength of 5/5       Start:  06/27/25    Expected End:  08/08/25            Patient will achieve bilateral knee extension strength of 5/5       Start:  06/27/25    Expected End:  08/08/25                Donavon Bello PTA

## 2025-07-11 NOTE — TELEPHONE ENCOUNTER
Spoke with grand -daughter(Michaela) who states radiation isn't an option at this time and canceled upcoming f/u appt.

## 2025-07-15 ENCOUNTER — CLINICAL SUPPORT (OUTPATIENT)
Dept: REHABILITATION | Facility: HOSPITAL | Age: OVER 89
End: 2025-07-15
Payer: MEDICARE

## 2025-07-15 DIAGNOSIS — M54.41 CHRONIC MIDLINE LOW BACK PAIN WITH BILATERAL SCIATICA: Primary | ICD-10-CM

## 2025-07-15 DIAGNOSIS — R53.1 DECREASED STRENGTH, ENDURANCE, AND MOBILITY: ICD-10-CM

## 2025-07-15 DIAGNOSIS — R68.89 DECREASED STRENGTH, ENDURANCE, AND MOBILITY: ICD-10-CM

## 2025-07-15 DIAGNOSIS — M54.42 CHRONIC MIDLINE LOW BACK PAIN WITH BILATERAL SCIATICA: Primary | ICD-10-CM

## 2025-07-15 DIAGNOSIS — G89.29 CHRONIC MIDLINE LOW BACK PAIN WITH BILATERAL SCIATICA: Primary | ICD-10-CM

## 2025-07-15 DIAGNOSIS — Z74.09 DECREASED STRENGTH, ENDURANCE, AND MOBILITY: ICD-10-CM

## 2025-07-15 PROCEDURE — 97112 NEUROMUSCULAR REEDUCATION: CPT | Mod: PN

## 2025-07-15 PROCEDURE — 97530 THERAPEUTIC ACTIVITIES: CPT | Mod: PN

## 2025-07-15 NOTE — PROGRESS NOTES
"Outpatient Rehab    Physical Therapy Visit    Patient Name: Vince Martinez  MRN: [id number]  YOB: 1930  Encounter Date: 7/15/2025    Therapy Diagnosis:   Encounter Diagnoses   Name Primary?    Chronic midline low back pain with bilateral sciatica Yes    Decreased strength, endurance, and mobility      Physician: Shlomo Luong*    Physician Orders: Eval and Treat  Medical Diagnosis: Weakness of both legs  Balance problems  Surgical Diagnosis: Not applicable for this Episode   Surgical Date: Not applicable for this Episode  Days Since Last Surgery: Not applicable for this Episode    Visit # / Visits Authorized:  5 / 20  Insurance Authorization Period: 6/13/2025 to 12/31/2025  Date of Evaluation: 6/13/2025  Plan of Care Certification: 6/27/2025 to 8/13/2025      PT/PTA:     Number of PTA visits since last PT visit:   Time In:  1105  Time Out:  1200  Total Time (in minutes):   55  Total Billable Time (in minutes):   55    FOTO:  Intake Score:  %  Survey Score 2:  %  Survey Score 3:  %    Precautions:       Subjective  his low back is doing better from the exercises. He reports still having pain, but it's not as bad. Patient reports 5/10 low back pain.   Pain: 5/10 - low back/hips    Objective          Treatment:  Balance/Neuromuscular Re-Education: 30 minutes   Bridges: 3x10  Hook lying hip abduction iso: 5"x20 - blue TB  SLR: 2x10 - bilateral  Side lying clamshells: 2x10 - bilateral  Standing hip abduction: 2x10 - red theraband  Cybex leg press: 2x10 - 4 plates     Therapeutic Activity: 25 minutes   Nu-step: 8'xL4  SKTC: 10x10"  Piriformis stretch: 3x30" - bilateral   Standing hamstring stretch at stairs: 10x10"    Time Entry(in minutes): 30 minutes        Assessment & Plan   Assessment:  Patient is a 89 year old male referred to physical therapy for lower extremity weakness and balance deficits. Patient reports chronic low back pain and demonstrates deficits with range of motion, strength, and " function that limit ability to participate in school, work, and recreational activities. Primary deficits include hip/lumbar range of motion deficits and gross lower extremity strength, endurance, and mobility deficits. Patient tolerating plan of care well thus far.     The patient will continue to benefit from skilled outpatient physical therapy in order to address the deficits listed in the problem list on the initial evaluation, provide patient and family education, and maximize the patients level of independence in the home and community environments.     The patient's spiritual, cultural, and educational needs were considered, and the patient is agreeable to the plan of care and goals.           Plan:  continue PT plan of care    Goals:   Active       Ambulation/movement       Patient will walk 1 mile pain free.       Start:  06/27/25    Expected End:  08/08/25               Pain       Patient will report pain of 0/10 demonstrating a reduction of overall pain       Start:  06/27/25    Expected End:  08/08/25               Range of Motion       Patient will achieve spinal flexion to 75 degrees.       Start:  06/27/25    Expected End:  08/08/25               Strength       Patient will achieve bilateral hip flexion strength of 5/5       Start:  06/27/25    Expected End:  08/08/25            Patient will achieve bilateral hip abduction strength of 5/5       Start:  06/27/25    Expected End:  08/08/25            Patient will achieve bilateral knee flexion strength of 5/5       Start:  06/27/25    Expected End:  08/08/25            Patient will achieve bilateral knee extension strength of 5/5       Start:  06/27/25    Expected End:  08/08/25                Kar Vargas, PT

## 2025-07-21 ENCOUNTER — CLINICAL SUPPORT (OUTPATIENT)
Dept: REHABILITATION | Facility: HOSPITAL | Age: OVER 89
End: 2025-07-21
Payer: MEDICARE

## 2025-07-21 DIAGNOSIS — R53.1 DECREASED STRENGTH, ENDURANCE, AND MOBILITY: ICD-10-CM

## 2025-07-21 DIAGNOSIS — G89.29 CHRONIC MIDLINE LOW BACK PAIN WITH BILATERAL SCIATICA: Primary | ICD-10-CM

## 2025-07-21 DIAGNOSIS — R68.89 DECREASED STRENGTH, ENDURANCE, AND MOBILITY: ICD-10-CM

## 2025-07-21 DIAGNOSIS — M54.42 CHRONIC MIDLINE LOW BACK PAIN WITH BILATERAL SCIATICA: Primary | ICD-10-CM

## 2025-07-21 DIAGNOSIS — Z74.09 DECREASED STRENGTH, ENDURANCE, AND MOBILITY: ICD-10-CM

## 2025-07-21 DIAGNOSIS — M54.41 CHRONIC MIDLINE LOW BACK PAIN WITH BILATERAL SCIATICA: Primary | ICD-10-CM

## 2025-07-21 PROCEDURE — 97530 THERAPEUTIC ACTIVITIES: CPT | Mod: PN

## 2025-07-21 PROCEDURE — 97112 NEUROMUSCULAR REEDUCATION: CPT | Mod: PN

## 2025-07-21 NOTE — PROGRESS NOTES
"Outpatient Rehab    Physical Therapy Visit    Patient Name: Vince Martinez  MRN: [id number]  YOB: 1930  Encounter Date: 7/21/2025    Therapy Diagnosis:   Encounter Diagnoses   Name Primary?    Chronic midline low back pain with bilateral sciatica Yes    Decreased strength, endurance, and mobility      Physician: Shlomo Luong*    Physician Orders: Eval and Treat  Medical Diagnosis: Weakness of both legs  Balance problems  Surgical Diagnosis: Not applicable for this Episode   Surgical Date: Not applicable for this Episode  Days Since Last Surgery: Not applicable for this Episode    Visit # / Visits Authorized:  6 / 20  Insurance Authorization Period: 6/13/2025 to 12/31/2025  Date of Evaluation: 6/13/2025  Plan of Care Certification: 6/27/2025 to 8/13/2025      PT/PTA:     Number of PTA visits since last PT visit:   Time In:  1305  Time Out:  1400  Total Time (in minutes):   55  Total Billable Time (in minutes):   30    FOTO:  Intake Score:  %  Survey Score 2:  %  Survey Score 3:  %    Precautions:       Subjective  sore from doing some yard work over the weekend, but doing okay overall.   Pain: 5/10 - low back/hips    Objective          Treatment:  Balance/Neuromuscular Re-Education: 30 minutes   Bridges: 3x10  Hook lying hip abduction iso: 5"x20 - blue TB  SLR: 2x10 - bilateral  Side lying clamshells: 2x10 - bilateral  Standing hip abduction: 2x10 - red theraband  Cybex leg press: 2x10 - 4 plates     Therapeutic Activity: 25 minutes   Nu-step: 8'xL4  SKTC: 10x10"  Piriformis stretch: 3x30" - bilateral   Standing hamstring stretch at stairs: 10x10"  Seated swiss ball roll outs: 5"x20     Time Entry(in minutes): 30 minutes        Assessment & Plan   Assessment:  Patient is a 89 year old male referred to physical therapy for lower extremity weakness and balance deficits. Patient reports chronic low back pain and demonstrates deficits with range of motion, strength, and function that limit ability " to participate in school, work, and recreational activities. Primary deficits include hip/lumbar range of motion deficits and gross lower extremity strength, endurance, and mobility deficits. Patient tolerating plan of care well thus far.     The patient will continue to benefit from skilled outpatient physical therapy in order to address the deficits listed in the problem list on the initial evaluation, provide patient and family education, and maximize the patients level of independence in the home and community environments.     The patient's spiritual, cultural, and educational needs were considered, and the patient is agreeable to the plan of care and goals.           Plan:  continue PT plan of care    Goals:   Active       Ambulation/movement       Patient will walk 1 mile pain free.       Start:  06/27/25    Expected End:  08/08/25               Pain       Patient will report pain of 0/10 demonstrating a reduction of overall pain       Start:  06/27/25    Expected End:  08/08/25               Range of Motion       Patient will achieve spinal flexion to 75 degrees.       Start:  06/27/25    Expected End:  08/08/25               Strength       Patient will achieve bilateral hip flexion strength of 5/5       Start:  06/27/25    Expected End:  08/08/25            Patient will achieve bilateral hip abduction strength of 5/5       Start:  06/27/25    Expected End:  08/08/25            Patient will achieve bilateral knee flexion strength of 5/5       Start:  06/27/25    Expected End:  08/08/25            Patient will achieve bilateral knee extension strength of 5/5       Start:  06/27/25    Expected End:  08/08/25                Kar Vargas, PT

## 2025-07-24 ENCOUNTER — CLINICAL SUPPORT (OUTPATIENT)
Dept: REHABILITATION | Facility: HOSPITAL | Age: OVER 89
End: 2025-07-24
Payer: MEDICARE

## 2025-07-24 DIAGNOSIS — M96.1 FAILED BACK SYNDROME: Primary | ICD-10-CM

## 2025-07-24 PROCEDURE — 97112 NEUROMUSCULAR REEDUCATION: CPT | Mod: PN,CQ

## 2025-07-24 PROCEDURE — 97530 THERAPEUTIC ACTIVITIES: CPT | Mod: PN,CQ

## 2025-07-24 NOTE — PROGRESS NOTES
"Outpatient Rehab    Physical Therapy Visit    Patient Name: Vince Martinez  MRN: [id number]  YOB: 1930  Encounter Date: 7/24/2025    Therapy Diagnosis:   Encounter Diagnosis   Name Primary?    Failed back syndrome Yes     Physician: Shlomo Luong*    Physician Orders: Eval and Treat  Medical Diagnosis: Weakness of both legs  Balance problems  Surgical Diagnosis: Not applicable for this Episode   Surgical Date: Not applicable for this Episode  Days Since Last Surgery: Not applicable for this Episode    Visit # / Visits Authorized:  7 / 20  Insurance Authorization Period: 6/13/2025 to 12/31/2025  Date of Evaluation: 11/17/2023  Plan of Care Certification:       PT/PTA:     Number of PTA visits since last PT visit:   Time In: 1405  Time Out: 1500  Total Time (in minutes): 55   Total Billable Time (in minutes): 30     FOTO:  Intake Score:  %  Survey Score 2:  %  Survey Score 3:  %    Precautions:       Subjective  slightly more pain today due to doing some cleaning in his bathroom.    Pain: 6/10 - low back/hips    Objective          Treatment:  Balance/Neuromuscular Re-Education: 30 minutes   Bridges: 3x10  Hook lying hip abduction iso: 5"x20 - blue TB  SLR: 2x10 - bilateral  Side lying clamshells: 2x10 - bilateral  Standing hip abduction: 2x10 - red theraband  Cybex leg press: 3x10 - 5 plates     Therapeutic Activity: 25 minutes   Nu-step: 8'xL4  SKTC: 10x10"  Piriformis stretch: 3x30" - bilateral   Standing hamstring stretch at stairs: 10x10"  Seated swiss ball roll outs: 5"x20   Sit to stand from chair-  3x10-10# DB     Time Entry(in minutes): 30 minutes   Neuromuscular Re-Education Time Entry: 15  Therapeutic Activity Time Entry: 15    Assessment & Plan   Assessment:  Patient is a 89 year old male referred to physical therapy for lower extremity weakness and balance deficits. Patient reports chronic low back pain and demonstrates deficits with range of motion, strength, and function that limit " ability to participate in school, work, and recreational activities. Presents with slightly increased pain today due to cleaning his bathroom. Progressions in bold. Toleared well and reports pain unchanged  following.     The patient will continue to benefit from skilled outpatient physical therapy in order to address the deficits listed in the problem list on the initial evaluation, provide patient and family education, and maximize the patients level of independence in the home and community environments.     The patient's spiritual, cultural, and educational needs were considered, and the patient is agreeable to the plan of care and goals.           Plan:  continue PT plan of care      Goals:   Active         Ambulation/movement         Patient will walk 1 mile pain free.         Start:  06/27/25    Expected End:  08/08/25                 Pain         Patient will report pain of 0/10 demonstrating a reduction of overall pain         Start:  06/27/25    Expected End:  08/08/25                 Range of Motion         Patient will achieve spinal flexion to 75 degrees.         Start:  06/27/25    Expected End:  08/08/25                 Strength         Patient will achieve bilateral hip flexion strength of 5/5         Start:  06/27/25    Expected End:  08/08/25              Patient will achieve bilateral hip abduction strength of 5/5         Start:  06/27/25    Expected End:  08/08/25              Patient will achieve bilateral knee flexion strength of 5/5         Start:  06/27/25    Expected End:  08/08/25              Patient will achieve bilateral knee extension strength of 5/5         Start:  06/27/25    Expected End:  08/08/25                   Donavon Bello PTA

## 2025-07-28 ENCOUNTER — CLINICAL SUPPORT (OUTPATIENT)
Dept: REHABILITATION | Facility: HOSPITAL | Age: OVER 89
End: 2025-07-28
Payer: MEDICARE

## 2025-07-28 DIAGNOSIS — G89.29 CHRONIC MIDLINE LOW BACK PAIN WITH BILATERAL SCIATICA: Primary | ICD-10-CM

## 2025-07-28 DIAGNOSIS — M54.41 CHRONIC MIDLINE LOW BACK PAIN WITH BILATERAL SCIATICA: Primary | ICD-10-CM

## 2025-07-28 DIAGNOSIS — R53.1 DECREASED STRENGTH, ENDURANCE, AND MOBILITY: ICD-10-CM

## 2025-07-28 DIAGNOSIS — R68.89 DECREASED STRENGTH, ENDURANCE, AND MOBILITY: ICD-10-CM

## 2025-07-28 DIAGNOSIS — Z74.09 DECREASED STRENGTH, ENDURANCE, AND MOBILITY: ICD-10-CM

## 2025-07-28 DIAGNOSIS — M54.42 CHRONIC MIDLINE LOW BACK PAIN WITH BILATERAL SCIATICA: Primary | ICD-10-CM

## 2025-07-28 PROCEDURE — 97530 THERAPEUTIC ACTIVITIES: CPT | Mod: PN

## 2025-07-28 PROCEDURE — 97112 NEUROMUSCULAR REEDUCATION: CPT | Mod: PN

## 2025-07-28 NOTE — PROGRESS NOTES
"Outpatient Rehab    Physical Therapy Visit    Patient Name: Vince Martinez  MRN: [id number]  YOB: 1930  Encounter Date: 7/28/2025    Therapy Diagnosis:   Encounter Diagnoses   Name Primary?    Chronic midline low back pain with bilateral sciatica Yes    Decreased strength, endurance, and mobility      Physician: Shlomo Luong*    Physician Orders: Eval and Treat  Medical Diagnosis: Weakness of both legs  Balance problems  Surgical Diagnosis: Not applicable for this Episode   Surgical Date: Not applicable for this Episode  Days Since Last Surgery: Not applicable for this Episode    Visit # / Visits Authorized:  8 / 20  Insurance Authorization Period: 6/13/2025 to 12/31/2025  Date of Evaluation: 6/13/2025  Plan of Care Certification: 6/27/2025 to 8/13/2025      PT/PTA:     Number of PTA visits since last PT visit:   Time In:    Time Out:    Total Time (in minutes):     Total Billable Time (in minutes):       FOTO:  Intake Score:  %  Survey Score 2:  %  Survey Score 3:  %    Precautions:       Subjective  slightly more pain today due to doing some cleaning in his bathroom.    Pain: 6/10 - low back/hips    Objective          Treatment:  Balance/Neuromuscular Re-Education: 30 minutes   Bridges: 3x10  Hook lying hip abduction iso: 5"x20 - blue TB  SLR: 2x10 - bilateral  Side lying clamshells: 2x10 - bilateral  Standing hip abduction: 2x10 - red theraband  Cybex leg press: 3x8 - 6 plates     Therapeutic Activity: 25 minutes   Nu-step: 8'xL4  SKTC: 10x10"  Piriformis stretch: 3x30" - bilateral   Standing hamstring stretch at stairs: 10x10"  Seated swiss ball roll outs: 5"x20   Sit to stand from chair-  3x10- 5# DB     Time Entry(in minutes): 30 minutes        Assessment & Plan   Assessment:  Patient is a 89 year old male referred to physical therapy for lower extremity weakness and balance deficits. Patient reports chronic low back pain and demonstrates deficits with range of motion, strength, and " function that limit ability to participate in school, work, and recreational activities. Patient has been progressing lower extremity strength, endurance, and mobility.     The patient will continue to benefit from skilled outpatient physical therapy in order to address the deficits listed in the problem list on the initial evaluation, provide patient and family education, and maximize the patients level of independence in the home and community environments.     The patient's spiritual, cultural, and educational needs were considered, and the patient is agreeable to the plan of care and goals.           Plan:  continue PT plan of care      Active       Ambulation/movement       Patient will walk 1 mile pain free.       Start:  06/27/25    Expected End:  08/08/25               Pain       Patient will report pain of 0/10 demonstrating a reduction of overall pain       Start:  06/27/25    Expected End:  08/08/25               Range of Motion       Patient will achieve spinal flexion to 75 degrees.       Start:  06/27/25    Expected End:  08/08/25               Strength       Patient will achieve bilateral hip flexion strength of 5/5       Start:  06/27/25    Expected End:  08/08/25            Patient will achieve bilateral hip abduction strength of 5/5       Start:  06/27/25    Expected End:  08/08/25            Patient will achieve bilateral knee flexion strength of 5/5       Start:  06/27/25    Expected End:  08/08/25            Patient will achieve bilateral knee extension strength of 5/5       Start:  06/27/25    Expected End:  08/08/25            Goals:   Active         Ambulation/movement         Patient will walk 1 mile pain free.         Start:  06/27/25    Expected End:  08/08/25                 Pain         Patient will report pain of 0/10 demonstrating a reduction of overall pain         Start:  06/27/25    Expected End:  08/08/25                 Range of Motion         Patient will achieve spinal flexion  to 75 degrees.         Start:  06/27/25    Expected End:  08/08/25                 Strength         Patient will achieve bilateral hip flexion strength of 5/5         Start:  06/27/25    Expected End:  08/08/25              Patient will achieve bilateral hip abduction strength of 5/5         Start:  06/27/25    Expected End:  08/08/25              Patient will achieve bilateral knee flexion strength of 5/5         Start:  06/27/25    Expected End:  08/08/25              Patient will achieve bilateral knee extension strength of 5/5         Start:  06/27/25    Expected End:  08/08/25                   Kar Vargas, PT

## 2025-07-31 ENCOUNTER — CLINICAL SUPPORT (OUTPATIENT)
Dept: REHABILITATION | Facility: HOSPITAL | Age: OVER 89
End: 2025-07-31
Payer: MEDICARE

## 2025-07-31 DIAGNOSIS — R68.89 DECREASED STRENGTH, ENDURANCE, AND MOBILITY: ICD-10-CM

## 2025-07-31 DIAGNOSIS — M54.41 CHRONIC MIDLINE LOW BACK PAIN WITH BILATERAL SCIATICA: ICD-10-CM

## 2025-07-31 DIAGNOSIS — G89.29 CHRONIC MIDLINE LOW BACK PAIN WITH BILATERAL SCIATICA: ICD-10-CM

## 2025-07-31 DIAGNOSIS — M54.42 CHRONIC MIDLINE LOW BACK PAIN WITH BILATERAL SCIATICA: ICD-10-CM

## 2025-07-31 DIAGNOSIS — R53.1 DECREASED STRENGTH, ENDURANCE, AND MOBILITY: ICD-10-CM

## 2025-07-31 DIAGNOSIS — M96.1 FAILED BACK SYNDROME: Primary | ICD-10-CM

## 2025-07-31 DIAGNOSIS — Z74.09 DECREASED STRENGTH, ENDURANCE, AND MOBILITY: ICD-10-CM

## 2025-07-31 PROCEDURE — 97112 NEUROMUSCULAR REEDUCATION: CPT | Mod: PN

## 2025-07-31 PROCEDURE — 97530 THERAPEUTIC ACTIVITIES: CPT | Mod: PN

## 2025-08-05 ENCOUNTER — CLINICAL SUPPORT (OUTPATIENT)
Dept: REHABILITATION | Facility: HOSPITAL | Age: OVER 89
End: 2025-08-05
Payer: MEDICARE

## 2025-08-05 DIAGNOSIS — M96.1 FAILED BACK SYNDROME: Primary | ICD-10-CM

## 2025-08-05 PROCEDURE — 97112 NEUROMUSCULAR REEDUCATION: CPT | Mod: PN,CQ

## 2025-08-05 PROCEDURE — 97530 THERAPEUTIC ACTIVITIES: CPT | Mod: PN,CQ

## 2025-08-05 NOTE — PROGRESS NOTES
"Outpatient Rehab    Physical Therapy Visit    Patient Name: Vince Martinez  MRN: [id number]  YOB: 1930  Encounter Date: 8/5/2025    Therapy Diagnosis:   Encounter Diagnosis   Name Primary?    Failed back syndrome Yes     Physician: Shlomo Luong*    Physician Orders: Eval and Treat  Medical Diagnosis: Weakness of both legs  Balance problems  Surgical Diagnosis: Not applicable for this Episode   Surgical Date: Not applicable for this Episode  Days Since Last Surgery: Not applicable for this Episode    Visit # / Visits Authorized:  10 / 20  Insurance Authorization Period: 6/13/2025 to 12/31/2025  Date of Evaluation: 11/17/2023  Plan of Care Certification:       PT/PTA:     Number of PTA visits since last PT visit:   Time In: 1100  Time Out: 1155  Total Time (in minutes): 55  Total Billable Time (in minutes): 55     FOTO:  Intake Score:  %  Survey Score 2:  %  Survey Score 3:  %    Precautions:       Subjective  Pain continues. Feels like he is getting stronger though. Some home activities like gardening are bothersome when he has to squat down low. He typically uses a chair to sit on when working in the garden.   Pain: 4/10 - low back/hips    Objective          Treatment:  Balance/Neuromuscular Re-Education: 25 minutes   Bridges: 3x10  Hook lying hip abduction iso: 5"x20 - blue TB  SLR: 2x10 - bilateral  Side lying clamshells: 2x10 - bilateral  Side lying reverse clams: 2x10 bilateral   Standing hip abduction: 2x10 - red theraband  Cybex leg press: 3x8 - 6 plates     Therapeutic Activity: 25 minutes   Nu-step: 8'xL4  SKTC: 10x10"  Piriformis stretch: 3x30" - bilateral   Standing hamstring stretch at stairs: 10x10"  Seated swiss ball roll outs: 5"x20   Sit to stand from chair-  3x10- 7# DB     Vince received the following manual therapy techniques: Joint mobilizations were applied to the: left hip  for 5 minutes, including:  Long axis and medial<> lateral grade I-IV     Time Entry(in minutes): 30 " minutes   Manual Therapy Time Entry: 5  Neuromuscular Re-Education Time Entry: 25  Therapeutic Activity Time Entry: 25    Assessment & Plan   Assessment:  Patient is a 89 year old male referred to physical therapy for lower extremity weakness and balance deficits. Patient reports chronic low back pain and demonstrates deficits with range of motion, strength, and function that limit ability to participate in activities of daily living and gardening. Added some left hip distractions today. Both med to lateral and long axis distractions together abolished  pain. Pain did return to about 1/10 following Leg Press. Patient has been progressing lower extremity strength, endurance, and mobility.     The patient will continue to benefit from skilled outpatient physical therapy in order to address the deficits listed in the problem list on the initial evaluation, provide patient and family education, and maximize the patients level of independence in the home and community environments.     The patient's spiritual, cultural, and educational needs were considered, and the patient is agreeable to the plan of care and goals.           Plan:  continue PT plan of care        Goals:   Active         Ambulation/movement         Patient will walk 1 mile pain free.         Start:  06/27/25    Expected End:  08/08/25                 Pain         Patient will report pain of 0/10 demonstrating a reduction of overall pain         Start:  06/27/25    Expected End:  08/08/25                 Range of Motion         Patient will achieve spinal flexion to 75 degrees.         Start:  06/27/25    Expected End:  08/08/25                 Strength         Patient will achieve bilateral hip flexion strength of 5/5         Start:  06/27/25    Expected End:  08/08/25              Patient will achieve bilateral hip abduction strength of 5/5         Start:  06/27/25    Expected End:  08/08/25              Patient will achieve bilateral knee flexion  strength of 5/5         Start:  06/27/25    Expected End:  08/08/25              Patient will achieve bilateral knee extension strength of 5/5         Start:  06/27/25    Expected End:  08/08/25                   Donavon Bello PTA

## 2025-08-05 NOTE — PROGRESS NOTES
"Outpatient Rehab    Physical Therapy Visit    Patient Name: Vince Martinez  MRN: [id number]  YOB: 1930  Encounter Date: 7/31/2025    Therapy Diagnosis:   Encounter Diagnoses   Name Primary?    Failed back syndrome Yes    Chronic midline low back pain with bilateral sciatica     Decreased strength, endurance, and mobility      Physician: Shlomo Luong*    Physician Orders: Eval and Treat  Medical Diagnosis: Weakness of both legs  Balance problems  Surgical Diagnosis: Not applicable for this Episode   Surgical Date: Not applicable for this Episode  Days Since Last Surgery: Not applicable for this Episode    Visit # / Visits Authorized:  9 / 20  Insurance Authorization Period: 6/13/2025 to 12/31/2025  Date of Evaluation: 6/13/2025  Plan of Care Certification: 6/27/2025 to 8/13/2025      PT/PTA:     Number of PTA visits since last PT visit:   Time In:  1405  Time Out:  1500  Total Time (in minutes):   55  Total Billable Time (in minutes):   55    FOTO:  Intake Score:  %  Survey Score 2:  %  Survey Score 3:  %    Precautions:       Subjective  slightly more pain today due to doing some cleaning in his bathroom.    Pain: 6/10 - low back/hips    Objective          Treatment:  Balance/Neuromuscular Re-Education: 30 minutes   Bridges: 3x10  Hook lying hip abduction iso: 5"x20 - blue TB  SLR: 2x10 - bilateral  Side lying clamshells: 2x10 - bilateral  Standing hip abduction: 2x10 - red theraband  Cybex leg press: 3x8 - 6 plates     Therapeutic Activity: 25 minutes   Nu-step: 8'xL4  SKTC: 10x10"  Piriformis stretch: 3x30" - bilateral   Standing hamstring stretch at stairs: 10x10"  Seated swiss ball roll outs: 5"x20   Sit to stand from chair-  3x10- 5# DB     Time Entry(in minutes): 30 minutes        Assessment & Plan   Assessment:  Patient is a 89 year old male referred to physical therapy for lower extremity weakness and balance deficits. Patient reports chronic low back pain and demonstrates deficits " with range of motion, strength, and function that limit ability to participate in school, work, and recreational activities. Patient has been progressing lower extremity strength, endurance, and mobility.     The patient will continue to benefit from skilled outpatient physical therapy in order to address the deficits listed in the problem list on the initial evaluation, provide patient and family education, and maximize the patients level of independence in the home and community environments.     The patient's spiritual, cultural, and educational needs were considered, and the patient is agreeable to the plan of care and goals.           Plan:  continue PT plan of care      Active       Ambulation/movement       Patient will walk 1 mile pain free.       Start:  06/27/25    Expected End:  08/08/25               Pain       Patient will report pain of 0/10 demonstrating a reduction of overall pain       Start:  06/27/25    Expected End:  08/08/25               Range of Motion       Patient will achieve spinal flexion to 75 degrees.       Start:  06/27/25    Expected End:  08/08/25               Strength       Patient will achieve bilateral hip flexion strength of 5/5       Start:  06/27/25    Expected End:  08/08/25            Patient will achieve bilateral hip abduction strength of 5/5       Start:  06/27/25    Expected End:  08/08/25            Patient will achieve bilateral knee flexion strength of 5/5       Start:  06/27/25    Expected End:  08/08/25            Patient will achieve bilateral knee extension strength of 5/5       Start:  06/27/25    Expected End:  08/08/25            Goals:   Active         Ambulation/movement         Patient will walk 1 mile pain free.         Start:  06/27/25    Expected End:  08/08/25                 Pain         Patient will report pain of 0/10 demonstrating a reduction of overall pain         Start:  06/27/25    Expected End:  08/08/25                 Range of Motion          Patient will achieve spinal flexion to 75 degrees.         Start:  06/27/25    Expected End:  08/08/25                 Strength         Patient will achieve bilateral hip flexion strength of 5/5         Start:  06/27/25    Expected End:  08/08/25              Patient will achieve bilateral hip abduction strength of 5/5         Start:  06/27/25    Expected End:  08/08/25              Patient will achieve bilateral knee flexion strength of 5/5         Start:  06/27/25    Expected End:  08/08/25              Patient will achieve bilateral knee extension strength of 5/5         Start:  06/27/25    Expected End:  08/08/25                   Kar Vargas, PT

## 2025-08-13 ENCOUNTER — CLINICAL SUPPORT (OUTPATIENT)
Dept: REHABILITATION | Facility: HOSPITAL | Age: OVER 89
End: 2025-08-13
Payer: MEDICARE

## 2025-08-13 DIAGNOSIS — G89.29 CHRONIC MIDLINE LOW BACK PAIN WITH BILATERAL SCIATICA: Primary | ICD-10-CM

## 2025-08-13 DIAGNOSIS — R53.1 DECREASED STRENGTH, ENDURANCE, AND MOBILITY: ICD-10-CM

## 2025-08-13 DIAGNOSIS — R68.89 DECREASED STRENGTH, ENDURANCE, AND MOBILITY: ICD-10-CM

## 2025-08-13 DIAGNOSIS — M54.42 CHRONIC MIDLINE LOW BACK PAIN WITH BILATERAL SCIATICA: Primary | ICD-10-CM

## 2025-08-13 DIAGNOSIS — M54.41 CHRONIC MIDLINE LOW BACK PAIN WITH BILATERAL SCIATICA: Primary | ICD-10-CM

## 2025-08-13 DIAGNOSIS — Z74.09 DECREASED STRENGTH, ENDURANCE, AND MOBILITY: ICD-10-CM

## 2025-08-13 PROCEDURE — 97530 THERAPEUTIC ACTIVITIES: CPT | Mod: PN

## (undated) DEVICE — DRAPE C-ARM/MOBILE XRAY 44X80

## (undated) DEVICE — DRESSING LEUKOPLAST FLEX 1X3IN

## (undated) DEVICE — GLOVE BIOGEL PI MICRO INDIC 7

## (undated) DEVICE — GAUZE SPONGE 4X4 12PLY

## (undated) DEVICE — CLOSURE SKIN STERI STRIP 1/2X4

## (undated) DEVICE — NDL HYPO REG 25G X 1 1/2

## (undated) DEVICE — DRESSING AQUACEL FOAM 5 X 5

## (undated) DEVICE — BLADE ELECTRO EDGE INSULATED

## (undated) DEVICE — SUT MONOCRYL 4-0 PS-2

## (undated) DEVICE — ELECTRODE REM PLYHSV RETURN 9

## (undated) DEVICE — SEE MEDLINE ITEM 146313

## (undated) DEVICE — DRESSING AQUACEL FOAM 3 X 3

## (undated) DEVICE — NDL SPINAL SPINOCAN 22GX3.5

## (undated) DEVICE — ADHESIVE MASTISOL VIAL 48/BX

## (undated) DEVICE — SEE MEDLINE ITEM 146292

## (undated) DEVICE — APPLICATOR CHLORAPREP ORN 26ML

## (undated) DEVICE — DRESSING TRANS 4X4 3/4

## (undated) DEVICE — COVER OVERHEAD SURG LT BLUE

## (undated) DEVICE — GLOVE BIOGEL SKINSENSE PI 7.0

## (undated) DEVICE — SEE MEDLINE ITEM 156905

## (undated) DEVICE — DRAPE LEICA MICROSCOPE 48X120

## (undated) DEVICE — TOWEL OR NONABSORB ADH 17X26

## (undated) DEVICE — SYR DISP LL 5CC

## (undated) DEVICE — SEE MEDLINE ITEM 152622

## (undated) DEVICE — CORD BIPOLAR 12 FOOT

## (undated) DEVICE — SEE MEDLINE ITEM 157150

## (undated) DEVICE — SUT ETHILON 3-0 PS2 18 BLK

## (undated) DEVICE — SEE MEDLINE ITEM 157125

## (undated) DEVICE — DRESSING SURGICAL 1/2X1/2

## (undated) DEVICE — SEE MEDLINE ITEM 157116

## (undated) DEVICE — ALCOHOL 70% ISOP W/GREEN 16OZ

## (undated) DEVICE — SUT 0 VICRYL / UR6 (J603)

## (undated) DEVICE — SPONGE DERMA 8PLY 2X2

## (undated) DEVICE — DRESSING TELFA STRL 4X3 LF

## (undated) DEVICE — SEE MEDLINE ITEM 157148

## (undated) DEVICE — DRAPE STERI-DRAPE 1000 17X11IN

## (undated) DEVICE — CONTAINER SPECIMEN STRL 4OZ

## (undated) DEVICE — SEE MEDLINE ITEM 157131

## (undated) DEVICE — BANDAGE ADHESIVE

## (undated) DEVICE — SKINMARKER W/RULER DEVON

## (undated) DEVICE — SEE MEDLINE ITEM 157117

## (undated) DEVICE — KIT SPINAL PATIENT CARE JACK

## (undated) DEVICE — DRESSING TEGADERM 2 3/8 X 2.75

## (undated) DEVICE — DRESSING AQUACEL FOAM 4X4IN

## (undated) DEVICE — SUT MCRYL PLUS 4-0 PS2 27IN

## (undated) DEVICE — BURR MIS CURVED 3.0MM

## (undated) DEVICE — DRESSING TEGADERM 4.4X5IN